# Patient Record
Sex: MALE | Race: WHITE | NOT HISPANIC OR LATINO | Employment: FULL TIME | URBAN - METROPOLITAN AREA
[De-identification: names, ages, dates, MRNs, and addresses within clinical notes are randomized per-mention and may not be internally consistent; named-entity substitution may affect disease eponyms.]

---

## 2017-01-06 ENCOUNTER — HOSPITAL ENCOUNTER (OUTPATIENT)
Dept: CARDIOLOGY | Facility: HOSPITAL | Age: 57
Setting detail: RECURRING SERIES
Discharge: HOME | End: 2017-01-06

## 2017-01-06 ENCOUNTER — TELEPHONE (OUTPATIENT)
Dept: CARDIOLOGY | Facility: CLINIC | Age: 57
End: 2017-01-06

## 2017-01-06 PROCEDURE — 85610 PROTHROMBIN TIME: CPT | Performed by: INTERNAL MEDICINE

## 2017-01-06 PROCEDURE — 36416 COLLJ CAPILLARY BLOOD SPEC: CPT | Performed by: INTERNAL MEDICINE

## 2017-01-06 NOTE — TELEPHONE ENCOUNTER
Pt calling, he was hired by the [facility]'s office & in their training he has to be tased & he has a mechanical heart valve, is this safe? Pt # [phone]. dmk

## 2017-02-03 ENCOUNTER — HOSPITAL ENCOUNTER (OUTPATIENT)
Dept: CARDIOLOGY | Facility: HOSPITAL | Age: 57
Setting detail: RECURRING SERIES
Discharge: HOME | End: 2017-02-03

## 2017-02-03 PROCEDURE — 36416 COLLJ CAPILLARY BLOOD SPEC: CPT

## 2017-02-03 PROCEDURE — 85610 PROTHROMBIN TIME: CPT

## 2017-03-03 ENCOUNTER — HOSPITAL ENCOUNTER (OUTPATIENT)
Dept: CARDIOLOGY | Facility: HOSPITAL | Age: 57
Setting detail: RECURRING SERIES
Discharge: HOME | End: 2017-03-03

## 2017-03-03 PROCEDURE — 36416 COLLJ CAPILLARY BLOOD SPEC: CPT

## 2017-03-03 PROCEDURE — 85610 PROTHROMBIN TIME: CPT

## 2017-03-06 RX ORDER — ATENOLOL 25 MG/1
25 TABLET ORAL DAILY
Qty: 30 TABLET | Refills: 0 | Status: SHIPPED | OUTPATIENT
Start: 2017-03-06 | End: 2017-04-09 | Stop reason: SDUPTHER

## 2017-03-31 ENCOUNTER — HOSPITAL ENCOUNTER (OUTPATIENT)
Dept: CARDIOLOGY | Facility: HOSPITAL | Age: 57
Setting detail: RECURRING SERIES
Discharge: HOME | End: 2017-03-31

## 2017-03-31 PROCEDURE — 85610 PROTHROMBIN TIME: CPT

## 2017-03-31 PROCEDURE — 36416 COLLJ CAPILLARY BLOOD SPEC: CPT

## 2017-04-10 RX ORDER — ATENOLOL 25 MG/1
TABLET ORAL
Qty: 30 TABLET | Refills: 0 | Status: SHIPPED | OUTPATIENT
Start: 2017-04-10 | End: 2017-05-04 | Stop reason: SDUPTHER

## 2017-05-04 ENCOUNTER — HOSPITAL ENCOUNTER (OUTPATIENT)
Dept: CARDIOLOGY | Facility: HOSPITAL | Age: 57
Setting detail: RECURRING SERIES
Discharge: HOME | End: 2017-05-04

## 2017-05-04 ENCOUNTER — OFFICE VISIT (OUTPATIENT)
Dept: CARDIOLOGY | Facility: CLINIC | Age: 57
End: 2017-05-04

## 2017-05-04 VITALS
DIASTOLIC BLOOD PRESSURE: 82 MMHG | HEIGHT: 78 IN | HEART RATE: 60 BPM | BODY MASS INDEX: 34.02 KG/M2 | SYSTOLIC BLOOD PRESSURE: 140 MMHG | WEIGHT: 294 LBS

## 2017-05-04 DIAGNOSIS — I34.0 NON-RHEUMATIC MITRAL REGURGITATION: ICD-10-CM

## 2017-05-04 DIAGNOSIS — I49.1 APC (ATRIAL PREMATURE CONTRACTIONS): ICD-10-CM

## 2017-05-04 DIAGNOSIS — I33.0 ACUTE BACTERIAL ENDOCARDITIS: Primary | ICD-10-CM

## 2017-05-04 DIAGNOSIS — I48.0 PAROXYSMAL ATRIAL FIBRILLATION (HCC): ICD-10-CM

## 2017-05-04 PROCEDURE — 93000 ELECTROCARDIOGRAM COMPLETE: CPT | Performed by: INTERNAL MEDICINE

## 2017-05-04 PROCEDURE — 36416 COLLJ CAPILLARY BLOOD SPEC: CPT

## 2017-05-04 PROCEDURE — 85610 PROTHROMBIN TIME: CPT

## 2017-05-04 PROCEDURE — 99214 OFFICE O/P EST MOD 30 MIN: CPT | Performed by: INTERNAL MEDICINE

## 2017-05-04 RX ORDER — ATENOLOL 25 MG/1
25 TABLET ORAL DAILY
Qty: 90 TABLET | Refills: 1 | Status: SHIPPED | OUTPATIENT
Start: 2017-05-04 | End: 2017-11-06 | Stop reason: SDUPTHER

## 2017-05-04 RX ORDER — WARFARIN SODIUM 5 MG/1
5 TABLET ORAL
Qty: 90 TABLET | Refills: 0 | Status: SHIPPED | OUTPATIENT
Start: 2017-05-04 | End: 2017-09-23 | Stop reason: SDUPTHER

## 2017-06-06 ENCOUNTER — HOSPITAL ENCOUNTER (OUTPATIENT)
Dept: CARDIOLOGY | Facility: HOSPITAL | Age: 57
Setting detail: RECURRING SERIES
Discharge: HOME | End: 2017-06-06

## 2017-06-06 PROCEDURE — 36416 COLLJ CAPILLARY BLOOD SPEC: CPT

## 2017-06-06 PROCEDURE — 85610 PROTHROMBIN TIME: CPT

## 2017-07-07 ENCOUNTER — TELEPHONE (OUTPATIENT)
Dept: CARDIOLOGY | Facility: CLINIC | Age: 57
End: 2017-07-07

## 2017-07-07 NOTE — TELEPHONE ENCOUNTER
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 Needs to go to the [facility] on "[employer]" fax# [phone]. Pt's # [phone] wants a call when complete. He knows you are out until Monday. dmk

## 2017-07-13 ENCOUNTER — HOSPITAL ENCOUNTER (OUTPATIENT)
Dept: CARDIOLOGY | Facility: HOSPITAL | Age: 57
Setting detail: RECURRING SERIES
Discharge: HOME | End: 2017-07-13

## 2017-07-13 PROCEDURE — 85610 PROTHROMBIN TIME: CPT

## 2017-07-13 PROCEDURE — 36416 COLLJ CAPILLARY BLOOD SPEC: CPT

## 2017-07-13 NOTE — TELEPHONE ENCOUNTER
The dictation system is still down nation wide.   This letter is scanned under the Media Tab and was also faxed, as directed earlier in the message stream, to the indicated number.  Thanks

## 2017-08-16 ENCOUNTER — HOSPITAL ENCOUNTER (OUTPATIENT)
Dept: CARDIOLOGY | Facility: HOSPITAL | Age: 57
Setting detail: RECURRING SERIES
Discharge: HOME | End: 2017-08-16

## 2017-08-16 PROCEDURE — 36416 COLLJ CAPILLARY BLOOD SPEC: CPT

## 2017-08-16 PROCEDURE — 85610 PROTHROMBIN TIME: CPT

## 2017-09-25 RX ORDER — WARFARIN SODIUM 5 MG/1
TABLET ORAL
Qty: 5 TABLET | Refills: 0 | Status: SHIPPED | OUTPATIENT
Start: 2017-09-25 | End: 2017-09-28 | Stop reason: SDUPTHER

## 2017-09-27 ENCOUNTER — HOSPITAL ENCOUNTER (OUTPATIENT)
Dept: CARDIOLOGY | Facility: HOSPITAL | Age: 57
Setting detail: RECURRING SERIES
Discharge: HOME | End: 2017-09-27

## 2017-09-27 PROCEDURE — 85610 PROTHROMBIN TIME: CPT

## 2017-09-27 PROCEDURE — 36416 COLLJ CAPILLARY BLOOD SPEC: CPT

## 2017-09-28 RX ORDER — WARFARIN SODIUM 5 MG/1
5 TABLET ORAL DAILY
Qty: 30 TABLET | Refills: 0 | Status: SHIPPED | OUTPATIENT
Start: 2017-09-28 | End: 2017-10-26 | Stop reason: SDUPTHER

## 2017-10-25 ENCOUNTER — HOSPITAL ENCOUNTER (OUTPATIENT)
Dept: CARDIOLOGY | Facility: HOSPITAL | Age: 57
Setting detail: RECURRING SERIES
Discharge: HOME | End: 2017-10-25

## 2017-10-25 PROCEDURE — 36416 COLLJ CAPILLARY BLOOD SPEC: CPT

## 2017-10-25 PROCEDURE — 85610 PROTHROMBIN TIME: CPT

## 2017-10-27 RX ORDER — WARFARIN SODIUM 5 MG/1
TABLET ORAL
Qty: 30 TABLET | Refills: 1 | Status: SHIPPED | OUTPATIENT
Start: 2017-10-27 | End: 2017-12-29 | Stop reason: SDUPTHER

## 2017-11-06 RX ORDER — ATENOLOL 25 MG/1
TABLET ORAL
Qty: 90 TABLET | Refills: 1 | Status: SHIPPED | OUTPATIENT
Start: 2017-11-06 | End: 2018-05-03 | Stop reason: SDUPTHER

## 2017-11-08 ENCOUNTER — HOSPITAL ENCOUNTER (OUTPATIENT)
Dept: CARDIOLOGY | Facility: HOSPITAL | Age: 57
Setting detail: RECURRING SERIES
Discharge: HOME | End: 2017-11-08

## 2017-11-08 PROCEDURE — 36416 COLLJ CAPILLARY BLOOD SPEC: CPT

## 2017-11-08 PROCEDURE — 85610 PROTHROMBIN TIME: CPT

## 2017-12-12 ENCOUNTER — HOSPITAL ENCOUNTER (OUTPATIENT)
Dept: CARDIOLOGY | Facility: HOSPITAL | Age: 57
Setting detail: RECURRING SERIES
Discharge: HOME | End: 2017-12-12

## 2017-12-12 PROCEDURE — 85610 PROTHROMBIN TIME: CPT

## 2017-12-12 PROCEDURE — 36416 COLLJ CAPILLARY BLOOD SPEC: CPT

## 2017-12-29 RX ORDER — WARFARIN SODIUM 5 MG/1
TABLET ORAL
Qty: 90 TABLET | Refills: 0 | Status: SHIPPED | OUTPATIENT
Start: 2017-12-29 | End: 2018-03-25 | Stop reason: SDUPTHER

## 2018-01-12 ENCOUNTER — HOSPITAL ENCOUNTER (OUTPATIENT)
Dept: CARDIOLOGY | Facility: HOSPITAL | Age: 58
Setting detail: RECURRING SERIES
Discharge: HOME | End: 2018-01-12

## 2018-01-12 PROCEDURE — 36416 COLLJ CAPILLARY BLOOD SPEC: CPT

## 2018-01-12 PROCEDURE — 85610 PROTHROMBIN TIME: CPT

## 2018-02-09 ENCOUNTER — HOSPITAL ENCOUNTER (OUTPATIENT)
Dept: CARDIOLOGY | Facility: HOSPITAL | Age: 58
Setting detail: RECURRING SERIES
Discharge: HOME | End: 2018-02-09

## 2018-02-09 PROCEDURE — 36416 COLLJ CAPILLARY BLOOD SPEC: CPT

## 2018-02-09 PROCEDURE — 85610 PROTHROMBIN TIME: CPT

## 2018-02-16 ENCOUNTER — HOSPITAL ENCOUNTER (OUTPATIENT)
Dept: CARDIOLOGY | Facility: HOSPITAL | Age: 58
Setting detail: RECURRING SERIES
Discharge: HOME | End: 2018-02-16

## 2018-02-16 PROCEDURE — 36416 COLLJ CAPILLARY BLOOD SPEC: CPT

## 2018-02-16 PROCEDURE — 85610 PROTHROMBIN TIME: CPT

## 2018-02-23 ENCOUNTER — HOSPITAL ENCOUNTER (OUTPATIENT)
Dept: CARDIOLOGY | Facility: HOSPITAL | Age: 58
Setting detail: RECURRING SERIES
Discharge: HOME | End: 2018-02-23

## 2018-02-23 PROCEDURE — 36416 COLLJ CAPILLARY BLOOD SPEC: CPT

## 2018-02-23 PROCEDURE — 85610 PROTHROMBIN TIME: CPT

## 2018-03-07 ENCOUNTER — HOSPITAL ENCOUNTER (OUTPATIENT)
Dept: CARDIOLOGY | Facility: HOSPITAL | Age: 58
Setting detail: RECURRING SERIES
Discharge: HOME | End: 2018-03-07

## 2018-03-07 PROCEDURE — 85610 PROTHROMBIN TIME: CPT

## 2018-03-07 PROCEDURE — 36416 COLLJ CAPILLARY BLOOD SPEC: CPT

## 2018-03-22 ENCOUNTER — HOSPITAL ENCOUNTER (OUTPATIENT)
Dept: CARDIOLOGY | Facility: HOSPITAL | Age: 58
Setting detail: RECURRING SERIES
Discharge: HOME | End: 2018-03-22

## 2018-03-22 PROCEDURE — 85610 PROTHROMBIN TIME: CPT

## 2018-03-22 PROCEDURE — 36416 COLLJ CAPILLARY BLOOD SPEC: CPT

## 2018-03-26 RX ORDER — WARFARIN SODIUM 5 MG/1
TABLET ORAL
Qty: 90 TABLET | Refills: 0 | Status: SHIPPED | OUTPATIENT
Start: 2018-03-26 | End: 2018-06-25 | Stop reason: SDUPTHER

## 2018-04-04 ENCOUNTER — HOSPITAL ENCOUNTER (OUTPATIENT)
Dept: CARDIOLOGY | Facility: HOSPITAL | Age: 58
Setting detail: RECURRING SERIES
Discharge: HOME | End: 2018-04-04

## 2018-04-04 PROCEDURE — 36416 COLLJ CAPILLARY BLOOD SPEC: CPT

## 2018-04-04 PROCEDURE — 85610 PROTHROMBIN TIME: CPT

## 2018-05-04 RX ORDER — ATENOLOL 25 MG/1
TABLET ORAL
Qty: 30 TABLET | Refills: 0 | Status: SHIPPED | OUTPATIENT
Start: 2018-05-04 | End: 2018-06-04 | Stop reason: SDUPTHER

## 2018-05-09 ENCOUNTER — HOSPITAL ENCOUNTER (OUTPATIENT)
Dept: CARDIOLOGY | Facility: HOSPITAL | Age: 58
Setting detail: RECURRING SERIES
Discharge: HOME | End: 2018-05-09

## 2018-05-09 PROCEDURE — 36416 COLLJ CAPILLARY BLOOD SPEC: CPT

## 2018-05-09 PROCEDURE — 85610 PROTHROMBIN TIME: CPT

## 2018-05-11 ENCOUNTER — TELEPHONE (OUTPATIENT)
Dept: CARDIOLOGY | Facility: CLINIC | Age: 58
End: 2018-05-11

## 2018-05-11 NOTE — TELEPHONE ENCOUNTER
He said it was ok to see Gabrielle, as long as he is ok with that. I made this telephone encounter for documentation. dmk

## 2018-05-11 NOTE — TELEPHONE ENCOUNTER
----- Message from Navi Fay MD sent at 5/11/2018  7:45 AM EDT -----  Regarding: RE: 1 YR FU  If ok with patient  ----- Message -----  From: Shanae Swift MA  Sent: 5/10/2018   4:30 PM  To: Navi Fay MD  Subject: FW: 1 YR FU                                      Is this ok for the patient to see Gabrielle?       ----- Message -----  From: Gemma Barreto  Sent: 5/10/2018   4:05 PM  To: Shanae Swift MA  Subject: 1 YR FU                                          Shanae,  Pako Donlon called today and is wanting a 1 year follow up appointment with Dr Calderón which would be in May but he only wants an appointment on a Wednesday. I went out to November and could not find one with Dr Fay and Pako is needing a refill on his meds.  I scheduled an appointment with TISH Chaudhry for 6/19/18 @ 2:00 pm.  Is this acceptable.  Just let me know.  Thank you,  Gemma

## 2018-05-23 ENCOUNTER — HOSPITAL ENCOUNTER (OUTPATIENT)
Dept: CARDIOLOGY | Facility: HOSPITAL | Age: 58
Setting detail: RECURRING SERIES
Discharge: HOME | End: 2018-05-23

## 2018-05-23 PROCEDURE — 36416 COLLJ CAPILLARY BLOOD SPEC: CPT

## 2018-05-23 PROCEDURE — 85610 PROTHROMBIN TIME: CPT

## 2018-06-04 RX ORDER — ATENOLOL 25 MG/1
25 TABLET ORAL DAILY
Qty: 30 TABLET | Refills: 0 | Status: SHIPPED | OUTPATIENT
Start: 2018-06-04 | End: 2018-07-04 | Stop reason: SDUPTHER

## 2018-06-19 ENCOUNTER — OFFICE VISIT (OUTPATIENT)
Dept: CARDIOLOGY | Facility: CLINIC | Age: 58
End: 2018-06-19

## 2018-06-19 ENCOUNTER — HOSPITAL ENCOUNTER (OUTPATIENT)
Dept: CARDIOLOGY | Facility: HOSPITAL | Age: 58
Setting detail: RECURRING SERIES
Discharge: HOME | End: 2018-06-19

## 2018-06-19 VITALS
BODY MASS INDEX: 30.42 KG/M2 | DIASTOLIC BLOOD PRESSURE: 94 MMHG | WEIGHT: 270 LBS | SYSTOLIC BLOOD PRESSURE: 140 MMHG | HEART RATE: 59 BPM

## 2018-06-19 DIAGNOSIS — I48.0 PAROXYSMAL ATRIAL FIBRILLATION (HCC): ICD-10-CM

## 2018-06-19 DIAGNOSIS — Z86.79 H/O BACTERIAL ENDOCARDITIS: ICD-10-CM

## 2018-06-19 DIAGNOSIS — I34.0 NON-RHEUMATIC MITRAL REGURGITATION: Primary | ICD-10-CM

## 2018-06-19 DIAGNOSIS — I33.0 ACUTE BACTERIAL ENDOCARDITIS: ICD-10-CM

## 2018-06-19 DIAGNOSIS — I49.1 APC (ATRIAL PREMATURE CONTRACTIONS): ICD-10-CM

## 2018-06-19 DIAGNOSIS — R94.31 ABNORMAL EKG: ICD-10-CM

## 2018-06-19 PROCEDURE — 99214 OFFICE O/P EST MOD 30 MIN: CPT | Performed by: NURSE PRACTITIONER

## 2018-06-19 PROCEDURE — 36416 COLLJ CAPILLARY BLOOD SPEC: CPT

## 2018-06-19 PROCEDURE — 85610 PROTHROMBIN TIME: CPT

## 2018-06-19 PROCEDURE — 93000 ELECTROCARDIOGRAM COMPLETE: CPT | Performed by: NURSE PRACTITIONER

## 2018-06-19 NOTE — PATIENT INSTRUCTIONS
Endocarditis  Endocarditis is an infection of the inner layer of the heart (endocardium) or an infection of the heart valves. Endocarditis can cause growths inside the heart or on the heart valves. Over time, these growths can destroy heart tissue and cause heart failure or problems with heart rhythm. They can also cause stroke if they break away and form a blood clot in the brain. Early treatment offers the best chance for curing endocarditis and preventing complications.  What are the causes?  This condition may be caused by:  · Germs that normally live in or on your body. The germs that most commonly cause endocarditis are bacteria.  · A fungus.    What increases the risk?  This condition is more likely to develop in people who have:  · A heart defect.  · Artificial (prosthetic) heart valves.  · An abnormal or damaged heart valve.  · A history of endocarditis.    Having certain procedures may also increase the risk of germs getting into the heart or bloodstream.  What are the signs or symptoms?  Symptoms of this condition may start suddenly, or they may start slowly and gradually get worse.  Symptoms include:  · Fever.  · Chills.  · Night sweats.  · Muscle aches.  · Fatigue.  · Weakness.  · Shortness of breath.  · Chest pain.  · Blood spots in the eyes.  · Bleeding under the fingernails or toenails.  · Painless red spots on the palms.  · Painful lumps in the fingertips or toes.  · Swelling in the feet or ankles.    How is this diagnosed?  This condition may be diagnosed based on:  · A physical exam. Your health care provider will listen to your heart to check for abnormal heart sounds (murmur). He or she may also use a scope to check for bleeding at the back of your eyes (retinas).  · Tests. They may include:  ? Blood tests to look for the germs that cause endocarditis.  ? Imaging tests. A chest x-ray, CT scan, or echocardiogram may be used to create an image of your heart. A type of echocardiogram called a  transesophageal echocardiogram may be done to look at certain heart valves more closely.    How is this treated?  Treatment for this condition depends on the cause of the endocarditis. Treatment may include:  · Antibiotic medicines. These may be given through a tube into one of your veins (IV antibiotics) or taken by mouth. You may need to be on more than one antibiotic medicine.  · Surgery to replace your heart valve. You may need surgery if:  ? The endocarditis does not respond to treatment.  ? You develop complications.  ? Your heart valve is severely damaged.    Follow these instructions at home:  Medicines  · Take over-the-counter and prescription medicines only as told by your health care provider.  · If you were prescribed an antibiotic medicine, take it as told by your health care provider. Do not stop taking the antibiotic even if you start to feel better. You may need to be on intravenous antibiotics for several weeks.  · Do not use IV drugs unless it is part of your medical treatment.  Lifestyle  · Do not get tattoos or body piercings.  · Practice good oral hygiene. This includes:  ? Brushing and flossing regularly.  ? Scheduling routine dental appointments.  · Do not use any products that contain nicotine or tobacco, such as cigarettes and e-cigarettes. If you need help quitting, ask your health care provider.  · Limit alcohol intake to no more than 1 drink a day for nonpregnant women and 2 drinks a day for men. One drink equals 12 oz of beer, 5 oz of wine, or 1½ oz of hard liquor.  General instructions  · Let your health care provider know before you have any dental or surgical procedures. You may need to take antibiotics before the procedure.  · Tell all of your health care providers, including your dentist, that you have had endocarditis.  · Gradually resume your usual activities.  · Keep all follow-up visits as told by your health care provider. This is important.  Contact a health care provider  if:  · You have a fever.  · Your symptoms do not improve.  · Your symptoms get worse.  · Your symptoms come back.  Get help right away if:  · You have trouble breathing.  · You have chest pain.  · You have symptoms of a stroke. These include:  ? Sudden weakness.  ? Numbness.  ? Confusion.  ? Trouble talking or understanding.  ? A severe headache.  Summary  · Endocarditis is an infection of the inner layer of the heart (endocardium) or heart valves. It is caused by bacteria or a fungus.  · Having certain heart conditions or procedures may increase the risk of endocarditis.  · Antibiotics are an important treatment for endocarditis. Take these medicines as told by your health care provider. Do not stop taking them even if you start to feel better.  · Tell all of your health care providers, including your dentist, that you have had endocarditis.  This information is not intended to replace advice given to you by your health care provider. Make sure you discuss any questions you have with your health care provider.  Document Released: 12/18/2006 Document Revised: 09/29/2017 Document Reviewed: 09/29/2017  [employer] Interactive Patient Education © 2018 [employer]

## 2018-06-19 NOTE — PROGRESS NOTES
Date of Office Visit: 2018  Encounter Provider: COREY Garces  Place of Service: [facility] CARDIOLOGY  Patient Name: Elliot Sebastian  :1960    Chief Complaint   Patient presents with   • Atrial Fibrillation   • Congestive Heart Failure   • Cardiac Valve Problem   • Follow-up   :     HPI: Elliot Sebastian is a 58 y.o. male is a patient of Dr. Fay. I am seeing him today and have reviewed the records.     His past medical history is significant of Paroxysmal atrial fibrillation, bacterial endocarditis, mitral valve insufficiency status post mitral valve replacement, bilateral pleural effusion, pulmonary hypertension, gout, DVT, and BPH.    He had a history of severe mitral regurgitation and acute bacterial endocarditis.  He had some congestive heart failure and eventually underwent a [place] valve replacement of the mitral valve in 2013 by .  He recovered well from that.  He had atrial fibrillation in the perioperative period and was on amiodarone at one point.  This was discontinued in .  He had no further recurrence of atrial fibrillation.    He was last seen in the office in May 2017 and reported that every now and then he had palpitations.  There were no adjustments made to his medication regimen and he was to return in one year.    He presents today for annual reassessment. He denies palpitation, shortness of breath, edema, lightheadedness, chest pain, or fatigue.  Approximately one month ago he started to rise and a couple times a week.  He exercises for 1-2 miles at a time and incorporate some walking.  He denies shortness of breath or chest discomfort with that.  He does have snoring history but denies apnea.  He denies daytime fatigue or sleepiness.  He tells me that 4 months ago he began a new job and he is now on third shift.  He is working a [employer].  January plan start at the [employer] if he passes his physical.  He does  not have a blood pressure cuff at home and has not been able to check his blood pressures.      No Known Allergies    Past Medical History:   Diagnosis Date   • Acute bacterial endocarditis    • Anemia    • APC (atrial premature contractions)    • Atrial fibrillation    • BPH (benign prostatic hypertrophy)    • CHF (congestive heart failure)    • Cholelithiasis    • Chronic constipation    • Deep vein thrombophlebitis of leg     DISTAL   • Disease of thyroid gland    • Gout    • Intestinal obstruction    • Ischemic enteritis    • Left heart failure, NYHA class 3     CLASS 111 LEFT SYSTOIC CONGESTIVE HEART FAILURE   • Mitral regurgitation    • Pleural effusion, bilateral    • Pulmonary hypertension    • Superior mesenteric artery aneurysm    • Umbilical hernia    • Vitamin D deficiency        Past Surgical History:   Procedure Laterality Date   • APPENDECTOMY     • CHOLECYSTECTOMY     • EXPLORATION NECK FOR POSTOP HEMORRHAGE / THROMBOSIS / INFECTION     • MITRAL VALVE REPLACEMENT  01/03/2013    33MM [employer] MECHANICAL VALVE, PRESERVATION OF CORDS TO SUBVALVULAR APPARATUS AND DOROTHY GORE-FLORI CORD IN THE P2 AREA, DEBRIDEMENT OF ANTERIOR AND POSTERIOR MITRAL LEAFLET      Family and social history reviewed.     Review of Systems     All other systems were reviewed and are negative        Objective:     Vitals:    06/19/18 1412   BP: 140/94   BP Location: Left arm   Patient Position: Sitting   Pulse: 59   Weight: 122 kg (270 lb)     Body mass index is 30.42 kg/m².    PHYSICAL EXAM:  Physical Exam   Constitutional: He is oriented to person, place, and time. He appears well-developed and well-nourished. No distress.   HENT:   Head: Normocephalic.   Eyes: Conjunctivae are normal.   Neck: Normal range of motion. No JVD present.   Cardiovascular: Normal rate, regular rhythm and intact distal pulses.    Murmur heard.   Systolic murmur is present  Prosthetic S1  Pulses:       Carotid pulses are 2+ on the right side, and 2+ on the  left side.       Radial pulses are 2+ on the right side, and 2+ on the left side.        Posterior tibial pulses are 2+ on the right side, and 2+ on the left side.   Pulmonary/Chest: Effort normal and breath sounds normal. No respiratory distress. He has no wheezes. He has no rhonchi. He has no rales. He exhibits no tenderness.   Abdominal: Soft. Bowel sounds are normal. He exhibits no distension.   Musculoskeletal: Normal range of motion. He exhibits no edema.   Neurological: He is alert and oriented to person, place, and time.   Skin: Skin is warm, dry and intact. No rash noted. He is not diaphoretic. No cyanosis.   Psychiatric: He has a normal mood and affect. His behavior is normal. Judgment and thought content normal.         ECG 12 Lead  Date/Time: 6/19/2018 2:24 PM  Performed by: ALLIE PETERS  Authorized by: ALLIE PETERS   Comparison: compared with previous ECG from 5/4/2017  Similar to previous ECG  Rhythm: sinus rhythm  Rate: bradycardic  BPM: 59  Conduction: 1st degree  ST Segments: ST segments normal  T depression: III and aVF  QRS axis: left  Clinical impression: abnormal ECG  Comments: More prominent t wave inversion in inferior leads             Current Outpatient Prescriptions   Medication Sig Dispense Refill   • ascorbic acid (TH VITAMIN C) 1000 MG tablet Take 2,000 mg by mouth.     • atenolol (TENORMIN) 25 MG tablet Take 1 tablet by mouth Daily. 30 tablet 0   • Cholecalciferol (VITAMIN D3) 2000 UNITS capsule Take 5,000 Units by mouth.     • Multiple Vitamins-Minerals (MENS MULTIVITAMIN PLUS) tablet Take by mouth.     • warfarin (COUMADIN) 5 MG tablet TAKE ONE TABLET BY MOUTH ONCE DAILY 90 tablet 0     No current facility-administered medications for this visit.      Assessment:       Diagnosis Plan   1. Non-rheumatic mitral regurgitation     2. Paroxysmal atrial fibrillation  ECG 12 Lead   3. H/O bacterial endocarditis     4. APC (atrial premature contractions)     5. Abnormal EKG  Treadmill  Stress Test   6. Acute bacterial endocarditis          Orders Placed This Encounter   Procedures   • Treadmill Stress Test     Standing Status:   Future     Standing Expiration Date:   6/19/2019     Order Specific Question:   What stress agent will be used?     Answer:   Exercise with possible pharmacologic     Order Specific Question:   Reason for Exam:     Answer:   abnormal EKG   • ECG 12 Lead     This order was created via procedure documentation       Plan:       1. Paroxysmal atrial fibrillation this was in the perioperative setting. No recurrence    2. History of bacterial endocarditis and severe mitral regurgitation status post mitral valve replacement. remains on coumadin    3. Long term anticoagulant on warfarin-INR checked in our clinic    4. History of DVT- prior to 01/2013.     5. Abnormal EKG- more prominent T wave inversion in lead II, and new T wave inversion in avF. Treadmill only stress test ordered.     6. Elevated blood pressure- he is to monitor at local pharmacies and call if >140/90. I explained goal BP is <130/80. He verbalized understanding    Follow up in  5-6 months with Dr. Fay    Patient was instructed to call the office if new symptoms develop or report to nearest ER if heart attack or stroke is suspected.    It has been a pleasure to participate in this patient's care.      Thank you,  COREY Garces      **Lu Disclaimer:**  Much of this encounter note is an electronic transcription/translation of spoken language to printed text. The electronic translation of spoken language may permit erroneous, or at times, nonsensical words or phrases to be inadvertently transcribed. Although I have reviewed the note for such errors, some may still exist.

## 2018-06-25 RX ORDER — WARFARIN SODIUM 5 MG/1
5 TABLET ORAL DAILY
Qty: 90 TABLET | Refills: 0 | Status: SHIPPED | OUTPATIENT
Start: 2018-06-25 | End: 2018-09-29 | Stop reason: SDUPTHER

## 2018-07-05 RX ORDER — ATENOLOL 25 MG/1
TABLET ORAL
Qty: 30 TABLET | Refills: 5 | Status: SHIPPED | OUTPATIENT
Start: 2018-07-05 | End: 2018-12-29 | Stop reason: SDUPTHER

## 2018-07-11 ENCOUNTER — HOSPITAL ENCOUNTER (OUTPATIENT)
Dept: CARDIOLOGY | Facility: HOSPITAL | Age: 58
Discharge: HOME | End: 2018-07-11
Admitting: NURSE PRACTITIONER

## 2018-07-11 ENCOUNTER — HOSPITAL ENCOUNTER (OUTPATIENT)
Dept: CARDIOLOGY | Facility: HOSPITAL | Age: 58
Setting detail: RECURRING SERIES
Discharge: HOME | End: 2018-07-11

## 2018-07-11 DIAGNOSIS — R94.31 ABNORMAL EKG: ICD-10-CM

## 2018-07-11 LAB
BH CV STRESS BP STAGE 1: NORMAL
BH CV STRESS BP STAGE 2: NORMAL
BH CV STRESS BP STAGE 3: NORMAL
BH CV STRESS DURATION MIN STAGE 1: 3
BH CV STRESS DURATION MIN STAGE 2: 3
BH CV STRESS DURATION MIN STAGE 3: 3
BH CV STRESS DURATION SEC STAGE 1: 0
BH CV STRESS DURATION SEC STAGE 2: 0
BH CV STRESS DURATION SEC STAGE 3: 0
BH CV STRESS GRADE STAGE 1: 10
BH CV STRESS GRADE STAGE 2: 12
BH CV STRESS GRADE STAGE 3: 14
BH CV STRESS HR STAGE 1: 97
BH CV STRESS HR STAGE 2: 120
BH CV STRESS HR STAGE 3: 149
BH CV STRESS METS STAGE 1: 5
BH CV STRESS METS STAGE 2: 7.5
BH CV STRESS METS STAGE 3: 10
BH CV STRESS PROTOCOL 1: NORMAL
BH CV STRESS RECOVERY BP: NORMAL MMHG
BH CV STRESS RECOVERY HR: 93 BPM
BH CV STRESS SPEED STAGE 1: 1.7
BH CV STRESS SPEED STAGE 2: 2.5
BH CV STRESS SPEED STAGE 3: 3.4
BH CV STRESS STAGE 1: 1
BH CV STRESS STAGE 2: 2
BH CV STRESS STAGE 3: 3
MAXIMAL PREDICTED HEART RATE: 163 BPM
PERCENT MAX PREDICTED HR: 91.41 %
STRESS BASELINE BP: NORMAL MMHG
STRESS BASELINE HR: 74 BPM
STRESS PERCENT HR: 108 %
STRESS POST ESTIMATED WORKLOAD: 10 METS
STRESS POST EXERCISE DUR MIN: 9 MIN
STRESS POST EXERCISE DUR SEC: 0 SEC
STRESS POST PEAK BP: NORMAL MMHG
STRESS POST PEAK HR: 149 BPM
STRESS TARGET HR: 139 BPM

## 2018-07-11 PROCEDURE — 36416 COLLJ CAPILLARY BLOOD SPEC: CPT

## 2018-07-11 PROCEDURE — 93017 CV STRESS TEST TRACING ONLY: CPT

## 2018-07-11 PROCEDURE — 93018 CV STRESS TEST I&R ONLY: CPT | Performed by: INTERNAL MEDICINE

## 2018-07-11 PROCEDURE — 93016 CV STRESS TEST SUPVJ ONLY: CPT | Performed by: INTERNAL MEDICINE

## 2018-07-11 PROCEDURE — 85610 PROTHROMBIN TIME: CPT

## 2018-08-27 ENCOUNTER — HOSPITAL ENCOUNTER (OUTPATIENT)
Dept: CARDIOLOGY | Facility: HOSPITAL | Age: 58
Setting detail: RECURRING SERIES
Discharge: HOME | End: 2018-08-27

## 2018-08-27 PROCEDURE — 85610 PROTHROMBIN TIME: CPT

## 2018-08-27 PROCEDURE — 36416 COLLJ CAPILLARY BLOOD SPEC: CPT

## 2018-09-21 ENCOUNTER — HOSPITAL ENCOUNTER (OUTPATIENT)
Dept: CARDIOLOGY | Facility: HOSPITAL | Age: 58
Setting detail: RECURRING SERIES
Discharge: HOME | End: 2018-09-21

## 2018-09-21 PROCEDURE — 85610 PROTHROMBIN TIME: CPT

## 2018-09-21 PROCEDURE — 36416 COLLJ CAPILLARY BLOOD SPEC: CPT

## 2018-10-01 RX ORDER — WARFARIN SODIUM 5 MG/1
TABLET ORAL
Qty: 90 TABLET | Refills: 0 | Status: SHIPPED | OUTPATIENT
Start: 2018-10-01 | End: 2018-12-29 | Stop reason: SDUPTHER

## 2018-10-19 ENCOUNTER — ANTICOAGULATION VISIT (OUTPATIENT)
Dept: PHARMACY | Facility: HOSPITAL | Age: 58
End: 2018-10-19

## 2018-10-19 DIAGNOSIS — Z95.2 HX OF MITRAL VALVE REPLACEMENT WITH MECHANICAL VALVE: ICD-10-CM

## 2018-10-19 DIAGNOSIS — I48.0 PAROXYSMAL ATRIAL FIBRILLATION (HCC): ICD-10-CM

## 2018-10-19 LAB
INR PPP: 2.6 (ref 0.91–1.09)
PROTHROMBIN TIME: 31.3 SECONDS (ref 10–13.8)

## 2018-10-19 PROCEDURE — 85610 PROTHROMBIN TIME: CPT

## 2018-10-19 PROCEDURE — G0463 HOSPITAL OUTPT CLINIC VISIT: HCPCS

## 2018-10-19 PROCEDURE — 36416 COLLJ CAPILLARY BLOOD SPEC: CPT

## 2018-10-19 NOTE — PROGRESS NOTES
Anticoagulation Clinic Progress Note  Anticoagulation Summary  As of 10/19/2018    INR goal:   2.5-3.5   TTR:   --   Today's INR:   2.6   Warfarin maintenance plan:   2.5 mg on Wed; 5 mg all other days   Weekly warfarin total:   32.5 mg   Plan last modified:   Sammie Holbrook RPH (10/19/2018)   Next INR check:   2018   Target end date:   Indefinite    Indications    Atrial fibrillation (CMS/HCC) [I48.91]  Hx of mitral valve replacement with mechanical valve [Z95.2] [Z95.2]             Anticoagulation Episode Summary     INR check location:       Preferred lab:       Send INR reminders to:   [facility]    Comments:         Anticoagulation Care Providers     Provider Role Specialty Phone number    Navi Fay MD Referring Cardiology [phone]    Sammie Holbrook RPH Responsible Pharmacy             Drug interactions: no new meds  Diet: consistent    Clinic Interview:  Patient Findings     Negatives:   Signs/symptoms of thrombosis, Signs/symptoms of bleeding,   Laboratory test error suspected, Change in health, Change in alcohol use,   Change in activity, Upcoming invasive procedure, Emergency department   visit, Upcoming dental procedure, Missed doses, Extra doses, Change in   medications, Change in diet/appetite, Hospital admission, Bruising, Other   complaints      Clinical Outcomes     Negatives:   Major bleeding event, Thromboembolic event,   Anticoagulation-related hospital admission, Anticoagulation-related ED   visit, Anticoagulation-related fatality        Education:  Elliot Sebastian is a new start in the [facility]. We discussed the followin) Warfarin's indication, mechanism, and dosing  2) Enforced the importance of taking warfarin as instructed and at the same time every day, preferably in the evening so that we can make dose adjustments more easily following subsequent clinic visits  3) What he should do about a missed dose; pts can take missed doses  within about 12 hours of their usual scheduled dose, but he was instructed on the importance of not doubling up on doses unless told to do so by the [facility]  4) Explained possible side effects of warfarin therapy, including increased risk of bleeding, s/sx of bleeding and s/sx of any additional clots/PE/CVA.   5) Discussed monitoring of warfarin, the INR, goal INR range, and the frequency of monitoring  6) Reviewed drug/food/tobacco/EtOH interactions and provided written information covering these topics in more detail, explaining that green, leafy vegetables interact most heavily with warfarin  7) Instructed the pt not to take or discontinue any medications without informing his physician/pharmacist and reminded him to inform us of any dietary changes, as well  8) Explained that he would be coming into the clinic more frequently in these first few weeks of therapy as we try to adjust his dose and achieve a therapeutic INR x 2 consecutive readings. Once that is achieved, patient will follow up in clinic every 4 weeks, on average.    He stated no problems with transportation or scheduling clinic appts in this manner. he expressed understanding of the information provided and has no additional questions at this time.    Elliotkishore Sebastian was presented with a copy of the Patients Rights and Responsibilities. he expressed verbal consent and agreement to receive care in the [facility] under the current collaborative care agreement with [facility].       INR History:  Previous INR data from [facility] is recorded in [facility] and scanned into the patient's chart in [facility]. These results have been analyzed and reviewed.      Plan:  1. INR is Therapeutic today- see above in Anticoagulation Summary.   Will instruct Elliot Sebastian to Continue their warfarin regimen- see above in Anticoagulation Summary.  2. Follow up in 2 weeks  3. Patient declines warfarin  refills.  4. Verbal and written information provided. Patient expresses understanding and has no further questions at this time.    Sammie Holbrook [facility]

## 2018-11-02 ENCOUNTER — ANTICOAGULATION VISIT (OUTPATIENT)
Dept: PHARMACY | Facility: HOSPITAL | Age: 58
End: 2018-11-02

## 2018-11-02 DIAGNOSIS — Z95.2 HX OF MITRAL VALVE REPLACEMENT WITH MECHANICAL VALVE: ICD-10-CM

## 2018-11-02 LAB
INR PPP: 2 (ref 0.91–1.09)
PROTHROMBIN TIME: 24 SECONDS (ref 10–13.8)

## 2018-11-02 PROCEDURE — 85610 PROTHROMBIN TIME: CPT

## 2018-11-02 PROCEDURE — G0463 HOSPITAL OUTPT CLINIC VISIT: HCPCS

## 2018-11-02 PROCEDURE — 36416 COLLJ CAPILLARY BLOOD SPEC: CPT

## 2018-11-02 NOTE — PROGRESS NOTES
Anticoagulation Clinic Progress Note    Anticoagulation Summary  As of 11/2/2018    INR goal:   2.5-3.5   TTR:   0.0 % (4 d)   Today's INR:   2.0!   Warfarin maintenance plan:   5 mg every day   Weekly warfarin total:   35 mg   Plan last modified:   Sammie Holbrook RPH (11/2/2018)   Next INR check:   11/16/2018   Target end date:   Indefinite    Indications    Atrial fibrillation (CMS/HCC) [I48.91]  Hx of mitral valve replacement with mechanical valve [Z95.2] [Z95.2]             Anticoagulation Episode Summary     INR check location:       Preferred lab:       Send INR reminders to:   [facility] [place]    Comments:         Anticoagulation Care Providers     Provider Role Specialty Phone number    Navi Fay MD Referring Cardiology [phone]    Sammie Holbrook RPH Responsible Pharmacy           Drug interactions: has remained unchanged.  Diet: has remained unchanged.    Clinic Interview:  Patient Findings     Negatives:   Signs/symptoms of thrombosis, Signs/symptoms of bleeding,   Laboratory test error suspected, Change in health, Change in alcohol use,   Change in activity, Upcoming invasive procedure, Emergency department   visit, Upcoming dental procedure, Missed doses, Extra doses, Change in   medications, Change in diet/appetite, Hospital admission, Bruising, Other   complaints      Clinical Outcomes     Negatives:   Major bleeding event, Thromboembolic event,   Anticoagulation-related hospital admission, Anticoagulation-related ED   visit, Anticoagulation-related fatality        INR History:  Anticoagulation Monitoring 10/19/2018 11/2/2018   INR 2.6 2.0   INR Date 10/19/2018 11/2/2018   INR Goal 2.5-3.5 2.5-3.5   Trend - Up   Last Week Total 0 mg 32.5 mg   Next Week Total 32.5 mg 35 mg   Sun 5 mg 5 mg   Mon 5 mg 5 mg   Tue 5 mg 5 mg   Wed 2.5 mg 5 mg   Thu 5 mg 5 mg   Fri 5 mg 5 mg   Sat 5 mg 5 mg   Visit Report - -       Previous INR data from [facility] is recorded in [facility] and scanned into the patient's chart in [facility]. These results have been analyzed and reviewed.      Plan:  1. INR is Subtherapeutic today- see above in Anticoagulation Summary.  Will instruct Elliot Sebastian to Increase their warfarin regimen- see above in Anticoagulation Summary.  2. Follow up in 2 weeks  3. Patient declines warfarin refills.  4. Verbal and written information provided. Patient expresses understanding and has no further questions at this time.    Sammie Holbrook RPH

## 2018-11-16 ENCOUNTER — ANTICOAGULATION VISIT (OUTPATIENT)
Dept: PHARMACY | Facility: HOSPITAL | Age: 58
End: 2018-11-16

## 2018-11-16 DIAGNOSIS — Z95.2 HX OF MITRAL VALVE REPLACEMENT WITH MECHANICAL VALVE: ICD-10-CM

## 2018-11-16 LAB
INR PPP: 3.2 (ref 0.91–1.09)
PROTHROMBIN TIME: 38.5 SECONDS (ref 10–13.8)

## 2018-11-16 PROCEDURE — 85610 PROTHROMBIN TIME: CPT

## 2018-11-16 PROCEDURE — 36416 COLLJ CAPILLARY BLOOD SPEC: CPT

## 2018-11-16 NOTE — PROGRESS NOTES
Anticoagulation Clinic Progress Note    Anticoagulation Summary  As of 11/16/2018    INR goal:   2.5-3.5   TTR:   43.8 % (2.6 wk)   INR used for dosing:   3.2 (11/16/2018)   Warfarin maintenance plan:   5 mg every day   Weekly warfarin total:   35 mg   No change documented:   Manisha Benjamin   Plan last modified:   Sammie Holbrook RPH (11/2/2018)   Next INR check:   11/30/2018   Priority:   High   Target end date:   Indefinite    Indications    Atrial fibrillation (CMS/HCC) [I48.91]  Hx of mitral valve replacement with mechanical valve [Z95.2] [Z95.2]             Anticoagulation Episode Summary     INR check location:       Preferred lab:       Send INR reminders to:    CHARLES MENDEZ [facility]    Comments:         Anticoagulation Care Providers     Provider Role Specialty Phone number    Navi Fay MD Referring Cardiology [phone]    Sammie Holbrook RPH Responsible Pharmacy [phone]          Drug interactions: has remained unchanged.  Diet: has remained unchanged.    Clinic Interview:  Patient Findings     Negatives:   Signs/symptoms of thrombosis, Signs/symptoms of bleeding,   Laboratory test error suspected, Change in health, Change in alcohol use,   Change in activity, Upcoming invasive procedure, Emergency department   visit, Upcoming dental procedure, Missed doses, Extra doses, Change in   medications, Change in diet/appetite, Hospital admission, Bruising, Other   complaints      Clinical Outcomes     Negatives:   Major bleeding event, Thromboembolic event,   Anticoagulation-related hospital admission, Anticoagulation-related ED   visit, Anticoagulation-related fatality        INR History:  Anticoagulation Monitoring 10/19/2018 11/2/2018 11/16/2018   INR 2.6 2.0 3.2   INR Date 10/19/2018 11/2/2018 11/16/2018   INR Goal 2.5-3.5 2.5-3.5 2.5-3.5   Trend - Up Same   Last Week Total 0 mg 32.5 mg 35 mg   Next Week Total 32.5 mg 35 mg 35 mg   Sun 5 mg 5 mg 5 mg   Mon 5 mg 5 mg 5 mg   Tue 5 mg 5 mg 5  mg   Wed 2.5 mg 5 mg 5 mg   Thu 5 mg 5 mg 5 mg   Fri 5 mg 5 mg 5 mg   Sat 5 mg 5 mg 5 mg   Visit Report - - -   Some recent data might be hidden       Plan:  1. INR is therapeutic today- see above in Anticoagulation Summary.   Will instruct Elliot Sebastian to continue their warfarin regimen- see above in Anticoagulation Summary.  2. Follow up in 2 weeks.  3. Patient declines warfarin refills.  4. Verbal and written information provided. Patient expresses understanding and has no further questions at this time.    Manisha Benjamin

## 2018-11-30 ENCOUNTER — ANTICOAGULATION VISIT (OUTPATIENT)
Dept: PHARMACY | Facility: HOSPITAL | Age: 58
End: 2018-11-30

## 2018-11-30 DIAGNOSIS — Z95.2 HX OF MITRAL VALVE REPLACEMENT WITH MECHANICAL VALVE: ICD-10-CM

## 2018-11-30 LAB
INR PPP: 2.8 (ref 0.91–1.09)
PROTHROMBIN TIME: 34.2 SECONDS (ref 10–13.8)

## 2018-11-30 PROCEDURE — 36416 COLLJ CAPILLARY BLOOD SPEC: CPT

## 2018-11-30 PROCEDURE — 85610 PROTHROMBIN TIME: CPT

## 2018-11-30 NOTE — PROGRESS NOTES
Anticoagulation Clinic Progress Note    Anticoagulation Summary  As of 2018    INR goal:   2.5-3.5   TTR:   67.9 % (1.1 mo)   INR used for dosin.8 (2018)   Warfarin maintenance plan:   5 mg every day   Weekly warfarin total:   35 mg   No change documented:   Sara Mukherjee   Plan last modified:   Sammie Holbrook RPH (2018)   Next INR check:   2018   Priority:   High   Target end date:   Indefinite    Indications    Atrial fibrillation (CMS/HCC) [I48.91]  Hx of mitral valve replacement with mechanical valve [Z95.2] [Z95.2]             Anticoagulation Episode Summary     INR check location:       Preferred lab:       Send INR reminders to:    CHARLES MENDEZ [facility]    Comments:         Anticoagulation Care Providers     Provider Role Specialty Phone number    Navi Fay MD Referring Cardiology [phone]    Sammie Holbrook RPH Responsible Pharmacy [phone]          Drug interactions: has remained unchanged.  Diet: has remained unchanged.    Clinic Interview:  Patient Findings     Negatives:   Signs/symptoms of thrombosis, Signs/symptoms of bleeding,   Laboratory test error suspected, Change in health, Change in alcohol use,   Change in activity, Upcoming invasive procedure, Emergency department   visit, Upcoming dental procedure, Missed doses, Extra doses, Change in   medications, Change in diet/appetite, Hospital admission, Bruising, Other   complaints      Clinical Outcomes     Negatives:   Major bleeding event, Thromboembolic event,   Anticoagulation-related hospital admission, Anticoagulation-related ED   visit, Anticoagulation-related fatality        INR History:  Anticoagulation Monitoring 2018   INR 2.0 3.2 2.8   INR Date 2018   INR Goal 2.5-3.5 2.5-3.5 2.5-3.5   Trend Up Same Same   Last Week Total 32.5 mg 35 mg 35 mg   Next Week Total 35 mg 35 mg 35 mg   Sun 5 mg 5 mg 5 mg   Mon 5 mg 5 mg 5 mg   Tue 5 mg 5 mg  5 mg   Wed 5 mg 5 mg 5 mg   Thu 5 mg 5 mg 5 mg   Fri 5 mg 5 mg 5 mg   Sat 5 mg 5 mg 5 mg   Visit Report - - -   Some recent data might be hidden       Plan:  1. INR is therapeutic today- see above in Anticoagulation Summary.   Will instruct Elliot Sebastian to continue their warfarin regimen- see above in Anticoagulation Summary.  2. Follow up in 4 weeks.  3. Patient declines warfarin refills.  4. Verbal and written information provided. Patient expresses understanding and has no further questions at this time.    Sara Mukherjee

## 2018-12-28 ENCOUNTER — ANTICOAGULATION VISIT (OUTPATIENT)
Dept: PHARMACY | Facility: HOSPITAL | Age: 58
End: 2018-12-28

## 2018-12-28 DIAGNOSIS — Z95.2 HX OF MITRAL VALVE REPLACEMENT WITH MECHANICAL VALVE: ICD-10-CM

## 2018-12-28 LAB
INR PPP: 2.2 (ref 0.91–1.09)
PROTHROMBIN TIME: 26.2 SECONDS (ref 10–13.8)

## 2018-12-28 PROCEDURE — 85610 PROTHROMBIN TIME: CPT

## 2018-12-28 PROCEDURE — 36416 COLLJ CAPILLARY BLOOD SPEC: CPT

## 2018-12-28 PROCEDURE — G0463 HOSPITAL OUTPT CLINIC VISIT: HCPCS

## 2018-12-28 NOTE — PROGRESS NOTES
Anticoagulation Clinic Progress Note    Anticoagulation Summary  As of 2018    INR goal:   2.5-3.5   TTR:   59.6 % (2 mo)   INR used for dosin.2! (2018)   Warfarin maintenance plan:   5 mg every day   Weekly warfarin total:   35 mg   Plan last modified:   Sammie Holbrook RPH (2018)   Next INR check:   2019   Priority:   High   Target end date:   Indefinite    Indications    Atrial fibrillation (CMS/HCC) [I48.91]  Hx of mitral valve replacement with mechanical valve [Z95.2] [Z95.2]             Anticoagulation Episode Summary     INR check location:       Preferred lab:       Send INR reminders to:    [facility]    Comments:         Anticoagulation Care Providers     Provider Role Specialty Phone number    Navi Fay MD Referring Cardiology [phone]    Sammie Holbrook RPH Responsible Pharmacy [phone]          Clinic Interview:  Patient Findings     Positives:   Change in alcohol use, Change in diet/appetite    Comments:   Extra greens and alcohol this week. Holiday eating.        Clinical Outcomes     Comments:   Extra greens and alcohol this week. Holiday eating.          INR History:  Anticoagulation Monitoring 2018   INR 3.2 2.8 2.2   INR Date 2018   INR Goal 2.5-3.5 2.5-3.5 2.5-3.5   Trend Same Same Same   Last Week Total 35 mg 35 mg 35 mg   Next Week Total 35 mg 35 mg 37.5 mg   Sun 5 mg 5 mg 5 mg   Mon 5 mg 5 mg 5 mg   Tue 5 mg 5 mg 5 mg   Wed 5 mg 5 mg 5 mg   Thu 5 mg 5 mg 5 mg   Fri 5 mg 5 mg 7.5 mg (); Otherwise 5 mg   Sat 5 mg 5 mg 5 mg   Visit Report - - -   Some recent data might be hidden       Plan:  1. INR is Subtherapeutic today- see above in Anticoagulation Summary.  Will instruct Elliot Sebastian to take a booster dose today of 7.5mg. Then resume the 35mg/wk dose. He has been very stable on this dose. Ate/Drank differently last week with the Holidays.   2. Follow up in 2 weeks  3.  Patient declines warfarin refills.  4. Verbal and written information provided. Patient expresses understanding and has no further questions at this time.    Gemma Mclaughlin [facility]

## 2018-12-31 RX ORDER — WARFARIN SODIUM 5 MG/1
TABLET ORAL
Qty: 90 TABLET | Refills: 0 | Status: SHIPPED | OUTPATIENT
Start: 2018-12-31 | End: 2019-01-14 | Stop reason: SDUPTHER

## 2018-12-31 RX ORDER — ATENOLOL 25 MG/1
TABLET ORAL
Qty: 30 TABLET | Refills: 5 | Status: SHIPPED | OUTPATIENT
Start: 2018-12-31 | End: 2019-06-30 | Stop reason: SDUPTHER

## 2019-01-14 ENCOUNTER — ANTICOAGULATION VISIT (OUTPATIENT)
Dept: PHARMACY | Facility: HOSPITAL | Age: 59
End: 2019-01-14

## 2019-01-14 DIAGNOSIS — Z95.2 HX OF MITRAL VALVE REPLACEMENT WITH MECHANICAL VALVE: ICD-10-CM

## 2019-01-14 LAB
INR PPP: 2.8 (ref 0.91–1.09)
PROTHROMBIN TIME: 33.8 SECONDS (ref 10–13.8)

## 2019-01-14 PROCEDURE — 36416 COLLJ CAPILLARY BLOOD SPEC: CPT

## 2019-01-14 PROCEDURE — 85610 PROTHROMBIN TIME: CPT

## 2019-01-14 RX ORDER — WARFARIN SODIUM 5 MG/1
TABLET ORAL
Qty: 90 TABLET | Refills: 0 | Status: SHIPPED | OUTPATIENT
Start: 2019-01-14 | End: 2019-06-19 | Stop reason: SDUPTHER

## 2019-01-14 NOTE — PROGRESS NOTES
Anticoagulation Clinic Progress Note    Anticoagulation Summary  As of 2019    INR goal:   2.5-3.5   TTR:   57.5 % (2.6 mo)   INR used for dosin.8 (2019)   Warfarin maintenance plan:   5 mg every day   Weekly warfarin total:   35 mg   No change documented:   Sara Mukherjee   Plan last modified:   Sammie Holbrook RPH (2018)   Next INR check:   2019   Priority:   High   Target end date:   Indefinite    Indications    Atrial fibrillation (CMS/HCC) [I48.91]  Hx of mitral valve replacement with mechanical valve [Z95.2] [Z95.2]             Anticoagulation Episode Summary     INR check location:       Preferred lab:       Send INR reminders to:   [facility] MEN[facility] [place]    Comments:         Anticoagulation Care Providers     Provider Role Specialty Phone number    Navi Fay MD Referring Cardiology [phone]    Sammie Holbrook RPH Responsible Pharmacy [phone]          Clinic Interview:  Patient Findings     Negatives:   Signs/symptoms of thrombosis, Signs/symptoms of bleeding,   Laboratory test error suspected, Change in health, Change in alcohol use,   Change in activity, Upcoming invasive procedure, Emergency department   visit, Upcoming dental procedure, Missed doses, Extra doses, Change in   medications, Change in diet/appetite, Hospital admission, Bruising, Other   complaints      Clinical Outcomes     Negatives:   Major bleeding event, Thromboembolic event,   Anticoagulation-related hospital admission, Anticoagulation-related ED   visit, Anticoagulation-related fatality        INR History:  Anticoagulation Monitoring 2018   INR 2.8 2.2 2.8   INR Date 2018   INR Goal 2.5-3.5 2.5-3.5 2.5-3.5   Trend Same Same Same   Last Week Total 35 mg 35 mg 35 mg   Next Week Total 35 mg 37.5 mg 35 mg   Sun 5 mg 5 mg 5 mg   Mon 5 mg 5 mg 5 mg   Tue 5 mg 5 mg 5 mg   Wed 5 mg 5 mg 5 mg   Thu 5 mg 5 mg 5 mg   Fri 5 mg 7.5 mg ();  Otherwise 5 mg 5 mg   Sat 5 mg 5 mg 5 mg   Visit Report - - -   Some recent data might be hidden       Plan:  1. INR is therapeutic today- see above in Anticoagulation Summary.   Will instruct Elliot Sebastian to continue their warfarin regimen- see above in Anticoagulation Summary.  2. Follow up in 4 weeks.  3. Patient declines warfarin refills.  4. Verbal and written information provided. Patient expresses understanding and has no further questions at this time.    Sara Mukherjee

## 2019-01-23 ENCOUNTER — OFFICE VISIT (OUTPATIENT)
Dept: CARDIOLOGY | Facility: CLINIC | Age: 59
End: 2019-01-23

## 2019-01-23 VITALS
OXYGEN SATURATION: 98 % | HEART RATE: 68 BPM | HEIGHT: 78 IN | SYSTOLIC BLOOD PRESSURE: 108 MMHG | WEIGHT: 269 LBS | DIASTOLIC BLOOD PRESSURE: 74 MMHG | BODY MASS INDEX: 31.12 KG/M2

## 2019-01-23 DIAGNOSIS — I48.0 PAROXYSMAL ATRIAL FIBRILLATION (HCC): ICD-10-CM

## 2019-01-23 DIAGNOSIS — I34.0 NON-RHEUMATIC MITRAL REGURGITATION: Primary | ICD-10-CM

## 2019-01-23 DIAGNOSIS — I33.0 ACUTE BACTERIAL ENDOCARDITIS: ICD-10-CM

## 2019-01-23 PROCEDURE — 99214 OFFICE O/P EST MOD 30 MIN: CPT | Performed by: INTERNAL MEDICINE

## 2019-01-23 PROCEDURE — 93000 ELECTROCARDIOGRAM COMPLETE: CPT | Performed by: INTERNAL MEDICINE

## 2019-01-23 NOTE — PROGRESS NOTES
Date of Office Visit: 2019  Encounter Provider: Navi Fay MD  Place of Service: [facility] CARDIOLOGY  Patient Name: Elliot Sebastian  :1960      Chief Complaint   Patient presents with   • Cardiac Valve Problem     History of Present Illness  The patient is a 58-year-old white male with a history of bacterial endocarditis Wells mitral regurgitation for which he underwent a mitral valve replacement in  with a [employer] mechanical prosthesis.  The patient returns to the office today for follow-up examination.  He is feeling well.  As a result of his valvular disease the patient developed transient atrial fibrillation.  He has not been treated for this since 2016.  He also had heart failure as a result of his mitral regurgitation but this too has resolved.  Symptomatically he feels well without complaints of palpitations, shortness of breath, peripheral edema, chest pain nor fatigue.      Past Medical History:   Diagnosis Date   • Acute bacterial endocarditis    • Anemia    • APC (atrial premature contractions)    • Atrial fibrillation (CMS/HCC)    • BPH (benign prostatic hypertrophy)    • CHF (congestive heart failure) (CMS/HCC)    • Cholelithiasis    • Chronic constipation    • Deep vein thrombophlebitis of leg (CMS/HCC)     DISTAL   • Disease of thyroid gland    • Gout    • Intestinal obstruction (CMS/HCC)    • Ischemic enteritis (CMS/HCC)    • Left heart failure, NYHA class 3 (CMS/HCC)     CLASS 111 LEFT SYSTOIC CONGESTIVE HEART FAILURE   • Mitral regurgitation    • Pleural effusion, bilateral    • Pulmonary hypertension (CMS/HCC)    • 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CMS/HCC)    • Umbilical hernia    • Vitamin D deficiency          Past Surgical History:   Procedure Laterality Date   • APPENDECTOMY     • CHOLECYSTECTOMY     • EXPLORATION NECK FOR POSTOP HEMORRHAGE / THROMBOSIS / INFECTION     • MITRAL VALVE REPLACEMENT  2013    33MM [employer] DAVID  MECHANICAL VALVE, PRESERVATION OF CORDS TO SUBVALVULAR APPARATUS AND DOROTHY GORE-FLORI CORD IN THE P2 AREA, DEBRIDEMENT OF ANTERIOR AND POSTERIOR MITRAL LEAFLET            Current Outpatient Medications:   •  ascorbic acid (TH VITAMIN C) 1000 MG tablet, Take 1,000 mg by mouth., Disp: , Rfl:   •  atenolol (TENORMIN) 25 MG tablet, TAKE 1 TABLET BY MOUTH ONCE DAILY, Disp: 30 tablet, Rfl: 5  •  Cholecalciferol (VITAMIN D3) 2000 UNITS capsule, Take 1,000 Units by mouth Daily., Disp: , Rfl:   •  Multiple Vitamins-Minerals (MENS MULTIVITAMIN PLUS) tablet, Take by mouth., Disp: , Rfl:   •  warfarin (COUMADIN) 5 MG tablet, Take one tablet by mouth daily, or as directed by the [facility]., Disp: 90 tablet, Rfl: 0      Social History     Socioeconomic History   • Marital status:      Spouse name: Not on file   • Number of children: Not on file   • Years of education: Not on file   • Highest education level: Not on file   Social Needs   • Financial resource strain: Not on file   • Food insecurity - worry: Not on file   • Food insecurity - inability: Not on file   • Transportation needs - medical: Not on file   • Transportation needs - non-medical: Not on file   Occupational History   • Not on file   Tobacco Use   • Smoking status: Never Smoker   • Smokeless tobacco: Never Used   • Tobacco comment: caffeine use   Substance and Sexual Activity   • Alcohol use: Yes     Comment: SOCIAL   • Drug use: Not on file   • Sexual activity: Not on file   Other Topics Concern   • Not on file   Social History Narrative   • Not on file         Review of Systems   Constitution: Negative.   HENT: Negative.    Eyes: Negative.    Cardiovascular: Negative.    Respiratory: Negative.    Endocrine: Negative.    Skin: Negative.    Musculoskeletal: Negative.    Gastrointestinal: Negative.    Neurological: Negative.    Psychiatric/Behavioral: Negative.        Procedures      ECG 12 Lead  Date/Time: 1/23/2019 1:28 PM  Performed by:  "Navi Fay MD  Authorized by: Navi Fay MD   Comparison: compared with previous ECG from 6/19/2018  Similar to previous ECG  Rhythm: sinus rhythm  Rate: normal  Conduction: conduction normal  ST Segments: ST segments normal  T Waves: T waves normal  QRS axis: normal                  Objective:    /74 (BP Location: Left arm, Patient Position: Sitting, Cuff Size: Adult)   Pulse 68   Ht 198.1 cm (78\")   Wt 122 kg (269 lb)   SpO2 98%   BMI 31.09 kg/m²         Physical Exam   Constitutional: He is oriented to person, place, and time. He appears well-developed and well-nourished.   HENT:   Head: Normocephalic.   Eyes: Pupils are equal, round, and reactive to light.   Neck: Normal range of motion. No JVD present. Carotid bruit is not present. No thyromegaly present.   Cardiovascular: Normal rate, regular rhythm and intact distal pulses. Exam reveals no gallop and no friction rub.   No murmur heard.  Mechanical prosthetic sounds: Normal   Pulmonary/Chest: Effort normal and breath sounds normal.   Abdominal: Soft. Bowel sounds are normal.   Musculoskeletal: He exhibits no edema.   Neurological: He is alert and oriented to person, place, and time.   Skin: Skin is warm, dry and intact. No erythema.   Psychiatric: He has a normal mood and affect.   Vitals reviewed.          Assessment:       Diagnosis Plan   1. Non-rheumatic mitral regurgitation     2. Acute bacterial endocarditis     3. Paroxysmal atrial fibrillation (CMS/HCC)         1.  Mitral regurgitation: Status post mechanical replacement.  Anticoagulated with warfarin    2.  History of bacterial endocarditis: Resolved    3.  Transient atrial fibrillation: No further episodes since 2016.     Plan:       Patient remained stable.  He will follow-up in one year  "

## 2019-02-12 ENCOUNTER — APPOINTMENT (OUTPATIENT)
Dept: PHARMACY | Facility: HOSPITAL | Age: 59
End: 2019-02-12

## 2019-02-13 ENCOUNTER — ANTICOAGULATION VISIT (OUTPATIENT)
Dept: PHARMACY | Facility: HOSPITAL | Age: 59
End: 2019-02-13

## 2019-02-13 DIAGNOSIS — Z95.2 HX OF MITRAL VALVE REPLACEMENT WITH MECHANICAL VALVE: ICD-10-CM

## 2019-02-13 LAB
INR PPP: 2 (ref 0.91–1.09)
PROTHROMBIN TIME: 24.2 SECONDS (ref 10–13.8)

## 2019-02-13 PROCEDURE — 36416 COLLJ CAPILLARY BLOOD SPEC: CPT

## 2019-02-13 PROCEDURE — G0463 HOSPITAL OUTPT CLINIC VISIT: HCPCS

## 2019-02-13 PROCEDURE — 85610 PROTHROMBIN TIME: CPT

## 2019-02-13 NOTE — PROGRESS NOTES
Anticoagulation Clinic Progress Note    Anticoagulation Summary  As of 2019    INR goal:   2.5-3.5   TTR:   51.9 % (3.6 mo)   INR used for dosin.0! (2019)   Warfarin maintenance plan:   7.5 mg on Wed, Sat; 5 mg all other days   Weekly warfarin total:   40 mg   Plan last modified:   Sary Tejeda RPH (2019)   Next INR check:   2019   Priority:   High   Target end date:   Indefinite    Indications    Atrial fibrillation (CMS/HCC) [I48.91]  Hx of mitral valve replacement with mechanical valve [Z95.2] [Z95.2]             Anticoagulation Episode Summary     INR check location:       Preferred lab:       Send INR reminders to:    CHARLES MENDEZ [facility]    Comments:         Anticoagulation Care Providers     Provider Role Specialty Phone number    Navi Fay MD Referring Cardiology [phone]    Sammie Holbrook RP Responsible Pharmacy [phone]          Clinic Interview:      INR History:  Anticoagulation Monitoring 2018   INR 2.2 2.8 2.0   INR Date 2018   INR Goal 2.5-3.5 2.5-3.5 2.5-3.5   Trend Same Same Up   Last Week Total 35 mg 35 mg 35 mg   Next Week Total 37.5 mg 35 mg 40 mg   Sun 5 mg 5 mg 5 mg   Mon 5 mg 5 mg 5 mg   Tue 5 mg 5 mg 5 mg   Wed 5 mg 5 mg 7.5 mg   Thu 5 mg 5 mg 5 mg   Fri 7.5 mg (); Otherwise 5 mg 5 mg 5 mg   Sat 5 mg 5 mg 7.5 mg   Visit Report - - -   Some recent data might be hidden       Plan:  1. INR is Subtherapeutic today- see above in Anticoagulation Summary.  Will instruct Elliot Sebastian to Increase their warfarin regimen- see above in Anticoagulation Summary.  2. Follow up in 2 weeks  3. Patient declines warfarin refills.  4. Verbal and written information provided. Patient expresses understanding and has no further questions at this time.    Sary Tejeda REBECA

## 2019-02-27 ENCOUNTER — ANTICOAGULATION VISIT (OUTPATIENT)
Dept: PHARMACY | Facility: HOSPITAL | Age: 59
End: 2019-02-27

## 2019-02-27 DIAGNOSIS — Z95.2 HX OF MITRAL VALVE REPLACEMENT WITH MECHANICAL VALVE: ICD-10-CM

## 2019-02-27 LAB
INR PPP: 2.6 (ref 0.91–1.09)
PROTHROMBIN TIME: 30.7 SECONDS (ref 10–13.8)

## 2019-02-27 PROCEDURE — 85610 PROTHROMBIN TIME: CPT

## 2019-02-27 PROCEDURE — 36416 COLLJ CAPILLARY BLOOD SPEC: CPT

## 2019-02-27 NOTE — PROGRESS NOTES
Anticoagulation Clinic Progress Note    Anticoagulation Summary  As of 2019    INR goal:   2.5-3.5   TTR:   47.9 % (4 mo)   INR used for dosin.6 (2019)   Warfarin maintenance plan:   7.5 mg on Wed, Sat; 5 mg all other days   Weekly warfarin total:   40 mg   No change documented:   Sara Mukherjee   Plan last modified:   Sary Tejeda [facility] (2019)   Next INR check:   3/20/2019   Priority:   High   Target end date:   Indefinite    Indications    Atrial fibrillation (CMS/HCC) [I48.91]  Hx of mitral valve replacement with mechanical valve [Z95.2] [Z95.2]             Anticoagulation Episode Summary     INR check location:       Preferred lab:       Send INR reminders to:   [facility] MENDEZ [facility]    Comments:         Anticoagulation Care Providers     Provider Role Specialty Phone number    Navi Fay MD Referring Cardiology [phone]    Sammie Holbrook [facility] Responsible Pharmacy [phone]          Clinic Interview:  Patient Findings     Negatives:   Signs/symptoms of thrombosis, Signs/symptoms of bleeding,   Laboratory test error suspected, Change in health, Change in alcohol use,   Change in activity, Upcoming invasive procedure, Emergency department   visit, Upcoming dental procedure, Missed doses, Extra doses, Change in   medications, Change in diet/appetite, Hospital admission, Bruising, Other   complaints      Clinical Outcomes     Negatives:   Major bleeding event, Thromboembolic event,   Anticoagulation-related hospital admission, Anticoagulation-related ED   visit, Anticoagulation-related fatality        INR History:  Anticoagulation Monitoring 2019   INR 2.8 2.0 2.6   INR Date 2019   INR Goal 2.5-3.5 2.5-3.5 2.5-3.5   Trend Same Up Same   Last Week Total 35 mg 35 mg 40 mg   Next Week Total 35 mg 40 mg 40 mg   Sun 5 mg 5 mg 5 mg   Mon 5 mg 5 mg 5 mg   Tue 5 mg 5 mg 5 mg   Wed 5 mg 7.5 mg 7.5 mg   Thu 5 mg 5 mg 5 mg   Fri 5 mg  5 mg 5 mg   Sat 5 mg 7.5 mg 7.5 mg   Visit Report - - -   Some recent data might be hidden       Plan:  1. INR is therapeutic today- see above in Anticoagulation Summary.   Will instruct Elliot Sebastian to continue their warfarin regimen- see above in Anticoagulation Summary.  2. Follow up in 3 weeks.  3. Patient declines warfarin refills.  4. Verbal and written information provided. Patient expresses understanding and has no further questions at this time.    Sara Mukherjee

## 2019-03-20 ENCOUNTER — ANTICOAGULATION VISIT (OUTPATIENT)
Dept: PHARMACY | Facility: HOSPITAL | Age: 59
End: 2019-03-20

## 2019-03-20 DIAGNOSIS — Z95.2 HX OF MITRAL VALVE REPLACEMENT WITH MECHANICAL VALVE: ICD-10-CM

## 2019-03-20 LAB
INR PPP: 3.9 (ref 0.91–1.09)
PROTHROMBIN TIME: 46.7 SECONDS (ref 10–13.8)

## 2019-03-20 PROCEDURE — 36416 COLLJ CAPILLARY BLOOD SPEC: CPT

## 2019-03-20 PROCEDURE — 85610 PROTHROMBIN TIME: CPT

## 2019-03-20 NOTE — PROGRESS NOTES
Anticoagulation Clinic Progress Note    Anticoagulation Summary  As of 3/20/2019    INR goal:   2.5-3.5   TTR:   51.0 % (4.7 mo)   INR used for dosing:   3.9! (3/20/2019)   Warfarin maintenance plan:   7.5 mg every Wed, Sat; 5 mg all other days   Weekly warfarin total:   40 mg   No change documented:   Sara Mukherjee   Plan last modified:   Sary Tejeda [facility] (2/13/2019)   Next INR check:   4/17/2019   Priority:   High   Target end date:   Indefinite    Indications    Atrial fibrillation (CMS/HCC) [I48.91]  Hx of mitral valve replacement with mechanical valve [Z95.2] [Z95.2]             Anticoagulation Episode Summary     INR check location:       Preferred lab:       Send INR reminders to:    [facility]    Comments:         Anticoagulation Care Providers     Provider Role Specialty Phone number    Navi Fay MD Referring Cardiology [phone]    Sammie Holbrook [facility] Responsible Pharmacy [phone]          Clinic Interview:  Patient Findings     Positives:   Extra doses    Negatives:   Signs/symptoms of thrombosis, Signs/symptoms of bleeding,   Laboratory test error suspected, Change in health, Change in alcohol use,   Change in activity, Upcoming invasive procedure, Emergency department   visit, Upcoming dental procedure, Missed doses, Change in medications,   Change in diet/appetite, Hospital admission, Bruising, Other complaints    Comments:   On Monday (3/18) missed dose, so on Tuesday (3/19) He doubled   5 mg from Monday and 5 from Tuesday.      Clinical Outcomes     Negatives:   Major bleeding event, Thromboembolic event,   Anticoagulation-related hospital admission, Anticoagulation-related ED   visit, Anticoagulation-related fatality    Comments:   On Monday (3/18) missed dose, so on Tuesday (3/19) He doubled   5 mg from Monday and 5 from Tuesday.        INR History:  Anticoagulation Monitoring 2/13/2019 2/27/2019 3/20/2019   INR 2.0 2.6 3.9   INR Date 2/13/2019 2/27/2019  3/20/2019   INR Goal 2.5-3.5 2.5-3.5 2.5-3.5   Trend Up Same Same   Last Week Total 35 mg 40 mg 40 mg   Next Week Total 40 mg 40 mg 40 mg   Sun 5 mg 5 mg 5 mg   Mon 5 mg 5 mg 5 mg   Tue 5 mg 5 mg 5 mg   Wed 7.5 mg 7.5 mg 7.5 mg   Thu 5 mg 5 mg 5 mg   Fri 5 mg 5 mg 5 mg   Sat 7.5 mg 7.5 mg 7.5 mg   Visit Report - - -   Some recent data might be hidden       Plan:  1. INR is out of range- see above in Anticoagulation Summary.   Will instruct Elliot Sebastian to Continue  their warfarin regimen- see above in Anticoagulation Summary.  2. Follow up in 4 weeks.   3. Patient declines warfarin refills.  4. Verbal and written information provided. Patient expresses understanding and has no further questions at this time.    Sara Mukherjee

## 2019-04-30 ENCOUNTER — ANTICOAGULATION VISIT (OUTPATIENT)
Dept: PHARMACY | Facility: HOSPITAL | Age: 59
End: 2019-04-30

## 2019-04-30 DIAGNOSIS — Z95.2 HX OF MITRAL VALVE REPLACEMENT WITH MECHANICAL VALVE: ICD-10-CM

## 2019-04-30 LAB
INR PPP: 6 (ref 0.91–1.09)
PROTHROMBIN TIME: 71.4 SECONDS (ref 10–13.8)

## 2019-04-30 PROCEDURE — 85610 PROTHROMBIN TIME: CPT

## 2019-04-30 PROCEDURE — G0463 HOSPITAL OUTPT CLINIC VISIT: HCPCS

## 2019-04-30 PROCEDURE — 36416 COLLJ CAPILLARY BLOOD SPEC: CPT

## 2019-04-30 NOTE — PROGRESS NOTES
Anticoagulation Clinic Progress Note    Anticoagulation Summary  As of 2019    INR goal:   2.5-3.5   TTR:   39.6 % (6.1 mo)   INR used for dosin.0! (2019)   Warfarin maintenance plan:   7.5 mg every Wed, Sat; 5 mg all other days   Weekly warfarin total:   40 mg   Plan last modified:   Sary Tejeda RPH (2019)   Next INR check:   2019   Priority:   High   Target end date:   Indefinite    Indications    Atrial fibrillation (CMS/HCC) [I48.91]  Hx of mitral valve replacement with mechanical valve [Z95.2] [Z95.2]             Anticoagulation Episode Summary     INR check location:       Preferred lab:       Send INR reminders to:   DYANA MENDEZ [facility]    Comments:         Anticoagulation Care Providers     Provider Role Specialty Phone number    Navi Fay MD Referring Cardiology [phone]    Sammie Holbrook [facility] Responsible Pharmacy [phone]          Clinic Interview:  Patient Findings     Positives:   Change in alcohol use    Negatives:   Signs/symptoms of thrombosis, Signs/symptoms of bleeding,   Laboratory test error suspected, Change in health, Change in activity,   Upcoming invasive procedure, Emergency department visit, Upcoming dental   procedure, Missed doses, Extra doses, Change in medications, Change in   diet/appetite, Hospital admission, Bruising, Other complaints    Comments:   Increased alcohol due to recent stressors.      Clinical Outcomes     Negatives:   Major bleeding event, Thromboembolic event,   Anticoagulation-related hospital admission, Anticoagulation-related ED   visit, Anticoagulation-related fatality    Comments:   Increased alcohol due to recent stressors.        INR History:  Anticoagulation Monitoring 2019 3/20/2019 2019   INR 2.6 3.9 6.0   INR Date 2019 3/20/2019 2019   INR Goal 2.5-3.5 2.5-3.5 2.5-3.5   Trend Same Same Same   Last Week Total 40 mg 40 mg 40 mg   Next Week Total 40 mg 40 mg 27.5 mg   Sun 5 mg 5 mg 5 mg    Mon 5 mg 5 mg 5 mg   Tue 5 mg 5 mg Hold (4/30); Otherwise 5 mg   Wed 7.5 mg 7.5 mg Hold (5/1)   Thu 5 mg 5 mg 5 mg   Fri 5 mg 5 mg 5 mg   Sat 7.5 mg 7.5 mg 7.5 mg   Visit Report - - -   Some recent data might be hidden       Plan:  1. INR is Supratherapeutic today- see above in Anticoagulation Summary.  Will instruct Elliot Sebastian to Change their warfarin regimen- see above in Anticoagulation Summary.  2. Follow up in 8 days (refused sooner appt due to work scheduled)  3. Patient declines warfarin refills.  4. Verbal and written information provided. Patient expresses understanding and has no further questions at this time.    Michael Horner [facility]

## 2019-05-08 ENCOUNTER — ANTICOAGULATION VISIT (OUTPATIENT)
Dept: PHARMACY | Facility: HOSPITAL | Age: 59
End: 2019-05-08

## 2019-05-08 DIAGNOSIS — Z95.2 HX OF MITRAL VALVE REPLACEMENT WITH MECHANICAL VALVE: ICD-10-CM

## 2019-05-08 LAB
INR PPP: 2.7 (ref 0.91–1.09)
PROTHROMBIN TIME: 32.7 SECONDS (ref 10–13.8)

## 2019-05-08 PROCEDURE — 85610 PROTHROMBIN TIME: CPT

## 2019-05-08 PROCEDURE — 36416 COLLJ CAPILLARY BLOOD SPEC: CPT

## 2019-05-09 NOTE — PROGRESS NOTES
Anticoagulation Clinic Progress Note    Anticoagulation Summary  As of 2019    INR goal:   2.5-3.5   TTR:   39.0 % (6.4 mo)   INR used for dosin.7 (2019)   Warfarin maintenance plan:   7.5 mg every Wed, Sat; 5 mg all other days   Weekly warfarin total:   40 mg   No change documented:   Sara Mukherjee   Plan last modified:   Sary Tejeda [facility] (2019)   Next INR check:   2019   Priority:   High   Target end date:   Indefinite    Indications    Atrial fibrillation (CMS/HCC) [I48.91]  Hx of mitral valve replacement with mechanical valve [Z95.2] [Z95.2]             Anticoagulation Episode Summary     INR check location:       Preferred lab:       Send INR reminders to:    [facility]    Comments:         Anticoagulation Care Providers     Provider Role Specialty Phone number    Navi Fay MD Referring Cardiology [phone]    [facility] [phone]          Clinic Interview:  Patient Findings     Negatives:   Signs/symptoms of thrombosis, Signs/symptoms of bleeding,   Laboratory test error suspected, Change in health, Change in alcohol use,   Change in activity, Upcoming invasive procedure, Emergency department   visit, Upcoming dental procedure, Missed doses, Extra doses, Change in   medications, Change in diet/appetite, Hospital admission, Bruising, Other   complaints      Clinical Outcomes     Negatives:   Major bleeding event, Thromboembolic event,   Anticoagulation-related hospital admission, Anticoagulation-related ED   visit, Anticoagulation-related fatality        INR History:  Anticoagulation Monitoring 3/20/2019 2019 2019   INR 3.9 6.0 2.7   INR Date 3/20/2019 2019 2019   INR Goal 2.5-3.5 2.5-3.5 2.5-3.5   Trend Same Same Same   Last Week Total 40 mg 40 mg 32.5 mg   Next Week Total 40 mg 27.5 mg 40 mg   Sun 5 mg 5 mg 5 mg   Mon 5 mg 5 mg 5 mg   Tue 5 mg Hold (); Otherwise 5 mg 5 mg   Wed 7.5 mg Hold () 7.5  mg   Thu 5 mg 5 mg 5 mg   Fri 5 mg 5 mg 5 mg   Sat 7.5 mg 7.5 mg 7.5 mg   Visit Report - - -   Some recent data might be hidden       Plan:  1. INR is therapeutic today- see above in Anticoagulation Summary.   Will instruct Elliot Sebastian to continue their warfarin regimen- see above in Anticoagulation Summary.  2. Follow up in 2 weeks.  3. Patient declines warfarin refills.  4. Verbal and written information provided. Patient expresses understanding and has no further questions at this time.    Sara Mukherjee

## 2019-05-22 ENCOUNTER — ANTICOAGULATION VISIT (OUTPATIENT)
Dept: PHARMACY | Facility: HOSPITAL | Age: 59
End: 2019-05-22

## 2019-05-22 DIAGNOSIS — Z95.2 HX OF MITRAL VALVE REPLACEMENT WITH MECHANICAL VALVE: ICD-10-CM

## 2019-05-22 LAB
INR PPP: 6.2 (ref 0.91–1.09)
PROTHROMBIN TIME: 74.7 SECONDS (ref 10–13.8)

## 2019-05-22 PROCEDURE — 36416 COLLJ CAPILLARY BLOOD SPEC: CPT

## 2019-05-22 PROCEDURE — G0463 HOSPITAL OUTPT CLINIC VISIT: HCPCS

## 2019-05-22 PROCEDURE — 85610 PROTHROMBIN TIME: CPT

## 2019-05-22 NOTE — PROGRESS NOTES
"Anticoagulation Clinic Progress Note    Anticoagulation Summary  As of 2019    INR goal:   2.5-3.5   TTR:   37.9 % (6.8 mo)   INR used for dosin.2! (2019)   Warfarin maintenance plan:   7.5 mg every Wed, Sat; 5 mg all other days   Weekly warfarin total:   40 mg   Plan last modified:   Sary Tejeda [facility] (2019)   Next INR check:   2019   Priority:   High   Target end date:   Indefinite    Indications    Atrial fibrillation (CMS/HCC) [I48.91]  Hx of mitral valve replacement with mechanical valve [Z95.2] [Z95.2]             Anticoagulation Episode Summary     INR check location:       Preferred lab:       Send INR reminders to:    CHARLES MENDEZ [facility]    Comments:         Anticoagulation Care Providers     Provider Role Specialty Phone number    Navi Fay MD Referring Cardiology [phone]    [facility] [phone]          Clinic Interview:  Patient Findings     Positives:   Change in alcohol use, Bruising, Other complaints    Negatives:   Signs/symptoms of thrombosis, Signs/symptoms of bleeding,   Laboratory test error suspected, Change in health, Change in activity,   Upcoming invasive procedure, Emergency department visit, Upcoming dental   procedure, Missed doses, Extra doses, Change in medications, Change in   diet/appetite, Hospital admission    Comments:   Increase alcohol intake (2 drinks/day), decreased exercise   and physical activity. Reports that his stress is \"through the roof.\"   Noted bruising on his arms. Discussed the need to be evaluated if bruising   worsens, blood shows up in urine/stool, nosebleeds, etc.      Clinical Outcomes     Negatives:   Major bleeding event, Thromboembolic event,   Anticoagulation-related hospital admission, Anticoagulation-related ED   visit, Anticoagulation-related fatality    Comments:   Increase alcohol intake (2 drinks/day), decreased exercise   and physical activity. Reports that his stress is " "\"through the roof.\"   Noted bruising on his arms. Discussed the need to be evaluated if bruising   worsens, blood shows up in urine/stool, nosebleeds, etc.        INR History:  Anticoagulation Monitoring 4/30/2019 5/8/2019 5/22/2019   INR 6.0 2.7 6.2   INR Date 4/30/2019 5/8/2019 5/22/2019   INR Goal 2.5-3.5 2.5-3.5 2.5-3.5   Trend Same Same Same   Last Week Total 40 mg 32.5 mg 40 mg   Next Week Total 27.5 mg 40 mg 27.5 mg   Sun 5 mg 5 mg 5 mg   Mon 5 mg 5 mg 5 mg   Tue Hold (4/30); Otherwise 5 mg 5 mg 5 mg   Wed Hold (5/1) 7.5 mg Hold (5/22)   Thu 5 mg 5 mg Hold (5/23)   Fri 5 mg 5 mg 5 mg   Sat 7.5 mg 7.5 mg 7.5 mg   Visit Report - - -   Some recent data might be hidden       Plan:  1. INR is Supratherapeutic today- see above in Anticoagulation Summary.  Will instruct Elliot Sebastian to Change their warfarin regimen- see above in Anticoagulation Summary. May need to decrease weekly maintenance dose at next visit.  2. Follow up in 1 week (earliest that patient is able to come back)  3. Patient declines warfarin refills.  4. Verbal and written information provided. Patient expresses understanding and has no further questions at this time.  5. Recommended that he have a venous blood draw to verify his INR. Patient refused, said he would go on his next visit with [facility] if he's still elevated.  Discussed signs/symptoms of bleeding and when to call us/be evaluated in ED.     Leonardo Mckeon RPH  "

## 2019-05-29 ENCOUNTER — ANTICOAGULATION VISIT (OUTPATIENT)
Dept: PHARMACY | Facility: HOSPITAL | Age: 59
End: 2019-05-29

## 2019-05-29 DIAGNOSIS — Z95.2 HX OF MITRAL VALVE REPLACEMENT WITH MECHANICAL VALVE: ICD-10-CM

## 2019-05-29 LAB
INR PPP: 1.9 (ref 0.91–1.09)
PROTHROMBIN TIME: 22.7 SECONDS (ref 10–13.8)

## 2019-05-29 PROCEDURE — 36416 COLLJ CAPILLARY BLOOD SPEC: CPT

## 2019-05-29 PROCEDURE — 85610 PROTHROMBIN TIME: CPT

## 2019-05-29 PROCEDURE — G0463 HOSPITAL OUTPT CLINIC VISIT: HCPCS

## 2019-05-29 NOTE — PROGRESS NOTES
Anticoagulation Clinic Progress Note    Anticoagulation Summary  As of 2019    INR goal:   2.5-3.5   TTR:   37.4 % (7.1 mo)   INR used for dosin.9! (2019)   Warfarin maintenance plan:   7.5 mg every Sun; 5 mg all other days   Weekly warfarin total:   37.5 mg   Plan last modified:   Leonardo Mckeon RPH (2019)   Next INR check:   2019   Priority:   High   Target end date:   Indefinite    Indications    Atrial fibrillation (CMS/HCC) [I48.91]  Hx of mitral valve replacement with mechanical valve [Z95.2] [Z95.2]             Anticoagulation Episode Summary     INR check location:       Preferred lab:       Send INR reminders to:   DYANA MENDEZ [facility]    Comments:         Anticoagulation Care Providers     Provider Role Specialty Phone number    Navi Fay MD Referring Cardiology [phone]    [facility] Hol[facility] [phone]          Clinic Interview:  Patient Findings     Positives:   Change in diet/appetite, Other complaints    Negatives:   Signs/symptoms of thrombosis, Signs/symptoms of bleeding,   Laboratory test error suspected, Change in health, Change in alcohol use,   Change in activity, Upcoming invasive procedure, Emergency department   visit, Upcoming dental procedure, Missed doses, Extra doses, Change in   medications, Hospital admission, Bruising    Comments:   Has started counseling and stress is better. Ate more greens   since last visit.      Clinical Outcomes     Negatives:   Major bleeding event, Thromboembolic event,   Anticoagulation-related hospital admission, Anticoagulation-related ED   visit, Anticoagulation-related fatality    Comments:   Has started counseling and stress is better. Ate more greens   since last visit.        INR History:  Anticoagulation Monitoring 2019   INR 2.7 6.2 1.9   INR Date 2019   INR Goal 2.5-3.5 2.5-3.5 2.5-3.5   Trend Same Same Down   Last Week Total 32.5 mg  40 mg 27.5 mg   Next Week Total 40 mg 27.5 mg 42.5 mg   Sun 5 mg 5 mg 7.5 mg   Mon 5 mg 5 mg 5 mg   Tue 5 mg 5 mg 5 mg   Wed 7.5 mg Hold (5/22) 10 mg (5/29)   Thu 5 mg Hold (5/23) 5 mg   Fri 5 mg 5 mg 5 mg   Sat 7.5 mg 7.5 mg 5 mg   Visit Report - - -   Some recent data might be hidden       Plan:  1. INR is Subtherapeutic today- see above in Anticoagulation Summary.  Will instruct Elliot Sebastian to Change their warfarin regimen- see above in Anticoagulation Summary.  2. Follow up in 1 week  3. Patient declines warfarin refills.  4. Verbal and written information provided. Patient expresses understanding and has no further questions at this time.    Leonardo Mckeon RP

## 2019-06-04 ENCOUNTER — ANTICOAGULATION VISIT (OUTPATIENT)
Dept: PHARMACY | Facility: HOSPITAL | Age: 59
End: 2019-06-04

## 2019-06-04 DIAGNOSIS — Z95.2 HX OF MITRAL VALVE REPLACEMENT WITH MECHANICAL VALVE: ICD-10-CM

## 2019-06-04 LAB
INR PPP: 3.6 (ref 0.91–1.09)
PROTHROMBIN TIME: 43.1 SECONDS (ref 10–13.8)

## 2019-06-04 PROCEDURE — 36416 COLLJ CAPILLARY BLOOD SPEC: CPT

## 2019-06-04 PROCEDURE — 85610 PROTHROMBIN TIME: CPT

## 2019-06-04 NOTE — PROGRESS NOTES
Anticoagulation Clinic Progress Note    Anticoagulation Summary  As of 6/4/2019    INR goal:   2.5-3.5   TTR:   38.0 % (7.3 mo)   INR used for dosing:   3.6! (6/4/2019)   Warfarin maintenance plan:   7.5 mg every Sun; 5 mg all other days   Weekly warfarin total:   37.5 mg   No change documented:   Michael Horner [facility]   Plan last modified:   Leonardo Mckeon RPH (5/29/2019)   Next INR check:   6/18/2019   Priority:   High   Target end date:   Indefinite    Indications    Atrial fibrillation (CMS/[facility]) [I48.91]  Hx of mitral valve replacement with mechanical valve [Z95.2] [Z95.2]             Anticoagulation Episode Summary     INR check location:       Preferred lab:       Send INR reminders to:    [facility]    Comments:         Anticoagulation Care Providers     Provider Role Specialty Phone number    Navi Fay MD Referring Cardiology [phone]    [facility] [phone]          Clinic Interview:  Patient Findings     Positives:   Change in diet/appetite    Negatives:   Signs/symptoms of thrombosis, Signs/symptoms of bleeding,   Laboratory test error suspected, Change in health, Change in alcohol use,   Change in activity, Upcoming invasive procedure, Emergency department   visit, Upcoming dental procedure, Missed doses, Extra doses, Change in   medications, Hospital admission, Bruising, Other complaints    Comments:   Reports no vit k on Sunday like he had planned. Pt voices   plan to have serving of vitamin k every Sunday alongside higher 7.5 mg   dose.       Clinical Outcomes     Negatives:   Major bleeding event, Thromboembolic event,   Anticoagulation-related hospital admission, Anticoagulation-related ED   visit, Anticoagulation-related fatality    Comments:   Reports no vit k on Sunday like he had planned. Pt voices   plan to have serving of vitamin k every Sunday alongside higher 7.5 mg   dose.         INR History:  Anticoagulation Monitoring 5/22/2019  5/29/2019 6/4/2019   INR 6.2 1.9 3.6   INR Date 5/22/2019 5/29/2019 6/4/2019   INR Goal 2.5-3.5 2.5-3.5 2.5-3.5   Trend Same Down Same   Last Week Total 40 mg 27.5 mg 42.5 mg   Next Week Total 27.5 mg 42.5 mg 37.5 mg   Sun 5 mg 7.5 mg 7.5 mg   Mon 5 mg 5 mg 5 mg   Tue 5 mg 5 mg 5 mg   Wed Hold (5/22) 10 mg (5/29) 5 mg   Thu Hold (5/23) 5 mg 5 mg   Fri 5 mg 5 mg 5 mg   Sat 7.5 mg 5 mg 5 mg   Visit Report - - -   Some recent data might be hidden       Plan:  1. INR is Supratherapeutic today- see above in Anticoagulation Summary.  Will instruct Elliot Sebastian to Continue their warfarin regimen- see above in Anticoagulation Summary.  2. Follow up in 1 week and 2 weeks  3. Patient declines warfarin refills.  4. Verbal and written information provided. Patient expresses understanding and has no further questions at this time.    Michael Horner [facility]

## 2019-06-18 ENCOUNTER — ANTICOAGULATION VISIT (OUTPATIENT)
Dept: PHARMACY | Facility: HOSPITAL | Age: 59
End: 2019-06-18

## 2019-06-18 DIAGNOSIS — Z95.2 HX OF MITRAL VALVE REPLACEMENT WITH MECHANICAL VALVE: ICD-10-CM

## 2019-06-18 LAB
INR PPP: 5.5 (ref 0.91–1.09)
PROTHROMBIN TIME: 66.2 SECONDS (ref 10–13.8)

## 2019-06-18 PROCEDURE — 85610 PROTHROMBIN TIME: CPT

## 2019-06-18 PROCEDURE — 36416 COLLJ CAPILLARY BLOOD SPEC: CPT

## 2019-06-18 PROCEDURE — G0463 HOSPITAL OUTPT CLINIC VISIT: HCPCS

## 2019-06-18 NOTE — PROGRESS NOTES
Anticoagulation Clinic Progress Note    Anticoagulation Summary  As of 2019    INR goal:   2.5-3.5   TTR:   35.7 % (7.7 mo)   INR used for dosin.5! (2019)   Warfarin maintenance plan:   5 mg every day   Weekly warfarin total:   35 mg   Plan last modified:   Michael Horner [facility] (2019)   Next INR check:   2019   Priority:   High   Target end date:   Indefinite    Indications    Atrial fibrillation (CMS/HCC) [I48.91]  Hx of mitral valve replacement with mechanical valve [Z95.2] [Z95.2]             Anticoagulation Episode Summary     INR check location:       Preferred lab:       Send INR reminders to:    [facility]    Comments:         Anticoagulation Care Providers     Provider Role Specialty Phone number    Navi Fay MD Referring Cardiology [phone]    [facility] Hol[facility] [phone]          Clinic Interview:  Patient Findings     Negatives:   Signs/symptoms of thrombosis, Signs/symptoms of bleeding,   Laboratory test error suspected, Change in health, Change in alcohol use,   Change in activity, Upcoming invasive procedure, Emergency department   visit, Upcoming dental procedure, Missed doses, Extra doses, Change in   medications, Change in diet/appetite, Hospital admission, Bruising, Other   complaints    Comments:   Trying to eat vit k 1x/wk: brussels sprouts 2 sundays ago,   peas this past .       Clinical Outcomes     Negatives:   Major bleeding event, Thromboembolic event,   Anticoagulation-related hospital admission, Anticoagulation-related ED   visit, Anticoagulation-related fatality    Comments:   Trying to eat vit k 1x/wk: brussels sprouts 2 sundays ago,   peas this past .         INR History:  Anticoagulation Monitoring 2019   INR 1.9 3.6 5.5   INR Date 2019   INR Goal 2.5-3.5 2.5-3.5 2.5-3.5   Trend Down Same Down   Last Week Total 27.5 mg 42.5 mg 37.5 mg   Next Week  Total 42.5 mg 37.5 mg 30 mg   Sun 7.5 mg 7.5 mg 5 mg   Mon 5 mg 5 mg 5 mg   Tue 5 mg 5 mg Hold (6/18)   Wed 10 mg (5/29) 5 mg 5 mg   Thu 5 mg 5 mg 5 mg   Fri 5 mg 5 mg 5 mg   Sat 5 mg 5 mg 5 mg   Visit Report - - -   Some recent data might be hidden       Plan:  1. POC INR is Supratherapeutic today (refused venous INR) - see above in Anticoagulation Summary.  Will instruct Elliot Sebastian to Change their warfarin regimen- see above in Anticoagulation Summary.  2. Follow up in 1 week  3. Patient declines warfarin refills.  4. Verbal and written information provided. Patient expresses understanding and has no further questions at this time.    Michael Horner [facility]

## 2019-06-19 RX ORDER — WARFARIN SODIUM 5 MG/1
TABLET ORAL
Qty: 90 TABLET | Refills: 0 | Status: SHIPPED | OUTPATIENT
Start: 2019-06-19 | End: 2019-08-14 | Stop reason: SDUPTHER

## 2019-06-25 ENCOUNTER — ANTICOAGULATION VISIT (OUTPATIENT)
Dept: PHARMACY | Facility: HOSPITAL | Age: 59
End: 2019-06-25

## 2019-06-25 DIAGNOSIS — Z95.2 HX OF MITRAL VALVE REPLACEMENT WITH MECHANICAL VALVE: ICD-10-CM

## 2019-06-25 LAB
INR PPP: 2.8 (ref 0.91–1.09)
PROTHROMBIN TIME: 33.4 SECONDS (ref 10–13.8)

## 2019-06-25 PROCEDURE — 85610 PROTHROMBIN TIME: CPT

## 2019-06-25 PROCEDURE — 36416 COLLJ CAPILLARY BLOOD SPEC: CPT

## 2019-06-25 NOTE — PROGRESS NOTES
Anticoagulation Clinic Progress Note    Anticoagulation Summary  As of 2019    INR goal:   2.5-3.5   TTR:   35.4 % (8 mo)   INR used for dosin.8 (2019)   Warfarin maintenance plan:   5 mg every day   Weekly warfarin total:   35 mg   No change documented:   Alicja Starks   Plan last modified:   Michael Horner [facility] (2019)   Next INR check:   2019   Priority:   High   Target end date:   Indefinite    Indications    Atrial fibrillation (CMS/HCC) [I48.91]  Hx of mitral valve replacement with mechanical valve [Z95.2] [Z95.2]             Anticoagulation Episode Summary     INR check location:       Preferred lab:       Send INR reminders to:   [facility] MENDEZ [facility] PO[facility]    Comments:         Anticoagulation Care Providers     Provider Role Specialty Phone number    Navi Fay MD Referring Cardiology [phone]    [facility] Holbrook [facility] [phone]          Clinic Interview:  Patient Findings     Negatives:   Signs/symptoms of thrombosis, Signs/symptoms of bleeding,   Laboratory test error suspected, Change in health, Change in alcohol use,   Change in activity, Upcoming invasive procedure, Emergency department   visit, Upcoming dental procedure, Missed doses, Extra doses, Change in   medications, Change in diet/appetite, Hospital admission, Bruising, Other   complaints      Clinical Outcomes     Negatives:   Major bleeding event, Thromboembolic event,   Anticoagulation-related hospital admission, Anticoagulation-related ED   visit, Anticoagulation-related fatality        INR History:  Anticoagulation Monitoring 2019   INR 3.6 5.5 2.8   INR Date 2019   INR Goal 2.5-3.5 2.5-3.5 2.5-3.5   Trend Same Down Same   Last Week Total 42.5 mg 37.5 mg 30 mg   Next Week Total 37.5 mg 30 mg 35 mg   Sun 7.5 mg 5 mg 5 mg   Mon 5 mg 5 mg 5 mg   Tue 5 mg Hold () 5 mg   Wed 5 mg 5 mg 5 mg   Thu 5 mg 5 mg 5 mg   Fri 5 mg 5 mg 5  mg   Sat 5 mg 5 mg 5 mg   Visit Report - - -   Some recent data might be hidden       Plan:  1. INR is therapeutic today- see above in Anticoagulation Summary.   Will instruct Elliot Sebastian to continue their warfarin regimen- see above in Anticoagulation Summary.  2. Follow up in 2 weeks.  3. Patient desires warfarin refills.  4. Verbal and written information provided. Patient expresses understanding and has no further questions at this time.    Alicja Starks

## 2019-07-02 RX ORDER — ATENOLOL 25 MG/1
TABLET ORAL
Qty: 30 TABLET | Refills: 5 | Status: SHIPPED | OUTPATIENT
Start: 2019-07-02 | End: 2019-08-15 | Stop reason: SDUPTHER

## 2019-07-09 ENCOUNTER — ANTICOAGULATION VISIT (OUTPATIENT)
Dept: PHARMACY | Facility: HOSPITAL | Age: 59
End: 2019-07-09

## 2019-07-09 DIAGNOSIS — Z95.2 HX OF MITRAL VALVE REPLACEMENT WITH MECHANICAL VALVE: ICD-10-CM

## 2019-07-09 LAB
INR PPP: 2.3 (ref 0.91–1.09)
PROTHROMBIN TIME: 28.2 SECONDS (ref 10–13.8)

## 2019-07-09 PROCEDURE — G0463 HOSPITAL OUTPT CLINIC VISIT: HCPCS

## 2019-07-09 PROCEDURE — 36416 COLLJ CAPILLARY BLOOD SPEC: CPT

## 2019-07-09 PROCEDURE — 85610 PROTHROMBIN TIME: CPT

## 2019-07-09 NOTE — PROGRESS NOTES
Anticoagulation Clinic Progress Note    Anticoagulation Summary  As of 2019    INR goal:   2.5-3.5   TTR:   36.8 % (8.4 mo)   INR used for dosin.3! (2019)   Warfarin maintenance plan:   5 mg every day   Weekly warfarin total:   35 mg   Plan last modified:   Michael Horner [facility] (2019)   Next INR check:   2019   Priority:   High   Target end date:   Indefinite    Indications    Atrial fibrillation (CMS/HCC) [I48.91]  Hx of mitral valve replacement with mechanical valve [Z95.2] [Z95.2]             Anticoagulation Episode Summary     INR check location:       Preferred lab:       Send INR reminders to:    [facility]    Comments:         Anticoagulation Care Providers     Provider Role Specialty Phone number    Navi Fay MD Referring Cardiology [phone]    [facility] [phone]          Clinic Interview:  Patient Findings     Positives:   Change in diet/appetite    Negatives:   Signs/symptoms of thrombosis, Signs/symptoms of bleeding,   Laboratory test error suspected, Change in health, Change in alcohol use,   Change in activity, Upcoming invasive procedure, Emergency department   visit, Upcoming dental procedure, Missed doses, Extra doses, Change in   medications, Hospital admission, Bruising, Other complaints    Comments:   Brussell Sprouts on .       Clinical Outcomes     Negatives:   Major bleeding event, Thromboembolic event,   Anticoagulation-related hospital admission, Anticoagulation-related ED   visit, Anticoagulation-related fatality    Comments:   Brumo Liz on .        INR History:  Anticoagulation Monitoring 2019   INR 5.5 2.8 2.3   INR Date 2019   INR Goal 2.5-3.5 2.5-3.5 2.5-3.5   Trend Down Same Same   Last Week Total 37.5 mg 30 mg 35 mg   Next Week Total 30 mg 35 mg 37.5 mg   Sun 5 mg 5 mg 5 mg   Mon 5 mg 5 mg 5 mg   Tue Hold () 5 mg 7.5 mg ();  Otherwise 5 mg   Wed 5 mg 5 mg 5 mg   Thu 5 mg 5 mg 5 mg   Fri 5 mg 5 mg 5 mg   Sat 5 mg 5 mg 5 mg   Visit Report - - -   Some recent data might be hidden       Plan:  1. INR is Subtherapeutic today- see above in Anticoagulation Summary.  Will instruct Elliot Sebastian to take a booster dose today of 7.5mg, then resume his 35mg/week dosage regimen.  2. Follow up in 2 weeks  3. Patient declines warfarin refills.  4. Verbal and written information provided. Patient expresses understanding and has no further questions at this time.    Gemma Mclaughlin [facility]

## 2019-07-23 ENCOUNTER — ANTICOAGULATION VISIT (OUTPATIENT)
Dept: PHARMACY | Facility: HOSPITAL | Age: 59
End: 2019-07-23

## 2019-07-23 DIAGNOSIS — Z95.2 HX OF MITRAL VALVE REPLACEMENT WITH MECHANICAL VALVE: ICD-10-CM

## 2019-07-23 LAB
INR PPP: 2.4 (ref 0.91–1.09)
PROTHROMBIN TIME: 28.3 SECONDS (ref 10–13.8)

## 2019-07-23 PROCEDURE — 85610 PROTHROMBIN TIME: CPT

## 2019-07-23 PROCEDURE — 36416 COLLJ CAPILLARY BLOOD SPEC: CPT

## 2019-07-23 PROCEDURE — G0463 HOSPITAL OUTPT CLINIC VISIT: HCPCS

## 2019-07-23 NOTE — PROGRESS NOTES
Anticoagulation Clinic Progress Note    Anticoagulation Summary  As of 2019    INR goal:   2.5-3.5   TTR:   34.9 % (8.9 mo)   INR used for dosin.4! (2019)   Warfarin maintenance plan:   5 mg every day   Weekly warfarin total:   35 mg   Plan last modified:   Michael Horner [facility] (2019)   Next INR check:   2019   Priority:   High   Target end date:   Indefinite    Indications    Atrial fibrillation (CMS/HCC) [I48.91]  Hx of mitral valve replacement with mechanical valve [Z95.2] [Z95.2]             Anticoagulation Episode Summary     INR check location:       Preferred lab:       Send INR reminders to:    [facility]    Comments:         Anticoagulation Care Providers     Provider Role Specialty Phone number    Navi Fay MD Referring Cardiology [phone]    [facility] [phone]          Clinic Interview:  Patient Findings     Positives:   Change in diet/appetite    Negatives:   Signs/symptoms of thrombosis, Signs/symptoms of bleeding,   Laboratory test error suspected, Change in health, Change in alcohol use,   Change in activity, Upcoming invasive procedure, Emergency department   visit, Upcoming dental procedure, Missed doses, Extra doses, Change in   medications, Hospital admission, Bruising, Other complaints    Comments:   Pt did not decrease vit k, just moved consumption to another   day. Pt wishes to try lower amount of vit k.      Clinical Outcomes     Negatives:   Major bleeding event, Thromboembolic event,   Anticoagulation-related hospital admission, Anticoagulation-related ED   visit, Anticoagulation-related fatality    Comments:   Pt did not decrease vit k, just moved consumption to another   day. Pt wishes to try lower amount of vit k.        INR History:  Anticoagulation Monitoring 2019   INR 2.8 2.3 2.4   INR Date 2019   INR Goal 2.5-3.5 2.5-3.5 2.5-3.5   Trend Same Same Same    Last Week Total 30 mg 35 mg 35 mg   Next Week Total 35 mg 37.5 mg 37.5 mg   Sun 5 mg 5 mg 5 mg   Mon 5 mg 5 mg 5 mg   Tue 5 mg 7.5 mg (7/9); Otherwise 5 mg 7.5 mg (7/23); Otherwise 5 mg   Wed 5 mg 5 mg 5 mg   Thu 5 mg 5 mg 5 mg   Fri 5 mg 5 mg 5 mg   Sat 5 mg 5 mg 5 mg   Visit Report - - -   Some recent data might be hidden       Plan:  1. INR is Subtherapeutic today- see above in Anticoagulation Summary.  Will instruct Elliot Sebastian to Change their warfarin regimen- see above in Anticoagulation Summary.  2. Follow up in 2 weeks  3. Patient declines warfarin refills.  4. Verbal and written information provided. Patient expresses understanding and has no further questions at this time.    Michael Horner [facility]

## 2019-08-06 ENCOUNTER — ANTICOAGULATION VISIT (OUTPATIENT)
Dept: PHARMACY | Facility: HOSPITAL | Age: 59
End: 2019-08-06

## 2019-08-06 DIAGNOSIS — Z95.2 HX OF MITRAL VALVE REPLACEMENT WITH MECHANICAL VALVE: ICD-10-CM

## 2019-08-06 LAB
INR PPP: 4.1 (ref 0.91–1.09)
PROTHROMBIN TIME: 48.7 SECONDS (ref 10–13.8)

## 2019-08-06 PROCEDURE — 85610 PROTHROMBIN TIME: CPT

## 2019-08-06 PROCEDURE — 36416 COLLJ CAPILLARY BLOOD SPEC: CPT

## 2019-08-06 PROCEDURE — G0463 HOSPITAL OUTPT CLINIC VISIT: HCPCS

## 2019-08-06 NOTE — PROGRESS NOTES
Anticoagulation Clinic Progress Note    Anticoagulation Summary  As of 2019    INR goal:   2.5-3.5   TTR:   36.1 % (9.4 mo)   INR used for dosin.1! (2019)   Warfarin maintenance plan:   5 mg every day   Weekly warfarin total:   35 mg   Plan last modified:   Michael Horner [facility] (2019)   Next INR check:   2019   Priority:   High   Target end date:   Indefinite    Indications    Atrial fibrillation (CMS/HCC) [I48.91]  Hx of mitral valve replacement with mechanical valve [Z95.2] [Z95.2]             Anticoagulation Episode Summary     INR check location:       Preferred lab:       Send INR reminders to:    [facility]    Comments:         Anticoagulation Care Providers     Provider Role Specialty Phone number    Navi Fay MD Referring Cardiology [phone]    Sammie Holbrook [facility] Responsible Pharmacy [phone]          Clinic Interview:  Patient Findings     Positives:   Change in diet/appetite    Negatives:   Signs/symptoms of thrombosis, Signs/symptoms of bleeding,   Laboratory test error suspected, Change in health, Change in alcohol use,   Change in activity, Upcoming invasive procedure, Emergency department   visit, Upcoming dental procedure, Missed doses, Extra doses, Change in   medications, Hospital admission, Bruising, Other complaints    Comments:   Had no greens the past 2 weeks. He is seeing how greens   affect his INR.       Clinical Outcomes     Negatives:   Major bleeding event, Thromboembolic event,   Anticoagulation-related hospital admission, Anticoagulation-related ED   visit, Anticoagulation-related fatality    Comments:   Had no greens the past 2 weeks. He is seeing how greens   affect his INR.  He is reviewing the Vitamin K content of vegetables and is working on a stable plan for eating.        INR History:  Anticoagulation Monitoring 2019   INR 2.3 2.4 4.1   INR Date 2019   INR Goal 2.5-3.5 2.5-3.5  2.5-3.5   Trend Same Same Same   Last Week Total 35 mg 35 mg 35 mg   Next Week Total 37.5 mg 37.5 mg 32.5 mg   Sun 5 mg 5 mg 5 mg   Mon 5 mg 5 mg 5 mg   Tue 7.5 mg (7/9); Otherwise 5 mg 7.5 mg (7/23); Otherwise 5 mg 2.5 mg (8/6); Otherwise 5 mg   Wed 5 mg 5 mg 5 mg   Thu 5 mg 5 mg 5 mg   Fri 5 mg 5 mg 5 mg   Sat 5 mg 5 mg 5 mg   Visit Report - - -   Some recent data might be hidden       Plan:  1. INR is Supratherapeutic today- see above in Anticoagulation Summary.  Will instruct Elliot Sebastian to decrease his dose today by 50%. He plans to eat brussel sprouts today and adjust his Vitamin K intake via vegetables these next 2 weeks.   2. Follow up in 2 weeks  3. Patient declines warfarin refills.  4. Verbal and written information provided. Patient expresses understanding and has no further questions at this time.    Gemma Mclaughlin [facility]

## 2019-08-15 RX ORDER — ATENOLOL 25 MG/1
TABLET ORAL
Qty: 30 TABLET | Refills: 5 | Status: SHIPPED | OUTPATIENT
Start: 2019-08-15 | End: 2019-12-09 | Stop reason: SDUPTHER

## 2019-08-15 RX ORDER — WARFARIN SODIUM 5 MG/1
TABLET ORAL
Qty: 30 TABLET | Refills: 0 | Status: SHIPPED | OUTPATIENT
Start: 2019-08-15 | End: 2019-10-20 | Stop reason: SDUPTHER

## 2019-08-20 ENCOUNTER — ANTICOAGULATION VISIT (OUTPATIENT)
Dept: PHARMACY | Facility: HOSPITAL | Age: 59
End: 2019-08-20

## 2019-08-20 DIAGNOSIS — Z95.2 HX OF MITRAL VALVE REPLACEMENT WITH MECHANICAL VALVE: ICD-10-CM

## 2019-08-20 LAB
INR PPP: 2.7 (ref 0.91–1.09)
PROTHROMBIN TIME: 32.2 SECONDS (ref 10–13.8)

## 2019-08-20 PROCEDURE — 85610 PROTHROMBIN TIME: CPT

## 2019-08-20 PROCEDURE — 36416 COLLJ CAPILLARY BLOOD SPEC: CPT

## 2019-08-20 NOTE — PROGRESS NOTES
Anticoagulation Clinic Progress Note    Anticoagulation Summary  As of 2019    INR goal:   2.5-3.5   TTR:   37.1 % (9.8 mo)   INR used for dosin.7 (2019)   Warfarin maintenance plan:   5 mg every day   Weekly warfarin total:   35 mg   No change documented:   Manisha Benjamin   Plan last modified:   Michael Horner [facility] (2019)   Next INR check:   9/3/2019   Priority:   High   Target end date:   Indefinite    Indications    Atrial fibrillation (CMS/HCC) [I48.91]  Hx of mitral valve replacement with mechanical valve [Z95.2] [Z95.2]             Anticoagulation Episode Summary     INR check location:       Preferred lab:       Send INR reminders to:   [facility] MEN[facility]    Comments:         Anticoagulation Care Providers     Provider Role Specialty Phone number    Navi Fay MD Referring Cardiology [phone]    Sammie Holbrook [facility] Responsible Pharmacy [phone]          Clinic Interview:  Patient Findings     Negatives:   Signs/symptoms of thrombosis, Signs/symptoms of bleeding,   Laboratory test error suspected, Change in health, Change in alcohol use,   Change in activity, Upcoming invasive procedure, Emergency department   visit, Upcoming dental procedure, Missed doses, Extra doses, Change in   medications, Change in diet/appetite, Hospital admission, Bruising, Other   complaints      Clinical Outcomes     Negatives:   Major bleeding event, Thromboembolic event,   Anticoagulation-related hospital admission, Anticoagulation-related ED   visit, Anticoagulation-related fatality        INR History:  Anticoagulation Monitoring 2019   INR 2.4 4.1 2.7   INR Date 2019   INR Goal 2.5-3.5 2.5-3.5 2.5-3.5   Trend Same Same Same   Last Week Total 35 mg 35 mg 30 mg   Next Week Total 37.5 mg 32.5 mg 35 mg   Sun 5 mg 5 mg 5 mg   Mon 5 mg 5 mg 5 mg   Tue 7.5 mg (); Otherwise 5 mg 2.5 mg (); Otherwise 5 mg 5 mg   Wed 5 mg 5 mg 5 mg   Thu  5 mg 5 mg 5 mg   Fri 5 mg 5 mg 5 mg   Sat 5 mg 5 mg 5 mg   Visit Report - - -   Some recent data might be hidden       Plan:  1. INR is therapeutic today- see above in Anticoagulation Summary.   Will instruct Elliot Sebastian to continue their warfarin regimen- see above in Anticoagulation Summary.  2. Follow up in 2 weeks.  3. Patient declines warfarin refills.  4. Verbal and written information provided. Patient expresses understanding and has no further questions at this time.    Manisha Benjamin

## 2019-09-03 ENCOUNTER — ANTICOAGULATION VISIT (OUTPATIENT)
Dept: PHARMACY | Facility: HOSPITAL | Age: 59
End: 2019-09-03

## 2019-09-03 DIAGNOSIS — Z95.2 HX OF MITRAL VALVE REPLACEMENT WITH MECHANICAL VALVE: ICD-10-CM

## 2019-09-03 LAB
INR PPP: 4.1 (ref 0.91–1.09)
PROTHROMBIN TIME: 49.4 SECONDS (ref 10–13.8)

## 2019-09-03 PROCEDURE — G0463 HOSPITAL OUTPT CLINIC VISIT: HCPCS

## 2019-09-03 PROCEDURE — 36416 COLLJ CAPILLARY BLOOD SPEC: CPT

## 2019-09-03 PROCEDURE — 85610 PROTHROMBIN TIME: CPT

## 2019-09-03 NOTE — PROGRESS NOTES
Anticoagulation Clinic Progress Note    Anticoagulation Summary  As of 9/3/2019    INR goal:   2.5-3.5   TTR:   38.0 % (10.3 mo)   INR used for dosin.1! (9/3/2019)   Warfarin maintenance plan:   5 mg every day   Weekly warfarin total:   35 mg   Plan last modified:   Michael Horner [facility] (2019)   Next INR check:   2019   Priority:   High   Target end date:   Indefinite    Indications    Atrial fibrillation (CMS/HCC) [I48.91]  Hx of mitral valve replacement with mechanical valve [Z95.2] [Z95.2]             Anticoagulation Episode Summary     INR check location:       Preferred lab:       Send INR reminders to:    [facility]    Comments:         Anticoagulation Care Providers     Provider Role Specialty Phone number    Navi Fay MD Referring Cardiology [phone]    Sammie Holbrook [facility] Responsible Pharm[facility] [phone]          Clinic Interview:  Patient Findings     Positives:   Change in medications    Negatives:   Signs/symptoms of thrombosis, Signs/symptoms of bleeding,   Laboratory test error suspected, Change in health, Change in alcohol use,   Change in activity, Upcoming invasive procedure, Emergency department   visit, Upcoming dental procedure, Missed doses, Extra doses, Change in   diet/appetite, Hospital admission, Bruising, Other complaints    Comments:   Recent Steroid for back injury, finished last dose today.   Some acetaminophen use last week.       Clinical Outcomes     Negatives:   Major bleeding event, Thromboembolic event,   Anticoagulation-related hospital admission, Anticoagulation-related ED   visit, Anticoagulation-related fatality    Comments:   Recent Steroid for back injury, finished last dose today.   Some acetaminophen use last week.         INR History:  Anticoagulation Monitoring 2019 2019 9/3/2019   INR 4.1 2.7 4.1   INR Date 2019 2019 9/3/2019   INR Goal 2.5-3.5 2.5-3.5 2.5-3.5   Trend Same Same Same   Last Week Total 35 mg 30  mg 35 mg   Next Week Total 32.5 mg 35 mg 32.5 mg   Sun 5 mg 5 mg 5 mg   Mon 5 mg 5 mg 5 mg   Tue 2.5 mg (8/6); Otherwise 5 mg 5 mg 5 mg   Wed 5 mg 5 mg 2.5 mg (9/4); Otherwise 5 mg   Thu 5 mg 5 mg 5 mg   Fri 5 mg 5 mg 5 mg   Sat 5 mg 5 mg 5 mg   Visit Report - - -   Some recent data might be hidden       Plan:  1. INR is Supratherapeutic today- see above in Anticoagulation Summary.  Will instruct Elliot Sebastian to decrease his next dose of warfarin to 2.5mg. He will eat some broccoli today.   2. Follow up in 2 weeks  3. Patient declines warfarin refills.  4. Verbal and written information provided. Patient expresses understanding and has no further questions at this time.    Gemma Mclaughlin [facility]

## 2019-09-03 NOTE — PROGRESS NOTES
Anticoagulation Clinic Progress Note    Anticoagulation Summary  As of 9/3/2019    INR goal:   2.5-3.5   TTR:   38.0 % (10.3 mo)   INR used for dosin.1! (9/3/2019)   Warfarin maintenance plan:   5 mg every day   Weekly warfarin total:   35 mg   Plan last modified:   Michael Horner [facility] (2019)   Next INR check:   2019   Priority:   High   Target end date:   Indefinite    Indications    Atrial fibrillation (CMS/HCC) [I48.91]  Hx of mitral valve replacement with mechanical valve [Z95.2] [Z95.2]             Anticoagulation Episode Summary     INR check location:       Preferred lab:       Send INR reminders to:    [facility]    Comments:         Anticoagulation Care Providers     Provider Role Specialty Phone number    Navi Fay MD Referring Cardiology [phone]    Sammie Holbrook [facility] Responsible Pharmacy [phone]          Clinic Interview:      INR History:  Anticoagulation Monitoring 2019 2019 9/3/2019   INR 4.1 2.7 4.1   INR Date 2019 2019 9/3/2019   INR Goal 2.5-3.5 2.5-3.5 2.5-3.5   Trend Same Same Same   Last Week Total 35 mg 30 mg 35 mg   Next Week Total 32.5 mg 35 mg 32.5 mg   Sun 5 mg 5 mg 5 mg   Mon 5 mg 5 mg 5 mg   Tue 2.5 mg (); Otherwise 5 mg 5 mg 5 mg   Wed 5 mg 5 mg 2.5 mg (); Otherwise 5 mg   Thu 5 mg 5 mg 5 mg   Fri 5 mg 5 mg 5 mg   Sat 5 mg 5 mg 5 mg   Visit Report - - -   Some recent data might be hidden       Plan:  1. INR is {therapeutic/subtherapeutic/supratherapeutic:33002} today- see above in Anticoagulation Summary.  Will instruct Elliot Sebastian to {CONTINUE/INCREASE:22756} their warfarin regimen- see above in Anticoagulation Summary.  2. Follow up in {WHEN; DAYS WEEKS MONTHS:[id number]}  3. Patient {DECLINES/DESIRES:} warfarin refills.  4. Verbal and written information provided. Patient expresses understanding and has no further questions at this time.    Gemma Mclaughlin [facility]

## 2019-09-17 ENCOUNTER — ANTICOAGULATION VISIT (OUTPATIENT)
Dept: PHARMACY | Facility: HOSPITAL | Age: 59
End: 2019-09-17

## 2019-09-17 DIAGNOSIS — Z95.2 HX OF MITRAL VALVE REPLACEMENT WITH MECHANICAL VALVE: ICD-10-CM

## 2019-09-17 LAB
INR PPP: 4.7 (ref 0.91–1.09)
PROTHROMBIN TIME: 56.3 SECONDS (ref 10–13.8)

## 2019-09-17 PROCEDURE — 36416 COLLJ CAPILLARY BLOOD SPEC: CPT

## 2019-09-17 PROCEDURE — 85610 PROTHROMBIN TIME: CPT

## 2019-09-17 PROCEDURE — G0463 HOSPITAL OUTPT CLINIC VISIT: HCPCS

## 2019-09-17 NOTE — PROGRESS NOTES
Anticoagulation Clinic Progress Note    Anticoagulation Summary  As of 2019    INR goal:   2.5-3.5   TTR:   36.3 % (10.8 mo)   INR used for dosin.7! (2019)   Warfarin maintenance plan:   5 mg every day   Weekly warfarin total:   35 mg   Plan last modified:   Michael Horner [facility] (2019)   Next INR check:   2019   Priority:   High   Target end date:   Indefinite    Indications    Atrial fibrillation (CMS/HCC) [I48.91]  Hx of mitral valve replacement with mechanical valve [Z95.2] [Z95.2]             Anticoagulation Episode Summary     INR check location:       Preferred lab:       Send INR reminders to:    [facility]    Comments:         Anticoagulation Care Providers     Provider Role Specialty Phone number    Navi Fay MD Referring Cardiology [phone]          Clinic Interview:  Patient Findings     Positives:   Change in alcohol use, Change in activity, Change in   medications, Change in diet/appetite, Other complaints    Negatives:   Signs/symptoms of thrombosis, Signs/symptoms of bleeding,   Laboratory test error suspected, Change in health, Upcoming invasive   procedure, Emergency department visit, Upcoming dental procedure, Missed   doses, Extra doses, Hospital admission, Bruising    Comments:   On vacation for past 9 days at home. Significant amt of time   working in yard, sweating a lot, less hydrated than usual. Continues with   vit k one day/wk. APAP use since last night. Decreased EtOH overall while   on vacation. Already took today's dose of warfarin. Previously low on   current dose.       Clinical Outcomes     Negatives:   Major bleeding event, Thromboembolic event,   Anticoagulation-related hospital admission, Anticoagulation-related ED   visit, Anticoagulation-related fatality    Comments:   On vacation for past 9 days at home. Significant amt of time   working in yard, sweating a lot, less hydrated than usual. Continues with   vit k one day/wk.  APAP use since last night. Decreased EtOH overall while   on vacation. Already took today's dose of warfarin. Previously low on   current dose.         INR History:  Anticoagulation Monitoring 8/20/2019 9/3/2019 9/17/2019   INR 2.7 4.1 4.7   INR Date 8/20/2019 9/3/2019 9/17/2019   INR Goal 2.5-3.5 2.5-3.5 2.5-3.5   Trend Same Same Same   Last Week Total 30 mg 35 mg 35 mg   Next Week Total 35 mg 32.5 mg 30 mg   Sun 5 mg 5 mg 5 mg   Mon 5 mg 5 mg 5 mg   Tue 5 mg 5 mg 5 mg   Wed 5 mg 2.5 mg (9/4); Otherwise 5 mg Hold (9/18)   Thu 5 mg 5 mg 5 mg   Fri 5 mg 5 mg 5 mg   Sat 5 mg 5 mg 5 mg   Visit Report - - -   Some recent data might be hidden       Plan:  1. INR is Supratherapeutic today- see above in Anticoagulation Summary.  Will instruct Elliot Sebastian to Change their warfarin regimen- see above in Anticoagulation Summary.  2. Follow up in 1 week  3. Patient declines warfarin refills.  4. To seek immediate medical attention if s/sx of bleed develop or fall occurs. Verbal and written information provided. Patient expresses understanding and has no further questions at this time.    Michael Horner [facility]

## 2019-09-24 ENCOUNTER — ANTICOAGULATION VISIT (OUTPATIENT)
Dept: PHARMACY | Facility: HOSPITAL | Age: 59
End: 2019-09-24

## 2019-09-24 DIAGNOSIS — Z95.2 HX OF MITRAL VALVE REPLACEMENT WITH MECHANICAL VALVE: ICD-10-CM

## 2019-09-24 LAB
INR PPP: 2.8 (ref 0.91–1.09)
PROTHROMBIN TIME: 33.8 SECONDS (ref 10–13.8)

## 2019-09-24 PROCEDURE — 85610 PROTHROMBIN TIME: CPT

## 2019-09-24 PROCEDURE — 36416 COLLJ CAPILLARY BLOOD SPEC: CPT

## 2019-09-24 NOTE — PROGRESS NOTES
Anticoagulation Clinic Progress Note    Anticoagulation Summary  As of 2019    INR goal:   2.5-3.5   TTR:   36.3 % (11 mo)   INR used for dosin.8 (2019)   Warfarin maintenance plan:   5 mg every day   Weekly warfarin total:   35 mg   No change documented:   Svetlana Arreola, Pharmacy Intern   Plan last modified:   Michael Horner [facility] (2019)   Next INR check:   10/1/2019   Priority:   High   Target end date:   Indefinite    Indications    Atrial fibrillation (CMS/HCC) [I48.91]  Hx of mitral valve replacement with mechanical valve [Z95.2] [Z95.2]             Anticoagulation Episode Summary     INR check location:       Preferred lab:       Send INR reminders to:   [facility] PO[facility]    Comments:         Anticoagulation Care Providers     Provider Role Specialty Phone number    Navi Fay MD Referring Cardiology [phone]          Clinic Interview:  Patient Findings     Negatives:   Signs/symptoms of thrombosis, Signs/symptoms of bleeding,   Laboratory test error suspected, Change in health, Change in alcohol use,   Change in activity, Upcoming invasive procedure, Emergency department   visit, Upcoming dental procedure, Missed doses, Extra doses, Change in   medications, Change in diet/appetite, Hospital admission, Bruising, Other   complaints      Clinical Outcomes     Negatives:   Major bleeding event, Thromboembolic event,   Anticoagulation-related hospital admission, Anticoagulation-related ED   visit, Anticoagulation-related fatality        INR History:  Anticoagulation Monitoring 9/3/2019 2019 2019   INR 4.1 4.7 2.8   INR Date 9/3/2019 2019 2019   INR Goal 2.5-3.5 2.5-3.5 2.5-3.5   Trend Same Same Same   Last Week Total 35 mg 35 mg 30 mg   Next Week Total 32.5 mg 30 mg 35 mg   Sun 5 mg 5 mg 5 mg   Mon 5 mg 5 mg 5 mg   Tue 5 mg 5 mg 5 mg   Wed 2.5 mg (); Otherwise 5 mg Hold () 5 mg   Thu 5 mg 5 mg 5 mg   Fri 5 mg 5 mg 5 mg   Sat 5 mg 5 mg 5 mg    Visit Report - - -   Some recent data might be hidden       Plan:  1. INR is Therapeutic today- see above in Anticoagulation Summary.  Will instruct Elliot Tomlinsonlon to Continue their warfarin regimen- see above in Anticoagulation Summary.  2. Follow up in 1 week  3. Patient declines warfarin refills.  4. Verbal and written information provided. Patient expresses understanding and has no further questions at this time.    Svetlana Arreola, Pharmacy Intern

## 2019-10-02 ENCOUNTER — ANTICOAGULATION VISIT (OUTPATIENT)
Dept: PHARMACY | Facility: HOSPITAL | Age: 59
End: 2019-10-02

## 2019-10-02 DIAGNOSIS — Z95.2 HX OF MITRAL VALVE REPLACEMENT WITH MECHANICAL VALVE: ICD-10-CM

## 2019-10-02 LAB
INR PPP: 2.9 (ref 0.91–1.09)
PROTHROMBIN TIME: 34.3 SECONDS (ref 10–13.8)

## 2019-10-02 PROCEDURE — 36416 COLLJ CAPILLARY BLOOD SPEC: CPT

## 2019-10-02 PROCEDURE — 85610 PROTHROMBIN TIME: CPT

## 2019-10-02 NOTE — PROGRESS NOTES
Anticoagulation Clinic Progress Note    Anticoagulation Summary  As of 10/2/2019    INR goal:   2.5-3.5   TTR:   37.8 % (11.3 mo)   INR used for dosin.9 (10/2/2019)   Warfarin maintenance plan:   5 mg every day   Weekly warfarin total:   35 mg   No change documented:   Manisha Benjamin   Plan last modified:   Michael Horner [facility] (2019)   Next INR check:   10/30/2019   Priority:   Maintenance   Target end date:   Indefinite    Indications    Atrial fibrillation (CMS/HCC) [I48.91]  Hx of mitral valve replacement with mechanical valve [Z95.2] [Z95.2]             Anticoagulation Episode Summary     INR check location:       Preferred lab:       Send INR reminders to:    CHARLES MENDEZ [facility]    Comments:         Anticoagulation Care Providers     Provider Role Specialty Phone number    Navi Fay MD Referring Cardiology [phone]          Clinic Interview:      INR History:  Anticoagulation Monitoring 2019 2019 10/2/2019   INR 4.7 2.8 2.9   INR Date 2019 2019 10/2/2019   INR Goal 2.5-3.5 2.5-3.5 2.5-3.5   Trend Same Same Same   Last Week Total 35 mg 30 mg 35 mg   Next Week Total 30 mg 35 mg 35 mg   Sun 5 mg 5 mg 5 mg   Mon 5 mg 5 mg 5 mg   Tue 5 mg 5 mg 5 mg   Wed Hold () 5 mg 5 mg   Thu 5 mg 5 mg 5 mg   Fri 5 mg 5 mg 5 mg   Sat 5 mg 5 mg 5 mg   Visit Report - - -   Some recent data might be hidden       Plan:  1. INR is therapeutic today- see above in Anticoagulation Summary.   Will instruct Elliot Sebastian to continue their warfarin regimen- see above in Anticoagulation Summary.  2. Follow up in 4 weeks.  3. Patient declines warfarin refills.  4. Verbal and written information provided. Patient expresses understanding and has no further questions at this time.    Manisha Benjamin

## 2019-10-21 RX ORDER — WARFARIN SODIUM 5 MG/1
TABLET ORAL
Qty: 90 TABLET | Refills: 0 | Status: SHIPPED | OUTPATIENT
Start: 2019-10-21 | End: 2020-01-20

## 2019-10-28 ENCOUNTER — ANTICOAGULATION VISIT (OUTPATIENT)
Dept: PHARMACY | Facility: HOSPITAL | Age: 59
End: 2019-10-28

## 2019-10-28 DIAGNOSIS — Z95.2 HX OF MITRAL VALVE REPLACEMENT WITH MECHANICAL VALVE: ICD-10-CM

## 2019-10-28 LAB
INR PPP: 2.7 (ref 0.91–1.09)
PROTHROMBIN TIME: 32.6 SECONDS (ref 10–13.8)

## 2019-10-28 PROCEDURE — 85610 PROTHROMBIN TIME: CPT

## 2019-10-28 PROCEDURE — 36416 COLLJ CAPILLARY BLOOD SPEC: CPT

## 2019-10-28 NOTE — PROGRESS NOTES
Anticoagulation Clinic Progress Note    Anticoagulation Summary  As of 10/28/2019    INR goal:   2.5-3.5   TTR:   42.3 % (1 y)   INR used for dosin.7 (10/28/2019)   Warfarin maintenance plan:   5 mg every day   Weekly warfarin total:   35 mg   No change documented:   Santiago Parra RPH   Plan last modified:   Michael Horner R[facility] (2019)   Next INR check:   2019   Priority:   Maintenance   Target end date:   Indefinite    Indications    Atrial fibrillation (CMS/HCC) [I48.91]  Hx of mitral valve replacement with mechanical valve [Z95.2] [Z95.2]             Anticoagulation Episode Summary     INR check location:       Preferred lab:       Send INR reminders to:   DYANA MENDEZ [facility]    Comments:         Anticoagulation Care Providers     Provider Role Specialty Phone number    Navi Fay MD Referring Cardiology [phone]          Clinic Interview:  Patient Findings     Negatives:   Signs/symptoms of thrombosis, Signs/symptoms of bleeding,   Laboratory test error suspected, Change in health, Change in alcohol use,   Change in activity, Upcoming invasive procedure, Emergency department   visit, Upcoming dental procedure, Missed doses, Extra doses, Change in   medications, Change in diet/appetite, Hospital admission, Bruising, Other   complaints      Clinical Outcomes     Negatives:   Major bleeding event, Thromboembolic event,   Anticoagulation-related hospital admission, Anticoagulation-related ED   visit, Anticoagulation-related fatality        INR History:  Anticoagulation Monitoring 2019 10/2/2019 10/28/2019   INR 2.8 2.9 2.7   INR Date 2019 10/2/2019 10/28/2019   INR Goal 2.5-3.5 2.5-3.5 2.5-3.5   Trend Same Same Same   Last Week Total 30 mg 35 mg 35 mg   Next Week Total 35 mg 35 mg 35 mg   Sun 5 mg 5 mg 5 mg   Mon 5 mg 5 mg 5 mg   Tue 5 mg 5 mg 5 mg   Wed 5 mg 5 mg 5 mg   Thu 5 mg 5 mg 5 mg   Fri 5 mg 5 mg 5 mg   Sat 5 mg 5 mg 5 mg   Visit Report - - -   Some recent  data might be hidden       Plan:  1. INR is Therapeutic today- see above in Anticoagulation Summary.  Will instruct Elliot Sebastian to Continue their warfarin regimen- see above in Anticoagulation Summary.  2. Follow up in 1 month  3. Patient declines warfarin refills.  4. Verbal and written information provided. Patient expresses understanding and has no further questions at this time.    Santiago Parra, [facility]

## 2019-11-25 ENCOUNTER — ANTICOAGULATION VISIT (OUTPATIENT)
Dept: PHARMACY | Facility: HOSPITAL | Age: 59
End: 2019-11-25

## 2019-11-25 DIAGNOSIS — Z95.2 HX OF MITRAL VALVE REPLACEMENT WITH MECHANICAL VALVE: ICD-10-CM

## 2019-11-25 LAB
INR PPP: 3.9 (ref 0.91–1.09)
PROTHROMBIN TIME: 46.2 SECONDS (ref 10–13.8)

## 2019-11-25 PROCEDURE — 85610 PROTHROMBIN TIME: CPT

## 2019-11-25 PROCEDURE — 36416 COLLJ CAPILLARY BLOOD SPEC: CPT

## 2019-11-25 PROCEDURE — G0463 HOSPITAL OUTPT CLINIC VISIT: HCPCS

## 2019-11-25 NOTE — PROGRESS NOTES
I have supervised and reviewed the notes, assessments, and/or procedures performed by Yair Bonilla, PharmD Candidate. The documented assessment and plan were developed cooperatively, and the plan was implemented in my presence. I concur with his documentation of this patient.    Sary Tejeda [facility]

## 2019-11-25 NOTE — PROGRESS NOTES
Anticoagulation Clinic Progress Note    Anticoagulation Summary  As of 11/25/2019    INR goal:   2.5-3.5   TTR:   44.0 % (1.1 y)   INR used for dosing:   3.9! (11/25/2019)   Warfarin maintenance plan:   5 mg every day   Weekly warfarin total:   35 mg   Plan last modified:   Michael Horner RPH (6/18/2019)   Next INR check:   12/9/2019   Priority:   Maintenance   Target end date:   Indefinite    Indications    Atrial fibrillation (CMS/HCC) [I48.91]  Hx of mitral valve replacement with mechanical valve [Z95.2] [Z95.2]             Anticoagulation Episode Summary     INR check location:       Preferred lab:       Send INR reminders to:    [facility]    Comments:         Anticoagulation Care Providers     Provider Role Specialty Phone number    Navi Fay MD Referring Cardiology [phone]          Clinic Interview:  Patient Findings     Positives:   Change in health, Change in alcohol use    Negatives:   Signs/symptoms of thrombosis, Signs/symptoms of bleeding,   Laboratory test error suspected, Change in activity, Upcoming invasive   procedure, Emergency department visit, Upcoming dental procedure, Missed   doses, Extra doses, Change in medications, Change in diet/appetite,   Hospital admission, Bruising, Other complaints    Comments:   GI distress, states after meals he needs to be ready to go to   the bathroom; also states he had a few extra drinks of alcohol yesterday.      Clinical Outcomes     Negatives:   Major bleeding event, Thromboembolic event,   Anticoagulation-related hospital admission, Anticoagulation-related ED   visit, Anticoagulation-related fatality    Comments:   GI distress, states after meals he needs to be ready to go to   the bathroom; also states he had a few extra drinks of alcohol yesterday.        INR History:  Anticoagulation Monitoring 10/2/2019 10/28/2019 11/25/2019   INR 2.9 2.7 3.9   INR Date 10/2/2019 10/28/2019 11/25/2019   INR Goal 2.5-3.5 2.5-3.5 2.5-3.5    Trend Same Same Same   Last Week Total 35 mg 35 mg 35 mg   Next Week Total 35 mg 35 mg 32.5 mg   Sun 5 mg 5 mg 5 mg   Mon 5 mg 5 mg 5 mg   Tue 5 mg 5 mg 2.5 mg (11/26); Otherwise 5 mg   Wed 5 mg 5 mg 5 mg   Thu 5 mg 5 mg 5 mg   Fri 5 mg 5 mg 5 mg   Sat 5 mg 5 mg 5 mg   Visit Report - - -   Some recent data might be hidden       Plan:  1. INR is Supratherapeutic today- see above in Anticoagulation Summary.  Will instruct Elliot Sebastian to Change their warfarin regimen- see above in Anticoagulation Summary.  2. Follow up in 2 weeks  3. Patient declines warfarin refills.  4. Verbal and written information provided. Patient expresses understanding and has no further questions at this time.    Christiano Bonilla, Pharmacy Intern

## 2019-12-09 ENCOUNTER — ANTICOAGULATION VISIT (OUTPATIENT)
Dept: PHARMACY | Facility: HOSPITAL | Age: 59
End: 2019-12-09

## 2019-12-09 DIAGNOSIS — Z95.2 HX OF MITRAL VALVE REPLACEMENT WITH MECHANICAL VALVE: ICD-10-CM

## 2019-12-09 LAB
INR PPP: 3 (ref 0.91–1.09)
PROTHROMBIN TIME: 35.6 SECONDS (ref 10–13.8)

## 2019-12-09 PROCEDURE — 85610 PROTHROMBIN TIME: CPT

## 2019-12-09 PROCEDURE — 36416 COLLJ CAPILLARY BLOOD SPEC: CPT

## 2019-12-09 RX ORDER — ATENOLOL 25 MG/1
25 TABLET ORAL DAILY
Qty: 30 TABLET | Refills: 2 | Status: SHIPPED | OUTPATIENT
Start: 2019-12-09 | End: 2020-03-12

## 2019-12-09 NOTE — PROGRESS NOTES
Anticoagulation Clinic Progress Note    Anticoagulation Summary  As of 12/9/2019    INR goal:   2.5-3.5   TTR:   44.4 % (1.1 y)   INR used for dosing:   3.0 (12/9/2019)   Warfarin maintenance plan:   5 mg every day   Weekly warfarin total:   35 mg   No change documented:   Manisha Benjamin   Plan last modified:   Michael Horner [facility] (6/18/2019)   Next INR check:   1/6/2020   Priority:   Maintenance   Target end date:   Indefinite    Indications    Atrial fibrillation (CMS/HCC) [I48.91]  Hx of mitral valve replacement with mechanical valve [Z95.2] [Z95.2]             Anticoagulation Episode Summary     INR check location:       Preferred lab:       Send INR reminders to:    CHARLES MENDEZ [facility]    Comments:         Anticoagulation Care Providers     Provider Role Specialty Phone number    Navi Fay MD Referring Cardiology [phone]          Clinic Interview:      INR History:  Anticoagulation Monitoring 10/28/2019 11/25/2019 12/9/2019   INR 2.7 3.9 3.0   INR Date 10/28/2019 11/25/2019 12/9/2019   INR Goal 2.5-3.5 2.5-3.5 2.5-3.5   Trend Same Same Same   Last Week Total 35 mg 35 mg 35 mg   Next Week Total 35 mg 32.5 mg 35 mg   Sun 5 mg 5 mg 5 mg   Mon 5 mg 5 mg 5 mg   Tue 5 mg 2.5 mg (11/26); Otherwise 5 mg 5 mg   Wed 5 mg 5 mg 5 mg   Thu 5 mg 5 mg 5 mg   Fri 5 mg 5 mg 5 mg   Sat 5 mg 5 mg 5 mg   Visit Report - - -   Some recent data might be hidden       Plan:  1. INR is therapeutic today- see above in Anticoagulation Summary.   Will instruct Elliot Sebastian to continue their warfarin regimen- see above in Anticoagulation Summary.  2. Follow up in 4 weeks.  3. Patient declines warfarin refills.  4. Verbal and written information provided. Patient expresses understanding and has no further questions at this time.    Manisha Benjamin

## 2020-01-06 ENCOUNTER — APPOINTMENT (OUTPATIENT)
Dept: PHARMACY | Facility: HOSPITAL | Age: 60
End: 2020-01-06

## 2020-01-13 ENCOUNTER — ANTICOAGULATION VISIT (OUTPATIENT)
Dept: PHARMACY | Facility: HOSPITAL | Age: 60
End: 2020-01-13

## 2020-01-13 DIAGNOSIS — Z95.2 HX OF MITRAL VALVE REPLACEMENT WITH MECHANICAL VALVE: ICD-10-CM

## 2020-01-13 LAB
INR PPP: 3.1 (ref 0.91–1.09)
PROTHROMBIN TIME: 37.7 SECONDS (ref 10–13.8)

## 2020-01-13 PROCEDURE — 85610 PROTHROMBIN TIME: CPT

## 2020-01-13 PROCEDURE — 36416 COLLJ CAPILLARY BLOOD SPEC: CPT

## 2020-01-13 NOTE — PROGRESS NOTES
I have supervised and reviewed the notes, assessments, and/or procedures performed by our PharmD Candidate. The documented assessment and plan were developed cooperatively. I concur with the documentation of this patient encounter.    Sary Tejeda RP

## 2020-01-13 NOTE — PROGRESS NOTES
Anticoagulation Clinic Progress Note    Anticoagulation Summary  As of 1/13/2020    INR goal:   2.5-3.5   TTR:   48.8 % (1.2 y)   INR used for dosing:   3.1 (1/13/2020)   Warfarin maintenance plan:   5 mg every day   Weekly warfarin total:   35 mg   No change documented:   Kathie Allison, Pharmacy Intern   Plan last modified:   Michael Horner [facility] (6/18/2019)   Next INR check:   2/10/2020   Priority:   Maintenance   Target end date:   Indefinite    Indications    Atrial fibrillation (CMS/HCC) [I48.91]  Hx of mitral valve replacement with mechanical valve [Z95.2] [Z95.2]             Anticoagulation Episode Summary     INR check location:       Preferred lab:       Send INR reminders to:    CHARLES MENDEZ [facility]    Comments:         Anticoagulation Care Providers     Provider Role Specialty Phone number    Navi Fay MD Referring Cardiology [phone]          Clinic Interview:  Patient Findings     Negatives:   Signs/symptoms of thrombosis, Signs/symptoms of bleeding,   Laboratory test error suspected, Change in health, Change in alcohol use,   Change in activity, Upcoming invasive procedure, Emergency department   visit, Upcoming dental procedure, Missed doses, Extra doses, Change in   medications, Change in diet/appetite, Hospital admission, Bruising, Other   complaints      Clinical Outcomes     Negatives:   Major bleeding event, Thromboembolic event,   Anticoagulation-related hospital admission, Anticoagulation-related ED   visit, Anticoagulation-related fatality        INR History:  Anticoagulation Monitoring 11/25/2019 12/9/2019 1/13/2020   INR 3.9 3.0 3.1   INR Date 11/25/2019 12/9/2019 1/13/2020   INR Goal 2.5-3.5 2.5-3.5 2.5-3.5   Trend Same Same Same   Last Week Total 35 mg 35 mg 35 mg   Next Week Total 32.5 mg 35 mg 35 mg   Sun 5 mg 5 mg 5 mg   Mon 5 mg 5 mg 5 mg   Tue 2.5 mg (11/26); Otherwise 5 mg 5 mg 5 mg   Wed 5 mg 5 mg 5 mg   Thu 5 mg 5 mg 5 mg   Fri 5 mg 5 mg 5 mg   Sat 5 mg 5 mg  5 mg   Visit Report - - -   Some recent data might be hidden       Plan:  1. INR is Therapeutic today- see above in Anticoagulation Summary.  Will instruct Elliot Sebastian to Continue their warfarin regimen- see above in Anticoagulation Summary.  2. Follow up in 1 month  3. Patient declines warfarin refills.  4. Verbal and written information provided. Patient expresses understanding and has no further questions at this time.    Kathie Allison, Pharmacy Intern

## 2020-01-20 RX ORDER — WARFARIN SODIUM 5 MG/1
TABLET ORAL
Qty: 90 TABLET | Refills: 1 | Status: ON HOLD | OUTPATIENT
Start: 2020-01-20 | End: 2020-03-30

## 2020-02-10 ENCOUNTER — ANTICOAGULATION VISIT (OUTPATIENT)
Dept: PHARMACY | Facility: HOSPITAL | Age: 60
End: 2020-02-10

## 2020-02-10 ENCOUNTER — OFFICE VISIT (OUTPATIENT)
Dept: CARDIOLOGY | Facility: CLINIC | Age: 60
End: 2020-02-10

## 2020-02-10 VITALS
DIASTOLIC BLOOD PRESSURE: 100 MMHG | SYSTOLIC BLOOD PRESSURE: 150 MMHG | BODY MASS INDEX: 35.29 KG/M2 | HEART RATE: 67 BPM | OXYGEN SATURATION: 97 % | WEIGHT: 305 LBS | HEIGHT: 78 IN

## 2020-02-10 DIAGNOSIS — Z95.2 HX OF MITRAL VALVE REPLACEMENT WITH MECHANICAL VALVE: ICD-10-CM

## 2020-02-10 DIAGNOSIS — R03.0 ELEVATED BLOOD PRESSURE READING: ICD-10-CM

## 2020-02-10 DIAGNOSIS — I33.0 BACTERIAL ENDOCARDITIS, UNSPECIFIED CHRONICITY: Primary | ICD-10-CM

## 2020-02-10 DIAGNOSIS — I48.91 ATRIAL FIBRILLATION, UNSPECIFIED TYPE (HCC): ICD-10-CM

## 2020-02-10 PROBLEM — R68.82 REDUCED LIBIDO: Status: ACTIVE | Noted: 2020-02-10

## 2020-02-10 PROBLEM — N40.0 BENIGN PROSTATIC HYPERPLASIA: Status: ACTIVE | Noted: 2020-02-10

## 2020-02-10 PROBLEM — E05.90 SUBCLINICAL HYPERTHYROIDISM: Status: ACTIVE | Noted: 2020-02-10

## 2020-02-10 PROBLEM — E55.9 VITAMIN D DEFICIENCY: Status: ACTIVE | Noted: 2020-02-10

## 2020-02-10 PROBLEM — M54.50 LOW BACK PAIN: Status: ACTIVE | Noted: 2020-02-10

## 2020-02-10 PROBLEM — R53.83 FATIGUE: Status: ACTIVE | Noted: 2020-02-10

## 2020-02-10 PROBLEM — R97.20 ELEVATED PROSTATE SPECIFIC ANTIGEN (PSA): Status: ACTIVE | Noted: 2020-02-10

## 2020-02-10 PROBLEM — R41.840 POOR CONCENTRATION: Status: ACTIVE | Noted: 2020-02-10

## 2020-02-10 LAB
INR PPP: 3.8 (ref 0.91–1.09)
PROTHROMBIN TIME: 45 SECONDS (ref 10–13.8)

## 2020-02-10 PROCEDURE — 85610 PROTHROMBIN TIME: CPT

## 2020-02-10 PROCEDURE — 99214 OFFICE O/P EST MOD 30 MIN: CPT | Performed by: NURSE PRACTITIONER

## 2020-02-10 PROCEDURE — G0463 HOSPITAL OUTPT CLINIC VISIT: HCPCS

## 2020-02-10 PROCEDURE — 93000 ELECTROCARDIOGRAM COMPLETE: CPT | Performed by: NURSE PRACTITIONER

## 2020-02-10 PROCEDURE — 36416 COLLJ CAPILLARY BLOOD SPEC: CPT

## 2020-02-10 NOTE — PROGRESS NOTES
Date of Office Visit: 02/10/20  Encounter Provider: COREY Singletary  Primary Cardiologist: Dr. Fay  Place of Service: [facility] CARDIOLOGY  Patient Name: Elliot Sebastian  :1960      Subjective:     Chief Complaint:  Yearly cardiac follow-up    History of Present Illness:  Elliot Sebastian is a pleasant 59 y.o. male who is new to me .  Outside records have been obtained and reviewed by me.     This is a patient of Dr. Fay.  He has a history of bacterial endocarditis and mitral regurgitation for which he underwent mitral valve replacement with a [place] mechanical prosthesis in .  The patient developed atrial fibrillation transiently due to his valvular disease but apparently had had no recurrence.  He also had heart failure as result of mitral regurgitation that had also resolved.    2019 patient was last in the office to see Dr. Fay.  He had been feeling well without chest pain, shortness of breath, feet fatigue, peripheral edema or palpitations.    2020 last INR therapeutic at 3.1.  He is recheck INR today which is managed by the outpatient anticoagulation clinic at [facility].    He is presenting today for yearly cardiac follow-up.  He has overall been doing well.  He admits he got off the bandwagon of running when he did not make the cut for the [employer].  Prior to 6 or 7 months ago he was running frequently.  He denies any chest pain, shortness of breath, palpitations, lower extremity edema, PND, orthopnea dizziness, lightheadedness, syncope, near syncope or significant fatigue.  His blood pressure is high in the office today.  He really has not been checking at home.  He is not been following regularly with a primary care physician.  He denies any falls or abnormal bleeding with a friend.  He is managed by the outpatient INR clinic.  He denies headaches or changes in his vision.  He is not surprised his blood  pressure is up a little bit since he has gained weight.  He does have some leg achiness in his thighs that is better if he is exerting himself that he has attributed to his atenolol in the past.      Past Medical History:   Diagnosis Date   • Acute bacterial endocarditis    • Anemia    • APC (atrial premature contractions)    • Atrial fibrillation (CMS/HCC)    • BPH (benign prostatic hypertrophy)    • CHF (congestive heart failure) (CMS/HCC)    • Cholelithiasis    • Chronic constipation    • Deep vein thrombophlebitis of leg (CMS/HCC)     DISTAL   • Disease of thyroid gland    • Gout    • Intestinal obstruction (CMS/HCC)    • Ischemic enteritis (CMS/HCC)    • Left heart failure, NYHA class 3 (CMS/HCC)     CLASS 111 LEFT SYSTOIC CONGESTIVE HEART FAILURE   • Mitral regurgitation    • Pleural effusion, bilateral    • Pulmonary hypertension (CMS/HCC)    • 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CMS/HCC)    • Umbilical hernia    • Vitamin D deficiency      Past Surgical History:   Procedure Laterality Date   • APPENDECTOMY     • CHOLECYSTECTOMY     • EXPLORATION NECK FOR POSTOP HEMORRHAGE / THROMBOSIS / INFECTION     • MITRAL VALVE REPLACEMENT  01/03/2013    33MM [employer] MECHANICAL VALVE, PRESERVATION OF CORDS TO SUBVALVULAR APPARATUS AND DOROTHY GORE-FLORI CORD IN THE P2 AREA, DEBRIDEMENT OF ANTERIOR AND POSTERIOR MITRAL LEAFLET      Outpatient Medications Prior to Visit   Medication Sig Dispense Refill   • ascorbic acid (TH VITAMIN C) 1000 MG tablet Take 1,000 mg by mouth.     • atenolol (TENORMIN) 25 MG tablet Take 1 tablet by mouth Daily. 30 tablet 2   • Cholecalciferol (VITAMIN D3) 2000 UNITS capsule Take 1,000 Units by mouth Daily.     • Multiple Vitamins-Minerals (MENS MULTIVITAMIN PLUS) tablet Take by mouth.     • warfarin (COUMADIN) 5 MG tablet TAKE 1 TABLET BY MOUTH ONCE DAILY OR AS DIRECTED BY MED MANAGEMENT CLINIC 90 tablet 1   • cyclobenzaprine (FLEXERIL) 10 MG tablet Take 1 tablet by mouth 3 (Three) Times a Day As  Needed for Muscle Spasms. 15 tablet 0     No facility-administered medications prior to visit.        Allergies as of 02/10/2020   • (No Known Allergies)     Social History     Socioeconomic History   • Marital status:      Spouse name: Not on file   • Number of children: Not on file   • Years of education: Not on file   • Highest education level: Not on file   Tobacco Use   • Smoking status: Never Smoker   • Smokeless tobacco: Never Used   • Tobacco comment: caffeine use   Substance and Sexual Activity   • Alcohol use: Yes     Comment: SOCIAL     Family History   Problem Relation Age of Onset   • Diabetes Mother    • Heart disease Father    • Diabetes Sister    • Diabetes Brother      Review of Systems   Constitution: Positive for weight gain. Negative for chills, fever and malaise/fatigue.   HENT: Negative for ear pain, hearing loss, nosebleeds and sore throat.    Eyes: Negative for double vision, pain, vision loss in left eye and vision loss in right eye.   Cardiovascular: Negative for chest pain, claudication, dyspnea on exertion, irregular heartbeat, leg swelling, near-syncope, orthopnea, palpitations, paroxysmal nocturnal dyspnea and syncope.   Respiratory: Negative for cough, shortness of breath, snoring and wheezing.    Endocrine: Negative for cold intolerance and heat intolerance.   Hematologic/Lymphatic: Negative for bleeding problem.   Skin: Negative for color change, itching, rash and unusual hair distribution.   Musculoskeletal: Positive for myalgias (bilateral thigh pain). Negative for joint pain and joint swelling.   Gastrointestinal: Negative for abdominal pain, diarrhea, hematochezia, melena, nausea and vomiting.   Genitourinary: Negative for decreased libido, frequency, hematuria, hesitancy and incomplete emptying.   Neurological: Negative for excessive daytime sleepiness, dizziness, headaches, light-headedness, loss of balance, numbness, paresthesias and seizures.   Psychiatric/Behavioral:  "Negative for depression.          Objective:     Vitals:    02/10/20 1457 02/10/20 1523   BP: 140/90 150/100   BP Location: Right arm Right arm   Patient Position: Sitting    Cuff Size: Adult    Pulse: 64 67   SpO2:  97%   Weight: (!) 138 kg (305 lb)    Height: 198.1 cm (78\")      Body mass index is 35.25 kg/m².    PHYSICAL EXAM:  Physical Exam   Constitutional: He is oriented to person, place, and time. He appears well-developed and well-nourished. No distress.   Obese   HENT:   Head: Normocephalic and atraumatic.   Eyes: Pupils are equal, round, and reactive to light. Conjunctivae and EOM are normal.   Neck: No JVD present. Carotid bruit is not present.   Cardiovascular: Normal rate, regular rhythm, normal heart sounds and intact distal pulses.   Pulses:       Radial pulses are 2+ on the right side, and 2+ on the left side.        Dorsalis pedis pulses are 2+ on the right side, and 2+ on the left side.        Posterior tibial pulses are 2+ on the right side, and 2+ on the left side.   Bilateral toes and feet cold but with palpable pulses and normal capillary refill  Normal mechanical prosthetic valve sounds   Pulmonary/Chest: Effort normal and breath sounds normal. No accessory muscle usage. No tachypnea. No respiratory distress. He has no decreased breath sounds. He has no wheezes. He has no rhonchi. He has no rales. He exhibits no tenderness.   Abdominal: Soft. Bowel sounds are normal. He exhibits no distension. There is no tenderness. There is no rebound and no guarding.   Musculoskeletal: Normal range of motion. He exhibits no edema.   Neurological: He is alert and oriented to person, place, and time.   Skin: Skin is warm, dry and intact. He is not diaphoretic. No erythema.   Psychiatric: He has a normal mood and affect. His speech is normal and behavior is normal. Judgment and thought content normal. Cognition and memory are normal.   Nursing note and vitals reviewed.        ECG 12 Lead  Date/Time: 2/10/2020 " 3:02 PM  Performed by: Chrystal Escalera APRN  Authorized by: Chrystal Escalera APRN   Comparison: compared with previous ECG from 1/23/2019  Similar to previous ECG  Comparison to previous ECG: PACs new  Rhythm: sinus rhythm  Ectopy: atrial premature contractions  Rate: normal  BPM: 64  T inversion: III and aVF  T flattening: V2 and V3  QRS axis: left  Other findings: non-specific ST-T wave changes, T wave abnormality and poor R wave progression    Clinical impression: abnormal EKG  Comments: Inferior T wave abnormalities unchanged  Indication: Hypertension, bacterial endocarditis with mechanical mitral valve            Assessment:       Diagnosis Plan   1. Bacterial endocarditis, unspecified chronicity     2. Hx of mitral valve replacement with mechanical valve [Z95.2]     3. Atrial fibrillation, unspecified type (CMS/HCC)     4. Elevated blood pressure reading         Plan:     1. History of bacterial endocarditis: He will need anabiotic prophylaxis for dental or invasive procedures.  He was reeducated on this today and was understanding.  2.  History of mechanical atrial valve replacement: 2013.  Anticoagulated on warfarin managed by outpatient anticoagulation clinic.  No reported issues.  Will defer to Dr. Fay on repeat echocardiogram for reassessment of mechanical valve.  Transiently he had congestive heart failure and atrial fibrillation that both resolved following valve replacement.  3.  Elevated blood pressure reading: He has gained weight and has fallen off the exercise wagon.  We discussed lifestyle modification.  He is asymptomatic.  He is going to monitor his sodium intake and try to increase his physical activity.  He is going to check his blood pressure at work a few times a week over the next few weeks and will determine if he needs medical therapy following lifestyle modifications first.  4.  PACs: He is asymptomatic.  In the past he had transient atrial fibrillation that resolved after his  valve replacement  5.  Obesity: We discussed dietary modifications and getting back into an exercise regimen.      He has not seen his primary care physician in several years.  He is going to make a follow-up appointment as he has not had repeat labs recently.  We will follow-up on his blood pressure readings from home in the next few weeks.    Follow up with Dr. Fay in 1 year, unless otherwise needed sooner.  I advised the patient to contact our office with any questions or concerns.       It has been a pleasure to participate in this patient's care. Please feel free to contact me with any questions or concerns.     COREY Singletary  02/10/20             Your medication list           Accurate as of February 10, 2020  5:05 PM. If you have any questions, ask your nurse or doctor.               CONTINUE taking these medications      Instructions Last Dose Given Next Dose Due   atenolol 25 MG tablet  Commonly known as:  TENORMIN      Take 1 tablet by mouth Daily.       MENS MULTIVITAMIN PLUS tablet      Take by mouth.       TH VITAMIN C 1000 MG tablet  Generic drug:  ascorbic acid      Take 1,000 mg by mouth.       Vitamin D3 50 MCG (2000 UT) capsule      Take 1,000 Units by mouth Daily.       warfarin 5 MG tablet  Commonly known as:  COUMADIN      TAKE 1 TABLET BY MOUTH ONCE DAILY OR AS DIRECTED BY MED MANAGEMENT CLINIC          STOP taking these medications    cyclobenzaprine 10 MG tablet  Commonly known as:  FLEXERIL  Stopped by:  COREY Singletary               The above medication changes may not have been made by this provider.  Medication list was updated to reflect medications patient is currently taking including medication changes and discontinuations made by other healthcare providers.     Dictated utilizing Dragon Dictation System.

## 2020-02-10 NOTE — PROGRESS NOTES
Anticoagulation Clinic Progress Note    Anticoagulation Summary  As of 2/10/2020    INR goal:   2.5-3.5   TTR:   49.3 % (1.3 y)   INR used for dosing:   3.8! (2/10/2020)   Warfarin maintenance plan:   5 mg every day   Weekly warfarin total:   35 mg   No change documented:   Michael Horner RPH   Plan last modified:   Michael Horner RPH (6/18/2019)   Next INR check:   2/24/2020   Priority:   Maintenance   Target end date:   Indefinite    Indications    Atrial fibrillation (CMS/HCC) [I48.91]  Hx of mitral valve replacement with mechanical valve [Z95.2] [Z95.2]             Anticoagulation Episode Summary     INR check location:       Preferred lab:       Send INR reminders to:    CHARLES MENDEZ [facility]    Comments:         Anticoagulation Care Providers     Provider Role Specialty Phone number    Navi Fay MD Referring Cardiology [phone]          Clinic Interview:  Patient Findings     Negatives:   Signs/symptoms of thrombosis, Signs/symptoms of bleeding,   Laboratory test error suspected, Change in health, Change in alcohol use,   Change in activity, Upcoming invasive procedure, Emergency department   visit, Upcoming dental procedure, Missed doses, Extra doses, Change in   medications, Change in diet/appetite, Hospital admission, Bruising, Other   complaints    Comments:   Reports intention to eat additional serving of vit k.      Clinical Outcomes     Negatives:   Major bleeding event, Thromboembolic event,   Anticoagulation-related hospital admission, Anticoagulation-related ED   visit, Anticoagulation-related fatality    Comments:   Reports intention to eat additional serving of vit k.        INR History:  Anticoagulation Monitoring 12/9/2019 1/13/2020 2/10/2020   INR 3.0 3.1 3.8   INR Date 12/9/2019 1/13/2020 2/10/2020   INR Goal 2.5-3.5 2.5-3.5 2.5-3.5   Trend Same Same Same   Last Week Total 35 mg 35 mg 35 mg   Next Week Total 35 mg 35 mg 35 mg   Sun 5 mg 5 mg 5 mg   Mon 5 mg 5 mg 5 mg   Tue 5  mg 5 mg 5 mg   Wed 5 mg 5 mg 5 mg   Thu 5 mg 5 mg 5 mg   Fri 5 mg 5 mg 5 mg   Sat 5 mg 5 mg 5 mg   Visit Report - - -   Some recent data might be hidden       Plan:  1. INR is Supratherapeutic today- see above in Anticoagulation Summary.  Will instruct Elliot CAIN Romulo to Continue their warfarin regimen- see above in Anticoagulation Summary.  2. Follow up in 2 weeks  3. Patient declines warfarin refills.  4. Verbal and written information provided. Patient expresses understanding and has no further questions at this time.    Michael Horner [facility]

## 2020-02-24 ENCOUNTER — ANTICOAGULATION VISIT (OUTPATIENT)
Dept: PHARMACY | Facility: HOSPITAL | Age: 60
End: 2020-02-24

## 2020-02-24 DIAGNOSIS — Z95.2 HX OF MITRAL VALVE REPLACEMENT WITH MECHANICAL VALVE: ICD-10-CM

## 2020-02-24 LAB
INR PPP: 2.1 (ref 0.91–1.09)
PROTHROMBIN TIME: 25.6 SECONDS (ref 10–13.8)

## 2020-02-24 PROCEDURE — 85610 PROTHROMBIN TIME: CPT

## 2020-02-24 PROCEDURE — G0463 HOSPITAL OUTPT CLINIC VISIT: HCPCS

## 2020-02-24 PROCEDURE — 36416 COLLJ CAPILLARY BLOOD SPEC: CPT

## 2020-02-24 NOTE — PROGRESS NOTES
Anticoagulation Clinic Progress Note    Anticoagulation Summary  As of 2020    INR goal:   2.5-3.5   TTR:   49.6 % (1.3 y)   INR used for dosin.1! (2020)   Warfarin maintenance plan:   5 mg every day   Weekly warfarin total:   35 mg   Plan last modified:   Michael Horner RPH (2019)   Next INR check:   3/9/2020   Priority:   Maintenance   Target end date:   Indefinite    Indications    Atrial fibrillation (CMS/HCC) [I48.91]  Hx of mitral valve replacement with mechanical valve [Z95.2] [Z95.2]             Anticoagulation Episode Summary     INR check location:       Preferred lab:       Send INR reminders to:    CHARLES MENDEZ [facility]    Comments:         Anticoagulation Care Providers     Provider Role Specialty Phone number    Navi Fay MD Referring Cardiology [phone]          Clinic Interview:  Patient Findings     Positives:   Change in diet/appetite    Negatives:   Signs/symptoms of thrombosis, Signs/symptoms of bleeding,   Laboratory test error suspected, Change in health, Change in alcohol use,   Change in activity, Upcoming invasive procedure, Emergency department   visit, Upcoming dental procedure, Missed doses, Extra doses, Change in   medications, Hospital admission, Bruising, Other complaints    Comments:   Increased vit k (2 servings [bags] of broccoli, 1 salad).       Clinical Outcomes     Negatives:   Major bleeding event, Thromboembolic event,   Anticoagulation-related hospital admission, Anticoagulation-related ED   visit, Anticoagulation-related fatality    Comments:   Increased vit k (2 servings [bags] of broccoli, 1 salad).         INR History:  Anticoagulation Monitoring 2020 2/10/2020 2020   INR 3.1 3.8 2.1   INR Date 2020 2/10/2020 2020   INR Goal 2.5-3.5 2.5-3.5 2.5-3.5   Trend Same Same Same   Last Week Total 35 mg 35 mg 35 mg   Next Week Total 35 mg 35 mg 37.5 mg   Sun 5 mg 5 mg 5 mg   Mon 5 mg 5 mg 7.5 mg (); Otherwise 5 mg    Tue 5 mg 5 mg 5 mg   Wed 5 mg 5 mg 5 mg   Thu 5 mg 5 mg 5 mg   Fri 5 mg 5 mg 5 mg   Sat 5 mg 5 mg 5 mg   Visit Report - - -   Some recent data might be hidden       Plan:  1. INR is Subtherapeutic today- see above in Anticoagulation Summary.  Will instruct Elliot Sebastian to Change their warfarin regimen- see above in Anticoagulation Summary.  2. Follow up in 2 weeks  3. Patient declines warfarin refills.  4. Verbal and written information provided. Patient expresses understanding and has no further questions at this time.    Michael Horner [facility]

## 2020-03-02 ENCOUNTER — TELEPHONE (OUTPATIENT)
Dept: CARDIOLOGY | Facility: CLINIC | Age: 60
End: 2020-03-02

## 2020-03-02 NOTE — TELEPHONE ENCOUNTER
I called and left patient a message to follow-up on his outpatient blood pressure monitoring to determine if he needed medication adjustment.  I asked him to call me back.

## 2020-03-05 NOTE — TELEPHONE ENCOUNTER
Rachel can you please reach out to this patient and find out he is been measuring his blood pressure cuff so with any readings as he getting?  I left him 2 messages already has had his last office visit we were going to see what his blood pressures were doing over the next couple weeks and decide if he needed medication changes.  Thanks.

## 2020-03-09 ENCOUNTER — ANTICOAGULATION VISIT (OUTPATIENT)
Dept: PHARMACY | Facility: HOSPITAL | Age: 60
End: 2020-03-09

## 2020-03-09 DIAGNOSIS — Z95.2 HX OF MITRAL VALVE REPLACEMENT WITH MECHANICAL VALVE: ICD-10-CM

## 2020-03-09 LAB
INR PPP: 2.2 (ref 0.91–1.09)
PROTHROMBIN TIME: 26.1 SECONDS (ref 10–13.8)

## 2020-03-09 PROCEDURE — 36416 COLLJ CAPILLARY BLOOD SPEC: CPT

## 2020-03-09 PROCEDURE — 85610 PROTHROMBIN TIME: CPT

## 2020-03-09 PROCEDURE — G0463 HOSPITAL OUTPT CLINIC VISIT: HCPCS

## 2020-03-09 NOTE — PROGRESS NOTES
Anticoagulation Clinic Progress Note    Anticoagulation Summary  As of 3/9/2020    INR goal:   2.5-3.5   TTR:   48.2 % (1.4 y)   INR used for dosin.2! (3/9/2020)   Warfarin maintenance plan:   7.5 mg every Mon; 5 mg all other days   Weekly warfarin total:   37.5 mg   Plan last modified:   Sary Tejeda RPH (3/9/2020)   Next INR check:   3/23/2020   Priority:   Maintenance   Target end date:   Indefinite    Indications    Atrial fibrillation (CMS/HCC) [I48.91]  Hx of mitral valve replacement with mechanical valve [Z95.2] [Z95.2]             Anticoagulation Episode Summary     INR check location:       Preferred lab:       Send INR reminders to:   [facility] [place]    Comments:         Anticoagulation Care Providers     Provider Role Specialty Phone number    Navi Fay MD Referring Cardiology [phone]          Clinic Interview:  Patient Findings     Positives:   Change in alcohol use, Change in diet/appetite, Bruising    Negatives:   Signs/symptoms of thrombosis, Signs/symptoms of bleeding,   Laboratory test error suspected, Change in health, Change in activity,   Upcoming invasive procedure, Emergency department visit, Upcoming dental   procedure, Missed doses, Extra doses, Change in medications, Hospital   admission, Other complaints    Comments:    Pt decreased vit K to only 1 serving broccli/wk & had 1 EtOH   drink yesterday.      Clinical Outcomes     Negatives:   Major bleeding event, Thromboembolic event,   Anticoagulation-related hospital admission, Anticoagulation-related ED   visit, Anticoagulation-related fatality    Comments:    Pt decreased vit K to only 1 serving broccli/wk & had 1 EtOH   drink yesterday.        INR History:  Anticoagulation Monitoring 2/10/2020 2020 3/9/2020   INR 3.8 2.1 2.2   INR Date 2/10/2020 2020 3/9/2020   INR Goal 2.5-3.5 2.5-3.5 2.5-3.5   Trend Same Same Up   Last Week Total 35 mg 35 mg 35 mg   Next Week Total 35 mg 37.5 mg 40 mg   Sun  5 mg 5 mg 5 mg   Mon 5 mg 7.5 mg (2/24); Otherwise 5 mg 10 mg (3/9); Otherwise 7.5 mg   Tue 5 mg 5 mg 5 mg   Wed 5 mg 5 mg 5 mg   Thu 5 mg 5 mg 5 mg   Fri 5 mg 5 mg 5 mg   Sat 5 mg 5 mg 5 mg   Visit Report - - -   Some recent data might be hidden       Plan:  1. INR is Subtherapeutic today- see above in Anticoagulation Summary.  Will instruct Elliot Sebastian to Change their warfarin regimen- see above in Anticoagulation Summary.  2. Follow up in 2 weeks  3. Patient declines warfarin refills.  4. Verbal and written information provided. Patient expresses understanding and has no further questions at this time.    Micheline Arreola, Pharmacy Intern

## 2020-03-09 NOTE — PROGRESS NOTES
I have supervised and reviewed the notes, assessments, and/or procedures performed by our PharmD Candidate. The documented assessment and plan were developed cooperatively, and the plan was implemented in my presence. I concur with the documentation of this patient encounter.    Sary Tejeda [facility]

## 2020-03-10 NOTE — TELEPHONE ENCOUNTER
Rachel have you contact this patient yet to find out about his blood pressures (see my last telephone note to you)?

## 2020-03-12 RX ORDER — ATENOLOL 25 MG/1
TABLET ORAL
Qty: 90 TABLET | Refills: 3 | Status: ON HOLD | OUTPATIENT
Start: 2020-03-12 | End: 2020-03-30

## 2020-03-23 ENCOUNTER — ANTICOAGULATION VISIT (OUTPATIENT)
Dept: PHARMACY | Facility: HOSPITAL | Age: 60
End: 2020-03-23

## 2020-03-23 ENCOUNTER — APPOINTMENT (OUTPATIENT)
Dept: PHARMACY | Facility: HOSPITAL | Age: 60
End: 2020-03-23

## 2020-03-23 DIAGNOSIS — Z95.2 HX OF MITRAL VALVE REPLACEMENT WITH MECHANICAL VALVE: ICD-10-CM

## 2020-03-23 LAB
INR PPP: 3.6 (ref 0.91–1.09)
PROTHROMBIN TIME: 43.3 SECONDS (ref 10–13.8)

## 2020-03-23 PROCEDURE — 36416 COLLJ CAPILLARY BLOOD SPEC: CPT

## 2020-03-23 PROCEDURE — 85610 PROTHROMBIN TIME: CPT

## 2020-03-24 NOTE — PROGRESS NOTES
Anticoagulation Clinic Progress Note    Anticoagulation Summary  As of 3/23/2020    INR goal:   2.5-3.5   TTR:   48.8 % (1.4 y)   INR used for dosing:   3.6! (3/23/2020)   Warfarin maintenance plan:   7.5 mg every Mon; 5 mg all other days   Weekly warfarin total:   37.5 mg   No change documented:   Sara Mukherjee   Plan last modified:   Sary Tejeda RPH (3/9/2020)   Next INR check:   4/6/2020   Priority:   Maintenance   Target end date:   Indefinite    Indications    Atrial fibrillation (CMS/HCC) [I48.91]  Hx of mitral valve replacement with mechanical valve [Z95.2] [Z95.2]             Anticoagulation Episode Summary     INR check location:       Preferred lab:       Send INR reminders to:    [facility]    Comments:         Anticoagulation Care Providers     Provider Role Specialty Phone number    Navi Fay MD Referring Cardiology [phone]          Clinic Interview:  Patient Findings     Negatives:   Signs/symptoms of thrombosis, Signs/symptoms of bleeding,   Laboratory test error suspected, Change in health, Change in alcohol use,   Change in activity, Upcoming invasive procedure, Emergency department   visit, Upcoming dental procedure, Missed doses, Extra doses, Change in   medications, Change in diet/appetite, Hospital admission, Bruising, Other   complaints      Clinical Outcomes     Negatives:   Major bleeding event, Thromboembolic event,   Anticoagulation-related hospital admission, Anticoagulation-related ED   visit, Anticoagulation-related fatality        INR History:  Anticoagulation Monitoring 2/24/2020 3/9/2020 3/23/2020   INR 2.1 2.2 3.6   INR Date 2/24/2020 3/9/2020 3/23/2020   INR Goal 2.5-3.5 2.5-3.5 2.5-3.5   Trend Same Up Same   Last Week Total 35 mg 35 mg 37.5 mg   Next Week Total 37.5 mg 40 mg 37.5 mg   Sun 5 mg 5 mg 5 mg   Mon 7.5 mg (2/24); Otherwise 5 mg 10 mg (3/9); Otherwise 7.5 mg 7.5 mg   Tue 5 mg 5 mg 5 mg   Wed 5 mg 5 mg 5 mg   Thu 5 mg 5 mg 5 mg   Fri  5 mg 5 mg 5 mg   Sat 5 mg 5 mg 5 mg   Visit Report - - -   Some recent data might be hidden       Plan:  1. INR is out of range- see above in Anticoagulation Summary.   Will instruct Elliot Sebastian to Continue  their warfarin regimen- see above in Anticoagulation Summary.  2. Follow up in 2 weeks.   3. Patient declines warfarin refills.  4. Verbal and written information provided. Patient expresses understanding and has no further questions at this time.    Sara Mukherjee

## 2020-03-25 NOTE — TELEPHONE ENCOUNTER
I am not sure what either of you are talking about for this patient.  The only thing I know is that Rachel & I had been trying to reach him to see if he had been checking his blood pressure at home as I had advised and we had not been able to make contact with him.

## 2020-03-30 ENCOUNTER — TELEPHONE (OUTPATIENT)
Dept: CARDIOLOGY | Facility: CLINIC | Age: 60
End: 2020-03-30

## 2020-03-30 ENCOUNTER — HOSPITAL ENCOUNTER (INPATIENT)
Facility: HOSPITAL | Age: 60
LOS: 3 days | Discharge: HOME | End: 2020-04-02
Attending: EMERGENCY MEDICINE | Admitting: INTERNAL MEDICINE

## 2020-03-30 ENCOUNTER — APPOINTMENT (OUTPATIENT)
Dept: GENERAL RADIOLOGY | Facility: HOSPITAL | Age: 60
End: 2020-03-30

## 2020-03-30 DIAGNOSIS — Z95.2 HISTORY OF MITRAL VALVE REPLACEMENT WITH MECHANICAL VALVE: ICD-10-CM

## 2020-03-30 DIAGNOSIS — L03.115 CELLULITIS OF LEG, RIGHT: ICD-10-CM

## 2020-03-30 DIAGNOSIS — A41.9 SEPSIS WITHOUT ACUTE ORGAN DYSFUNCTION, DUE TO UNSPECIFIED ORGANISM (HCC): Primary | ICD-10-CM

## 2020-03-30 PROBLEM — Z20.822 SUSPECTED COVID-19 VIRUS INFECTION: Status: ACTIVE | Noted: 2020-03-30

## 2020-03-30 LAB
ALBUMIN SERPL-MCNC: 4.3 G/DL (ref 3.5–5.2)
ALBUMIN/GLOB SERPL: 2.2 G/DL
ALP SERPL-CCNC: 66 U/L (ref 39–117)
ALT SERPL W P-5'-P-CCNC: 26 U/L (ref 1–41)
ANION GAP SERPL CALCULATED.3IONS-SCNC: 9.3 MMOL/L (ref 5–15)
AST SERPL-CCNC: 30 U/L (ref 1–40)
B PARAPERT DNA SPEC QL NAA+PROBE: NOT DETECTED
B PERT DNA SPEC QL NAA+PROBE: NOT DETECTED
BACTERIA UR QL AUTO: ABNORMAL /HPF
BASOPHILS # BLD AUTO: 0.01 10*3/MM3 (ref 0–0.2)
BASOPHILS NFR BLD AUTO: 0.2 % (ref 0–1.5)
BILIRUB SERPL-MCNC: 0.7 MG/DL (ref 0.2–1.2)
BILIRUB UR QL STRIP: NEGATIVE
BUN BLD-MCNC: 13 MG/DL (ref 6–20)
BUN/CREAT SERPL: 13.5 (ref 7–25)
C PNEUM DNA NPH QL NAA+NON-PROBE: NOT DETECTED
CALCIUM SPEC-SCNC: 9.3 MG/DL (ref 8.6–10.5)
CHLORIDE SERPL-SCNC: 104 MMOL/L (ref 98–107)
CLARITY UR: CLEAR
CO2 SERPL-SCNC: 25.7 MMOL/L (ref 22–29)
COLOR UR: YELLOW
CREAT BLD-MCNC: 0.96 MG/DL (ref 0.76–1.27)
CRP SERPL-MCNC: 0.2 MG/DL (ref 0–0.5)
D-LACTATE SERPL-SCNC: 2.5 MMOL/L (ref 0.5–2)
D-LACTATE SERPL-SCNC: 3.3 MMOL/L (ref 0.5–2)
DEPRECATED RDW RBC AUTO: 47.6 FL (ref 37–54)
EOSINOPHIL # BLD AUTO: 0.05 10*3/MM3 (ref 0–0.4)
EOSINOPHIL NFR BLD AUTO: 0.8 % (ref 0.3–6.2)
ERYTHROCYTE [DISTWIDTH] IN BLOOD BY AUTOMATED COUNT: 13.5 % (ref 12.3–15.4)
FLUAV H1 2009 PAND RNA NPH QL NAA+PROBE: NOT DETECTED
FLUAV H1 HA GENE NPH QL NAA+PROBE: NOT DETECTED
FLUAV H3 RNA NPH QL NAA+PROBE: NOT DETECTED
FLUAV SUBTYP SPEC NAA+PROBE: NOT DETECTED
FLUBV RNA ISLT QL NAA+PROBE: NOT DETECTED
GFR SERPL CREATININE-BSD FRML MDRD: 80 ML/MIN/1.73
GLOBULIN UR ELPH-MCNC: 2 GM/DL
GLUCOSE BLD-MCNC: 133 MG/DL (ref 65–99)
GLUCOSE UR STRIP-MCNC: NEGATIVE MG/DL
HADV DNA SPEC NAA+PROBE: NOT DETECTED
HCOV 229E RNA SPEC QL NAA+PROBE: NOT DETECTED
HCOV HKU1 RNA SPEC QL NAA+PROBE: NOT DETECTED
HCOV NL63 RNA SPEC QL NAA+PROBE: NOT DETECTED
HCOV OC43 RNA SPEC QL NAA+PROBE: NOT DETECTED
HCT VFR BLD AUTO: 43.7 % (ref 37.5–51)
HGB BLD-MCNC: 15.1 G/DL (ref 13–17.7)
HGB UR QL STRIP.AUTO: ABNORMAL
HMPV RNA NPH QL NAA+NON-PROBE: NOT DETECTED
HPIV1 RNA SPEC QL NAA+PROBE: NOT DETECTED
HPIV2 RNA SPEC QL NAA+PROBE: NOT DETECTED
HPIV3 RNA NPH QL NAA+PROBE: NOT DETECTED
HPIV4 P GENE NPH QL NAA+PROBE: NOT DETECTED
HYALINE CASTS UR QL AUTO: ABNORMAL /LPF
IMM GRANULOCYTES # BLD AUTO: 0.01 10*3/MM3 (ref 0–0.05)
IMM GRANULOCYTES NFR BLD AUTO: 0.2 % (ref 0–0.5)
INR PPP: 2.73 (ref 0.9–1.1)
KETONES UR QL STRIP: ABNORMAL
LACTATE HOLD SPECIMEN: NORMAL
LDH SERPL-CCNC: 288 U/L (ref 135–225)
LEUKOCYTE ESTERASE UR QL STRIP.AUTO: NEGATIVE
LYMPHOCYTES # BLD AUTO: 0.6 10*3/MM3 (ref 0.7–3.1)
LYMPHOCYTES NFR BLD AUTO: 10.2 % (ref 19.6–45.3)
M PNEUMO IGG SER IA-ACNC: NOT DETECTED
MCH RBC QN AUTO: 32.8 PG (ref 26.6–33)
MCHC RBC AUTO-ENTMCNC: 34.6 G/DL (ref 31.5–35.7)
MCV RBC AUTO: 95 FL (ref 79–97)
MONOCYTES # BLD AUTO: 0.21 10*3/MM3 (ref 0.1–0.9)
MONOCYTES NFR BLD AUTO: 3.6 % (ref 5–12)
NEUTROPHILS # BLD AUTO: 5.01 10*3/MM3 (ref 1.7–7)
NEUTROPHILS NFR BLD AUTO: 85 % (ref 42.7–76)
NITRITE UR QL STRIP: NEGATIVE
NRBC BLD AUTO-RTO: 0.2 /100 WBC (ref 0–0.2)
PH UR STRIP.AUTO: <=5 [PH] (ref 5–8)
PLATELET # BLD AUTO: 129 10*3/MM3 (ref 140–450)
PMV BLD AUTO: 9.2 FL (ref 6–12)
POTASSIUM BLD-SCNC: 4.2 MMOL/L (ref 3.5–5.2)
PROCALCITONIN SERPL-MCNC: 0.09 NG/ML (ref 0.1–0.25)
PROT SERPL-MCNC: 6.3 G/DL (ref 6–8.5)
PROT UR QL STRIP: NEGATIVE
PROTHROMBIN TIME: 28.6 SECONDS (ref 11.7–14.2)
RBC # BLD AUTO: 4.6 10*6/MM3 (ref 4.14–5.8)
RBC # UR: ABNORMAL /HPF
REF LAB TEST METHOD: ABNORMAL
RHINOVIRUS RNA SPEC NAA+PROBE: NOT DETECTED
RSV RNA NPH QL NAA+NON-PROBE: NOT DETECTED
SODIUM BLD-SCNC: 139 MMOL/L (ref 136–145)
SP GR UR STRIP: 1.02 (ref 1–1.03)
SQUAMOUS #/AREA URNS HPF: ABNORMAL /HPF
UROBILINOGEN UR QL STRIP: ABNORMAL
WBC NRBC COR # BLD: 5.89 10*3/MM3 (ref 3.4–10.8)
WBC UR QL AUTO: ABNORMAL /HPF

## 2020-03-30 PROCEDURE — 80053 COMPREHEN METABOLIC PANEL: CPT | Performed by: NURSE PRACTITIONER

## 2020-03-30 PROCEDURE — 99285 EMERGENCY DEPT VISIT HI MDM: CPT

## 2020-03-30 PROCEDURE — 93005 ELECTROCARDIOGRAM TRACING: CPT

## 2020-03-30 PROCEDURE — 71045 X-RAY EXAM CHEST 1 VIEW: CPT

## 2020-03-30 PROCEDURE — 25010000002 VANCOMYCIN 10 G RECONSTITUTED SOLUTION: Performed by: EMERGENCY MEDICINE

## 2020-03-30 PROCEDURE — 85025 COMPLETE CBC W/AUTO DIFF WBC: CPT | Performed by: NURSE PRACTITIONER

## 2020-03-30 PROCEDURE — 25010000002 CEFEPIME PER 500 MG: Performed by: INTERNAL MEDICINE

## 2020-03-30 PROCEDURE — U0002 COVID-19 LAB TEST NON-CDC: HCPCS | Performed by: EMERGENCY MEDICINE

## 2020-03-30 PROCEDURE — 83605 ASSAY OF LACTIC ACID: CPT | Performed by: NURSE PRACTITIONER

## 2020-03-30 PROCEDURE — 86140 C-REACTIVE PROTEIN: CPT | Performed by: NURSE PRACTITIONER

## 2020-03-30 PROCEDURE — 36415 COLL VENOUS BLD VENIPUNCTURE: CPT

## 2020-03-30 PROCEDURE — 81001 URINALYSIS AUTO W/SCOPE: CPT | Performed by: NURSE PRACTITIONER

## 2020-03-30 PROCEDURE — 0100U HC BIOFIRE FILMARRAY RESP PANEL 2: CPT | Performed by: NURSE PRACTITIONER

## 2020-03-30 PROCEDURE — 25010000002 CEFEPIME PER 500 MG: Performed by: EMERGENCY MEDICINE

## 2020-03-30 PROCEDURE — 84145 PROCALCITONIN (PCT): CPT | Performed by: NURSE PRACTITIONER

## 2020-03-30 PROCEDURE — 83615 LACTATE (LD) (LDH) ENZYME: CPT | Performed by: NURSE PRACTITIONER

## 2020-03-30 PROCEDURE — 85610 PROTHROMBIN TIME: CPT | Performed by: EMERGENCY MEDICINE

## 2020-03-30 PROCEDURE — 93005 ELECTROCARDIOGRAM TRACING: CPT | Performed by: EMERGENCY MEDICINE

## 2020-03-30 PROCEDURE — 87040 BLOOD CULTURE FOR BACTERIA: CPT | Performed by: NURSE PRACTITIONER

## 2020-03-30 PROCEDURE — 25810000003 SODIUM CHLORIDE 0.9 % WITH KCL 20 MEQ 20-0.9 MEQ/L-% SOLUTION: Performed by: INTERNAL MEDICINE

## 2020-03-30 PROCEDURE — 93010 ELECTROCARDIOGRAM REPORT: CPT | Performed by: INTERNAL MEDICINE

## 2020-03-30 RX ORDER — NITROGLYCERIN 0.4 MG/1
0.4 TABLET SUBLINGUAL
Status: DISCONTINUED | OUTPATIENT
Start: 2020-03-30 | End: 2020-04-02 | Stop reason: HOSPADM

## 2020-03-30 RX ORDER — WARFARIN SODIUM 5 MG/1
5 TABLET ORAL
COMMUNITY
End: 2020-07-20

## 2020-03-30 RX ORDER — SODIUM CHLORIDE 0.9 % (FLUSH) 0.9 %
10 SYRINGE (ML) INJECTION EVERY 12 HOURS SCHEDULED
Status: DISCONTINUED | OUTPATIENT
Start: 2020-03-30 | End: 2020-04-02 | Stop reason: HOSPADM

## 2020-03-30 RX ORDER — MELATONIN
2000 DAILY
Status: DISCONTINUED | OUTPATIENT
Start: 2020-03-30 | End: 2020-04-02 | Stop reason: HOSPADM

## 2020-03-30 RX ORDER — ATENOLOL 25 MG/1
25 TABLET ORAL DAILY
Status: DISCONTINUED | OUTPATIENT
Start: 2020-03-30 | End: 2020-04-02 | Stop reason: HOSPADM

## 2020-03-30 RX ORDER — SODIUM CHLORIDE 0.9 % (FLUSH) 0.9 %
10 SYRINGE (ML) INJECTION AS NEEDED
Status: DISCONTINUED | OUTPATIENT
Start: 2020-03-30 | End: 2020-04-02 | Stop reason: HOSPADM

## 2020-03-30 RX ORDER — WARFARIN SODIUM 5 MG/1
5 TABLET ORAL
Status: DISCONTINUED | OUTPATIENT
Start: 2020-03-31 | End: 2020-04-02 | Stop reason: HOSPADM

## 2020-03-30 RX ORDER — ACETAMINOPHEN 500 MG
1000 TABLET ORAL ONCE
Status: COMPLETED | OUTPATIENT
Start: 2020-03-30 | End: 2020-03-30

## 2020-03-30 RX ORDER — ACETAMINOPHEN 325 MG/1
650 TABLET ORAL EVERY 4 HOURS PRN
Status: DISCONTINUED | OUTPATIENT
Start: 2020-03-30 | End: 2020-04-02 | Stop reason: HOSPADM

## 2020-03-30 RX ORDER — WARFARIN SODIUM 7.5 MG/1
7.5 TABLET ORAL WEEKLY
COMMUNITY
Start: 2020-03-30 | End: 2020-07-20 | Stop reason: SDUPTHER

## 2020-03-30 RX ORDER — ONDANSETRON 2 MG/ML
4 INJECTION INTRAMUSCULAR; INTRAVENOUS EVERY 6 HOURS PRN
Status: DISCONTINUED | OUTPATIENT
Start: 2020-03-30 | End: 2020-04-02 | Stop reason: HOSPADM

## 2020-03-30 RX ORDER — WARFARIN SODIUM 1 MG/1
7.5 TABLET ORAL
Status: DISCONTINUED | OUTPATIENT
Start: 2020-03-30 | End: 2020-03-30

## 2020-03-30 RX ORDER — ATENOLOL 25 MG/1
25 TABLET ORAL DAILY
COMMUNITY
End: 2021-05-06 | Stop reason: SDUPTHER

## 2020-03-30 RX ORDER — DIPHENOXYLATE HYDROCHLORIDE AND ATROPINE SULFATE 2.5; .025 MG/1; MG/1
1 TABLET ORAL DAILY
Status: DISCONTINUED | OUTPATIENT
Start: 2020-03-30 | End: 2020-04-02 | Stop reason: HOSPADM

## 2020-03-30 RX ORDER — ACETAMINOPHEN 160 MG/5ML
650 SOLUTION ORAL EVERY 4 HOURS PRN
Status: DISCONTINUED | OUTPATIENT
Start: 2020-03-30 | End: 2020-04-02 | Stop reason: HOSPADM

## 2020-03-30 RX ORDER — ACETAMINOPHEN 650 MG/1
650 SUPPOSITORY RECTAL EVERY 4 HOURS PRN
Status: DISCONTINUED | OUTPATIENT
Start: 2020-03-30 | End: 2020-04-02 | Stop reason: HOSPADM

## 2020-03-30 RX ORDER — SODIUM CHLORIDE AND POTASSIUM CHLORIDE 150; 900 MG/100ML; MG/100ML
125 INJECTION, SOLUTION INTRAVENOUS CONTINUOUS
Status: DISCONTINUED | OUTPATIENT
Start: 2020-03-30 | End: 2020-03-31

## 2020-03-30 RX ORDER — WARFARIN SODIUM 7.5 MG/1
7.5 TABLET ORAL
Status: DISCONTINUED | OUTPATIENT
Start: 2020-03-30 | End: 2020-04-02 | Stop reason: HOSPADM

## 2020-03-30 RX ADMIN — CEFEPIME HYDROCHLORIDE 2 G: 2 INJECTION, POWDER, FOR SOLUTION INTRAVENOUS at 21:45

## 2020-03-30 RX ADMIN — WARFARIN 7.5 MG: 7.5 TABLET ORAL at 21:44

## 2020-03-30 RX ADMIN — ACETAMINOPHEN 1000 MG: 500 TABLET, FILM COATED ORAL at 13:00

## 2020-03-30 RX ADMIN — VITAMIN D, TAB 1000IU (100/BT) 2000 UNITS: 25 TAB at 21:44

## 2020-03-30 RX ADMIN — POTASSIUM CHLORIDE AND SODIUM CHLORIDE 100 ML/HR: 900; 150 INJECTION, SOLUTION INTRAVENOUS at 19:56

## 2020-03-30 RX ADMIN — Medication 1 TABLET: at 21:44

## 2020-03-30 RX ADMIN — ACETAMINOPHEN 650 MG: 325 TABLET, FILM COATED ORAL at 19:56

## 2020-03-30 RX ADMIN — CEFEPIME HYDROCHLORIDE 2 G: 2 INJECTION, POWDER, FOR SOLUTION INTRAVENOUS at 14:42

## 2020-03-30 RX ADMIN — ATENOLOL 25 MG: 25 TABLET ORAL at 21:44

## 2020-03-30 RX ADMIN — VANCOMYCIN HYDROCHLORIDE 2750 MG: 10 INJECTION, POWDER, LYOPHILIZED, FOR SOLUTION INTRAVENOUS at 15:32

## 2020-03-30 RX ADMIN — SODIUM CHLORIDE, POTASSIUM CHLORIDE, SODIUM LACTATE AND CALCIUM CHLORIDE 2811 ML: 600; 310; 30; 20 INJECTION, SOLUTION INTRAVENOUS at 12:51

## 2020-03-30 RX ADMIN — SODIUM CHLORIDE, PRESERVATIVE FREE 10 ML: 5 INJECTION INTRAVENOUS at 21:44

## 2020-03-30 NOTE — TELEPHONE ENCOUNTER
I attempted to reach patient regarding his blood pressure that he was supposed to be checking for the last month (after his 2/10/2020 office visit with me).  I have attempted to reach him a few times over the last few weeks regarding his blood pressures, but have not been able to reach him.  Upon this phone call I noticed he has been admitted to the hospital for what appears to be sepsis and fever.  We will follow-up post hospital course.

## 2020-03-31 LAB
ALBUMIN SERPL-MCNC: 3.6 G/DL (ref 3.5–5.2)
ALBUMIN/GLOB SERPL: 1.8 G/DL
ALP SERPL-CCNC: 47 U/L (ref 39–117)
ALT SERPL W P-5'-P-CCNC: 32 U/L (ref 1–41)
ANION GAP SERPL CALCULATED.3IONS-SCNC: 12.5 MMOL/L (ref 5–15)
AST SERPL-CCNC: 50 U/L (ref 1–40)
BASOPHILS # BLD AUTO: 0.02 10*3/MM3 (ref 0–0.2)
BASOPHILS NFR BLD AUTO: 0.2 % (ref 0–1.5)
BILIRUB SERPL-MCNC: 1.5 MG/DL (ref 0.2–1.2)
BUN BLD-MCNC: 15 MG/DL (ref 6–20)
BUN/CREAT SERPL: 14.6 (ref 7–25)
CALCIUM SPEC-SCNC: 8.7 MG/DL (ref 8.6–10.5)
CHLORIDE SERPL-SCNC: 105 MMOL/L (ref 98–107)
CO2 SERPL-SCNC: 18.5 MMOL/L (ref 22–29)
CREAT BLD-MCNC: 1.03 MG/DL (ref 0.76–1.27)
D-LACTATE SERPL-SCNC: 2 MMOL/L (ref 0.5–2)
DEPRECATED RDW RBC AUTO: 47.3 FL (ref 37–54)
EOSINOPHIL # BLD AUTO: 0 10*3/MM3 (ref 0–0.4)
EOSINOPHIL NFR BLD AUTO: 0 % (ref 0.3–6.2)
ERYTHROCYTE [DISTWIDTH] IN BLOOD BY AUTOMATED COUNT: 13.5 % (ref 12.3–15.4)
GFR SERPL CREATININE-BSD FRML MDRD: 74 ML/MIN/1.73
GLOBULIN UR ELPH-MCNC: 2 GM/DL
GLUCOSE BLD-MCNC: 149 MG/DL (ref 65–99)
HCT VFR BLD AUTO: 38.8 % (ref 37.5–51)
HGB BLD-MCNC: 13.8 G/DL (ref 13–17.7)
IMM GRANULOCYTES # BLD AUTO: 0.07 10*3/MM3 (ref 0–0.05)
IMM GRANULOCYTES NFR BLD AUTO: 0.7 % (ref 0–0.5)
INR PPP: 3.15 (ref 0.9–1.1)
LYMPHOCYTES # BLD AUTO: 1.11 10*3/MM3 (ref 0.7–3.1)
LYMPHOCYTES NFR BLD AUTO: 11.7 % (ref 19.6–45.3)
MCH RBC QN AUTO: 33.7 PG (ref 26.6–33)
MCHC RBC AUTO-ENTMCNC: 35.6 G/DL (ref 31.5–35.7)
MCV RBC AUTO: 94.6 FL (ref 79–97)
MONOCYTES # BLD AUTO: 0.64 10*3/MM3 (ref 0.1–0.9)
MONOCYTES NFR BLD AUTO: 6.8 % (ref 5–12)
NEUTROPHILS # BLD AUTO: 7.61 10*3/MM3 (ref 1.7–7)
NEUTROPHILS NFR BLD AUTO: 80.6 % (ref 42.7–76)
NRBC BLD AUTO-RTO: 0 /100 WBC (ref 0–0.2)
PLATELET # BLD AUTO: 118 10*3/MM3 (ref 140–450)
PMV BLD AUTO: 10.3 FL (ref 6–12)
POTASSIUM BLD-SCNC: 4.4 MMOL/L (ref 3.5–5.2)
PROT SERPL-MCNC: 5.6 G/DL (ref 6–8.5)
PROTHROMBIN TIME: 32.1 SECONDS (ref 11.7–14.2)
RBC # BLD AUTO: 4.1 10*6/MM3 (ref 4.14–5.8)
SODIUM BLD-SCNC: 136 MMOL/L (ref 136–145)
WBC NRBC COR # BLD: 9.45 10*3/MM3 (ref 3.4–10.8)

## 2020-03-31 PROCEDURE — 85025 COMPLETE CBC W/AUTO DIFF WBC: CPT | Performed by: INTERNAL MEDICINE

## 2020-03-31 PROCEDURE — 25010000002 CEFEPIME PER 500 MG: Performed by: INTERNAL MEDICINE

## 2020-03-31 PROCEDURE — 85610 PROTHROMBIN TIME: CPT | Performed by: INTERNAL MEDICINE

## 2020-03-31 PROCEDURE — 25010000002 VANCOMYCIN 10 G RECONSTITUTED SOLUTION: Performed by: INTERNAL MEDICINE

## 2020-03-31 PROCEDURE — 83605 ASSAY OF LACTIC ACID: CPT | Performed by: INTERNAL MEDICINE

## 2020-03-31 PROCEDURE — 80053 COMPREHEN METABOLIC PANEL: CPT | Performed by: INTERNAL MEDICINE

## 2020-03-31 RX ADMIN — ACETAMINOPHEN 650 MG: 325 TABLET, FILM COATED ORAL at 12:28

## 2020-03-31 RX ADMIN — CEFEPIME HYDROCHLORIDE 2 G: 2 INJECTION, POWDER, FOR SOLUTION INTRAVENOUS at 16:54

## 2020-03-31 RX ADMIN — CEFEPIME HYDROCHLORIDE 2 G: 2 INJECTION, POWDER, FOR SOLUTION INTRAVENOUS at 22:34

## 2020-03-31 RX ADMIN — ACETAMINOPHEN 650 MG: 325 TABLET, FILM COATED ORAL at 20:34

## 2020-03-31 RX ADMIN — VANCOMYCIN HYDROCHLORIDE 2000 MG: 10 INJECTION, POWDER, LYOPHILIZED, FOR SOLUTION INTRAVENOUS at 14:30

## 2020-03-31 RX ADMIN — SODIUM CHLORIDE, PRESERVATIVE FREE 10 ML: 5 INJECTION INTRAVENOUS at 08:16

## 2020-03-31 RX ADMIN — ACETAMINOPHEN 650 MG: 325 TABLET, FILM COATED ORAL at 05:27

## 2020-03-31 RX ADMIN — Medication 1 TABLET: at 08:16

## 2020-03-31 RX ADMIN — WARFARIN 5 MG: 5 TABLET ORAL at 17:34

## 2020-03-31 RX ADMIN — ATENOLOL 25 MG: 25 TABLET ORAL at 08:16

## 2020-03-31 RX ADMIN — ACETAMINOPHEN 650 MG: 325 TABLET, FILM COATED ORAL at 00:11

## 2020-03-31 RX ADMIN — CEFEPIME HYDROCHLORIDE 2 G: 2 INJECTION, POWDER, FOR SOLUTION INTRAVENOUS at 05:16

## 2020-03-31 RX ADMIN — VITAMIN D, TAB 1000IU (100/BT) 2000 UNITS: 25 TAB at 08:16

## 2020-03-31 RX ADMIN — SODIUM CHLORIDE, PRESERVATIVE FREE 10 ML: 5 INJECTION INTRAVENOUS at 20:30

## 2020-03-31 RX ADMIN — VANCOMYCIN HYDROCHLORIDE 2000 MG: 10 INJECTION, POWDER, LYOPHILIZED, FOR SOLUTION INTRAVENOUS at 02:00

## 2020-04-01 LAB
ANION GAP SERPL CALCULATED.3IONS-SCNC: 9.8 MMOL/L (ref 5–15)
BUN BLD-MCNC: 10 MG/DL (ref 6–20)
BUN/CREAT SERPL: 11.5 (ref 7–25)
CALCIUM SPEC-SCNC: 8.6 MG/DL (ref 8.6–10.5)
CHLORIDE SERPL-SCNC: 103 MMOL/L (ref 98–107)
CO2 SERPL-SCNC: 22.2 MMOL/L (ref 22–29)
CREAT BLD-MCNC: 0.87 MG/DL (ref 0.76–1.27)
DEPRECATED RDW RBC AUTO: 46.5 FL (ref 37–54)
ERYTHROCYTE [DISTWIDTH] IN BLOOD BY AUTOMATED COUNT: 13.3 % (ref 12.3–15.4)
GFR SERPL CREATININE-BSD FRML MDRD: 90 ML/MIN/1.73
GLUCOSE BLD-MCNC: 129 MG/DL (ref 65–99)
HCT VFR BLD AUTO: 37.2 % (ref 37.5–51)
HGB BLD-MCNC: 13.1 G/DL (ref 13–17.7)
INR PPP: 3.03 (ref 0.9–1.1)
MCH RBC QN AUTO: 33.3 PG (ref 26.6–33)
MCHC RBC AUTO-ENTMCNC: 35.2 G/DL (ref 31.5–35.7)
MCV RBC AUTO: 94.7 FL (ref 79–97)
PLATELET # BLD AUTO: 107 10*3/MM3 (ref 140–450)
PMV BLD AUTO: 9.4 FL (ref 6–12)
POTASSIUM BLD-SCNC: 3.8 MMOL/L (ref 3.5–5.2)
PROTHROMBIN TIME: 31.1 SECONDS (ref 11.7–14.2)
RBC # BLD AUTO: 3.93 10*6/MM3 (ref 4.14–5.8)
REF LAB TEST METHOD: NORMAL
SARS-COV-2 RNA RESP QL NAA+PROBE: NOT DETECTED
SODIUM BLD-SCNC: 135 MMOL/L (ref 136–145)
VANCOMYCIN TROUGH SERPL-MCNC: 11.3 MCG/ML (ref 5–20)
WBC NRBC COR # BLD: 7.09 10*3/MM3 (ref 3.4–10.8)

## 2020-04-01 PROCEDURE — 85027 COMPLETE CBC AUTOMATED: CPT | Performed by: INTERNAL MEDICINE

## 2020-04-01 PROCEDURE — 80048 BASIC METABOLIC PNL TOTAL CA: CPT | Performed by: INTERNAL MEDICINE

## 2020-04-01 PROCEDURE — 85610 PROTHROMBIN TIME: CPT | Performed by: INTERNAL MEDICINE

## 2020-04-01 PROCEDURE — 25010000002 CEFEPIME PER 500 MG: Performed by: INTERNAL MEDICINE

## 2020-04-01 PROCEDURE — 25010000002 VANCOMYCIN 10 G RECONSTITUTED SOLUTION: Performed by: INTERNAL MEDICINE

## 2020-04-01 PROCEDURE — 80202 ASSAY OF VANCOMYCIN: CPT | Performed by: INTERNAL MEDICINE

## 2020-04-01 RX ADMIN — CEFEPIME HYDROCHLORIDE 2 G: 2 INJECTION, POWDER, FOR SOLUTION INTRAVENOUS at 06:01

## 2020-04-01 RX ADMIN — VITAMIN D, TAB 1000IU (100/BT) 2000 UNITS: 25 TAB at 08:00

## 2020-04-01 RX ADMIN — Medication 1 TABLET: at 08:00

## 2020-04-01 RX ADMIN — VANCOMYCIN HYDROCHLORIDE 2000 MG: 10 INJECTION, POWDER, LYOPHILIZED, FOR SOLUTION INTRAVENOUS at 14:16

## 2020-04-01 RX ADMIN — ATENOLOL 25 MG: 25 TABLET ORAL at 06:01

## 2020-04-01 RX ADMIN — VANCOMYCIN HYDROCHLORIDE 1250 MG: 10 INJECTION, POWDER, LYOPHILIZED, FOR SOLUTION INTRAVENOUS at 22:17

## 2020-04-01 RX ADMIN — SODIUM CHLORIDE, PRESERVATIVE FREE 10 ML: 5 INJECTION INTRAVENOUS at 08:00

## 2020-04-01 RX ADMIN — VANCOMYCIN HYDROCHLORIDE 2000 MG: 10 INJECTION, POWDER, LYOPHILIZED, FOR SOLUTION INTRAVENOUS at 02:24

## 2020-04-01 RX ADMIN — WARFARIN 5 MG: 5 TABLET ORAL at 17:05

## 2020-04-01 RX ADMIN — ACETAMINOPHEN 650 MG: 325 TABLET, FILM COATED ORAL at 04:16

## 2020-04-01 RX ADMIN — CEFEPIME HYDROCHLORIDE 2 G: 2 INJECTION, POWDER, FOR SOLUTION INTRAVENOUS at 17:57

## 2020-04-01 RX ADMIN — ACETAMINOPHEN 650 MG: 325 TABLET, FILM COATED ORAL at 00:17

## 2020-04-02 VITALS
WEIGHT: 307.3 LBS | HEIGHT: 78 IN | SYSTOLIC BLOOD PRESSURE: 132 MMHG | TEMPERATURE: 98.7 F | DIASTOLIC BLOOD PRESSURE: 81 MMHG | RESPIRATION RATE: 18 BRPM | OXYGEN SATURATION: 95 % | BODY MASS INDEX: 35.56 KG/M2 | HEART RATE: 70 BPM

## 2020-04-02 LAB
ANION GAP SERPL CALCULATED.3IONS-SCNC: 8.1 MMOL/L (ref 5–15)
BUN BLD-MCNC: 10 MG/DL (ref 6–20)
BUN/CREAT SERPL: 11.9 (ref 7–25)
CALCIUM SPEC-SCNC: 8.7 MG/DL (ref 8.6–10.5)
CHLORIDE SERPL-SCNC: 106 MMOL/L (ref 98–107)
CO2 SERPL-SCNC: 25.9 MMOL/L (ref 22–29)
CREAT BLD-MCNC: 0.84 MG/DL (ref 0.76–1.27)
DEPRECATED RDW RBC AUTO: 45.6 FL (ref 37–54)
ERYTHROCYTE [DISTWIDTH] IN BLOOD BY AUTOMATED COUNT: 13.1 % (ref 12.3–15.4)
GFR SERPL CREATININE-BSD FRML MDRD: 94 ML/MIN/1.73
GLUCOSE BLD-MCNC: 102 MG/DL (ref 65–99)
HCT VFR BLD AUTO: 35.4 % (ref 37.5–51)
HGB BLD-MCNC: 12.3 G/DL (ref 13–17.7)
INR PPP: 2.91 (ref 0.9–1.1)
MCH RBC QN AUTO: 32.9 PG (ref 26.6–33)
MCHC RBC AUTO-ENTMCNC: 34.7 G/DL (ref 31.5–35.7)
MCV RBC AUTO: 94.7 FL (ref 79–97)
PLATELET # BLD AUTO: 121 10*3/MM3 (ref 140–450)
PMV BLD AUTO: 9.8 FL (ref 6–12)
POTASSIUM BLD-SCNC: 4.1 MMOL/L (ref 3.5–5.2)
PROTHROMBIN TIME: 30.1 SECONDS (ref 11.7–14.2)
RBC # BLD AUTO: 3.74 10*6/MM3 (ref 4.14–5.8)
SODIUM BLD-SCNC: 140 MMOL/L (ref 136–145)
WBC NRBC COR # BLD: 5.47 10*3/MM3 (ref 3.4–10.8)

## 2020-04-02 PROCEDURE — 80048 BASIC METABOLIC PNL TOTAL CA: CPT | Performed by: INTERNAL MEDICINE

## 2020-04-02 PROCEDURE — 85610 PROTHROMBIN TIME: CPT | Performed by: INTERNAL MEDICINE

## 2020-04-02 PROCEDURE — 85027 COMPLETE CBC AUTOMATED: CPT | Performed by: INTERNAL MEDICINE

## 2020-04-02 PROCEDURE — 25010000002 CEFEPIME PER 500 MG: Performed by: INTERNAL MEDICINE

## 2020-04-02 PROCEDURE — 25010000002 VANCOMYCIN 10 G RECONSTITUTED SOLUTION: Performed by: INTERNAL MEDICINE

## 2020-04-02 RX ORDER — CEPHALEXIN 500 MG/1
500 CAPSULE ORAL 3 TIMES DAILY
Qty: 21 CAPSULE | Refills: 0 | Status: SHIPPED | OUTPATIENT
Start: 2020-04-02 | End: 2020-04-09

## 2020-04-02 RX ADMIN — CEFEPIME HYDROCHLORIDE 2 G: 2 INJECTION, POWDER, FOR SOLUTION INTRAVENOUS at 08:38

## 2020-04-02 RX ADMIN — VITAMIN D, TAB 1000IU (100/BT) 2000 UNITS: 25 TAB at 08:38

## 2020-04-02 RX ADMIN — Medication 1 TABLET: at 08:38

## 2020-04-02 RX ADMIN — VANCOMYCIN HYDROCHLORIDE 1250 MG: 10 INJECTION, POWDER, LYOPHILIZED, FOR SOLUTION INTRAVENOUS at 05:28

## 2020-04-02 RX ADMIN — ATENOLOL 25 MG: 25 TABLET ORAL at 08:38

## 2020-04-02 RX ADMIN — SODIUM CHLORIDE, PRESERVATIVE FREE 10 ML: 5 INJECTION INTRAVENOUS at 08:38

## 2020-04-02 RX ADMIN — CEFEPIME HYDROCHLORIDE 2 G: 2 INJECTION, POWDER, FOR SOLUTION INTRAVENOUS at 01:09

## 2020-04-04 LAB
BACTERIA SPEC AEROBE CULT: NORMAL
BACTERIA SPEC AEROBE CULT: NORMAL

## 2020-04-06 ENCOUNTER — APPOINTMENT (OUTPATIENT)
Dept: PHARMACY | Facility: HOSPITAL | Age: 60
End: 2020-04-06

## 2020-04-20 ENCOUNTER — APPOINTMENT (OUTPATIENT)
Dept: LAB | Facility: HOSPITAL | Age: 60
End: 2020-04-20

## 2020-04-20 ENCOUNTER — ANTICOAGULATION VISIT (OUTPATIENT)
Dept: PHARMACY | Facility: HOSPITAL | Age: 60
End: 2020-04-20

## 2020-04-20 DIAGNOSIS — Z95.2 HX OF MITRAL VALVE REPLACEMENT WITH MECHANICAL VALVE: ICD-10-CM

## 2020-04-20 LAB
INR PPP: 3.94 (ref 0.9–1.1)
INR PPP: 5.1 (ref 0.91–1.09)
PROTHROMBIN TIME: 38.3 SECONDS (ref 11.7–14.2)
PROTHROMBIN TIME: 61.4 SECONDS (ref 10–13.8)

## 2020-04-20 PROCEDURE — 36416 COLLJ CAPILLARY BLOOD SPEC: CPT

## 2020-04-20 PROCEDURE — 85610 PROTHROMBIN TIME: CPT

## 2020-04-20 PROCEDURE — G0463 HOSPITAL OUTPT CLINIC VISIT: HCPCS

## 2020-04-20 PROCEDURE — 36415 COLL VENOUS BLD VENIPUNCTURE: CPT

## 2020-04-20 NOTE — PROGRESS NOTES
"Anticoagulation Clinic Progress Note    Anticoagulation Summary  As of 2020    INR goal:   2.5-3.5   TTR:   49.4 % (1.4 y)   INR used for dosin.1! (2020)   Warfarin maintenance plan:   7.5 mg every Mon; 5 mg all other days   Weekly warfarin total:   37.5 mg   Plan last modified:   Sary Tejeda RPH (3/9/2020)   Next INR check:   2020   Priority:   Maintenance   Target end date:   Indefinite    Indications    Atrial fibrillation (CMS/HCC) [I48.91]  Hx of mitral valve replacement with mechanical valve [Z95.2] [Z95.2]             Anticoagulation Episode Summary     INR check location:       Preferred lab:       Send INR reminders to:   [facility] MENDE[facility]    Comments:         Anticoagulation Care Providers     Provider Role Specialty Phone number    Navi Fay MD Referring Cardiology [phone]          Clinic Interview:  Patient Findings     Positives:   Change in diet/appetite    Negatives:   Signs/symptoms of thrombosis, Signs/symptoms of bleeding,   Laboratory test error suspected, Change in health, Change in alcohol use,   Change in activity, Upcoming invasive procedure, Emergency department   visit, Upcoming dental procedure, Missed doses, Extra doses, Change in   medications, Hospital admission, Bruising, Other complaints    Comments:   Pt missed Saturday greens; pt also had \"strawberry-dilan\" this   AM       Clinical Outcomes     Negatives:   Major bleeding event, Thromboembolic event,   Anticoagulation-related hospital admission, Anticoagulation-related ED   visit, Anticoagulation-related fatality    Comments:   Pt missed Saturday greens; pt also had \"strawberry-dilan\" this   AM         INR History:  Anticoagulation Monitoring 3/9/2020 3/23/2020 2020   INR 2.2 3.6 5.1   INR Date 3/9/2020 3/23/2020 2020   INR Goal 2.5-3.5 2.5-3.5 2.5-3.5   Trend Up Same Same   Last Week Total 35 mg 37.5 mg 37.5 mg   Next Week Total 40 mg 37.5 mg 32.5 mg   Sun 5 mg 5 mg -   Mon 10 " mg (3/9); Otherwise 7.5 mg 7.5 mg 7.5 mg   Tue 5 mg 5 mg Hold (4/21)   Wed 5 mg 5 mg 5 mg   Thu 5 mg 5 mg 5 mg   Fri 5 mg 5 mg -   Sat 5 mg 5 mg -   Visit Report - - -   Some recent data might be hidden       Plan:  1. INR is Supratherapeutic today- see above in Anticoagulation Summary.  Will instruct Elliot Sebastian to plan on holding warfarin on 4/20/2020 and eat leafy greens today if possible seeing as the patient has already taken their warfarin dose- see above in Anticoagulation Summary.  2. Follow up in 1 days  3. Patient declines warfarin refills.  4. Verbal and written information provided. Patient expresses understanding and has no further questions at this time.    Mejia Bailey [facility]

## 2020-04-21 NOTE — PROGRESS NOTES
"Anticoagulation Clinic Progress Note    Anticoagulation Summary  As of 4/20/2020    INR goal:   2.5-3.5   TTR:   49.4 % (1.4 y)   INR used for dosing:   3.94! (4/20/2020)   Warfarin maintenance plan:   7.5 mg every Mon; 5 mg all other days   Weekly warfarin total:   37.5 mg   Plan last modified:   Sary Tejeda RPH (3/9/2020)   Next INR check:   4/24/2020   Priority:   Maintenance   Target end date:   Indefinite    Indications    Atrial fibrillation (CMS/HCC) [I48.91]  Hx of mitral valve replacement with mechanical valve [Z95.2] [Z95.2]             Anticoagulation Episode Summary     INR check location:       Preferred lab:       Send INR reminders to:   DY[facility] MENDEZ [facility]    Comments:         Anticoagulation Care Providers     Provider Role Specialty Phone number    Navi Fay MD Referring Cardiology [phone]          Clinic Interview:  Patient Findings     Positives:   Change in diet/appetite    Negatives:   Signs/symptoms of thrombosis, Signs/symptoms of bleeding,   Laboratory test error suspected, Change in health, Change in alcohol use,   Change in activity, Upcoming invasive procedure, Emergency department   visit, Upcoming dental procedure, Missed doses, Extra doses, Change in   medications, Hospital admission, Bruising, Other complaints    Comments:   Pt missed Saturday greens; pt also had \"strawberry-dilan\" this   AM       Clinical Outcomes     Negatives:   Major bleeding event, Thromboembolic event,   Anticoagulation-related hospital admission, Anticoagulation-related ED   visit, Anticoagulation-related fatality    Comments:   Pt missed Saturday greens; pt also had \"strawberry-dilan\" this   AM         INR History:  Anticoagulation Monitoring 3/9/2020 3/23/2020 4/20/2020   INR 2.2 3.6 3.94   INR Date 3/9/2020 3/23/2020 4/20/2020   INR Goal 2.5-3.5 2.5-3.5 2.5-3.5   Trend Up Same Same   Last Week Total 35 mg 37.5 mg 37.5 mg   Next Week Total 40 mg 37.5 mg 35 mg   Sun 5 mg 5 mg -   Mon 10 " mg (3/9); Otherwise 7.5 mg 7.5 mg 7.5 mg   Tue 5 mg 5 mg 2.5 mg (4/21)   Wed 5 mg 5 mg 5 mg   Thu 5 mg 5 mg 5 mg   Fri 5 mg 5 mg -   Sat 5 mg 5 mg -   Visit Report - - -   Some recent data might be hidden       Plan:  1. INR is Supratherapeutic today- see above in Anticoagulation Summary.  Will instruct Elliot Sebastian to Continue their warfarin regimen with the exception of taking 2.5 mg on 4/21- see above in Anticoagulation Summary.  2. Follow up in 1 week  3. Patient declines warfarin refills.  4. Verbal and written information provided. Patient expresses understanding and has no further questions at this time.    Mejia Bailey [facility]

## 2020-04-27 ENCOUNTER — ANTICOAGULATION VISIT (OUTPATIENT)
Dept: PHARMACY | Facility: HOSPITAL | Age: 60
End: 2020-04-27

## 2020-04-27 DIAGNOSIS — Z95.2 HX OF MITRAL VALVE REPLACEMENT WITH MECHANICAL VALVE: ICD-10-CM

## 2020-04-27 LAB
INR PPP: 3.6 (ref 0.91–1.09)
PROTHROMBIN TIME: 43.6 SECONDS (ref 10–13.8)

## 2020-04-27 PROCEDURE — G0463 HOSPITAL OUTPT CLINIC VISIT: HCPCS

## 2020-04-27 PROCEDURE — 36416 COLLJ CAPILLARY BLOOD SPEC: CPT

## 2020-04-27 PROCEDURE — 85610 PROTHROMBIN TIME: CPT

## 2020-04-27 NOTE — PROGRESS NOTES
Anticoagulation Clinic Progress Note    Anticoagulation Summary  As of 4/27/2020    INR goal:   2.5-3.5   TTR:   48.7 % (1.4 y)   INR used for dosing:   3.6! (4/27/2020)   Warfarin maintenance plan:   5 mg every day   Weekly warfarin total:   35 mg   Plan last modified:   Michael Horner RPH (4/27/2020)   Next INR check:   5/11/2020   Priority:   Maintenance   Target end date:   Indefinite    Indications    Atrial fibrillation (CMS/HCC) [I48.91]  Hx of mitral valve replacement with mechanical valve [Z95.2] [Z95.2]             Anticoagulation Episode Summary     INR check location:       Preferred lab:       Send INR reminders to:    [facility]    Comments:         Anticoagulation Care Providers     Provider Role Specialty Phone number    Navi Fay MD Referring Cardiology [phone]          Clinic Interview:  Patient Findings     Negatives:   Signs/symptoms of thrombosis, Signs/symptoms of bleeding,   Laboratory test error suspected, Change in health, Change in alcohol use,   Change in activity, Upcoming invasive procedure, Emergency department   visit, Upcoming dental procedure, Missed doses, Extra doses, Change in   medications, Change in diet/appetite, Hospital admission, Bruising, Other   complaints      Clinical Outcomes     Negatives:   Major bleeding event, Thromboembolic event,   Anticoagulation-related hospital admission, Anticoagulation-related ED   visit, Anticoagulation-related fatality        INR History:  Anticoagulation Monitoring 3/23/2020 4/20/2020 4/27/2020   INR 3.6 3.94 3.6   INR Date 3/23/2020 4/20/2020 4/27/2020   INR Goal 2.5-3.5 2.5-3.5 2.5-3.5   Trend Same Same Down   Last Week Total 37.5 mg 37.5 mg 35 mg   Next Week Total 37.5 mg 35 mg 35 mg   Sun 5 mg - 5 mg   Mon 7.5 mg 7.5 mg 5 mg   Tue 5 mg 2.5 mg (4/21) 5 mg   Wed 5 mg 5 mg 5 mg   Thu 5 mg 5 mg 5 mg   Fri 5 mg - 5 mg   Sat 5 mg - 5 mg   Visit Report - - -   Some recent data might be hidden       Plan:  1.  INR is Supratherapeutic today- see above in Anticoagulation Summary.   Will instruct Elliot Tomlinsonlon to Change their warfarin regimen- see above in Anticoagulation Summary.  2. Follow up in 2 weeks  3. They have been instructed to call if any changes in medications, doses, concerns, etc. Patient expresses understanding and has no further questions at this time.    Michael Horner RP

## 2020-05-31 LAB — INR PPP: 3.4

## 2020-06-01 ENCOUNTER — ANTICOAGULATION VISIT (OUTPATIENT)
Dept: PHARMACY | Facility: HOSPITAL | Age: 60
End: 2020-06-01

## 2020-06-01 DIAGNOSIS — Z95.2 HX OF MITRAL VALVE REPLACEMENT WITH MECHANICAL VALVE: ICD-10-CM

## 2020-06-02 NOTE — PROGRESS NOTES
Anticoagulation Clinic Progress Note    Anticoagulation Summary  As of 6/1/2020    INR goal:   2.5-3.5   TTR:   48.8 % (1.5 y)   INR used for dosing:   3.40 (5/31/2020)   Warfarin maintenance plan:   5 mg every day   Weekly warfarin total:   35 mg   Plan last modified:   Michael Horner RPH (4/27/2020)   Next INR check:   6/8/2020   Priority:   Maintenance   Target end date:   Indefinite    Indications    Atrial fibrillation (CMS/HCC) [I48.91]  Hx of mitral valve replacement with mechanical valve [Z95.2] [Z95.2]             Anticoagulation Episode Summary     INR check location:       Preferred lab:       Send INR reminders to:    [facility]    Comments:         Anticoagulation Care Providers     Provider Role Specialty Phone number    Navi Fay MD Referring Cardiology [phone]          Clinic Interview:  No pertinent clinical findings have been reported.    INR History:  Anticoagulation Monitoring 4/20/2020 4/27/2020 6/1/2020   INR 3.94 3.6 3.40   INR Date 4/20/2020 4/27/2020 5/31/2020   INR Goal 2.5-3.5 2.5-3.5 2.5-3.5   Trend Same Down Same   Last Week Total 37.5 mg 35 mg 35 mg   Next Week Total 35 mg 35 mg 35 mg   Sun - 5 mg 5 mg   Mon 7.5 mg 5 mg 5 mg   Tue 2.5 mg (4/21) 5 mg 5 mg   Wed 5 mg 5 mg 5 mg   Thu 5 mg 5 mg 5 mg   Fri - 5 mg 5 mg   Sat - 5 mg 5 mg   Visit Report - - -   Some recent data might be hidden       Plan:  1. INR is therapeutic - see above in Anticoagulation Summary.    Elliot Sebastian to continue their warfarin regimen- see above in Anticoagulation Summary.  2. Follow up in 1 week  3. Spoke with pt today. They have been instructed to call if any changes in medications, doses, concerns, etc. Patient expresses understanding and has no further questions at this time.    Sary Tejeda [facility]

## 2020-06-08 ENCOUNTER — ANTICOAGULATION VISIT (OUTPATIENT)
Dept: PHARMACY | Facility: HOSPITAL | Age: 60
End: 2020-06-08

## 2020-06-08 DIAGNOSIS — Z95.2 HX OF MITRAL VALVE REPLACEMENT WITH MECHANICAL VALVE: ICD-10-CM

## 2020-06-08 LAB — INR PPP: 4.4

## 2020-06-08 NOTE — PROGRESS NOTES
Anticoagulation Clinic Progress Note    Anticoagulation Summary  As of 2020    INR goal:   2.5-3.5   TTR:   48.2 % (1.6 y)   INR used for dosin.40! (2020)   Warfarin maintenance plan:   5 mg every day   Weekly warfarin total:   35 mg   Plan last modified:   Michael Horner RPH (2020)   Next INR check:   6/15/2020   Priority:   Maintenance   Target end date:   Indefinite    Indications    Atrial fibrillation (CMS/HCC) [I48.91]  Hx of mitral valve replacement with mechanical valve [Z95.2] [Z95.2]             Anticoagulation Episode Summary     INR check location:       Preferred lab:       Send INR reminders to:    [facility]    Comments:   MDINAVILA home juliet weekly      Anticoagulation Care Providers     Provider Role Specialty Phone number    Navi Fay MD Referring Cardiology [phone]          Clinic Interview:      INR History:  Anticoagulation Monitoring 2020   INR 3.6 3.40 4.40   INR Date 2020   INR Goal 2.5-3.5 2.5-3.5 2.5-3.5   Trend Down Same Same   Last Week Total 35 mg 35 mg 35 mg   Next Week Total 35 mg 35 mg 32.5 mg   Sun 5 mg 5 mg 5 mg   Mon 5 mg 5 mg 2.5 mg ()   Tue 5 mg 5 mg 5 mg   Wed 5 mg 5 mg 5 mg   Thu 5 mg 5 mg 5 mg   Fri 5 mg 5 mg 5 mg   Sat 5 mg 5 mg 5 mg   Visit Report - - -   Some recent data might be hidden       Plan:  1. INR is Supratherapeutic today- see above in Anticoagulation Summary.   Will instruct Elliot Sebastian to reduce warfarin dose today, then continue their warfarin regimen- see above in Anticoagulation Summary.  2. Follow up in 1 week  3. Spoke with pt today. They have been instructed to call if any changes in medications, doses, concerns, etc. Patient expresses understanding and has no further questions at this time.    Sary Tejeda [facility]

## 2020-06-15 ENCOUNTER — ANTICOAGULATION VISIT (OUTPATIENT)
Dept: PHARMACY | Facility: HOSPITAL | Age: 60
End: 2020-06-15

## 2020-06-15 DIAGNOSIS — Z95.2 HX OF MITRAL VALVE REPLACEMENT WITH MECHANICAL VALVE: ICD-10-CM

## 2020-06-15 LAB — INR PPP: 4.1

## 2020-06-15 NOTE — PROGRESS NOTES
Anticoagulation Clinic Progress Note    Anticoagulation Summary  As of 6/15/2020    INR goal:   2.5-3.5   TTR:   47.6 % (1.6 y)   INR used for dosin.10! (6/15/2020)   Warfarin maintenance plan:   2.5 mg every Fri; 5 mg all other days   Weekly warfarin total:   32.5 mg   Plan last modified:   Michael Horner RPH (6/15/2020)   Next INR check:   2020   Priority:   Maintenance   Target end date:   Indefinite    Indications    Atrial fibrillation (CMS/HCC) [I48.91]  Hx of mitral valve replacement with mechanical valve [Z95.2] [Z95.2]             Anticoagulation Episode Summary     INR check location:       Preferred lab:       Send INR reminders to:   [facility]    Comments:   MDINR home juliet weekly      Anticoagulation Care Providers     Provider Role Specialty Phone number    Navi Fay MD Referring Cardiology [phone]          Clinic Interview:  Patient Findings     Negatives:   Signs/symptoms of thrombosis, Signs/symptoms of bleeding,   Laboratory test error suspected, Change in health, Change in alcohol use,   Change in activity, Upcoming invasive procedure, Emergency department   visit, Upcoming dental procedure, Missed doses, Extra doses, Change in   medications, Change in diet/appetite, Hospital admission, Bruising, Other   complaints      Clinical Outcomes     Negatives:   Major bleeding event, Thromboembolic event,   Anticoagulation-related hospital admission, Anticoagulation-related ED   visit, Anticoagulation-related fatality        INR History:  Anticoagulation Monitoring 2020 2020 6/15/2020   INR 3.40 4.40 4.10   INR Date 2020 2020 6/15/2020   INR Goal 2.5-3.5 2.5-3.5 2.5-3.5   Trend Same Same Down   Last Week Total 35 mg 35 mg 32.5 mg   Next Week Total 35 mg 32.5 mg 30 mg   Sun 5 mg 5 mg 5 mg   Mon 5 mg 2.5 mg () 2.5 mg (6/15)   Tue 5 mg 5 mg 5 mg   Wed 5 mg 5 mg 5 mg   Thu 5 mg 5 mg 5 mg   Fri 5 mg 5 mg 2.5 mg   Sat 5 mg 5 mg 5 mg   Visit  Report - - -   Some recent data might be hidden       Plan:  1. INR is Supratherapeutic today- see above in Anticoagulation Summary.   Will instruct Elliot Sebastian to Change their warfarin regimen- see above in Anticoagulation Summary.  2. Follow up in 1 week  3. They have been instructed to call if any changes in medications, doses, concerns, etc. Patient expresses understanding and has no further questions at this time.    Michael Horner [facility]

## 2020-06-22 ENCOUNTER — ANTICOAGULATION VISIT (OUTPATIENT)
Dept: PHARMACY | Facility: HOSPITAL | Age: 60
End: 2020-06-22

## 2020-06-22 DIAGNOSIS — Z95.2 HX OF MITRAL VALVE REPLACEMENT WITH MECHANICAL VALVE: ICD-10-CM

## 2020-06-22 LAB — INR PPP: 4.7

## 2020-06-22 NOTE — PROGRESS NOTES
Anticoagulation Clinic Progress Note    Anticoagulation Summary  As of 2020    INR goal:   2.5-3.5   TTR:   47.1 % (1.6 y)   INR used for dosin.70! (2020)   Warfarin maintenance plan:   2.5 mg every Fri; 5 mg all other days   Weekly warfarin total:   32.5 mg   Plan last modified:   Michael Horner RPH (6/15/2020)   Next INR check:   2020   Priority:   Maintenance   Target end date:   Indefinite    Indications    Atrial fibrillation (CMS/HCC) [I48.91]  Hx of mitral valve replacement with mechanical valve [Z95.2] [Z95.2]             Anticoagulation Episode Summary     INR check location:       Preferred lab:       Send INR reminders to:    [facility]    Comments:   MDINR home juliet weekly      Anticoagulation Care Providers     Provider Role Specialty Phone number    Navi Fay MD Referring Cardiology [phone]          Clinic Interview:  Did have 2 drinks last  night and one this am (8% etoh drinks).  Occasional etoh       INR History:  Anticoagulation Monitoring 2020 6/15/2020 2020   INR 4.40 4.10 4.70   INR Date 2020 6/15/2020 2020   INR Goal 2.5-3.5 2.5-3.5 2.5-3.5   Trend Same Down Same   Last Week Total 35 mg 32.5 mg 30 mg   Next Week Total 32.5 mg 30 mg 27.5 mg   Sun 5 mg 5 mg 5 mg   Mon 2.5 mg () 2.5 mg (6/15) Hold ()   Tue 5 mg 5 mg 5 mg   Wed 5 mg 5 mg 5 mg   Thu 5 mg 5 mg 5 mg   Fri 5 mg 2.5 mg 2.5 mg   Sat 5 mg 5 mg 5 mg   Visit Report - - -   Some recent data might be hidden       Plan:  1. INR is Supratherapeutic today- see above in Anticoagulation Summary.   Will instruct Elliot Sebastian to HOLD warfarin today, then continue their warfarin regimen- see above in Anticoagulation Summary.  2. Follow up in 1 week  3. Spoke with pt today. They have been instructed to call if any changes in medications, doses, concerns, etc. Patient expresses understanding and has no further questions at this time.    Sary Tejeda [facility]

## 2020-06-29 ENCOUNTER — ANTICOAGULATION VISIT (OUTPATIENT)
Dept: PHARMACY | Facility: HOSPITAL | Age: 60
End: 2020-06-29

## 2020-06-29 DIAGNOSIS — Z95.2 HX OF MITRAL VALVE REPLACEMENT WITH MECHANICAL VALVE: ICD-10-CM

## 2020-06-29 LAB — INR PPP: 3

## 2020-06-29 NOTE — PROGRESS NOTES
Anticoagulation Clinic Progress Note    Anticoagulation Summary  As of 6/29/2020    INR goal:   2.5-3.5   TTR:   46.9 % (1.6 y)   INR used for dosing:   3.00 (6/29/2020)   Warfarin maintenance plan:   2.5 mg every Mon, Fri; 5 mg all other days   Weekly warfarin total:   30 mg   Plan last modified:   Michael Horner RPH (6/29/2020)   Next INR check:   7/6/2020   Priority:   Maintenance   Target end date:   Indefinite    Indications    Atrial fibrillation (CMS/HCC) [I48.91]  Hx of mitral valve replacement with mechanical valve [Z95.2] [Z95.2]             Anticoagulation Episode Summary     INR check location:       Preferred lab:       Send INR reminders to:   [facility]    Comments:   MDINR home juliet weekly      Anticoagulation Care Providers     Provider Role Specialty Phone number    Navi Fay MD Referring Cardiology [phone]          Clinic Interview:  Patient Findings     Negatives:   Signs/symptoms of thrombosis, Signs/symptoms of bleeding,   Laboratory test error suspected, Change in health, Change in alcohol use,   Change in activity, Upcoming invasive procedure, Emergency department   visit, Upcoming dental procedure, Missed doses, Extra doses, Change in   medications, Change in diet/appetite, Hospital admission, Bruising, Other   complaints      Clinical Outcomes     Negatives:   Major bleeding event, Thromboembolic event,   Anticoagulation-related hospital admission, Anticoagulation-related ED   visit, Anticoagulation-related fatality        INR History:  Anticoagulation Monitoring 6/15/2020 6/22/2020 6/29/2020   INR 4.10 4.70 3.00   INR Date 6/15/2020 6/22/2020 6/29/2020   INR Goal 2.5-3.5 2.5-3.5 2.5-3.5   Trend Down Same Down   Last Week Total 32.5 mg 30 mg 27.5 mg   Next Week Total 30 mg 27.5 mg 30 mg   Sun 5 mg 5 mg 5 mg   Mon 2.5 mg (6/15) Hold (6/22) 2.5 mg   Tue 5 mg 5 mg 5 mg   Wed 5 mg 5 mg 5 mg   Thu 5 mg 5 mg 5 mg   Fri 2.5 mg 2.5 mg 2.5 mg   Sat 5 mg 5 mg 5 mg    Visit Report - - -   Some recent data might be hidden       Plan:  1. INR is Therapeutic today- see above in Anticoagulation Summary.   Will instruct Elliot Tomlinsonlon to Change their warfarin regimen- see above in Anticoagulation Summary.  2. Follow up in 1 week  3. They have been instructed to call if any changes in medications, doses, concerns, etc. Patient expresses understanding and has no further questions at this time.    Michael Horner [facility]

## 2020-07-06 ENCOUNTER — ANTICOAGULATION VISIT (OUTPATIENT)
Dept: PHARMACY | Facility: HOSPITAL | Age: 60
End: 2020-07-06

## 2020-07-06 DIAGNOSIS — Z95.2 HX OF MITRAL VALVE REPLACEMENT WITH MECHANICAL VALVE: ICD-10-CM

## 2020-07-06 LAB — INR PPP: 2.5

## 2020-07-06 NOTE — PROGRESS NOTES
Anticoagulation Clinic Progress Note    Anticoagulation Summary  As of 2020    INR goal:   2.5-3.5   TTR:   47.5 % (1.6 y)   INR used for dosin.50 (2020)   Warfarin maintenance plan:   2.5 mg every Mon, Fri; 5 mg all other days   Weekly warfarin total:   30 mg   No change documented:   Sara Mukherjee   Plan last modified:   Michael Horner RPH (2020)   Next INR check:   2020   Priority:   Maintenance   Target end date:   Indefinite    Indications    Atrial fibrillation (CMS/HCC) [I48.91]  Hx of mitral valve replacement with mechanical valve [Z95.2] [Z95.2]             Anticoagulation Episode Summary     INR check location:       Preferred lab:       Send INR reminders to:    [facility]    Comments:   NICOLE home juliet weekly      Anticoagulation Care Providers     Provider Role Specialty Phone number    Navi Fay MD Referring Cardiology [phone]          Clinic Interview:  No pertinent clinical findings have been reported.    INR History:  Anticoagulation Monitoring 2020   INR 4.70 3.00 2.50   INR Date 2020   INR Goal 2.5-3.5 2.5-3.5 2.5-3.5   Trend Same Down Same   Last Week Total 30 mg 27.5 mg 30 mg   Next Week Total 27.5 mg 30 mg 30 mg   Sun 5 mg 5 mg 5 mg   Mon Hold () 2.5 mg 2.5 mg   Tue 5 mg 5 mg 5 mg   Wed 5 mg 5 mg 5 mg   Thu 5 mg 5 mg 5 mg   Fri 2.5 mg 2.5 mg 2.5 mg   Sat 5 mg 5 mg 5 mg   Visit Report - - -   Some recent data might be hidden       Plan:  1. INR is therapeutic today- see above in Anticoagulation Summary.    Elliot Sebastian to change their warfarin regimen- see above in Anticoagulation Summary.  2. Follow up in 1 week  3. Spoke with pt today.. They have been instructed to call if any changes in medications, doses, concerns, etc. Patient expresses understanding and has no further questions at this time.    Sara Mukherjee

## 2020-07-13 ENCOUNTER — ANTICOAGULATION VISIT (OUTPATIENT)
Dept: PHARMACY | Facility: HOSPITAL | Age: 60
End: 2020-07-13

## 2020-07-13 DIAGNOSIS — Z95.2 HX OF MITRAL VALVE REPLACEMENT WITH MECHANICAL VALVE: ICD-10-CM

## 2020-07-13 LAB — INR PPP: 3.3

## 2020-07-13 NOTE — PROGRESS NOTES
Anticoagulation Clinic Progress Note    Anticoagulation Summary  As of 7/13/2020    INR goal:   2.5-3.5   TTR:   48.1 % (1.6 y)   INR used for dosing:   3.30 (7/13/2020)   Warfarin maintenance plan:   2.5 mg every Fri; 5 mg all other days   Weekly warfarin total:   32.5 mg   No change documented:   Sara Mukherjee   Plan last modified:   Sary Tejeda RPH (7/6/2020)   Next INR check:   7/20/2020   Priority:   Maintenance   Target end date:   Indefinite    Indications    Atrial fibrillation (CMS/HCC) [I48.91]  Hx of mitral valve replacement with mechanical valve [Z95.2] [Z95.2]             Anticoagulation Episode Summary     INR check location:       Preferred lab:       Send INR reminders to:    [facility]    Comments:   NICOLE home juliet weekly      Anticoagulation Care Providers     Provider Role Specialty Phone number    Navi Fay MD Referring Cardiology [phone]          Clinic Interview:  No pertinent clinical findings have been reported.    INR History:  Anticoagulation Monitoring 6/29/2020 7/6/2020 7/13/2020   INR 3.00 2.50 3.30   INR Date 6/29/2020 7/6/2020 7/13/2020   INR Goal 2.5-3.5 2.5-3.5 2.5-3.5   Trend Down Up Same   Last Week Total 27.5 mg 30 mg 32.5 mg   Next Week Total 30 mg 32.5 mg 32.5 mg   Sun 5 mg 5 mg 5 mg   Mon 2.5 mg 5 mg 5 mg   Tue 5 mg 5 mg 5 mg   Wed 5 mg 5 mg 5 mg   Thu 5 mg 5 mg 5 mg   Fri 2.5 mg 2.5 mg 2.5 mg   Sat 5 mg 5 mg 5 mg   Visit Report - - -   Some recent data might be hidden       Plan:  1. INR is therapeutic today- see above in Anticoagulation Summary.    Elliot Sebastian to continue their warfarin regimen- see above in Anticoagulation Summary.  2. Follow up in 1 week  3. They have been instructed to call if any changes in medications, doses, concerns, etc. Patient expresses understanding and has no further questions at this time.    Michael Horner RP

## 2020-07-20 ENCOUNTER — TELEPHONE (OUTPATIENT)
Dept: CARDIOLOGY | Facility: CLINIC | Age: 60
End: 2020-07-20

## 2020-07-20 ENCOUNTER — ANTICOAGULATION VISIT (OUTPATIENT)
Dept: PHARMACY | Facility: HOSPITAL | Age: 60
End: 2020-07-20

## 2020-07-20 DIAGNOSIS — Z95.2 HX OF MITRAL VALVE REPLACEMENT WITH MECHANICAL VALVE: ICD-10-CM

## 2020-07-20 LAB — INR PPP: 3

## 2020-07-20 RX ORDER — WARFARIN SODIUM 5 MG/1
TABLET ORAL
Qty: 90 TABLET | Refills: 1 | Status: SHIPPED | OUTPATIENT
Start: 2020-07-20 | End: 2021-01-18

## 2020-07-20 NOTE — TELEPHONE ENCOUNTER
[phone]    Pt called to confirm he can take Imodium with his mechanical valve?  Please advise.    C DAVON

## 2020-07-20 NOTE — PROGRESS NOTES
Anticoagulation Clinic Progress Note    Anticoagulation Summary  As of 7/20/2020    INR goal:   2.5-3.5   TTR:   48.7 % (1.7 y)   INR used for dosing:   3.00 (7/20/2020)   Warfarin maintenance plan:   2.5 mg every Fri; 5 mg all other days   Weekly warfarin total:   32.5 mg   No change documented:   Sara Mukherjee   Plan last modified:   Sary Tejeda RPH (7/6/2020)   Next INR check:   7/27/2020   Priority:   Maintenance   Target end date:   Indefinite    Indications    Atrial fibrillation (CMS/HCC) [I48.91]  Hx of mitral valve replacement with mechanical valve [Z95.2] [Z95.2]             Anticoagulation Episode Summary     INR check location:       Preferred lab:       Send INR reminders to:    [facility]    Comments:   NICOLE home juliet weekly      Anticoagulation Care Providers     Provider Role Specialty Phone number    Navi Fay MD Referring Cardiology [phone]          Clinic Interview:  No pertinent clinical findings have been reported.    INR History:  Anticoagulation Monitoring 7/6/2020 7/13/2020 7/20/2020   INR 2.50 3.30 3.00   INR Date 7/6/2020 7/13/2020 7/20/2020   INR Goal 2.5-3.5 2.5-3.5 2.5-3.5   Trend Up Same Same   Last Week Total 30 mg 32.5 mg 32.5 mg   Next Week Total 32.5 mg 32.5 mg 32.5 mg   Sun 5 mg 5 mg 5 mg   Mon 5 mg 5 mg 5 mg   Tue 5 mg 5 mg 5 mg   Wed 5 mg 5 mg 5 mg   Thu 5 mg 5 mg 5 mg   Fri 2.5 mg 2.5 mg 2.5 mg   Sat 5 mg 5 mg 5 mg   Visit Report - - -   Some recent data might be hidden       Plan:  1. INR is therapeutic today- see above in Anticoagulation Summary.    Elliot Sebastian to continue their warfarin regimen- see above in Anticoagulation Summary.  2. Follow up in 1 week  3. Pt has agreed to only be called if INR out of range. They have been instructed to call if any changes in medications, doses, concerns, etc. Patient expresses understanding and has no further questions at this time.    Sara Mukherjee

## 2020-07-27 ENCOUNTER — OFFICE VISIT (OUTPATIENT)
Dept: FAMILY MEDICINE CLINIC | Facility: CLINIC | Age: 60
End: 2020-07-27

## 2020-07-27 ENCOUNTER — ANTICOAGULATION VISIT (OUTPATIENT)
Dept: PHARMACY | Facility: HOSPITAL | Age: 60
End: 2020-07-27

## 2020-07-27 VITALS
HEIGHT: 78 IN | BODY MASS INDEX: 34.55 KG/M2 | SYSTOLIC BLOOD PRESSURE: 144 MMHG | WEIGHT: 298.6 LBS | HEART RATE: 70 BPM | OXYGEN SATURATION: 98 % | DIASTOLIC BLOOD PRESSURE: 86 MMHG

## 2020-07-27 DIAGNOSIS — K52.9 CHRONIC DIARRHEA: Primary | ICD-10-CM

## 2020-07-27 DIAGNOSIS — R10.9 ABDOMINAL CRAMPING: ICD-10-CM

## 2020-07-27 DIAGNOSIS — Z95.2 HX OF MITRAL VALVE REPLACEMENT WITH MECHANICAL VALVE: ICD-10-CM

## 2020-07-27 LAB — INR PPP: 3.2

## 2020-07-27 PROCEDURE — 99213 OFFICE O/P EST LOW 20 MIN: CPT | Performed by: PHYSICIAN ASSISTANT

## 2020-07-27 RX ORDER — DICYCLOMINE HYDROCHLORIDE 10 MG/1
10 CAPSULE ORAL
Qty: 60 CAPSULE | Refills: 0 | Status: SHIPPED | OUTPATIENT
Start: 2020-07-27 | End: 2020-09-21

## 2020-07-27 NOTE — PROGRESS NOTES
Subjective   Elliot Sebastian is a 60 y.o. male presents for   Chief Complaint   Patient presents with   • Irritable Bowel Syndrome     diarrhea 2-3x a day        History of Present Illness     Elliot is a 60-year-old male presents with chronic diarrhea.  States he has had loose stool to diarrhea at least 2-3 times daily for the past several months.  States this has been occurring for the past 6-7 months.  He has had some abdominal cramping as well.  He has had an occasional nausea episodes.  States when he gets the urge for a bowel movement he has to go right away.  He has noticed some mucus on his stools.  He has tried over-the-counter Metamucil to see if this will help bulk up stools but has not had any improvement.  Denied any recent antibiotic usage, fevers, chills, vomiting, foreign travel, well water or camping trips.  He does have multiple cats inside and out at home.  States the cats have not had any diarrhea or GI upset.  States his diet has been overall healthy.  He has not noticed any improvement with what he eats with his bowel movements.  He has a history of a small bowel obstruction with resection surgery performed by Dr. Annemarie Jaimes.  He has had a colonoscopy in the past as well.  Did notice some once stool about 6 or 7 months ago but none since.  No family history of Crohn's or ulcerative colitis.    The following portions of the patient's history were reviewed and updated as appropriate: allergies, current medications, past family history, past medical history, past social history, past surgical history and problem list.    Review of Systems   Constitutional: Negative.  Negative for chills, fatigue and fever.   HENT: Negative.    Eyes: Negative.    Respiratory: Negative.    Cardiovascular: Negative.  Negative for chest pain, palpitations and leg swelling.   Gastrointestinal: Positive for abdominal pain, diarrhea and nausea. Negative for blood in stool and constipation.   Endocrine: Negative.   "  Genitourinary: Negative.    Musculoskeletal: Negative.    Skin: Negative.    Allergic/Immunologic: Negative.    Neurological: Negative.  Negative for dizziness, light-headedness and headaches.   Hematological: Negative.    Psychiatric/Behavioral: Negative.          Vitals:    07/27/20 1415   BP: 144/86   BP Location: Left arm   Patient Position: Sitting   Cuff Size: Adult   Pulse: 70   SpO2: 98%   Weight: 135 kg (298 lb 9.6 oz)   Height: 200.7 cm (79\")     Wt Readings from Last 3 Encounters:   07/27/20 135 kg (298 lb 9.6 oz)   04/01/20 (!) 139 kg (307 lb 4.8 oz)   02/10/20 (!) 138 kg (305 lb)     BP Readings from Last 3 Encounters:   07/27/20 144/86   04/02/20 132/81   02/10/20 150/100     Social History     Socioeconomic History   • Marital status:      Spouse name: Not on file   • Number of children: Not on file   • Years of education: Not on file   • Highest education level: Not on file   Tobacco Use   • Smoking status: Never Smoker   • Smokeless tobacco: Never Used   • Tobacco comment: caffeine use   Substance and Sexual Activity   • Alcohol use: Yes     Comment: SOCIAL       No Known Allergies    Body mass index is 33.64 kg/m².    Objective   Physical Exam   Constitutional: He is oriented to person, place, and time. Vital signs are normal. He appears well-developed and well-nourished.   Sitting on exam table wearing facial mask   HENT:   Head: Normocephalic and atraumatic.   Neck: Trachea normal and phonation normal. Neck supple. No tracheal tenderness present. No tracheal deviation present.   Cardiovascular: Normal rate, regular rhythm, S1 normal, S2 normal, normal heart sounds and normal pulses.   No murmur heard.  Pulmonary/Chest: Effort normal and breath sounds normal.   Abdominal: Soft. Normal appearance, normal aorta and bowel sounds are normal. There is no hepatomegaly. There is no tenderness. There is no rigidity, no rebound, no guarding, no CVA tenderness, no tenderness at McBurney's point and " negative Charles's sign.   Neurological: He is alert and oriented to person, place, and time.   Skin: Skin is warm, dry and intact. Capillary refill takes less than 2 seconds.   Psychiatric: He has a normal mood and affect. His speech is normal and behavior is normal. Judgment and thought content normal. Cognition and memory are normal.      I was wearing surgical mask during the entire office visit encounter.    Assessment/Plan   Elliot was seen today for irritable bowel syndrome.    Diagnoses and all orders for this visit:    Chronic diarrhea  -     Ambulatory Referral to Gastroenterology  -     dicyclomine (Bentyl) 10 MG capsule; Take 1 capsule by mouth 4 (Four) Times a Day Before Meals & at Bedtime.  -     Ova & Parasite Examination - Stool, Per Rectum  -     Clostridium Difficile EIA - Stool, Per Rectum  -     Stool Culture (Reference Lab) - Stool, Per Rectum  -     XR Abdomen Flat & Upright; Future  -     Ova & Parasite Examination - Stool, Per Rectum; Future  -     Clostridium Difficile EIA - Stool, Per Rectum; Future  -     Stool Culture (Reference Lab) - Stool, Per Rectum; Future  -     Clostridium Difficile EIA - Stool, Per Rectum; Future  -     Stool Culture (Reference Lab) - Stool, Per Rectum; Future  -     Ova & Parasite Examination - Stool, Per Rectum; Future    Abdominal cramping  -     XR Abdomen Flat & Upright; Future  -     Ova & Parasite Examination - Stool, Per Rectum; Future  -     Clostridium Difficile EIA - Stool, Per Rectum; Future  -     Stool Culture (Reference Lab) - Stool, Per Rectum; Future  -     Clostridium Difficile EIA - Stool, Per Rectum; Future  -     Stool Culture (Reference Lab) - Stool, Per Rectum; Future  -     Ova & Parasite Examination - Stool, Per Rectum; Future      Mr. Elliot Sebastian is seen at office visit today with chronic diarrhea and abdominal cramping: We will check stool O&P, C. difficile and stool culture x3 for further evaluation of his chronic diarrhea.  I have  given him a written order to take to [facility] for x-ray abdominal flat upright series for further evaluation of his symptoms.  He has had a history of a small bowel obstruction.  Elliot will be notified of test results when completed.  Will try Bentyl to take up to 4 times a day to see if this will improve his symptoms.  Will send to GI for further evaluation for possible Crohn's versus microscopic colitis.  He has upcoming appointment with Dr. Beltran in August 2020.  I have asked him to keep this appointment as well.    YISEL Banda [facility]  [address] [place] [address]  Dept: [phone]  Dept Fax: [phone]  Loc: [phone]  Loc Fax: [phone]

## 2020-07-27 NOTE — PROGRESS NOTES
Anticoagulation Clinic Progress Note    Anticoagulation Summary  As of 7/27/2020    INR goal:   2.5-3.5   TTR:   49.3 % (1.7 y)   INR used for dosing:   3.20 (7/27/2020)   Warfarin maintenance plan:   2.5 mg every Fri; 5 mg all other days   Weekly warfarin total:   32.5 mg   No change documented:   Manisha Benjamin   Plan last modified:   Sary Tejeda RPH (7/6/2020)   Next INR check:   8/24/2020   Priority:   Maintenance   Target end date:   Indefinite    Indications    Atrial fibrillation (CMS/HCC) [I48.91]  Hx of mitral valve replacement with mechanical valve [Z95.2] [Z95.2]             Anticoagulation Episode Summary     INR check location:       Preferred lab:       Send INR reminders to:    [facility]    Comments:   MDINAVILA home juliet weekly *only call when out of range*      Anticoagulation Care Providers     Provider Role Specialty Phone number    Navi Fay MD Referring Cardiology [phone]          Clinic Interview:  No pertinent clinical findings have been reported.    INR History:  Anticoagulation Monitoring 7/13/2020 7/20/2020 7/27/2020   INR 3.30 3.00 3.20   INR Date 7/13/2020 7/20/2020 7/27/2020   INR Goal 2.5-3.5 2.5-3.5 2.5-3.5   Trend Same Same Same   Last Week Total 32.5 mg 32.5 mg 32.5 mg   Next Week Total 32.5 mg 32.5 mg 32.5 mg   Sun 5 mg 5 mg 5 mg   Mon 5 mg 5 mg 5 mg   Tue 5 mg 5 mg 5 mg   Wed 5 mg 5 mg 5 mg   Thu 5 mg 5 mg 5 mg   Fri 2.5 mg 2.5 mg 2.5 mg   Sat 5 mg 5 mg 5 mg   Visit Report - - -   Some recent data might be hidden       Plan:  1. INR is therapeutic today- see above in Anticoagulation Summary.    Elliot Sebastian to continue their warfarin regimen- see above in Anticoagulation Summary.  2. Follow up in 1 week  3. They have been instructed to call if any changes in medications, doses, concerns, etc. Patient expresses understanding and has no further questions at this time.    Manisha Benjamin

## 2020-07-28 ENCOUNTER — RESULTS ENCOUNTER (OUTPATIENT)
Dept: FAMILY MEDICINE CLINIC | Facility: CLINIC | Age: 60
End: 2020-07-28

## 2020-07-28 ENCOUNTER — TELEPHONE (OUTPATIENT)
Dept: FAMILY MEDICINE CLINIC | Facility: CLINIC | Age: 60
End: 2020-07-28

## 2020-07-28 DIAGNOSIS — R10.9 ABDOMINAL CRAMPING: ICD-10-CM

## 2020-07-28 DIAGNOSIS — K52.9 CHRONIC DIARRHEA: ICD-10-CM

## 2020-07-28 NOTE — TELEPHONE ENCOUNTER
Patient called and was checking on the status of paperwork needed for Dismissal from Jury Duty.    Please advise     [phone]

## 2020-07-29 ENCOUNTER — TELEPHONE (OUTPATIENT)
Dept: FAMILY MEDICINE CLINIC | Facility: CLINIC | Age: 60
End: 2020-07-29

## 2020-07-29 NOTE — TELEPHONE ENCOUNTER
Yes he verbally stated he needed a note for jury duty due to his current diarrhea issues.  I said I would complete the forms after receiving.  I have not received the forms at this time.  We have called patient to obtain copy of forms

## 2020-07-29 NOTE — TELEPHONE ENCOUNTER
Did Patient talk to you about Jury Duty paperwork at his appt Monday?  He says he told you all about it but I dont see any note of it.

## 2020-07-31 ENCOUNTER — TELEPHONE (OUTPATIENT)
Dept: FAMILY MEDICINE CLINIC | Facility: CLINIC | Age: 60
End: 2020-07-31

## 2020-07-31 DIAGNOSIS — R19.7 DIARRHEA, UNSPECIFIED TYPE: Primary | ICD-10-CM

## 2020-07-31 RX ORDER — DIPHENOXYLATE HYDROCHLORIDE AND ATROPINE SULFATE 2.5; .025 MG/1; MG/1
1 TABLET ORAL 4 TIMES DAILY PRN
Qty: 20 TABLET | Refills: 0 | Status: SHIPPED | OUTPATIENT
Start: 2020-07-31 | End: 2020-11-30

## 2020-07-31 NOTE — TELEPHONE ENCOUNTER
Please notified patient that we will send to pharmacy Lomotil medication to take to up to 4 times a day for his diarrhea.  This is a short-term medication.  Please bring stool studies and for testing as soon as he can.

## 2020-07-31 NOTE — TELEPHONE ENCOUNTER
PATIENT CALLED IN AND STATED HE WAS IN FOR DIARRHEA AND WAS PRESCRIBED dicyclomine (Bentyl) 10 MG capsule PATIENT STATED IT IS MAKING HIS SYMPTOMS WORSE . PATIENT WOULD LIKE TO KNOW IF SOMETHING ELSE COULD BE PRESCRIBED .      PHARMACY VERIFIED  [address], [place] - [address] [address] [facility] [phone]  - [phone]   [phone]        PATIENT CALL BACK [phone].

## 2020-07-31 NOTE — TELEPHONE ENCOUNTER
Notified pt to stop jessecandice, he is working long hours and has not brought in samples he said he should have them here by Monday. Also is needing to schedule with GI.

## 2020-07-31 NOTE — TELEPHONE ENCOUNTER
1.  Have him stop the Bentyl medication.  2.  Has he brought in his samples for stool studies?  3.  Has he heard from GI?    Please run a sara for possible Lomotil

## 2020-08-03 ENCOUNTER — ANTICOAGULATION VISIT (OUTPATIENT)
Dept: PHARMACY | Facility: HOSPITAL | Age: 60
End: 2020-08-03

## 2020-08-03 DIAGNOSIS — Z95.2 HX OF MITRAL VALVE REPLACEMENT WITH MECHANICAL VALVE: ICD-10-CM

## 2020-08-03 LAB — INR PPP: 2.8

## 2020-08-03 NOTE — PROGRESS NOTES
Anticoagulation Clinic Progress Note    Anticoagulation Summary  As of 8/3/2020    INR goal:   2.5-3.5   TTR:   49.8 % (1.7 y)   INR used for dosin.80 (8/3/2020)   Warfarin maintenance plan:   2.5 mg every Fri; 5 mg all other days   Weekly warfarin total:   32.5 mg   No change documented:   Sara Mukherjee   Plan last modified:   Sary Tejeda RPH (2020)   Next INR check:   8/10/2020   Priority:   Maintenance   Target end date:   Indefinite    Indications    Atrial fibrillation (CMS/HCC) [I48.91]  Hx of mitral valve replacement with mechanical valve [Z95.2] [Z95.2]             Anticoagulation Episode Summary     INR check location:       Preferred lab:       Send INR reminders to:    [facility]    Comments:   MDINAVILA home juliet weekly *only call when out of range*      Anticoagulation Care Providers     Provider Role Specialty Phone number    Navi Fay MD Referring Cardiology [phone]          Clinic Interview:  No pertinent clinical findings have been reported.    INR History:  Anticoagulation Monitoring 2020 2020 8/3/2020   INR 3.00 3.20 2.80   INR Date 2020 2020 8/3/2020   INR Goal 2.5-3.5 2.5-3.5 2.5-3.5   Trend Same Same Same   Last Week Total 32.5 mg 32.5 mg 32.5 mg   Next Week Total 32.5 mg 32.5 mg 32.5 mg   Sun 5 mg 5 mg 5 mg   Mon 5 mg 5 mg 5 mg   Tue 5 mg 5 mg 5 mg   Wed 5 mg 5 mg 5 mg   Thu 5 mg 5 mg 5 mg   Fri 2.5 mg 2.5 mg 2.5 mg   Sat 5 mg 5 mg 5 mg   Visit Report - - -   Some recent data might be hidden       Plan:  1. INR is therapeutic today- see above in Anticoagulation Summary.    Elliot Sebastian to continue their warfarin regimen- see above in Anticoagulation Summary.  2. Follow up in 1 week  3. Pt has agreed to only be called if INR out of range. They have been instructed to call if any changes in medications, doses, concerns, etc. Patient expresses understanding and has no further questions at this time.    Sara Mukherjee

## 2020-08-10 DIAGNOSIS — K52.9 CHRONIC DIARRHEA: ICD-10-CM

## 2020-08-10 DIAGNOSIS — R10.9 ABDOMINAL CRAMPING: ICD-10-CM

## 2020-08-10 LAB — INR PPP: 2.9

## 2020-08-11 ENCOUNTER — ANTICOAGULATION VISIT (OUTPATIENT)
Dept: PHARMACY | Facility: HOSPITAL | Age: 60
End: 2020-08-11

## 2020-08-11 DIAGNOSIS — Z95.2 HX OF MITRAL VALVE REPLACEMENT WITH MECHANICAL VALVE: ICD-10-CM

## 2020-08-11 NOTE — PROGRESS NOTES
Anticoagulation Clinic Progress Note    Anticoagulation Summary  As of 2020    INR goal:   2.5-3.5   TTR:   50.4 % (1.7 y)   INR used for dosin.90 (8/10/2020)   Warfarin maintenance plan:   2.5 mg every Fri; 5 mg all other days   Weekly warfarin total:   32.5 mg   No change documented:   Alicja Starks   Plan last modified:   Sary Tejeda RPH (2020)   Next INR check:   2020   Priority:   Maintenance   Target end date:   Indefinite    Indications    Atrial fibrillation (CMS/HCC) [I48.91]  Hx of mitral valve replacement with mechanical valve [Z95.2] [Z95.2]             Anticoagulation Episode Summary     INR check location:       Preferred lab:       Send INR reminders to:    [facility]    Comments:   MDINAVILA home juliet weekly *only call when out of range*      Anticoagulation Care Providers     Provider Role Specialty Phone number    Navi Fay MD Referring Cardiology [phone]          Clinic Interview:  No pertinent clinical findings have been reported.    INR History:  Anticoagulation Monitoring 2020 8/3/2020 2020   INR 3.20 2.80 2.90   INR Date 2020 8/3/2020 8/10/2020   INR Goal 2.5-3.5 2.5-3.5 2.5-3.5   Trend Same Same Same   Last Week Total 32.5 mg 32.5 mg 32.5 mg   Next Week Total 32.5 mg 32.5 mg 32.5 mg   Sun 5 mg 5 mg 5 mg   Mon 5 mg 5 mg -   Tue 5 mg 5 mg 5 mg   Wed 5 mg 5 mg 5 mg   Thu 5 mg 5 mg 5 mg   Fri 2.5 mg 2.5 mg 2.5 mg   Sat 5 mg 5 mg 5 mg   Visit Report - - -   Some recent data might be hidden       Plan:  1. INR is therapeutic today- see above in Anticoagulation Summary.    Elliot Sebastian to continue their warfarin regimen- see above in Anticoagulation Summary.  2. Follow up in 1 week  3. Pt has agreed to only be called if INR out of range. They have been instructed to call if any changes in medications, doses, concerns, etc. Patient expresses understanding and has no further questions at this time.    Alicja BALTAZAR  Raudel

## 2020-08-14 LAB
BACTERIA SPEC CULT: NORMAL
BACTERIA SPEC CULT: NORMAL
C DIFF TOX A+B STL QL IA: NEGATIVE
CAMPYLOBACTER STL CULT: NORMAL
E COLI SXT STL QL IA: NEGATIVE
O+P SPEC MICRO: NORMAL
O+P STL CONC: NORMAL
SALM + SHIG STL CULT: NORMAL

## 2020-08-17 ENCOUNTER — TELEPHONE (OUTPATIENT)
Dept: FAMILY MEDICINE CLINIC | Facility: CLINIC | Age: 60
End: 2020-08-17

## 2020-08-17 ENCOUNTER — ANTICOAGULATION VISIT (OUTPATIENT)
Dept: PHARMACY | Facility: HOSPITAL | Age: 60
End: 2020-08-17

## 2020-08-17 DIAGNOSIS — K52.9 CHRONIC DIARRHEA: Primary | ICD-10-CM

## 2020-08-17 DIAGNOSIS — R10.9 ABDOMINAL CRAMPING: ICD-10-CM

## 2020-08-17 DIAGNOSIS — Z95.2 HX OF MITRAL VALVE REPLACEMENT WITH MECHANICAL VALVE: ICD-10-CM

## 2020-08-17 LAB — INR PPP: 3.2

## 2020-08-17 NOTE — PROGRESS NOTES
Anticoagulation Clinic Progress Note    Anticoagulation Summary  As of 8/17/2020    INR goal:   2.5-3.5   TTR:   50.9 % (1.7 y)   INR used for dosing:   3.20 (8/17/2020)   Warfarin maintenance plan:   2.5 mg every Fri; 5 mg all other days   Weekly warfarin total:   32.5 mg   No change documented:   Sara Mukherjee   Plan last modified:   Sary Tejeda RPH (7/6/2020)   Next INR check:   8/24/2020   Priority:   Maintenance   Target end date:   Indefinite    Indications    Atrial fibrillation (CMS/HCC) [I48.91]  Hx of mitral valve replacement with mechanical valve [Z95.2] [Z95.2]             Anticoagulation Episode Summary     INR check location:       Preferred lab:       Send INR reminders to:    [facility]    Comments:   MDINAVILA home juliet weekly *only call when out of range*      Anticoagulation Care Providers     Provider Role Specialty Phone number    Navi Fay MD Referring Cardiology [phone]          Clinic Interview:  No pertinent clinical findings have been reported.    INR History:  Anticoagulation Monitoring 8/3/2020 8/11/2020 8/17/2020   INR 2.80 2.90 3.20   INR Date 8/3/2020 8/10/2020 8/17/2020   INR Goal 2.5-3.5 2.5-3.5 2.5-3.5   Trend Same Same Same   Last Week Total 32.5 mg 32.5 mg 32.5 mg   Next Week Total 32.5 mg 32.5 mg 32.5 mg   Sun 5 mg 5 mg 5 mg   Mon 5 mg - 5 mg   Tue 5 mg 5 mg 5 mg   Wed 5 mg 5 mg 5 mg   Thu 5 mg 5 mg 5 mg   Fri 2.5 mg 2.5 mg 2.5 mg   Sat 5 mg 5 mg 5 mg   Visit Report - - -   Some recent data might be hidden       Plan:  1. INR is therapeutic today- see above in Anticoagulation Summary.    Elliot Sebastian to continue their warfarin regimen- see above in Anticoagulation Summary.  2. Follow up in 2 weeks  3. Pt has agreed to only be called if INR out of range. They have been instructed to call if any changes in medications, doses, concerns, etc. Patient expresses understanding and has no further questions at this time.    Sara Mukherjee

## 2020-08-24 ENCOUNTER — HOSPITAL ENCOUNTER (OUTPATIENT)
Dept: GENERAL RADIOLOGY | Facility: HOSPITAL | Age: 60
Discharge: HOME | End: 2020-08-24
Admitting: PHYSICIAN ASSISTANT

## 2020-08-24 ENCOUNTER — ANTICOAGULATION VISIT (OUTPATIENT)
Dept: PHARMACY | Facility: HOSPITAL | Age: 60
End: 2020-08-24

## 2020-08-24 DIAGNOSIS — Z95.2 HX OF MITRAL VALVE REPLACEMENT WITH MECHANICAL VALVE: ICD-10-CM

## 2020-08-24 DIAGNOSIS — R10.9 ABDOMINAL CRAMPING: ICD-10-CM

## 2020-08-24 DIAGNOSIS — K52.9 CHRONIC DIARRHEA: ICD-10-CM

## 2020-08-24 LAB — INR PPP: 3

## 2020-08-24 PROCEDURE — 74019 RADEX ABDOMEN 2 VIEWS: CPT

## 2020-08-24 NOTE — PROGRESS NOTES
Anticoagulation Clinic Progress Note    Anticoagulation Summary  As of 8/24/2020    INR goal:   2.5-3.5   TTR:   51.5 % (1.8 y)   INR used for dosing:   3.00 (8/24/2020)   Warfarin maintenance plan:   2.5 mg every Fri; 5 mg all other days   Weekly warfarin total:   32.5 mg   No change documented:   Manisha Benjamin   Plan last modified:   Sary Tejeda RP (7/6/2020)   Next INR check:   8/31/2020   Priority:   High   Target end date:   Indefinite    Indications    Atrial fibrillation (CMS/HCC) [I48.91]  Hx of mitral valve replacement with mechanical valve [Z95.2] [Z95.2]             Anticoagulation Episode Summary     INR check location:       Preferred lab:       Send INR reminders to:    [facility]    Comments:   MDINAVILA home juliet weekly *only call when out of range*      Anticoagulation Care Providers     Provider Role Specialty Phone number    Navi Fay MD Referring Cardiology [phone]          Clinic Interview:  No pertinent clinical findings have been reported.    INR History:  Anticoagulation Monitoring 8/11/2020 8/17/2020 8/24/2020   INR 2.90 3.20 3.00   INR Date 8/10/2020 8/17/2020 8/24/2020   INR Goal 2.5-3.5 2.5-3.5 2.5-3.5   Trend Same Same Same   Last Week Total 32.5 mg 32.5 mg 32.5 mg   Next Week Total 32.5 mg 32.5 mg 32.5 mg   Sun 5 mg 5 mg 5 mg   Mon - 5 mg 5 mg   Tue 5 mg 5 mg 5 mg   Wed 5 mg 5 mg 5 mg   Thu 5 mg 5 mg 5 mg   Fri 2.5 mg 2.5 mg 2.5 mg   Sat 5 mg 5 mg 5 mg   Visit Report - - -   Some recent data might be hidden       Plan:  1. INR is therapeutic today- see above in Anticoagulation Summary.    Elliot Sebastian to continue their warfarin regimen- see above in Anticoagulation Summary.  2. Follow up in 1 week  3. Pt has agreed to only be called if INR out of range. They have been instructed to call if any changes in medications, doses, concerns, etc. Patient expresses understanding and has no further questions at this time.    Manisha Benjamin

## 2020-08-29 LAB
BACTERIA SPEC CULT: NORMAL
BACTERIA SPEC CULT: NORMAL
C DIFF TOX A+B STL QL IA: NORMAL
CAMPYLOBACTER STL CULT: NORMAL
E COLI SXT STL QL IA: NEGATIVE
O+P SPEC MICRO: NORMAL
O+P STL CONC: NORMAL
SALM + SHIG STL CULT: NORMAL
SPECIMEN STATUS: NORMAL

## 2020-09-01 ENCOUNTER — ANTICOAGULATION VISIT (OUTPATIENT)
Dept: PHARMACY | Facility: HOSPITAL | Age: 60
End: 2020-09-01

## 2020-09-01 DIAGNOSIS — Z95.2 HX OF MITRAL VALVE REPLACEMENT WITH MECHANICAL VALVE: ICD-10-CM

## 2020-09-01 LAB — INR PPP: 3

## 2020-09-01 NOTE — PROGRESS NOTES
Anticoagulation Clinic Progress Note    Anticoagulation Summary  As of 9/1/2020    INR goal:   2.5-3.5   TTR:   52.0 % (1.8 y)   INR used for dosing:   3.00 (8/31/2020)   Warfarin maintenance plan:   2.5 mg every Fri; 5 mg all other days   Weekly warfarin total:   32.5 mg   No change documented:   Manisha Benjamin   Plan last modified:   Sary Tejeda [facility] (7/6/2020)   Next INR check:   9/8/2020   Priority:   High   Target end date:   Indefinite    Indications    Atrial fibrillation (CMS/HCC) [I48.91]  Hx of mitral valve replacement with mechanical valve [Z95.2] [Z95.2]             Anticoagulation Episode Summary     INR check location:       Preferred lab:       Send INR reminders to:   [facility]  POOL    Comments:   NICOLE home juliet weekly *only call when out of range*      Anticoagulation Care Providers     Provider Role Specialty Phone number    Navi Fay MD Referring Cardiology [phone]          Clinic Interview:  No pertinent clinical findings have been reported.    INR History:  Anticoagulation Monitoring 8/17/2020 8/24/2020 9/1/2020   INR 3.20 3.00 3.00   INR Date 8/17/2020 8/24/2020 8/31/2020   INR Goal 2.5-3.5 2.5-3.5 2.5-3.5   Trend Same Same Same   Last Week Total 32.5 mg 32.5 mg 32.5 mg   Next Week Total 32.5 mg 32.5 mg 32.5 mg   Sun 5 mg 5 mg 5 mg   Mon 5 mg 5 mg 5 mg   Tue 5 mg 5 mg 5 mg   Wed 5 mg 5 mg 5 mg   Thu 5 mg 5 mg 5 mg   Fri 2.5 mg 2.5 mg 2.5 mg   Sat 5 mg 5 mg 5 mg   Visit Report - - -   Some recent data might be hidden       Plan:  1. INR is therapeutic today- see above in Anticoagulation Summary.    Elliot Sebastian to continue their warfarin regimen- see above in Anticoagulation Summary.  2. Follow up in 1 week  3. Pt has agreed to only be called if INR out of range. They have been instructed to call if any changes in medications, doses, concerns, etc. Patient expresses understanding and has no further questions at this time.    Manisha Benjamin

## 2020-09-07 LAB — INR PPP: 2.8

## 2020-09-08 ENCOUNTER — ANTICOAGULATION VISIT (OUTPATIENT)
Dept: PHARMACY | Facility: HOSPITAL | Age: 60
End: 2020-09-08

## 2020-09-08 DIAGNOSIS — Z95.2 HX OF MITRAL VALVE REPLACEMENT WITH MECHANICAL VALVE: ICD-10-CM

## 2020-09-08 NOTE — PROGRESS NOTES
Anticoagulation Clinic Progress Note    Anticoagulation Summary  As of 2020    INR goal:   2.5-3.5   TTR:   52.5 % (1.8 y)   INR used for dosin.80 (2020)   Warfarin maintenance plan:   2.5 mg every Fri; 5 mg all other days   Weekly warfarin total:   32.5 mg   No change documented:   Alicja Starks   Plan last modified:   Sary Tejeda RPH (2020)   Next INR check:   2020   Priority:   High   Target end date:   Indefinite    Indications    Atrial fibrillation (CMS/HCC) [I48.91]  Hx of mitral valve replacement with mechanical valve [Z95.2] [Z95.2]             Anticoagulation Episode Summary     INR check location:       Preferred lab:       Send INR reminders to:   [facility]  POOL    Comments:   NICOLE home juliet weekly *only call when out of range*      Anticoagulation Care Providers     Provider Role Specialty Phone number    Navi Fay MD Referring Cardiology [phone]          Clinic Interview:  No pertinent clinical findings have been reported.    INR History:  Anticoagulation Monitoring 2020   INR 3.00 3.00 2.80   INR Date 2020   INR Goal 2.5-3.5 2.5-3.5 2.5-3.5   Trend Same Same Same   Last Week Total 32.5 mg 32.5 mg 32.5 mg   Next Week Total 32.5 mg 32.5 mg 32.5 mg   Sun 5 mg 5 mg 5 mg   Mon 5 mg 5 mg -   Tue 5 mg 5 mg 5 mg   Wed 5 mg 5 mg 5 mg   Thu 5 mg 5 mg 5 mg   Fri 2.5 mg 2.5 mg 2.5 mg   Sat 5 mg 5 mg 5 mg   Visit Report - - -   Some recent data might be hidden       Plan:  1. INR is therapeutic today- see above in Anticoagulation Summary.    Elliot Sebastian to continue their warfarin regimen- see above in Anticoagulation Summary.  2. Follow up in 1 week  3. Pt has agreed to only be called if INR out of range. They have been instructed to call if any changes in medications, doses, concerns, etc. Patient expresses understanding and has no further questions at this time.    Alicja BALTAZAR  Raudel

## 2020-09-14 ENCOUNTER — ANTICOAGULATION VISIT (OUTPATIENT)
Dept: PHARMACY | Facility: HOSPITAL | Age: 60
End: 2020-09-14

## 2020-09-14 DIAGNOSIS — Z95.2 HX OF MITRAL VALVE REPLACEMENT WITH MECHANICAL VALVE: ICD-10-CM

## 2020-09-14 LAB — INR PPP: 2.7

## 2020-09-14 NOTE — PROGRESS NOTES
Anticoagulation Clinic Progress Note    Anticoagulation Summary  As of 2020    INR goal:  2.5-3.5   TTR:  53.0 % (1.8 y)   INR used for dosin.70 (2020)   Warfarin maintenance plan:  2.5 mg every Fri; 5 mg all other days   Weekly warfarin total:  32.5 mg   No change documented:  Alicja Starks   Plan last modified:  Sary Tejeda RPH (2020)   Next INR check:  2020   Priority:  High   Target end date:  Indefinite    Indications    Atrial fibrillation (CMS/HCC) [I48.91]  Hx of mitral valve replacement with mechanical valve [Z95.2] [Z95.2]             Anticoagulation Episode Summary     INR check location:      Preferred lab:      Send INR reminders to:  [facility]  POOL    Comments:  MDINAVILA home juliet weekly *only call when out of range*      Anticoagulation Care Providers     Provider Role Specialty Phone number    Navi Fay MD Referring Cardiology [phone]          Clinic Interview:  No pertinent clinical findings have been reported.    INR History:  Anticoagulation Monitoring 2020   INR 3.00 2.80 2.70   INR Date 2020   INR Goal 2.5-3.5 2.5-3.5 2.5-3.5   Trend Same Same Same   Last Week Total 32.5 mg 32.5 mg 32.5 mg   Next Week Total 32.5 mg 32.5 mg 32.5 mg   Sun 5 mg 5 mg 5 mg   Mon 5 mg - 5 mg   Tue 5 mg 5 mg 5 mg   Wed 5 mg 5 mg 5 mg   Thu 5 mg 5 mg 5 mg   Fri 2.5 mg 2.5 mg 2.5 mg   Sat 5 mg 5 mg 5 mg   Visit Report - - -   Some recent data might be hidden       Plan:  1. INR is therapeutic today- see above in Anticoagulation Summary.    Elliot Sebastian to continue their warfarin regimen- see above in Anticoagulation Summary.  2. Follow up in 1 week  3. Pt has agreed to only be called if INR out of range. They have been instructed to call if any changes in medications, doses, concerns, etc. Patient expresses understanding and has no further questions at this time.    Alicja Starks

## 2020-09-21 ENCOUNTER — OFFICE VISIT (OUTPATIENT)
Dept: GASTROENTEROLOGY | Facility: CLINIC | Age: 60
End: 2020-09-21

## 2020-09-21 VITALS — WEIGHT: 291.7 LBS | BODY MASS INDEX: 33.75 KG/M2 | HEIGHT: 78 IN

## 2020-09-21 DIAGNOSIS — R19.7 DIARRHEA, UNSPECIFIED TYPE: Primary | ICD-10-CM

## 2020-09-21 PROCEDURE — 99204 OFFICE O/P NEW MOD 45 MIN: CPT | Performed by: INTERNAL MEDICINE

## 2020-09-21 RX ORDER — SODIUM CHLORIDE, SODIUM LACTATE, POTASSIUM CHLORIDE, CALCIUM CHLORIDE 600; 310; 30; 20 MG/100ML; MG/100ML; MG/100ML; MG/100ML
30 INJECTION, SOLUTION INTRAVENOUS CONTINUOUS
Status: CANCELLED | OUTPATIENT
Start: 2020-10-26

## 2020-09-21 NOTE — PROGRESS NOTES
Chief Complaint   Patient presents with   • Diarrhea   • Abdominal Pain     Subjective   HPI  Elliot Sebastian is a 60 y.o. male who presents today for new patient evaluation.    's primary complaint is of postprandial diarrhea.  The symptoms have been present for the least the last 2 to 3 months he suspects may be even longer than that.  Typically has diarrhea within 15 minutes to an hour after eating.  He has not been able to isolate any particular dietary triggers.  Symptoms do not happen after every meal and he can sometimes have periods of constipation between these episodes of diarrhea.  He was given a prescription for for Bentyl by his PCP but felt that this made his symptoms worse.  Was then given a prescription for Lomotil but has been reluctant to try this as he is concerned about interactions with alcohol as he does endorse drinking at least 1 beer daily.  He denies any unintentional weight loss.  Prior colonoscopy about 7 years ago.    He has a history of a partial small bowel resection in 2012 related to ischemic colitis occurring in the context of an episode of endocarditis.  He had 22 inches of his small bowel resected.  He also had a mechanical heart valve replacement done at that time and for that remains on warfarin.  He is followed by Dr. Mosqueda.        Past Medical History:   Diagnosis Date   • Acute bacterial endocarditis    • Anemia    • APC (atrial premature contractions)    • Atrial fibrillation (CMS/HCC)    • BPH (benign prostatic hypertrophy)    • CHF (congestive heart failure) (CMS/HCC)    • Cholelithiasis    • Chronic constipation    • Deep vein thrombophlebitis of leg (CMS/HCC)     DISTAL   • Disease of thyroid gland    • Gout    • Intestinal obstruction (CMS/HCC)    • Ischemic enteritis (CMS/HCC)    • Left heart failure, NYHA class 3 (CMS/HCC)     CLASS 111 LEFT SYSTOIC CONGESTIVE HEART FAILURE   • Mitral regurgitation    • Pleural effusion, bilateral    • Pulmonary hypertension  (CMS/HCC)    • 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CMS/HCC)    • Umbilical hernia    • Vitamin D deficiency        Current Outpatient Medications:   •  atenolol (TENORMIN) 25 MG tablet, Take 25 mg by mouth Daily., Disp: , Rfl:   •  Cholecalciferol (VITAMIN D3) 2000 UNITS capsule, Take 1,000 Units by mouth Daily., Disp: , Rfl:   •  Multiple Vitamins-Minerals (MENS MULTIVITAMIN PLUS) tablet, Take 2 capsules by mouth Daily. Soft gels, Disp: , Rfl:   •  warfarin (COUMADIN) 5 MG tablet, TAKE 1 TABLET BY MOUTH ONCE DAILY OR AS DIRECTED BY MED MANAGEMENT CLINIC, Disp: 90 tablet, Rfl: 1  •  diphenoxylate-atropine (Lomotil) 2.5-0.025 MG per tablet, Take 1 tablet by mouth 4 (Four) Times a Day As Needed for Diarrhea., Disp: 20 tablet, Rfl: 0  No Known Allergies     Social History     Socioeconomic History   • Marital status:      Spouse name: Not on file   • Number of children: Not on file   • Years of education: Not on file   • Highest education level: Not on file   Tobacco Use   • Smoking status: Never Smoker   • Smokeless tobacco: Never Used   • Tobacco comment: caffeine use   Substance and Sexual Activity   • Alcohol use: Yes     Alcohol/week: 2.0 standard drinks     Types: 2 Cans of beer per week     Comment: 1-2 nightly   • Drug use: Never     Family History   Problem Relation Age of Onset   • Diabetes Mother    • Heart disease Father    • Diabetes Sister    • Diabetes Brother      Review of Systems   Constitutional: Negative.    Gastrointestinal: Positive for constipation and diarrhea.        Objective      There were no vitals filed for this visit.  Physical Exam  Vitals signs reviewed.   Constitutional:       Appearance: He is well-developed.   HENT:      Head: Normocephalic and atraumatic.   Eyes:      General: No scleral icterus.     Conjunctiva/sclera: Conjunctivae normal.   Neck:      Musculoskeletal: Normal range of motion and neck supple.      Thyroid: No thyromegaly.   Cardiovascular:      Rate and  Rhythm: Normal rate and regular rhythm.      Heart sounds: No murmur. No friction rub.      Comments: Mechanical heart valve click noted  Pulmonary:      Effort: Pulmonary effort is normal. No respiratory distress.      Breath sounds: Normal breath sounds. No wheezing or rales.   Chest:      Chest wall: No tenderness.   Abdominal:      General: Bowel sounds are normal. There is no distension.      Palpations: Abdomen is soft. There is no mass.      Tenderness: There is no abdominal tenderness. There is no guarding or rebound.      Hernia: No hernia is present.   Lymphadenopathy:      Cervical: No cervical adenopathy.   Skin:     General: Skin is warm and dry.      Findings: No rash.   Neurological:      Mental Status: He is alert and oriented to person, place, and time.   Psychiatric:         Behavior: Behavior normal.         Thought Content: Thought content normal.         Judgment: Judgment normal.          Assessment/Plan   Assessment:     1. Diarrhea, unspecified type      Plan:   Check celiac panel today in office  Will schedule for colonoscopy.  Will reach out to Dr Fay's office for instructions on perioperative management of his warfarin  Recommend trial of Citrucel        Juan Phillips M.D.  [facility]  [address] [place] [address]  Office: [phone]

## 2020-09-22 ENCOUNTER — ANTICOAGULATION VISIT (OUTPATIENT)
Dept: PHARMACY | Facility: HOSPITAL | Age: 60
End: 2020-09-22

## 2020-09-22 DIAGNOSIS — Z95.2 HX OF MITRAL VALVE REPLACEMENT WITH MECHANICAL VALVE: ICD-10-CM

## 2020-09-22 LAB
ENDOMYSIUM IGA SER QL: NEGATIVE
GLIADIN PEPTIDE IGA SER-ACNC: 10 UNITS (ref 0–19)
GLIADIN PEPTIDE IGG SER-ACNC: 3 UNITS (ref 0–19)
IGA SERPL-MCNC: 144 MG/DL (ref 90–386)
INR PPP: 3.4
TTG IGA SER-ACNC: <2 U/ML (ref 0–3)
TTG IGG SER-ACNC: 5 U/ML (ref 0–5)

## 2020-09-22 NOTE — PROGRESS NOTES
Anticoagulation Clinic Progress Note    Anticoagulation Summary  As of 9/22/2020    INR goal:  2.5-3.5   TTR:  53.5 % (1.8 y)   INR used for dosing:  3.40 (9/21/2020)   Warfarin maintenance plan:  2.5 mg every Fri; 5 mg all other days   Weekly warfarin total:  32.5 mg   No change documented:  Manisha Benjamin   Plan last modified:  Sary Tejeda RP (7/6/2020)   Next INR check:  9/29/2020   Priority:  High   Target end date:  Indefinite    Indications    Atrial fibrillation (CMS/HCC) [I48.91]  Hx of mitral valve replacement with mechanical valve [Z95.2] [Z95.2]             Anticoagulation Episode Summary     INR check location:      Preferred lab:      Send INR reminders to:  [facility]  POOL    Comments:  NICOLE home juliet weekly *only call when out of range*      Anticoagulation Care Providers     Provider Role Specialty Phone number    Navi Fay MD Referring Cardiology [phone]          Clinic Interview:  No pertinent clinical findings have been reported.    INR History:  Anticoagulation Monitoring 9/8/2020 9/14/2020 9/22/2020   INR 2.80 2.70 3.40   INR Date 9/7/2020 9/14/2020 9/21/2020   INR Goal 2.5-3.5 2.5-3.5 2.5-3.5   Trend Same Same Same   Last Week Total 32.5 mg 32.5 mg 32.5 mg   Next Week Total 32.5 mg 32.5 mg 32.5 mg   Sun 5 mg 5 mg 5 mg   Mon - 5 mg 5 mg   Tue 5 mg 5 mg 5 mg   Wed 5 mg 5 mg 5 mg   Thu 5 mg 5 mg 5 mg   Fri 2.5 mg 2.5 mg 2.5 mg   Sat 5 mg 5 mg 5 mg   Visit Report - - -   Some recent data might be hidden       Plan:  1. INR is therapeutic today- see above in Anticoagulation Summary.    Elliot Sebastian to continue their warfarin regimen- see above in Anticoagulation Summary.  2. Follow up in 1 week  3. Pt has agreed to only be called if INR out of range. They have been instructed to call if any changes in medications, doses, concerns, etc. Patient expresses understanding and has no further questions at this time.    Manisha Benjamin

## 2020-09-28 ENCOUNTER — ANTICOAGULATION VISIT (OUTPATIENT)
Dept: PHARMACY | Facility: HOSPITAL | Age: 60
End: 2020-09-28

## 2020-09-28 DIAGNOSIS — Z95.2 HX OF MITRAL VALVE REPLACEMENT WITH MECHANICAL VALVE: ICD-10-CM

## 2020-09-28 LAB — INR PPP: 2.9

## 2020-09-28 NOTE — PROGRESS NOTES
Anticoagulation Clinic Progress Note    Anticoagulation Summary  As of 2020    INR goal:  2.5-3.5   TTR:  54.0 % (1.9 y)   INR used for dosin.90 (2020)   Warfarin maintenance plan:  2.5 mg every Fri; 5 mg all other days   Weekly warfarin total:  32.5 mg   No change documented:  Sara Mukherjee   Plan last modified:  Sary Tejeda RPH (2020)   Next INR check:  10/5/2020   Priority:  High   Target end date:  Indefinite    Indications    Atrial fibrillation (CMS/HCC) [I48.91]  Hx of mitral valve replacement with mechanical valve [Z95.2] [Z95.2]             Anticoagulation Episode Summary     INR check location:      Preferred lab:      Send INR reminders to:  [facility]  POOL    Comments:  NICOLE home juliet weekly *only call when out of range*      Anticoagulation Care Providers     Provider Role Specialty Phone number    Navi Fay MD Referring Cardiology [phone]          Clinic Interview:  No pertinent clinical findings have been reported.    INR History:  Anticoagulation Monitoring 2020   INR 2.70 3.40 2.90   INR Date 2020   INR Goal 2.5-3.5 2.5-3.5 2.5-3.5   Trend Same Same Same   Last Week Total 32.5 mg 32.5 mg 32.5 mg   Next Week Total 32.5 mg 32.5 mg 32.5 mg   Sun 5 mg 5 mg 5 mg   Mon 5 mg 5 mg 5 mg   Tue 5 mg 5 mg 5 mg   Wed 5 mg 5 mg 5 mg   Thu 5 mg 5 mg 5 mg   Fri 2.5 mg 2.5 mg 2.5 mg   Sat 5 mg 5 mg 5 mg   Visit Report - - -   Some recent data might be hidden       Plan:  1. INR is therapeutic today- see above in Anticoagulation Summary.    Elliot Sebastian to continue their warfarin regimen- see above in Anticoagulation Summary.  2. Follow up in 1 week  3. Pt has agreed to only be called if INR out of range. They have been instructed to call if any changes in medications, doses, concerns, etc. Patient expresses understanding and has no further questions at this time.    Sara Mukherjee

## 2020-10-05 ENCOUNTER — ANTICOAGULATION VISIT (OUTPATIENT)
Dept: PHARMACY | Facility: HOSPITAL | Age: 60
End: 2020-10-05

## 2020-10-05 DIAGNOSIS — Z95.2 HX OF MITRAL VALVE REPLACEMENT WITH MECHANICAL VALVE: ICD-10-CM

## 2020-10-05 LAB — INR PPP: 2.5

## 2020-10-05 NOTE — PROGRESS NOTES
Anticoagulation Clinic Progress Note    Anticoagulation Summary  As of 10/5/2020    INR goal:  2.5-3.5   TTR:  54.4 % (1.9 y)   INR used for dosin.50 (10/5/2020)   Warfarin maintenance plan:  2.5 mg every Fri; 5 mg all other days   Weekly warfarin total:  32.5 mg   No change documented:  Sara Mukherjee   Plan last modified:  Sary Tejeda RPH (2020)   Next INR check:  10/12/2020   Priority:  High   Target end date:  Indefinite    Indications    Atrial fibrillation (CMS/HCC) [I48.91]  Hx of mitral valve replacement with mechanical valve [Z95.2] [Z95.2]             Anticoagulation Episode Summary     INR check location:      Preferred lab:      Send INR reminders to:  [facility]  POOL    Comments:  NICOLE home juliet weekly *only call when out of range*      Anticoagulation Care Providers     Provider Role Specialty Phone number    Navi Fay MD Referring Cardiology [phone]          Clinic Interview:  No pertinent clinical findings have been reported.    INR History:  Anticoagulation Monitoring 2020 2020 10/5/2020   INR 3.40 2.90 2.50   INR Date 2020 2020 10/5/2020   INR Goal 2.5-3.5 2.5-3.5 2.5-3.5   Trend Same Same Same   Last Week Total 32.5 mg 32.5 mg 32.5 mg   Next Week Total 32.5 mg 32.5 mg 32.5 mg   Sun 5 mg 5 mg 5 mg   Mon 5 mg 5 mg 5 mg   Tue 5 mg 5 mg 5 mg   Wed 5 mg 5 mg 5 mg   Thu 5 mg 5 mg 5 mg   Fri 2.5 mg 2.5 mg 2.5 mg   Sat 5 mg 5 mg 5 mg   Visit Report - - -   Some recent data might be hidden       Plan:  1. INR is therapeutic today- see above in Anticoagulation Summary.    Elliot Sebastian to continue their warfarin regimen- see above in Anticoagulation Summary.  2. Follow up in 1 week  3. Pt has agreed to only be called if INR out of range. They have been instructed to call if any changes in medications, doses, concerns, etc. Patient expresses understanding and has no further questions at this time.    Sara Mukherjee

## 2020-10-12 ENCOUNTER — TELEPHONE (OUTPATIENT)
Dept: CARDIOLOGY | Facility: CLINIC | Age: 60
End: 2020-10-12

## 2020-10-12 NOTE — TELEPHONE ENCOUNTER
Mr. Sebastian is  Checking with his GI doc to make sure the 4 days is ok.  He tells me that he saw Chrystal in February of this year and has an appt with you 2/2021.  Do you still want him to make an earlier appointment  With you?    Thank you,  Jacqui Kaminski RN  [facility]  Triage

## 2020-10-12 NOTE — TELEPHONE ENCOUNTER
5 days is to home.  Off 4 days.  If he needs to be off longer he may need a Lovenox bridge.  He also needs to make a follow-up appointment.  He was last seen in January 2019

## 2020-10-12 NOTE — TELEPHONE ENCOUNTER
Dr. Fay,    Mr Donlon called to report that his is having a colonoscopy with biopsies done on 10/25/20. He is on warfarin and they would like him to stop this 5 days prior to the procedure.    Please advise.    Thank you,  Jacqui Kaminski RN  [facility]  Triage

## 2020-10-13 ENCOUNTER — ANTICOAGULATION VISIT (OUTPATIENT)
Dept: PHARMACY | Facility: HOSPITAL | Age: 60
End: 2020-10-13

## 2020-10-13 DIAGNOSIS — Z95.2 HX OF MITRAL VALVE REPLACEMENT WITH MECHANICAL VALVE: ICD-10-CM

## 2020-10-13 LAB — INR PPP: 2.8

## 2020-10-13 NOTE — PROGRESS NOTES
Anticoagulation Clinic Progress Note    Anticoagulation Summary  As of 10/13/2020    INR goal:  2.5-3.5   TTR:  54.9 % (1.9 y)   INR used for dosin.80 (10/12/2020)   Warfarin maintenance plan:  2.5 mg every Fri; 5 mg all other days   Weekly warfarin total:  32.5 mg   No change documented:  Manisha Benjamin   Plan last modified:  Sary Tejeda [facility] (2020)   Next INR check:  10/20/2020   Priority:  High   Target end date:  Indefinite    Indications    Atrial fibrillation (CMS/HCC) [I48.91]  Hx of mitral valve replacement with mechanical valve [Z95.2] [Z95.2]             Anticoagulation Episode Summary     INR check location:      Preferred lab:      Send INR reminders to:  [facility]  POOL    Comments:  NICOLE home juliet weekly *only call when out of range*      Anticoagulation Care Providers     Provider Role Specialty Phone number    Navi Fay MD Referring Cardiology [phone]          Clinic Interview:  No pertinent clinical findings have been reported.    INR History:  Anticoagulation Monitoring 2020 10/5/2020 10/13/2020   INR 2.90 2.50 2.80   INR Date 2020 10/5/2020 10/12/2020   INR Goal 2.5-3.5 2.5-3.5 2.5-3.5   Trend Same Same Same   Last Week Total 32.5 mg 32.5 mg 32.5 mg   Next Week Total 32.5 mg 32.5 mg 32.5 mg   Sun 5 mg 5 mg 5 mg   Mon 5 mg 5 mg 5 mg   Tue 5 mg 5 mg 5 mg   Wed 5 mg 5 mg 5 mg   Thu 5 mg 5 mg 5 mg   Fri 2.5 mg 2.5 mg 2.5 mg   Sat 5 mg 5 mg 5 mg   Visit Report - - -   Some recent data might be hidden       Plan:  1. INR is therapeutic today- see above in Anticoagulation Summary.    Elliot Sebastian to continue their warfarin regimen- see above in Anticoagulation Summary.  2. Follow up in 1 week  3. Pt has agreed to only be called if INR out of range. They have been instructed to call if any changes in medications, doses, concerns, etc. Patient expresses understanding and has no further questions at this time.    Manisha Benjamin

## 2020-10-16 ENCOUNTER — TRANSCRIBE ORDERS (OUTPATIENT)
Dept: ADMINISTRATIVE | Facility: HOSPITAL | Age: 60
End: 2020-10-16

## 2020-10-16 ENCOUNTER — TELEPHONE (OUTPATIENT)
Dept: GASTROENTEROLOGY | Facility: CLINIC | Age: 60
End: 2020-10-16

## 2020-10-16 DIAGNOSIS — Z01.818 OTHER SPECIFIED PRE-OPERATIVE EXAMINATION: Primary | ICD-10-CM

## 2020-10-16 NOTE — TELEPHONE ENCOUNTER
Called plan for PA spoke to Candelaria call ref# [id number]  No auth required  Facility can call [phone] to negotiate price for procedure with insurance  DOS 10/26  Cpt [id number]

## 2020-10-19 LAB — INR PPP: 2.8

## 2020-10-20 ENCOUNTER — ANTICOAGULATION VISIT (OUTPATIENT)
Dept: PHARMACY | Facility: HOSPITAL | Age: 60
End: 2020-10-20

## 2020-10-20 DIAGNOSIS — Z95.2 HX OF MITRAL VALVE REPLACEMENT WITH MECHANICAL VALVE: ICD-10-CM

## 2020-10-20 NOTE — PROGRESS NOTES
Anticoagulation Clinic Progress Note    Anticoagulation Summary  As of 10/20/2020    INR goal:  2.5-3.5   TTR:  55.4 % (1.9 y)   INR used for dosin.80 (10/19/2020)   Warfarin maintenance plan:  2.5 mg every Fri; 5 mg all other days   Weekly warfarin total:  32.5 mg   No change documented:  Sara Mukherjee   Plan last modified:  Sary Tejeda RPH (2020)   Next INR check:  10/26/2020   Priority:  High   Target end date:  Indefinite    Indications    Atrial fibrillation (CMS/HCC) [I48.91]  Hx of mitral valve replacement with mechanical valve [Z95.2] [Z95.2]             Anticoagulation Episode Summary     INR check location:      Preferred lab:      Send INR reminders to:  [facility]  POOL    Comments:  NICOLE home juliet weekly *only call when out of range*      Anticoagulation Care Providers     Provider Role Specialty Phone number    Navi Fay MD Referring Cardiology [phone]          Clinic Interview:  No pertinent clinical findings have been reported.    INR History:  Anticoagulation Monitoring 10/5/2020 10/13/2020 10/20/2020   INR 2.50 2.80 2.80   INR Date 10/5/2020 10/12/2020 10/19/2020   INR Goal 2.5-3.5 2.5-3.5 2.5-3.5   Trend Same Same Same   Last Week Total 32.5 mg 32.5 mg 32.5 mg   Next Week Total 32.5 mg 32.5 mg 32.5 mg   Sun 5 mg 5 mg 5 mg   Mon 5 mg 5 mg -   Tue 5 mg 5 mg 5 mg   Wed 5 mg 5 mg 5 mg   Thu 5 mg 5 mg 5 mg   Fri 2.5 mg 2.5 mg 2.5 mg   Sat 5 mg 5 mg 5 mg   Visit Report - - -   Some recent data might be hidden       Plan:  1. INR is therapeutic today- see above in Anticoagulation Summary.    Elliot Sebastian to continue their warfarin regimen- see above in Anticoagulation Summary.  2. Follow up in 1 week  3. Pt has agreed to only be called if INR out of range. They have been instructed to call if any changes in medications, doses, concerns, etc. Patient expresses understanding and has no further questions at this time.    Sara Mukherjee

## 2020-10-23 ENCOUNTER — LAB (OUTPATIENT)
Dept: LAB | Facility: HOSPITAL | Age: 60
End: 2020-10-23

## 2020-10-23 DIAGNOSIS — Z01.818 OTHER SPECIFIED PRE-OPERATIVE EXAMINATION: ICD-10-CM

## 2020-10-23 PROCEDURE — U0004 COV-19 TEST NON-CDC HGH THRU: HCPCS | Performed by: INTERNAL MEDICINE

## 2020-10-23 PROCEDURE — C9803 HOPD COVID-19 SPEC COLLECT: HCPCS

## 2020-10-24 LAB — SARS-COV-2 RNA RESP QL NAA+PROBE: NOT DETECTED

## 2020-10-26 ENCOUNTER — ANESTHESIA (OUTPATIENT)
Dept: GASTROENTEROLOGY | Facility: HOSPITAL | Age: 60
End: 2020-10-26

## 2020-10-26 ENCOUNTER — ANESTHESIA EVENT (OUTPATIENT)
Dept: GASTROENTEROLOGY | Facility: HOSPITAL | Age: 60
End: 2020-10-26

## 2020-10-26 ENCOUNTER — HOSPITAL ENCOUNTER (OUTPATIENT)
Facility: HOSPITAL | Age: 60
Setting detail: HOSPITAL OUTPATIENT SURGERY
Discharge: HOME | End: 2020-10-26
Attending: INTERNAL MEDICINE | Admitting: INTERNAL MEDICINE

## 2020-10-26 VITALS
TEMPERATURE: 97.9 F | OXYGEN SATURATION: 97 % | SYSTOLIC BLOOD PRESSURE: 118 MMHG | RESPIRATION RATE: 16 BRPM | DIASTOLIC BLOOD PRESSURE: 92 MMHG | HEART RATE: 79 BPM

## 2020-10-26 DIAGNOSIS — R19.7 DIARRHEA, UNSPECIFIED TYPE: ICD-10-CM

## 2020-10-26 LAB — INR PPP: 1.6

## 2020-10-26 PROCEDURE — 45385 COLONOSCOPY W/LESION REMOVAL: CPT | Performed by: INTERNAL MEDICINE

## 2020-10-26 PROCEDURE — 25010000002 PROPOFOL 10 MG/ML EMULSION: Performed by: NURSE ANESTHETIST, CERTIFIED REGISTERED

## 2020-10-26 PROCEDURE — 88305 TISSUE EXAM BY PATHOLOGIST: CPT | Performed by: INTERNAL MEDICINE

## 2020-10-26 PROCEDURE — 45380 COLONOSCOPY AND BIOPSY: CPT | Performed by: INTERNAL MEDICINE

## 2020-10-26 DEVICE — DEV CLIP ENDO RESOLUTION360 CONTRL ROT 235CM: Type: IMPLANTABLE DEVICE | Site: DESCENDING COLON | Status: FUNCTIONAL

## 2020-10-26 RX ORDER — SODIUM CHLORIDE, SODIUM LACTATE, POTASSIUM CHLORIDE, CALCIUM CHLORIDE 600; 310; 30; 20 MG/100ML; MG/100ML; MG/100ML; MG/100ML
30 INJECTION, SOLUTION INTRAVENOUS CONTINUOUS
Status: DISCONTINUED | OUTPATIENT
Start: 2020-10-26 | End: 2020-10-26 | Stop reason: HOSPADM

## 2020-10-26 RX ORDER — LIDOCAINE HYDROCHLORIDE 20 MG/ML
INJECTION, SOLUTION INFILTRATION; PERINEURAL AS NEEDED
Status: DISCONTINUED | OUTPATIENT
Start: 2020-10-26 | End: 2020-10-26 | Stop reason: SURG

## 2020-10-26 RX ORDER — SODIUM CHLORIDE 0.9 % (FLUSH) 0.9 %
3 SYRINGE (ML) INJECTION EVERY 12 HOURS SCHEDULED
Status: DISCONTINUED | OUTPATIENT
Start: 2020-10-26 | End: 2020-10-26 | Stop reason: HOSPADM

## 2020-10-26 RX ORDER — SODIUM CHLORIDE 0.9 % (FLUSH) 0.9 %
10 SYRINGE (ML) INJECTION AS NEEDED
Status: DISCONTINUED | OUTPATIENT
Start: 2020-10-26 | End: 2020-10-26 | Stop reason: HOSPADM

## 2020-10-26 RX ORDER — PROPOFOL 10 MG/ML
VIAL (ML) INTRAVENOUS CONTINUOUS PRN
Status: DISCONTINUED | OUTPATIENT
Start: 2020-10-26 | End: 2020-10-26 | Stop reason: SURG

## 2020-10-26 RX ORDER — SODIUM CHLORIDE, SODIUM LACTATE, POTASSIUM CHLORIDE, CALCIUM CHLORIDE 600; 310; 30; 20 MG/100ML; MG/100ML; MG/100ML; MG/100ML
30 INJECTION, SOLUTION INTRAVENOUS CONTINUOUS PRN
Status: DISCONTINUED | OUTPATIENT
Start: 2020-10-26 | End: 2020-10-26 | Stop reason: HOSPADM

## 2020-10-26 RX ORDER — PROPOFOL 10 MG/ML
VIAL (ML) INTRAVENOUS AS NEEDED
Status: DISCONTINUED | OUTPATIENT
Start: 2020-10-26 | End: 2020-10-26 | Stop reason: SURG

## 2020-10-26 RX ADMIN — SODIUM CHLORIDE, POTASSIUM CHLORIDE, SODIUM LACTATE AND CALCIUM CHLORIDE 30 ML/HR: 600; 310; 30; 20 INJECTION, SOLUTION INTRAVENOUS at 09:17

## 2020-10-26 RX ADMIN — PROPOFOL 250 MCG/KG/MIN: 10 INJECTION, EMULSION INTRAVENOUS at 09:24

## 2020-10-26 RX ADMIN — LIDOCAINE HYDROCHLORIDE 60 MG: 20 INJECTION, SOLUTION INFILTRATION; PERINEURAL at 09:23

## 2020-10-26 RX ADMIN — PROPOFOL 100 MG: 10 INJECTION, EMULSION INTRAVENOUS at 09:23

## 2020-10-26 NOTE — ANESTHESIA PREPROCEDURE EVALUATION
Anesthesia Evaluation     Patient summary reviewed and Nursing notes reviewed                Airway   Mallampati: II  TM distance: >3 FB  Neck ROM: full  No difficulty expected  Dental - normal exam     Pulmonary - normal exam   (+) pleural effusion,   Cardiovascular     Rhythm: irregular  Rate: normal    (+) valvular problems/murmurs MR, dysrhythmias PAC, Atrial Fib, CHF , DVT resolved,     ROS comment: Hx of endocarditis/s/p MVR/CHF/pulmonary HTN  PE comment: Feels like afib/prosthetic valve sounds    Neuro/Psych  GI/Hepatic/Renal/Endo      Musculoskeletal     (+) back pain, chronic pain,   Abdominal  - normal exam   Substance History      OB/GYN          Other                        Anesthesia Plan    ASA 3     MAC     intravenous induction     Anesthetic plan, all risks, benefits, and alternatives have been provided, discussed and informed consent has been obtained with: patient.    Plan discussed with CRNA.

## 2020-10-26 NOTE — DISCHARGE INSTRUCTIONS
For the next 24 hours patient needs to be with a responsible adult.    For 24 hours DO NOT drive, operate machinery, appliances, drink alcohol, make important decisions or sign legal documents.    Start with a light or bland diet if you are feeling sick to your stomach otherwise advance to regular diet as tolerated.    Follow recommendations on procedure report if provided by your doctor.    Call Dr Phillips for problems [phone]    Problems may include but not limited to: large amounts of bleeding, trouble breathing, repeated vomiting, severe unrelieved pain, fever or chills.

## 2020-10-26 NOTE — H&P
[facility]  Pre Procedure History & Physical    Chief Complaint:   Diarrhea    Subjective     HPI:   Elliot Sebastian is a 60 y.o. male who presents today for new patient evaluation.     Pts primary complaint is of postprandial diarrhea.  The symptoms have been present for the least the last 2 to 3 months he suspects may be even longer than that.  Typically has diarrhea within 15 minutes to an hour after eating.  He has not been able to isolate any particular dietary triggers.  Symptoms do not happen after every meal and he can sometimes have periods of constipation between these episodes of diarrhea.  He was given a prescription for for Bentyl by his PCP but felt that this made his symptoms worse.  Was then given a prescription for Lomotil but has been reluctant to try this as he is concerned about interactions with alcohol as he does endorse drinking at least 1 beer daily.  He denies any unintentional weight loss.  Prior colonoscopy about 7 years ago.     He has a history of a partial small bowel resection in 2012 related to ischemic colitis occurring in the context of an episode of endocarditis.  He had 22 inches of his small bowel resected.  He also had a mechanical heart valve replacement done at that time and for that remains on warfarin.  He is followed by Dr. Fay.      Patient has stopped wafarin 4 days prior to procedure per instructions per Dr Fay's office.         Past Medical History:   Past Medical History:   Diagnosis Date   • Acute bacterial endocarditis    • Anemia    • APC (atrial premature contractions)    • Atrial fibrillation (CMS/HCC)    • BPH (benign prostatic hypertrophy)    • CHF (congestive heart failure) (CMS/HCC)    • Cholelithiasis    • Chronic constipation    • Deep vein thrombophlebitis of leg (CMS/HCC)     DISTAL   • Disease of thyroid gland    • Gout    • Intestinal obstruction (CMS/HCC)    • Ischemic enteritis (CMS/HCC)    • Left heart failure, NYHA  class 3 (CMS/HCC)     CLASS 111 LEFT SYSTOIC CONGESTIVE HEART FAILURE   • Mitral regurgitation    • Pleural effusion, bilateral    • Pulmonary hypertension (CMS/HCC)    • 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CMS/HCC)    • Umbilical hernia    • Vitamin D deficiency        Family History:  Family History   Problem Relation Age of Onset   • Diabetes Mother    • Heart disease Father    • Diabetes Sister    • Diabetes Brother        Social History:   reports that he has never smoked. He has never used smokeless tobacco. He reports current alcohol use of about 2.0 standard drinks of alcohol per week. He reports that he does not use drugs.    Medications:   Medications Prior to Admission   Medication Sig Dispense Refill Last Dose   • atenolol (TENORMIN) 25 MG tablet Take 25 mg by mouth Daily.   10/24/2020   • Cholecalciferol (VITAMIN D3) 2000 UNITS capsule Take 1,000 Units by mouth Daily.   10/24/2020   • diphenoxylate-atropine (Lomotil) 2.5-0.025 MG per tablet Take 1 tablet by mouth 4 (Four) Times a Day As Needed for Diarrhea. 20 tablet 0 Unknown at Unknown time   • Multiple Vitamins-Minerals (MENS MULTIVITAMIN PLUS) tablet Take 2 capsules by mouth Daily. Soft gels   10/24/2020   • warfarin (COUMADIN) 5 MG tablet TAKE 1 TABLET BY MOUTH ONCE DAILY OR AS DIRECTED BY MED MANAGEMENT CLINIC 90 tablet 1 10/22/2020       Allergies:  Patient has no known allergies.    ROS:    Pertinent items are noted in HPI     Objective     Blood pressure 145/87, pulse 76, temperature 98.3 °F (36.8 °C), temperature source Oral, resp. rate 16, SpO2 97 %.    Physical Exam   Constitutional: Pt is oriented to person, place, and time and well-developed, well-nourished, and in no distress.   HENT:   Mouth/Throat: Oropharynx is clear and moist.   Neck: Normal range of motion. Neck supple.   Cardiovascular: Normal rate, regular rhythm and normal heart sounds.    Pulmonary/Chest: Effort normal and breath sounds normal. No respiratory distress. No   wheezes.   Abdominal: Soft. Bowel sounds are normal.   Skin: Skin is warm and dry.   Psychiatric: Mood, memory, affect and judgment normal.     Assessment/Plan     Diagnosis:  Diarrhea    Anticipated Surgical Procedure:  Colonoscopy    The risks, benefits, and alternatives of this procedure have been discussed with the patient or the responsible party- the patient understands and agrees to proceed.

## 2020-10-26 NOTE — ANESTHESIA POSTPROCEDURE EVALUATION
Patient: Elliot Sebastian    Procedure Summary     Date: 10/26/20 Room / Location: [facility] / [facility]    Anesthesia Start: 0918 Anesthesia Stop: 1007    Procedure: COLONOSCOPY into cecum with biopsy, polypectomy, clip placement (N/A ) Diagnosis:       Diarrhea, unspecified type      (Diarrhea, unspecified type [R19.7])    Surgeon: Juan Phillips MD Provider: Chilango Kline MD    Anesthesia Type: MAC ASA Status: 3          Anesthesia Type: MAC    Vitals  Vitals Value Taken Time   /75 10/26/20 1016   Temp 36.6 °C (97.9 °F) 10/26/20 1016   Pulse 75 10/26/20 1016   Resp 16 10/26/20 1016   SpO2 99 % 10/26/20 1016           Post Anesthesia Care and Evaluation    Patient location during evaluation: PHASE II  Patient participation: complete - patient participated  Level of consciousness: awake and alert  Pain management: adequate  Airway patency: patent  Anesthetic complications: No anesthetic complications  PONV Status: none  Cardiovascular status: acceptable  Respiratory status: acceptable  Hydration status: acceptable

## 2020-10-27 ENCOUNTER — ANTICOAGULATION VISIT (OUTPATIENT)
Dept: PHARMACY | Facility: HOSPITAL | Age: 60
End: 2020-10-27

## 2020-10-27 DIAGNOSIS — Z95.2 HX OF MITRAL VALVE REPLACEMENT WITH MECHANICAL VALVE: ICD-10-CM

## 2020-10-27 LAB
LAB AP CASE REPORT: NORMAL
LAB AP CLINICAL INFORMATION: NORMAL
PATH REPORT.FINAL DX SPEC: NORMAL
PATH REPORT.GROSS SPEC: NORMAL

## 2020-10-27 NOTE — PROGRESS NOTES
Anticoagulation Clinic Progress Note    Anticoagulation Summary  As of 10/27/2020    INR goal:  2.5-3.5   TTR:  55.1 % (1.9 y)   INR used for dosin.60 (10/26/2020)   Warfarin maintenance plan:  2.5 mg every Fri; 5 mg all other days   Weekly warfarin total:  32.5 mg   Plan last modified:  Sary Tejeda RPH (2020)   Next INR check:  2020   Priority:  High   Target end date:  Indefinite    Indications    Atrial fibrillation (CMS/HCC) [I48.91]  Hx of mitral valve replacement with mechanical valve [Z95.2] [Z95.2]             Anticoagulation Episode Summary     INR check location:      Preferred lab:      Send INR reminders to:   [facility]  POOL    Comments:  MDINR home juliet weekly *only call when out of range*      Anticoagulation Care Providers     Provider Role Specialty Phone number    Navi Fay MD Referring Cardiology [phone]          Clinic Interview:  Patient Findings     Positives:  Missed doses, Extra doses, Other complaints    Negatives:  Signs/symptoms of thrombosis, Signs/symptoms of bleeding,   Laboratory test error suspected, Change in health, Change in alcohol use,   Change in activity, Upcoming invasive procedure, Emergency department   visit, Upcoming dental procedure, Change in medications, Change in   diet/appetite, Hospital admission, Bruising    Comments:  Held warfarin x 4 days for colonoscopy. Took 10 mg yesterday   rather than 5 mg. Pt resistant to enoxaparin bridge due to cost.       Clinical Outcomes     Negatives:  Major bleeding event, Thromboembolic event,   Anticoagulation-related hospital admission, Anticoagulation-related ED   visit, Anticoagulation-related fatality    Comments:  Held warfarin x 4 days for colonoscopy. Took 10 mg yesterday   rather than 5 mg. Pt resistant to enoxaparin bridge due to cost.         INR History:  Anticoagulation Monitoring 10/13/2020 10/20/2020 10/27/2020   INR 2.80 2.80 1.60   INR Date 10/12/2020 10/19/2020  10/26/2020   INR Goal 2.5-3.5 2.5-3.5 2.5-3.5   Trend Same Same Same   Last Week Total 32.5 mg 32.5 mg 20 mg   Next Week Total 32.5 mg 32.5 mg 35 mg   Sun 5 mg 5 mg 5 mg   Mon 5 mg - -   Tue 5 mg 5 mg 7.5 mg (10/27)   Wed 5 mg 5 mg 5 mg   Thu 5 mg 5 mg 5 mg   Fri 2.5 mg 2.5 mg 2.5 mg   Sat 5 mg 5 mg 5 mg   Visit Report - - -   Some recent data might be hidden       Plan:  1. INR is Subtherapeutic today- see above in Anticoagulation Summary.   Will instruct Elliot Sebastian to Change their warfarin regimen- see above in Anticoagulation Summary.  2. Follow up in 3 days  3. They have been instructed to call if any changes in medications, doses, concerns, etc. Patient expresses understanding and has no further questions at this time.    Michael Horner [facility]

## 2020-10-30 ENCOUNTER — ANTICOAGULATION VISIT (OUTPATIENT)
Dept: PHARMACY | Facility: HOSPITAL | Age: 60
End: 2020-10-30

## 2020-10-30 DIAGNOSIS — Z95.2 HX OF MITRAL VALVE REPLACEMENT WITH MECHANICAL VALVE: ICD-10-CM

## 2020-10-30 LAB — INR PPP: 3.5

## 2020-10-30 NOTE — PROGRESS NOTES
Anticoagulation Clinic Progress Note    Anticoagulation Summary  As of 10/30/2020    INR goal:  2.5-3.5   TTR:  55.0 % (1.9 y)   INR used for dosing:  3.50 (10/30/2020)   Warfarin maintenance plan:  2.5 mg every Fri; 5 mg all other days   Weekly warfarin total:  32.5 mg   Plan last modified:  Sary Tejeda RPH (7/6/2020)   Next INR check:  11/2/2020   Priority:  High   Target end date:  Indefinite    Indications    Atrial fibrillation (CMS/HCC) [I48.91]  Hx of mitral valve replacement with mechanical valve [Z95.2] [Z95.2]             Anticoagulation Episode Summary     INR check location:      Preferred lab:      Send INR reminders to:   [facility]  POOL    Comments:  MDINAVILA home juliet weekly *only call when out of range*      Anticoagulation Care Providers     Provider Role Specialty Phone number    Navi Fay MD Referring Cardiology [phone]          Clinic Interview:      INR History:  Anticoagulation Monitoring 10/20/2020 10/27/2020 10/30/2020   INR 2.80 1.60 3.50   INR Date 10/19/2020 10/26/2020 10/30/2020   INR Goal 2.5-3.5 2.5-3.5 2.5-3.5   Trend Same Same Same   Last Week Total 32.5 mg 20 mg 27.5 mg   Next Week Total 32.5 mg 35 mg 32.5 mg   Sun 5 mg 5 mg 5 mg   Mon - - -   Tue 5 mg 7.5 mg (10/27) -   Wed 5 mg 5 mg -   Thu 5 mg 5 mg -   Fri 2.5 mg 2.5 mg 2.5 mg   Sat 5 mg 5 mg 5 mg   Visit Report - - -   Some recent data might be hidden       Plan:  1. INR is Therapeutic today- see above in Anticoagulation Summary.   Will instruct Elliot Sebastian to Continue their warfarin regimen- see above in Anticoagulation Summary.  2. Follow up in 1 week  3. Spoke with pt today. They have been instructed to call if any changes in medications, doses, concerns, etc. Patient expresses understanding and has no further questions at this time.    Sary Tejeda RPH

## 2020-11-02 ENCOUNTER — ANTICOAGULATION VISIT (OUTPATIENT)
Dept: PHARMACY | Facility: HOSPITAL | Age: 60
End: 2020-11-02

## 2020-11-02 DIAGNOSIS — Z95.2 HX OF MITRAL VALVE REPLACEMENT WITH MECHANICAL VALVE: ICD-10-CM

## 2020-11-02 LAB — INR PPP: 3

## 2020-11-02 NOTE — PROGRESS NOTES
Anticoagulation Clinic Progress Note    Anticoagulation Summary  As of 11/2/2020    INR goal:  2.5-3.5   TTR:  55.2 % (2 y)   INR used for dosing:  3.00 (11/2/2020)   Warfarin maintenance plan:  2.5 mg every Fri; 5 mg all other days   Weekly warfarin total:  32.5 mg   No change documented:  Michael Horner RPH   Plan last modified:  Sary Tejeda RPH (7/6/2020)   Next INR check:  11/9/2020   Priority:  High   Target end date:  Indefinite    Indications    Atrial fibrillation (CMS/HCC) [I48.91]  Hx of mitral valve replacement with mechanical valve [Z95.2] [Z95.2]             Anticoagulation Episode Summary     INR check location:      Preferred lab:      Send INR reminders to:   [facility]  POOL    Comments:  NICOLE home juliet weekly *only call when out of range*      Anticoagulation Care Providers     Provider Role Specialty Phone number    Navi Fay MD Referring Cardiology [phone]          Clinic Interview:  No pertinent clinical findings have been reported.    INR History:  Anticoagulation Monitoring 10/27/2020 10/30/2020 11/2/2020   INR 1.60 3.50 3.00   INR Date 10/26/2020 10/30/2020 11/2/2020   INR Goal 2.5-3.5 2.5-3.5 2.5-3.5   Trend Same Same Same   Last Week Total 20 mg 27.5 mg 40 mg   Next Week Total 35 mg 32.5 mg 32.5 mg   Sun 5 mg 5 mg 5 mg   Mon - - 5 mg   Tue 7.5 mg (10/27) - 5 mg   Wed 5 mg - 5 mg   Thu 5 mg - 5 mg   Fri 2.5 mg 2.5 mg 2.5 mg   Sat 5 mg 5 mg 5 mg   Visit Report - - -   Some recent data might be hidden       Plan:  1. INR is therapeutic today- see above in Anticoagulation Summary.    Elliot Sebastian to continue their warfarin regimen- see above in Anticoagulation Summary.  2. Follow up in 1 week  3. Pt has agreed to only be called if INR out of range. Left HIPAA-friendly VM w/ instructions. They have been instructed to call if any changes in medications, doses, concerns, etc.     Michael Horner RPH

## 2020-11-05 ENCOUNTER — TELEPHONE (OUTPATIENT)
Dept: GASTROENTEROLOGY | Facility: CLINIC | Age: 60
End: 2020-11-05

## 2020-11-05 NOTE — TELEPHONE ENCOUNTER
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  RTC with me 1 week after his appt with surgery

## 2020-11-09 LAB — INR PPP: 3.3

## 2020-11-10 ENCOUNTER — ANTICOAGULATION VISIT (OUTPATIENT)
Dept: PHARMACY | Facility: HOSPITAL | Age: 60
End: 2020-11-10

## 2020-11-10 DIAGNOSIS — Z95.2 HX OF MITRAL VALVE REPLACEMENT WITH MECHANICAL VALVE: ICD-10-CM

## 2020-11-10 NOTE — PROGRESS NOTES
Anticoagulation Clinic Progress Note    Anticoagulation Summary  As of 11/10/2020    INR goal:  2.5-3.5   TTR:  55.7 % (2 y)   INR used for dosing:  3.30 (11/9/2020)   Warfarin maintenance plan:  2.5 mg every Fri; 5 mg all other days   Weekly warfarin total:  32.5 mg   No change documented:  Sara Mukherjee   Plan last modified:  Sary Tejeda RPH (7/6/2020)   Next INR check:  11/16/2020   Priority:  High   Target end date:  Indefinite    Indications    Atrial fibrillation (CMS/HCC) [I48.91]  Hx of mitral valve replacement with mechanical valve [Z95.2] [Z95.2]             Anticoagulation Episode Summary     INR check location:      Preferred lab:      [facility]    Comments:  NICOLE home juliet weekly *only call when out of range*      Anticoagulation Care Providers     Provider Role Specialty Phone number    Navi Fay MD Referring Cardiology [phone]          Clinic Interview:  No pertinent clinical findings have been reported.    INR History:  Anticoagulation Monitoring 10/30/2020 11/2/2020 11/10/2020   INR 3.50 3.00 3.30   INR Date 10/30/2020 11/2/2020 11/9/2020   INR Goal 2.5-3.5 2.5-3.5 2.5-3.5   Trend Same Same Same   Last Week Total 27.5 mg 40 mg 32.5 mg   Next Week Total 32.5 mg 32.5 mg 32.5 mg   Sun 5 mg 5 mg 5 mg   Mon - 5 mg -   Tue - 5 mg 5 mg   Wed - 5 mg 5 mg   Thu - 5 mg 5 mg   Fri 2.5 mg 2.5 mg 2.5 mg   Sat 5 mg 5 mg 5 mg   Visit Report - - -   Some recent data might be hidden       Plan:  1. INR is therapeutic today- see above in Anticoagulation Summary.    Elliot Sebastian to continue their warfarin regimen- see above in Anticoagulation Summary.  2. Follow up in 1 week  3. Pt has agreed to only be called if INR out of range. They have been instructed to call if any changes in medications, doses, concerns, etc. Patient expresses understanding and has no further questions at this time.    Sara Mukherjee

## 2020-11-11 ENCOUNTER — TELEPHONE (OUTPATIENT)
Dept: GASTROENTEROLOGY | Facility: CLINIC | Age: 60
End: 2020-11-11

## 2020-11-11 NOTE — TELEPHONE ENCOUNTER
Call to pt.  Advise per DR Phillips note. Verb understanding.    Advise that Dr Parks's office will contact to arrange appt.  Pt will then call this office to arrange f/u appt with DR Phillips for 1 wk after.      Pt states continues to have episodes of very urgent diarrhea - causes incontinence.  Denies blood.  Citrucel has helped somewhat.  Asking what could be causing this.  Question to DR Phillips.

## 2020-11-11 NOTE — TELEPHONE ENCOUNTER
No answers from his colonoscopy his biopsies were negative for infection or inflammation.    Lets give him a two week course of Xifaxin 550mg TID.  He will need to have his INR monitor closely through the anticoagulation clinic while on the medication as antibiotics can effect his coumadin levels.

## 2020-11-12 DIAGNOSIS — Z00.00 ANNUAL PHYSICAL EXAM: Primary | ICD-10-CM

## 2020-11-12 DIAGNOSIS — E55.9 VITAMIN D DEFICIENCY: ICD-10-CM

## 2020-11-12 DIAGNOSIS — Z12.5 SPECIAL SCREENING FOR MALIGNANT NEOPLASM OF PROSTATE: ICD-10-CM

## 2020-11-12 DIAGNOSIS — Z00.00 HEALTHCARE MAINTENANCE: ICD-10-CM

## 2020-11-12 NOTE — TELEPHONE ENCOUNTER
VM to pt with request to contact office.     Call to [facility] @ [phone] - VIRIDIANA left with Ramirez per DR Jacqueline michelle.

## 2020-11-16 LAB — INR PPP: 2.4

## 2020-11-17 ENCOUNTER — ANTICOAGULATION VISIT (OUTPATIENT)
Dept: PHARMACY | Facility: HOSPITAL | Age: 60
End: 2020-11-17

## 2020-11-17 DIAGNOSIS — Z95.2 HX OF MITRAL VALVE REPLACEMENT WITH MECHANICAL VALVE: ICD-10-CM

## 2020-11-17 NOTE — PROGRESS NOTES
Anticoagulation Clinic Progress Note    Anticoagulation Summary  As of 2020    INR goal:  2.5-3.5   TTR:  56.0 % (2 y)   INR used for dosin.40 (2020)   Warfarin maintenance plan:  2.5 mg every Fri; 5 mg all other days   Weekly warfarin total:  32.5 mg   Plan last modified:  Sary Tejeda RPH (2020)   Next INR check:  2020   Priority:  High   Target end date:  Indefinite    Indications    Atrial fibrillation (CMS/HCC) [I48.91]  Hx of mitral valve replacement with mechanical valve [Z95.2] [Z95.2]             Anticoagulation Episode Summary     INR check location:      Preferred lab:      Send INR reminders to:   [facility]  POOL    Comments:  MDINAVILA home juliet weekly *only call when out of range*      Anticoagulation Care Providers     Provider Role Specialty Phone number    Navi Fay MD Referring Cardiology [phone]          Clinic Interview:  Patient Findings     Negatives:  Signs/symptoms of thrombosis, Signs/symptoms of bleeding,   Laboratory test error suspected, Change in health, Change in alcohol use,   Change in activity, Upcoming invasive procedure, Emergency department   visit, Upcoming dental procedure, Missed doses, Extra doses, Change in   medications, Change in diet/appetite, Hospital admission, Bruising, Other   complaints      Clinical Outcomes     Negatives:  Major bleeding event, Thromboembolic event,   Anticoagulation-related hospital admission, Anticoagulation-related ED   visit, Anticoagulation-related fatality        INR History:  Anticoagulation Monitoring 2020 11/10/2020 2020   INR 3.00 3.30 2.40   INR Date 2020   INR Goal 2.5-3.5 2.5-3.5 2.5-3.5   Trend Same Same Same   Last Week Total 40 mg 32.5 mg 32.5 mg   Next Week Total 32.5 mg 32.5 mg 32.5 mg   Sun 5 mg 5 mg 5 mg   Mon 5 mg - -   Tue 5 mg 5 mg 5 mg   Wed 5 mg 5 mg 5 mg   Thu 5 mg 5 mg 5 mg   Fri 2.5 mg 2.5 mg 2.5 mg   Sat 5 mg 5 mg 5 mg   Visit  Report - - -   Some recent data might be hidden       Plan:  1. INR is Subtherapeutic today- see above in Anticoagulation Summary.   Will instruct Elliot Sebastian to Continue their warfarin regimen- see above in Anticoagulation Summary.  2. Follow up in 1 week  3. Spoke with patient. They have been instructed to call if any changes in medications, doses, concerns, etc. Patient expresses understanding and has no further questions at this time.    Criselda Da Silva, [facility]

## 2020-11-18 ENCOUNTER — PRIOR AUTHORIZATION (OUTPATIENT)
Dept: GASTROENTEROLOGY | Facility: CLINIC | Age: 60
End: 2020-11-18

## 2020-11-18 NOTE — TELEPHONE ENCOUNTER
PA for Xifaxan 550MG tablets submitted through [facility].  Pending.

## 2020-11-23 LAB — INR PPP: 2.8

## 2020-11-24 ENCOUNTER — ANTICOAGULATION VISIT (OUTPATIENT)
Dept: PHARMACY | Facility: HOSPITAL | Age: 60
End: 2020-11-24

## 2020-11-24 DIAGNOSIS — Z95.2 HX OF MITRAL VALVE REPLACEMENT WITH MECHANICAL VALVE: ICD-10-CM

## 2020-11-24 LAB
25(OH)D3+25(OH)D2 SERPL-MCNC: 22.7 NG/ML (ref 30–100)
ALBUMIN SERPL-MCNC: 4.1 G/DL (ref 3.8–4.9)
ALBUMIN/GLOB SERPL: 1.6 {RATIO} (ref 1.2–2.2)
ALP SERPL-CCNC: 76 IU/L (ref 39–117)
ALT SERPL-CCNC: 22 IU/L (ref 0–44)
AST SERPL-CCNC: 26 IU/L (ref 0–40)
BASOPHILS # BLD AUTO: 0 X10E3/UL (ref 0–0.2)
BASOPHILS NFR BLD AUTO: 1 %
BILIRUB SERPL-MCNC: 0.7 MG/DL (ref 0–1.2)
BUN SERPL-MCNC: 10 MG/DL (ref 6–24)
BUN/CREAT SERPL: 11 (ref 9–20)
CALCIUM SERPL-MCNC: 9.6 MG/DL (ref 8.7–10.2)
CHLORIDE SERPL-SCNC: 106 MMOL/L (ref 96–106)
CHOLEST SERPL-MCNC: 190 MG/DL (ref 100–199)
CO2 SERPL-SCNC: 24 MMOL/L (ref 20–29)
CREAT SERPL-MCNC: 0.88 MG/DL (ref 0.76–1.27)
EOSINOPHIL # BLD AUTO: 0.1 X10E3/UL (ref 0–0.4)
EOSINOPHIL NFR BLD AUTO: 2 %
ERYTHROCYTE [DISTWIDTH] IN BLOOD BY AUTOMATED COUNT: 13.3 % (ref 11.6–15.4)
GLOBULIN SER CALC-MCNC: 2.5 G/DL (ref 1.5–4.5)
GLUCOSE SERPL-MCNC: 103 MG/DL (ref 65–99)
HCT VFR BLD AUTO: 46.2 % (ref 37.5–51)
HDLC SERPL-MCNC: 37 MG/DL
HGB BLD-MCNC: 15.7 G/DL (ref 13–17.7)
IMM GRANULOCYTES # BLD AUTO: 0 X10E3/UL (ref 0–0.1)
IMM GRANULOCYTES NFR BLD AUTO: 0 %
LDLC SERPL CALC-MCNC: 117 MG/DL (ref 0–99)
LYMPHOCYTES # BLD AUTO: 1.4 X10E3/UL (ref 0.7–3.1)
LYMPHOCYTES NFR BLD AUTO: 35 %
MCH RBC QN AUTO: 32.3 PG (ref 26.6–33)
MCHC RBC AUTO-ENTMCNC: 34 G/DL (ref 31.5–35.7)
MCV RBC AUTO: 95 FL (ref 79–97)
MONOCYTES # BLD AUTO: 0.4 X10E3/UL (ref 0.1–0.9)
MONOCYTES NFR BLD AUTO: 10 %
NEUTROPHILS # BLD AUTO: 2.1 X10E3/UL (ref 1.4–7)
NEUTROPHILS NFR BLD AUTO: 52 %
PLATELET # BLD AUTO: 172 X10E3/UL (ref 150–450)
POTASSIUM SERPL-SCNC: 4.5 MMOL/L (ref 3.5–5.2)
PROT SERPL-MCNC: 6.6 G/DL (ref 6–8.5)
PSA SERPL-MCNC: 1.2 NG/ML (ref 0–4)
RBC # BLD AUTO: 4.86 X10E6/UL (ref 4.14–5.8)
SODIUM SERPL-SCNC: 141 MMOL/L (ref 134–144)
TRIGL SERPL-MCNC: 206 MG/DL (ref 0–149)
VLDLC SERPL CALC-MCNC: 36 MG/DL (ref 5–40)
WBC # BLD AUTO: 4 X10E3/UL (ref 3.4–10.8)

## 2020-11-24 NOTE — PROGRESS NOTES
Anticoagulation Clinic Progress Note    Anticoagulation Summary  As of 2020    INR goal:  2.5-3.5   TTR:  56.2 % (2 y)   INR used for dosin.80 (2020)   Warfarin maintenance plan:  2.5 mg every Fri; 5 mg all other days   Weekly warfarin total:  32.5 mg   No change documented:  Gemma Mclaughlin RPH   Plan last modified:  Sary Tejeda RPH (2020)   Next INR check:     Priority:  High   Target end date:  Indefinite    Indications    Atrial fibrillation (CMS/HCC) [I48.91]  Hx of mitral valve replacement with mechanical valve [Z95.2] [Z95.2]             Anticoagulation Episode Summary     INR check location:      Preferred lab:      Send INR reminders to:  [facility]  POOL    Comments:  MDINAVILA home juliet weekly *only call when out of range*      Anticoagulation Care Providers     Provider Role Specialty Phone number    Navi Fay MD Referring Cardiology [phone]          Clinic Interview:  No pertinent clinical findings have been reported.    INR History:  Anticoagulation Monitoring 11/10/2020 2020 2020   INR 3.30 2.40 2.80   INR Date 2020   INR Goal 2.5-3.5 2.5-3.5 2.5-3.5   Trend Same Same Same   Last Week Total 32.5 mg 32.5 mg 32.5 mg   Next Week Total 32.5 mg 32.5 mg 32.5 mg   Sun 5 mg 5 mg 5 mg   Mon - - 5 mg   Tue 5 mg 5 mg 5 mg   Wed 5 mg 5 mg 5 mg   Thu 5 mg 5 mg 5 mg   Fri 2.5 mg 2.5 mg 2.5 mg   Sat 5 mg 5 mg 5 mg   Visit Report - - -   Some recent data might be hidden       Plan:  1. INR is therapeutic today- see above in Anticoagulation Summary.    Elliot Sebastian to continue their warfarin regimen- see above in Anticoagulation Summary.  2. Follow up in 2 weeks  3. Pt has agreed to only be called if INR out of range. They have been instructed to call if any changes in medications, doses, concerns, etc.     Gemma Mclaughlin RP

## 2020-11-25 ENCOUNTER — TELEPHONE (OUTPATIENT)
Dept: GASTROENTEROLOGY | Facility: CLINIC | Age: 60
End: 2020-11-25

## 2020-11-25 NOTE — TELEPHONE ENCOUNTER
----- Message from Idris Mena sent at 11/24/2020  4:03 PM EST -----  Regarding: call back  Contact: [phone]  PT is returning phone call, please reach out to sunita

## 2020-11-30 ENCOUNTER — ANTICOAGULATION VISIT (OUTPATIENT)
Dept: PHARMACY | Facility: HOSPITAL | Age: 60
End: 2020-11-30

## 2020-11-30 ENCOUNTER — OFFICE VISIT (OUTPATIENT)
Dept: FAMILY MEDICINE CLINIC | Facility: CLINIC | Age: 60
End: 2020-11-30

## 2020-11-30 VITALS
HEIGHT: 78 IN | HEART RATE: 82 BPM | OXYGEN SATURATION: 96 % | SYSTOLIC BLOOD PRESSURE: 122 MMHG | DIASTOLIC BLOOD PRESSURE: 82 MMHG | WEIGHT: 292 LBS | TEMPERATURE: 98 F | BODY MASS INDEX: 33.78 KG/M2

## 2020-11-30 DIAGNOSIS — E55.9 VITAMIN D DEFICIENCY: ICD-10-CM

## 2020-11-30 DIAGNOSIS — Z95.2 HX OF MITRAL VALVE REPLACEMENT WITH MECHANICAL VALVE: ICD-10-CM

## 2020-11-30 DIAGNOSIS — Z00.01 ENCOUNTER FOR WELL ADULT EXAM WITH ABNORMAL FINDINGS: Primary | ICD-10-CM

## 2020-11-30 DIAGNOSIS — I48.0 PAROXYSMAL ATRIAL FIBRILLATION (HCC): ICD-10-CM

## 2020-11-30 DIAGNOSIS — R73.02 GLUCOSE INTOLERANCE (IMPAIRED GLUCOSE TOLERANCE): ICD-10-CM

## 2020-11-30 DIAGNOSIS — N40.0 BENIGN PROSTATIC HYPERPLASIA WITHOUT LOWER URINARY TRACT SYMPTOMS: ICD-10-CM

## 2020-11-30 DIAGNOSIS — S80.811A ABRASION OF RIGHT LOWER LEG, INITIAL ENCOUNTER: ICD-10-CM

## 2020-11-30 DIAGNOSIS — E66.09 EXOGENOUS OBESITY: ICD-10-CM

## 2020-11-30 DIAGNOSIS — K52.9 CHRONIC DIARRHEA: ICD-10-CM

## 2020-11-30 PROBLEM — R19.7 DIARRHEA: Status: RESOLVED | Noted: 2020-09-21 | Resolved: 2020-11-30

## 2020-11-30 LAB — INR PPP: 2.6

## 2020-11-30 PROCEDURE — 99396 PREV VISIT EST AGE 40-64: CPT | Performed by: FAMILY MEDICINE

## 2020-11-30 NOTE — PROGRESS NOTES
Anticoagulation Clinic Progress Note    Anticoagulation Summary  As of 2020    INR goal:  2.5-3.5   TTR:  56.6 % (2 y)   INR used for dosin.60 (2020)   Warfarin maintenance plan:  2.5 mg every Fri; 5 mg all other days   Weekly warfarin total:  32.5 mg   No change documented:  Alicja Starks   Plan last modified:  Sary Tejeda RPH (2020)   Next INR check:  2020   Priority:  High   Target end date:  Indefinite    Indications    Atrial fibrillation (CMS/HCC) [I48.91]  Hx of mitral valve replacement with mechanical valve [Z95.2] [Z95.2]             Anticoagulation Episode Summary     INR check location:      Preferred lab:      Send INR reminders to:   [facility]  POOL    Comments:  NICOLE home juliet weekly *only call when out of range*      Anticoagulation Care Providers     Provider Role Specialty Phone number    Navi Fay MD Referring Cardiology [phone]          Clinic Interview:  No pertinent clinical findings have been reported.    INR History:  Anticoagulation Monitoring 2020   INR 2.40 2.80 2.60   INR Date 2020   INR Goal 2.5-3.5 2.5-3.5 2.5-3.5   Trend Same Same Same   Last Week Total 32.5 mg 32.5 mg 32.5 mg   Next Week Total 32.5 mg 32.5 mg 32.5 mg   Sun 5 mg 5 mg 5 mg   Mon - - 5 mg   Tue 5 mg 5 mg 5 mg   Wed 5 mg 5 mg 5 mg   Thu 5 mg 5 mg 5 mg   Fri 2.5 mg 2.5 mg 2.5 mg   Sat 5 mg 5 mg 5 mg   Visit Report - - -   Some recent data might be hidden       Plan:  1. INR is therapeutic today- see above in Anticoagulation Summary.    Elliot Sebastian to continue their warfarin regimen- see above in Anticoagulation Summary.  2. Follow up in 1 week  3. Pt has agreed to only be called if INR out of range. They have been instructed to call if any changes in medications, doses, concerns, etc. Patient expresses understanding and has no further questions at this time.    Alicja Starks

## 2020-11-30 NOTE — TELEPHONE ENCOUNTER
Called patient. Left message for call back.      Per Dr. 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  RTC with me 1 week after his appt with surgery

## 2020-12-07 ENCOUNTER — ANTICOAGULATION VISIT (OUTPATIENT)
Dept: PHARMACY | Facility: HOSPITAL | Age: 60
End: 2020-12-07

## 2020-12-07 DIAGNOSIS — Z95.2 HX OF MITRAL VALVE REPLACEMENT WITH MECHANICAL VALVE: ICD-10-CM

## 2020-12-07 DIAGNOSIS — I48.0 PAROXYSMAL ATRIAL FIBRILLATION (HCC): ICD-10-CM

## 2020-12-07 LAB — INR PPP: 2.7

## 2020-12-07 NOTE — PROGRESS NOTES
Anticoagulation Clinic Progress Note    Anticoagulation Summary  As of 2020    INR goal:  2.5-3.5   TTR:  57.0 % (2 y)   INR used for dosin.70 (2020)   Warfarin maintenance plan:  2.5 mg every Fri; 5 mg all other days   Weekly warfarin total:  32.5 mg   No change documented:  Alicja Starks   Plan last modified:  Sary Tejeda RPH (2020)   Next INR check:  2020   Priority:  High   Target end date:  Indefinite    Indications    Paroxysmal atrial fibrillation (CMS/HCC) [I48.0]  Hx of mitral valve replacement with mechanical valve [Z95.2] [Z95.2]             Anticoagulation Episode Summary     INR check location:      Preferred lab:      Send INR reminders to:  [facility]  POOL    Comments:  NICOLE home juliet weekly *only call when out of range*      Anticoagulation Care Providers     Provider Role Specialty Phone number    Navi Fay MD Referring Cardiology [phone]          Clinic Interview:  No pertinent clinical findings have been reported.    INR History:  Anticoagulation Monitoring 2020   INR 2.80 2.60 2.70   INR Date 2020   INR Goal 2.5-3.5 2.5-3.5 2.5-3.5   Trend Same Same Same   Last Week Total 32.5 mg 32.5 mg 32.5 mg   Next Week Total 32.5 mg 32.5 mg 32.5 mg   Sun 5 mg 5 mg 5 mg   Mon - 5 mg 5 mg   Tue 5 mg 5 mg 5 mg   Wed 5 mg 5 mg 5 mg   Thu 5 mg 5 mg 5 mg   Fri 2.5 mg 2.5 mg 2.5 mg   Sat 5 mg 5 mg 5 mg   Visit Report - - -   Some recent data might be hidden       Plan:  1. INR is therapeutic today- see above in Anticoagulation Summary.    Elliot Sebastian to continue their warfarin regimen- see above in Anticoagulation Summary.  2. Follow up in 1 week  3. Pt has agreed to only be called if INR out of range. They have been instructed to call if any changes in medications, doses, concerns, etc. Patient expresses understanding and has no further questions at this time.    Alicja BALTAZAR  Raudel

## 2020-12-14 LAB — INR PPP: 3.1

## 2020-12-15 ENCOUNTER — ANTICOAGULATION VISIT (OUTPATIENT)
Dept: PHARMACY | Facility: HOSPITAL | Age: 60
End: 2020-12-15

## 2020-12-15 DIAGNOSIS — I48.0 PAROXYSMAL ATRIAL FIBRILLATION (HCC): ICD-10-CM

## 2020-12-15 DIAGNOSIS — Z95.2 HX OF MITRAL VALVE REPLACEMENT WITH MECHANICAL VALVE: ICD-10-CM

## 2020-12-15 NOTE — PROGRESS NOTES
Anticoagulation Clinic Progress Note    Anticoagulation Summary  As of 12/15/2020    INR goal:  2.5-3.5   TTR:  57.4 % (2.1 y)   INR used for dosing:  3.10 (12/14/2020)   Warfarin maintenance plan:  2.5 mg every Fri; 5 mg all other days   Weekly warfarin total:  32.5 mg   No change documented:  Alicja Starks   Plan last modified:  Sary Tejeda RPH (7/6/2020)   Next INR check:  12/21/2020   Priority:  High   Target end date:  Indefinite    Indications    Paroxysmal atrial fibrillation (CMS/HCC) [I48.0]  Hx of mitral valve replacement with mechanical valve [Z95.2] [Z95.2]             Anticoagulation Episode Summary     INR check location:      Preferred lab:      Send INR reminders to:   [facility]  POOL    Comments:  MDINAVILA home juliet weekly *only call when out of range*      Anticoagulation Care Providers     Provider Role Specialty Phone number    Navi Fay MD Referring Cardiology [phone]          Clinic Interview:  No pertinent clinical findings have been reported.    INR History:  Anticoagulation Monitoring 11/30/2020 12/7/2020 12/15/2020   INR 2.60 2.70 3.10   INR Date 11/30/2020 12/7/2020 12/14/2020   INR Goal 2.5-3.5 2.5-3.5 2.5-3.5   Trend Same Same Same   Last Week Total 32.5 mg 32.5 mg 32.5 mg   Next Week Total 32.5 mg 32.5 mg 32.5 mg   Sun 5 mg 5 mg 5 mg   Mon 5 mg 5 mg -   Tue 5 mg 5 mg 5 mg   Wed 5 mg 5 mg 5 mg   Thu 5 mg 5 mg 5 mg   Fri 2.5 mg 2.5 mg 2.5 mg   Sat 5 mg 5 mg 5 mg   Visit Report - - -   Some recent data might be hidden       Plan:  1. INR is therapeutic today- see above in Anticoagulation Summary.    Elliot Sebastian to continue their warfarin regimen- see above in Anticoagulation Summary.  2. Follow up in 1 week  3. Pt has agreed to only be called if INR out of range. They have been instructed to call if any changes in medications, doses, concerns, etc. Patient expresses understanding and has no further questions at this time.    Alicja BALTAZAR  Raudel

## 2020-12-21 LAB — INR PPP: 3.8

## 2020-12-22 ENCOUNTER — ANTICOAGULATION VISIT (OUTPATIENT)
Dept: PHARMACY | Facility: HOSPITAL | Age: 60
End: 2020-12-22

## 2020-12-22 ENCOUNTER — TELEPHONE (OUTPATIENT)
Dept: GASTROENTEROLOGY | Facility: CLINIC | Age: 60
End: 2020-12-22

## 2020-12-22 DIAGNOSIS — Z95.2 HX OF MITRAL VALVE REPLACEMENT WITH MECHANICAL VALVE: ICD-10-CM

## 2020-12-22 DIAGNOSIS — I48.0 PAROXYSMAL ATRIAL FIBRILLATION (HCC): ICD-10-CM

## 2020-12-22 RX ORDER — CHOLESTYRAMINE 4 G/9G
4 POWDER, FOR SUSPENSION ORAL DAILY
Qty: 30 PACKET | Refills: 11 | Status: SHIPPED | OUTPATIENT
Start: 2020-12-22 | End: 2022-03-01 | Stop reason: SDUPTHER

## 2020-12-23 NOTE — PROGRESS NOTES
Anticoagulation Clinic Progress Note    Anticoagulation Summary  As of 12/22/2020    INR goal:  2.5-3.5   TTR:  57.4 % (2.1 y)   INR used for dosing:  3.80 (12/21/2020)   Warfarin maintenance plan:  2.5 mg every Fri; 5 mg all other days   Weekly warfarin total:  32.5 mg   Plan last modified:  Sary Tejeda RPH (7/6/2020)   Next INR check:  12/28/2020   Priority:  High   Target end date:  Indefinite    Indications    Paroxysmal atrial fibrillation (CMS/HCC) [I48.0]  Hx of mitral valve replacement with mechanical valve [Z95.2] [Z95.2]             Anticoagulation Episode Summary     INR check location:      Preferred lab:      Send INR reminders to:   [facility]  POOL    Comments:  MDINAVILA home juliet weekly *only call when out of range*      Anticoagulation Care Providers     Provider Role Specialty Phone number    Navi Fay MD Referring Cardiology [phone]          Clinic Interview:      INR History:  Anticoagulation Monitoring 12/7/2020 12/15/2020 12/22/2020   INR 2.70 3.10 3.80   INR Date 12/7/2020 12/14/2020 12/21/2020   INR Goal 2.5-3.5 2.5-3.5 2.5-3.5   Trend Same Same Same   Last Week Total 32.5 mg 32.5 mg 32.5 mg   Next Week Total 32.5 mg 32.5 mg 32.5 mg   Sun 5 mg 5 mg 5 mg   Mon 5 mg - -   Tue 5 mg 5 mg 5 mg   Wed 5 mg 5 mg 5 mg   Thu 5 mg 5 mg 5 mg   Fri 2.5 mg 2.5 mg 2.5 mg   Sat 5 mg 5 mg 5 mg   Visit Report - - -   Some recent data might be hidden       Plan:  1. INR is Supratherapeutic today- see above in Anticoagulation Summary.   Will instruct Elliot Sebastian to Continue their warfarin regimen- see above in Anticoagulation Summary.  2. Follow up in 1 weeks  3. Left VM to call us with updates/issues for elevated INR. They have been instructed to call if any changes in medications, doses, concerns, etc.   Sary Tejeda RPH

## 2020-12-28 ENCOUNTER — ANTICOAGULATION VISIT (OUTPATIENT)
Dept: PHARMACY | Facility: HOSPITAL | Age: 60
End: 2020-12-28

## 2020-12-28 DIAGNOSIS — Z95.2 HX OF MITRAL VALVE REPLACEMENT WITH MECHANICAL VALVE: ICD-10-CM

## 2020-12-28 DIAGNOSIS — I48.0 PAROXYSMAL ATRIAL FIBRILLATION (HCC): ICD-10-CM

## 2020-12-28 LAB — INR PPP: 3.6

## 2020-12-29 ENCOUNTER — TELEPHONE (OUTPATIENT)
Dept: CARDIOLOGY | Facility: CLINIC | Age: 60
End: 2020-12-29

## 2020-12-29 ENCOUNTER — OFFICE VISIT (OUTPATIENT)
Dept: CARDIOLOGY | Facility: CLINIC | Age: 60
End: 2020-12-29

## 2020-12-29 VITALS
HEIGHT: 78 IN | WEIGHT: 303.8 LBS | BODY MASS INDEX: 35.15 KG/M2 | SYSTOLIC BLOOD PRESSURE: 140 MMHG | OXYGEN SATURATION: 99 % | DIASTOLIC BLOOD PRESSURE: 90 MMHG | HEART RATE: 56 BPM

## 2020-12-29 DIAGNOSIS — I10 ESSENTIAL HYPERTENSION: ICD-10-CM

## 2020-12-29 DIAGNOSIS — Z95.2 HX OF MITRAL VALVE REPLACEMENT WITH MECHANICAL VALVE: Primary | ICD-10-CM

## 2020-12-29 DIAGNOSIS — I33.0 ACUTE BACTERIAL ENDOCARDITIS: ICD-10-CM

## 2020-12-29 PROCEDURE — 93000 ELECTROCARDIOGRAM COMPLETE: CPT | Performed by: INTERNAL MEDICINE

## 2020-12-29 PROCEDURE — 99214 OFFICE O/P EST MOD 30 MIN: CPT | Performed by: INTERNAL MEDICINE

## 2020-12-29 NOTE — TELEPHONE ENCOUNTER
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

## 2020-12-29 NOTE — PROGRESS NOTES
Date of Office Visit: 2020    Patient Name: Elliot Sebastian  : 1960    Encounter Provider: Navi Fay MD  Referring Provider: No ref. provider found  Place of Service: [facility] CARDIOLOGY  Patient Care Team:  Mannie Beltran DO as PCP - General (Family Medicine)  Sary Tejeda RPH as Pharmacist      Chief Complaint   Patient presents with   • Cardiac Valve Problem   • Hypertension     History of Present Illness    The patient is a 60-year-old white male with a history of bacterial endocarditis.  He ultimately required replacement of his mitral valve with a mechanical [place] prosthesis.  This occurred in .  Postoperatively the patient developed transient atrial fibrillation.  He has not had any recurrence.    The patient returns to the office today feeling well.  He is denying any chest pain or shortness of breath.  There is no lightheadedness nor dizziness.  There is no orthopnea nor paroxysmal nocturnal dyspnea.    Recently he went in for an employment physical and was noted initially to have an elevated blood pressure.  His pressure was in the 170 systolic range.  The patient had his blood pressure rechecked and it had come down to acceptable levels and he was able to pass his physical.  He reports he does not check his blood pressure regularly.    Past Medical History:   Diagnosis Date   • Acute bacterial endocarditis    • Anemia    • APC (atrial premature contractions)    • Atrial fibrillation (CMS/HCC)    • BPH (benign prostatic hypertrophy)    • CHF (congestive heart failure) (CMS/HCC)    • Cholelithiasis    • Chronic constipation    • Deep vein thrombophlebitis of leg (CMS/HCC)     DISTAL   • Disease of thyroid gland    • Gout    • Intestinal obstruction (CMS/HCC)    • Ischemic enteritis (CMS/HCC)    • Left heart failure, NYHA class 3 (CMS/HCC)     CLASS 111 LEFT SYSTOIC CONGESTIVE HEART FAILURE   • Mitral regurgitation    • Pleural  effusion, bilateral    • Pulmonary hypertension (CMS/HCC)    • 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CMS/HCC)    • Umbilical hernia    • Vitamin D deficiency          Past Surgical History:   Procedure Laterality Date   • APPENDECTOMY     • CHOLECYSTECTOMY     • COLONOSCOPY  approx 2013    normal per patient   • COLONOSCOPY N/A 10/26/2020    Procedure: COLONOSCOPY into cecum with biopsy, polypectomy, clip placement;  Surgeon: Juan Phillips MD;  Location: [facility] ENDOSCOPY;  Service: Gastroenterology;  Laterality: N/A;  polyps, clip  asc polyp removed but not retrieved   • EXPLORATION NECK FOR POSTOP HEMORRHAGE / THROMBOSIS / INFECTION     • MITRAL VALVE REPLACEMENT  01/03/2013    33MM [facility] MECHANICAL VALVE, PRESERVATION OF CORDS TO SUBVALVULAR APPARATUS AND DOROTHY GORE-FLORI CORD IN THE P2 AREA, DEBRIDEMENT OF ANTERIOR AND POSTERIOR MITRAL LEAFLET            Current Outpatient Medications:   •  atenolol (TENORMIN) 25 MG tablet, Take 25 mg by mouth Daily., Disp: , Rfl:   •  Cholecalciferol (VITAMIN D3) 2000 UNITS capsule, Take 1,000 Units by mouth Daily., Disp: , Rfl:   •  cholestyramine (Questran) 4 GM/DOSE powder, Take 1 packet by mouth Daily. If no improvement after two weeks, may increase to BID., Disp: 30 packet, Rfl: 11  •  Multiple Vitamins-Minerals (MENS MULTIVITAMIN PLUS) tablet, Take 2 capsules by mouth Daily. Soft gels, Disp: , Rfl:   •  warfarin (COUMADIN) 5 MG tablet, TAKE 1 TABLET BY MOUTH ONCE DAILY OR AS DIRECTED BY MED MANAGEMENT CLINIC, Disp: 90 tablet, Rfl: 1      Social History     Socioeconomic History   • Marital status:      Spouse name: Not on file   • Number of children: Not on file   • Years of education: Not on file   • Highest education level: Not on file   Tobacco Use   • Smoking status: Never Smoker   • Smokeless tobacco: Never Used   • Tobacco comment: caffeine use   Substance and Sexual Activity   • Alcohol use: Yes     Alcohol/week: 2.0 standard drinks     Types: 2 Cans  "of beer per week     Comment: 1-2 nightly   • Drug use: Never         Review of Systems   Constitution: Negative.   HENT: Negative.    Eyes: Negative.    Cardiovascular: Negative.    Respiratory: Negative.    Endocrine: Negative.    Skin: Negative.    Musculoskeletal: Negative.    Gastrointestinal: Negative.    Neurological: Negative.    Psychiatric/Behavioral: Negative.        Procedures      ECG 12 Lead    Date/Time: 12/29/2020 9:55 AM  Performed by: Navi Fay MD  Authorized by: Navi Fay MD   Comparison: compared with previous ECG from 3/30/2020  Similar to previous ECG  Rhythm: sinus rhythm  Ectopy: atrial premature contractions  Rate: normal  Conduction: conduction normal  QRS axis: normal  Other findings: non-specific ST-T wave changes                Objective:    /90 (BP Location: Left arm, Patient Position: Sitting, Cuff Size: Adult)   Pulse 56   Ht 200.7 cm (79\")   Wt (!) 138 kg (303 lb 12.8 oz)   SpO2 99%   BMI 34.22 kg/m²         Vitals signs reviewed.   Constitutional:       Appearance: Well-developed.   Eyes:      Pupils: Pupils are equal, round, and reactive to light.   HENT:      Head: Normocephalic.   Neck:      Musculoskeletal: Normal range of motion.      Thyroid: No thyromegaly.      Vascular: No carotid bruit or JVD.   Pulmonary:      Effort: Pulmonary effort is normal.      Breath sounds: Normal breath sounds.   Cardiovascular:      Normal rate. Regular rhythm.      No gallop.      Comments: Normal prosthetic mechanical valve sounds  Pulses:     Intact distal pulses.   Edema:     Peripheral edema absent.   Abdominal:      General: Bowel sounds are normal.      Palpations: Abdomen is soft.   Skin:     General: Skin is warm and dry.      Findings: No erythema.   Neurological:      Mental Status: Alert and oriented to person, place, and time.             Assessment:       Diagnosis Plan   1. Hx of mitral valve replacement with mechanical valve [Z95.2]  Adult " Transthoracic Echo Complete W/ Cont if Necessary Per Protocol   2. Essential hypertension     3. Acute bacterial endocarditis       1.  Bacterial endocarditis: Status post mitral valve replacement with mechanical prosthesis.  Anticoagulated with warfarin.  Follow-up echocardiogram at this time.  2.  Hypertension: Blood pressure borderline acceptable today.  I have asked him to get a blood pressure cuff and start to record his pressures regularly.  He will call me within a couple of weeks with an update.  In addition weight reduction and salt limitation are important measures to follow as well.       Plan:

## 2021-01-04 LAB — INR PPP: 2.3

## 2021-01-05 ENCOUNTER — ANTICOAGULATION VISIT (OUTPATIENT)
Dept: PHARMACY | Facility: HOSPITAL | Age: 61
End: 2021-01-05

## 2021-01-05 DIAGNOSIS — Z95.2 HX OF MITRAL VALVE REPLACEMENT WITH MECHANICAL VALVE: ICD-10-CM

## 2021-01-05 DIAGNOSIS — I48.0 PAROXYSMAL ATRIAL FIBRILLATION (HCC): ICD-10-CM

## 2021-01-05 NOTE — PROGRESS NOTES
Anticoagulation Clinic Progress Note    Anticoagulation Summary  As of 2021    INR goal:  2.5-3.5   TTR:  57.0 % (2.1 y)   INR used for dosin.30 (2021)   Warfarin maintenance plan:  2.5 mg every Fri; 5 mg all other days   Weekly warfarin total:  32.5 mg   No change documented:  Collette Vora [facility]   Plan last modified:  Sary Tejeda RPH (2020)   Next INR check:  2021   Priority:  High   Target end date:  Indefinite    Indications    Paroxysmal atrial fibrillation (CMS/HCC) [I48.0]  Hx of mitral valve replacement with mechanical valve [Z95.2] [Z95.2]             Anticoagulation Episode Summary     INR check location:      Preferred lab:      Send INR reminders to:   [facility]  POOL    Comments:  MDINAVILA home juliet weekly *only call when out of range*      Anticoagulation Care Providers     Provider Role Specialty Phone number    Navi Fay MD Referring Cardiology [phone]          Clinic Interview:  Patient Findings     Positives:  Missed doses    Negatives:  Signs/symptoms of thrombosis, Signs/symptoms of bleeding,   Laboratory test error suspected, Change in health, Change in alcohol use,   Change in activity, Upcoming invasive procedure, Emergency department   visit, Upcoming dental procedure, Extra doses, Change in medications,   Change in diet/appetite, Hospital admission, Bruising, Other complaints    Comments:  INR subtherapeutic due to one missed dose, continue regimen,   recheck in one week      Clinical Outcomes     Negatives:  Major bleeding event, Thromboembolic event,   Anticoagulation-related hospital admission, Anticoagulation-related ED   visit, Anticoagulation-related fatality    Comments:  INR subtherapeutic due to one missed dose, continue regimen,   recheck in one week        INR History:  Anticoagulation Monitoring 2020   INR 3.80 3.60 2.30   INR Date 2020   INR Goal 2.5-3.5 2.5-3.5 2.5-3.5     Trend Same Same Same   Last Week Total 32.5 mg 32.5 mg 32.5 mg   Next Week Total 32.5 mg 32.5 mg 32.5 mg   Sun 5 mg 5 mg 5 mg   Mon - 5 mg -   Tue 5 mg 5 mg 5 mg   Wed 5 mg 5 mg 5 mg   Thu 5 mg 5 mg 5 mg   Fri 2.5 mg 2.5 mg 2.5 mg   Sat 5 mg 5 mg 5 mg   Visit Report - - -   Some recent data might be hidden       Plan:  1. INR is Subtherapeutic today due to one missed dose- see above in Anticoagulation Summary.   Will instruct Elliot Sebastian to Continue their warfarin regimen- see above in Anticoagulation Summary.  2. Follow up in 1 week  3. Pt has agreed to only be called if INR out of range. They have been instructed to call if any changes in medications, doses, concerns, etc. Patient expresses understanding and has no further questions at this time.    Collette Vora, [facility]

## 2021-01-11 LAB — INR PPP: 2.4

## 2021-01-12 ENCOUNTER — ANTICOAGULATION VISIT (OUTPATIENT)
Dept: PHARMACY | Facility: HOSPITAL | Age: 61
End: 2021-01-12

## 2021-01-12 DIAGNOSIS — Z95.2 HX OF MITRAL VALVE REPLACEMENT WITH MECHANICAL VALVE: ICD-10-CM

## 2021-01-12 DIAGNOSIS — I48.0 PAROXYSMAL ATRIAL FIBRILLATION (HCC): ICD-10-CM

## 2021-01-12 NOTE — PROGRESS NOTES
Anticoagulation Clinic Progress Note    Anticoagulation Summary  As of 2021    INR goal:  2.5-3.5   TTR:  56.5 % (2.1 y)   INR used for dosin.40 (2021)   Warfarin maintenance plan:  2.5 mg every Fri; 5 mg all other days   Weekly warfarin total:  32.5 mg   No change documented:  Michael Horner RPH   Plan last modified:  Sary Tejeda RPH (2020)   Next INR check:  2021   Priority:  High   Target end date:  Indefinite    Indications    Paroxysmal atrial fibrillation (CMS/HCC) [I48.0]  Hx of mitral valve replacement with mechanical valve [Z95.2] [Z95.2]             Anticoagulation Episode Summary     INR check location:      Preferred lab:      Send INR reminders to:   [facility]  POOL    Comments:  MDINR home juliet weekly *only call when out of range*      Anticoagulation Care Providers     Provider Role Specialty Phone number    Navi Fay MD Referring Cardiology [phone]          Clinic Interview:  Patient Findings     Positives:  Change in alcohol use, Change in diet/appetite    Negatives:  Signs/symptoms of thrombosis, Signs/symptoms of bleeding,   Laboratory test error suspected, Change in health, Change in activity,   Upcoming invasive procedure, Emergency department visit, Upcoming dental   procedure, Missed doses, Extra doses, Change in medications, Hospital   admission, Bruising, Other complaints    Comments:  Reports new job that has requires some long drives. This hsas   resulted in less EtOH consumption, which will continue moving forward. Pt   voices intentions to decrease his vit k intake.      Clinical Outcomes     Negatives:  Major bleeding event, Thromboembolic event,   Anticoagulation-related hospital admission, Anticoagulation-related ED   visit, Anticoagulation-related fatality    Comments:  Reports new job that has requires some long drives. This hsas   resulted in less EtOH consumption, which will continue moving forward. Pt   voices intentions  to decrease his vit k intake.        INR History:  Anticoagulation Monitoring 12/28/2020 1/5/2021 1/12/2021   INR 3.60 2.30 2.40   INR Date 12/28/2020 1/4/2021 1/11/2021   INR Goal 2.5-3.5 2.5-3.5 2.5-3.5   Trend Same Same Same   Last Week Total 32.5 mg 32.5 mg 32.5 mg   Next Week Total 32.5 mg 32.5 mg 32.5 mg   Sun 5 mg 5 mg 5 mg   Mon 5 mg - -   Tue 5 mg 5 mg 5 mg   Wed 5 mg 5 mg 5 mg   Thu 5 mg 5 mg 5 mg   Fri 2.5 mg 2.5 mg 2.5 mg   Sat 5 mg 5 mg 5 mg   Visit Report - - -   Some recent data might be hidden       Plan:  1. INR is Subtherapeutic today- see above in Anticoagulation Summary.   Will instruct Elliot CAIN Sebastian to Continue their warfarin regimen- see above in Anticoagulation Summary.  2. Follow up in 1 week  3. They have been instructed to call if any changes in medications, doses, concerns, etc. Patient expresses understanding and has no further questions at this time.    Michael Horner [facility]

## 2021-01-18 RX ORDER — WARFARIN SODIUM 5 MG/1
TABLET ORAL
Qty: 90 TABLET | Refills: 0 | Status: SHIPPED | OUTPATIENT
Start: 2021-01-18 | End: 2021-05-03

## 2021-01-20 LAB — INR PPP: 2.4

## 2021-01-21 ENCOUNTER — ANTICOAGULATION VISIT (OUTPATIENT)
Dept: PHARMACY | Facility: HOSPITAL | Age: 61
End: 2021-01-21

## 2021-01-21 DIAGNOSIS — Z95.2 HX OF MITRAL VALVE REPLACEMENT WITH MECHANICAL VALVE: ICD-10-CM

## 2021-01-21 DIAGNOSIS — I48.0 PAROXYSMAL ATRIAL FIBRILLATION (HCC): ICD-10-CM

## 2021-01-21 NOTE — PROGRESS NOTES
Anticoagulation Clinic Progress Note    Anticoagulation Summary  As of 2021    INR goal:  2.5-3.5   TTR:  55.9 % (2.2 y)   INR used for dosin.40 (2021)   Warfarin maintenance plan:  2.5 mg every Fri; 5 mg all other days   Weekly warfarin total:  32.5 mg   No change documented:  Alcija Starks   Plan last modified:  Sary Tejeda [facility] (2020)   Next INR check:  2021   Priority:  High   Target end date:  Indefinite    Indications    Paroxysmal atrial fibrillation (CMS/HCC) [I48.0]  Hx of mitral valve replacement with mechanical valve [Z95.2] [Z95.2]             Anticoagulation Episode Summary     INR check location:      Preferred lab:      Send INR reminders to:   [facility]  POOL    Comments:  NICOLE home juliet weekly *only call when out of range*      Anticoagulation Care Providers     Provider Role Specialty Phone number    Navi Fay MD Referring Cardiology [phone]          Clinic Interview:  No pertinent clinical findings have been reported.    INR History:  Anticoagulation Monitoring 2021   INR 2.30 2.40 2.40   INR Date 2021   INR Goal 2.5-3.5 2.5-3.5 2.5-3.5   Trend Same Same Same   Last Week Total 32.5 mg 32.5 mg 32.5 mg   Next Week Total 32.5 mg 32.5 mg 32.5 mg   Sun 5 mg 5 mg 5 mg   Mon - - 5 mg   Tue 5 mg 5 mg 5 mg   Wed 5 mg 5 mg -   Thu 5 mg 5 mg 5 mg   Fri 2.5 mg 2.5 mg 2.5 mg   Sat 5 mg 5 mg 5 mg   Visit Report - - -   Some recent data might be hidden       Plan:  1. INR is subtherapeutic - see above in Anticoagulation Summary.    Elliot Sebastian to continue their warfarin regimen- see above in Anticoagulation Summary.  2. Follow up in 1 week  3. Pt has agreed to only be called if INR out of range. They have been instructed to call if any changes in medications, doses, concerns, etc. Patient expresses understanding and has no further questions at this time.    Alicja Starks

## 2021-01-25 ENCOUNTER — ANTICOAGULATION VISIT (OUTPATIENT)
Dept: PHARMACY | Facility: HOSPITAL | Age: 61
End: 2021-01-25

## 2021-01-25 DIAGNOSIS — Z95.2 HX OF MITRAL VALVE REPLACEMENT WITH MECHANICAL VALVE: ICD-10-CM

## 2021-01-25 DIAGNOSIS — I48.0 PAROXYSMAL ATRIAL FIBRILLATION (HCC): ICD-10-CM

## 2021-01-25 LAB — INR PPP: 3.1

## 2021-01-25 NOTE — PROGRESS NOTES
Anticoagulation Clinic Progress Note    Anticoagulation Summary  As of 1/25/2021    INR goal:  2.5-3.5   TTR:  56.1 % (2.2 y)   INR used for dosing:  3.10 (1/25/2021)   Warfarin maintenance plan:  5 mg every day   Weekly warfarin total:  35 mg   Plan last modified:  Collette Vora RP (1/25/2021)   Next INR check:     Priority:  High   Target end date:  Indefinite    Indications    Paroxysmal atrial fibrillation (CMS/HCC) [I48.0]  Hx of mitral valve replacement with mechanical valve [Z95.2] [Z95.2]             Anticoagulation Episode Summary     INR check location:      Preferred lab:      Send INR reminders to:  [facility]  POOL    Comments:  MDINAVILA home juliet weekly *only call when out of range*      Anticoagulation Care Providers     Provider Role Specialty Phone number    Navi Fay MD Referring Cardiology [phone]          Clinic Interview:      INR History:  Anticoagulation Monitoring 1/12/2021 1/21/2021 1/25/2021   INR 2.40 2.40 3.10   INR Date 1/11/2021 1/20/2021 1/25/2021   INR Goal 2.5-3.5 2.5-3.5 2.5-3.5   Trend Same Same Up   Last Week Total 32.5 mg 32.5 mg 35 mg   Next Week Total 32.5 mg 32.5 mg 35 mg   Sun 5 mg 5 mg 5 mg   Mon - 5 mg 5 mg   Tue 5 mg 5 mg 5 mg   Wed 5 mg - 5 mg   Thu 5 mg 5 mg 5 mg   Fri 2.5 mg 2.5 mg 5 mg   Sat 5 mg 5 mg 5 mg   Visit Report - - -   Some recent data might be hidden       Plan:  1. INR is Therapeutic today- see above in Anticoagulation Summary.  Will instruct Elliot Sebastian to Continue their warfarin regimen- see above in Anticoagulation Summary.  2. Follow up in 1 week  3. Patient declines warfarin refills.  4. Verbal and written information provided. Patient expresses understanding and has no further questions at this time.    Collette Vora [facility]

## 2021-02-04 LAB — INR PPP: 4.7

## 2021-02-05 ENCOUNTER — ANTICOAGULATION VISIT (OUTPATIENT)
Dept: PHARMACY | Facility: HOSPITAL | Age: 61
End: 2021-02-05

## 2021-02-05 DIAGNOSIS — Z95.2 HX OF MITRAL VALVE REPLACEMENT WITH MECHANICAL VALVE: ICD-10-CM

## 2021-02-05 DIAGNOSIS — I48.0 PAROXYSMAL ATRIAL FIBRILLATION (HCC): ICD-10-CM

## 2021-02-05 NOTE — PROGRESS NOTES
Anticoagulation Clinic Progress Note    Anticoagulation Summary  As of 2021    INR goal:  2.5-3.5   TTR:  55.7 % (2.2 y)   INR used for dosin.70 (2021)   Warfarin maintenance plan:  5 mg every day   Weekly warfarin total:  35 mg   Plan last modified:  Collette Vora [facility] (2021)   Next INR check:  2021   Priority:  High   Target end date:  Indefinite    Indications    Paroxysmal atrial fibrillation (CMS/HCC) [I48.0]  Hx of mitral valve replacement with mechanical valve [Z95.2] [Z95.2]             Anticoagulation Episode Summary     INR check location:      Preferred lab:      Send INR reminders to:   [facility]  POOL    Comments:  MDINR home juliet weekly *only call when out of range*      Anticoagulation Care Providers     Provider Role Specialty Phone number    Navi Fay MD Referring Cardiology [phone]          Clinic Interview:  Patient Findings     Positives:  Change in diet/appetite    Negatives:  Signs/symptoms of thrombosis, Signs/symptoms of bleeding,   Laboratory test error suspected, Change in health, Change in alcohol use,   Change in activity, Upcoming invasive procedure, Emergency department   visit, Upcoming dental procedure, Missed doses, Extra doses, Change in   medications, Hospital admission, Bruising, Other complaints    Comments:  Reports no vit k in past week (less than usual) trying to help   keep his INR from becoming subtherapeutic; plans to resume usual intake.       Clinical Outcomes     Negatives:  Major bleeding event, Thromboembolic event,   Anticoagulation-related hospital admission, Anticoagulation-related ED   visit, Anticoagulation-related fatality    Comments:  Reports no vit k in past week (less than usual) trying to help   keep his INR from becoming subtherapeutic; plans to resume usual intake.         INR History:  Anticoagulation Monitoring 2021   INR 2.40 3.10 4.70   INR Date 2021  2/4/2021   INR Goal 2.5-3.5 2.5-3.5 2.5-3.5   Trend Same Up Same   Last Week Total 32.5 mg 35 mg 35 mg   Next Week Total 32.5 mg 35 mg 30 mg   Sun 5 mg 5 mg 5 mg   Mon 5 mg 5 mg 5 mg   Tue 5 mg 5 mg 5 mg   Wed - 5 mg 5 mg   Thu 5 mg 5 mg -   Fri 2.5 mg 5 mg Hold (2/5)   Sat 5 mg 5 mg 5 mg   Visit Report - - -   Some recent data might be hidden       Plan:  1. INR was Supratherapeutic yesterday- see above in Anticoagulation Summary.   Will instruct Elliot Patrician to Change their warfarin regimen- see above in Anticoagulation Summary.  2. Follow up in 1 week  3. They have been instructed to call if any changes in medications, doses, concerns, etc. To seek immediate medical attention if s/sx of bleeding develop or fall occurs. Patient expresses understanding and has no further questions at this time.    Michael Horner [facility]

## 2021-02-07 ENCOUNTER — TELEPHONE (OUTPATIENT)
Dept: GASTROENTEROLOGY | Facility: CLINIC | Age: 61
End: 2021-02-07

## 2021-02-07 NOTE — TELEPHONE ENCOUNTER
Patient was a no show for office visit with Zeina, and has cancelled his appointment with general surgery.     He has a large precancerous polyp in the colon which will likely need to be removed with surgery, he really needs to follow up with surgery and our office.

## 2021-02-12 ENCOUNTER — ANTICOAGULATION VISIT (OUTPATIENT)
Dept: PHARMACY | Facility: HOSPITAL | Age: 61
End: 2021-02-12

## 2021-02-12 DIAGNOSIS — I48.0 PAROXYSMAL ATRIAL FIBRILLATION (HCC): ICD-10-CM

## 2021-02-12 DIAGNOSIS — Z95.2 HX OF MITRAL VALVE REPLACEMENT WITH MECHANICAL VALVE: ICD-10-CM

## 2021-02-12 LAB — INR PPP: 2.4

## 2021-02-20 LAB — INR PPP: 3.2

## 2021-02-22 ENCOUNTER — ANTICOAGULATION VISIT (OUTPATIENT)
Dept: PHARMACY | Facility: HOSPITAL | Age: 61
End: 2021-02-22

## 2021-02-22 DIAGNOSIS — I48.0 PAROXYSMAL ATRIAL FIBRILLATION (HCC): ICD-10-CM

## 2021-02-22 DIAGNOSIS — Z95.2 HX OF MITRAL VALVE REPLACEMENT WITH MECHANICAL VALVE: ICD-10-CM

## 2021-02-22 NOTE — PROGRESS NOTES
Anticoagulation Clinic Progress Note    Anticoagulation Summary  As of 2/22/2021    INR goal:  2.5-3.5   TTR:  55.9 % (2.3 y)   INR used for dosing:  3.20 (2/20/2021)   Warfarin maintenance plan:  5 mg every day   Weekly warfarin total:  35 mg   No change documented:  Gemma Mclaughlin [facility]   Plan last modified:  Collette Vora RP (1/25/2021)   Next INR check:  3/5/2021   Priority:  High   Target end date:  Indefinite    Indications    Paroxysmal atrial fibrillation (CMS/HCC) [I48.0]  Hx of mitral valve replacement with mechanical valve [Z95.2] [Z95.2]             Anticoagulation Episode Summary     INR check location:      Preferred lab:      Send INR reminders to:  [facility]  POOL    Comments:  NICOLE home juliet weekly *only call when out of range*      Anticoagulation Care Providers     Provider Role Specialty Phone number    Navi Fay MD Referring Cardiology [phone]          Clinic Interview:  No pertinent clinical findings have been reported.    INR History:  Anticoagulation Monitoring 2/5/2021 2/12/2021 2/22/2021   INR 4.70 2.40 3.20   INR Date 2/4/2021 2/12/2021 2/20/2021   INR Goal 2.5-3.5 2.5-3.5 2.5-3.5   Trend Same Same Same   Last Week Total 35 mg 30 mg 35 mg   Next Week Total 30 mg 35 mg 35 mg   Sun 5 mg 5 mg 5 mg   Mon 5 mg 5 mg 5 mg   Tue 5 mg 5 mg 5 mg   Wed 5 mg 5 mg 5 mg   Thu - 5 mg 5 mg   Fri Hold (2/5) 5 mg 5 mg   Sat 5 mg 5 mg 5 mg   Visit Report - - -   Some recent data might be hidden       Plan:  1. INR was therapeutic 2/20- see above in Anticoagulation Summary.    Elliot Sebastian to continue their warfarin regimen- see above in Anticoagulation Summary.  2. Follow up in 2 weeks  3. Pt has agreed to only be called if INR out of range. They have been instructed to call if any changes in medications, doses, concerns, etc.     Gemma Mclaughlin [facility]

## 2021-03-01 ENCOUNTER — ANTICOAGULATION VISIT (OUTPATIENT)
Dept: PHARMACY | Facility: HOSPITAL | Age: 61
End: 2021-03-01

## 2021-03-01 DIAGNOSIS — I48.0 PAROXYSMAL ATRIAL FIBRILLATION (HCC): ICD-10-CM

## 2021-03-01 DIAGNOSIS — Z95.2 HX OF MITRAL VALVE REPLACEMENT WITH MECHANICAL VALVE: ICD-10-CM

## 2021-03-01 LAB — INR PPP: 3.1

## 2021-03-01 NOTE — PROGRESS NOTES
Anticoagulation Clinic Progress Note    Anticoagulation Summary  As of 3/1/2021    INR goal:  2.5-3.5   TTR:  56.4 % (2.3 y)   INR used for dosing:  3.10 (3/1/2021)   Warfarin maintenance plan:  5 mg every day   Weekly warfarin total:  35 mg   No change documented:  Manisha Benjamin   Plan last modified:  Collette Vora, [facility] (1/25/2021)   Next INR check:  3/8/2021   Priority:  High   Target end date:  Indefinite    Indications    Paroxysmal atrial fibrillation (CMS/HCC) [I48.0]  Hx of mitral valve replacement with mechanical valve [Z95.2] [Z95.2]             Anticoagulation Episode Summary     INR check location:      Preferred lab:      Send INR reminders to:  [facility]  POOL    Comments:  NICOLE home juliet weekly *only call when out of range*      Anticoagulation Care Providers     Provider Role Specialty Phone number    Navi Fay MD Referring Cardiology [phone]          Clinic Interview:  No pertinent clinical findings have been reported.    INR History:  Anticoagulation Monitoring 2/12/2021 2/22/2021 3/1/2021   INR 2.40 3.20 3.10   INR Date 2/12/2021 2/20/2021 3/1/2021   INR Goal 2.5-3.5 2.5-3.5 2.5-3.5   Trend Same Same Same   Last Week Total 30 mg 35 mg 35 mg   Next Week Total 35 mg 35 mg 35 mg   Sun 5 mg 5 mg 5 mg   Mon 5 mg 5 mg 5 mg   Tue 5 mg 5 mg 5 mg   Wed 5 mg 5 mg 5 mg   Thu 5 mg 5 mg 5 mg   Fri 5 mg 5 mg 5 mg   Sat 5 mg 5 mg 5 mg   Visit Report - - -   Some recent data might be hidden       Plan:  1. INR is therapeutic today- see above in Anticoagulation Summary.    Elliot Sebastian to continue their warfarin regimen- see above in Anticoagulation Summary.  2. Follow up in 1 week  3. Pt has agreed to only be called if INR out of range. They have been instructed to call if any changes in medications, doses, concerns, etc. Patient expresses understanding and has no further questions at this time.    Manisha Benjamin

## 2021-03-10 LAB — INR PPP: 2.9

## 2021-03-11 ENCOUNTER — ANTICOAGULATION VISIT (OUTPATIENT)
Dept: PHARMACY | Facility: HOSPITAL | Age: 61
End: 2021-03-11

## 2021-03-11 DIAGNOSIS — Z95.2 HX OF MITRAL VALVE REPLACEMENT WITH MECHANICAL VALVE: ICD-10-CM

## 2021-03-11 DIAGNOSIS — I48.0 PAROXYSMAL ATRIAL FIBRILLATION (HCC): ICD-10-CM

## 2021-03-11 NOTE — PROGRESS NOTES
Anticoagulation Clinic Progress Note    Anticoagulation Summary  As of 3/11/2021    INR goal:  2.5-3.5   TTR:  56.8 % (2.3 y)   INR used for dosin.90 (3/10/2021)   Warfarin maintenance plan:  5 mg every day   Weekly warfarin total:  35 mg   No change documented:  Alicja Starks   Plan last modified:  Collette Vora, [facility] (2021)   Next INR check:  3/17/2021   Priority:  High   Target end date:  Indefinite    Indications    Paroxysmal atrial fibrillation (CMS/HCC) [I48.0]  Hx of mitral valve replacement with mechanical valve [Z95.2] [Z95.2]             Anticoagulation Episode Summary     INR check location:      Preferred lab:      Send INR reminders to:  [facility]  POOL    Comments:  NICOLE home juliet weekly *only call when out of range*      Anticoagulation Care Providers     Provider Role Specialty Phone number    Navi Fay MD Referring Cardiology [phone]          Clinic Interview:  No pertinent clinical findings have been reported.    INR History:  Anticoagulation Monitoring 2021 3/1/2021 3/11/2021   INR 3.20 3.10 2.90   INR Date 2021 3/1/2021 3/10/2021   INR Goal 2.5-3.5 2.5-3.5 2.5-3.5   Trend Same Same Same   Last Week Total 35 mg 35 mg 35 mg   Next Week Total 35 mg 35 mg 35 mg   Sun 5 mg 5 mg 5 mg   Mon 5 mg 5 mg 5 mg   Tue 5 mg 5 mg 5 mg   Wed 5 mg 5 mg -   Thu 5 mg 5 mg 5 mg   Fri 5 mg 5 mg 5 mg   Sat 5 mg 5 mg 5 mg   Visit Report - - -   Some recent data might be hidden       Plan:  1. INR is therapeutic today- see above in Anticoagulation Summary.    Elliot Sebastian to continue their warfarin regimen- see above in Anticoagulation Summary.  2. Follow up in 1 week  3. Pt has agreed to only be called if INR out of range. They have been instructed to call if any changes in medications, doses, concerns, etc. Patient expresses understanding and has no further questions at this time.    Alicja Starks

## 2021-03-17 ENCOUNTER — ANTICOAGULATION VISIT (OUTPATIENT)
Dept: PHARMACY | Facility: HOSPITAL | Age: 61
End: 2021-03-17

## 2021-03-17 DIAGNOSIS — Z95.2 HX OF MITRAL VALVE REPLACEMENT WITH MECHANICAL VALVE: ICD-10-CM

## 2021-03-17 DIAGNOSIS — I48.0 PAROXYSMAL ATRIAL FIBRILLATION (HCC): ICD-10-CM

## 2021-03-17 LAB — INR PPP: 3

## 2021-03-24 LAB — INR PPP: 3.4

## 2021-03-26 ENCOUNTER — BULK ORDERING (OUTPATIENT)
Dept: CASE MANAGEMENT | Facility: OTHER | Age: 61
End: 2021-03-26

## 2021-03-26 ENCOUNTER — ANTICOAGULATION VISIT (OUTPATIENT)
Dept: PHARMACY | Facility: HOSPITAL | Age: 61
End: 2021-03-26

## 2021-03-26 DIAGNOSIS — Z23 IMMUNIZATION DUE: ICD-10-CM

## 2021-03-26 DIAGNOSIS — I48.0 PAROXYSMAL ATRIAL FIBRILLATION (HCC): ICD-10-CM

## 2021-03-26 DIAGNOSIS — Z95.2 HX OF MITRAL VALVE REPLACEMENT WITH MECHANICAL VALVE: ICD-10-CM

## 2021-03-26 NOTE — PROGRESS NOTES
Anticoagulation Clinic Progress Note    Anticoagulation Summary  As of 3/26/2021    INR goal:  2.5-3.5   TTR:  57.5 % (2.3 y)   INR used for dosing:  3.40 (3/24/2021)   Warfarin maintenance plan:  5 mg every day   Weekly warfarin total:  35 mg   No change documented:  Sara Mukherjee   Plan last modified:  Collette Vora, [facility] (1/25/2021)   Next INR check:  3/31/2021   Priority:  High   Target end date:  Indefinite    Indications    Paroxysmal atrial fibrillation (CMS/HCC) [I48.0]  Hx of mitral valve replacement with mechanical valve [Z95.2] [Z95.2]             Anticoagulation Episode Summary     INR check location:      Preferred lab:      [facility]    Comments:  NICOLE home juliet weekly *only call when out of range*      Anticoagulation Care Providers     Provider Role Specialty Phone number    Navi Fay MD Referring Cardiology [phone]          Clinic Interview:  No pertinent clinical findings have been reported.    INR History:  Anticoagulation Monitoring 3/11/2021 3/17/2021 3/26/2021   INR 2.90 3.00 3.40   INR Date 3/10/2021 3/17/2021 3/24/2021   INR Goal 2.5-3.5 2.5-3.5 2.5-3.5   Trend Same Same Same   Last Week Total 35 mg 35 mg 35 mg   Next Week Total 35 mg 35 mg 35 mg   Sun 5 mg 5 mg 5 mg   Mon 5 mg 5 mg 5 mg   Tue 5 mg 5 mg 5 mg   Wed - 5 mg -   Thu 5 mg 5 mg -   Fri 5 mg 5 mg 5 mg   Sat 5 mg 5 mg 5 mg   Visit Report - - -   Some recent data might be hidden       Plan:  1. INR is therapeutic today- see above in Anticoagulation Summary.    Elliot Sebastian to continue their warfarin regimen- see above in Anticoagulation Summary.  2. Follow up in 1 week  3. Pt has agreed to only be called if INR out of range. They have been instructed to call if any changes in medications, doses, concerns, etc. Patient expresses understanding and has no further questions at this time.    Sara Mukherjee

## 2021-03-31 LAB — INR PPP: 3.3

## 2021-04-01 ENCOUNTER — ANTICOAGULATION VISIT (OUTPATIENT)
Dept: PHARMACY | Facility: HOSPITAL | Age: 61
End: 2021-04-01

## 2021-04-01 DIAGNOSIS — Z95.2 HX OF MITRAL VALVE REPLACEMENT WITH MECHANICAL VALVE: ICD-10-CM

## 2021-04-01 DIAGNOSIS — I48.0 PAROXYSMAL ATRIAL FIBRILLATION (HCC): ICD-10-CM

## 2021-04-01 NOTE — PROGRESS NOTES
Anticoagulation Clinic Progress Note    Anticoagulation Summary  As of 4/1/2021    INR goal:  2.5-3.5   TTR:  57.9 % (2.4 y)   INR used for dosing:  3.30 (3/31/2021)   Warfarin maintenance plan:  5 mg every day   Weekly warfarin total:  35 mg   No change documented:  Sara Mukherjee   Plan last modified:  Collette Vora, [facility] (1/25/2021)   Next INR check:  4/7/2021   Priority:  High   Target end date:  Indefinite    Indications    Paroxysmal atrial fibrillation (CMS/HCC) [I48.0]  Hx of mitral valve replacement with mechanical valve [Z95.2] [Z95.2]             Anticoagulation Episode Summary     INR check location:      Preferred lab:      Send INR reminders to:   [facility]  POOL    Comments:  NICOLE home juliet weekly *only call when out of range*      Anticoagulation Care Providers     Provider Role Specialty Phone number    Navi Fay MD Referring Cardiology [phone]          Clinic Interview:  No pertinent clinical findings have been reported.    INR History:  Anticoagulation Monitoring 3/17/2021 3/26/2021 4/1/2021   INR 3.00 3.40 3.30   INR Date 3/17/2021 3/24/2021 3/31/2021   INR Goal 2.5-3.5 2.5-3.5 2.5-3.5   Trend Same Same Same   Last Week Total 35 mg 35 mg 35 mg   Next Week Total 35 mg 35 mg 35 mg   Sun 5 mg 5 mg 5 mg   Mon 5 mg 5 mg 5 mg   Tue 5 mg 5 mg 5 mg   Wed 5 mg - -   Thu 5 mg - 5 mg   Fri 5 mg 5 mg 5 mg   Sat 5 mg 5 mg 5 mg   Visit Report - - -   Some recent data might be hidden       Plan:  1. INR is therapeutic today- see above in Anticoagulation Summary.    Elliot Sebastian to continue their warfarin regimen- see above in Anticoagulation Summary.  2. Follow up in 1 week  3. Pt has agreed to only be called if INR out of range. They have been instructed to call if any changes in medications, doses, concerns, etc. Patient expresses understanding and has no further questions at this time.    Sara Mukherjee

## 2021-04-07 ENCOUNTER — ANTICOAGULATION VISIT (OUTPATIENT)
Dept: PHARMACY | Facility: HOSPITAL | Age: 61
End: 2021-04-07

## 2021-04-07 DIAGNOSIS — Z95.2 HX OF MITRAL VALVE REPLACEMENT WITH MECHANICAL VALVE: ICD-10-CM

## 2021-04-07 DIAGNOSIS — I48.0 PAROXYSMAL ATRIAL FIBRILLATION (HCC): ICD-10-CM

## 2021-04-07 LAB — INR PPP: 3.6

## 2021-04-07 NOTE — PROGRESS NOTES
Anticoagulation Clinic Progress Note    Anticoagulation Summary  As of 4/7/2021    INR goal:  2.5-3.5   TTR:  57.9 % (2.4 y)   INR used for dosing:  3.60 (4/7/2021)   Warfarin maintenance plan:  5 mg every day   Weekly warfarin total:  35 mg   No change documented:  Michael Horner [facility]   Plan last modified:  Collette Vora [facility] (1/25/2021)   Next INR check:  4/14/2021   Priority:  High   Target end date:  Indefinite    Indications    Paroxysmal atrial fibrillation (CMS/HCC) [I48.0]  Hx of mitral valve replacement with mechanical valve [Z95.2] [Z95.2]             Anticoagulation Episode Summary     INR check location:      Preferred lab:      Send INR reminders to:  [facility]  POOL    Comments:  MDINAVILA home juliet weekly *only call when out of range*      Anticoagulation Care Providers     Provider Role Specialty Phone number    Navi Fay MD Referring Cardiology [phone]          Clinic Interview:  Patient Findings     Positives:  Change in health, Other complaints    Negatives:  Signs/symptoms of thrombosis, Signs/symptoms of bleeding,   Laboratory test error suspected, Change in alcohol use, Change in   activity, Upcoming invasive procedure, Emergency department visit,   Upcoming dental procedure, Missed doses, Extra doses, Change in   medications, Change in diet/appetite, Hospital admission, Bruising    Comments:  Reports a little diarrhea after missing a couple doses of   cholestyramine.       Clinical Outcomes     Negatives:  Major bleeding event, Thromboembolic event,   Anticoagulation-related hospital admission, Anticoagulation-related ED   visit, Anticoagulation-related fatality    Comments:  Reports a little diarrhea after missing a couple doses of   cholestyramine.         INR History:  Anticoagulation Monitoring 3/26/2021 4/1/2021 4/7/2021   INR 3.40 3.30 3.60   INR Date 3/24/2021 3/31/2021 4/7/2021   INR Goal 2.5-3.5 2.5-3.5 2.5-3.5   Trend Same Same Same   Last Week Total  35 mg 35 mg 35 mg   Next Week Total 35 mg 35 mg 35 mg   Sun 5 mg 5 mg 5 mg   Mon 5 mg 5 mg 5 mg   Tue 5 mg 5 mg 5 mg   Wed - - 5 mg   Thu - 5 mg 5 mg   Fri 5 mg 5 mg 5 mg   Sat 5 mg 5 mg 5 mg   Visit Report - - -   Some recent data might be hidden       Plan:  1. INR is Supratherapeutic today- see above in Anticoagulation Summary.   Will instruct Elliot Sebastian to Change their warfarin regimen- see above in Anticoagulation Summary.  2. Follow up in 1 week  3. They have been instructed to call if any changes in medications, doses, concerns, etc. Patient expresses understanding and has no further questions at this time.    Michael Horner RP

## 2021-04-14 ENCOUNTER — ANTICOAGULATION VISIT (OUTPATIENT)
Dept: PHARMACY | Facility: HOSPITAL | Age: 61
End: 2021-04-14

## 2021-04-14 DIAGNOSIS — Z95.2 HX OF MITRAL VALVE REPLACEMENT WITH MECHANICAL VALVE: ICD-10-CM

## 2021-04-14 DIAGNOSIS — I48.0 PAROXYSMAL ATRIAL FIBRILLATION (HCC): Primary | ICD-10-CM

## 2021-04-14 LAB — INR PPP: 3.5

## 2021-04-14 NOTE — PROGRESS NOTES
Anticoagulation Clinic Progress Note    Anticoagulation Summary  As of 4/14/2021    INR goal:  2.5-3.5   TTR:  57.5 % (2.4 y)   INR used for dosing:  3.50 (4/14/2021)   Warfarin maintenance plan:  5 mg every day   Weekly warfarin total:  35 mg   No change documented:  Michael Horner RPH   Plan last modified:  Collette Vora RPH (1/25/2021)   Next INR check:  4/21/2021   Priority:  High   Target end date:  Indefinite    Indications    Paroxysmal atrial fibrillation (CMS/HCC) [I48.0]  Hx of mitral valve replacement with mechanical valve [Z95.2] [Z95.2]             Anticoagulation Episode Summary     INR check location:      Preferred lab:      [facility]    Comments:  NICOLE home juliet weekly *only call when out of range*      Anticoagulation Care Providers     Provider Role Specialty Phone number    Navi Fay MD Referring Cardiology [phone]          Clinic Interview:  No pertinent clinical findings have been reported.    INR History:  Anticoagulation Monitoring 4/1/2021 4/7/2021 4/14/2021   INR 3.30 3.60 3.50   INR Date 3/31/2021 4/7/2021 4/14/2021   INR Goal 2.5-3.5 2.5-3.5 2.5-3.5   Trend Same Same Same   Last Week Total 35 mg 35 mg 35 mg   Next Week Total 35 mg 35 mg 35 mg   Sun 5 mg 5 mg 5 mg   Mon 5 mg 5 mg 5 mg   Tue 5 mg 5 mg 5 mg   Wed - 5 mg 5 mg   Thu 5 mg 5 mg 5 mg   Fri 5 mg 5 mg 5 mg   Sat 5 mg 5 mg 5 mg   Visit Report - - -   Some recent data might be hidden       Plan:  1. INR is therapeutic today- see above in Anticoagulation Summary.    Elliot Sebastian to continue their warfarin regimen- see above in Anticoagulation Summary.  2. Follow up in 1 week  3. Pt has agreed to only be called if INR out of range. They have been instructed to call if any changes in medications, doses, concerns, etc. Patient expresses understanding and has no further questions at this time.    Michael Horner RPH

## 2021-04-22 ENCOUNTER — ANTICOAGULATION VISIT (OUTPATIENT)
Dept: PHARMACY | Facility: HOSPITAL | Age: 61
End: 2021-04-22

## 2021-04-22 DIAGNOSIS — I48.0 PAROXYSMAL ATRIAL FIBRILLATION (HCC): Primary | ICD-10-CM

## 2021-04-22 DIAGNOSIS — Z95.2 HX OF MITRAL VALVE REPLACEMENT WITH MECHANICAL VALVE: ICD-10-CM

## 2021-04-22 LAB — INR PPP: 5.1

## 2021-04-22 NOTE — PROGRESS NOTES
Anticoagulation Clinic Progress Note    Anticoagulation Summary  As of 2021    INR goal:  2.5-3.5   TTR:  57.0 % (2.4 y)   INR used for dosin.10 (2021)   Warfarin maintenance plan:  2.5 mg every Mon; 5 mg all other days   Weekly warfarin total:  32.5 mg   Plan last modified:  Santiago Parra, PharmD (2021)   Next INR check:  2021   Priority:  High   Target end date:  Indefinite    Indications    Paroxysmal atrial fibrillation (CMS/HCC) [I48.0]  Hx of mitral valve replacement with mechanical valve [Z95.2] [Z95.2]             Anticoagulation Episode Summary     INR check location:      Preferred lab:      Send INR reminders to:   [facility]  POOL    Comments:  MDINAVILA home juliet weekly *only call when out of range*      Anticoagulation Care Providers     Provider Role Specialty Phone number    Navi Fay MD Referring Cardiology [phone]          Clinic Interview:  Patient Findings     Positives:  Change in alcohol use, Other complaints    Negatives:  Signs/symptoms of thrombosis, Signs/symptoms of bleeding,   Laboratory test error suspected, Change in health, Change in activity,   Upcoming invasive procedure, Emergency department visit, Upcoming dental   procedure, Missed doses, Extra doses, Change in medications, Change in   diet/appetite, Hospital admission, Bruising    Comments:  Patient reports being stressed out recently adjusting to new   job; had to drive in the [facility] and didn't get home until 4   AM. Patient reports drinking a beer or two several days of the week.       Clinical Outcomes     Negatives:  Major bleeding event, Thromboembolic event,   Anticoagulation-related hospital admission, Anticoagulation-related ED   visit, Anticoagulation-related fatality    Comments:  Patient reports being stressed out recently adjusting to new   job; had to drive in the [facility] and didn't get home until 4   AM. Patient reports drinking a beer or  two several days of the week.         INR History:  Anticoagulation Monitoring 4/7/2021 4/14/2021 4/22/2021   INR 3.60 3.50 5.10   INR Date 4/7/2021 4/14/2021 4/21/2021   INR Goal 2.5-3.5 2.5-3.5 2.5-3.5   Trend Same Same Down   Last Week Total 35 mg 35 mg 35 mg   Next Week Total 35 mg 35 mg 27.5 mg   Sun 5 mg 5 mg 5 mg   Mon 5 mg 5 mg 2.5 mg   Tue 5 mg 5 mg 5 mg   Wed 5 mg 5 mg -   Thu 5 mg 5 mg Hold (4/22)   Fri 5 mg 5 mg 5 mg   Sat 5 mg 5 mg 5 mg   Visit Report - - -   Some recent data might be hidden       Plan:  1. INR is Supratherapeutic today- see above in Anticoagulation Summary.   Will instruct Elliot Sebastian to Change their warfarin regimen- see above in Anticoagulation Summary. Hold today's dose and reduce maintenance regimen to 2.5 mg Mondays and 5 mg all other days (~7% decrease)  2. Follow up in 1 week  3. Pt has agreed to only be called if INR out of range. They have been instructed to call if any changes in medications, doses, concerns, etc. Patient expresses understanding and has no further questions at this time.    Santiago Parra, EdD

## 2021-04-28 LAB — INR PPP: 3.2

## 2021-04-29 ENCOUNTER — ANTICOAGULATION VISIT (OUTPATIENT)
Dept: PHARMACY | Facility: HOSPITAL | Age: 61
End: 2021-04-29

## 2021-04-29 DIAGNOSIS — Z95.2 HX OF MITRAL VALVE REPLACEMENT WITH MECHANICAL VALVE: ICD-10-CM

## 2021-04-29 DIAGNOSIS — I48.0 PAROXYSMAL ATRIAL FIBRILLATION (HCC): Primary | ICD-10-CM

## 2021-04-29 NOTE — PROGRESS NOTES
Anticoagulation Clinic Progress Note  Anticoagulation Summary  As of 4/29/2021    INR goal:  2.5-3.5   TTR:  56.7 % (2.4 y)   INR used for dosing:  3.20 (4/28/2021)   Warfarin maintenance plan:  2.5 mg every Mon; 5 mg all other days   Weekly warfarin total:  32.5 mg   Plan last modified:  Santiago Parra PharmD (4/22/2021)   Next INR check:  5/5/2021   Priority:  High   Target end date:  Indefinite    Indications    Paroxysmal atrial fibrillation (CMS/HCC) [I48.0]  Hx of mitral valve replacement with mechanical valve [Z95.2] [Z95.2]             Anticoagulation Episode Summary     INR check location:      Preferred lab:      Send INR reminders to:   [facility]  POOL    Comments:  MDINAVILA home juliet weekly *only call when out of range*      Anticoagulation Care Providers     Provider Role Specialty Phone number    Navi Fay MD Referring Cardiology [phone]        Clinic Interview:  Patient Findings     Negatives:  Signs/symptoms of thrombosis, Signs/symptoms of bleeding,   Laboratory test error suspected, Change in health, Change in alcohol use,   Change in activity, Upcoming invasive procedure, Emergency department   visit, Upcoming dental procedure, Missed doses, Extra doses, Change in   medications, Change in diet/appetite, Hospital admission, Bruising, Other   complaints    Comments:  Confirms he took new dosing as described 4/22/21      Clinical Outcomes     Negatives:  Major bleeding event, Thromboembolic event,   Anticoagulation-related hospital admission, Anticoagulation-related ED   visit, Anticoagulation-related fatality    Comments:  Confirms he took new dosing as described 4/22/21      INR History:  Anticoagulation Monitoring 4/14/2021 4/22/2021 4/29/2021   INR 3.50 5.10 3.20   INR Date 4/14/2021 4/21/2021 4/28/2021   INR Goal 2.5-3.5 2.5-3.5 2.5-3.5   Trend Same Down Same   Last Week Total 35 mg 35 mg 27.5 mg   Next Week Total 35 mg 27.5 mg 32.5 mg   Sun 5 mg 5 mg 5 mg   Mon 5  mg 2.5 mg 2.5 mg   Tue 5 mg 5 mg 5 mg   Wed 5 mg - -   Thu 5 mg Hold (4/22) 5 mg   Fri 5 mg 5 mg 5 mg   Sat 5 mg 5 mg 5 mg   Visit Report - - -   Some recent data might be hidden     Plan:  1. INR is Therapeutic today- see above in Anticoagulation Summary.   Will instruct Elliot CAIN Sebastian to Continue their warfarin regimen- see above in Anticoagulation Summary.  2. Follow up in 1 week  3. They have been instructed to call if any changes in medications, doses, concerns, etc. Patient expresses understanding and has no further questions at this time.    Pop Vanegas, PharmD

## 2021-05-05 ENCOUNTER — ANTICOAGULATION VISIT (OUTPATIENT)
Dept: PHARMACY | Facility: HOSPITAL | Age: 61
End: 2021-05-05

## 2021-05-05 DIAGNOSIS — I48.0 PAROXYSMAL ATRIAL FIBRILLATION (HCC): Primary | ICD-10-CM

## 2021-05-05 DIAGNOSIS — Z95.2 HX OF MITRAL VALVE REPLACEMENT WITH MECHANICAL VALVE: ICD-10-CM

## 2021-05-05 LAB — INR PPP: 3.7

## 2021-05-05 NOTE — PROGRESS NOTES
Anticoagulation Clinic Progress Note    Anticoagulation Summary  As of 5/5/2021    INR goal:  2.5-3.5   TTR:  56.7 % (2.5 y)   INR used for dosing:  3.70 (5/5/2021)   Warfarin maintenance plan:  2.5 mg every Mon; 5 mg all other days   Weekly warfarin total:  32.5 mg   Plan last modified:  Stjepic, Santiago, PharmD (4/22/2021)   Next INR check:  5/12/2021   Priority:  High   Target end date:  Indefinite    Indications    Paroxysmal atrial fibrillation (CMS/HCC) [I48.0]  Hx of mitral valve replacement with mechanical valve [Z95.2] [Z95.2]             Anticoagulation Episode Summary     INR check location:      Preferred lab:      Send INR reminders to:   [facility]  POOL    Comments:  MDINAVILA home juliet weekly *only call when out of range*      Anticoagulation Care Providers     Provider Role Specialty Phone number    Navi Fay MD Referring Cardiology [phone]          Clinic Interview:  Patient Findings     Positives:  Other complaints    Negatives:  Signs/symptoms of thrombosis, Signs/symptoms of bleeding,   Laboratory test error suspected, Change in health, Change in alcohol use,   Change in activity, Upcoming invasive procedure, Emergency department   visit, Upcoming dental procedure, Missed doses, Extra doses, Change in   medications, Change in diet/appetite, Hospital admission, Bruising    Comments:  Already took today's 5 mg dose of warfarin.       Clinical Outcomes     Negatives:  Major bleeding event, Thromboembolic event,   Anticoagulation-related hospital admission, Anticoagulation-related ED   visit, Anticoagulation-related fatality    Comments:  Already took today's 5 mg dose of warfarin.         INR History:  Anticoagulation Monitoring 4/22/2021 4/29/2021 5/5/2021   INR 5.10 3.20 3.70   INR Date 4/21/2021 4/28/2021 5/5/2021   INR Goal 2.5-3.5 2.5-3.5 2.5-3.5   Trend Down Same Same   Last Week Total 35 mg 27.5 mg 32.5 mg   Next Week Total 27.5 mg 32.5 mg 30 mg   Sun 5 mg 5 mg 5 mg     Mon 2.5 mg 2.5 mg 2.5 mg   Tue 5 mg 5 mg 5 mg   Wed - - 5 mg   Thu Hold (4/22) 5 mg 2.5 mg (5/6)   Fri 5 mg 5 mg 5 mg   Sat 5 mg 5 mg 5 mg   Visit Report - - -   Some recent data might be hidden       Plan:  1. INR is Supratherapeutic today- see above in Anticoagulation Summary.   Will instruct Elliot Sebastian to Change their warfarin regimen- see above in Anticoagulation Summary.  2. Follow up in 1 week  3. They have been instructed to call if any changes in medications, doses, concerns, etc. Patient expresses understanding and has no further questions at this time.    Michael Horner [facility]

## 2021-05-06 RX ORDER — ATENOLOL 25 MG/1
25 TABLET ORAL DAILY
Qty: 90 TABLET | Refills: 3 | Status: SHIPPED | OUTPATIENT
Start: 2021-05-06 | End: 2021-08-19 | Stop reason: SDUPTHER

## 2021-05-12 ENCOUNTER — ANTICOAGULATION VISIT (OUTPATIENT)
Dept: PHARMACY | Facility: HOSPITAL | Age: 61
End: 2021-05-12

## 2021-05-12 DIAGNOSIS — I48.0 PAROXYSMAL ATRIAL FIBRILLATION (HCC): Primary | ICD-10-CM

## 2021-05-12 DIAGNOSIS — Z95.2 HX OF MITRAL VALVE REPLACEMENT WITH MECHANICAL VALVE: ICD-10-CM

## 2021-05-12 LAB — INR PPP: 3.3

## 2021-05-12 NOTE — PROGRESS NOTES
Anticoagulation Clinic Progress Note    Anticoagulation Summary  As of 5/12/2021    INR goal:  2.5-3.5   TTR:  56.7 % (2.5 y)   INR used for dosing:  3.30 (5/12/2021)   Warfarin maintenance plan:  2.5 mg every Mon, Thu; 5 mg all other days   Weekly warfarin total:  30 mg   Plan last modified:  Michael Horner RPH (5/12/2021)   Next INR check:  5/19/2021   Priority:  High   Target end date:  Indefinite    Indications    Paroxysmal atrial fibrillation (CMS/HCC) [I48.0]  Hx of mitral valve replacement with mechanical valve [Z95.2] [Z95.2]             Anticoagulation Episode Summary     INR check location:      Preferred lab:      Send INR reminders to:   [facility]  POOL    Comments:  MDINAVILA home juliet weekly *only call when out of range*      Anticoagulation Care Providers     Provider Role Specialty Phone number    Navi Fay MD Referring Cardiology [phone]          Clinic Interview:  Patient Findings     Negatives:  Signs/symptoms of thrombosis, Signs/symptoms of bleeding,   Laboratory test error suspected, Change in health, Change in alcohol use,   Change in activity, Upcoming invasive procedure, Emergency department   visit, Upcoming dental procedure, Missed doses, Extra doses, Change in   medications, Change in diet/appetite, Hospital admission, Bruising, Other   complaints      Clinical Outcomes     Negatives:  Major bleeding event, Thromboembolic event,   Anticoagulation-related hospital admission, Anticoagulation-related ED   visit, Anticoagulation-related fatality        INR History:  Anticoagulation Monitoring 4/29/2021 5/5/2021 5/12/2021   INR 3.20 3.70 3.30   INR Date 4/28/2021 5/5/2021 5/12/2021   INR Goal 2.5-3.5 2.5-3.5 2.5-3.5   Trend Same Same Down   Last Week Total 27.5 mg 32.5 mg 30 mg   Next Week Total 32.5 mg 30 mg 30 mg   Sun 5 mg 5 mg 5 mg   Mon 2.5 mg 2.5 mg 2.5 mg   Tue 5 mg 5 mg 5 mg   Wed - 5 mg 5 mg   Thu 5 mg 2.5 mg (5/6) 2.5 mg   Fri 5 mg 5 mg 5 mg   Sat 5 mg 5  mg 5 mg   Visit Report - - -   Some recent data might be hidden       Plan:  1. INR is Therapeutic today- see above in Anticoagulation Summary.   Will instruct Elliot Sebastian to Change their warfarin regimen- see above in Anticoagulation Summary.  2. Follow up in 1 week  3. They have been instructed to call if any changes in medications, doses, concerns, etc. Patient expresses understanding and has no further questions at this time.    Michael Horner RP

## 2021-05-20 ENCOUNTER — ANTICOAGULATION VISIT (OUTPATIENT)
Dept: PHARMACY | Facility: HOSPITAL | Age: 61
End: 2021-05-20

## 2021-05-20 DIAGNOSIS — Z95.2 HX OF MITRAL VALVE REPLACEMENT WITH MECHANICAL VALVE: ICD-10-CM

## 2021-05-20 DIAGNOSIS — I48.0 PAROXYSMAL ATRIAL FIBRILLATION (HCC): Primary | ICD-10-CM

## 2021-05-20 LAB — INR PPP: 2.9

## 2021-05-20 NOTE — PROGRESS NOTES
Anticoagulation Clinic Progress Note    Anticoagulation Summary  As of 2021    INR goal:  2.5-3.5   TTR:  57.1 % (2.5 y)   INR used for dosin.90 (2021)   Warfarin maintenance plan:  2.5 mg every Mon, Thu; 5 mg all other days   Weekly warfarin total:  30 mg   No change documented:  Manisha Benjamin   Plan last modified:  Michael Horner [facility] (2021)   Next INR check:  2021   Priority:  High   Target end date:  Indefinite    Indications    Paroxysmal atrial fibrillation (CMS/HCC) [I48.0]  Hx of mitral valve replacement with mechanical valve [Z95.2] [Z95.2]             Anticoagulation Episode Summary     INR check location:      Preferred lab:      Send INR reminders to:  [facility]  POOL    Comments:  NICOLE home juliet weekly *only call when out of range*      Anticoagulation Care Providers     Provider Role Specialty Phone number    Navi Fay MD Referring Cardiology [phone]          Clinic Interview:  No pertinent clinical findings have been reported.    INR History:  Anticoagulation Monitoring 2021   INR 3.70 3.30 2.90   INR Date 2021   INR Goal 2.5-3.5 2.5-3.5 2.5-3.5   Trend Same Down Same   Last Week Total 32.5 mg 30 mg 30 mg   Next Week Total 30 mg 30 mg 30 mg   Sun 5 mg 5 mg 5 mg   Mon 2.5 mg 2.5 mg 2.5 mg   Tue 5 mg 5 mg 5 mg   Wed 5 mg 5 mg 5 mg   Thu 2.5 mg () 2.5 mg 2.5 mg   Fri 5 mg 5 mg 5 mg   Sat 5 mg 5 mg 5 mg   Visit Report - - -   Some recent data might be hidden       Plan:  1. INR is therapeutic today- see above in Anticoagulation Summary.    Elliot Sebastian to continue their warfarin regimen- see above in Anticoagulation Summary.  2. Follow up in 1 week  3. Pt has agreed to only be called if INR out of range. They have been instructed to call if any changes in medications, doses, concerns, etc. Patient expresses understanding and has no further questions at this time.    Manisha Benjamin

## 2021-05-27 ENCOUNTER — ANTICOAGULATION VISIT (OUTPATIENT)
Dept: PHARMACY | Facility: HOSPITAL | Age: 61
End: 2021-05-27

## 2021-05-27 DIAGNOSIS — Z95.2 HX OF MITRAL VALVE REPLACEMENT WITH MECHANICAL VALVE: ICD-10-CM

## 2021-05-27 DIAGNOSIS — I48.0 PAROXYSMAL ATRIAL FIBRILLATION (HCC): Primary | ICD-10-CM

## 2021-05-27 LAB — INR PPP: 2.7

## 2021-05-27 NOTE — PROGRESS NOTES
Anticoagulation Clinic Progress Note    Anticoagulation Summary  As of 2021    INR goal:  2.5-3.5   TTR:  57.4 % (2.5 y)   INR used for dosin.70 (2021)   Warfarin maintenance plan:  2.5 mg every Mon, Thu; 5 mg all other days   Weekly warfarin total:  30 mg   No change documented:  Alicja Starks   Plan last modified:  Michael Horner [facility] (2021)   Next INR check:  6/3/2021   Priority:  High   Target end date:  Indefinite    Indications    Paroxysmal atrial fibrillation (CMS/HCC) [I48.0]  Hx of mitral valve replacement with mechanical valve [Z95.2] [Z95.2]             Anticoagulation Episode Summary     INR check location:      Preferred lab:      Send INR reminders to:   [facility]  POOL    Comments:  MDINAVILA home juliet weekly *only call when out of range*      Anticoagulation Care Providers     Provider Role Specialty Phone number    Navi Fay MD Referring Cardiology [phone]          Clinic Interview:  No pertinent clinical findings have been reported.    INR History:  Anticoagulation Monitoring 2021   INR 3.30 2.90 2.70   INR Date 2021   INR Goal 2.5-3.5 2.5-3.5 2.5-3.5   Trend Down Same Same   Last Week Total 30 mg 30 mg 30 mg   Next Week Total 30 mg 30 mg 30 mg   Sun 5 mg 5 mg 5 mg   Mon 2.5 mg 2.5 mg 2.5 mg   Tue 5 mg 5 mg 5 mg   Wed 5 mg 5 mg 5 mg   Thu 2.5 mg 2.5 mg 2.5 mg   Fri 5 mg 5 mg 5 mg   Sat 5 mg 5 mg 5 mg   Visit Report - - -   Some recent data might be hidden       Plan:  1. INR is therapeutic today- see above in Anticoagulation Summary.    Elliot Sebastian to continue their warfarin regimen- see above in Anticoagulation Summary.  2. Follow up in 1 week  3. Pt has agreed to only be called if INR out of range. They have been instructed to call if any changes in medications, doses, concerns, etc. Patient expresses understanding and has no further questions at this time.    Alicja BALTAZAR  Raudel

## 2021-06-02 ENCOUNTER — ANTICOAGULATION VISIT (OUTPATIENT)
Dept: PHARMACY | Facility: HOSPITAL | Age: 61
End: 2021-06-02

## 2021-06-02 DIAGNOSIS — Z95.2 HX OF MITRAL VALVE REPLACEMENT WITH MECHANICAL VALVE: ICD-10-CM

## 2021-06-02 DIAGNOSIS — I48.0 PAROXYSMAL ATRIAL FIBRILLATION (HCC): Primary | ICD-10-CM

## 2021-06-02 LAB — INR PPP: 2.9

## 2021-06-02 NOTE — PROGRESS NOTES
Anticoagulation Clinic Progress Note    Anticoagulation Summary  As of 2021    INR goal:  2.5-3.5   TTR:  57.7 % (2.5 y)   INR used for dosin.90 (2021)   Warfarin maintenance plan:  2.5 mg every Mon, Thu; 5 mg all other days   Weekly warfarin total:  30 mg   No change documented:  Alicja Starks   Plan last modified:  Michael Horner [facility] (2021)   Next INR check:  2021   Priority:  High   Target end date:  Indefinite    Indications    Paroxysmal atrial fibrillation (CMS/HCC) [I48.0]  Hx of mitral valve replacement with mechanical valve [Z95.2] [Z95.2]             Anticoagulation Episode Summary     INR check location:      Preferred lab:      Send INR reminders to:   CHARLES [facility]  POOL    Comments:  MDINAVILA home juliet weekly *only call when out of range*      Anticoagulation Care Providers     Provider Role Specialty Phone number    Navi Fay MD Referring Cardiology [phone]          Clinic Interview:  No pertinent clinical findings have been reported.    INR History:  Anticoagulation Monitoring 2021   INR 2.90 2.70 2.90   INR Date 2021   INR Goal 2.5-3.5 2.5-3.5 2.5-3.5   Trend Same Same Same   Last Week Total 30 mg 30 mg 30 mg   Next Week Total 30 mg 30 mg 30 mg   Sun 5 mg 5 mg 5 mg   Mon 2.5 mg 2.5 mg 2.5 mg   Tue 5 mg 5 mg 5 mg   Wed 5 mg 5 mg 5 mg   Thu 2.5 mg 2.5 mg 2.5 mg   Fri 5 mg 5 mg 5 mg   Sat 5 mg 5 mg 5 mg   Visit Report - - -   Some recent data might be hidden       Plan:  1. INR is therapeutic today- see above in Anticoagulation Summary.    Elliot Sebastian to continue their warfarin regimen- see above in Anticoagulation Summary.  2. Follow up in 1 week  3. Pt has agreed to only be called if INR out of range. They have been instructed to call if any changes in medications, doses, concerns, etc. Patient expresses understanding and has no further questions at this time.    Alicja Starks

## 2021-06-09 LAB — INR PPP: 3.5

## 2021-06-10 ENCOUNTER — ANTICOAGULATION VISIT (OUTPATIENT)
Dept: PHARMACY | Facility: HOSPITAL | Age: 61
End: 2021-06-10

## 2021-06-10 DIAGNOSIS — I48.0 PAROXYSMAL ATRIAL FIBRILLATION (HCC): Primary | ICD-10-CM

## 2021-06-10 DIAGNOSIS — Z95.2 HX OF MITRAL VALVE REPLACEMENT WITH MECHANICAL VALVE: ICD-10-CM

## 2021-06-10 NOTE — PROGRESS NOTES
Anticoagulation Clinic Progress Note    Anticoagulation Summary  As of 6/10/2021    INR goal:  2.5-3.5   TTR:  58.0 % (2.6 y)   INR used for dosing:  3.50 (6/9/2021)   Warfarin maintenance plan:  2.5 mg every Mon, Thu; 5 mg all other days   Weekly warfarin total:  30 mg   No change documented:  Alicja Starks   Plan last modified:  Michael Horner [facility] (5/12/2021)   Next INR check:  6/16/2021   Priority:  High   Target end date:  Indefinite    Indications    Paroxysmal atrial fibrillation (CMS/HCC) [I48.0]  Hx of mitral valve replacement with mechanical valve [Z95.2] [Z95.2]             Anticoagulation Episode Summary     INR check location:      Preferred lab:      Send INR reminders to:   CHARLES [facility]  POOL    Comments:  MDINAVILA home juliet weekly *only call when out of range*      Anticoagulation Care Providers     Provider Role Specialty Phone number    Navi Fay MD Referring Cardiology [phone]          Clinic Interview:  No pertinent clinical findings have been reported.    INR History:  Anticoagulation Monitoring 5/27/2021 6/2/2021 6/10/2021   INR 2.70 2.90 3.50   INR Date 5/27/2021 6/2/2021 6/9/2021   INR Goal 2.5-3.5 2.5-3.5 2.5-3.5   Trend Same Same Same   Last Week Total 30 mg 30 mg 30 mg   Next Week Total 30 mg 30 mg 30 mg   Sun 5 mg 5 mg 5 mg   Mon 2.5 mg 2.5 mg 2.5 mg   Tue 5 mg 5 mg 5 mg   Wed 5 mg 5 mg -   Thu 2.5 mg 2.5 mg 2.5 mg   Fri 5 mg 5 mg 5 mg   Sat 5 mg 5 mg 5 mg   Visit Report - - -   Some recent data might be hidden       Plan:  1. INR is therapeutic today- see above in Anticoagulation Summary.    Elliot Sebastian to continue their warfarin regimen- see above in Anticoagulation Summary.  2. Follow up in 1 week  3. Pt has agreed to only be called if INR out of range. They have been instructed to call if any changes in medications, doses, concerns, etc. Patient expresses understanding and has no further questions at this time.    Alicja Starks

## 2021-06-16 LAB — INR PPP: 4.1

## 2021-06-25 ENCOUNTER — ANTICOAGULATION VISIT (OUTPATIENT)
Dept: PHARMACY | Facility: HOSPITAL | Age: 61
End: 2021-06-25

## 2021-06-25 DIAGNOSIS — I48.0 PAROXYSMAL ATRIAL FIBRILLATION (HCC): Primary | ICD-10-CM

## 2021-06-25 DIAGNOSIS — Z95.2 HX OF MITRAL VALVE REPLACEMENT WITH MECHANICAL VALVE: ICD-10-CM

## 2021-06-25 LAB — INR PPP: 3.4

## 2021-06-25 NOTE — PROGRESS NOTES
Anticoagulation Clinic Progress Note    Anticoagulation Summary  As of 6/25/2021    INR goal:  2.5-3.5   TTR:  57.1 % (2.6 y)   INR used for dosing:  3.40 (6/25/2021)   Warfarin maintenance plan:  2.5 mg every Mon, Thu; 5 mg all other days   Weekly warfarin total:  30 mg   No change documented:  Michael Horner RPH   Plan last modified:  Michael Horner RPH (5/12/2021)   Next INR check:  7/2/2021   Priority:  High   Target end date:  Indefinite    Indications    Paroxysmal atrial fibrillation (CMS/HCC) [I48.0]  Hx of mitral valve replacement with mechanical valve [Z95.2] [Z95.2]             Anticoagulation Episode Summary     INR check location:      Preferred lab:      Send INR reminders to:   [facility]  POOL    Comments:  MDINAVILA home juliet weekly *only call when out of range*      Anticoagulation Care Providers     Provider Role Specialty Phone number    Navi Fay MD Referring Cardiology [phone]          Clinic Interview:  Patient Findings     Positives:  Change in diet/appetite    Negatives:  Signs/symptoms of thrombosis, Signs/symptoms of bleeding,   Laboratory test error suspected, Change in health, Change in alcohol use,   Change in activity, Upcoming invasive procedure, Emergency department   visit, Upcoming dental procedure, Missed doses, Extra doses, Change in   medications, Hospital admission, Bruising, Other complaints    Comments:  Reports he had less vit k last week leading up to INR 4.1 on   6/16/21; he has since resumed his usual intake. Confirmed he did not   change his warfarin dose after seeing high INR.       Clinical Outcomes     Negatives:  Major bleeding event, Thromboembolic event,   Anticoagulation-related hospital admission, Anticoagulation-related ED   visit, Anticoagulation-related fatality    Comments:  Reports he had less vit k last week leading up to INR 4.1 on   6/16/21; he has since resumed his usual intake. Confirmed he did not   change his warfarin dose  after seeing high INR.         INR History:  Anticoagulation Monitoring 6/2/2021 6/10/2021 6/25/2021   INR 2.90 3.50 3.40   INR Date 6/2/2021 6/9/2021 6/25/2021   INR Goal 2.5-3.5 2.5-3.5 2.5-3.5   Trend Same Same Same   Last Week Total 30 mg 30 mg 30 mg   Next Week Total 30 mg 30 mg 30 mg   Sun 5 mg 5 mg 5 mg   Mon 2.5 mg 2.5 mg 2.5 mg   Tue 5 mg 5 mg 5 mg   Wed 5 mg - 5 mg   Thu 2.5 mg 2.5 mg 2.5 mg   Fri 5 mg 5 mg 5 mg   Sat 5 mg 5 mg 5 mg   Visit Report - - -   Some recent data might be hidden       Plan:  1. INR is Therapeutic today- see above in Anticoagulation Summary.   Will instruct Elliot Sebastian to Continue their warfarin regimen- see above in Anticoagulation Summary.  2. Follow up in 1 week  3. They have been instructed to call if any changes in medications, doses, concerns, etc. Patient expresses understanding and has no further questions at this time.    Michael Horner [facility]

## 2021-06-30 LAB — INR PPP: 4

## 2021-07-01 ENCOUNTER — ANTICOAGULATION VISIT (OUTPATIENT)
Dept: PHARMACY | Facility: HOSPITAL | Age: 61
End: 2021-07-01

## 2021-07-01 DIAGNOSIS — I48.0 PAROXYSMAL ATRIAL FIBRILLATION (HCC): Primary | ICD-10-CM

## 2021-07-01 DIAGNOSIS — Z95.2 HX OF MITRAL VALVE REPLACEMENT WITH MECHANICAL VALVE: ICD-10-CM

## 2021-07-01 NOTE — PROGRESS NOTES
Anticoagulation Clinic Progress Note  Anticoagulation Summary  As of 2021    INR goal:  2.5-3.5   TTR:  56.9 % (2.6 y)   INR used for dosin.00 (2021)   Warfarin maintenance plan:  2.5 mg every Mon, Thu; 5 mg all other days   Weekly warfarin total:  30 mg   Plan last modified:  Michael Horner RPH (2021)   Next INR check:  2021   Priority:  High   Target end date:  Indefinite    Indications    Paroxysmal atrial fibrillation (CMS/HCC) [I48.0]  Hx of mitral valve replacement with mechanical valve [Z95.2] [Z95.2]             Anticoagulation Episode Summary     INR check location:      Preferred lab:      Send INR reminders to:   [facility]  POOL    Comments:  MDINAVILA home juliet weekly *only call when out of range*      Anticoagulation Care Providers     Provider Role Specialty Phone number    Navi Fay MD Referring Cardiology [phone]        Clinic Interview:  Patient Findings     Positives:  Change in diet/appetite    Negatives:  Signs/symptoms of thrombosis, Signs/symptoms of bleeding,   Laboratory test error suspected, Change in health, Change in alcohol use,   Change in activity, Upcoming invasive procedure, Emergency department   visit, Upcoming dental procedure, Missed doses, Extra doses, Change in   medications, Hospital admission, Bruising, Other complaints    Comments:  Denies any bleeding.  Noted to have less greens than typical      Clinical Outcomes     Negatives:  Major bleeding event, Thromboembolic event,   Anticoagulation-related hospital admission, Anticoagulation-related ED   visit, Anticoagulation-related fatality      INR History:  Anticoagulation Monitoring 6/10/2021 2021 2021   INR 3.50 3.40 4.00   INR Date 2021   INR Goal 2.5-3.5 2.5-3.5 2.5-3.5   Trend Same Same Same   Last Week Total 30 mg 30 mg 30 mg   Next Week Total 30 mg 30 mg 27.5 mg   Sun 5 mg 5 mg 5 mg   Mon 2.5 mg 2.5 mg 2.5 mg   Tue 5 mg 5 mg 5 mg   Wed -  5 mg -   Thu 2.5 mg 2.5 mg 2.5 mg   Fri 5 mg 5 mg 2.5 mg (7/2)   Sat 5 mg 5 mg 5 mg   Visit Report - - -   Some recent data might be hidden     Plan:  1. INR is Supratherapeutic today- see above in Anticoagulation Summary.   Will instruct Elliot Sebastian to Change their warfarin regimen- see above in Anticoagulation Summary.  2. Follow up in 1 week  3. They have been instructed to call if any changes in medications, doses, concerns, etc. Patient expresses understanding and has no further questions at this time.    Pop Vanegas, PharmD

## 2021-07-07 ENCOUNTER — ANTICOAGULATION VISIT (OUTPATIENT)
Dept: PHARMACY | Facility: HOSPITAL | Age: 61
End: 2021-07-07

## 2021-07-07 DIAGNOSIS — Z95.2 HX OF MITRAL VALVE REPLACEMENT WITH MECHANICAL VALVE: ICD-10-CM

## 2021-07-07 DIAGNOSIS — I48.0 PAROXYSMAL ATRIAL FIBRILLATION (HCC): Primary | ICD-10-CM

## 2021-07-07 LAB — INR PPP: 2.5

## 2021-07-07 NOTE — PROGRESS NOTES
Anticoagulation Clinic Progress Note    Anticoagulation Summary  As of 2021    INR goal:  2.5-3.5   TTR:  57.0 % (2.6 y)   INR used for dosin.50 (2021)   Warfarin maintenance plan:  2.5 mg every Mon, Wed, Fri; 5 mg all other days   Weekly warfarin total:  27.5 mg   Plan last modified:  Collette Vora [facility] (2021)   Next INR check:  2021   Priority:  High   Target end date:  Indefinite    Indications    Paroxysmal atrial fibrillation (CMS/HCC) [I48.0]  Hx of mitral valve replacement with mechanical valve [Z95.2] [Z95.2]             Anticoagulation Episode Summary     INR check location:      Preferred lab:      [facility]    Comments:  MDINAVILA home juliet weekly *only call when out of range*      Anticoagulation Care Providers     Provider Role Specialty Phone number    Navi Fay MD Referring Cardiology [phone]          Clinic Interview:  Patient Findings     Positives:  Change in alcohol use, Change in diet/appetite    Negatives:  Signs/symptoms of thrombosis, Signs/symptoms of bleeding,   Laboratory test error suspected, Change in health, Change in activity,   Upcoming invasive procedure, Emergency department visit, Upcoming dental   procedure, Missed doses, Extra doses, Change in medications, Hospital   admission, Bruising, Other complaints    Comments:  Is eeating less veggies and drinking 1-2 beers a day       Clinical Outcomes     Negatives:  Major bleeding event, Thromboembolic event,   Anticoagulation-related hospital admission, Anticoagulation-related ED   visit, Anticoagulation-related fatality    Comments:  Is eeating less veggies and drinking 1-2 beers a day         INR History:  Anticoagulation Monitoring 2021   INR 3.40 4.00 2.50   INR Date 2021   INR Goal 2.5-3.5 2.5-3.5 2.5-3.5   Trend Same Same Down   Last Week Total 30 mg 30 mg 27.5 mg   Next Week Total 30 mg 27.5 mg 30  mg   Sun 5 mg 5 mg 5 mg   Mon 2.5 mg 2.5 mg 2.5 mg   Tue 5 mg 5 mg 5 mg   Wed 5 mg - 5 mg (7/7)   Thu 2.5 mg 2.5 mg 5 mg (7/8)   Fri 5 mg 2.5 mg (7/2) 2.5 mg (7/9)   Sat 5 mg 5 mg 5 mg   Visit Report - - -   Some recent data might be hidden       Plan:  1. INR is Therapeutic today- see above in Anticoagulation Summary.   Will instruct Elliot Sebastian to Change their warfarin regimen- see above in Anticoagulation Summary.  2. Follow up in 1 week  3. Pt has agreed to only be called if INR out of range. They have been instructed to call if any changes in medications, doses, concerns, etc. Patient expresses understanding and has no further questions at this time.    Collette Vora, [facility]

## 2021-07-15 ENCOUNTER — ANTICOAGULATION VISIT (OUTPATIENT)
Dept: PHARMACY | Facility: HOSPITAL | Age: 61
End: 2021-07-15

## 2021-07-15 DIAGNOSIS — Z95.2 HX OF MITRAL VALVE REPLACEMENT WITH MECHANICAL VALVE: ICD-10-CM

## 2021-07-15 DIAGNOSIS — I48.0 PAROXYSMAL ATRIAL FIBRILLATION (HCC): Primary | ICD-10-CM

## 2021-07-15 LAB — INR PPP: 3.1

## 2021-07-15 NOTE — PROGRESS NOTES
Anticoagulation Clinic Progress Note    Anticoagulation Summary  As of 7/15/2021    INR goal:  2.5-3.5   TTR:  57.3 % (2.6 y)   INR used for dosing:  3.10 (7/14/2021)   Warfarin maintenance plan:  2.5 mg every Mon, Wed, Fri; 5 mg all other days   Weekly warfarin total:  27.5 mg   No change documented:  Manisha Benjamin   Plan last modified:  Collette Vora, [facility] (7/7/2021)   Next INR check:  7/22/2021   Priority:  High   Target end date:  Indefinite    Indications    Paroxysmal atrial fibrillation (CMS/HCC) [I48.0]  Hx of mitral valve replacement with mechanical valve [Z95.2] [Z95.2]             Anticoagulation Episode Summary     INR check location:      Preferred lab:      Send INR reminders to:  [facility]  POOL    Comments:  NICOLE home juliet weekly *only call when out of range*      Anticoagulation Care Providers     Provider Role Specialty Phone number    Navi Fay MD Referring Cardiology [phone]          Clinic Interview:  No pertinent clinical findings have been reported.    INR History:  Anticoagulation Monitoring 7/1/2021 7/7/2021 7/15/2021   INR 4.00 2.50 3.10   INR Date 6/30/2021 7/7/2021 7/14/2021   INR Goal 2.5-3.5 2.5-3.5 2.5-3.5   Trend Same Down Same   Last Week Total 30 mg 27.5 mg 27.5 mg   Next Week Total 27.5 mg 30 mg 27.5 mg   Sun 5 mg 5 mg 5 mg   Mon 2.5 mg 2.5 mg 2.5 mg   Tue 5 mg 5 mg 5 mg   Wed - 5 mg (7/7) 2.5 mg   Thu 2.5 mg 5 mg (7/8) 5 mg   Fri 2.5 mg (7/2) 2.5 mg (7/9) 2.5 mg   Sat 5 mg 5 mg 5 mg   Visit Report - - -   Some recent data might be hidden       Plan:  1. INR is therapeutic today- see above in Anticoagulation Summary.    Elliot Sebastian to continue their warfarin regimen- see above in Anticoagulation Summary.  2. Follow up in 1 week  3. Pt has agreed to only be called if INR out of range. They have been instructed to call if any changes in medications, doses, concerns, etc. Patient expresses understanding and has no further questions at  this time.    Manisha Benjamin

## 2021-07-15 NOTE — PROGRESS NOTES
Anticoagulation Clinic Progress Note    Anticoagulation Summary  As of 7/15/2021    INR goal:  2.5-3.5   TTR:  57.3 % (2.6 y)   INR used for dosing:  3.10 (7/14/2021)   Warfarin maintenance plan:  2.5 mg every Mon, Wed, Fri; 5 mg all other days   Weekly warfarin total:  27.5 mg   No change documented:  aMnisha Benjamin   Plan last modified:  Collette Vora, [facility] (7/7/2021)   Next INR check:  7/21/2021   Priority:  High   Target end date:  Indefinite    Indications    Paroxysmal atrial fibrillation (CMS/HCC) [I48.0]  Hx of mitral valve replacement with mechanical valve [Z95.2] [Z95.2]             Anticoagulation Episode Summary     INR check location:      Preferred lab:      [facility]    Comments:  MDINAVILA home juliet weekly *only call when out of range*      Anticoagulation Care Providers     Provider Role Specialty Phone number    Navi Fay MD Referring Cardiology [phone]          Clinic Interview:  Patient Findings     Negatives:  Signs/symptoms of thrombosis, Signs/symptoms of bleeding,   Laboratory test error suspected, Change in health, Change in alcohol use,   Change in activity, Upcoming invasive procedure, Emergency department   visit, Upcoming dental procedure, Missed doses, Extra doses, Change in   medications, Change in diet/appetite, Hospital admission, Bruising, Other   complaints      Clinical Outcomes     Negatives:  Major bleeding event, Thromboembolic event,   Anticoagulation-related hospital admission, Anticoagulation-related ED   visit, Anticoagulation-related fatality        INR History:  Anticoagulation Monitoring 7/1/2021 7/7/2021 7/15/2021   INR 4.00 2.50 3.10   INR Date 6/30/2021 7/7/2021 7/14/2021   INR Goal 2.5-3.5 2.5-3.5 2.5-3.5   Trend Same Down Same   Last Week Total 30 mg 27.5 mg 27.5 mg   Next Week Total 27.5 mg 30 mg 27.5 mg   Sun 5 mg 5 mg 5 mg   Mon 2.5 mg 2.5 mg 2.5 mg   Tue 5 mg 5 mg 5 mg   Wed - 5 mg (7/7) -   Thu  2.5 mg 5 mg (7/8) 5 mg   Fri 2.5 mg (7/2) 2.5 mg (7/9) 2.5 mg   Sat 5 mg 5 mg 5 mg   Visit Report - - -   Some recent data might be hidden       Plan:  1. INR is Therapeutic today- see above in Anticoagulation Summary.   Will instruct Elliot Sebastian to Continue their warfarin regimen- see above in Anticoagulation Summary.  2. Follow up in 1 week  3. They have been instructed to call if any changes in medications, doses, concerns, etc. Patient expresses understanding and has no further questions at this time.    Michael Horner [facility]

## 2021-07-21 ENCOUNTER — ANTICOAGULATION VISIT (OUTPATIENT)
Dept: PHARMACY | Facility: HOSPITAL | Age: 61
End: 2021-07-21

## 2021-07-21 DIAGNOSIS — Z95.2 HX OF MITRAL VALVE REPLACEMENT WITH MECHANICAL VALVE: ICD-10-CM

## 2021-07-21 DIAGNOSIS — I48.0 PAROXYSMAL ATRIAL FIBRILLATION (HCC): Primary | ICD-10-CM

## 2021-07-21 LAB — INR PPP: 1.9

## 2021-07-21 NOTE — PROGRESS NOTES
Anticoagulation Clinic Progress Note    Anticoagulation Summary  As of 2021    INR goal:  2.5-3.5   TTR:  57.3 % (2.7 y)   INR used for dosin.90 (2021)   Warfarin maintenance plan:  2.5 mg every Mon, Wed, Fri; 5 mg all other days   Weekly warfarin total:  27.5 mg   Plan last modified:  Collette Vora [facility] (2021)   Next INR check:  2021   Priority:  High   Target end date:  Indefinite    Indications    Paroxysmal atrial fibrillation (CMS/HCC) [I48.0]  Hx of mitral valve replacement with mechanical valve [Z95.2] [Z95.2]             Anticoagulation Episode Summary     INR check location:      Preferred lab:      [facility]    Comments:  NICOLE home juliet weekly *only call when out of range*      Anticoagulation Care Providers     Provider Role Specialty Phone number    Navi Fay MD Referring Cardiology [phone]          Clinic Interview:  Patient Findings     Positives:  Missed doses    Comments:  Missed Monday and  dose       Clinical Outcomes     Comments:  Missed Monday and  dose         INR History:  Anticoagulation Monitoring 2021 7/15/2021 2021   INR 2.50 3.10 1.90   INR Date 2021   INR Goal 2.5-3.5 2.5-3.5 2.5-3.5   Trend Down Same Same   Last Week Total 27.5 mg 27.5 mg 27.5 mg   Next Week Total 30 mg 27.5 mg 30 mg   Sun 5 mg 5 mg 5 mg   Mon 2.5 mg 2.5 mg 2.5 mg   Tue 5 mg 5 mg 5 mg   Wed 5 mg () - 5 mg ()   Thu 5 mg () 5 mg 5 mg   Fri 2.5 mg () 2.5 mg 2.5 mg   Sat 5 mg 5 mg 5 mg   Visit Report - - -   Some recent data might be hidden       Plan:  1. INR is Subtherapeutic today- see above in Anticoagulation Summary.   Will instruct Elliot Sebastian to Increase their warfarin regimen- see above in Anticoagulation Summary.  2. Follow up in 1 week  3. Pt has agreed to only be called if INR out of range. They have been instructed to call if any changes in medications,  doses, concerns, etc. Patient expresses understanding and has no further questions at this time.    Angelica Marroquin, [facility]

## 2021-07-28 ENCOUNTER — ANTICOAGULATION VISIT (OUTPATIENT)
Dept: PHARMACY | Facility: HOSPITAL | Age: 61
End: 2021-07-28

## 2021-07-28 DIAGNOSIS — I48.0 PAROXYSMAL ATRIAL FIBRILLATION (HCC): Primary | ICD-10-CM

## 2021-07-28 DIAGNOSIS — Z95.2 HX OF MITRAL VALVE REPLACEMENT WITH MECHANICAL VALVE: ICD-10-CM

## 2021-07-28 LAB — INR PPP: 2.6

## 2021-07-28 NOTE — PROGRESS NOTES
Anticoagulation Clinic Progress Note    Anticoagulation Summary  As of 2021    INR goal:  2.5-3.5   TTR:  56.9 % (2.7 y)   INR used for dosin.60 (2021)   Warfarin maintenance plan:  2.5 mg every Mon, Wed, Fri; 5 mg all other days   Weekly warfarin total:  27.5 mg   No change documented:  Angelica Marroquin RPH   Plan last modified:  Collette Vora RP (2021)   Next INR check:  2021   Priority:  High   Target end date:  Indefinite    Indications    Paroxysmal atrial fibrillation (CMS/HCC) [I48.0]  Hx of mitral valve replacement with mechanical valve [Z95.2] [Z95.2]             Anticoagulation Episode Summary     INR check location:      Preferred lab:      Send INR reminders to:   [facility]  POOL    Comments:  NICOLE home juliet weekly *only call when out of range*      Anticoagulation Care Providers     Provider Role Specialty Phone number    Navi Fay MD Referring Cardiology [phone]          Clinic Interview:  No pertinent clinical findings have been reported.    INR History:  Anticoagulation Monitoring 7/15/2021 2021 2021   INR 3.10 1.90 2.60   INR Date 2021   INR Goal 2.5-3.5 2.5-3.5 2.5-3.5   Trend Same Same Same   Last Week Total 27.5 mg 27.5 mg 30 mg   Next Week Total 27.5 mg 30 mg 27.5 mg   Sun 5 mg 5 mg 5 mg   Mon 2.5 mg 2.5 mg 2.5 mg   Tue 5 mg 5 mg 5 mg   Wed - 5 mg () 2.5 mg   Thu 5 mg 5 mg 5 mg   Fri 2.5 mg 2.5 mg 2.5 mg   Sat 5 mg 5 mg 5 mg   Visit Report - - -   Some recent data might be hidden       Plan:  1. INR is therapeutic today- see above in Anticoagulation Summary.    Elliot Sebastian to continue their warfarin regimen- see above in Anticoagulation Summary.  2. Follow up in 1 week  3. Pt has agreed to only be called if INR out of range. They have been instructed to call if any changes in medications, doses, concerns, etc. Patient expresses understanding and has no further questions at this  time.    Angelica Marroquin, [facility]

## 2021-08-04 ENCOUNTER — ANTICOAGULATION VISIT (OUTPATIENT)
Dept: PHARMACY | Facility: HOSPITAL | Age: 61
End: 2021-08-04

## 2021-08-04 DIAGNOSIS — I48.0 PAROXYSMAL ATRIAL FIBRILLATION (HCC): Primary | ICD-10-CM

## 2021-08-04 DIAGNOSIS — Z95.2 HX OF MITRAL VALVE REPLACEMENT WITH MECHANICAL VALVE: ICD-10-CM

## 2021-08-04 LAB — INR PPP: 3

## 2021-08-04 NOTE — PROGRESS NOTES
Anticoagulation Clinic Progress Note    Anticoagulation Summary  As of 8/4/2021    INR goal:  2.5-3.5   TTR:  57.3 % (2.7 y)   INR used for dosing:  3.00 (8/4/2021)   Warfarin maintenance plan:  2.5 mg every Mon, Wed, Fri; 5 mg all other days   Weekly warfarin total:  27.5 mg   No change documented:  Alicja Starks   Plan last modified:  Collette Vora, [facility] (7/7/2021)   Next INR check:  8/11/2021   Priority:  High   Target end date:  Indefinite    Indications    Paroxysmal atrial fibrillation (CMS/HCC) [I48.0]  Hx of mitral valve replacement with mechanical valve [Z95.2] [Z95.2]             Anticoagulation Episode Summary     INR check location:      Preferred lab:      Send INR reminders to:  [facility]  POOL    Comments:  NICOLE home juliet weekly *only call when out of range*      Anticoagulation Care Providers     Provider Role Specialty Phone number    Navi Fay MD Referring Cardiology [phone]          Clinic Interview:  No pertinent clinical findings have been reported.    INR History:  Anticoagulation Monitoring 7/21/2021 7/28/2021 8/4/2021   INR 1.90 2.60 3.00   INR Date 7/21/2021 7/28/2021 8/4/2021   INR Goal 2.5-3.5 2.5-3.5 2.5-3.5   Trend Same Same Same   Last Week Total 27.5 mg 30 mg 27.5 mg   Next Week Total 30 mg 27.5 mg 27.5 mg   Sun 5 mg 5 mg 5 mg   Mon 2.5 mg 2.5 mg 2.5 mg   Tue 5 mg 5 mg 5 mg   Wed 5 mg (7/21) 2.5 mg 2.5 mg   Thu 5 mg 5 mg 5 mg   Fri 2.5 mg 2.5 mg 2.5 mg   Sat 5 mg 5 mg 5 mg   Visit Report - - -   Some recent data might be hidden       Plan:  1. INR is therapeutic today- see above in Anticoagulation Summary.    Elliot Sebastian to continue their warfarin regimen- see above in Anticoagulation Summary.  2. Follow up in 1 week  3. Pt has agreed to only be called if INR out of range. They have been instructed to call if any changes in medications, doses, concerns, etc. Patient expresses understanding and has no further questions at this  time.    Alicja Starks

## 2021-08-12 ENCOUNTER — ANTICOAGULATION VISIT (OUTPATIENT)
Dept: PHARMACY | Facility: HOSPITAL | Age: 61
End: 2021-08-12

## 2021-08-12 DIAGNOSIS — Z95.2 HX OF MITRAL VALVE REPLACEMENT WITH MECHANICAL VALVE: ICD-10-CM

## 2021-08-12 DIAGNOSIS — I48.0 PAROXYSMAL ATRIAL FIBRILLATION (HCC): Primary | ICD-10-CM

## 2021-08-12 LAB — INR PPP: 2.8

## 2021-08-12 NOTE — PROGRESS NOTES
Anticoagulation Clinic Progress Note    Anticoagulation Summary  As of 2021    INR goal:  2.5-3.5   TTR:  57.6 % (2.7 y)   INR used for dosin.80 (2021)   Warfarin maintenance plan:  2.5 mg every Mon, Wed, Fri; 5 mg all other days   Weekly warfarin total:  27.5 mg   No change documented:  Manisha Benjamin   Plan last modified:  Collette Vora, [facility] (2021)   Next INR check:  2021   Priority:  High   Target end date:  Indefinite    Indications    Paroxysmal atrial fibrillation (CMS/HCC) [I48.0]  Hx of mitral valve replacement with mechanical valve [Z95.2] [Z95.2]             Anticoagulation Episode Summary     INR check location:      Preferred lab:      Send INR reminders to:   [facility]  POOL    Comments:  NICOLE home juliet weekly *only call when out of range*      Anticoagulation Care Providers     Provider Role Specialty Phone number    Navi Fay MD Referring Cardiology [phone]          Clinic Interview:  No pertinent clinical findings have been reported.    INR History:  Anticoagulation Monitoring 2021   INR 2.60 3.00 2.80   INR Date 2021   INR Goal 2.5-3.5 2.5-3.5 2.5-3.5   Trend Same Same Same   Last Week Total 30 mg 27.5 mg 27.5 mg   Next Week Total 27.5 mg 27.5 mg 27.5 mg   Sun 5 mg 5 mg 5 mg   Mon 2.5 mg 2.5 mg 2.5 mg   Tue 5 mg 5 mg 5 mg   Wed 2.5 mg 2.5 mg 2.5 mg   Thu 5 mg 5 mg 5 mg   Fri 2.5 mg 2.5 mg 2.5 mg   Sat 5 mg 5 mg 5 mg   Visit Report - - -   Some recent data might be hidden       Plan:  1. INR is therapeutic today- see above in Anticoagulation Summary.    Elliot Sebastian to continue their warfarin regimen- see above in Anticoagulation Summary.  2. Follow up in 1 week  3. Pt has agreed to only be called if INR out of range. They have been instructed to call if any changes in medications, doses, concerns, etc. Patient expresses understanding and has no further questions at this  time.    Manisha Benjamin

## 2021-08-19 ENCOUNTER — ANTICOAGULATION VISIT (OUTPATIENT)
Dept: PHARMACY | Facility: HOSPITAL | Age: 61
End: 2021-08-19

## 2021-08-19 DIAGNOSIS — I48.0 PAROXYSMAL ATRIAL FIBRILLATION (HCC): Primary | ICD-10-CM

## 2021-08-19 DIAGNOSIS — Z95.2 HX OF MITRAL VALVE REPLACEMENT WITH MECHANICAL VALVE: ICD-10-CM

## 2021-08-19 LAB — INR PPP: 2.7

## 2021-08-19 RX ORDER — ATENOLOL 25 MG/1
25 TABLET ORAL DAILY
Qty: 90 TABLET | Refills: 1 | Status: SHIPPED | OUTPATIENT
Start: 2021-08-19 | End: 2021-08-30 | Stop reason: SDUPTHER

## 2021-08-19 RX ORDER — WARFARIN SODIUM 5 MG/1
TABLET ORAL
Qty: 75 TABLET | Refills: 1 | Status: SHIPPED | OUTPATIENT
Start: 2021-08-19 | End: 2021-08-30

## 2021-08-19 NOTE — TELEPHONE ENCOUNTER
Please advise filling Atenolol.    LOV   -   12/29/2020  Next   -   12/30/21  Last Labs   -   11/23/2020    Renetta

## 2021-08-19 NOTE — PROGRESS NOTES
Anticoagulation Clinic Progress Note    Anticoagulation Summary  As of 2021    INR goal:  2.5-3.5   TTR:  57.8 % (2.7 y)   INR used for dosin.70 (2021)   Warfarin maintenance plan:  2.5 mg every Mon, Wed, Fri; 5 mg all other days   Weekly warfarin total:  27.5 mg   No change documented:  Manisha Benjamin   Plan last modified:  Collette Vora, [facility] (2021)   Next INR check:  2021   Priority:  High   Target end date:  Indefinite    Indications    Paroxysmal atrial fibrillation (CMS/HCC) [I48.0]  Hx of mitral valve replacement with mechanical valve [Z95.2] [Z95.2]             Anticoagulation Episode Summary     INR check location:      Preferred lab:      Send INR reminders to:   [facility]  POOL    Comments:  NICOLE home juliet weekly *only call when out of range*      Anticoagulation Care Providers     Provider Role Specialty Phone number    Navi Fay MD Referring Cardiology [phone]          Clinic Interview:  No pertinent clinical findings have been reported.    INR History:  Anticoagulation Monitoring 2021   INR 3.00 2.80 2.70   INR Date 2021   INR Goal 2.5-3.5 2.5-3.5 2.5-3.5   Trend Same Same Same   Last Week Total 27.5 mg 27.5 mg 27.5 mg   Next Week Total 27.5 mg 27.5 mg 27.5 mg   Sun 5 mg 5 mg 5 mg   Mon 2.5 mg 2.5 mg 2.5 mg   Tue 5 mg 5 mg 5 mg   Wed 2.5 mg 2.5 mg 2.5 mg   Thu 5 mg 5 mg 5 mg   Fri 2.5 mg 2.5 mg 2.5 mg   Sat 5 mg 5 mg 5 mg   Visit Report - - -   Some recent data might be hidden       Plan:  1. INR is therapeutic today- see above in Anticoagulation Summary.    Elliot Sebastian to continue their warfarin regimen- see above in Anticoagulation Summary.  2. Follow up in 1 week  3. Pt has agreed to only be called if INR out of range. They have been instructed to call if any changes in medications, doses, concerns, etc. Patient expresses understanding and has no further questions at this  time.    Manisha Benjamin

## 2021-08-25 ENCOUNTER — ANTICOAGULATION VISIT (OUTPATIENT)
Dept: PHARMACY | Facility: HOSPITAL | Age: 61
End: 2021-08-25

## 2021-08-25 DIAGNOSIS — Z95.2 HX OF MITRAL VALVE REPLACEMENT WITH MECHANICAL VALVE: ICD-10-CM

## 2021-08-25 DIAGNOSIS — I48.0 PAROXYSMAL ATRIAL FIBRILLATION (HCC): Primary | ICD-10-CM

## 2021-08-25 LAB — INR PPP: 3

## 2021-08-25 NOTE — PROGRESS NOTES
Anticoagulation Clinic Progress Note    Anticoagulation Summary  As of 8/25/2021    INR goal:  2.5-3.5   TTR:  58.1 % (2.8 y)   INR used for dosing:  3.00 (8/25/2021)   Warfarin maintenance plan:  2.5 mg every Mon, Wed, Fri; 5 mg all other days   Weekly warfarin total:  27.5 mg   No change documented:  Sara Mukherjee   Plan last modified:  Collette Vora, [facility] (7/7/2021)   Next INR check:  9/1/2021   Priority:  High   Target end date:  Indefinite    Indications    Paroxysmal atrial fibrillation (CMS/HCC) [I48.0]  Hx of mitral valve replacement with mechanical valve [Z95.2] [Z95.2]             Anticoagulation Episode Summary     INR check location:      Preferred lab:      Send INR reminders to:   [facility]  POOL    Comments:  NICOLE home juliet weekly *only call when out of range*      Anticoagulation Care Providers     Provider Role Specialty Phone number    Navi Fay MD Referring Cardiology [phone]          Clinic Interview:  No pertinent clinical findings have been reported.    INR History:  Anticoagulation Monitoring 8/12/2021 8/19/2021 8/25/2021   INR 2.80 2.70 3.00   INR Date 8/12/2021 8/18/2021 8/25/2021   INR Goal 2.5-3.5 2.5-3.5 2.5-3.5   Trend Same Same Same   Last Week Total 27.5 mg 27.5 mg 27.5 mg   Next Week Total 27.5 mg 27.5 mg 27.5 mg   Sun 5 mg 5 mg 5 mg   Mon 2.5 mg 2.5 mg 2.5 mg   Tue 5 mg 5 mg 5 mg   Wed 2.5 mg 2.5 mg 2.5 mg   Thu 5 mg 5 mg 5 mg   Fri 2.5 mg 2.5 mg 2.5 mg   Sat 5 mg 5 mg 5 mg   Visit Report - - -   Some recent data might be hidden       Plan:  1. INR is therapeutic today- see above in Anticoagulation Summary.    Elliot Sebastian to continue their warfarin regimen- see above in Anticoagulation Summary.  2. Follow up in 1 week  3. Pt has agreed to only be called if INR out of range. They have been instructed to call if any changes in medications, doses, concerns, etc. Patient expresses understanding and has no further questions at this  time.    Sara Mukherjee

## 2021-08-30 RX ORDER — WARFARIN SODIUM 5 MG/1
TABLET ORAL
Qty: 71 TABLET | Refills: 1 | Status: SHIPPED | OUTPATIENT
Start: 2021-08-30 | End: 2022-04-20

## 2021-08-30 RX ORDER — ATENOLOL 25 MG/1
25 TABLET ORAL DAILY
Qty: 90 TABLET | Refills: 1 | Status: SHIPPED | OUTPATIENT
Start: 2021-08-30 | End: 2022-04-14

## 2021-09-01 ENCOUNTER — ANTICOAGULATION VISIT (OUTPATIENT)
Dept: PHARMACY | Facility: HOSPITAL | Age: 61
End: 2021-09-01

## 2021-09-01 DIAGNOSIS — I48.0 PAROXYSMAL ATRIAL FIBRILLATION (HCC): Primary | ICD-10-CM

## 2021-09-01 DIAGNOSIS — Z95.2 HX OF MITRAL VALVE REPLACEMENT WITH MECHANICAL VALVE: ICD-10-CM

## 2021-09-01 LAB — INR PPP: 3

## 2021-09-01 NOTE — PROGRESS NOTES
Anticoagulation Clinic Progress Note    Anticoagulation Summary  As of 9/1/2021    INR goal:  2.5-3.5   TTR:  58.4 % (2.8 y)   INR used for dosing:  3.00 (9/1/2021)   Warfarin maintenance plan:  2.5 mg every Mon, Wed, Fri; 5 mg all other days   Weekly warfarin total:  27.5 mg   No change documented:  Manisha Benjamin   Plan last modified:  Collette Vora, [facility] (7/7/2021)   Next INR check:  9/8/2021   Priority:  High   Target end date:  Indefinite    Indications    Paroxysmal atrial fibrillation (CMS/HCC) [I48.0]  Hx of mitral valve replacement with mechanical valve [Z95.2] [Z95.2]             Anticoagulation Episode Summary     INR check location:      Preferred lab:      Send INR reminders to:   [facility]  POOL    Comments:  NICOLE home juliet weekly *only call when out of range*      Anticoagulation Care Providers     Provider Role Specialty Phone number    Navi Fay MD Referring Cardiology [phone]          Clinic Interview:  No pertinent clinical findings have been reported.    INR History:  Anticoagulation Monitoring 8/19/2021 8/25/2021 9/1/2021   INR 2.70 3.00 3.00   INR Date 8/18/2021 8/25/2021 9/1/2021   INR Goal 2.5-3.5 2.5-3.5 2.5-3.5   Trend Same Same Same   Last Week Total 27.5 mg 27.5 mg 27.5 mg   Next Week Total 27.5 mg 27.5 mg 27.5 mg   Sun 5 mg 5 mg 5 mg   Mon 2.5 mg 2.5 mg 2.5 mg   Tue 5 mg 5 mg 5 mg   Wed 2.5 mg 2.5 mg 2.5 mg   Thu 5 mg 5 mg 5 mg   Fri 2.5 mg 2.5 mg 2.5 mg   Sat 5 mg 5 mg 5 mg   Visit Report - - -   Some recent data might be hidden       Plan:  1. INR is therapeutic today- see above in Anticoagulation Summary.    Elliot Sebastian to continue their warfarin regimen- see above in Anticoagulation Summary.  2. Follow up in 1 week  3. Pt has agreed to only be called if INR out of range. They have been instructed to call if any changes in medications, doses, concerns, etc. Patient expresses understanding and has no further questions at this  time.    Manisha Benjamin

## 2021-09-08 LAB — INR PPP: 2.6

## 2021-09-09 ENCOUNTER — ANTICOAGULATION VISIT (OUTPATIENT)
Dept: PHARMACY | Facility: HOSPITAL | Age: 61
End: 2021-09-09

## 2021-09-09 DIAGNOSIS — Z95.2 HX OF MITRAL VALVE REPLACEMENT WITH MECHANICAL VALVE: ICD-10-CM

## 2021-09-09 DIAGNOSIS — I48.0 PAROXYSMAL ATRIAL FIBRILLATION (HCC): Primary | ICD-10-CM

## 2021-09-09 NOTE — PROGRESS NOTES
Anticoagulation Clinic Progress Note    Anticoagulation Summary  As of 2021    INR goal:  2.5-3.5   TTR:  58.7 % (2.8 y)   INR used for dosin.60 (2021)   Warfarin maintenance plan:  2.5 mg every Mon, Wed, Fri; 5 mg all other days   Weekly warfarin total:  27.5 mg   No change documented:  Alicja Starks   Plan last modified:  Collette Vora, [facility] (2021)   Next INR check:  9/15/2021   Priority:  High   Target end date:  Indefinite    Indications    Paroxysmal atrial fibrillation (CMS/HCC) [I48.0]  Hx of mitral valve replacement with mechanical valve [Z95.2] [Z95.2]             Anticoagulation Episode Summary     INR check location:      Preferred lab:      Send INR reminders to:  [facility]  POOL    Comments:  NICOLE home juliet weekly *only call when out of range*      Anticoagulation Care Providers     Provider Role Specialty Phone number    Navi Fay MD Referring Cardiology [phone]          Clinic Interview:  No pertinent clinical findings have been reported.    INR History:  Anticoagulation Monitoring 2021   INR 3.00 3.00 2.60   INR Date 2021   INR Goal 2.5-3.5 2.5-3.5 2.5-3.5   Trend Same Same Same   Last Week Total 27.5 mg 27.5 mg 27.5 mg   Next Week Total 27.5 mg 27.5 mg 27.5 mg   Sun 5 mg 5 mg 5 mg   Mon 2.5 mg 2.5 mg 2.5 mg   Tue 5 mg 5 mg 5 mg   Wed 2.5 mg 2.5 mg -   Thu 5 mg 5 mg 5 mg   Fri 2.5 mg 2.5 mg 2.5 mg   Sat 5 mg 5 mg 5 mg   Visit Report - - -   Some recent data might be hidden       Plan:  1. INR is therapeutic today- see above in Anticoagulation Summary.    Elliot Sebastian to continue their warfarin regimen- see above in Anticoagulation Summary.  2. Follow up in 1 week  3. Pt has agreed to only be called if INR out of range. They have been instructed to call if any changes in medications, doses, concerns, etc. Patient expresses understanding and has no further questions at this  time.    Alicja Starks

## 2021-09-15 LAB — INR PPP: 3.4

## 2021-09-16 ENCOUNTER — ANTICOAGULATION VISIT (OUTPATIENT)
Dept: PHARMACY | Facility: HOSPITAL | Age: 61
End: 2021-09-16

## 2021-09-16 DIAGNOSIS — I48.0 PAROXYSMAL ATRIAL FIBRILLATION (HCC): Primary | ICD-10-CM

## 2021-09-16 DIAGNOSIS — Z95.2 HX OF MITRAL VALVE REPLACEMENT WITH MECHANICAL VALVE: ICD-10-CM

## 2021-09-16 NOTE — PROGRESS NOTES
Anticoagulation Clinic Progress Note    Anticoagulation Summary  As of 9/16/2021    INR goal:  2.5-3.5   TTR:  59.0 % (2.8 y)   INR used for dosing:  3.40 (9/15/2021)   Warfarin maintenance plan:  2.5 mg every Mon, Wed, Fri; 5 mg all other days   Weekly warfarin total:  27.5 mg   No change documented:  Alicja Starks   Plan last modified:  Collette Vora, [facility] (7/7/2021)   Next INR check:  9/22/2021   Priority:  High   Target end date:  Indefinite    Indications    Paroxysmal atrial fibrillation (CMS/HCC) [I48.0]  Hx of mitral valve replacement with mechanical valve [Z95.2] [Z95.2]             Anticoagulation Episode Summary     INR check location:      Preferred lab:      Send INR reminders to:  [facility]  POOL    Comments:  NICOLE home juliet weekly *only call when out of range*      Anticoagulation Care Providers     Provider Role Specialty Phone number    Navi Fay MD Referring Cardiology [phone]          Clinic Interview:  No pertinent clinical findings have been reported.    INR History:  Anticoagulation Monitoring 9/1/2021 9/9/2021 9/16/2021   INR 3.00 2.60 3.40   INR Date 9/1/2021 9/8/2021 9/15/2021   INR Goal 2.5-3.5 2.5-3.5 2.5-3.5   Trend Same Same Same   Last Week Total 27.5 mg 27.5 mg 27.5 mg   Next Week Total 27.5 mg 27.5 mg 27.5 mg   Sun 5 mg 5 mg 5 mg   Mon 2.5 mg 2.5 mg 2.5 mg   Tue 5 mg 5 mg 5 mg   Wed 2.5 mg - -   Thu 5 mg 5 mg 5 mg   Fri 2.5 mg 2.5 mg 2.5 mg   Sat 5 mg 5 mg 5 mg   Visit Report - - -   Some recent data might be hidden       Plan:  1. INR is therapeutic today- see above in Anticoagulation Summary.    Elliot Sebastian to continue their warfarin regimen- see above in Anticoagulation Summary.  2. Follow up in 1 week  3. Pt has agreed to only be called if INR out of range. They have been instructed to call if any changes in medications, doses, concerns, etc. Patient expresses understanding and has no further questions at this time.    Angelica  Cara, [facility]

## 2021-09-22 LAB — INR PPP: 3.2

## 2021-09-23 ENCOUNTER — ANTICOAGULATION VISIT (OUTPATIENT)
Dept: PHARMACY | Facility: HOSPITAL | Age: 61
End: 2021-09-23

## 2021-09-23 DIAGNOSIS — I48.0 PAROXYSMAL ATRIAL FIBRILLATION (HCC): Primary | ICD-10-CM

## 2021-09-23 DIAGNOSIS — Z95.2 HX OF MITRAL VALVE REPLACEMENT WITH MECHANICAL VALVE: ICD-10-CM

## 2021-09-23 NOTE — PROGRESS NOTES
Anticoagulation Clinic Progress Note    Anticoagulation Summary  As of 9/23/2021    INR goal:  2.5-3.5   TTR:  59.3 % (2.8 y)   INR used for dosing:  3.20 (9/22/2021)   Warfarin maintenance plan:  2.5 mg every Mon, Wed, Fri; 5 mg all other days   Weekly warfarin total:  27.5 mg   No change documented:  Alicja Starks   Plan last modified:  Collette Vora, [facility] (7/7/2021)   Next INR check:  9/29/2021   Priority:  High   Target end date:  Indefinite    Indications    Paroxysmal atrial fibrillation (CMS/HCC) [I48.0]  Hx of mitral valve replacement with mechanical valve [Z95.2] [Z95.2]             Anticoagulation Episode Summary     INR check location:      Preferred lab:      Send INR reminders to:   [facility]  POOL    Comments:  NICOLE home juliet weekly *only call when out of range*      Anticoagulation Care Providers     Provider Role Specialty Phone number    Navi Fay MD Referring Cardiology [phone]          Clinic Interview:  No pertinent clinical findings have been reported.    INR History:  Anticoagulation Monitoring 9/9/2021 9/16/2021 9/23/2021   INR 2.60 3.40 3.20   INR Date 9/8/2021 9/15/2021 9/22/2021   INR Goal 2.5-3.5 2.5-3.5 2.5-3.5   Trend Same Same Same   Last Week Total 27.5 mg 27.5 mg 27.5 mg   Next Week Total 27.5 mg 27.5 mg 27.5 mg   Sun 5 mg 5 mg 5 mg   Mon 2.5 mg 2.5 mg 2.5 mg   Tue 5 mg 5 mg 5 mg   Wed - - -   Thu 5 mg 5 mg 5 mg   Fri 2.5 mg 2.5 mg 2.5 mg   Sat 5 mg 5 mg 5 mg   Visit Report - - -   Some recent data might be hidden       Plan:  1. INR is therapeutic today- see above in Anticoagulation Summary.    Elliot Sebastian to continue their warfarin regimen- see above in Anticoagulation Summary.  2. Follow up in 1 week  3. Pt has agreed to only be called if INR out of range. They have been instructed to call if any changes in medications, doses, concerns, etc. Patient expresses understanding and has no further questions at this time.    Alicja BALTAZAR  [place]

## 2021-09-29 LAB — INR PPP: 2.2

## 2021-09-30 ENCOUNTER — ANTICOAGULATION VISIT (OUTPATIENT)
Dept: PHARMACY | Facility: HOSPITAL | Age: 61
End: 2021-09-30

## 2021-09-30 DIAGNOSIS — I48.0 PAROXYSMAL ATRIAL FIBRILLATION (HCC): Primary | ICD-10-CM

## 2021-09-30 DIAGNOSIS — Z95.2 HX OF MITRAL VALVE REPLACEMENT WITH MECHANICAL VALVE: ICD-10-CM

## 2021-09-30 NOTE — PROGRESS NOTES
Anticoagulation Clinic Progress Note    Anticoagulation Summary  As of 2021    INR goal:  2.5-3.5   TTR:  59.3 % (2.9 y)   INR used for dosin.20 (2021)   Warfarin maintenance plan:  2.5 mg every Mon, Wed, Fri; 5 mg all other days   Weekly warfarin total:  27.5 mg   Plan last modified:  Collette Vora [facility] (2021)   Next INR check:  10/6/2021   Priority:  High   Target end date:  Indefinite    Indications    Paroxysmal atrial fibrillation (CMS/HCC) [I48.0]  Hx of mitral valve replacement with mechanical valve [Z95.2] [Z95.2]             Anticoagulation Episode Summary     INR check location:      Preferred lab:      [facility]    Comments:  MDINAVILA home juliet weekly *only call when out of range*      Anticoagulation Care Providers     Provider Role Specialty Phone number    Navi Fay MD Referring Cardiology [phone]          Drug interactions: has remained unchanged.  Diet: has remained unchanged.    Clinic Interview:  No pertinent clinical findings have been reported.    INR History:  Anticoagulation Monitoring 2021   INR 3.40 3.20 2.20   INR Date 9/15/2021 2021 2021   INR Goal 2.5-3.5 2.5-3.5 2.5-3.5   Trend Same Same Same   Last Week Total 27.5 mg 27.5 mg 22.5 mg   Next Week Total 27.5 mg 27.5 mg 30 mg   Sun 5 mg 5 mg 5 mg   Mon 2.5 mg 2.5 mg 2.5 mg   Tue 5 mg 5 mg 5 mg   Wed - - -   Thu 5 mg 5 mg 7.5 mg ()   Fri 2.5 mg 2.5 mg 2.5 mg   Sat 5 mg 5 mg 5 mg   Visit Report - - -   Some recent data might be hidden       Plan:  1. INR is 2.2 today- see above in Anticoagulation Summary.   Pt reports he missed his Sat  dose. Instructed pt to take booster of 7.5mg today, then cont current sched see above in Anticoagulation Summary.  2. Follow up in 1 weeks  3. Pt has agreed to only be called if INR out of range. They have been instructed to call if any changes in medications, doses, concerns, etc.  Patient expresses understanding and has no further questions at this time.    Madison Medellin, [facility]

## 2021-10-06 LAB — INR PPP: 3.3

## 2021-10-07 ENCOUNTER — ANTICOAGULATION VISIT (OUTPATIENT)
Dept: PHARMACY | Facility: HOSPITAL | Age: 61
End: 2021-10-07

## 2021-10-07 DIAGNOSIS — Z95.2 HX OF MITRAL VALVE REPLACEMENT WITH MECHANICAL VALVE: ICD-10-CM

## 2021-10-07 DIAGNOSIS — I48.0 PAROXYSMAL ATRIAL FIBRILLATION (HCC): Primary | ICD-10-CM

## 2021-10-07 NOTE — PROGRESS NOTES
Anticoagulation Clinic Progress Note    Anticoagulation Summary  As of 10/7/2021    INR goal:  2.5-3.5   TTR:  59.4 % (2.9 y)   INR used for dosing:  3.30 (10/6/2021)   Warfarin maintenance plan:  2.5 mg every Mon, Wed, Fri; 5 mg all other days   Weekly warfarin total:  27.5 mg   No change documented:  Angelica Marroquin RPH   Plan last modified:  Collette Vora [facility] (7/7/2021)   Next INR check:  10/13/2021   Priority:  High   Target end date:  Indefinite    Indications    Paroxysmal atrial fibrillation (HCC) [I48.0]  Hx of mitral valve replacement with mechanical valve [Z95.2] [Z95.2]             Anticoagulation Episode Summary     INR check location:      Preferred lab:      Send INR reminders to:   CHARLES MENDEZ  POOL    Comments:  MDINAVILA home juliet weekly *only call when out of range*      Anticoagulation Care Providers     Provider Role Specialty Phone number    Navi Fay MD Referring Cardiology [phone]          Clinic Interview:  No pertinent clinical findings have been reported.    INR History:  Anticoagulation Monitoring 9/23/2021 9/30/2021 10/7/2021   INR 3.20 2.20 3.30   INR Date 9/22/2021 9/29/2021 10/6/2021   INR Goal 2.5-3.5 2.5-3.5 2.5-3.5   Trend Same Same Same   Last Week Total 27.5 mg 22.5 mg 30 mg   Next Week Total 27.5 mg 30 mg 27.5 mg   Sun 5 mg 5 mg 5 mg   Mon 2.5 mg 2.5 mg 2.5 mg   Tue 5 mg 5 mg 5 mg   Wed - - -   Thu 5 mg 7.5 mg (9/30) 5 mg   Fri 2.5 mg 2.5 mg 2.5 mg   Sat 5 mg 5 mg 5 mg   Visit Report - - -   Some recent data might be hidden       Plan:  1. INR is therapeutic today- see above in Anticoagulation Summary.    Elliot Sebastian to continue their warfarin regimen- see above in Anticoagulation Summary.  2. Follow up in 1 week  3. Pt has agreed to only be called if INR out of range. They have been instructed to call if any changes in medications, doses, concerns, etc. Patient expresses understanding and has no further questions at this time.    Angelica  Cara, [facility]

## 2021-10-13 LAB — INR PPP: 3.1

## 2021-10-14 ENCOUNTER — ANTICOAGULATION VISIT (OUTPATIENT)
Dept: PHARMACY | Facility: HOSPITAL | Age: 61
End: 2021-10-14

## 2021-10-14 DIAGNOSIS — I48.0 PAROXYSMAL ATRIAL FIBRILLATION (HCC): Primary | ICD-10-CM

## 2021-10-14 DIAGNOSIS — Z95.2 HX OF MITRAL VALVE REPLACEMENT WITH MECHANICAL VALVE: ICD-10-CM

## 2021-10-14 NOTE — PROGRESS NOTES
Anticoagulation Clinic Progress Note    Anticoagulation Summary  As of 10/14/2021    INR goal:  2.5-3.5   TTR:  59.7 % (2.9 y)   INR used for dosing:  3.10 (10/13/2021)   Warfarin maintenance plan:  2.5 mg every Mon, Wed, Fri; 5 mg all other days   Weekly warfarin total:  27.5 mg   No change documented:  Sara Mukherjee   Plan last modified:  Collette Vora, [facility] (7/7/2021)   Next INR check:  10/20/2021   Priority:  High   Target end date:  Indefinite    Indications    Paroxysmal atrial fibrillation (HCC) [I48.0]  Hx of mitral valve replacement with mechanical valve [Z95.2] [Z95.2]             Anticoagulation Episode Summary     INR check location:      Preferred lab:      Send INR reminders to:   CHARLES MENDEZ  POOL    Comments:  NICOLE home juliet weekly *only call when out of range*      Anticoagulation Care Providers     Provider Role Specialty Phone number    Navi Fay MD Referring Cardiology [phone]          Clinic Interview:  No pertinent clinical findings have been reported.    INR History:  Anticoagulation Monitoring 9/30/2021 10/7/2021 10/14/2021   INR 2.20 3.30 3.10   INR Date 9/29/2021 10/6/2021 10/13/2021   INR Goal 2.5-3.5 2.5-3.5 2.5-3.5   Trend Same Same Same   Last Week Total 22.5 mg 30 mg 27.5 mg   Next Week Total 30 mg 27.5 mg 27.5 mg   Sun 5 mg 5 mg 5 mg   Mon 2.5 mg 2.5 mg 2.5 mg   Tue 5 mg 5 mg 5 mg   Wed - - -   Thu 7.5 mg (9/30) 5 mg 5 mg   Fri 2.5 mg 2.5 mg 2.5 mg   Sat 5 mg 5 mg 5 mg   Visit Report - - -   Some recent data might be hidden       Plan:  1. INR is therapeutic today- see above in Anticoagulation Summary.    Elliot Sebastian to continue their warfarin regimen- see above in Anticoagulation Summary.  2. Follow up in 1 week  3. Pt has agreed to only be called if INR out of range. They have been instructed to call if any changes in medications, doses, concerns, etc. Patient expresses understanding and has no further questions at this time.    Sara  Yaz

## 2021-10-20 LAB — INR PPP: 3.5

## 2021-10-21 ENCOUNTER — ANTICOAGULATION VISIT (OUTPATIENT)
Dept: PHARMACY | Facility: HOSPITAL | Age: 61
End: 2021-10-21

## 2021-10-21 DIAGNOSIS — Z95.2 HX OF MITRAL VALVE REPLACEMENT WITH MECHANICAL VALVE: ICD-10-CM

## 2021-10-21 DIAGNOSIS — I48.0 PAROXYSMAL ATRIAL FIBRILLATION (HCC): Primary | ICD-10-CM

## 2021-10-21 NOTE — PROGRESS NOTES
Anticoagulation Clinic Progress Note    Anticoagulation Summary  As of 10/21/2021    INR goal:  2.5-3.5   TTR:  60.0 % (2.9 y)   INR used for dosing:  3.50 (10/20/2021)   Warfarin maintenance plan:  2.5 mg every Mon, Wed, Fri; 5 mg all other days   Weekly warfarin total:  27.5 mg   No change documented:  Alicja Starks   Plan last modified:  Collette Vora, [facility] (7/7/2021)   Next INR check:  10/27/2021   Priority:  High   Target end date:  Indefinite    Indications    Paroxysmal atrial fibrillation (HCC) [I48.0]  Hx of mitral valve replacement with mechanical valve [Z95.2] [Z95.2]             Anticoagulation Episode Summary     INR check location:      Preferred lab:      Send INR reminders to:   [facility]  POOL    Comments:  MDINAVILA home juliet weekly *only call when out of range*      Anticoagulation Care Providers     Provider Role Specialty Phone number    Navi Fay MD Referring Cardiology [phone]          Clinic Interview:  No pertinent clinical findings have been reported.    INR History:  Anticoagulation Monitoring 10/7/2021 10/14/2021 10/21/2021   INR 3.30 3.10 3.50   INR Date 10/6/2021 10/13/2021 10/20/2021   INR Goal 2.5-3.5 2.5-3.5 2.5-3.5   Trend Same Same Same   Last Week Total 30 mg 27.5 mg 27.5 mg   Next Week Total 27.5 mg 27.5 mg 27.5 mg   Sun 5 mg 5 mg 5 mg   Mon 2.5 mg 2.5 mg 2.5 mg   Tue 5 mg 5 mg 5 mg   Wed - - -   Thu 5 mg 5 mg 5 mg   Fri 2.5 mg 2.5 mg 2.5 mg   Sat 5 mg 5 mg 5 mg   Visit Report - - -   Some recent data might be hidden       Plan:  1. INR is therapeutic today- see above in Anticoagulation Summary.    Elliot Sebastian to continue their warfarin regimen- see above in Anticoagulation Summary.  2. Follow up in 1 week  3. Pt has agreed to only be called if INR out of range. They have been instructed to call if any changes in medications, doses, concerns, etc. Patient expresses understanding and has no further questions at this time.    Alicja  GIOVANNY Starks

## 2021-10-27 LAB — INR PPP: 2.7

## 2021-10-28 ENCOUNTER — ANTICOAGULATION VISIT (OUTPATIENT)
Dept: PHARMACY | Facility: HOSPITAL | Age: 61
End: 2021-10-28

## 2021-10-28 DIAGNOSIS — Z95.2 HX OF MITRAL VALVE REPLACEMENT WITH MECHANICAL VALVE: ICD-10-CM

## 2021-10-28 DIAGNOSIS — I48.0 PAROXYSMAL ATRIAL FIBRILLATION (HCC): Primary | ICD-10-CM

## 2021-10-28 NOTE — PROGRESS NOTES
Anticoagulation Clinic Progress Note    Anticoagulation Summary  As of 10/28/2021    INR goal:  2.5-3.5   TTR:  60.2 % (2.9 y)   INR used for dosin.70 (10/27/2021)   Warfarin maintenance plan:  2.5 mg every Mon, Wed, Fri; 5 mg all other days   Weekly warfarin total:  27.5 mg   No change documented:  Sraa Mukherjee   Plan last modified:  Collette Vora, [facility] ([facility])   Next INR check:  11/3/2021   Priority:  High   Target end date:  Indefinite    Indications    Paroxysmal atrial fibrillation (HCC) [I48.0]  Hx of mitral valve replacement with mechanical valve [Z95.2] [Z95.2]             Anticoagulation Episode Summary     INR check location:      Preferred lab:      Send INR reminders to:  DYANA MENDEZ  POOL    Comments:  NICOLE home juliet weekly *only call when out of range*      Anticoagulation Care Providers     Provider Role Specialty Phone number    Navi Fay MD Referring Cardiology [phone]          Clinic Interview:  No pertinent clinical findings have been reported.    INR History:  Anticoagulation Monitoring 10/14/2021 10/21/2021 10/28/2021   INR 3.10 3.50 2.70   INR Date 10/13/2021 10/20/2021 10/27/2021   INR Goal 2.5-3.5 2.5-3.5 2.5-3.5   Trend Same Same Same   Last Week Total 27.5 mg 27.5 mg 27.5 mg   Next Week Total 27.5 mg 27.5 mg 27.5 mg   Sun 5 mg 5 mg 5 mg   Mon 2.5 mg 2.5 mg 2.5 mg   Tue 5 mg 5 mg 5 mg   Wed - - -   Thu 5 mg 5 mg 5 mg   Fri 2.5 mg 2.5 mg 2.5 mg   Sat 5 mg 5 mg 5 mg   Visit Report - - -   Some recent data might be hidden       Plan:  1. INR is therapeutic today- see above in Anticoagulation Summary.    Elliot Sebastian to continue their warfarin regimen- see above in Anticoagulation Summary.  2. Follow up in 1 week  3. Pt has agreed to only be called if INR out of range. They have been instructed to call if any changes in medications, doses, concerns, etc. Patient expresses understanding and has no further questions at this time.    Sara  Yaz

## 2021-11-04 LAB — INR PPP: 3.5

## 2021-11-05 ENCOUNTER — ANTICOAGULATION VISIT (OUTPATIENT)
Dept: PHARMACY | Facility: HOSPITAL | Age: 61
End: 2021-11-05

## 2021-11-05 DIAGNOSIS — I48.0 PAROXYSMAL ATRIAL FIBRILLATION (HCC): Primary | ICD-10-CM

## 2021-11-05 DIAGNOSIS — Z95.2 HX OF MITRAL VALVE REPLACEMENT WITH MECHANICAL VALVE: ICD-10-CM

## 2021-11-05 NOTE — PROGRESS NOTES
Anticoagulation Clinic Progress Note    Anticoagulation Summary  As of 11/5/2021    INR goal:  2.5-3.5   TTR:  60.5 % (3 y)   INR used for dosing:  3.50 (11/4/2021)   Warfarin maintenance plan:  2.5 mg every Mon, Wed, Fri; 5 mg all other days   Weekly warfarin total:  27.5 mg   No change documented:  Sara Mukherjee   Plan last modified:  Collette Vora [facility] (7/7/2021)   Next INR check:  11/11/2021   Priority:  High   Target end date:  Indefinite    Indications    Paroxysmal atrial fibrillation (HCC) [I48.0]  Hx of mitral valve replacement with mechanical valve [Z95.2] [Z95.2]             Anticoagulation Episode Summary     INR check location:      Preferred lab:      Send INR reminders to:   CHARLES MENDEZ  POOL    Comments:  NICOLE home juliet weekly *only call when out of range*      Anticoagulation Care Providers     Provider Role Specialty Phone number    Navi Fay MD Referring Cardiology [phone]          Clinic Interview:  No pertinent clinical findings have been reported.    INR History:  Anticoagulation Monitoring 10/21/2021 10/28/2021 11/5/2021   INR 3.50 2.70 3.50   INR Date 10/20/2021 10/27/2021 11/4/2021   INR Goal 2.5-3.5 2.5-3.5 2.5-3.5   Trend Same Same Same   Last Week Total 27.5 mg 27.5 mg 27.5 mg   Next Week Total 27.5 mg 27.5 mg 27.5 mg   Sun 5 mg 5 mg 5 mg   Mon 2.5 mg 2.5 mg 2.5 mg   Tue 5 mg 5 mg 5 mg   Wed - - 2.5 mg   Thu 5 mg 5 mg -   Fri 2.5 mg 2.5 mg 2.5 mg   Sat 5 mg 5 mg 5 mg   Visit Report - - -   Some recent data might be hidden       Plan:  1. INR is therapeutic today- see above in Anticoagulation Summary.    Elliot Sebastian to continue their warfarin regimen- see above in Anticoagulation Summary.  2. Follow up in 1 week  3. Pt has agreed to only be called if INR out of range. They have been instructed to call if any changes in medications, doses, concerns, etc. Patient expresses understanding and has no further questions at this time.    Sara  Yaz

## 2021-11-10 LAB — INR PPP: 2.3

## 2021-11-11 ENCOUNTER — ANTICOAGULATION VISIT (OUTPATIENT)
Dept: PHARMACY | Facility: HOSPITAL | Age: 61
End: 2021-11-11

## 2021-11-11 DIAGNOSIS — Z95.2 HX OF MITRAL VALVE REPLACEMENT WITH MECHANICAL VALVE: ICD-10-CM

## 2021-11-11 DIAGNOSIS — I48.0 PAROXYSMAL ATRIAL FIBRILLATION (HCC): Primary | ICD-10-CM

## 2021-11-11 NOTE — PROGRESS NOTES
Anticoagulation Clinic Progress Note    Anticoagulation Summary  As of 2021    INR goal:  2.5-3.5   TTR:  60.6 % (3 y)   INR used for dosin.30 (11/10/2021)   Warfarin maintenance plan:  2.5 mg every Mon, Wed, Fri; 5 mg all other days   Weekly warfarin total:  27.5 mg   No change documented:  Michael Horner, PharmD   Plan last modified:  Collette Vora, [facility] (2021)   Next INR check:  2021   Priority:  High   Target end date:  Indefinite    Indications    Paroxysmal atrial fibrillation (HCC) [I48.0]  Hx of mitral valve replacement with mechanical valve [Z95.2] [Z95.2]             Anticoagulation Episode Summary     INR check location:      Preferred lab:      Send INR reminders to:   CHARLES MENDEZ  POOL    Comments:  MDINR home juliet weekly *only call when out of range*      Anticoagulation Care Providers     Provider Role Specialty Phone number    Navi Fay MD Referring Cardiology [phone]          Clinic Interview:  Patient Findings     Positives:  Change in diet/appetite    Negatives:  Signs/symptoms of thrombosis, Signs/symptoms of bleeding,   Laboratory test error suspected, Change in health, Change in alcohol use,   Change in activity, Upcoming invasive procedure, Emergency department   visit, Upcoming dental procedure, Missed doses, Extra doses, Change in   medications, Hospital admission, Bruising, Other complaints    Comments:  Reports more vit k in diet (brussels sprouts) this week than   usual.      Clinical Outcomes     Negatives:  Major bleeding event, Thromboembolic event,   Anticoagulation-related hospital admission, Anticoagulation-related ED   visit, Anticoagulation-related fatality    Comments:  Reports more vit k in diet (brussels sprouts) this week than   usual.        INR History:  Anticoagulation Monitoring 10/28/2021 2021 2021   INR 2.70 3.50 2.30   INR Date 10/27/2021 2021 11/10/2021   INR Goal 2.5-3.5 2.5-3.5 2.5-3.5   Trend Same  Same Same   Last Week Total 27.5 mg 27.5 mg 27.5 mg   Next Week Total 27.5 mg 27.5 mg 27.5 mg   Sun 5 mg 5 mg 5 mg   Mon 2.5 mg 2.5 mg 2.5 mg   Tue 5 mg 5 mg 5 mg   Wed - 2.5 mg -   Thu 5 mg - 5 mg   Fri 2.5 mg 2.5 mg 2.5 mg   Sat 5 mg 5 mg 5 mg   Visit Report - - -   Some recent data might be hidden       Plan:  1. INR is Subtherapeutic today- see above in Anticoagulation Summary.   Will instruct Elliot Sebastian to Continue their warfarin regimen- see above in Anticoagulation Summary.  2. Follow up in 1 week  3. They have been instructed to call if any changes in medications, doses, concerns, etc. Patient expresses understanding and has no further questions at this time.    Michael Horner, PharmD

## 2021-11-18 ENCOUNTER — ANTICOAGULATION VISIT (OUTPATIENT)
Dept: PHARMACY | Facility: HOSPITAL | Age: 61
End: 2021-11-18

## 2021-11-18 DIAGNOSIS — Z95.2 HX OF MITRAL VALVE REPLACEMENT WITH MECHANICAL VALVE: ICD-10-CM

## 2021-11-18 DIAGNOSIS — I48.0 PAROXYSMAL ATRIAL FIBRILLATION (HCC): Primary | ICD-10-CM

## 2021-11-18 LAB — INR PPP: 3

## 2021-11-18 NOTE — PROGRESS NOTES
Anticoagulation Clinic Progress Note    Anticoagulation Summary  As of 11/18/2021    INR goal:  2.5-3.5   TTR:  60.7 % (3 y)   INR used for dosing:  3.00 (11/18/2021)   Warfarin maintenance plan:  2.5 mg every Mon, Wed, Fri; 5 mg all other days   Weekly warfarin total:  27.5 mg   No change documented:  Angelica Marroquin RPH   Plan last modified:  Collette Vora RPH (7/7/2021)   Next INR check:  11/25/2021   Priority:  High   Target end date:  Indefinite    Indications    Paroxysmal atrial fibrillation (HCC) [I48.0]  Hx of mitral valve replacement with mechanical valve [Z95.2] [Z95.2]             Anticoagulation Episode Summary     INR check location:      Preferred lab:      Send INR reminders to:   CHARLES MENDEZ  POOL    Comments:  NICOLE home juliet weekly *only call when out of range*      Anticoagulation Care Providers     Provider Role Specialty Phone number    Navi Fay MD Referring Cardiology [phone]          Clinic Interview:  No pertinent clinical findings have been reported.    INR History:  Anticoagulation Monitoring 11/5/2021 11/11/2021 11/18/2021   INR 3.50 2.30 3.00   INR Date 11/4/2021 11/10/2021 11/18/2021   INR Goal 2.5-3.5 2.5-3.5 2.5-3.5   Trend Same Same Same   Last Week Total 27.5 mg 27.5 mg 27.5 mg   Next Week Total 27.5 mg 27.5 mg 27.5 mg   Sun 5 mg 5 mg 5 mg   Mon 2.5 mg 2.5 mg 2.5 mg   Tue 5 mg 5 mg 5 mg   Wed 2.5 mg - 2.5 mg   Thu - 5 mg 5 mg   Fri 2.5 mg 2.5 mg 2.5 mg   Sat 5 mg 5 mg 5 mg   Visit Report - - -   Some recent data might be hidden       Plan:  1. INR is therapeutic today- see above in Anticoagulation Summary.    Elliot Sebastian to continue their warfarin regimen- see above in Anticoagulation Summary.  2. Follow up in 1 week  3.  They have been instructed to call if any changes in medications, doses, concerns, etc. Patient expresses understanding and has no further questions at this time.    Angelica Marroquin RPH

## 2021-11-25 LAB — INR PPP: 1.5

## 2021-11-26 ENCOUNTER — ANTICOAGULATION VISIT (OUTPATIENT)
Dept: PHARMACY | Facility: HOSPITAL | Age: 61
End: 2021-11-26

## 2021-11-26 DIAGNOSIS — I48.0 PAROXYSMAL ATRIAL FIBRILLATION (HCC): Primary | ICD-10-CM

## 2021-11-26 DIAGNOSIS — Z95.2 HX OF MITRAL VALVE REPLACEMENT WITH MECHANICAL VALVE: ICD-10-CM

## 2021-11-26 NOTE — PROGRESS NOTES
Anticoagulation Clinic Progress Note    Anticoagulation Summary  As of 2021    INR goal:  2.5-3.5   TTR:  60.6 % (3 y)   INR used for dosin.50 (2021)   Warfarin maintenance plan:  2.5 mg every Mon, Wed, Fri; 5 mg all other days   Weekly warfarin total:  27.5 mg   Plan last modified:  Sheron Zaman, PharmD (2021)   Next INR check:  2021   Priority:  High   Target end date:  Indefinite    Indications    Paroxysmal atrial fibrillation (HCC) [I48.0]  Hx of mitral valve replacement with mechanical valve [Z95.2] [Z95.2]             Anticoagulation Episode Summary     INR check location:      Preferred lab:      Send INR reminders to:   CHARLES MENDEZ  POOL    Comments:  MDINAVILA home juliet weekly *only call when out of range*      Anticoagulation Care Providers     Provider Role Specialty Phone number    Navi Fay MD Referring Cardiology [phone]          Clinic Interview:  Patient Findings     Negatives:  Signs/symptoms of thrombosis, Signs/symptoms of bleeding,   Laboratory test error suspected, Change in health, Change in alcohol use,   Change in activity, Upcoming invasive procedure, Emergency department   visit, Upcoming dental procedure, Missed doses, Extra doses, Change in   medications, Change in diet/appetite, Hospital admission, Bruising, Other   complaints      Clinical Outcomes     Negatives:  Major bleeding event, Thromboembolic event,   Anticoagulation-related hospital admission, Anticoagulation-related ED   visit, Anticoagulation-related fatality        INR History:  Anticoagulation Monitoring 2021   INR 2.30 3.00 1.50   INR Date 11/10/2021 2021 2021   INR Goal 2.5-3.5 2.5-3.5 2.5-3.5   Trend Same Same Same   Last Week Total 27.5 mg 27.5 mg 27.5 mg   Next Week Total 27.5 mg 27.5 mg 27.5 mg   Sun 5 mg 5 mg 5 mg   Mon 2.5 mg 2.5 mg -   Tue 5 mg 5 mg -   Wed - 2.5 mg -   Thu 5 mg 5 mg -   Fri 2.5 mg 2.5 mg 2.5 mg    Sat 5 mg 5 mg 5 mg   Visit Report - - -   Some recent data might be hidden     Comment: Patient did not answer after 3 attempts. Left HIPAA friendly message with directions and will plan to call on Monday to follow up.    Plan:  1. INR is Subtherapeutic today- see above in Anticoagulation Summary.   Will instruct Elliot Sebastian to Change their warfarin regimen to include a boost dose of 7.5 mg today, then resume normal- see above in Anticoagulation Summary.  2. Follow up in Monday  3. They have been instructed to call if any changes in medications, doses, concerns, etc. Patient expresses understanding and has no further questions at this time.    Sherno Zaman, PharmD

## 2021-11-29 ENCOUNTER — TELEPHONE (OUTPATIENT)
Dept: CARDIOLOGY | Facility: CLINIC | Age: 61
End: 2021-11-29

## 2021-11-29 ENCOUNTER — ANTICOAGULATION VISIT (OUTPATIENT)
Dept: PHARMACY | Facility: HOSPITAL | Age: 61
End: 2021-11-29

## 2021-11-29 DIAGNOSIS — Z95.2 HX OF MITRAL VALVE REPLACEMENT WITH MECHANICAL VALVE: ICD-10-CM

## 2021-11-29 DIAGNOSIS — I48.0 PAROXYSMAL ATRIAL FIBRILLATION (HCC): Primary | ICD-10-CM

## 2021-11-29 NOTE — TELEPHONE ENCOUNTER
Mr. 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      He needs it to say that he is able to perform his duties as a .    If you are able to write the letter, I will fax to Renata at [phone].    I will also call Mr. Sebastian to let him know it was faxed.    Please advise

## 2021-11-29 NOTE — PROGRESS NOTES
Anticoagulation Clinic Progress Note    Anticoagulation Summary  As of 11/29/2021    INR goal:  2.5-3.5   TTR:  60.6 % (3 y)   INR used for dosing:  No new INR was available at the time of this encounter.   Warfarin maintenance plan:  2.5 mg every Mon, Wed, Fri; 5 mg all other days   Weekly warfarin total:  27.5 mg   No change documented:  Angelica Marroquin RPH   Plan last modified:  Sheron Zaman, PharmD (11/26/2021)   Next INR check:  12/2/2021   Priority:  High   Target end date:  Indefinite    Indications    Paroxysmal atrial fibrillation (HCC) [I48.0]  Hx of mitral valve replacement with mechanical valve [Z95.2] [Z95.2]             Anticoagulation Episode Summary     INR check location:      Preferred lab:      Send INR reminders to:   CHARLES MENDEZ  POOL    Comments:  MDINAVILA home juliet weekly *only call when out of range*      Anticoagulation Care Providers     Provider Role Specialty Phone number    Navi Fay MD Referring Cardiology [phone]          Clinic Interview:  Patient Findings     Positives:  Missed doses    Negatives:  Signs/symptoms of thrombosis, Signs/symptoms of bleeding,   Laboratory test error suspected, Change in health, Change in alcohol use,   Change in activity, Upcoming invasive procedure, Emergency department   visit, Upcoming dental procedure, Extra doses, Change in medications,   Change in diet/appetite, Hospital admission, Bruising, Other complaints    Comments:  Patient missed Monday and Tuesdays dose the past week leading   to the INR of 1.5. Patient was unable to be reached on Friday and was   recommended to take 7.5 mg. Unfortunately he did not receive our message   in time and took 10 mg on Friday so then reduced Saturday to 2.5 mg (same   total recommended dose increase)       Clinical Outcomes     Negatives:  Major bleeding event, Thromboembolic event,   Anticoagulation-related hospital admission, Anticoagulation-related ED   visit,  Anticoagulation-related fatality    Comments:  Patient missed Monday and Tuesdays dose the past week leading   to the INR of 1.5. Patient was unable to be reached on Friday and was   recommended to take 7.5 mg. Unfortunately he did not receive our message   in time and took 10 mg on Friday so then reduced Saturday to 2.5 mg (same   total recommended dose increase)         INR History:  Anticoagulation Monitoring 11/18/2021 11/26/2021 11/29/2021   INR 3.00 1.50 -   INR Date 11/18/2021 11/25/2021 -   INR Goal 2.5-3.5 2.5-3.5 2.5-3.5   Trend Same Same Same   Last Week Total 27.5 mg 27.5 mg 32.5 mg   Next Week Total 27.5 mg 27.5 mg 27.5 mg   Sun 5 mg 5 mg -   Mon 2.5 mg - 2.5 mg   Tue 5 mg - 5 mg   Wed 2.5 mg - 2.5 mg   Thu 5 mg - -   Fri 2.5 mg 2.5 mg -   Sat 5 mg 5 mg -   Visit Report - - -   Some recent data might be hidden       Plan:  1. INR is not available today- see above in Anticoagulation Summary.   Will instruct Elliot Sebastian to Continue their warfarin regimen- see above in Anticoagulation Summary.  2. Follow up on Thursday   3. They have been instructed to call if any changes in medications, doses, concerns, etc. Patient expresses understanding and has no further questions at this time.    Angelica Marroquin [facility]

## 2021-12-01 ENCOUNTER — ANTICOAGULATION VISIT (OUTPATIENT)
Dept: PHARMACY | Facility: HOSPITAL | Age: 61
End: 2021-12-01

## 2021-12-01 DIAGNOSIS — I48.0 PAROXYSMAL ATRIAL FIBRILLATION (HCC): Primary | ICD-10-CM

## 2021-12-01 DIAGNOSIS — Z95.2 HX OF MITRAL VALVE REPLACEMENT WITH MECHANICAL VALVE: ICD-10-CM

## 2021-12-01 LAB — INR PPP: 3

## 2021-12-01 NOTE — PROGRESS NOTES
Anticoagulation Clinic Progress Note    Anticoagulation Summary  As of 12/1/2021    INR goal:  2.5-3.5   TTR:  60.4 % (3 y)   INR used for dosing:  3.00 (12/1/2021)   Warfarin maintenance plan:  2.5 mg every Mon, Wed, Fri; 5 mg all other days   Weekly warfarin total:  27.5 mg   No change documented:  Angelica Marroquin RPH   Plan last modified:  Sheron Zaman PharmD (11/26/2021)   Next INR check:  12/8/2021   Priority:  High   Target end date:  Indefinite    Indications    Paroxysmal atrial fibrillation (HCC) [I48.0]  Hx of mitral valve replacement with mechanical valve [Z95.2] [Z95.2]             Anticoagulation Episode Summary     INR check location:      Preferred lab:      Send INR reminders to:   [facility]  POOL    Comments:  [facility] home juliet weekly *only call when out of range*      Anticoagulation Care Providers     Provider Role Specialty Phone number    Navi Fay MD Referring Cardiology [phone]          Clinic Interview:  No pertinent clinical findings have been reported.    INR History:  Anticoagulation Monitoring 11/26/2021 11/29/2021 12/1/2021   INR 1.50 - 3.00   INR Date 11/25/2021 - 12/1/2021   INR Goal 2.5-3.5 2.5-3.5 2.5-3.5   Trend Same Same Same   Last Week Total 27.5 mg 32.5 mg 32.5 mg   Next Week Total 27.5 mg 27.5 mg 27.5 mg   Sun 5 mg - 5 mg   Mon - 2.5 mg 2.5 mg   Tue - 5 mg 5 mg   Wed - 2.5 mg 2.5 mg   Thu - - 5 mg   Fri 2.5 mg - 2.5 mg   Sat 5 mg - 5 mg   Visit Report - - -   Some recent data might be hidden       Plan:  1. INR is therapeutic today- see above in Anticoagulation Summary.    Elliot Sebastian to continue their warfarin regimen- see above in Anticoagulation Summary.  2. Follow up in 1 week  3. Pt has agreed to only be called if INR out of range. They have been instructed to call if any changes in medications, doses, concerns, etc. Patient expresses understanding and has no further questions at this time.    Angelica Marroquin RPH

## 2021-12-08 LAB — INR PPP: 3

## 2021-12-15 ENCOUNTER — ANTICOAGULATION VISIT (OUTPATIENT)
Dept: PHARMACY | Facility: HOSPITAL | Age: 61
End: 2021-12-15

## 2021-12-15 ENCOUNTER — TELEPHONE (OUTPATIENT)
Dept: PHARMACY | Facility: HOSPITAL | Age: 61
End: 2021-12-15

## 2021-12-15 DIAGNOSIS — Z95.2 HX OF MITRAL VALVE REPLACEMENT WITH MECHANICAL VALVE: ICD-10-CM

## 2021-12-15 DIAGNOSIS — I48.0 PAROXYSMAL ATRIAL FIBRILLATION (HCC): Primary | ICD-10-CM

## 2021-12-15 NOTE — PROGRESS NOTES
Anticoagulation Clinic Progress Note    Anticoagulation Summary  As of 12/15/2021    INR goal:  2.5-3.5   TTR:  60.7 % (3.1 y)   INR used for dosing:  3.00 (12/8/2021)   Warfarin maintenance plan:  2.5 mg every Mon, Wed, Fri; 5 mg all other days   Weekly warfarin total:  27.5 mg   No change documented:  [place], Michael, PharmD   Plan last modified:  Sheron Zaman PharmD (11/26/2021)   Next INR check:  1/5/2022   Priority:  High   Target end date:  Indefinite    Indications    Paroxysmal atrial fibrillation (HCC) [I48.0]  Hx of mitral valve replacement with mechanical valve [Z95.2] [Z95.2]             Anticoagulation Episode Summary     INR check location:      Preferred lab:      Send INR reminders to:   [facility]  POOL    Comments:  [facility] (previously [facility]; stopped due to cost)      Anticoagulation Care Providers     Provider Role Specialty Phone number    Navi Fay MD Referring Cardiology [phone]          Clinic Interview:  Patient Findings     Positives:  Other complaints    Negatives:  Signs/symptoms of thrombosis, Signs/symptoms of bleeding,   Laboratory test error suspected, Change in health, Change in alcohol use,   Change in activity, Upcoming invasive procedure, Emergency department   visit, Upcoming dental procedure, Missed doses, Extra doses, Change in   medications, Change in diet/appetite, Hospital admission, Bruising    Comments:  Pt electing to discontinue mdINR due to high cost with new   insurance. Based on the variability in his job schedule, he requests to   proceed with INR checks at [place], which does not requires scheduled   appointment times.       Clinical Outcomes     Negatives:  Major bleeding event, Thromboembolic event,   Anticoagulation-related hospital admission, Anticoagulation-related ED   visit, Anticoagulation-related fatality    Comments:  Pt electing to discontinue mdINR due to high cost with new   insurance. Based on the variability in his job  schedule, he requests to   proceed with INR checks at [place], which does not requires scheduled   appointment times.         INR History:  Anticoagulation Monitoring 11/29/2021 12/1/2021 12/15/2021   INR - 3.00 3.00   INR Date - 12/1/2021 12/8/2021   INR Goal 2.5-3.5 2.5-3.5 2.5-3.5   Trend Same Same Same   Last Week Total 32.5 mg 32.5 mg 27.5 mg   Next Week Total 27.5 mg 27.5 mg 27.5 mg   Sun - 5 mg 5 mg   Mon 2.5 mg 2.5 mg 2.5 mg   Tue 5 mg 5 mg 5 mg   Wed 2.5 mg 2.5 mg 2.5 mg   Thu - 5 mg 5 mg   Fri - 2.5 mg 2.5 mg   Sat - 5 mg 5 mg   Visit Report - - -   Some recent data might be hidden       Plan:  1. INR is Therapeutic today- see above in Anticoagulation Summary.   Will instruct Elliot CAIN Sebastian to Continue their warfarin regimen- see above in Anticoagulation Summary.  2. Follow up 4 weeks from last INR - INR check at [place].  3. They have been instructed to call if any changes in medications, doses, concerns, etc. Patient expresses understanding and has no further questions at this time.    Michael Horner, PharmD

## 2022-01-17 ENCOUNTER — OFFICE VISIT (OUTPATIENT)
Dept: CARDIOLOGY | Facility: CLINIC | Age: 62
End: 2022-01-17

## 2022-01-17 VITALS
DIASTOLIC BLOOD PRESSURE: 88 MMHG | WEIGHT: 309 LBS | HEART RATE: 63 BPM | HEIGHT: 78 IN | BODY MASS INDEX: 35.75 KG/M2 | SYSTOLIC BLOOD PRESSURE: 146 MMHG

## 2022-01-17 DIAGNOSIS — R03.0 ELEVATED BLOOD PRESSURE READING: ICD-10-CM

## 2022-01-17 DIAGNOSIS — I34.0 NONRHEUMATIC MITRAL VALVE REGURGITATION: ICD-10-CM

## 2022-01-17 DIAGNOSIS — I48.0 PAROXYSMAL ATRIAL FIBRILLATION: Primary | ICD-10-CM

## 2022-01-17 PROCEDURE — 93000 ELECTROCARDIOGRAM COMPLETE: CPT | Performed by: INTERNAL MEDICINE

## 2022-01-17 PROCEDURE — 99214 OFFICE O/P EST MOD 30 MIN: CPT | Performed by: INTERNAL MEDICINE

## 2022-01-17 RX ORDER — LOSARTAN POTASSIUM 50 MG/1
50 TABLET ORAL DAILY
Qty: 30 TABLET | Refills: 1 | Status: SHIPPED | OUTPATIENT
Start: 2022-01-17 | End: 2022-06-30

## 2022-01-17 RX ORDER — LOSARTAN POTASSIUM 50 MG/1
50 TABLET ORAL DAILY
Qty: 90 TABLET | Refills: 3 | Status: SHIPPED | OUTPATIENT
Start: 2022-01-17 | End: 2022-03-01 | Stop reason: SDUPTHER

## 2022-02-01 NOTE — TELEPHONE ENCOUNTER
Caller: Elliot Sebastian    Relationship: Self    Best call back number: [phone]    Requested Prescriptions:   Requested Prescriptions     Pending Prescriptions Disp Refills   • cholestyramine (Questran) 4 GM/DOSE powder 30 packet 11     Sig: Take 1 packet by mouth Daily. If no improvement after two weeks, may increase to BID.        Pharmacy where request should be sent: [facility] - [address] [phone] [facility] [phone] FX     Additional details provided by patient: PLEASE SEND THIS MEDICATION TO NEW PHARMACY SO THEY CAN ADD IT TO HIS FILE. HE DOES NOT CURRENTLY NEED A REFILL BUT WILL NEED ONE IN A COUPLE OF WEEKS.     Does the patient have less than a 3 day supply:  [] Yes  [x] No    Idris Almonte Rep   02/01/22 15:42 EST

## 2022-02-02 RX ORDER — CHOLESTYRAMINE 4 G/9G
4 POWDER, FOR SUSPENSION ORAL DAILY
Qty: 30 PACKET | Refills: 11 | OUTPATIENT
Start: 2022-02-02

## 2022-03-01 ENCOUNTER — OFFICE VISIT (OUTPATIENT)
Dept: FAMILY MEDICINE CLINIC | Facility: CLINIC | Age: 62
End: 2022-03-01

## 2022-03-01 VITALS
OXYGEN SATURATION: 98 % | HEART RATE: 62 BPM | DIASTOLIC BLOOD PRESSURE: 80 MMHG | SYSTOLIC BLOOD PRESSURE: 120 MMHG | TEMPERATURE: 97.3 F | WEIGHT: 303 LBS | HEIGHT: 78 IN | BODY MASS INDEX: 35.06 KG/M2

## 2022-03-01 DIAGNOSIS — K58.0 IRRITABLE BOWEL SYNDROME WITH DIARRHEA: Primary | ICD-10-CM

## 2022-03-01 PROCEDURE — 99213 OFFICE O/P EST LOW 20 MIN: CPT | Performed by: FAMILY MEDICINE

## 2022-03-01 RX ORDER — CHOLESTYRAMINE 4 G/9G
4 POWDER, FOR SUSPENSION ORAL DAILY
Qty: 90 PACKET | Refills: 3 | Status: SHIPPED | OUTPATIENT
Start: 2022-03-01 | End: 2022-09-12 | Stop reason: SDUPTHER

## 2022-03-02 NOTE — PROGRESS NOTES
Subjective   Elliot Sebastian is a 61 y.o. male with   Chief Complaint   Patient presents with   • ibs-d   • Med Refill   .    History of Present Illness   61-year-old white male with long history of irritable bowel syndrome with diarrhea here for further medical management.  Patient has successfully been using Questran at 4 g per dose powder packs using 1 daily.  As long as he does so this firms up his stool and he is able to get through his day.  He is a  and states that he cannot be relegated to the bathroom multiple times per day.  He has tolerated the above product well and there have been no side effects.  He is followed by cardiology for paroxysmal atrial fibrillation is well has a past history of bacterial endocarditis.  He has had congestive heart failure, mitral valve insufficiency and he has a history of mitral valve replacement.  Current medications include losartan, atenolol and warfarin.  Pro times are followed by cardiology.  In general he is doing well and has no other acute complaints.  The following portions of the patient's history were reviewed and updated as appropriate: allergies, current medications, past family history, past medical history, past social history, past surgical history and problem list.    Review of Systems   Cardiovascular:        Paroxysmal atrial fibrillation, hypertension, congestive heart failure, status post mitral valve replacement.   Gastrointestinal: Positive for diarrhea.        Irritable bowel syndrome with diarrhea       Objective     Vitals:    03/01/22 1354   BP: 120/80   Pulse: 62   Temp: 97.3 °F (36.3 °C)   SpO2: 98%       No results found for this or any previous visit (from the past 672 hour(s)).    Physical Exam  Vitals and nursing note reviewed.   Constitutional:       Appearance: Normal appearance. He is well-developed and well-groomed.   HENT:      Head: Normocephalic and atraumatic.   Neck:      Thyroid: No thyroid mass or thyromegaly.       Vascular: Normal carotid pulses. No carotid bruit.      Trachea: Trachea and phonation normal.   Cardiovascular:      Rate and Rhythm: Normal rate and regular rhythm.      Heart sounds: Normal heart sounds. No murmur heard.  No friction rub. No gallop.    Pulmonary:      Effort: Pulmonary effort is normal. No respiratory distress.      Breath sounds: Normal breath sounds. No decreased breath sounds, wheezing, rhonchi or rales.   Musculoskeletal:      Cervical back: Neck supple.   Lymphadenopathy:      Cervical: No cervical adenopathy.   Skin:     General: Skin is warm and dry.      Findings: No rash.   Neurological:      Mental Status: He is alert and oriented to person, place, and time.   Psychiatric:         Attention and Perception: Attention and perception normal.         Mood and Affect: Mood and affect normal.         Speech: Speech normal.         Behavior: Behavior normal. Behavior is cooperative.         Thought Content: Thought content normal.         Cognition and Memory: Cognition and memory normal.         Judgment: Judgment normal.         Assessment/Plan   Diagnoses and all orders for this visit:    1. Irritable bowel syndrome with diarrhea (Primary)  -     cholestyramine (Questran) 4 GM/DOSE powder; Take 1 packet by mouth Daily. If no improvement after two weeks, may increase to BID.  Dispense: 90 packet; Refill: 3        Return in about 1 year (around 3/1/2023) for Recheck.

## 2022-04-14 ENCOUNTER — TELEPHONE (OUTPATIENT)
Dept: PHARMACY | Facility: HOSPITAL | Age: 62
End: 2022-04-14

## 2022-04-14 RX ORDER — ATENOLOL 25 MG/1
25 TABLET ORAL DAILY
Qty: 90 TABLET | Refills: 0 | Status: SHIPPED | OUTPATIENT
Start: 2022-04-14 | End: 2022-07-18 | Stop reason: SDUPTHER

## 2022-04-20 ENCOUNTER — TELEPHONE (OUTPATIENT)
Dept: PHARMACY | Facility: HOSPITAL | Age: 62
End: 2022-04-20

## 2022-04-20 RX ORDER — WARFARIN SODIUM 5 MG/1
TABLET ORAL
Qty: 9 TABLET | Refills: 0 | Status: SHIPPED | OUTPATIENT
Start: 2022-04-20 | End: 2022-04-27 | Stop reason: SDUPTHER

## 2022-04-20 NOTE — TELEPHONE ENCOUNTER
Left a second message for patient to get INR at [place]. Only filled a 10 day supply as an emergency fill since last INR was in December.

## 2022-04-26 ENCOUNTER — ANTICOAGULATION VISIT (OUTPATIENT)
Dept: PHARMACY | Facility: HOSPITAL | Age: 62
End: 2022-04-26

## 2022-04-26 ENCOUNTER — LAB (OUTPATIENT)
Dept: LAB | Facility: HOSPITAL | Age: 62
End: 2022-04-26

## 2022-04-26 DIAGNOSIS — Z95.2 HX OF MITRAL VALVE REPLACEMENT WITH MECHANICAL VALVE: ICD-10-CM

## 2022-04-26 DIAGNOSIS — I48.0 PAROXYSMAL ATRIAL FIBRILLATION: Primary | ICD-10-CM

## 2022-04-26 DIAGNOSIS — I48.0 PAROXYSMAL ATRIAL FIBRILLATION: ICD-10-CM

## 2022-04-26 LAB
INR PPP: 3.3 (ref 0.8–1.2)
PROTHROMBIN TIME: 37 SECONDS (ref 12.8–15.2)

## 2022-04-26 PROCEDURE — 85610 PROTHROMBIN TIME: CPT | Performed by: INTERNAL MEDICINE

## 2022-04-27 RX ORDER — WARFARIN SODIUM 5 MG/1
TABLET ORAL
Qty: 75 TABLET | Refills: 0 | Status: SHIPPED | OUTPATIENT
Start: 2022-04-27 | End: 2022-07-18 | Stop reason: SDUPTHER

## 2022-04-27 NOTE — PROGRESS NOTES
Anticoagulation Clinic Progress Note    Anticoagulation Summary  As of 4/26/2022    INR goal:  2.5-3.5   TTR:  65.0 % (3.4 y)   INR used for dosing:  3.3 (4/26/2022)   Warfarin maintenance plan:  2.5 mg every Mon, Wed, Fri; 5 mg all other days   Weekly warfarin total:  27.5 mg   No change documented:  Angelica Marroquin RPH   Plan last modified:  Sheron Zaman, PharmD (11/26/2021)   Next INR check:  5/24/2022   Priority:  High   Target end date:  Indefinite    Indications    Paroxysmal atrial fibrillation (HCC) [I48.0]  Hx of mitral valve replacement with mechanical valve [Z95.2] [Z95.2]             Anticoagulation Episode Summary     INR check location:      Preferred lab:      Send INR reminders to:   [facility]  POOL    Comments:  [facility] (previously [facility]; stopped due to cost)      Anticoagulation Care Providers     Provider Role Specialty Phone number    Navi Fay MD Referring Cardiology [phone]            INR History:  Anticoagulation Monitoring 12/1/2021 12/15/2021 4/26/2022   INR 3.00 3.00 3.3   INR Date 12/1/2021 12/8/2021 4/26/2022   INR Goal 2.5-3.5 2.5-3.5 2.5-3.5   Trend Same Same Same   Last Week Total 32.5 mg 27.5 mg 27.5 mg   Next Week Total 27.5 mg 27.5 mg 27.5 mg   Sun 5 mg 5 mg 5 mg   Mon 2.5 mg 2.5 mg 2.5 mg   Tue 5 mg 5 mg 5 mg   Wed 2.5 mg 2.5 mg 2.5 mg   Thu 5 mg 5 mg 5 mg   Fri 2.5 mg 2.5 mg 2.5 mg   Sat 5 mg 5 mg 5 mg   Visit Report - - -   Some recent data might be hidden       Plan:  1. INR is Therapeutic today- see above in Anticoagulation Summary.   Will instruct Elliot Sebastian to Continue their warfarin regimen- see above in Anticoagulation Summary.  2. Follow up in 4 weeks  3.  They have been instructed to call if any changes in medications, doses, concerns, etc. Patient expresses understanding and has no further questions at this time.    Angelica Marroquin RPH

## 2022-06-01 ENCOUNTER — TELEPHONE (OUTPATIENT)
Dept: PHARMACY | Facility: HOSPITAL | Age: 62
End: 2022-06-01

## 2022-06-08 ENCOUNTER — TELEPHONE (OUTPATIENT)
Dept: PHARMACY | Facility: HOSPITAL | Age: 62
End: 2022-06-08

## 2022-06-08 NOTE — TELEPHONE ENCOUNTER
John Victoria  3/5/1957    Patient presents with: Follow - Up:  exam room 7. 4week followup      Stacey Hunter is a 65year old male who presents as a follow-up.     The patient has a history of alcoholism and consequent alcohol cirrho Patient is overdue for INR. Left voicemail to have it tested as soon as possible    Oral Tab Take 1 tablet (10 mg total) by mouth daily.  Disp: 90 tablet Rfl: 1   ferrous sulfate 325 (65 FE) MG Oral Tab EC Take 1 tablet (325 mg total) by mouth daily with breakfast. Disp: 90 tablet Rfl: 1   Pantoprazole Sodium 40 MG Oral Tab EC Take 1 table artery disease ([address]     Nonspecific abnormal electrocardiogram (ECG) (EKG)     S/P PTCA (percutaneous transluminal coronary angioplasty)     CAD in native artery     Stented coronary artery     Carotid stenosis     Carotid stenosis, left     Ankle pain, r 1/1/1972    Eye Surgery, REPAIR Fx of LEFT EYE   • OTHER SURGICAL HISTORY      Gastric Surgery, AT 4YEAR OLD      Social History    Tobacco Use      Smoking status: Former Smoker        Packs/day: 0.50        Years: 3.00        Pack years: 1.5        Quit hypertensive gastropathy, and gastric antral vascular ectasias with bleeding, for which PPI and propanolol use have been advised  Continue regular follow-up with gastroenterology and hepatology services  Continue lasix    History of alcoholism:  No craving

## 2022-06-16 ENCOUNTER — TELEPHONE (OUTPATIENT)
Dept: PHARMACY | Facility: HOSPITAL | Age: 62
End: 2022-06-16

## 2022-06-16 DIAGNOSIS — Z95.2 HX OF MITRAL VALVE REPLACEMENT WITH MECHANICAL VALVE: ICD-10-CM

## 2022-06-16 DIAGNOSIS — I48.0 PAROXYSMAL ATRIAL FIBRILLATION: Primary | ICD-10-CM

## 2022-06-16 NOTE — TELEPHONE ENCOUNTER
INR REMINDER: LVM for patient to get labs drawn. Place new standing order since last one was from 12/21.

## 2022-06-30 RX ORDER — LOSARTAN POTASSIUM 50 MG/1
50 TABLET ORAL DAILY
Qty: 90 TABLET | Refills: 0 | Status: SHIPPED | OUTPATIENT
Start: 2022-06-30 | End: 2022-07-18 | Stop reason: SDUPTHER

## 2022-06-30 NOTE — TELEPHONE ENCOUNTER
Please advise filling Losartan.  Did not meet protocol due to needing Potassium and creatinine blood work.  Also due for an appointment.  Made a note on prescription for this.    LOV   -   1/17/22  Next   -   Not scheduled    Last labs   -   NOTHING since 11/23/2020    Renetta NORTON Protocol Failed 06/29/2022 07:32 PM   Protocol Details  Normal serum potassium on file within the past year    Normal serum creatinine on file within the past year

## 2022-07-11 ENCOUNTER — ANTICOAGULATION VISIT (OUTPATIENT)
Dept: PHARMACY | Facility: HOSPITAL | Age: 62
End: 2022-07-11

## 2022-07-11 ENCOUNTER — LAB (OUTPATIENT)
Dept: LAB | Facility: HOSPITAL | Age: 62
End: 2022-07-11

## 2022-07-11 DIAGNOSIS — Z95.2 HX OF MITRAL VALVE REPLACEMENT WITH MECHANICAL VALVE: ICD-10-CM

## 2022-07-11 DIAGNOSIS — I48.0 PAROXYSMAL ATRIAL FIBRILLATION: Primary | ICD-10-CM

## 2022-07-11 LAB
INR PPP: 2.3 (ref 0.8–1.2)
PROTHROMBIN TIME: 27 SECONDS (ref 12.8–15.2)

## 2022-07-11 PROCEDURE — 85610 PROTHROMBIN TIME: CPT | Performed by: INTERNAL MEDICINE

## 2022-07-11 NOTE — PROGRESS NOTES
Anticoagulation Clinic Progress Note    Anticoagulation Summary  As of 2022    INR goal:  2.5-3.5   TTR:  65.9 % (3.6 y)   INR used for dosin.3 (2022)   Warfarin maintenance plan:  2.5 mg every Mon, Wed, Fri; 5 mg all other days   Weekly warfarin total:  27.5 mg   Plan last modified:  Sheron Spivey, PharmD (2021)   Next INR check:  2022   Priority:  High   Target end date:  Indefinite    Indications    Paroxysmal atrial fibrillation (HCC) [I48.0]  Hx of mitral valve replacement with mechanical valve [Z95.2] [Z95.2]             Anticoagulation Episode Summary     INR check location:      Preferred lab:      Send INR reminders to:   [facility]  POOL    Comments:  [facility] (previously [facility]; stopped due to cost)      Anticoagulation Care Providers     Provider Role Specialty Phone number    Navi Fay MD Referring Cardiology [phone]          Clinic Interview:  Patient Findings     Positives:  Missed doses    Negatives:  Signs/symptoms of thrombosis, Signs/symptoms of bleeding,   Laboratory test error suspected, Change in health, Change in alcohol use,   Change in activity, Upcoming invasive procedure, Emergency department   visit, Upcoming dental procedure, Extra doses, Change in medications,   Change in diet/appetite, Hospital admission, Bruising, Other complaints    Comments:  Reports missing a dose on ; states he checked with his   home monitor on Saturday and reading was 2.5.       Clinical Outcomes     Negatives:  Major bleeding event, Thromboembolic event,   Anticoagulation-related hospital admission, Anticoagulation-related ED   visit, Anticoagulation-related fatality    Comments:  Reports missing a dose on ; states he checked with his   home monitor on Saturday and reading was 2.5.         INR History:  Anticoagulation Monitoring 12/15/2021 2022 2022   INR 3.00 3.3 2.3   INR Date 2021   INR Goal 2.5-3.5 2.5-3.5  2.5-3.5   Trend Same Same Same   Last Week Total 27.5 mg 27.5 mg 22.5 mg   Next Week Total 27.5 mg 27.5 mg 30 mg   Sun 5 mg 5 mg 5 mg   Mon 2.5 mg 2.5 mg 5 mg (7/11); Otherwise 2.5 mg   Tue 5 mg 5 mg 5 mg   Wed 2.5 mg 2.5 mg 2.5 mg   Thu 5 mg 5 mg 5 mg   Fri 2.5 mg 2.5 mg 2.5 mg   Sat 5 mg 5 mg 5 mg   Visit Report - - -   Some recent data might be hidden       Plan:  1. INR is Subtherapeutic today- see above in Anticoagulation Summary.   Will instruct Elliot CAIN Sebastian to Continue their warfarin regimen- see above in Anticoagulation Summary.  Patient instructed to take 5mg today (instead of 2.5mg) then resume previous dosing.   2. Follow up in 2 weeks  3. They have been instructed to call if any changes in medications, doses, concerns, etc. Patient expresses understanding and has no further questions at this time.    Bushra Torre, PharmD

## 2022-07-18 RX ORDER — WARFARIN SODIUM 5 MG/1
TABLET ORAL
Qty: 75 TABLET | Refills: 0 | Status: ON HOLD | OUTPATIENT
Start: 2022-07-18 | End: 2022-08-07 | Stop reason: SDUPTHER

## 2022-07-19 RX ORDER — ATENOLOL 25 MG/1
25 TABLET ORAL DAILY
Qty: 90 TABLET | Refills: 0 | Status: SHIPPED | OUTPATIENT
Start: 2022-07-19 | End: 2022-07-25 | Stop reason: SDUPTHER

## 2022-07-19 RX ORDER — LOSARTAN POTASSIUM 50 MG/1
50 TABLET ORAL DAILY
Qty: 90 TABLET | Refills: 0 | Status: SHIPPED | OUTPATIENT
Start: 2022-07-19 | End: 2022-07-25 | Stop reason: SDUPTHER

## 2022-07-25 RX ORDER — LOSARTAN POTASSIUM 50 MG/1
50 TABLET ORAL DAILY
Qty: 90 TABLET | Refills: 0 | Status: SHIPPED | OUTPATIENT
Start: 2022-07-25 | End: 2022-08-19 | Stop reason: SDUPTHER

## 2022-07-25 RX ORDER — ATENOLOL 25 MG/1
25 TABLET ORAL DAILY
Qty: 90 TABLET | Refills: 0 | Status: SHIPPED | OUTPATIENT
Start: 2022-07-25 | End: 2022-08-19 | Stop reason: SDUPTHER

## 2022-07-25 NOTE — TELEPHONE ENCOUNTER
We need to find out what his BP has been running, if it is controlled.  He was to follow up in office in one month after Dr. Fay saw him.  I will refill one time,however, an appointment must be scheduled for additional refills.

## 2022-07-28 ENCOUNTER — ANTICOAGULATION VISIT (OUTPATIENT)
Dept: PHARMACY | Facility: HOSPITAL | Age: 62
End: 2022-07-28

## 2022-07-28 ENCOUNTER — LAB (OUTPATIENT)
Dept: LAB | Facility: HOSPITAL | Age: 62
End: 2022-07-28

## 2022-07-28 DIAGNOSIS — I48.0 PAROXYSMAL ATRIAL FIBRILLATION: Primary | ICD-10-CM

## 2022-07-28 DIAGNOSIS — Z95.2 HX OF MITRAL VALVE REPLACEMENT WITH MECHANICAL VALVE: ICD-10-CM

## 2022-07-28 LAB
INR PPP: 2.2 (ref 0.8–1.2)
PROTHROMBIN TIME: 25 SECONDS (ref 12.8–15.2)

## 2022-07-28 PROCEDURE — 85610 PROTHROMBIN TIME: CPT | Performed by: INTERNAL MEDICINE

## 2022-07-29 NOTE — PROGRESS NOTES
Anticoagulation Clinic Progress Note    Anticoagulation Summary  As of 2022    INR goal:  2.5-3.5   TTR:  65.0 % (3.7 y)   INR used for dosin.2 (2022)   Warfarin maintenance plan:  2.5 mg every Mon, Fri; 5 mg all other days   Weekly warfarin total:  30 mg   Plan last modified:  Angelica Marroquin RPH (2022)   Next INR check:  2022   Priority:  High   Target end date:  Indefinite    Indications    Paroxysmal atrial fibrillation (HCC) [I48.0]  Hx of mitral valve replacement with mechanical valve [Z95.2] [Z95.2]             Anticoagulation Episode Summary     INR check location:      Preferred lab:      Send INR reminders to:   [facility]  POOL    Comments:  [facility] (previously [facility]; stopped due to cost)      Anticoagulation Care Providers     Provider Role Specialty Phone number    Navi Fay MD Referring Cardiology [phone]          Clinic Interview:  Patient Findings     Negatives:  Signs/symptoms of thrombosis, Signs/symptoms of bleeding,   Laboratory test error suspected, Change in health, Change in alcohol use,   Change in activity, Upcoming invasive procedure, Emergency department   visit, Upcoming dental procedure, Missed doses, Extra doses, Change in   medications, Change in diet/appetite, Hospital admission, Bruising, Other   complaints      Clinical Outcomes     Negatives:  Major bleeding event, Thromboembolic event,   Anticoagulation-related hospital admission, Anticoagulation-related ED   visit, Anticoagulation-related fatality        INR History:  Anticoagulation Monitoring 2022   INR 3.3 2.3 2.2   INR Date 2022   INR Goal 2.5-3.5 2.5-3.5 2.5-3.5   Trend Same Same Up   Last Week Total 27.5 mg 22.5 mg 27.5 mg   Next Week Total 27.5 mg 30 mg 32.5 mg   Sun 5 mg 5 mg 5 mg   Mon 2.5 mg 5 mg (); Otherwise 2.5 mg 2.5 mg   Tue 5 mg 5 mg 5 mg   Wed 2.5 mg 2.5 mg 5 mg   Thu 5 mg 5 mg 7.5 mg (); Otherwise 5  mg   Fri 2.5 mg 2.5 mg 2.5 mg   Sat 5 mg 5 mg 5 mg   Visit Report - - -   Some recent data might be hidden       Plan:  1. INR is Subtherapeutic today- see above in Anticoagulation Summary.   Will instruct Elliot Sebastian to Increase their warfarin regimen- see above in Anticoagulation Summary.  2. Follow up in 2 weeks  3. LVM with instructions as I was unable to reach patient. They have been instructed to call if any changes in medications, doses, concerns, etc. Patient expresses understanding and has no further questions at this time.    Angelica Marroquin, [facility]

## 2022-08-01 ENCOUNTER — APPOINTMENT (OUTPATIENT)
Dept: CARDIOLOGY | Facility: HOSPITAL | Age: 62
End: 2022-08-01

## 2022-08-01 ENCOUNTER — HOSPITAL ENCOUNTER (INPATIENT)
Facility: HOSPITAL | Age: 62
LOS: 6 days | Discharge: HOME HEALTH | End: 2022-08-07
Attending: EMERGENCY MEDICINE | Admitting: INTERNAL MEDICINE

## 2022-08-01 DIAGNOSIS — I33.0 ACUTE BACTERIAL ENDOCARDITIS: ICD-10-CM

## 2022-08-01 DIAGNOSIS — L03.115 CELLULITIS OF RIGHT LOWER EXTREMITY: Primary | ICD-10-CM

## 2022-08-01 DIAGNOSIS — R79.1 SUBTHERAPEUTIC INTERNATIONAL NORMALIZED RATIO (INR): ICD-10-CM

## 2022-08-01 LAB
ALBUMIN SERPL-MCNC: 4 G/DL (ref 3.5–5.2)
ALBUMIN/GLOB SERPL: 1.7 G/DL
ALP SERPL-CCNC: 67 U/L (ref 39–117)
ALT SERPL W P-5'-P-CCNC: 35 U/L (ref 1–41)
ANION GAP SERPL CALCULATED.3IONS-SCNC: 12.2 MMOL/L (ref 5–15)
AST SERPL-CCNC: 44 U/L (ref 1–40)
BASOPHILS # BLD AUTO: 0.02 10*3/MM3 (ref 0–0.2)
BASOPHILS NFR BLD AUTO: 0.2 % (ref 0–1.5)
BILIRUB SERPL-MCNC: 1.7 MG/DL (ref 0–1.2)
BUN SERPL-MCNC: 18 MG/DL (ref 8–23)
BUN/CREAT SERPL: 14 (ref 7–25)
CALCIUM SPEC-SCNC: 9.7 MG/DL (ref 8.6–10.5)
CHLORIDE SERPL-SCNC: 98 MMOL/L (ref 98–107)
CO2 SERPL-SCNC: 21.8 MMOL/L (ref 22–29)
CREAT SERPL-MCNC: 1.29 MG/DL (ref 0.76–1.27)
D-LACTATE SERPL-SCNC: 3 MMOL/L (ref 0.5–2)
DEPRECATED RDW RBC AUTO: 48 FL (ref 37–54)
EGFRCR SERPLBLD CKD-EPI 2021: 63.1 ML/MIN/1.73
EOSINOPHIL # BLD AUTO: 0 10*3/MM3 (ref 0–0.4)
EOSINOPHIL NFR BLD AUTO: 0 % (ref 0.3–6.2)
ERYTHROCYTE [DISTWIDTH] IN BLOOD BY AUTOMATED COUNT: 13.6 % (ref 12.3–15.4)
GLOBULIN UR ELPH-MCNC: 2.4 GM/DL
GLUCOSE SERPL-MCNC: 109 MG/DL (ref 65–99)
HCT VFR BLD AUTO: 44.7 % (ref 37.5–51)
HGB BLD-MCNC: 15.6 G/DL (ref 13–17.7)
HOLD SPECIMEN: NORMAL
IMM GRANULOCYTES # BLD AUTO: 0.06 10*3/MM3 (ref 0–0.05)
IMM GRANULOCYTES NFR BLD AUTO: 0.7 % (ref 0–0.5)
INR PPP: 1.79 (ref 0.9–1.1)
LYMPHOCYTES # BLD AUTO: 0.49 10*3/MM3 (ref 0.7–3.1)
LYMPHOCYTES NFR BLD AUTO: 5.7 % (ref 19.6–45.3)
MCH RBC QN AUTO: 33.7 PG (ref 26.6–33)
MCHC RBC AUTO-ENTMCNC: 34.9 G/DL (ref 31.5–35.7)
MCV RBC AUTO: 96.5 FL (ref 79–97)
MONOCYTES # BLD AUTO: 0.43 10*3/MM3 (ref 0.1–0.9)
MONOCYTES NFR BLD AUTO: 5 % (ref 5–12)
NEUTROPHILS NFR BLD AUTO: 7.66 10*3/MM3 (ref 1.7–7)
NEUTROPHILS NFR BLD AUTO: 88.4 % (ref 42.7–76)
NRBC BLD AUTO-RTO: 0 /100 WBC (ref 0–0.2)
PLATELET # BLD AUTO: 131 10*3/MM3 (ref 140–450)
PMV BLD AUTO: 9.1 FL (ref 6–12)
POTASSIUM SERPL-SCNC: 4.2 MMOL/L (ref 3.5–5.2)
PROT SERPL-MCNC: 6.4 G/DL (ref 6–8.5)
PROTHROMBIN TIME: 20.4 SECONDS (ref 11.7–14.2)
RBC # BLD AUTO: 4.63 10*6/MM3 (ref 4.14–5.8)
SODIUM SERPL-SCNC: 132 MMOL/L (ref 136–145)
TROPONIN T SERPL-MCNC: <0.01 NG/ML (ref 0–0.03)
WBC NRBC COR # BLD: 8.66 10*3/MM3 (ref 3.4–10.8)
WHOLE BLOOD HOLD COAG: NORMAL
WHOLE BLOOD HOLD SPECIMEN: NORMAL

## 2022-08-01 PROCEDURE — 87186 SC STD MICRODIL/AGAR DIL: CPT | Performed by: EMERGENCY MEDICINE

## 2022-08-01 PROCEDURE — 85610 PROTHROMBIN TIME: CPT | Performed by: EMERGENCY MEDICINE

## 2022-08-01 PROCEDURE — U0003 INFECTIOUS AGENT DETECTION BY NUCLEIC ACID (DNA OR RNA); SEVERE ACUTE RESPIRATORY SYNDROME CORONAVIRUS 2 (SARS-COV-2) (CORONAVIRUS DISEASE [COVID-19]), AMPLIFIED PROBE TECHNIQUE, MAKING USE OF HIGH THROUGHPUT TECHNOLOGIES AS DESCRIBED BY CMS-2020-01-R: HCPCS | Performed by: EMERGENCY MEDICINE

## 2022-08-01 PROCEDURE — 84484 ASSAY OF TROPONIN QUANT: CPT | Performed by: EMERGENCY MEDICINE

## 2022-08-01 PROCEDURE — 85025 COMPLETE CBC W/AUTO DIFF WBC: CPT | Performed by: EMERGENCY MEDICINE

## 2022-08-01 PROCEDURE — 80053 COMPREHEN METABOLIC PANEL: CPT | Performed by: EMERGENCY MEDICINE

## 2022-08-01 PROCEDURE — 25010000002 VANCOMYCIN 10 G RECONSTITUTED SOLUTION: Performed by: EMERGENCY MEDICINE

## 2022-08-01 PROCEDURE — 93010 ELECTROCARDIOGRAM REPORT: CPT | Performed by: INTERNAL MEDICINE

## 2022-08-01 PROCEDURE — 93005 ELECTROCARDIOGRAM TRACING: CPT | Performed by: EMERGENCY MEDICINE

## 2022-08-01 PROCEDURE — 93970 EXTREMITY STUDY: CPT

## 2022-08-01 PROCEDURE — 87150 DNA/RNA AMPLIFIED PROBE: CPT | Performed by: EMERGENCY MEDICINE

## 2022-08-01 PROCEDURE — 87040 BLOOD CULTURE FOR BACTERIA: CPT | Performed by: EMERGENCY MEDICINE

## 2022-08-01 PROCEDURE — 99284 EMERGENCY DEPT VISIT MOD MDM: CPT

## 2022-08-01 PROCEDURE — 36415 COLL VENOUS BLD VENIPUNCTURE: CPT

## 2022-08-01 PROCEDURE — 83605 ASSAY OF LACTIC ACID: CPT | Performed by: EMERGENCY MEDICINE

## 2022-08-01 RX ADMIN — SODIUM CHLORIDE 500 ML: 9 INJECTION, SOLUTION INTRAVENOUS at 23:26

## 2022-08-01 RX ADMIN — VANCOMYCIN HYDROCHLORIDE 2500 MG: 10 INJECTION, POWDER, LYOPHILIZED, FOR SOLUTION INTRAVENOUS at 23:27

## 2022-08-01 NOTE — ED NOTES
"Patient from home via [facility]; c/o bilateral lower leg pain with redness and swelling. Hx of DVT. Patient is on warfarin. Patient states \"I think I am going into sepsis because I am shaky.\"  "

## 2022-08-02 ENCOUNTER — APPOINTMENT (OUTPATIENT)
Dept: CARDIOLOGY | Facility: HOSPITAL | Age: 62
End: 2022-08-02

## 2022-08-02 PROBLEM — Z79.01 CHRONIC ANTICOAGULATION: Status: ACTIVE | Noted: 2022-08-02

## 2022-08-02 PROBLEM — A41.89 GRAM POSITIVE SEPTICEMIA: Status: ACTIVE | Noted: 2022-08-02

## 2022-08-02 PROBLEM — N17.9 AKI (ACUTE KIDNEY INJURY): Status: ACTIVE | Noted: 2022-08-02

## 2022-08-02 PROBLEM — R50.9 FEVER: Status: ACTIVE | Noted: 2022-08-02

## 2022-08-02 LAB
AORTIC DIMENSIONLESS INDEX: 0.7 (DI)
BACTERIA BLD CULT: ABNORMAL
BH CV ECHO MEAS - ACS: 2.13 CM
BH CV ECHO MEAS - AO MAX PG: 5.3 MMHG
BH CV ECHO MEAS - AO MEAN PG: 2.9 MMHG
BH CV ECHO MEAS - AO ROOT DIAM: 3.9 CM
BH CV ECHO MEAS - AO V2 MAX: 115.4 CM/SEC
BH CV ECHO MEAS - AO V2 VTI: 18.7 CM
BH CV ECHO MEAS - AVA(I,D): 2.8 CM2
BH CV ECHO MEAS - EDV(CUBED): 117.9 ML
BH CV ECHO MEAS - EDV(MOD-SP2): 173 ML
BH CV ECHO MEAS - EDV(MOD-SP4): 148 ML
BH CV ECHO MEAS - EF(MOD-BP): 60.4 %
BH CV ECHO MEAS - EF(MOD-SP2): 57.8 %
BH CV ECHO MEAS - EF(MOD-SP4): 62.8 %
BH CV ECHO MEAS - ESV(CUBED): 43.5 ML
BH CV ECHO MEAS - ESV(MOD-SP2): 73 ML
BH CV ECHO MEAS - ESV(MOD-SP4): 55 ML
BH CV ECHO MEAS - FS: 28.3 %
BH CV ECHO MEAS - IVS/LVPW: 0.96 CM
BH CV ECHO MEAS - IVSD: 1.31 CM
BH CV ECHO MEAS - LAT PEAK E' VEL: 8.7 CM/SEC
BH CV ECHO MEAS - LV DIASTOLIC VOL/BSA (35-75): 55.1 CM2
BH CV ECHO MEAS - LV MASS(C)D: 264.7 GRAMS
BH CV ECHO MEAS - LV MAX PG: 2.19 MMHG
BH CV ECHO MEAS - LV MEAN PG: 1.11 MMHG
BH CV ECHO MEAS - LV SYSTOLIC VOL/BSA (12-30): 20.5 CM2
BH CV ECHO MEAS - LV V1 MAX: 74.1 CM/SEC
BH CV ECHO MEAS - LV V1 VTI: 14 CM
BH CV ECHO MEAS - LVIDD: 4.9 CM
BH CV ECHO MEAS - LVIDS: 3.5 CM
BH CV ECHO MEAS - LVOT AREA: 3.8 CM2
BH CV ECHO MEAS - LVOT DIAM: 2.19 CM
BH CV ECHO MEAS - LVPWD: 1.37 CM
BH CV ECHO MEAS - MED PEAK E' VEL: 6.2 CM/SEC
BH CV ECHO MEAS - MV A MAX VEL: 97.3 CM/SEC
BH CV ECHO MEAS - MV DEC SLOPE: 457.4 CM/SEC2
BH CV ECHO MEAS - MV DEC TIME: 0.2 MSEC
BH CV ECHO MEAS - MV E MAX VEL: 116 CM/SEC
BH CV ECHO MEAS - MV E/A: 1.19
BH CV ECHO MEAS - MV MAX PG: 8.1 MMHG
BH CV ECHO MEAS - MV MEAN PG: 4.7 MMHG
BH CV ECHO MEAS - MV P1/2T: 86 MSEC
BH CV ECHO MEAS - MV V2 VTI: 35.3 CM
BH CV ECHO MEAS - MVA(P1/2T): 2.6 CM2
BH CV ECHO MEAS - MVA(VTI): 1.5 CM2
BH CV ECHO MEAS - PA V2 MAX: 90.9 CM/SEC
BH CV ECHO MEAS - QP/QS: 0.92
BH CV ECHO MEAS - RAP SYSTOLE: 8 MMHG
BH CV ECHO MEAS - RV MAX PG: 1.06 MMHG
BH CV ECHO MEAS - RV V1 MAX: 51.4 CM/SEC
BH CV ECHO MEAS - RV V1 VTI: 9.3 CM
BH CV ECHO MEAS - RVOT DIAM: 2.6 CM
BH CV ECHO MEAS - SI(MOD-SP2): 37.2 ML/M2
BH CV ECHO MEAS - SI(MOD-SP4): 34.6 ML/M2
BH CV ECHO MEAS - SV(LVOT): 52.9 ML
BH CV ECHO MEAS - SV(MOD-SP2): 100 ML
BH CV ECHO MEAS - SV(MOD-SP4): 93 ML
BH CV ECHO MEAS - SV(RVOT): 48.5 ML
BH CV ECHO MEASUREMENTS AVERAGE E/E' RATIO: 15.57
BH CV LOWER VASCULAR LEFT COMMON FEMORAL AUGMENT: NORMAL
BH CV LOWER VASCULAR LEFT COMMON FEMORAL COMPETENT: NORMAL
BH CV LOWER VASCULAR LEFT COMMON FEMORAL COMPRESS: NORMAL
BH CV LOWER VASCULAR LEFT COMMON FEMORAL PHASIC: NORMAL
BH CV LOWER VASCULAR LEFT COMMON FEMORAL SPONT: NORMAL
BH CV LOWER VASCULAR LEFT DISTAL FEMORAL COMPRESS: NORMAL
BH CV LOWER VASCULAR LEFT GASTRONEMIUS COMPRESS: NORMAL
BH CV LOWER VASCULAR LEFT GREATER SAPH AK COMPRESS: NORMAL
BH CV LOWER VASCULAR LEFT GREATER SAPH BK COMPRESS: NORMAL
BH CV LOWER VASCULAR LEFT LESSER SAPH COMPRESS: NORMAL
BH CV LOWER VASCULAR LEFT MID FEMORAL AUGMENT: NORMAL
BH CV LOWER VASCULAR LEFT MID FEMORAL COMPETENT: NORMAL
BH CV LOWER VASCULAR LEFT MID FEMORAL COMPRESS: NORMAL
BH CV LOWER VASCULAR LEFT MID FEMORAL PHASIC: NORMAL
BH CV LOWER VASCULAR LEFT MID FEMORAL SPONT: NORMAL
BH CV LOWER VASCULAR LEFT PERONEAL COMPRESS: NORMAL
BH CV LOWER VASCULAR LEFT POPLITEAL AUGMENT: NORMAL
BH CV LOWER VASCULAR LEFT POPLITEAL COMPETENT: NORMAL
BH CV LOWER VASCULAR LEFT POPLITEAL COMPRESS: NORMAL
BH CV LOWER VASCULAR LEFT POPLITEAL PHASIC: NORMAL
BH CV LOWER VASCULAR LEFT POPLITEAL SPONT: NORMAL
BH CV LOWER VASCULAR LEFT POSTERIOR TIBIAL COMPRESS: NORMAL
BH CV LOWER VASCULAR LEFT PROFUNDA FEMORAL COMPRESS: NORMAL
BH CV LOWER VASCULAR LEFT PROXIMAL FEMORAL COMPRESS: NORMAL
BH CV LOWER VASCULAR LEFT SAPHENOFEMORAL JUNCTION COMPRESS: NORMAL
BH CV LOWER VASCULAR RIGHT COMMON FEMORAL AUGMENT: NORMAL
BH CV LOWER VASCULAR RIGHT COMMON FEMORAL COMPETENT: NORMAL
BH CV LOWER VASCULAR RIGHT COMMON FEMORAL COMPRESS: NORMAL
BH CV LOWER VASCULAR RIGHT COMMON FEMORAL PHASIC: NORMAL
BH CV LOWER VASCULAR RIGHT COMMON FEMORAL SPONT: NORMAL
BH CV LOWER VASCULAR RIGHT DISTAL FEMORAL COMPRESS: NORMAL
BH CV LOWER VASCULAR RIGHT GASTRONEMIUS COMPRESS: NORMAL
BH CV LOWER VASCULAR RIGHT GREATER SAPH AK COMPRESS: NORMAL
BH CV LOWER VASCULAR RIGHT GREATER SAPH BK COMPRESS: NORMAL
BH CV LOWER VASCULAR RIGHT LESSER SAPH COMPRESS: NORMAL
BH CV LOWER VASCULAR RIGHT MID FEMORAL AUGMENT: NORMAL
BH CV LOWER VASCULAR RIGHT MID FEMORAL COMPETENT: NORMAL
BH CV LOWER VASCULAR RIGHT MID FEMORAL COMPRESS: NORMAL
BH CV LOWER VASCULAR RIGHT MID FEMORAL PHASIC: NORMAL
BH CV LOWER VASCULAR RIGHT MID FEMORAL SPONT: NORMAL
BH CV LOWER VASCULAR RIGHT PERONEAL COMPRESS: NORMAL
BH CV LOWER VASCULAR RIGHT POPLITEAL AUGMENT: NORMAL
BH CV LOWER VASCULAR RIGHT POPLITEAL COMPETENT: NORMAL
BH CV LOWER VASCULAR RIGHT POPLITEAL COMPRESS: NORMAL
BH CV LOWER VASCULAR RIGHT POPLITEAL PHASIC: NORMAL
BH CV LOWER VASCULAR RIGHT POPLITEAL SPONT: NORMAL
BH CV LOWER VASCULAR RIGHT POSTERIOR TIBIAL COMPRESS: NORMAL
BH CV LOWER VASCULAR RIGHT PROFUNDA FEMORAL COMPRESS: NORMAL
BH CV LOWER VASCULAR RIGHT PROXIMAL FEMORAL COMPRESS: NORMAL
BH CV LOWER VASCULAR RIGHT SAPHENOFEMORAL JUNCTION COMPRESS: NORMAL
BH CV XLRA - TDI S': 6.9 CM/SEC
CREAT SERPL-MCNC: 0.88 MG/DL (ref 0.76–1.27)
D-LACTATE SERPL-SCNC: 2 MMOL/L (ref 0.5–2)
D-LACTATE SERPL-SCNC: 4.7 MMOL/L (ref 0.5–2)
EGFRCR SERPLBLD CKD-EPI 2021: 97.8 ML/MIN/1.73
INR PPP: 1.93 (ref 0.9–1.1)
LV EF 2D ECHO EST: 60 %
MAXIMAL PREDICTED HEART RATE: 159 BPM
MAXIMAL PREDICTED HEART RATE: 159 BPM
PROTHROMBIN TIME: 21.7 SECONDS (ref 11.7–14.2)
QT INTERVAL: 353 MS
SARS-COV-2 RNA RESP QL NAA+PROBE: NOT DETECTED
SINUS: 4.1 CM
STJ: 3.1 CM
STRESS TARGET HR: 135 BPM
STRESS TARGET HR: 135 BPM
VANCOMYCIN SERPL-MCNC: 7.4 MCG/ML (ref 5–40)

## 2022-08-02 PROCEDURE — 25010000002 DIGOXIN PER 500 MCG: Performed by: NURSE PRACTITIONER

## 2022-08-02 PROCEDURE — 25010000002 VANCOMYCIN PER 500 MG: Performed by: NURSE PRACTITIONER

## 2022-08-02 PROCEDURE — 83605 ASSAY OF LACTIC ACID: CPT | Performed by: EMERGENCY MEDICINE

## 2022-08-02 PROCEDURE — 25010000002 MAGNESIUM SULFATE 2 GM/50ML SOLUTION: Performed by: NURSE PRACTITIONER

## 2022-08-02 PROCEDURE — 93306 TTE W/DOPPLER COMPLETE: CPT | Performed by: INTERNAL MEDICINE

## 2022-08-02 PROCEDURE — 25010000002 CEFEPIME PER 500 MG: Performed by: EMERGENCY MEDICINE

## 2022-08-02 PROCEDURE — 80202 ASSAY OF VANCOMYCIN: CPT | Performed by: NURSE PRACTITIONER

## 2022-08-02 PROCEDURE — 93005 ELECTROCARDIOGRAM TRACING: CPT | Performed by: INTERNAL MEDICINE

## 2022-08-02 PROCEDURE — 85610 PROTHROMBIN TIME: CPT | Performed by: NURSE PRACTITIONER

## 2022-08-02 PROCEDURE — 99222 1ST HOSP IP/OBS MODERATE 55: CPT | Performed by: INTERNAL MEDICINE

## 2022-08-02 PROCEDURE — 82565 ASSAY OF CREATININE: CPT | Performed by: INTERNAL MEDICINE

## 2022-08-02 PROCEDURE — 25010000002 PERFLUTREN (DEFINITY) 8.476 MG IN SODIUM CHLORIDE (PF) 0.9 % 10 ML INJECTION: Performed by: INTERNAL MEDICINE

## 2022-08-02 PROCEDURE — 99223 1ST HOSP IP/OBS HIGH 75: CPT | Performed by: INTERNAL MEDICINE

## 2022-08-02 PROCEDURE — 93010 ELECTROCARDIOGRAM REPORT: CPT | Performed by: INTERNAL MEDICINE

## 2022-08-02 PROCEDURE — 25010000002 ONDANSETRON PER 1 MG: Performed by: NURSE PRACTITIONER

## 2022-08-02 PROCEDURE — 36415 COLL VENOUS BLD VENIPUNCTURE: CPT | Performed by: NURSE PRACTITIONER

## 2022-08-02 PROCEDURE — 93306 TTE W/DOPPLER COMPLETE: CPT

## 2022-08-02 RX ORDER — ACETAMINOPHEN 160 MG/5ML
650 SOLUTION ORAL EVERY 4 HOURS PRN
Status: DISCONTINUED | OUTPATIENT
Start: 2022-08-02 | End: 2022-08-07 | Stop reason: HOSPADM

## 2022-08-02 RX ORDER — SODIUM CHLORIDE 9 MG/ML
100 INJECTION, SOLUTION INTRAVENOUS CONTINUOUS
Status: DISCONTINUED | OUTPATIENT
Start: 2022-08-02 | End: 2022-08-04

## 2022-08-02 RX ORDER — ENOXAPARIN SODIUM 100 MG/ML
0.75 INJECTION SUBCUTANEOUS EVERY 12 HOURS
Status: DISCONTINUED | OUTPATIENT
Start: 2022-08-02 | End: 2022-08-02

## 2022-08-02 RX ORDER — WARFARIN SODIUM 7.5 MG/1
7.5 TABLET ORAL
Status: COMPLETED | OUTPATIENT
Start: 2022-08-02 | End: 2022-08-02

## 2022-08-02 RX ORDER — MAGNESIUM SULFATE HEPTAHYDRATE 40 MG/ML
2 INJECTION, SOLUTION INTRAVENOUS ONCE
Status: COMPLETED | OUTPATIENT
Start: 2022-08-02 | End: 2022-08-02

## 2022-08-02 RX ORDER — SODIUM CHLORIDE 0.9 % (FLUSH) 0.9 %
10 SYRINGE (ML) INJECTION AS NEEDED
Status: DISCONTINUED | OUTPATIENT
Start: 2022-08-02 | End: 2022-08-07 | Stop reason: HOSPADM

## 2022-08-02 RX ORDER — DIGOXIN 0.25 MG/ML
250 INJECTION INTRAMUSCULAR; INTRAVENOUS ONCE
Status: COMPLETED | OUTPATIENT
Start: 2022-08-03 | End: 2022-08-02

## 2022-08-02 RX ORDER — VANCOMYCIN HYDROCHLORIDE 1 G/200ML
1000 INJECTION, SOLUTION INTRAVENOUS EVERY 12 HOURS
Status: DISCONTINUED | OUTPATIENT
Start: 2022-08-02 | End: 2022-08-03

## 2022-08-02 RX ORDER — SODIUM CHLORIDE 0.9 % (FLUSH) 0.9 %
10 SYRINGE (ML) INJECTION EVERY 12 HOURS SCHEDULED
Status: DISCONTINUED | OUTPATIENT
Start: 2022-08-02 | End: 2022-08-07 | Stop reason: HOSPADM

## 2022-08-02 RX ORDER — ONDANSETRON 2 MG/ML
4 INJECTION INTRAMUSCULAR; INTRAVENOUS EVERY 6 HOURS PRN
Status: DISCONTINUED | OUTPATIENT
Start: 2022-08-02 | End: 2022-08-07 | Stop reason: HOSPADM

## 2022-08-02 RX ORDER — NITROGLYCERIN 0.4 MG/1
0.4 TABLET SUBLINGUAL
Status: DISCONTINUED | OUTPATIENT
Start: 2022-08-02 | End: 2022-08-07 | Stop reason: HOSPADM

## 2022-08-02 RX ORDER — DIGOXIN 0.25 MG/ML
250 INJECTION INTRAMUSCULAR; INTRAVENOUS ONCE
Status: COMPLETED | OUTPATIENT
Start: 2022-08-02 | End: 2022-08-02

## 2022-08-02 RX ORDER — ACETAMINOPHEN 650 MG/1
650 SUPPOSITORY RECTAL EVERY 4 HOURS PRN
Status: DISCONTINUED | OUTPATIENT
Start: 2022-08-02 | End: 2022-08-07 | Stop reason: HOSPADM

## 2022-08-02 RX ORDER — ACETAMINOPHEN 325 MG/1
650 TABLET ORAL EVERY 4 HOURS PRN
Status: DISCONTINUED | OUTPATIENT
Start: 2022-08-02 | End: 2022-08-07 | Stop reason: HOSPADM

## 2022-08-02 RX ORDER — ATENOLOL 25 MG/1
25 TABLET ORAL DAILY
Status: DISCONTINUED | OUTPATIENT
Start: 2022-08-02 | End: 2022-08-03

## 2022-08-02 RX ADMIN — ACETAMINOPHEN 650 MG: 325 TABLET, FILM COATED ORAL at 07:49

## 2022-08-02 RX ADMIN — ACETAMINOPHEN 650 MG: 325 TABLET, FILM COATED ORAL at 15:58

## 2022-08-02 RX ADMIN — PERFLUTREN 2 ML: 6.52 INJECTION, SUSPENSION INTRAVENOUS at 13:29

## 2022-08-02 RX ADMIN — Medication 10 ML: at 20:12

## 2022-08-02 RX ADMIN — DIGOXIN 250 MCG: 250 INJECTION, SOLUTION INTRAMUSCULAR; INTRAVENOUS at 20:09

## 2022-08-02 RX ADMIN — VANCOMYCIN HYDROCHLORIDE 1000 MG: 1 INJECTION, SOLUTION INTRAVENOUS at 12:09

## 2022-08-02 RX ADMIN — MAGNESIUM SULFATE HEPTAHYDRATE 2 G: 2 INJECTION, SOLUTION INTRAVENOUS at 20:09

## 2022-08-02 RX ADMIN — DIGOXIN 250 MCG: 250 INJECTION, SOLUTION INTRAMUSCULAR; INTRAVENOUS at 23:45

## 2022-08-02 RX ADMIN — ONDANSETRON 4 MG: 2 INJECTION INTRAMUSCULAR; INTRAVENOUS at 14:41

## 2022-08-02 RX ADMIN — SODIUM CHLORIDE 100 ML/HR: 9 INJECTION, SOLUTION INTRAVENOUS at 01:15

## 2022-08-02 RX ADMIN — ACETAMINOPHEN 650 MG: 325 TABLET, FILM COATED ORAL at 01:19

## 2022-08-02 RX ADMIN — CEFEPIME 2 G: 2 INJECTION, POWDER, FOR SOLUTION INTRAVENOUS at 01:00

## 2022-08-02 RX ADMIN — VANCOMYCIN HYDROCHLORIDE 1000 MG: 1 INJECTION, SOLUTION INTRAVENOUS at 23:45

## 2022-08-02 RX ADMIN — SODIUM CHLORIDE 500 ML: 9 INJECTION, SOLUTION INTRAVENOUS at 22:00

## 2022-08-02 RX ADMIN — WARFARIN 7.5 MG: 7.5 TABLET ORAL at 18:45

## 2022-08-02 RX ADMIN — SODIUM CHLORIDE 100 ML/HR: 9 INJECTION, SOLUTION INTRAVENOUS at 14:41

## 2022-08-02 NOTE — CONSULTS
Referring Provider: COREY Bingham  Subjective   History of present illness: This is a very nice 62-year-old we are asked for evaluation opinion regarding bacteremia.  Per review the past medical records, he has a history of mitral valve endocarditis in 2013 requiring mitral valve replacement and prolonged antibiotic therapy.  He was last admitted to the hospital from March 30 to April 2, 2020 with sepsis attributed to cellulitis.  COVID-19 and blood cultures were negative at that time.  Patient reports that he was doing well up until 7/30/2022 when he noted the cute onset of shaking chills that lasted about an hour and resolved spontaneously.  He did not think he had an actual fever.  He developed some mild lower back pain as well as swelling and erythema of the right lower extremity.  He had ongoing shaking chills which were intermittent.  Given his prior history from infection, he was concerned that he might have sepsis and presented to the [facility] emergency department.  Here blood cultures were drawn and he was started on vancomycin and cefepime.  His blood cultures are now coming back with gram-positive cocci in chains.    He says that he is feeling better after the antibiotics.  His back pain is much improved as well.      Past Medical History:   Diagnosis Date   • Acute bacterial endocarditis    • Anemia    • APC (atrial premature contractions)    • Atrial fibrillation ([facility])    • BPH (benign prostatic hypertrophy)    • CHF (congestive heart failure) ([facility])    • Cholelithiasis    • Chronic constipation    • Deep vein thrombophlebitis of leg ([facility])     DISTAL   • Disease of thyroid gland    • Gout    • Intestinal obstruction ([facility])    • Ischemic enteritis ([facility])    • Left heart failure, NYHA class 3 ([facility])     CLASS 111 LEFT SYSTOIC CONGESTIVE HEART FAILURE   • Mitral regurgitation    • Pleural effusion, bilateral    • Pulmonary hypertension ([facility])    • 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[facility])     • Umbilical hernia    • Vitamin D deficiency        Past Surgical History:   Procedure Laterality Date   • APPENDECTOMY     • CHOLECYSTECTOMY     • COLONOSCOPY  approx 2013    normal per patient   • COLONOSCOPY N/A 10/26/2020    Procedure: COLONOSCOPY into cecum with biopsy, polypectomy, clip placement;  Surgeon: Juan Phillips MD;  Location: [facility] ENDOSCOPY;  Service: Gastroenterology;  Laterality: N/A;  polyps, clip  asc polyp removed but not retrieved   • EXPLORATION NECK FOR POSTOP HEMORRHAGE / THROMBOSIS / INFECTION     • MITRAL VALVE REPLACEMENT  01/03/2013    33MM [facility] MECHANICAL VALVE, PRESERVATION OF CORDS TO SUBVALVULAR APPARATUS AND DOROTHY GORE-FLORI CORD IN THE P2 AREA, DEBRIDEMENT OF ANTERIOR AND POSTERIOR MITRAL LEAFLET        No family history of infectious diseases  Social history: He is  and lives here in [place], [place].  He works as a .  Non-smoker  No known drug allergies    Review of Systems  Pertinent items are noted in HPI, all other systems reviewed and negative    Objective     Physical Exam:   Vital Signs   Temp:  [99.8 °F (37.7 °C)-103.1 °F (39.5 °C)] 102.4 °F (39.1 °C)  Heart Rate:  [] 98  Resp:  [16-20] 16  BP: (108-141)/(58-84) 109/65    GENERAL: Awake and alert, in no acute distress.   HEENT: Oropharynx is clear. Hearing is grossly normal.   EYES: PERRL. No conjunctival injection. No lid lag.   LYMPHATICS: No lymphadenopathy of the neck or inguinal regions.   HEART: Regular rate and regular rhythm.  1-2+ peripheral edema in bilateral lower extremities and upper extremities.   LUNGS: Clear to auscultation anteriorly with normal respiratory effort.   GI: Soft, nontender, nondistended. No appreciable organomegaly.   SKIN: Warm and dry with mild erythema of the right lower extremity  PSYCHIATRIC: Appropriate mood, affect, insight, and judgment.     Results Review:  WBC 8.66    HGb 15.6/hct 45  Cr 1.29  AST 44  TB 1.7    Microbiology: 8/1 BCx  2/2 [facility]  Radiology: Duplex venography: no LE dvt    A/p  1.  Gram-positive septicemia  2.  Mitral valve replacement    Interestingly his white count is normal and his lower extremity exam is not all that impressive.  Is possible he has a low-grade cellulitis of the right lower extremity but the appearance is somewhat atypical for infective cellulitis.  Blood cultures are growing gram-positive cocci in admission sets which of course is concerning given his artificial heart valve.  We will start with a surface echocardiogram, but may need MOE  Continue vancomycin for an AUC of 400-600.  Random vancomycin level ahead of next dose.  We will further tailor antibiotics based on pending BCID pcr      Thank you for this consult.  We will continue to follow along and tailor antibiotics as the patient's clinical course evolves.      Dillon Vega MD  08/02/22  09:06 EDT

## 2022-08-02 NOTE — PAYOR COMM NOTE
"Elliot Singh (62 y.o. Male)     PLEASE SEE ATTACHED FOR INPT AUTH.             REF#[id number]    PLEASE CALL  OR  [phone]    THANK YOU    MELITON JAVIER LPN CCP        Date of Birth   1960    Social Security Number       Address   [address] [place] [address]  [facility] [address]    Home Phone   [phone]    MRN   [id number]       Mandaen   Sikh    Marital Status                               Admission Date   8/1/22    Admission Type   Emergency    Admitting Provider   Raúl Pendleton MD    Attending Provider   Raúl Pendleton MD    Department, Room/Bed   [address], S513/1       Discharge Date       Discharge Disposition       Discharge Destination                               Attending Provider: Raúl Pendleton MD    Allergies: No Known Allergies    Isolation: None   Infection: None   Code Status: CPR   Advance Care Planning Activity    Ht: 198.1 cm (78\")   Wt: 137 kg (301 lb 9.6 oz)    Admission Cmt: None   Principal Problem: Fever [R50.9]                 Active Insurance as of 8/1/2022     Primary Coverage     Payor Plan Insurance Group Employer/Plan Group    [employer] BLUE [employer]OSS [employer] BLUE [employer]OSS BLUE [employer]IELD PPO [id number]     Payor Plan Address Payor Plan Phone Number Payor Plan Fax Number Effective Dates    [address] [phone]  7/1/2022 - None Entered    [place] [address]       Subscriber Name Subscriber Birth Date Member ID       ELLIOT SINGH 1960 [id number]                 Emergency Contacts      (Rel.) Home Phone Work Phone Mobile Phone    Nola Cantu (Spouse) [phone] -- [phone]               History & Physical      Flori Walker APRN at 08/02/22 0849              Patient Name:  Elliot Singh  YOB: 1960  MRN:  [id number]  Admit Date:  8/1/2022  Patient Care Team:  Mannie Beltran DO as PCP - General (Family Medicine)  Sary Tejeda RPH as " Pharmacist      Subjective   History Present Illness     Chief Complaint   Patient presents with   • Leg Swelling   • Weakness - Generalized       Mr. Sebastian is a 62 y.o. male with a history of MVR on AC, afib, BPH, CHF, anemia, DVT, bacterial endocarditis that presents to [facility] complaining of RLE redness, pain,chills. Symptoms have been present since Saturday. He has also had nausea without vomiting. Denies fever at home, chest pain, shortness of breath, diarrhea, dysuria. Per lab report, blood cultures are growing GPC 2/2, ID has been consulted.    TMax 103.1. HR controlled. BP stable. On room air. Trop neg. Lactate 3.0-->4.7-->2.0. Blood cultures collected. WBC 8.66, Hgb 15.6, plts 131. INR 1.79. Gluc 109, Cr 1.29, Na 132, AST 44, CO2 21.8, Total bili 1.7. Covid neg. BLE doppler neg    Review of Systems   Constitutional: Positive for chills and fever.   HENT: Negative for congestion.    Respiratory: Negative for shortness of breath.    Cardiovascular: Negative for chest pain.   Gastrointestinal: Positive for nausea. Negative for diarrhea and vomiting.   Genitourinary: Negative for dysuria.   Musculoskeletal: Negative for arthralgias.   Skin: Positive for rash.   Neurological: Negative for headaches.   Psychiatric/Behavioral: Negative for sleep disturbance.        Personal History     Past Medical History:   Diagnosis Date   • Acute bacterial endocarditis    • Anemia    • APC (atrial premature contractions)    • Atrial fibrillation (HCC)    • BPH (benign prostatic hypertrophy)    • CHF (congestive heart failure) ([facility])    • Cholelithiasis    • Chronic constipation    • Deep vein thrombophlebitis of leg ([facility])     DISTAL   • Disease of thyroid gland    • Gout    • Intestinal obstruction ([facility])    • Ischemic enteritis ([facility])    • Left heart failure, NYHA class 3 ([facility])     CLASS 111 LEFT SYSTOIC CONGESTIVE HEART FAILURE   • Mitral regurgitation    • Pleural effusion, bilateral    • Pulmonary  hypertension (HCC)    • 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HCC)    • Umbilical hernia    • Vitamin D deficiency      Past Surgical History:   Procedure Laterality Date   • APPENDECTOMY     • CHOLECYSTECTOMY     • COLONOSCOPY  approx 2013    normal per patient   • COLONOSCOPY N/A 10/26/2020    Procedure: COLONOSCOPY into cecum with biopsy, polypectomy, clip placement;  Surgeon: Juan Phillips MD;  Location: [facility] ENDOSCOPY;  Service: Gastroenterology;  Laterality: N/A;  polyps, clip  asc polyp removed but not retrieved   • EXPLORATION NECK FOR POSTOP HEMORRHAGE / THROMBOSIS / INFECTION     • MITRAL VALVE REPLACEMENT  01/03/2013    33MM ST. JASON MECHANICAL VALVE, PRESERVATION OF CORDS TO SUBVALVULAR APPARATUS AND DOROTHY GORE-FLORI CORD IN THE P2 AREA, DEBRIDEMENT OF ANTERIOR AND POSTERIOR MITRAL LEAFLET      Family History   Problem Relation Age of Onset   • Diabetes Mother    • Heart disease Father    • Diabetes Sister    • Diabetes Brother      Social History     Tobacco Use   • Smoking status: Never Smoker   • Smokeless tobacco: Never Used   • Tobacco comment: caffeine use   Substance Use Topics   • Alcohol use: Yes     Alcohol/week: 2.0 standard drinks     Types: 2 Cans of beer per week     Comment: 1-2 nightly   • Drug use: Never     No current facility-administered medications on file prior to encounter.     Current Outpatient Medications on File Prior to Encounter   Medication Sig Dispense Refill   • atenolol (TENORMIN) 25 MG tablet Take 1 tablet by mouth Daily. MUST MAKE FOLLOW UP APPOINTMENT FOR FUTURE REFILLS 90 tablet 0   • Cholecalciferol (VITAMIN D3) 2000 UNITS capsule Take 1,000 Units by mouth Daily.     • cholestyramine (Questran) 4 GM/DOSE powder Take 1 packet by mouth Daily. If no improvement after two weeks, may increase to BID. 90 packet 3   • Multiple Vitamins-Minerals (MENS MULTIVITAMIN PLUS) tablet Take 2 capsules by mouth Daily. Soft gels     • warfarin (COUMADIN) 5 MG tablet TAKE ONE-HALF  (1/2) TABLET ON MONDAY, WEDNESDAY, AND FRIDAY AND 1 TABLET ALL OTHER DAYS OR AS DIRECTED. NEEDS LABS FOR MORE REFILLS (Patient taking differently: 2.5 mg Monday and Friday; 5mg Tuesday, Wednesday, Thursday, Saturday, Sunday) 75 tablet 0   • losartan (COZAAR) 50 MG tablet Take 1 tablet by mouth Daily. MUST MAKE FOLLOW UP APPOINTMENT FOR FUTURE REFILLS 6/30/2022 90 tablet 0     No Known Allergies    Objective    Objective     Vital Signs  Temp:  [99.8 °F (37.7 °C)-103.1 °F (39.5 °C)] 102.4 °F (39.1 °C)  Heart Rate:  [] 98  Resp:  [16-20] 16  BP: (108-141)/(58-84) 109/65  SpO2:  [95 %-99 %] 97 %  on   ;   Device (Oxygen Therapy): room air  Body mass index is 34.85 kg/m².    Physical Exam  Vitals and nursing note reviewed.   Constitutional:       General: He is not in acute distress.     Appearance: He is ill-appearing. He is not toxic-appearing.   HENT:      Head: Normocephalic.      Mouth/Throat:      Mouth: Mucous membranes are moist.   Eyes:      Conjunctiva/sclera: Conjunctivae normal.   Cardiovascular:      Rate and Rhythm: Normal rate and regular rhythm.      Comments: +click  Pulmonary:      Effort: Pulmonary effort is normal. No respiratory distress.      Breath sounds: Normal breath sounds.   Abdominal:      General: Bowel sounds are normal.      Palpations: Abdomen is soft.   Musculoskeletal:         General: Tenderness present.      Cervical back: Neck supple.      Right lower leg: No edema.      Left lower leg: No edema.   Skin:     General: Skin is warm and dry.      Findings: Erythema present.   Neurological:      Mental Status: He is alert and oriented to person, place, and time.   Psychiatric:         Mood and Affect: Mood normal.         Behavior: Behavior normal.         Results Review:  I reviewed the patient's new clinical results.  I reviewed the patient's new imaging results and agree with the interpretation.  I reviewed the patient's other test results and agree with the interpretation  I  personally viewed and interpreted the patient's EKG/Telemetry data    Lab Results (last 24 hours)     Procedure Component Value Units Date/Time    CBC & Differential [[id number]]  (Abnormal) Collected: 08/01/22 2209    Specimen: Blood Updated: 08/01/22 2226    Narrative:      The following orders were created for panel order CBC & Differential.  Procedure                               Abnormality         Status                     ---------                               -----------         ------                     CBC Auto Differential[[id number]]        Abnormal            Final result                 Please view results for these tests on the individual orders.    Comprehensive Metabolic Panel [[id number]]  (Abnormal) Collected: 08/01/22 2209    Specimen: Blood Updated: 08/01/22 2247     Glucose 109 mg/dL      BUN 18 mg/dL      Creatinine 1.29 mg/dL      Sodium 132 mmol/L      Potassium 4.2 mmol/L      Comment: Slight hemolysis detected by analyzer. Results may be affected.        Chloride 98 mmol/L      CO2 21.8 mmol/L      Calcium 9.7 mg/dL      Total Protein 6.4 g/dL      Albumin 4.00 g/dL      ALT (SGPT) 35 U/L      AST (SGOT) 44 U/L      Alkaline Phosphatase 67 U/L      Total Bilirubin 1.7 mg/dL      Globulin 2.4 gm/dL      A/G Ratio 1.7 g/dL      BUN/Creatinine Ratio 14.0     Anion Gap 12.2 mmol/L      eGFR 63.1 mL/min/1.73      Comment: National Kidney Foundation and American Society of Nephrology (ASN) Task Force recommended calculation based on the Chronic Kidney Disease Epidemiology Collaboration (CKD-EPI) equation refit without adjustment for race.       Narrative:      GFR Normal >60  Chronic Kidney Disease <60  Kidney Failure <15      Protime-INR [[id number]]  (Abnormal) Collected: 08/01/22 2209    Specimen: Blood Updated: 08/01/22 2241     Protime 20.4 Seconds      INR 1.79    CBC Auto Differential [[id number]]  (Abnormal) Collected: 08/01/22 2209    Specimen: Blood Updated: 08/01/22 2226     WBC 8.66  10*3/mm3      RBC 4.63 10*6/mm3      Hemoglobin 15.6 g/dL      Hematocrit 44.7 %      MCV 96.5 fL      MCH 33.7 pg      MCHC 34.9 g/dL      RDW 13.6 %      RDW-SD 48.0 fl      MPV 9.1 fL      Platelets 131 10*3/mm3      Neutrophil % 88.4 %      Lymphocyte % 5.7 %      Monocyte % 5.0 %      Eosinophil % 0.0 %      Basophil % 0.2 %      Immature Grans % 0.7 %      Neutrophils, Absolute 7.66 10*3/mm3      Lymphocytes, Absolute 0.49 10*3/mm3      Monocytes, Absolute 0.43 10*3/mm3      Eosinophils, Absolute 0.00 10*3/mm3      Basophils, Absolute 0.02 10*3/mm3      Immature Grans, Absolute 0.06 10*3/mm3      nRBC 0.0 /100 WBC     Blood Culture - Blood, Arm, Left [[id number]]  (Abnormal) Collected: 08/01/22 2209    Specimen: Blood from Arm, Left Updated: 08/02/22 0852     Blood Culture Abnormal Stain     Gram Stain Anaerobic Bottle Gram positive cocci in chains      Aerobic Bottle Gram positive cocci in chains    Blood Culture - Blood, Arm, Left [[id number]]  (Abnormal) Collected: 08/01/22 2209    Specimen: Blood from Arm, Left Updated: 08/02/22 0920     Blood Culture Abnormal Stain     Gram Stain Anaerobic Bottle Gram positive cocci in chains      Aerobic Bottle Gram positive cocci in chains    Lactic Acid, Plasma [[id number]]  (Abnormal) Collected: 08/01/22 2209    Specimen: Blood Updated: 08/01/22 2249     Lactate 3.0 mmol/L     Troponin [[id number]]  (Normal) Collected: 08/01/22 2209    Specimen: Blood Updated: 08/01/22 2247     Troponin T <0.010 ng/mL     Narrative:      Troponin T Reference Range:  <= 0.03 ng/mL-   Negative for AMI  >0.03 ng/mL-     Abnormal for myocardial necrosis.  Clinicians would have to utilize clinical acumen, EKG, Troponin and serial changes to determine if it is an Acute Myocardial Infarction or myocardial injury due to an underlying chronic condition.       Results may be falsely decreased if patient taking Biotin.      Blood Culture ID, PCR - Blood, Arm, Left [[id number]] Collected: 08/01/22  2209    Specimen: Blood from Arm, Left Updated: 08/02/22 0850    COVID PRE-OP / PRE-PROCEDURE SCREENING ORDER (NO ISOLATION) - Swab, Nasopharynx [[id number]]  (Normal) Collected: 08/01/22 2235    Specimen: Swab from Nasopharynx Updated: 08/02/22 0049    Narrative:      The following orders were created for panel order COVID PRE-OP / PRE-PROCEDURE SCREENING ORDER (NO ISOLATION) - Swab, Nasopharynx.  Procedure                               Abnormality         Status                     ---------                               -----------         ------                     COVID-19,[facility]...[[id number]]  Normal              Final result                 Please view results for these tests on the individual orders.    COVID-19,[facility] CEPHEID/CHUCHO NP SWAB IN TRANSPORT MEDIA 8-12 HR TAT - Swab, Nasopharynx [[id number]]  (Normal) Collected: 08/01/22 2235    Specimen: Swab from Nasopharynx Updated: 08/02/22 0049     COVID19 Not Detected    Narrative:      Fact sheet for providers: https://www.fda.gov/media/678593/download     Fact sheet for patients: https://www.fda.gov/media/652665/download    STAT Lactic Acid, Reflex [[id number]]  (Abnormal) Collected: 08/02/22 0142    Specimen: Blood Updated: 08/02/22 0234     Lactate 4.7 mmol/L     Protime-INR [[id number]]  (Abnormal) Collected: 08/02/22 0142    Specimen: Blood Updated: 08/02/22 0226     Protime 21.7 Seconds      INR 1.93    STAT Lactic Acid, Reflex [[id number]]  (Normal) Collected: 08/02/22 0454    Specimen: Blood Updated: 08/02/22 0532     Lactate 2.0 mmol/L           Imaging Results (Last 24 Hours)     ** No results found for the last 24 hours. **              ECG 12 Lead   Final Result   HEART RATE= 91  bpm   RR Interval= 660  ms   MS Interval= 182  ms   P Horizontal Axis= 34  deg   P Front Axis= 21  deg   QRSD Interval= 95  ms   QT Interval= 353  ms   QRS Axis= -34  deg   T Wave Axis= -4  deg   - BORDERLINE ECG -   Sinus rhythm   Left axis deviation    POOR R WAVE PROGRESSION   Borderline T abnormalities, inferior leads   NO SIGNIFICANT CHANGE FROM PREVIOUS ECG   Electronically Signed By: Nasim Cheung ([facility]) 02-Aug-2022 09:45:50   Date and Time of Study: 2022-08-01 22:16:17      SCANNED - TELEMETRY     Final Result              Assessment/Plan     Active Hospital Problems    Diagnosis  POA   • **Fever [R50.9]  Yes   • Gram positive septicemia (HCC) [A41.89]  Yes   • Chronic anticoagulation [Z79.01]  Not Applicable   • ASHIA (acute kidney injury) (HCC) [N17.9]  Yes   • Hx of mitral valve replacement with mechanical valve [Z95.2] [Z95.2]  Not Applicable   • Paroxysmal atrial fibrillation (HCC) [I48.0]  Yes   • Bacterial endocarditis - history of [I33.0]  Yes   • Cellulitis [L03.90]  Yes      Resolved Hospital Problems   No resolved problems to display.       Mr. Sebastian is a 62 y.o. male with a history of MVR on AC, afib, BPH, CHF, anemia, DVT, bacterial endocarditis who is admitted for fever, RLE cellulits, gram positive septicemia    -Fever/RLE cellulitis/Gram positive septicemia: Appreciate ID assistance. Continue antibiotics. Monitor fever curve. Doppler negative for DVT. Check Echo due to MVR; may need MOE. Cardiologist is Sumeet. WBC wnl. Lactic acid normalized   -Hx MVR/Bacterial endocarditis/Pafib: Cardiology consult. Checking Echo. HR controlled. Daily INR, pharmacy to dose Warfarin. Continue atenolol  -ASHIA: Cr 1.29, baseline around 0.8-1.0. Avoid nephrotoxic meds. ARB on home med list, will hold. IVF's. Monitor      · I discussed the patient's findings and my recommendations with patient.    VTE Prophylaxis - Warfarin (home med).  Code Status - Full code.       COREY Magaña  [facility]  08/02/22  11:04 EDT      Electronically signed by Flori Walker APRN at 08/02/22 1105          Emergency Department Notes      Therese Everett RN at 08/01/22 9985        Patient from home via PV; c/o bilateral lower leg pain with  "redness and swelling. Hx of DVT. Patient is on warfarin. Patient states \"I think I am going into sepsis because I am shaky.\"    Electronically signed by Therese Everett RN at 08/01/22 1900     Alex Lamas MD at 08/01/22 9304           EMERGENCY DEPARTMENT ENCOUNTER    Room Number:  S513/1  Date of encounter:  8/2/2022  PCP: Mannie Beltran DO  Historian: Patient      HPI:  Chief Complaint: Pain swelling redness to right anterior leg  A complete HPI/ROS/PMH/PSH/SH/FH are unobtainable due to: None    Context: Elliot Sebastian is a 62 y.o. male who presents to the ED c/o right lower leg erythema swelling and redness since 2 days ago.  Patient states shaking.  Patient has prior history of DVT.  Patient is on warfarin has history of mechanical mitral valve.  States he is concerned he might be in sepsis secondary to the shaking he was doing yesterday.      PAST MEDICAL HISTORY  Active Ambulatory Problems     Diagnosis Date Noted   • Paroxysmal atrial fibrillation (HCC) 03/02/2016   • Bacterial endocarditis - history of 03/02/2016   • Congestive heart failure (HCC) 03/02/2016   • Mitral valve insufficiency 03/02/2016   • Hx of mitral valve replacement with mechanical valve [Z95.2] 10/19/2018   • Benign prostatic hyperplasia without lower urinary tract symptoms 02/10/2020   • Cellulitis 11/02/2015   • Elevated prostate specific antigen (PSA) 02/10/2020   • Fatigue 02/10/2020   • Low back pain 02/10/2020   • Poor concentration 02/10/2020   • Reduced libido 02/10/2020   • Subclinical hyperthyroidism 02/10/2020   • Superficial bacterial skin infection 11/02/2015   • Vitamin D deficiency 02/10/2020   • Elevated blood pressure reading 02/10/2020   • Sepsis without acute organ dysfunction (HCC) 03/30/2020   • Chronic diarrhea 07/27/2020   • Abdominal cramping 07/27/2020   • Exogenous obesity 11/30/2020   • Glucose intolerance (impaired glucose tolerance) 11/30/2020     Resolved Ambulatory Problems     Diagnosis Date " Noted   • Diarrhea 09/21/2020     Past Medical History:   Diagnosis Date   • Acute bacterial endocarditis    • Anemia    • APC (atrial premature contractions)    • Atrial fibrillation (HCC)    • BPH (benign prostatic hypertrophy)    • CHF (congestive heart failure) (HCC)    • Cholelithiasis    • Chronic constipation    • Deep vein thrombophlebitis of leg (HCC)    • Disease of thyroid gland    • Gout    • Intestinal obstruction (HCC)    • Ischemic enteritis (HCC)    • Left heart failure, NYHA class 3 (HCC)    • Mitral regurgitation    • Pleural effusion, bilateral    • Pulmonary hypertension (HCC)    • 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HCC)    • Umbilical hernia          PAST SURGICAL HISTORY  Past Surgical History:   Procedure Laterality Date   • APPENDECTOMY     • CHOLECYSTECTOMY     • COLONOSCOPY  approx 2013    normal per patient   • COLONOSCOPY N/A 10/26/2020    Procedure: COLONOSCOPY into cecum with biopsy, polypectomy, clip placement;  Surgeon: Juan Phillips MD;  Location: [facility] ENDOSCOPY;  Service: Gastroenterology;  Laterality: N/A;  polyps, clip  asc polyp removed but not retrieved   • EXPLORATION NECK FOR POSTOP HEMORRHAGE / THROMBOSIS / INFECTION     • MITRAL VALVE REPLACEMENT  01/03/2013    33MM [employer] JASON MECHANICAL VALVE, PRESERVATION OF CORDS TO SUBVALVULAR APPARATUS AND DOROTHY GORE-FLORI CORD IN THE P2 AREA, DEBRIDEMENT OF ANTERIOR AND POSTERIOR MITRAL LEAFLET          FAMILY HISTORY  Family History   Problem Relation Age of Onset   • Diabetes Mother    • Heart disease Father    • Diabetes Sister    • Diabetes Brother          SOCIAL HISTORY  Social History     Socioeconomic History   • Marital status:    Tobacco Use   • Smoking status: Never Smoker   • Smokeless tobacco: Never Used   • Tobacco comment: caffeine use   Substance and Sexual Activity   • Alcohol use: Yes     Alcohol/week: 2.0 standard drinks     Types: 2 Cans of beer per week     Comment: 1-2 nightly   • Drug use: Never          ALLERGIES  Patient has no known allergies.        REVIEW OF SYSTEMS  Review of Systems     All systems reviewed and negative except for those discussed in HPI.       PHYSICAL EXAM    I have reviewed the triage vital signs and nursing notes.    ED Triage Vitals [08/01/22 1901]   Temp Heart Rate Resp BP SpO2   (!) 100.6 °F (38.1 °C) 100 20 138/75 99 %      Temp src Heart Rate Source Patient Position BP Location FiO2 (%)   -- -- -- -- --       Physical Exam  GENERAL: Mild  distressed  HENT: nares patent  EYES: no scleral icterus  CV: regular rhythm, regular rate  RESPIRATORY: normal effort  ABDOMEN: soft  MUSCULOSKELETAL: no deformity  NEURO: alert, moves all extremities, follows commands  SKIN: warm, dry, large erythematous patch with underlying induration to right anterior leg foot is well-perfused compartments are soft        LAB RESULTS  Recent Results (from the past 24 hour(s))   Duplex Venous Lower Extremity - Bilateral CAR    Collection Time: 08/01/22  8:14 PM   Result Value Ref Range    Target HR (85%) 135 bpm    Max. Pred. HR (100%) 159 bpm    Right Common Femoral Spont Y     Right Common Femoral Phasic Y     Right Common Femoral Augment Y     Right Common Femoral Competent Y     Right Common Femoral Compress C     Right Saphenofemoral Junction Compress C     Right Profunda Femoral Compress C     Right Proximal Femoral Compress C     Right Mid Femoral Spont Y     Right Mid Femoral Phasic Y     Right Mid Femoral Augment Y     Right Mid Femoral Competent Y     Right Mid Femoral Compress C     Right Distal Femoral Compress C     Right Popliteal Spont Y     Right Popliteal Phasic Y     Right Popliteal Augment Y     Right Popliteal Competent Y     Right Popliteal Compress C     Right Posterior Tibial Compress C     Right Peroneal Compress C     Right Gastronemius Compress C     Right Greater Saph AK Compress C     Right Greater Saph BK Compress C     Right Lesser Saph Compress C     Left Common Femoral  Spont Y     Left Common Femoral Phasic Y     Left Common Femoral Augment Y     Left Common Femoral Competent Y     Left Common Femoral Compress C     Left Saphenofemoral Junction Compress C     Left Profunda Femoral Compress C     Left Proximal Femoral Compress C     Left Mid Femoral Spont Y     Left Mid Femoral Phasic Y     Left Mid Femoral Augment Y     Left Mid Femoral Competent Y     Left Mid Femoral Compress C     Left Distal Femoral Compress C     Left Popliteal Spont Y     Left Popliteal Phasic Y     Left Popliteal Augment Y     Left Popliteal Competent Y     Left Popliteal Compress C     Left Posterior Tibial Compress C     Left Peroneal Compress C     Left Gastronemius Compress C     Left Greater Saph AK Compress C     Left Greater Saph BK Compress C     Left Lesser Saph Compress C    Comprehensive Metabolic Panel    Collection Time: 08/01/22 10:09 PM    Specimen: Blood   Result Value Ref Range    Glucose 109 (H) 65 - 99 mg/dL    BUN 18 8 - 23 mg/dL    Creatinine 1.29 (H) 0.76 - 1.27 mg/dL    Sodium 132 (L) 136 - 145 mmol/L    Potassium 4.2 3.5 - 5.2 mmol/L    Chloride 98 98 - 107 mmol/L    CO2 21.8 (L) 22.0 - 29.0 mmol/L    Calcium 9.7 8.6 - 10.5 mg/dL    Total Protein 6.4 6.0 - 8.5 g/dL    Albumin 4.00 3.50 - 5.20 g/dL    ALT (SGPT) 35 1 - 41 U/L    AST (SGOT) 44 (H) 1 - 40 U/L    Alkaline Phosphatase 67 39 - 117 U/L    Total Bilirubin 1.7 (H) 0.0 - 1.2 mg/dL    Globulin 2.4 gm/dL    A/G Ratio 1.7 g/dL    BUN/Creatinine Ratio 14.0 7.0 - 25.0    Anion Gap 12.2 5.0 - 15.0 mmol/L    eGFR 63.1 >60.0 mL/min/1.73   Protime-INR    Collection Time: 08/01/22 10:09 PM    Specimen: Blood   Result Value Ref Range    Protime 20.4 (H) 11.7 - 14.2 Seconds    INR 1.79 (H) 0.90 - 1.10   Green Top (Gel)    Collection Time: 08/01/22 10:09 PM   Result Value Ref Range    Extra Tube Hold for add-ons.    Lavender Top    Collection Time: 08/01/22 10:09 PM   Result Value Ref Range    Extra Tube hold for add-on    Light Blue Top     Collection Time: 08/01/22 10:09 PM   Result Value Ref Range    Extra Tube Hold for add-ons.    CBC Auto Differential    Collection Time: 08/01/22 10:09 PM    Specimen: Blood   Result Value Ref Range    WBC 8.66 3.40 - 10.80 10*3/mm3    RBC 4.63 4.14 - 5.80 10*6/mm3    Hemoglobin 15.6 13.0 - 17.7 g/dL    Hematocrit 44.7 37.5 - 51.0 %    MCV 96.5 79.0 - 97.0 fL    MCH 33.7 (H) 26.6 - 33.0 pg    MCHC 34.9 31.5 - 35.7 g/dL    RDW 13.6 12.3 - 15.4 %    RDW-SD 48.0 37.0 - 54.0 fl    MPV 9.1 6.0 - 12.0 fL    Platelets 131 (L) 140 - 450 10*3/mm3    Neutrophil % 88.4 (H) 42.7 - 76.0 %    Lymphocyte % 5.7 (L) 19.6 - 45.3 %    Monocyte % 5.0 5.0 - 12.0 %    Eosinophil % 0.0 (L) 0.3 - 6.2 %    Basophil % 0.2 0.0 - 1.5 %    Immature Grans % 0.7 (H) 0.0 - 0.5 %    Neutrophils, Absolute 7.66 (H) 1.70 - 7.00 10*3/mm3    Lymphocytes, Absolute 0.49 (L) 0.70 - 3.10 10*3/mm3    Monocytes, Absolute 0.43 0.10 - 0.90 10*3/mm3    Eosinophils, Absolute 0.00 0.00 - 0.40 10*3/mm3    Basophils, Absolute 0.02 0.00 - 0.20 10*3/mm3    Immature Grans, Absolute 0.06 (H) 0.00 - 0.05 10*3/mm3    nRBC 0.0 0.0 - 0.2 /100 WBC   Lactic Acid, Plasma    Collection Time: 08/01/22 10:09 PM    Specimen: Blood   Result Value Ref Range    Lactate 3.0 (C) 0.5 - 2.0 mmol/L   Troponin    Collection Time: 08/01/22 10:09 PM    Specimen: Blood   Result Value Ref Range    Troponin T <0.010 0.000 - 0.030 ng/mL   ECG 12 Lead    Collection Time: 08/01/22 10:16 PM   Result Value Ref Range    QT Interval 353 ms   COVID-19,[facility]-HOUSE [facility]HE[facility] NP SWAB IN TRANSPORT MEDIA 8-12 HR TAT - Swab, Nasopharynx    Collection Time: 08/01/22 10:35 PM    Specimen: Nasopharynx; Swab   Result Value Ref Range    COVID19 Not Detected Not Detected - Ref. Range       Ordered the above labs and independently reviewed the results.        RADIOLOGY  No Radiology Exams Resulted Within Past 24 Hours    I ordered the above noted radiological studies. Reviewed by me and discussed with  radiologist.  See dictation for official radiology interpretation.      PROCEDURES    Procedures      MEDICATIONS GIVEN IN ER    Medications   sodium chloride 0.9 % flush 10 mL (has no administration in time range)   sodium chloride 0.9 % flush 10 mL (has no administration in time range)   Pharmacy to Dose enoxaparin (LOVENOX) (has no administration in time range)   Pharmacy to dose warfarin (has no administration in time range)   Pharmacy to dose vancomycin (has no administration in time range)   nitroglycerin (NITROSTAT) SL tablet 0.4 mg (has no administration in time range)   acetaminophen (TYLENOL) tablet 650 mg (650 mg Oral Given 8/2/22 0119)     Or   acetaminophen (TYLENOL) 160 MG/5ML solution 650 mg ( Oral Not Given:  See Alt 8/2/22 0119)     Or   acetaminophen (TYLENOL) suppository 650 mg ( Rectal Not Given:  See Alt 8/2/22 0119)   sodium chloride 0.9 % infusion (100 mL/hr Intravenous New Bag 8/2/22 0115)   ondansetron (ZOFRAN) injection 4 mg (has no administration in time range)   vancomycin 2500 mg/500 mL 0.9% NS IVPB ([facility]) (2,500 mg Intravenous New Bag 8/1/22 2327)   sodium chloride 0.9 % bolus 500 mL (500 mL Intravenous New Bag 8/1/22 2326)   cefepime 2 gm IVPB in 100 ml NS (VTB) (2 g Intravenous New Bag 8/2/22 0100)         PROGRESS, DATA ANALYSIS, CONSULTS, AND MEDICAL DECISION MAKING    All labs have been independently reviewed by me.  All radiology studies have been reviewed by me and discussed with radiologist dictating the report.   EKG's independently viewed and interpreted by me.  Discussion below represents my analysis of pertinent findings related to patient's condition, differential diagnosis, treatment plan and final disposition.        ED Course as of 08/02/22 0202   Mon Aug 01, 2022   2023 Vascular  reports that patient is negative for DVT in bilateral lower extremities [JR]   2219 EKG          EKG time: 2216  Rhythm/Rate: Sinus rhythm rate 81  P waves and WV: Normal  QRS, axis:  Narrow regular left axis  ST and T waves: Nonspecific    Interpreted Contemporaneously by me, independently viewed  No significant changed compared to prior EKG from 3/30/2020   [TJ]      ED Course User Index  [JR] Manolo Beckford MD  [TJ] Alex Lamas MD           PPE: The patient wore a surgical mask throughout the entire patient encounter. I wore an N95.    AS OF 02:02 EDT VITALS:    BP - 141/74  HR - 98  TEMP - (!) 103.1 °F (39.5 °C) (Oral)  O2 SATS - 98%        DIAGNOSIS  Final diagnoses:   Cellulitis of right lower extremity   Subtherapeutic international normalized ratio (INR)         DISPOSITION  Admit           Alex Lamas MD  08/02/22 0202      Electronically signed by Alex Lamas MD at 08/02/22 0202       Oxygen Therapy (since admission)     Date/Time SpO2 Device (Oxygen Therapy) Flow (L/min) Oxygen Concentration (%) ETCO2 (mmHg)    08/02/22 0715 -- room air -- -- --    08/02/22 0506 97 room air -- -- --    08/02/22 0120 -- room air -- -- --    08/02/22 0048 98 -- -- -- --    08/02/22 0015 95 -- -- -- --    08/01/22 2331 97 -- -- -- --    08/01/22 2240 96 -- -- -- --    08/01/22 1901 99 -- -- -- --        Intake & Output (last 2 days)       07/31 0701 08/01 0700 08/01 0701  08/02 0700 08/02 0701 08/03 0700    IV Piggyback  100     Total Intake(mL/kg)  100 (0.7)     Urine (mL/kg/hr)  300     Total Output  300     Net  -200                Lines, Drains & Airways     Active LDAs     Name Placement date Placement time Site Days    Peripheral IV 08/02/22 0446 Anterior;Right Forearm 08/02/22  0446  Forearm  less than 1                  Medication Administration Report for Romulo Elliot CAIN as of 08/02/22 1156   Legend:    Given Hold Not Given Due Canceled Entry Other Actions    Time Time (Time) Time  Time-Action       Discontinued     Completed     Future     MAR Hold     Linked           Medications 07/31/22 08/01/22 08/02/22    acetaminophen (TYLENOL) tablet 650  mg  Dose: 650 mg  Freq: Every 4 Hours PRN Route: PO  PRN Reason: Mild Pain   Start: 08/02/22 0037   Admin Instructions:   Do not exceed 4 grams of acetaminophen in a 24 hr period. Max dose of 2gm for AST/ALT greater than 120 units/L    If given for fever, use fever parameter: fever greater than 100.4 °F.    If given for pain, use the following pain scale:   Mild Pain = Pain Score of 1-3, CPOT 1-2  Moderate Pain = Pain Score of 4-6, CPOT 3-4  Severe Pain = Pain Score of 7-10, CPOT 5-8      0119-Given     0749-Given           Or  acetaminophen (TYLENOL) 160 MG/5ML solution 650 mg  Dose: 650 mg  Freq: Every 4 Hours PRN Route: PO  PRN Reason: Mild Pain   Start: 08/02/22 0037   Admin Instructions:   Do not exceed 4 grams of acetaminophen in a 24 hr period. Max dose of 2gm for AST/ALT greater than 120 units/L    If given for fever, use fever parameter: fever greater than 100.4 °F.    If given for pain, use the following pain scale:   Mild Pain = Pain Score of 1-3, CPOT 1-2  Moderate Pain = Pain Score of 4-6, CPOT 3-4  Severe Pain = Pain Score of 7-10, CPOT 5-8      0119-Not Given:  See Alt     0749-Not Given:  See Alt           Or  acetaminophen (TYLENOL) suppository 650 mg  Dose: 650 mg  Freq: Every 4 Hours PRN Route: RE  PRN Reason: Mild Pain   Start: 08/02/22 0037   Admin Instructions:   Do not exceed 4 grams of acetaminophen in a 24 hr period. Max dose of 2gm for AST/ALT greater than 120 units/L    If given for fever, use fever parameter: fever greater than 100.4 °F.    If given for pain, use the following pain scale:   Mild Pain = Pain Score of 1-3, CPOT 1-2  Moderate Pain = Pain Score of 4-6, CPOT 3-4  Severe Pain = Pain Score of 7-10, CPOT 5-8      0119-Not Given:  See Alt     0749-Not Given:  See Alt            atenolol (TENORMIN) tablet 25 mg  Dose: 25 mg  Freq: Daily Route: PO  Start: 08/02/22 1200      1200             nitroglycerin (NITROSTAT) SL tablet 0.4 mg  Dose: 0.4 mg  Freq: Every 5 Minutes PRN Route:  SL  PRN Reason: Chest Pain  PRN Comment: Only if SBP Greater Than 100  Start: 08/02/22 0037   Admin Instructions:   If Pain Unrelieved After 3 Doses Notify MD          ondansetron (ZOFRAN) injection 4 mg  Dose: 4 mg  Freq: Every 6 Hours PRN Route: IV  PRN Reasons: Nausea,Vomiting  Start: 08/02/22 0037   Admin Instructions:   If BOTH ondansetron (ZOFRAN) and promethazine (PHENERGAN) are ordered use ondansetron first and THEN promethazine IF ondansetron is ineffective.          Pharmacy to dose vancomycin  Freq: Continuous PRN Route: XX  PRN Reason: Consult  Indications of Use: SKIN AND SOFT TISSUE INFECTION  Start: 08/02/22 0035   End: 08/07/22 0034          Pharmacy to dose warfarin  Freq: Continuous PRN Route: XX  PRN Reason: Consult  Indications of Use: ATRIAL FIBRILLATION,PRESENCE OF MECHANICAL PROSTHETIC HEART VALVE  Start: 08/02/22 0034   Admin Instructions:     If INR Greater Than Target, Contact Pharmacy Prior to Giving Dose.  Acutely Hazardous. Waste BOTH Residual Medication and/or Empty Package. .   Order specific questions:   Target INR 2.5 - 3.5            sodium chloride 0.9 % flush 10 mL  Dose: 10 mL  Freq: As Needed Route: IV  PRN Reason: Line Care  Start: 08/02/22 0033          sodium chloride 0.9 % flush 10 mL  Dose: 10 mL  Freq: Every 12 Hours Scheduled Route: IV  Start: 08/02/22 0039      0213-Canceled Entry     0900-Canceled Entry     2100           sodium chloride 0.9 % infusion  Rate: 100 mL/hr Dose: 100 mL/hr  Freq: Continuous Route: IV  Start: 08/02/22 0039      0115-New Bag     0346-Hold     0510-Restarted           vancomycin (VANCOCIN) 1000 mg/200 mL dextrose 5% IVPB  Dose: 1,000 mg  Freq: Every 12 Hours Route: IV  Indications of Use: SKIN AND SOFT TISSUE INFECTION  Start: 08/02/22 1100   End: 08/07/22 1059      1100     2300           Future Medications  Medications 07/31/22 08/01/22 08/02/22       warfarin (COUMADIN) (dosing per levels)  Freq: Daily Warfarin Route: XX  Start: 08/02/22  1800   Admin Instructions:   Pharmacy is dosing warfarin per INR  If INR Greater Than Target, Contact Pharmacy Prior to Giving Dose.  Acutely Hazardous. Waste BOTH Residual Medication and/or Empty Package. .   Order specific questions:   Target INR 2.5 - 3.5        1800             warfarin (COUMADIN) tablet 7.5 mg  Dose: 7.5 mg  Freq: Once (Warfarin) Route: PO  Indications of Use: ATRIAL FIBRILLATION,PRESENCE OF MECHANICAL PROSTHETIC HEART VALVE  Start: 08/02/22 1800   Admin Instructions:   Food-Drug Interaction  If INR Greater Than Target, Contact Pharmacy Prior to Giving Dose. Acutely Hazardous. Waste BOTH Residual Medication and/or Empty Package. .   Order specific questions:   Target INR 2.5 - 3.5        1800            Completed Medications  Medications 07/31/22 08/01/22 08/02/22       cefepime 2 gm IVPB in 100 ml NS (VTB)  Dose: 2 g  Freq: Once Route: IV  Indications of Use: SEPSIS  Start: 08/01/22 2312   End: 08/02/22 0130      0100-New Bag             sodium chloride 0.9 % bolus 500 mL  Dose: 500 mL  Freq: Once Route: IV  Start: 08/01/22 2202   End: 08/01/22 2356     2326-New Bag              vancomycin 2500 mg/500 mL 0.9% NS IVPB ([facility])  Dose: 2,500 mg  Freq: Once Route: IV  Indications of Use: SKIN AND SOFT TISSUE INFECTION  Start: 08/01/22 2202   End: 08/01/22 2327     2327-New Bag             Discontinued Medications  Medications 07/31/22 08/01/22 08/02/22       Enoxaparin Sodium (LOVENOX) syringe 100 mg  Dose: 0.75 mg/kg  Weight Dosing Info: 137 kg  Freq: Every 12 Hours Route: SC  Indications of Use: ATRIAL FIBRILLATION,PRESENCE OF MECHANICAL PROSTHETIC HEART VALVE  Start: 08/02/22 0900   End: 08/02/22 0545   Admin Instructions:   Give subcutaneous in abdomen only. Do not massage site after injection.          Pharmacy Consult - Pharmacy to dose  Freq: Continuous PRN Route: XX  PRN Reason: Consult  Start: 08/02/22 0854   End: 08/02/22 1012   Order specific questions:   Pharmacy to Dose:  "cefepime  Indication of Use RLE cellulitis            Pharmacy to Dose enoxaparin (LOVENOX)  Freq: Continuous PRN Route: XX  PRN Reason: Consult  Indications of Use: ATRIAL FIBRILLATION,PRESENCE OF MECHANICAL PROSTHETIC HEART VALVE  Start: 08/02/22 0034   End: 08/02/22 0545          Vancomycin Pharmacy Intermittent/Pulse Dosing  Freq: Daily Route: XX  Indications of Use: SKIN AND SOFT TISSUE INFECTION  Start: 08/02/22 0900   End: 08/02/22 0345   Admin Instructions:   Patient is on intermittent or pulse Vancomycin dosing. This is an informational \"Pharmacy Dosing\" note only.                        Consult Notes (all)      Dillon Vega MD at 08/02/22 0906      Consult Orders    1. Inpatient Infectious Diseases Consult [[id number]] ordered by Flori Walker APRN at 08/02/22 0859               Referring Provider: COREY Bingham  Subjective   History of present illness: This is a very nice 62-year-old we are asked for evaluation opinion regarding bacteremia.  Per review the past medical records, he has a history of mitral valve endocarditis in 2013 requiring mitral valve replacement and prolonged antibiotic therapy.  He was last admitted to the hospital from March 30 to April 2, 2020 with sepsis attributed to cellulitis.  COVID-19 and blood cultures were negative at that time.  Patient reports that he was doing well up until 7/30/2022 when he noted the cute onset of shaking chills that lasted about an hour and resolved spontaneously.  He did not think he had an actual fever.  He developed some mild lower back pain as well as swelling and erythema of the right lower extremity.  He had ongoing shaking chills which were intermittent.  Given his prior history from infection, he was concerned that he might have sepsis and presented to the [facility] emergency department.  Here blood cultures were drawn and he was started on vancomycin and cefepime.  His blood cultures are now coming back " with gram-positive cocci in chains.    He says that he is feeling better after the antibiotics.  His back pain is much improved as well.      Past Medical History:   Diagnosis Date   • Acute bacterial endocarditis    • Anemia    • APC (atrial premature contractions)    • Atrial fibrillation (HCC)    • BPH (benign prostatic hypertrophy)    • CHF (congestive heart failure) (HCC)    • Cholelithiasis    • Chronic constipation    • Deep vein thrombophlebitis of leg (HCC)     DISTAL   • Disease of thyroid gland    • Gout    • Intestinal obstruction (HCC)    • Ischemic enteritis (HCC)    • Left heart failure, NYHA class 3 (HCC)     CLASS 111 LEFT SYSTOIC CONGESTIVE HEART FAILURE   • Mitral regurgitation    • Pleural effusion, bilateral    • Pulmonary hypertension (HCC)    • 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HCC)    • Umbilical hernia    • Vitamin D deficiency        Past Surgical History:   Procedure Laterality Date   • APPENDECTOMY     • CHOLECYSTECTOMY     • COLONOSCOPY  approx 2013    normal per patient   • COLONOSCOPY N/A 10/26/2020    Procedure: COLONOSCOPY into cecum with biopsy, polypectomy, clip placement;  Surgeon: Juan Phillips MD;  Location: [facility] ENDOSCOPY;  Service: Gastroenterology;  Laterality: N/A;  polyps, clip  asc polyp removed but not retrieved   • EXPLORATION NECK FOR POSTOP HEMORRHAGE / THROMBOSIS / INFECTION     • MITRAL VALVE REPLACEMENT  01/03/2013    [address]MM [facility] MECHANICAL VALVE, PRESERVATION OF CORDS TO SUBVALVULAR APPARATUS AND DOROTHY GORE-FLORI CORD IN THE P2 AREA, DEBRIDEMENT OF ANTERIOR AND POSTERIOR MITRAL LEAFLET        No family history of infectious diseases  Social history: He is  and lives here in [place], [place].  He works as a .  Non-smoker  No known drug allergies    Review of Systems  Pertinent items are noted in HPI, all other systems reviewed and negative    Objective     Physical Exam:   Vital Signs   Temp:  [99.8 °F (37.7 °C)-103.1 °F (39.5 °C)]  102.4 °F (39.1 °C)  Heart Rate:  [] 98  Resp:  [16-20] 16  BP: (108-141)/(58-84) 109/65    GENERAL: Awake and alert, in no acute distress.   HEENT: Oropharynx is clear. Hearing is grossly normal.   EYES: PERRL. No conjunctival injection. No lid lag.   LYMPHATICS: No lymphadenopathy of the neck or inguinal regions.   HEART: Regular rate and regular rhythm.  1-2+ peripheral edema in bilateral lower extremities and upper extremities.   LUNGS: Clear to auscultation anteriorly with normal respiratory effort.   GI: Soft, nontender, nondistended. No appreciable organomegaly.   SKIN: Warm and dry with mild erythema of the right lower extremity  PSYCHIATRIC: Appropriate mood, affect, insight, and judgment.     Results Review:  WBC 8.66    HGb 15.6/hct 45  Cr 1.29  AST 44  TB 1.7    Microbiology: 8/1 BCx 2/2 GPC  Radiology: Duplex venography: no LE dvt    A/p  1.  Gram-positive septicemia  2.  Mitral valve replacement    Interestingly his white count is normal and his lower extremity exam is not all that impressive.  Is possible he has a low-grade cellulitis of the right lower extremity but the appearance is somewhat atypical for infective cellulitis.  Blood cultures are growing gram-positive cocci in admission sets which of course is concerning given his artificial heart valve.  We will start with a surface echocardiogram, but may need MOE  Continue vancomycin for an AUC of 400-600.  Random vancomycin level ahead of next dose.  We will further tailor antibiotics based on pending BCID pcr      Thank you for this consult.  We will continue to follow along and tailor antibiotics as the patient's clinical course evolves.      Dillon Vega MD  08/02/22  09:06 EDT          Electronically signed by Dillon Vega MD at 08/02/22 6953

## 2022-08-02 NOTE — CONSULTS
Patient Name: Elliot Sebastian  :1960  62 y.o.    Date of Admission: 2022  Date of Consultation:  22  Encounter Provider: Eveline Chanel MD  Place of Service: [facility] CARDIOLOGY  Referring Provider: Zaid Nation MD  Patient Care Team:  Mannie Beltran DO as PCP - General (Family Medicine)  Sary Tejeda [facility] as Pharmacist      Chief complaint:pain, swelling and redness of right anterior leg    History of Present Illness:       This is a 62-year-old patient has a history of DVT, MVR on warfarin-MVR done for bacterial endocarditis, A. fib, BPH, CHF, anemia.    In , he had bacterial endocarditis which caused heart failure and he had mechanical MVR placed at that time.    He has had a history of palpitations which are intermittent.  He is also had a history of hypertension with blood pressures up into the 170s.  He has been on atenolol for atrial fibrillation.  Losartan was added for hypertension treatment.    He presented to the emergency department 2022 with right lower extremity erythema, swelling and redness for 2 days and a temperature of 100.6..  On 2022, he had acute onset shaking chills lasting an hour that resolved spontaneously.  He did not think that it was a fever but after this he developed lower back pain with swelling and erythema.  In the ER, his creatinine was 1.29, sodium 132, INR 1.79, lactate 3, troponin negative, white blood cell count 8.66, hemoglobin 15.6, platelet 131, COVID-negative, ECG shows sinus rhythm and venous Dopplers were negative for DVT.  His blood cultures grew gram-positive cocci.  We have been asked to see him for PAF and gram-positive septicemia.  ID is following.    He has had no medication changes and uses of IV drugs.  He been taking his medications as directed without difficulty.  He felt fine.  Then he had sudden onset of the symptoms which include back pain and fever 2022.  He also has  infection in his right leg was also started on 7/30/2022.  He only had one of the break in the skin which was over his left knuckle.  He has not feels heart racing or skipping and has had no dizziness or syncope.  He feels terrible.  He is very fatigued.  He is somewhat nauseated.  He started having diarrhea today as well.    Stress Test 7/11/18          Past Medical History:   Diagnosis Date   • Acute bacterial endocarditis    • Anemia    • APC (atrial premature contractions)    • Atrial fibrillation (HCC)    • BPH (benign prostatic hypertrophy)    • CHF (congestive heart failure) (HCC)    • Cholelithiasis    • Chronic constipation    • Deep vein thrombophlebitis of leg (HCC)     DISTAL   • Disease of thyroid gland    • Gout    • Intestinal obstruction (HCC)    • Ischemic enteritis (HCC)    • Left heart failure, NYHA class 3 (HCC)     CLASS 111 LEFT SYSTOIC CONGESTIVE HEART FAILURE   • Mitral regurgitation    • Pleural effusion, bilateral    • Pulmonary hypertension (HCC)    • 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HCC)    • Umbilical hernia    • Vitamin D deficiency        Past Surgical History:   Procedure Laterality Date   • APPENDECTOMY     • CHOLECYSTECTOMY     • COLONOSCOPY  approx 2013    normal per patient   • COLONOSCOPY N/A 10/26/2020    Procedure: COLONOSCOPY into cecum with biopsy, polypectomy, clip placement;  Surgeon: Juan Phillips MD;  Location: [facility] ENDOSCOPY;  Service: Gastroenterology;  Laterality: N/A;  polyps, clip  asc polyp removed but not retrieved   • EXPLORATION NECK FOR POSTOP HEMORRHAGE / THROMBOSIS / INFECTION     • MITRAL VALVE REPLACEMENT  01/03/2013    33MM [employer]. JASON MECHANICAL VALVE, PRESERVATION OF CORDS TO SUBVALVULAR APPARATUS AND DOROTHY GORE-FLORI CORD IN THE P2 AREA, DEBRIDEMENT OF ANTERIOR AND POSTERIOR MITRAL LEAFLET          Prior to Admission medications    Medication Sig Start Date End Date Taking? Authorizing Provider   atenolol (TENORMIN) 25 MG tablet Take 1 tablet by  mouth Daily. MUST MAKE FOLLOW UP APPOINTMENT FOR FUTURE REFILLS 7/25/22  Yes Leyla Byrne APRN   Cholecalciferol (VITAMIN D3) 2000 UNITS capsule Take 1,000 Units by mouth Daily. 2/24/15  Yes ProviderHimanshu MD   cholestyramine (Questran) 4 GM/DOSE powder Take 1 packet by mouth Daily. If no improvement after two weeks, may increase to BID. 3/1/22  Yes Mannie Beltran, DO   Multiple Vitamins-Minerals (MENS MULTIVITAMIN PLUS) tablet Take 2 capsules by mouth Daily. Soft gels 4/1/13  Yes ProviderHimanshu MD   warfarin (COUMADIN) 5 MG tablet TAKE ONE-HALF (1/2) TABLET ON MONDAY, WEDNESDAY, AND FRIDAY AND 1 TABLET ALL OTHER DAYS OR AS DIRECTED. NEEDS LABS FOR MORE REFILLS  Patient taking differently: 2.5 mg Monday and Friday; 5mg Tuesday, Wednesday, Thursday, Saturday, Sunday 7/18/22  Yes Navi Fay MD   losartan (COZAAR) 50 MG tablet Take 1 tablet by mouth Daily. MUST MAKE FOLLOW UP APPOINTMENT FOR FUTURE REFILLS 6/30/2022 7/25/22   Leyla Byrne APRN       No Known Allergies    Social History     Socioeconomic History   • Marital status:    Tobacco Use   • Smoking status: Never Smoker   • Smokeless tobacco: Never Used   • Tobacco comment: caffeine use   Substance and Sexual Activity   • Alcohol use: Yes     Alcohol/week: 2.0 standard drinks     Types: 2 Cans of beer per week     Comment: 1-2 nightly   • Drug use: Never       Family History   Problem Relation Age of Onset   • Diabetes Mother    • Heart disease Father    • Diabetes Sister    • Diabetes Brother        REVIEW OF SYSTEMS:   All systems reviewed.  Pertinent positives identified in HPI.  All other systems are negative.      Objective:     Vitals:    08/02/22 0506 08/02/22 0715 08/02/22 1141 08/02/22 1211   BP: 108/58 109/65  101/74   BP Location: Left arm Left arm     Patient Position: Lying Lying     Pulse: 98      Resp: 18 16     Temp: 100.5 °F (38.1 °C) (!) 102.4 °F (39.1 °C) 99.8 °F (37.7 °C)    TempSrc: Oral Oral  Oral    SpO2: 97%      Weight:       Height:         Body mass index is 34.85 kg/m².    General Appearance:    Alert, cooperative, in no acute distress   Head:    Normocephalic, without obvious abnormality, atraumatic   Eyes:            Lids and lashes normal, conjunctivae and sclerae normal, no icterus, no pallor, corneas clear   Ears:    Ears appear intact with no abnormalities noted   Throat:   No oral lesions, no thrush, oral mucosa moist   Neck:   No adenopathy, supple, trachea midline, no thyromegaly, no carotid bruit, no JVD   Back:     No kyphosis present, no scoliosis present, no skin lesions, erythema or scars, no tenderness to palpation, range of motion normal   Lungs:     Clear to auscultation, respirations regular, even and unlabored    Heart:    Regular rhythm and normal rate, normal S1 and S2, no murmur, no gallop, no rub, no click   Chest Wall:    No abnormalities observed   Abdomen:     Normal bowel sounds, no masses, no organomegaly, soft, nontender, nondistended, no guarding, no rebound  tenderness   Extremities:   Moves all extremities well, 2+ edema, no cyanosis, had redness   Pulses:   Pulses palpable and equal bilaterally. Normal radial, carotid, dorsalis pedis and posterior tibial pulses bilaterally.    Skin:  Psychiatric:   No bleeding, bruising or rash    Alert and oriented x 3, normal mood and affect   Lab Review:     Results from last 7 days   Lab Units 08/01/22 2209   SODIUM mmol/L 132*   POTASSIUM mmol/L 4.2   CHLORIDE mmol/L 98   CO2 mmol/L 21.8*   BUN mg/dL 18   CREATININE mg/dL 1.29*   CALCIUM mg/dL 9.7   BILIRUBIN mg/dL 1.7*   ALK PHOS U/L 67   ALT (SGPT) U/L 35   AST (SGOT) U/L 44*   GLUCOSE mg/dL 109*     Results from last 7 days   Lab Units 08/01/22 2209   TROPONIN T ng/mL <0.010     Results from last 7 days   Lab Units 08/01/22 2209   WBC 10*3/mm3 8.66   HEMOGLOBIN g/dL 15.6   HEMATOCRIT % 44.7   PLATELETS 10*3/mm3 131*     Results from last 7 days   Lab Units 08/02/22  0142  08/01/22  2209 07/28/22  0806   INR  1.93* 1.79* 2.2*                                   I personally viewed and interpreted the patient's EKG/Telemetry data.        Assessment and Plan:       1. Gram-positive septicemia  2. Mechanical mitral valve, on warfarin -INR goal 2.5-3.5. pharmacy to adjust warfarin.   3. Cellulitis RLE.   4. Back pain.     TTE today and if unremarkable, may need a MOE.  Looks ill.     Eveline Chanel MD  08/02/22  12:19 EDT

## 2022-08-02 NOTE — PROGRESS NOTES
[facility] Clinical Pharmacy Services: Warfarin Dosing/Monitoring Consult    Elliot Sebastian is a 62 y.o. male, estimated creatinine clearance is 93.6 mL/min (A) (by C-G formula based on SCr of 1.29 mg/dL (H)). weighing (!) 137 kg (301 lb 9.6 oz).    Results from last 7 days   Lab Units 08/02/22  0142 08/01/22  2209 07/28/22  0806   INR  1.93* 1.79* 2.2*   HEMOGLOBIN g/dL  --  15.6  --    HEMATOCRIT %  --  44.7  --    PLATELETS 10*3/mm3  --  131*  --      Prior to admission anticoagulation: H/o mechanical valve, Afib anticoagulated on warfarin. Most recent regimen noted as warfarin 2.5mg M/F and 5mg all other days. Previous INR check on 7/28, subtherapeutic at that time.    Hospital Anticoagulation:  Consulting provider: Nataly NICOLE  Start date: 8/2  Indication: A Fib - requiring full anticoagulation, Mechanical valve  Target INR: 2.5 - 3.5  Expected duration: Indefinite   Bridge Therapy: Yes  with enoxaparin    Potential food or drug interactions:     Acetaminophen (prn) - may result in increased risk of bleeding/increased INR d/t inhibition of warfarin metabolism. Interaction more likely if receiving >2gm/day for consecutive days    Education complete?/Date: No; plan for follow up TBD    Assessment/Plan:    INR subtherapeutic on admission but increasing from yesterday. Home regimen recently increased on 7/28.     Dose: 7.5mg once today  Monitor for any signs or symptoms of bleeding  Follow up daily INRs and dose adjustments    Pharmacy will continue to follow until discharge or discontinuation of warfarin.     Mckinley Caldera, [facility]  Clinical Pharmacist

## 2022-08-02 NOTE — PROGRESS NOTES
[facility] Clinical Pharmacy Services: Vancomycin Pharmacokinetic Initial Consult Note    Elliot Sebastian is a 62 y.o. male who is on day 1 of pharmacy to dose vancomycin.    Indication: Skin and Soft Tissue  Consulting Provider: Nataly NICOLE  Planned Duration of Therapy: TBD - consulted for 5 days  Loading Dose Ordered or Given: 2500 mg on 8/1 at 2327  MRSA PCR performed: No  Culture/Source:   8/1: Blood cx pending 2/2  Target: -600 mg/L.hr   Other Antimicrobials: None    Vitals/Labs  Ht:  ; Wt: (!) 137 kg (301 lb 9.6 oz)  Temp Readings from Last 1 Encounters:   08/02/22 (!) 103.1 °F (39.5 °C) (Oral)    Estimated Creatinine Clearance: 94.4 mL/min (A) (by C-G formula based on SCr of 1.29 mg/dL (H)).        Results from last 7 days   Lab Units 08/01/22  2209   CREATININE mg/dL 1.29*   WBC 10*3/mm3 8.66     Assessment/Plan:      Vancomycin Dose:   1000 mg IV every  12  hours  Predictive AUC level for the dose ordered is 452 mg/L.hr, which is within the target of 400-600 mg/L.hr  Vanc Random has been ordered for 8/2 at 1000 to assess clearance of first dose given slight increase in Scr     Pharmacy will follow patient's kidney function and will adjust doses and obtain levels as necessary. Thank you for involving pharmacy in this patient's care. Please contact pharmacy with any questions or concerns.                           Mckinley Caldera, [facility]  Clinical Pharmacist

## 2022-08-02 NOTE — ED PROVIDER NOTES
EMERGENCY DEPARTMENT ENCOUNTER    Room Number:  S513/1  Date of encounter:  8/2/2022  PCP: Mannie Beltran DO  Historian: Patient      HPI:  Chief Complaint: Pain swelling redness to right anterior leg  A complete HPI/ROS/PMH/PSH/SH/FH are unobtainable due to: None    Context: Elliot Sebastian is a 62 y.o. male who presents to the ED c/o right lower leg erythema swelling and redness since 2 days ago.  Patient states shaking.  Patient has prior history of DVT.  Patient is on warfarin has history of mechanical mitral valve.  States he is concerned he might be in sepsis secondary to the shaking he was doing yesterday.      PAST MEDICAL HISTORY  Active Ambulatory Problems     Diagnosis Date Noted   • Paroxysmal atrial fibrillation (HCC) 03/02/2016   • Bacterial endocarditis - history of 03/02/2016   • Congestive heart failure (HCC) 03/02/2016   • Mitral valve insufficiency 03/02/2016   • Hx of mitral valve replacement with mechanical valve [Z95.2] 10/19/2018   • Benign prostatic hyperplasia without lower urinary tract symptoms 02/10/2020   • Cellulitis 11/02/2015   • Elevated prostate specific antigen (PSA) 02/10/2020   • Fatigue 02/10/2020   • Low back pain 02/10/2020   • Poor concentration 02/10/2020   • Reduced libido 02/10/2020   • Subclinical hyperthyroidism 02/10/2020   • Superficial bacterial skin infection 11/02/2015   • Vitamin D deficiency 02/10/2020   • Elevated blood pressure reading 02/10/2020   • Sepsis without acute organ dysfunction (HCC) 03/30/2020   • Chronic diarrhea 07/27/2020   • Abdominal cramping 07/27/2020   • Exogenous obesity 11/30/2020   • Glucose intolerance (impaired glucose tolerance) 11/30/2020     Resolved Ambulatory Problems     Diagnosis Date Noted   • Diarrhea 09/21/2020     Past Medical History:   Diagnosis Date   • Acute bacterial endocarditis    • Anemia    • APC (atrial premature contractions)    • Atrial fibrillation (HCC)    • BPH (benign prostatic hypertrophy)    • CHF  (congestive heart failure) (HCC)    • Cholelithiasis    • Chronic constipation    • Deep vein thrombophlebitis of leg (HCC)    • Disease of thyroid gland    • Gout    • Intestinal obstruction (HCC)    • Ischemic enteritis (HCC)    • Left heart failure, NYHA class 3 (HCC)    • Mitral regurgitation    • Pleural effusion, bilateral    • Pulmonary hypertension (HCC)    • 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HCC)    • Umbilical hernia          PAST SURGICAL HISTORY  Past Surgical History:   Procedure Laterality Date   • APPENDECTOMY     • CHOLECYSTECTOMY     • COLONOSCOPY  approx 2013    normal per patient   • COLONOSCOPY N/A 10/26/2020    Procedure: COLONOSCOPY into cecum with biopsy, polypectomy, clip placement;  Surgeon: Juan Phillips MD;  Location: [facility] ENDOSCOPY;  Service: Gastroenterology;  Laterality: N/A;  polyps, clip  asc polyp removed but not retrieved   • EXPLORATION NECK FOR POSTOP HEMORRHAGE / THROMBOSIS / INFECTION     • MITRAL VALVE REPLACEMENT  01/03/2013    33MM [employer] JASON MECHANICAL VALVE, PRESERVATION OF CORDS TO SUBVALVULAR APPARATUS AND DOROTHY GORE-FLORI CORD IN THE P2 AREA, DEBRIDEMENT OF ANTERIOR AND POSTERIOR MITRAL LEAFLET          FAMILY HISTORY  Family History   Problem Relation Age of Onset   • Diabetes Mother    • Heart disease Father    • Diabetes Sister    • Diabetes Brother          SOCIAL HISTORY  Social History     Socioeconomic History   • Marital status:    Tobacco Use   • Smoking status: Never Smoker   • Smokeless tobacco: Never Used   • Tobacco comment: caffeine use   Substance and Sexual Activity   • Alcohol use: Yes     Alcohol/week: 2.0 standard drinks     Types: 2 Cans of beer per week     Comment: 1-2 nightly   • Drug use: Never         ALLERGIES  Patient has no known allergies.        REVIEW OF SYSTEMS  Review of Systems     All systems reviewed and negative except for those discussed in HPI.       PHYSICAL EXAM    I have reviewed the triage vital signs and nursing  notes.    ED Triage Vitals [08/01/22 1901]   Temp Heart Rate Resp BP SpO2   (!) 100.6 °F (38.1 °C) 100 20 138/75 99 %      Temp src Heart Rate Source Patient Position BP Location FiO2 (%)   -- -- -- -- --       Physical Exam  GENERAL: Mild  distressed  HENT: nares patent  EYES: no scleral icterus  CV: regular rhythm, regular rate  RESPIRATORY: normal effort  ABDOMEN: soft  MUSCULOSKELETAL: no deformity  NEURO: alert, moves all extremities, follows commands  SKIN: warm, dry, large erythematous patch with underlying induration to right anterior leg foot is well-perfused compartments are soft        LAB RESULTS  Recent Results (from the past 24 hour(s))   Duplex Venous Lower Extremity - Bilateral CAR    Collection Time: 08/01/22  8:14 PM   Result Value Ref Range    Target HR (85%) 135 bpm    Max. Pred. HR (100%) 159 bpm    Right Common Femoral Spont Y     Right Common Femoral Phasic Y     Right Common Femoral Augment Y     Right Common Femoral Competent Y     Right Common Femoral Compress C     Right Saphenofemoral Junction Compress C     Right Profunda Femoral Compress C     Right Proximal Femoral Compress C     Right Mid Femoral Spont Y     Right Mid Femoral Phasic Y     Right Mid Femoral Augment Y     Right Mid Femoral Competent Y     Right Mid Femoral Compress C     Right Distal Femoral Compress C     Right Popliteal Spont Y     Right Popliteal Phasic Y     Right Popliteal Augment Y     Right Popliteal Competent Y     Right Popliteal Compress C     Right Posterior Tibial Compress C     Right Peroneal Compress C     Right Gastronemius Compress C     Right Greater Saph AK Compress C     Right Greater Saph BK Compress C     Right Lesser Saph Compress C     Left Common Femoral Spont Y     Left Common Femoral Phasic Y     Left Common Femoral Augment Y     Left Common Femoral Competent Y     Left Common Femoral Compress C     Left Saphenofemoral Junction Compress C     Left Profunda Femoral Compress C     Left Proximal  Femoral Compress C     Left Mid Femoral Spont Y     Left Mid Femoral Phasic Y     Left Mid Femoral Augment Y     Left Mid Femoral Competent Y     Left Mid Femoral Compress C     Left Distal Femoral Compress C     Left Popliteal Spont Y     Left Popliteal Phasic Y     Left Popliteal Augment Y     Left Popliteal Competent Y     Left Popliteal Compress C     Left Posterior Tibial Compress C     Left Peroneal Compress C     Left Gastronemius Compress C     Left Greater Saph AK Compress C     Left Greater Saph BK Compress C     Left Lesser Saph Compress C    Comprehensive Metabolic Panel    Collection Time: 08/01/22 10:09 PM    Specimen: Blood   Result Value Ref Range    Glucose 109 (H) 65 - 99 mg/dL    BUN 18 8 - 23 mg/dL    Creatinine 1.29 (H) 0.76 - 1.27 mg/dL    Sodium 132 (L) 136 - 145 mmol/L    Potassium 4.2 3.5 - 5.2 mmol/L    Chloride 98 98 - 107 mmol/L    CO2 21.8 (L) 22.0 - 29.0 mmol/L    Calcium 9.7 8.6 - 10.5 mg/dL    Total Protein 6.4 6.0 - 8.5 g/dL    Albumin 4.00 3.50 - 5.20 g/dL    ALT (SGPT) 35 1 - 41 U/L    AST (SGOT) 44 (H) 1 - 40 U/L    Alkaline Phosphatase 67 39 - 117 U/L    Total Bilirubin 1.7 (H) 0.0 - 1.2 mg/dL    Globulin 2.4 gm/dL    A/G Ratio 1.7 g/dL    BUN/Creatinine Ratio 14.0 7.0 - 25.0    Anion Gap 12.2 5.0 - 15.0 mmol/L    eGFR 63.1 >60.0 mL/min/1.73   Protime-INR    Collection Time: 08/01/22 10:09 PM    Specimen: Blood   Result Value Ref Range    Protime 20.4 (H) 11.7 - 14.2 Seconds    INR 1.79 (H) 0.90 - 1.10   Green Top (Gel)    Collection Time: 08/01/22 10:09 PM   Result Value Ref Range    Extra Tube Hold for add-ons.    Lavender Top    Collection Time: 08/01/22 10:09 PM   Result Value Ref Range    Extra Tube hold for add-on    Light Blue Top    Collection Time: 08/01/22 10:09 PM   Result Value Ref Range    Extra Tube Hold for add-ons.    CBC Auto Differential    Collection Time: 08/01/22 10:09 PM    Specimen: Blood   Result Value Ref Range    WBC 8.66 3.40 - 10.80 10*3/mm3    RBC  4.63 4.14 - 5.80 10*6/mm3    Hemoglobin 15.6 13.0 - 17.7 g/dL    Hematocrit 44.7 37.5 - 51.0 %    MCV 96.5 79.0 - 97.0 fL    MCH 33.7 (H) 26.6 - 33.0 pg    MCHC 34.9 31.5 - 35.7 g/dL    RDW 13.6 12.3 - 15.4 %    RDW-SD 48.0 37.0 - 54.0 fl    MPV 9.1 6.0 - 12.0 fL    Platelets 131 (L) 140 - 450 10*3/mm3    Neutrophil % 88.4 (H) 42.7 - 76.0 %    Lymphocyte % 5.7 (L) 19.6 - 45.3 %    Monocyte % 5.0 5.0 - 12.0 %    Eosinophil % 0.0 (L) 0.3 - 6.2 %    Basophil % 0.2 0.0 - 1.5 %    Immature Grans % 0.7 (H) 0.0 - 0.5 %    Neutrophils, Absolute 7.66 (H) 1.70 - 7.00 10*3/mm3    Lymphocytes, Absolute 0.49 (L) 0.70 - 3.10 10*3/mm3    Monocytes, Absolute 0.43 0.10 - 0.90 10*3/mm3    Eosinophils, Absolute 0.00 0.00 - 0.40 10*3/mm3    Basophils, Absolute 0.02 0.00 - 0.20 10*3/mm3    Immature Grans, Absolute 0.06 (H) 0.00 - 0.05 10*3/mm3    nRBC 0.0 0.0 - 0.2 /100 WBC   Lactic Acid, Plasma    Collection Time: 08/01/22 10:09 PM    Specimen: Blood   Result Value Ref Range    Lactate 3.0 (C) 0.5 - 2.0 mmol/L   Troponin    Collection Time: 08/01/22 10:09 PM    Specimen: Blood   Result Value Ref Range    Troponin T <0.010 0.000 - 0.030 ng/mL   ECG 12 Lead    Collection Time: 08/01/22 10:16 PM   Result Value Ref Range    QT Interval 353 ms   COVID-19,[facility] IN-HOUSE CEPHEID/CHUCHO NP SWAB IN TRANSPORT MEDIA 8-12 HR TAT - Swab, Nasopharynx    Collection Time: 08/01/22 10:35 PM    Specimen: Nasopharynx; Swab   Result Value Ref Range    COVID19 Not Detected Not Detected - Ref. Range       Ordered the above labs and independently reviewed the results.        RADIOLOGY  No Radiology Exams Resulted Within Past 24 Hours    I ordered the above noted radiological studies. Reviewed by me and discussed with radiologist.  See dictation for official radiology interpretation.      PROCEDURES    Procedures      MEDICATIONS GIVEN IN ER    Medications   sodium chloride 0.9 % flush 10 mL (has no administration in time range)   sodium chloride 0.9 % flush  10 mL (has no administration in time range)   Pharmacy to Dose enoxaparin (LOVENOX) (has no administration in time range)   Pharmacy to dose warfarin (has no administration in time range)   Pharmacy to dose vancomycin (has no administration in time range)   nitroglycerin (NITROSTAT) SL tablet 0.4 mg (has no administration in time range)   acetaminophen (TYLENOL) tablet 650 mg (650 mg Oral Given 8/2/22 0119)     Or   acetaminophen (TYLENOL) 160 MG/5ML solution 650 mg ( Oral Not Given:  See Alt 8/2/22 0119)     Or   acetaminophen (TYLENOL) suppository 650 mg ( Rectal Not Given:  See Alt 8/2/22 0119)   sodium chloride 0.9 % infusion (100 mL/hr Intravenous New Bag 8/2/22 0115)   ondansetron (ZOFRAN) injection 4 mg (has no administration in time range)   vancomycin 2500 mg/500 mL 0.9% NS IVPB ([facility]) (2,500 mg Intravenous New Bag 8/1/22 2327)   sodium chloride 0.9 % bolus 500 mL (500 mL Intravenous New Bag 8/1/22 2326)   cefepime 2 gm IVPB in 100 ml NS (VTB) (2 g Intravenous New Bag 8/2/22 0100)         PROGRESS, DATA ANALYSIS, CONSULTS, AND MEDICAL DECISION MAKING    All labs have been independently reviewed by me.  All radiology studies have been reviewed by me and discussed with radiologist dictating the report.   EKG's independently viewed and interpreted by me.  Discussion below represents my analysis of pertinent findings related to patient's condition, differential diagnosis, treatment plan and final disposition.        ED Course as of 08/02/22 0202   Mon Aug 01, 2022   2023 Vascular  reports that patient is negative for DVT in bilateral lower extremities [JR]   2219 EKG          EKG time: 2216  Rhythm/Rate: Sinus rhythm rate 81  P waves and WI: Normal  QRS, axis: Narrow regular left axis  ST and T waves: Nonspecific    Interpreted Contemporaneously by me, independently viewed  No significant changed compared to prior EKG from 3/30/2020   [TJ]      ED Course User Index  [JR] Manolo Beckford MD  [TJ]  Alex Lamas MD           PPE: The patient wore a surgical mask throughout the entire patient encounter. I wore an N95.    AS OF 02:02 EDT VITALS:    BP - 141/74  HR - 98  TEMP - (!) 103.1 °F (39.5 °C) (Oral)  O2 SATS - 98%        DIAGNOSIS  Final diagnoses:   Cellulitis of right lower extremity   Subtherapeutic international normalized ratio (INR)         DISPOSITION  Admit           Alex Lamas MD  08/02/22 0208

## 2022-08-02 NOTE — PLAN OF CARE
Goal Outcome Evaluation:  Plan of Care Reviewed With: patient        Progress: no change  Outcome Evaluation: Patient admitted overnight d/t sepsis and cellulitis of his RLE. RLE red and hot to touch. Patient with fever overnight. IVF and IV abx given as ordered. Initial repeat lactic increased then decreased on the latest re-check.      Problem: Heart Failure Comorbidity  Goal: Maintenance of Heart Failure Symptom Control  Outcome: Ongoing, Progressing  Intervention: Maintain Heart Failure-Management  Recent Flowsheet Documentation  Taken 8/2/2022 0200 by Krissy Roque RN  Medication Review/Management: medications reviewed  Taken 8/2/2022 0120 by Krissy Roque RN  Medication Review/Management: medications reviewed     Problem: Infection Progression (Sepsis/Septic Shock)  Goal: Absence of Infection Signs and Symptoms  Outcome: Ongoing, Progressing  Intervention: Promote Recovery  Recent Flowsheet Documentation  Taken 8/2/2022 0538 by Krissy Roque, RN  Activity Management: activity adjusted per tolerance  Taken 8/2/2022 0120 by Krissy Roque RN  Activity Management: activity adjusted per tolerance     Problem: Infection  Goal: Absence of Infection Signs and Symptoms  Outcome: Ongoing, Progressing

## 2022-08-02 NOTE — PROGRESS NOTES
Preliminary negative results of today's bilateral lower extremity venous duplex were called to Dr. Manolo Beckford.

## 2022-08-02 NOTE — CASE MANAGEMENT/SOCIAL WORK
Discharge Planning Assessment  [facility]     Patient Name: Elliot Sebastian  MRN: [id number]  Today's Date: 8/2/2022    Admit Date: 8/1/2022     Discharge Needs Assessment     Row Name 08/02/22 1518       Living Environment    People in Home spouse    Current Living Arrangements home    Primary Care Provided by self       Transition Planning    Patient/Family Anticipates Transition to home with family    Patient/Family Anticipated Services at Transition none       Discharge Needs Assessment    Equipment Currently Used at Home none    Concerns to be Addressed adjustment to diagnosis/illness    Equipment Needed After Discharge none               Discharge Plan     Row Name 08/02/22 1519       Plan    Plan Plans are home, no needs    Patient/Family in Agreement with Plan yes    Plan Comments CCP spoke with pt @ bedside, confirmed face sheet and primary pharmacy as [facility] in [facility].  Pt states he lives with spouse and is IDAL's.  He has his INR checked at [place].  Plans are home, pt denies any needs.  CCP will follow for assist as needed.  Naima BALTAZAR RN/CCP              Continued Care and Services - Admitted Since 8/1/2022    Coordination has not been started for this encounter.       Expected Discharge Date and Time     Expected Discharge Date Expected Discharge Time    Aug 5, 2022          Demographic Summary    No documentation.                Functional Status     Row Name 08/02/22 1518       Functional Status    Usual Activity Tolerance good    Current Activity Tolerance good               Psychosocial    No documentation.                Abuse/Neglect    No documentation.                Legal     Row Name 08/02/22 1518       Financial/Legal    Who Manages Finances if Patient Unable no living will               Substance Abuse    No documentation.                Patient Forms    No documentation.                   Naima Astorga RN

## 2022-08-02 NOTE — H&P
Patient Name:  Elliot Sebastian  YOB: 1960  MRN:  [id number]  Admit Date:  8/1/2022  Patient Care Team:  Mannie Beltran DO as PCP - General (Family Medicine)  Sary Tejeda RPH as Pharmacist      Subjective   History Present Illness     Chief Complaint   Patient presents with   • Leg Swelling   • Weakness - Generalized       Mr. Sebastian is a 62 y.o. male with a history of MVR on AC, afib, BPH, CHF, anemia, DVT, bacterial endocarditis that presents to [facility] complaining of RLE redness, pain,chills. Symptoms have been present since Saturday. He has also had nausea without vomiting. Denies fever at home, chest pain, shortness of breath, diarrhea, dysuria. Per lab report, blood cultures are growing GPC 2/2, ID has been consulted.    TMax 103.1. HR controlled. BP stable. On room air. Trop neg. Lactate 3.0-->4.7-->2.0. Blood cultures collected. WBC 8.66, Hgb 15.6, plts 131. INR 1.79. Gluc 109, Cr 1.29, Na 132, AST 44, CO2 21.8, Total bili 1.7. Covid neg. BLE doppler neg    Review of Systems   Constitutional: Positive for chills and fever.   HENT: Negative for congestion.    Respiratory: Negative for shortness of breath.    Cardiovascular: Negative for chest pain.   Gastrointestinal: Positive for nausea. Negative for diarrhea and vomiting.   Genitourinary: Negative for dysuria.   Musculoskeletal: Negative for arthralgias.   Skin: Positive for rash.   Neurological: Negative for headaches.   Psychiatric/Behavioral: Negative for sleep disturbance.        Personal History     Past Medical History:   Diagnosis Date   • Acute bacterial endocarditis    • Anemia    • APC (atrial premature contractions)    • Atrial fibrillation (HCC)    • BPH (benign prostatic hypertrophy)    • CHF (congestive heart failure) (HCC)    • Cholelithiasis    • Chronic constipation    • Deep vein thrombophlebitis of leg (HCC)     DISTAL   • Disease of thyroid gland    • Gout    • Intestinal obstruction (HCC)    •  Ischemic enteritis (HCC)    • Left heart failure, NYHA class 3 (HCC)     CLASS 111 LEFT SYSTOIC CONGESTIVE HEART FAILURE   • Mitral regurgitation    • Pleural effusion, bilateral    • Pulmonary hypertension (HCC)    • 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HCC)    • Umbilical hernia    • Vitamin D deficiency      Past Surgical History:   Procedure Laterality Date   • APPENDECTOMY     • CHOLECYSTECTOMY     • COLONOSCOPY  approx 2013    normal per patient   • COLONOSCOPY N/A 10/26/2020    Procedure: COLONOSCOPY into cecum with biopsy, polypectomy, clip placement;  Surgeon: Juan Phillips MD;  Location: [facility] ENDOSCOPY;  Service: Gastroenterology;  Laterality: N/A;  polyps, clip  asc polyp removed but not retrieved   • EXPLORATION NECK FOR POSTOP HEMORRHAGE / THROMBOSIS / INFECTION     • MITRAL VALVE REPLACEMENT  01/03/2013    33MM [facility] MECHANICAL VALVE, PRESERVATION OF CORDS TO SUBVALVULAR APPARATUS AND DOROTHY GORE-FLORI CORD IN THE P2 AREA, DEBRIDEMENT OF ANTERIOR AND POSTERIOR MITRAL LEAFLET      Family History   Problem Relation Age of Onset   • Diabetes Mother    • Heart disease Father    • Diabetes Sister    • Diabetes Brother      Social History     Tobacco Use   • Smoking status: Never Smoker   • Smokeless tobacco: Never Used   • Tobacco comment: caffeine use   Substance Use Topics   • Alcohol use: Yes     Alcohol/week: 2.0 standard drinks     Types: 2 Cans of beer per week     Comment: 1-2 nightly   • Drug use: Never     No current facility-administered medications on file prior to encounter.     Current Outpatient Medications on File Prior to Encounter   Medication Sig Dispense Refill   • atenolol (TENORMIN) 25 MG tablet Take 1 tablet by mouth Daily. MUST MAKE FOLLOW UP APPOINTMENT FOR FUTURE REFILLS 90 tablet 0   • Cholecalciferol (VITAMIN D3) 2000 UNITS capsule Take 1,000 Units by mouth Daily.     • cholestyramine (Questran) 4 GM/DOSE powder Take 1 packet by mouth Daily. If no improvement after two  weeks, may increase to BID. 90 packet 3   • Multiple Vitamins-Minerals (MENS MULTIVITAMIN PLUS) tablet Take 2 capsules by mouth Daily. Soft gels     • warfarin (COUMADIN) 5 MG tablet TAKE ONE-HALF (1/2) TABLET ON MONDAY, WEDNESDAY, AND FRIDAY AND 1 TABLET ALL OTHER DAYS OR AS DIRECTED. NEEDS LABS FOR MORE REFILLS (Patient taking differently: 2.5 mg Monday and Friday; 5mg Tuesday, Wednesday, Thursday, Saturday, Sunday) 75 tablet 0   • losartan (COZAAR) 50 MG tablet Take 1 tablet by mouth Daily. MUST MAKE FOLLOW UP APPOINTMENT FOR FUTURE REFILLS 6/30/2022 90 tablet 0     No Known Allergies    Objective    Objective     Vital Signs  Temp:  [99.8 °F (37.7 °C)-103.1 °F (39.5 °C)] 102.4 °F (39.1 °C)  Heart Rate:  [] 98  Resp:  [16-20] 16  BP: (108-141)/(58-84) 109/65  SpO2:  [95 %-99 %] 97 %  on   ;   Device (Oxygen Therapy): room air  Body mass index is 34.85 kg/m².    Physical Exam  Vitals and nursing note reviewed.   Constitutional:       General: He is not in acute distress.     Appearance: He is ill-appearing. He is not toxic-appearing.   HENT:      Head: Normocephalic.      Mouth/Throat:      Mouth: Mucous membranes are moist.   Eyes:      Conjunctiva/sclera: Conjunctivae normal.   Cardiovascular:      Rate and Rhythm: Normal rate and regular rhythm.      Comments: +click  Pulmonary:      Effort: Pulmonary effort is normal. No respiratory distress.      Breath sounds: Normal breath sounds.   Abdominal:      General: Bowel sounds are normal.      Palpations: Abdomen is soft.   Musculoskeletal:         General: Tenderness present.      Cervical back: Neck supple.      Right lower leg: No edema.      Left lower leg: No edema.   Skin:     General: Skin is warm and dry.      Findings: Erythema present.   Neurological:      Mental Status: He is alert and oriented to person, place, and time.   Psychiatric:         Mood and Affect: Mood normal.         Behavior: Behavior normal.         Results Review:  I reviewed the  patient's new clinical results.  I reviewed the patient's new imaging results and agree with the interpretation.  I reviewed the patient's other test results and agree with the interpretation  I personally viewed and interpreted the patient's EKG/Telemetry data    Lab Results (last 24 hours)     Procedure Component Value Units Date/Time    CBC & Differential [[id number]]  (Abnormal) Collected: 08/01/22 2209    Specimen: Blood Updated: 08/01/22 2226    Narrative:      The following orders were created for panel order CBC & Differential.  Procedure                               Abnormality         Status                     ---------                               -----------         ------                     CBC Auto Differential[[id number]]        Abnormal            Final result                 Please view results for these tests on the individual orders.    Comprehensive Metabolic Panel [[id number]]  (Abnormal) Collected: 08/01/22 2209    Specimen: Blood Updated: 08/01/22 2247     Glucose 109 mg/dL      BUN 18 mg/dL      Creatinine 1.29 mg/dL      Sodium 132 mmol/L      Potassium 4.2 mmol/L      Comment: Slight hemolysis detected by analyzer. Results may be affected.        Chloride 98 mmol/L      CO2 21.8 mmol/L      Calcium 9.7 mg/dL      Total Protein 6.4 g/dL      Albumin 4.00 g/dL      ALT (SGPT) 35 U/L      AST (SGOT) 44 U/L      Alkaline Phosphatase 67 U/L      Total Bilirubin 1.7 mg/dL      Globulin 2.4 gm/dL      A/G Ratio 1.7 g/dL      BUN/Creatinine Ratio 14.0     Anion Gap 12.2 mmol/L      eGFR 63.1 mL/min/1.73      Comment: National Kidney Foundation and American Society of Nephrology (ASN) Task Force recommended calculation based on the Chronic Kidney Disease Epidemiology Collaboration (CKD-EPI) equation refit without adjustment for race.       Narrative:      GFR Normal >60  Chronic Kidney Disease <60  Kidney Failure <15      Protime-INR [[id number]]  (Abnormal) Collected: 08/01/22 2209    Specimen:  Blood Updated: 08/01/22 2241     Protime 20.4 Seconds      INR 1.79    CBC Auto Differential [[id number]]  (Abnormal) Collected: 08/01/22 2209    Specimen: Blood Updated: 08/01/22 2226     WBC 8.66 10*3/mm3      RBC 4.63 10*6/mm3      Hemoglobin 15.6 g/dL      Hematocrit 44.7 %      MCV 96.5 fL      MCH 33.7 pg      MCHC 34.9 g/dL      RDW 13.6 %      RDW-SD 48.0 fl      MPV 9.1 fL      Platelets 131 10*3/mm3      Neutrophil % 88.4 %      Lymphocyte % 5.7 %      Monocyte % 5.0 %      Eosinophil % 0.0 %      Basophil % 0.2 %      Immature Grans % 0.7 %      Neutrophils, Absolute 7.66 10*3/mm3      Lymphocytes, Absolute 0.49 10*3/mm3      Monocytes, Absolute 0.43 10*3/mm3      Eosinophils, Absolute 0.00 10*3/mm3      Basophils, Absolute 0.02 10*3/mm3      Immature Grans, Absolute 0.06 10*3/mm3      nRBC 0.0 /100 WBC     Blood Culture - Blood, Arm, Left [[id number]]  (Abnormal) Collected: 08/01/22 2209    Specimen: Blood from Arm, Left Updated: 08/02/22 0852     Blood Culture Abnormal Stain     Gram Stain Anaerobic Bottle Gram positive cocci in chains      Aerobic Bottle Gram positive cocci in chains    Blood Culture - Blood, Arm, Left [[id number]]  (Abnormal) Collected: 08/01/22 2209    Specimen: Blood from Arm, Left Updated: 08/02/22 0920     Blood Culture Abnormal Stain     Gram Stain Anaerobic Bottle Gram positive cocci in chains      Aerobic Bottle Gram positive cocci in chains    Lactic Acid, Plasma [[id number]]  (Abnormal) Collected: 08/01/22 2209    Specimen: Blood Updated: 08/01/22 2249     Lactate 3.0 mmol/L     Troponin [[id number]]  (Normal) Collected: 08/01/22 2209    Specimen: Blood Updated: 08/01/22 2247     Troponin T <0.010 ng/mL     Narrative:      Troponin T Reference Range:  <= 0.03 ng/mL-   Negative for AMI  >0.03 ng/mL-     Abnormal for myocardial necrosis.  Clinicians would have to utilize clinical acumen, EKG, Troponin and serial changes to determine if it is an Acute Myocardial Infarction or  myocardial injury due to an underlying chronic condition.       Results may be falsely decreased if patient taking Biotin.      Blood Culture ID, PCR - Blood, Arm, Left [[id number]] Collected: 08/01/22 2209    Specimen: Blood from Arm, Left Updated: 08/02/22 0850    COVID PRE-OP / PRE-PROCEDURE SCREENING ORDER (NO ISOLATION) - Swab, Nasopharynx [[id number]]  (Normal) Collected: 08/01/22 2235    Specimen: Swab from Nasopharynx Updated: 08/02/22 0049    Narrative:      The following orders were created for panel order COVID PRE-OP / PRE-PROCEDURE SCREENING ORDER (NO ISOLATION) - Swab, Nasopharynx.  Procedure                               Abnormality         Status                     ---------                               -----------         ------                     COVID-19,[facility]-HOUSE...[[id number]]  Normal              Final result                 Please view results for these tests on the individual orders.    COVID-19,[facility]-HOU[facility] CEPHEID/CHUCHO NP SWAB IN TRANSPORT MEDIA 8-12 HR TAT - Swab, Nasopharynx [[id number]]  (Normal) Collected: 08/01/22 2235    Specimen: Swab from Nasopharynx Updated: 08/02/22 0049     COVID19 Not Detected    Narrative:      Fact sheet for providers: https://www.fda.gov/media/345318/download     Fact sheet for patients: https://www.fda.gov/media/686495/download    STAT Lactic Acid, Reflex [[id number]]  (Abnormal) Collected: 08/02/22 0142    Specimen: Blood Updated: 08/02/22 0234     Lactate 4.7 mmol/L     Protime-INR [[id number]]  (Abnormal) Collected: 08/02/22 0142    Specimen: Blood Updated: 08/02/22 0226     Protime 21.7 Seconds      INR 1.93    STAT Lactic Acid, Reflex [[id number]]  (Normal) Collected: 08/02/22 0454    Specimen: Blood Updated: 08/02/22 0532     Lactate 2.0 mmol/L           Imaging Results (Last 24 Hours)     ** No results found for the last 24 hours. **              ECG 12 Lead   Final Result   HEART RATE= 91  bpm   RR Interval= 660  ms   FL Interval= 182  ms   P  Horizontal Axis= 34  deg   P Front Axis= 21  deg   QRSD Interval= 95  ms   QT Interval= 353  ms   QRS Axis= -34  deg   T Wave Axis= -4  deg   - BORDERLINE ECG -   Sinus rhythm   Left axis deviation   POOR R WAVE PROGRESSION   Borderline T abnormalities, inferior leads   NO SIGNIFICANT CHANGE FROM PREVIOUS ECG   Electronically Signed By: Nasim Cheung ([facility]) 02-Aug-2022 09:45:50   Date and Time of Study: 2022-08-01 22:16:17      SCANNED - TELEMETRY     Final Result              Assessment/Plan     Active Hospital Problems    Diagnosis  POA   • **Fever [R50.9]  Yes   • Gram positive septicemia (HCC) [A41.89]  Yes   • Chronic anticoagulation [Z79.01]  Not Applicable   • ASHIA (acute kidney injury) (HCC) [N17.9]  Yes   • Hx of mitral valve replacement with mechanical valve [Z95.2] [Z95.2]  Not Applicable   • Paroxysmal atrial fibrillation (HCC) [I48.0]  Yes   • Bacterial endocarditis - history of [I33.0]  Yes   • Cellulitis [L03.90]  Yes      Resolved Hospital Problems   No resolved problems to display.       Mr. Sebastian is a 62 y.o. male with a history of MVR on AC, afib, BPH, CHF, anemia, DVT, bacterial endocarditis who is admitted for fever, RLE cellulits, gram positive septicemia    -Fever/RLE cellulitis/Gram positive septicemia: Appreciate ID assistance. Continue antibiotics. Monitor fever curve. Doppler negative for DVT. Check Echo due to MVR; may need MOE. Cardiologist is Sumeet. WBC wnl. Lactic acid normalized   -Hx MVR/Bacterial endocarditis/Pafib: Cardiology consult. Checking Echo. HR controlled. Daily INR, pharmacy to dose Warfarin. Continue atenolol  -ASHIA: Cr 1.29, baseline around 0.8-1.0. Avoid nephrotoxic meds. ARB on home med list, will hold. IVF's. Monitor      I discussed the patient's findings and my recommendations with patient.    VTE Prophylaxis - Warfarin (home med).  Code Status - Full code.       COREY Magaña  [facility]  08/02/22  11:04 EDT

## 2022-08-02 NOTE — NURSING NOTE
MT called this RN, states pt. HR in 130s sustained.  Upon assessment pt. Is asymptomatic, slightly hypotensive.  This RN ordered stat EKG, confirmed A-fib RVR.  Nightshift RN notified of changes at shift change, [facility] paged, see new orders.  Will continue to monitor pt. Closely.

## 2022-08-03 ENCOUNTER — ANESTHESIA EVENT (OUTPATIENT)
Dept: POSTOP/PACU | Facility: HOSPITAL | Age: 62
End: 2022-08-03

## 2022-08-03 ENCOUNTER — ANESTHESIA (OUTPATIENT)
Dept: POSTOP/PACU | Facility: HOSPITAL | Age: 62
End: 2022-08-03

## 2022-08-03 ENCOUNTER — APPOINTMENT (OUTPATIENT)
Dept: POSTOP/PACU | Facility: HOSPITAL | Age: 62
End: 2022-08-03

## 2022-08-03 VITALS — DIASTOLIC BLOOD PRESSURE: 72 MMHG | SYSTOLIC BLOOD PRESSURE: 98 MMHG | OXYGEN SATURATION: 99 % | HEART RATE: 109 BPM

## 2022-08-03 LAB
ALBUMIN SERPL-MCNC: 2.9 G/DL (ref 3.5–5.2)
ALP SERPL-CCNC: 53 U/L (ref 39–117)
ALT SERPL W P-5'-P-CCNC: 29 U/L (ref 1–41)
ANION GAP SERPL CALCULATED.3IONS-SCNC: 9 MMOL/L (ref 5–15)
APTT PPP: 34.5 SECONDS (ref 22.7–35.4)
APTT PPP: 45.8 SECONDS (ref 22.7–35.4)
ASCENDING AORTA: 3.7 CM
AST SERPL-CCNC: 47 U/L (ref 1–40)
BH CV ECHO MEAS - MV MAX PG: 12 MMHG
BH CV ECHO MEAS - MV MEAN PG: 7 MMHG
BH CV ECHO MEAS - MV V2 VTI: 22.5 CM
BH CV ECHO MEAS - RAP SYSTOLE: 3 MMHG
BH CV ECHO MEAS - RVSP: 35 MMHG
BH CV ECHO MEAS - TR MAX PG: 32 MMHG
BH CV ECHO MEAS - TR MAX VEL: 282.6 CM/SEC
BILIRUB CONJ SERPL-MCNC: 0.3 MG/DL (ref 0–0.3)
BILIRUB INDIRECT SERPL-MCNC: 0.7 MG/DL
BILIRUB SERPL-MCNC: 1 MG/DL (ref 0–1.2)
BUN SERPL-MCNC: 13 MG/DL (ref 8–23)
BUN/CREAT SERPL: 13 (ref 7–25)
CALCIUM SPEC-SCNC: 8.4 MG/DL (ref 8.6–10.5)
CHLORIDE SERPL-SCNC: 102 MMOL/L (ref 98–107)
CO2 SERPL-SCNC: 20 MMOL/L (ref 22–29)
CREAT SERPL-MCNC: 1 MG/DL (ref 0.76–1.27)
DEPRECATED RDW RBC AUTO: 44.5 FL (ref 37–54)
EGFRCR SERPLBLD CKD-EPI 2021: 85.6 ML/MIN/1.73
ERYTHROCYTE [DISTWIDTH] IN BLOOD BY AUTOMATED COUNT: 13.4 % (ref 12.3–15.4)
GLUCOSE SERPL-MCNC: 103 MG/DL (ref 65–99)
HCT VFR BLD AUTO: 36.5 % (ref 37.5–51)
HGB BLD-MCNC: 13.2 G/DL (ref 13–17.7)
INR PPP: 1.85 (ref 0.9–1.1)
MAGNESIUM SERPL-MCNC: 1.8 MG/DL (ref 1.6–2.4)
MCH RBC QN AUTO: 33.9 PG (ref 26.6–33)
MCHC RBC AUTO-ENTMCNC: 36.2 G/DL (ref 31.5–35.7)
MCV RBC AUTO: 93.8 FL (ref 79–97)
NT-PROBNP SERPL-MCNC: 1267 PG/ML (ref 0–900)
PLATELET # BLD AUTO: 90 10*3/MM3 (ref 140–450)
PMV BLD AUTO: 10.1 FL (ref 6–12)
POTASSIUM SERPL-SCNC: 3.8 MMOL/L (ref 3.5–5.2)
PROT SERPL-MCNC: 5.2 G/DL (ref 6–8.5)
PROTHROMBIN TIME: 21 SECONDS (ref 11.7–14.2)
QT INTERVAL: 337 MS
QT INTERVAL: 376 MS
RBC # BLD AUTO: 3.89 10*6/MM3 (ref 4.14–5.8)
SINUS: 4.1 CM
SODIUM SERPL-SCNC: 131 MMOL/L (ref 136–145)
STJ: 3.4 CM
TROPONIN T SERPL-MCNC: <0.01 NG/ML (ref 0–0.03)
TSH SERPL DL<=0.05 MIU/L-ACNC: 0.42 UIU/ML (ref 0.27–4.2)
WBC NRBC COR # BLD: 3.67 10*3/MM3 (ref 3.4–10.8)

## 2022-08-03 PROCEDURE — 93005 ELECTROCARDIOGRAM TRACING: CPT | Performed by: INTERNAL MEDICINE

## 2022-08-03 PROCEDURE — 99233 SBSQ HOSP IP/OBS HIGH 50: CPT | Performed by: INTERNAL MEDICINE

## 2022-08-03 PROCEDURE — 93010 ELECTROCARDIOGRAM REPORT: CPT | Performed by: INTERNAL MEDICINE

## 2022-08-03 PROCEDURE — 84443 ASSAY THYROID STIM HORMONE: CPT | Performed by: INTERNAL MEDICINE

## 2022-08-03 PROCEDURE — 83735 ASSAY OF MAGNESIUM: CPT | Performed by: INTERNAL MEDICINE

## 2022-08-03 PROCEDURE — 93325 DOPPLER ECHO COLOR FLOW MAPG: CPT | Performed by: INTERNAL MEDICINE

## 2022-08-03 PROCEDURE — 80076 HEPATIC FUNCTION PANEL: CPT | Performed by: INTERNAL MEDICINE

## 2022-08-03 PROCEDURE — 93321 DOPPLER ECHO F-UP/LMTD STD: CPT | Performed by: INTERNAL MEDICINE

## 2022-08-03 PROCEDURE — 87040 BLOOD CULTURE FOR BACTERIA: CPT | Performed by: INTERNAL MEDICINE

## 2022-08-03 PROCEDURE — 25010000002 HEPARIN (PORCINE) PER 1000 UNITS: Performed by: INTERNAL MEDICINE

## 2022-08-03 PROCEDURE — 25010000002 PHENYLEPHRINE 10 MG/ML SOLUTION

## 2022-08-03 PROCEDURE — 93312 ECHO TRANSESOPHAGEAL: CPT

## 2022-08-03 PROCEDURE — 25010000002 AMIODARONE IN DEXTROSE 5% 360-4.14 MG/200ML-% SOLUTION: Performed by: NURSE PRACTITIONER

## 2022-08-03 PROCEDURE — 76376 3D RENDER W/INTRP POSTPROCES: CPT

## 2022-08-03 PROCEDURE — 25010000002 PROPOFOL 10 MG/ML EMULSION

## 2022-08-03 PROCEDURE — 25010000002 DIGOXIN PER 500 MCG: Performed by: NURSE PRACTITIONER

## 2022-08-03 PROCEDURE — 25010000002 CEFTRIAXONE PER 250 MG: Performed by: INTERNAL MEDICINE

## 2022-08-03 PROCEDURE — 25010000002 HEPARIN (PORCINE) 25000-0.45 UT/250ML-% SOLUTION: Performed by: INTERNAL MEDICINE

## 2022-08-03 PROCEDURE — 85730 THROMBOPLASTIN TIME PARTIAL: CPT | Performed by: INTERNAL MEDICINE

## 2022-08-03 PROCEDURE — 0 LIDOCAINE 1 % SOLUTION

## 2022-08-03 PROCEDURE — 93321 DOPPLER ECHO F-UP/LMTD STD: CPT

## 2022-08-03 PROCEDURE — 83880 ASSAY OF NATRIURETIC PEPTIDE: CPT | Performed by: INTERNAL MEDICINE

## 2022-08-03 PROCEDURE — 80048 BASIC METABOLIC PNL TOTAL CA: CPT | Performed by: NURSE PRACTITIONER

## 2022-08-03 PROCEDURE — 85027 COMPLETE CBC AUTOMATED: CPT | Performed by: NURSE PRACTITIONER

## 2022-08-03 PROCEDURE — 84484 ASSAY OF TROPONIN QUANT: CPT | Performed by: INTERNAL MEDICINE

## 2022-08-03 PROCEDURE — 25010000002 AMIODARONE IN DEXTROSE 5% 150-4.21 MG/100ML-% SOLUTION: Performed by: NURSE PRACTITIONER

## 2022-08-03 PROCEDURE — 85610 PROTHROMBIN TIME: CPT | Performed by: NURSE PRACTITIONER

## 2022-08-03 PROCEDURE — 36415 COLL VENOUS BLD VENIPUNCTURE: CPT | Performed by: NURSE PRACTITIONER

## 2022-08-03 PROCEDURE — 93325 DOPPLER ECHO COLOR FLOW MAPG: CPT

## 2022-08-03 PROCEDURE — 25010000002 AMIODARONE IN DEXTROSE 5% 360-4.14 MG/200ML-% SOLUTION: Performed by: INTERNAL MEDICINE

## 2022-08-03 PROCEDURE — 93312 ECHO TRANSESOPHAGEAL: CPT | Performed by: INTERNAL MEDICINE

## 2022-08-03 RX ORDER — HEPARIN SODIUM 5000 [USP'U]/ML
29.2 INJECTION, SOLUTION INTRAVENOUS; SUBCUTANEOUS ONCE
Status: COMPLETED | OUTPATIENT
Start: 2022-08-03 | End: 2022-08-03

## 2022-08-03 RX ORDER — HEPARIN SODIUM 10000 [USP'U]/100ML
7.3 INJECTION, SOLUTION INTRAVENOUS
Status: DISCONTINUED | OUTPATIENT
Start: 2022-08-03 | End: 2022-08-04

## 2022-08-03 RX ORDER — HEPARIN SODIUM 5000 [USP'U]/ML
29.2 INJECTION, SOLUTION INTRAVENOUS; SUBCUTANEOUS EVERY 6 HOURS PRN
Status: DISCONTINUED | OUTPATIENT
Start: 2022-08-03 | End: 2022-08-07

## 2022-08-03 RX ORDER — GLYCOPYRROLATE 0.2 MG/ML
INJECTION INTRAMUSCULAR; INTRAVENOUS AS NEEDED
Status: DISCONTINUED | OUTPATIENT
Start: 2022-08-03 | End: 2022-08-03 | Stop reason: SURG

## 2022-08-03 RX ORDER — WARFARIN SODIUM 5 MG/1
5 TABLET ORAL
Status: COMPLETED | OUTPATIENT
Start: 2022-08-03 | End: 2022-08-03

## 2022-08-03 RX ORDER — SODIUM CHLORIDE, SODIUM LACTATE, POTASSIUM CHLORIDE, CALCIUM CHLORIDE 600; 310; 30; 20 MG/100ML; MG/100ML; MG/100ML; MG/100ML
INJECTION, SOLUTION INTRAVENOUS CONTINUOUS PRN
Status: DISCONTINUED | OUTPATIENT
Start: 2022-08-03 | End: 2022-08-03 | Stop reason: SURG

## 2022-08-03 RX ORDER — PHENYLEPHRINE HYDROCHLORIDE 10 MG/ML
INJECTION INTRAVENOUS AS NEEDED
Status: DISCONTINUED | OUTPATIENT
Start: 2022-08-03 | End: 2022-08-03 | Stop reason: SURG

## 2022-08-03 RX ORDER — PROMETHAZINE HYDROCHLORIDE 25 MG/1
25 SUPPOSITORY RECTAL ONCE AS NEEDED
Status: DISCONTINUED | OUTPATIENT
Start: 2022-08-03 | End: 2022-08-07 | Stop reason: HOSPADM

## 2022-08-03 RX ORDER — CHOLESTYRAMINE 4 G/9G
1 POWDER, FOR SUSPENSION ORAL EVERY 12 HOURS SCHEDULED
Status: DISCONTINUED | OUTPATIENT
Start: 2022-08-03 | End: 2022-08-07 | Stop reason: HOSPADM

## 2022-08-03 RX ORDER — PROMETHAZINE HYDROCHLORIDE 25 MG/1
25 TABLET ORAL ONCE AS NEEDED
Status: DISCONTINUED | OUTPATIENT
Start: 2022-08-03 | End: 2022-08-07 | Stop reason: HOSPADM

## 2022-08-03 RX ORDER — FAMOTIDINE 10 MG/ML
20 INJECTION, SOLUTION INTRAVENOUS ONCE
Status: DISCONTINUED | OUTPATIENT
Start: 2022-08-03 | End: 2022-08-07 | Stop reason: HOSPADM

## 2022-08-03 RX ORDER — LIDOCAINE HYDROCHLORIDE 10 MG/ML
INJECTION, SOLUTION INFILTRATION; PERINEURAL AS NEEDED
Status: DISCONTINUED | OUTPATIENT
Start: 2022-08-03 | End: 2022-08-03 | Stop reason: SURG

## 2022-08-03 RX ORDER — PROPOFOL 10 MG/ML
VIAL (ML) INTRAVENOUS AS NEEDED
Status: DISCONTINUED | OUTPATIENT
Start: 2022-08-03 | End: 2022-08-03 | Stop reason: SURG

## 2022-08-03 RX ADMIN — WARFARIN SODIUM 5 MG: 5 TABLET ORAL at 17:29

## 2022-08-03 RX ADMIN — PHENYLEPHRINE HYDROCHLORIDE 100 MCG: 10 INJECTION, SOLUTION INTRAVENOUS at 10:28

## 2022-08-03 RX ADMIN — AMIODARONE HYDROCHLORIDE 0.5 MG/MIN: 1.8 INJECTION, SOLUTION INTRAVENOUS at 08:23

## 2022-08-03 RX ADMIN — Medication 10 ML: at 08:22

## 2022-08-03 RX ADMIN — GLYCOPYRROLATE 0.1 MG: 0.2 INJECTION INTRAMUSCULAR; INTRAVENOUS at 10:12

## 2022-08-03 RX ADMIN — ACETAMINOPHEN 650 MG: 325 TABLET, FILM COATED ORAL at 08:22

## 2022-08-03 RX ADMIN — PHENYLEPHRINE HYDROCHLORIDE 100 MCG: 10 INJECTION, SOLUTION INTRAVENOUS at 10:24

## 2022-08-03 RX ADMIN — METOPROLOL TARTRATE 2.5 MG: 1 INJECTION, SOLUTION INTRAVENOUS at 01:00

## 2022-08-03 RX ADMIN — AMIODARONE HYDROCHLORIDE 1 MG/MIN: 1.8 INJECTION, SOLUTION INTRAVENOUS at 02:51

## 2022-08-03 RX ADMIN — AMIODARONE HYDROCHLORIDE 150 MG: 1.5 INJECTION, SOLUTION INTRAVENOUS at 01:06

## 2022-08-03 RX ADMIN — HEPARIN SODIUM 7.3 UNITS/KG/HR: 10000 INJECTION, SOLUTION INTRAVENOUS at 12:31

## 2022-08-03 RX ADMIN — CHOLESTYRAMINE 1 PACKET: 4 POWDER, FOR SUSPENSION ORAL at 17:29

## 2022-08-03 RX ADMIN — SODIUM CHLORIDE 100 ML/HR: 9 INJECTION, SOLUTION INTRAVENOUS at 23:40

## 2022-08-03 RX ADMIN — PHENYLEPHRINE HYDROCHLORIDE 100 MCG: 10 INJECTION, SOLUTION INTRAVENOUS at 10:38

## 2022-08-03 RX ADMIN — PROPOFOL 200 MCG/KG/MIN: 10 INJECTION, EMULSION INTRAVENOUS at 10:18

## 2022-08-03 RX ADMIN — CEFTRIAXONE 2 G: 2 INJECTION, POWDER, FOR SOLUTION INTRAMUSCULAR; INTRAVENOUS at 10:41

## 2022-08-03 RX ADMIN — LIDOCAINE HYDROCHLORIDE 60 MG: 10 INJECTION, SOLUTION INFILTRATION; PERINEURAL at 10:12

## 2022-08-03 RX ADMIN — HEPARIN SODIUM 4000 UNITS: 5000 INJECTION INTRAVENOUS; SUBCUTANEOUS at 12:31

## 2022-08-03 RX ADMIN — SODIUM CHLORIDE, POTASSIUM CHLORIDE, SODIUM LACTATE AND CALCIUM CHLORIDE: 600; 310; 30; 20 INJECTION, SOLUTION INTRAVENOUS at 10:05

## 2022-08-03 RX ADMIN — AMIODARONE HYDROCHLORIDE 0.5 MG/MIN: 1.8 INJECTION, SOLUTION INTRAVENOUS at 17:30

## 2022-08-03 RX ADMIN — PROPOFOL 100 MG: 10 INJECTION, EMULSION INTRAVENOUS at 10:18

## 2022-08-03 NOTE — PLAN OF CARE
Problem: Adult Inpatient Plan of Care  Goal: Plan of Care Review  Outcome: Ongoing, Progressing   Goal Outcome Evaluation:  Pt continues to be in Afib RVR. Cardiology called multiple times through the night regarding this. Pt asymptomatic. Attempted iv digoxin x 2 doses, iv mag, iv metoprolol, 500ml NS bolus and now on iv amioderone gtt, pt's rhythm continues to be afib and rate between 120-150s. Blood pressure had been running low, but now a bit better. No complaints of pain. Pt rested some throughout the night with no other issues or complaints.

## 2022-08-03 NOTE — NURSING NOTE
Pt still sustaining 130-150's afib after ordered dose of iv digoxin and [facility] paged for additional orders.

## 2022-08-03 NOTE — PROGRESS NOTES
[facility] Clinical Pharmacy Services: Warfarin Dosing/Monitoring Consult    Elliot Sebastian is a 62 y.o. male, estimated creatinine clearance is 120.7 mL/min (by C-G formula based on SCr of 1 mg/dL). weighing (!) 137 kg (301 lb).    Results from last 7 days   Lab Units 08/03/22  1210 08/03/22  0548 08/02/22  0142 08/01/22  2209 07/28/22  0806   INR   --  1.85* 1.93* 1.79* 2.2*   APTT seconds 34.5  --   --   --   --    HEMOGLOBIN g/dL  --  13.2  --  15.6  --    HEMATOCRIT %  --  36.5*  --  44.7  --    PLATELETS 10*3/mm3  --  90*  --  131*  --      Prior to admission anticoagulation: 2.5mg M/F and 5mg all other days    Hospital Anticoagulation:  Consulting provider: Nataly NICOLE  Start date: 8/2  Indication: A Fib - requiring full anticoagulation/mechanical valve  Target INR: 2.5 - 3.5  Expected duration: indefinite   Bridge Therapy: Yes  with unfractionated heparin    Potential food or drug interactions: amiodarone    Education complete?/Date: No; plan for follow up TBD    Assessment/Plan:  Dose: 5mg today  Monitor for any signs or symptoms of bleeding  Follow up daily INRs and dose adjustments    Pharmacy will continue to follow until discharge or discontinuation of warfarin.     Brodie Abreu PharmD  Clinical Pharmacist

## 2022-08-03 NOTE — ANESTHESIA PREPROCEDURE EVALUATION
Anesthesia Evaluation     Patient summary reviewed and Nursing notes reviewed   NPO Solid Status: > 8 hours  NPO Liquid Status: > 2 hours           Airway   Mallampati: II  Dental    (+) poor dentition    Pulmonary - normal exam   (+) pleural effusion,   Cardiovascular     (+) valvular problems/murmurs MR, dysrhythmias, CHF , murmur, DVT,       Neuro/Psych  (+) psychiatric history,    GI/Hepatic/Renal/Endo    (+) obesity,   renal disease, thyroid problem hypothyroidism    Musculoskeletal     Abdominal    Substance History      OB/GYN          Other                        Anesthesia Plan    ASA 4     MAC       Anesthetic plan, risks, benefits, and alternatives have been provided, discussed and informed consent has been obtained with: patient.    Plan discussed with CRNA.        CODE STATUS:    Code Status (Patient has no pulse and is not breathing): CPR (Attempt to Resuscitate)  Medical Interventions (Patient has pulse or is breathing): Full Support

## 2022-08-03 NOTE — PLAN OF CARE
Pt. Tachycardic throughout the day, improving.  Pt. Slightly hypotensive, much improved this evening.  VS otherwise wnl.  Pt. C/o back pain, relieved by Tylenol.  Pt. A&O x4.  Pt. Had MOE this a.m.  See results.  Pt. On amio drip and Heparin drip.  Pt. Receiving IVFs and IV abx.  Pt. States he feels much better this evening.  Pt. With several loose stools, Questran started BID.  Pt. With adequate UOP.  Pt. Resting comfortably at present, will continue to monitor closely.      Problem: Adult Inpatient Plan of Care  Goal: Plan of Care Review  Outcome: Ongoing, Progressing  Flowsheets (Taken 8/3/2022 1950)  Progress: no change  Plan of Care Reviewed With: patient

## 2022-08-03 NOTE — PROGRESS NOTES
LOS: 2 days   Patient Care Team:  Mannie Beltran DO as PCP - General (Family Medicine)  Sary Tejeda [facility] as Pharmacist    Chief Complaint:     F/u fever, sepsis    Interval History:     He went to [facility] with RVR last night.  When I walked in his amiodarone drip bag was on the floor so he actually was not getting any medication and his heart rate was high again.  His blood pressure is actually stable and better 114/80.  He looks better right now but he says he has moments where he feels good and then he feels poorly.  His right leg is looking better to the cellulitis.  He has no nausea but still has lower back pain mostly on the left.  He has had no vomiting or diarrhea.  He denies headaches.  He denies chest pain or difficulty breathing.      Echo 8/2/22  Interpretation Summary    · Left ventricular wall thickness is consistent with mild concentric hypertrophy.  · Estimated left ventricular EF = 60% Left ventricular systolic function is normal.  · Left ventricular diastolic function was indeterminate.  · There is a mechanical mitral valve prosthesis present. The mitral valve peak and mean gradients are within defined limits.        Objective   Vital Signs  Temp:  [98.2 °F (36.8 °C)-99.8 °F (37.7 °C)] 98.4 °F (36.9 °C)  Heart Rate:  [] 143  Resp:  [16-20] 16  BP: ()/(60-91) 107/91    Intake/Output Summary (Last 24 hours) at 8/3/2022 0742  Last data filed at 8/3/2022 0511  Gross per 24 hour   Intake 1950 ml   Output 250 ml   Net 1700 ml       Comfortable NAD  Neck supple, no JVD or thyromegaly appreciated  S1/S2 irregular with tachy, no m/r/g  Lungs CTA B, normal effort  Abdomen S/NT/ND (+) BS, no HSM appreciated  Extremities warm, no clubbing, cyanosis, or edema  No visible or palpable skin lesions  A/Ox4, mood and affect appropriate    Results Review:      Results from last 7 days   Lab Units 08/03/22  0548 08/02/22  1019 08/01/22  2209   SODIUM mmol/L 131*  --  132*   POTASSIUM mmol/L 3.8  --   4.2   CHLORIDE mmol/L 102  --  98   CO2 mmol/L 20.0*  --  21.8*   BUN mg/dL 13  --  18   CREATININE mg/dL 1.00 0.88 1.29*   GLUCOSE mg/dL 103*  --  109*   CALCIUM mg/dL 8.4*  --  9.7     Results from last 7 days   Lab Units 08/01/22  2209   TROPONIN T ng/mL <0.010     Results from last 7 days   Lab Units 08/03/22  0548 08/01/22  2209   WBC 10*3/mm3 3.67 8.66   HEMOGLOBIN g/dL 13.2 15.6   HEMATOCRIT % 36.5* 44.7   PLATELETS 10*3/mm3 90* 131*     Results from last 7 days   Lab Units 08/03/22  0548 08/02/22  0142 08/01/22 2209   INR  1.85* 1.93* 1.79*                   I reviewed the patient's new clinical results.  I personally viewed and interpreted the patient's EKG/Telemetry data        Medication Review:   amiodarone, 150 mg, Intravenous, Once  atenolol, 25 mg, Oral, Daily  sodium chloride, 10 mL, Intravenous, Q12H  vancomycin, 1,000 mg, Intravenous, Q12H  warfarin (COUMADIN) (dosing per levels), , Does not apply, Daily        amiodarone, 1 mg/min, Last Rate: 1 mg/min (08/03/22 0251)   Followed by  amiodarone, 0.5 mg/min  Pharmacy to dose vancomycin,   Pharmacy to dose warfarin,   sodium chloride, 100 mL/hr, Last Rate: 100 mL/hr (08/02/22 1441)        Assessment & Plan       Fever    Paroxysmal atrial fibrillation (HCC)    Bacterial endocarditis - history of    Hx of mitral valve replacement with mechanical valve [Z95.2]    Cellulitis    Gram positive septicemia (HCC)    Chronic anticoagulation    ASHIA (acute kidney injury) (HCC)      1. Gram-positive septicemia -Streptococcus, may be related to cellulitis, versus endocarditis versus somehow related to the back pain he is having  2. Mechanical mitral valve, on warfarin -INR goal 2.5-3.5. pharmacy to adjust warfarin.   3. Cellulitis RLE.   Improving  4. Back pain.   5. A fib with rvr.  This is new, on amiodarone drip, will increase it back to 1 mg/min.  6. Thrombocytopenia.       Keep npo for MOE with anesthesia today.  Benefits and risks of the MOE were discussed  with the patient including risk of damage to the esophagus.  He was mostly concerned about discomfort with a MOE but did explain to him that anesthesia will make sure that he is very comfortable.  Given the hypotension and tachycardia that he has had, I do not feel comfortable doing this without anesthesia.    Increase amiodarone drip to 1 mg/min to better control atrial fibrillation.    Eveline Chanel MD  08/03/22  07:42 EDT

## 2022-08-03 NOTE — PROGRESS NOTES
ID note for sepsis    Subjective: Now in A. fib with RVR.  On amnio drip.  Discussed with nursing.  Patient also complains of frequent urination and splotchy lesions on his lower extremity along with ongoing lower back pain    Objective:   Temp:  [98.2 °F (36.8 °C)-99.8 °F (37.7 °C)] 99.4 °F (37.4 °C)  Heart Rate:  [] 139  Resp:  [16-20] 16  BP: ()/(60-91) 114/74  GENERAL: Awake and alert, ill  HEENT: Oropharynx is clear. Hearing is grossly normal.   EYES: . No conjunctival injection. No lid lag.   HEART: Tachycardic and irregular 1-2+ peripheral edema.   LUNGS: Clear to auscultation anteriorly with normal respiratory effort.   GI: Soft, nontender, nondistended. No appreciable organomegaly.   SKIN: Warm and dry with minimal erythema of the right lower extremity with erythematous patches  PSYCHIATRIC: Appropriate mood, affect, insight, and judgment.     White count 3.67, hemoglobin 13, platelets 90  Creatinine 1  TTE, no clear evidence of prosthetic valve endocarditis  8/1 blood cultures 2/2 strep species  8/3 blood cultures 2/2 pending    Assessment and plan  1.  Streptococcal septicemia   2.  Artificial mitral valve  3.  A. fib with RVR  4.  Thrombocytopenia  5.  Lower back pain, rule out discitis    Still with ongoing sepsis-like parameters.  We will go ahead and stop the vancomycin in favor of Rocephin which probably has better streptococcal activity  His surface echocardiogram was negative but given ongoing sepsis and artificial mitral valve, probably would warrant MOE.  Cardiology is following along for the A. fib and we will see what they say about potentially performing MOE  I am going to order an MRI of the lumbar spine to evaluate his back pain to make sure he does not have a discitis or other infectious source of his back pain.

## 2022-08-03 NOTE — PROGRESS NOTES
Name: Elliot Sebastian ADMIT: 2022   : 1960  PCP: Mannie Beltran DO    MRN: [id number] LOS: 2 days   AGE/SEX: 62 y.o. male  ROOM: [facility]     Subjective   Subjective   Patient is lying on the bed and appears weak and lethargic.  Denies nausea, vomiting, abdominal pain, chest pain.       Objective   Objective   Vital Signs  Temp:  [97.6 °F (36.4 °C)-99.4 °F (37.4 °C)] 97.6 °F (36.4 °C)  Heart Rate:  [] 106  Resp:  [16-20] 16  BP: ()/(60-91) 115/85  SpO2:  [91 %-99 %] 95 %  on  Flow (L/min):  [2-4] 2;   Device (Oxygen Therapy): nasal cannula  Body mass index is 34.78 kg/m².  Physical Exam  Constitutional:       General: He is not in acute distress.  HENT:      Head: Normocephalic and atraumatic.      Mouth/Throat:      Mouth: Mucous membranes are moist.   Eyes:      Extraocular Movements: Extraocular movements intact.      Conjunctiva/sclera: Conjunctivae normal.      Pupils: Pupils are equal, round, and reactive to light.   Cardiovascular:      Rate and Rhythm: Normal rate and regular rhythm.      Pulses: Normal pulses.      Heart sounds: Normal heart sounds.   Pulmonary:      Effort: Pulmonary effort is normal.      Breath sounds: Normal breath sounds.   Abdominal:      General: Bowel sounds are normal. There is no distension.      Palpations: Abdomen is soft.   Musculoskeletal:      Cervical back: Normal range of motion and neck supple.      Right lower leg: Edema present.      Comments: Erythema noted   Skin:     General: Skin is warm and dry.   Neurological:      General: No focal deficit present.      Mental Status: He is alert and oriented to person, place, and time.         Results Review     I reviewed the patient's new clinical results.  Results from last 7 days   Lab Units 22  0548 22  2209   WBC 10*3/mm3 3.67 8.66   HEMOGLOBIN g/dL 13.2 15.6   PLATELETS 10*3/mm3 90* 131*     Results from last 7 days   Lab Units 22  0548 22  1019 22  2209   SODIUM  mmol/L 131*  --  132*   POTASSIUM mmol/L 3.8  --  4.2   CHLORIDE mmol/L 102  --  98   CO2 mmol/L 20.0*  --  21.8*   BUN mg/dL 13  --  18   CREATININE mg/dL 1.00 0.88 1.29*   GLUCOSE mg/dL 103*  --  109*   EGFR mL/min/1.73 85.6 97.8 63.1     Results from last 7 days   Lab Units 08/03/22  0548 08/01/22  2209   ALBUMIN g/dL 2.90* 4.00   BILIRUBIN mg/dL 1.0 1.7*   ALK PHOS U/L 53 67   AST (SGOT) U/L 47* 44*   ALT (SGPT) U/L 29 35     Results from last 7 days   Lab Units 08/03/22  0548 08/01/22  2209   CALCIUM mg/dL 8.4* 9.7   ALBUMIN g/dL 2.90* 4.00   MAGNESIUM mg/dL 1.8  --      Results from last 7 days   Lab Units 08/02/22  0454 08/02/22  0142 08/01/22  2209   LACTATE mmol/L 2.0 4.7* 3.0*     No results found for: HGBA1C, POCGLU    No radiology results for the last day  Scheduled Medications  amiodarone, 150 mg, Intravenous, Once  cefTRIAXone, 2 g, Intravenous, Q24H  cholestyramine, 1 packet, Oral, Q12H  famotidine, 20 mg, Intravenous, Once  sodium chloride, 10 mL, Intravenous, Q12H  warfarin (COUMADIN) (dosing per levels), , Does not apply, Daily  warfarin, 5 mg, Oral, Once    Infusions  amiodarone, 0.5 mg/min, Last Rate: 0.5 mg/min (08/03/22 1616)  heparin, 7.3 Units/kg/hr, Last Rate: 7.3 Units/kg/hr (08/03/22 1231)  Pharmacy to dose warfarin,   sodium chloride, 100 mL/hr, Last Rate: 100 mL/hr (08/03/22 1011)    Diet  Diet Regular; Consistent Carbohydrate       Assessment/Plan     Active Hospital Problems    Diagnosis  POA   • **Fever [R50.9]  Yes   • Gram positive septicemia (HCC) [A41.89]  Yes   • Chronic anticoagulation [Z79.01]  Not Applicable   • ASHIA (acute kidney injury) (HCC) [N17.9]  Yes   • Hx of mitral valve replacement with mechanical valve [Z95.2] [Z95.2]  Not Applicable   • Paroxysmal atrial fibrillation (HCC) [I48.0]  Yes   • Bacterial endocarditis - history of [I33.0]  Yes   • Cellulitis [L03.90]  Yes      Resolved Hospital Problems   No resolved problems to display.       #1 Gram-positive  septicemia/endocarditis/cellulitis, currently patient is on IV Rocephinand it will be continued.  Underwent MOE earlier today with evidence of vegetation/endocarditis on the mechanical valve.  Appreciate infectious disease input.  2.  A. fib with RVR, currently anticoagulated with warfarin and given that INR is not therapeutic will be bridged with heparin and is on amiodarone drip and cardiology is following along.  3.  Cellulitis right leg, as mentioned above.  4.  CODE STATUS is full code.      Raúl Pendleton MD  [facility]  08/03/22  17:22 EDT

## 2022-08-03 NOTE — ANESTHESIA POSTPROCEDURE EVALUATION
Patient: Elliot Sebastian    Procedure Summary     Date: 08/03/22 Room / Location: [facility] PACU    Anesthesia Start: 1011 Anesthesia Stop: 1041    Procedure: ADULT TRANSESOPHAGEAL ECHO 3D (MOE) W/ CONT IF NECESSARY PER PROTOCOL Diagnosis: (Valvular Function)    Scheduled Providers:  Provider: Liza Mendiola MD    Anesthesia Type: MAC ASA Status: 4          Anesthesia Type: No value filed.    Vitals  Vitals Value Taken Time   BP 96/72 08/03/22 1115   Temp     Pulse 99 08/03/22 1116   Resp 18 08/03/22 1110   SpO2 97 % 08/03/22 1116   Vitals shown include unvalidated device data.        Post Anesthesia Care and Evaluation      Comments: Pt. Discharged prior to being evaluated by anesthesia.  Chart is reviewed and no complications are noted.  THIS CASE IS NOT MEDICALLY DIRECTED

## 2022-08-04 ENCOUNTER — TELEPHONE (OUTPATIENT)
Dept: FAMILY MEDICINE CLINIC | Facility: CLINIC | Age: 62
End: 2022-08-04

## 2022-08-04 LAB
ANION GAP SERPL CALCULATED.3IONS-SCNC: 8.7 MMOL/L (ref 5–15)
APTT PPP: 47.3 SECONDS (ref 22.7–35.4)
APTT PPP: 56.8 SECONDS (ref 22.7–35.4)
APTT PPP: 63.5 SECONDS (ref 22.7–35.4)
BACTERIA SPEC AEROBE CULT: ABNORMAL
BACTERIA SPEC AEROBE CULT: ABNORMAL
BASOPHILS # BLD AUTO: 0.02 10*3/MM3 (ref 0–0.2)
BASOPHILS NFR BLD AUTO: 0.5 % (ref 0–1.5)
BUN SERPL-MCNC: 11 MG/DL (ref 8–23)
BUN/CREAT SERPL: 12.4 (ref 7–25)
CALCIUM SPEC-SCNC: 8.6 MG/DL (ref 8.6–10.5)
CHLORIDE SERPL-SCNC: 108 MMOL/L (ref 98–107)
CO2 SERPL-SCNC: 18.3 MMOL/L (ref 22–29)
CREAT SERPL-MCNC: 0.89 MG/DL (ref 0.76–1.27)
DEPRECATED RDW RBC AUTO: 48.7 FL (ref 37–54)
EGFRCR SERPLBLD CKD-EPI 2021: 97.5 ML/MIN/1.73
EOSINOPHIL # BLD AUTO: 0.04 10*3/MM3 (ref 0–0.4)
EOSINOPHIL NFR BLD AUTO: 0.9 % (ref 0.3–6.2)
ERYTHROCYTE [DISTWIDTH] IN BLOOD BY AUTOMATED COUNT: 13.6 % (ref 12.3–15.4)
GLUCOSE SERPL-MCNC: 97 MG/DL (ref 65–99)
GRAM STN SPEC: ABNORMAL
HCT VFR BLD AUTO: 36.1 % (ref 37.5–51)
HGB BLD-MCNC: 12.2 G/DL (ref 13–17.7)
INR PPP: 1.96 (ref 0.9–1.1)
ISOLATED FROM: ABNORMAL
ISOLATED FROM: ABNORMAL
LYMPHOCYTES # BLD AUTO: 0.8 10*3/MM3 (ref 0.7–3.1)
LYMPHOCYTES NFR BLD AUTO: 18.2 % (ref 19.6–45.3)
MCH RBC QN AUTO: 32.6 PG (ref 26.6–33)
MCHC RBC AUTO-ENTMCNC: 33.8 G/DL (ref 31.5–35.7)
MCV RBC AUTO: 96.5 FL (ref 79–97)
MONOCYTES # BLD AUTO: 0.5 10*3/MM3 (ref 0.1–0.9)
MONOCYTES NFR BLD AUTO: 11.4 % (ref 5–12)
NEUTROPHILS NFR BLD AUTO: 3.01 10*3/MM3 (ref 1.7–7)
NEUTROPHILS NFR BLD AUTO: 68.5 % (ref 42.7–76)
PLATELET # BLD AUTO: 95 10*3/MM3 (ref 140–450)
PMV BLD AUTO: 10.3 FL (ref 6–12)
POTASSIUM SERPL-SCNC: 3.6 MMOL/L (ref 3.5–5.2)
PROTHROMBIN TIME: 21.9 SECONDS (ref 11.7–14.2)
QT INTERVAL: 339 MS
QT INTERVAL: 392 MS
RBC # BLD AUTO: 3.74 10*6/MM3 (ref 4.14–5.8)
SODIUM SERPL-SCNC: 135 MMOL/L (ref 136–145)
WBC NRBC COR # BLD: 4.39 10*3/MM3 (ref 3.4–10.8)

## 2022-08-04 PROCEDURE — 25010000002 CEFTRIAXONE PER 250 MG: Performed by: INTERNAL MEDICINE

## 2022-08-04 PROCEDURE — 25010000002 HEPARIN (PORCINE) 25000-0.45 UT/250ML-% SOLUTION: Performed by: INTERNAL MEDICINE

## 2022-08-04 PROCEDURE — 80048 BASIC METABOLIC PNL TOTAL CA: CPT | Performed by: NURSE PRACTITIONER

## 2022-08-04 PROCEDURE — 85730 THROMBOPLASTIN TIME PARTIAL: CPT | Performed by: INTERNAL MEDICINE

## 2022-08-04 PROCEDURE — 99233 SBSQ HOSP IP/OBS HIGH 50: CPT | Performed by: INTERNAL MEDICINE

## 2022-08-04 PROCEDURE — 93010 ELECTROCARDIOGRAM REPORT: CPT | Performed by: INTERNAL MEDICINE

## 2022-08-04 PROCEDURE — 99232 SBSQ HOSP IP/OBS MODERATE 35: CPT | Performed by: INTERNAL MEDICINE

## 2022-08-04 PROCEDURE — 85610 PROTHROMBIN TIME: CPT | Performed by: NURSE PRACTITIONER

## 2022-08-04 PROCEDURE — 36415 COLL VENOUS BLD VENIPUNCTURE: CPT | Performed by: INTERNAL MEDICINE

## 2022-08-04 PROCEDURE — 25010000002 HEPARIN (PORCINE) PER 1000 UNITS: Performed by: INTERNAL MEDICINE

## 2022-08-04 PROCEDURE — 93005 ELECTROCARDIOGRAM TRACING: CPT | Performed by: INTERNAL MEDICINE

## 2022-08-04 PROCEDURE — 25010000002 AMIODARONE IN DEXTROSE 5% 360-4.14 MG/200ML-% SOLUTION: Performed by: INTERNAL MEDICINE

## 2022-08-04 PROCEDURE — 93005 ELECTROCARDIOGRAM TRACING: CPT | Performed by: NURSE PRACTITIONER

## 2022-08-04 PROCEDURE — 85025 COMPLETE CBC W/AUTO DIFF WBC: CPT | Performed by: INTERNAL MEDICINE

## 2022-08-04 RX ORDER — HEPARIN SODIUM 10000 [USP'U]/100ML
7.3 INJECTION, SOLUTION INTRAVENOUS
Status: DISCONTINUED | OUTPATIENT
Start: 2022-08-04 | End: 2022-08-07

## 2022-08-04 RX ORDER — AMIODARONE HYDROCHLORIDE 200 MG/1
200 TABLET ORAL EVERY 12 HOURS SCHEDULED
Status: DISCONTINUED | OUTPATIENT
Start: 2022-08-04 | End: 2022-08-07 | Stop reason: HOSPADM

## 2022-08-04 RX ORDER — WARFARIN SODIUM 5 MG/1
5 TABLET ORAL
Status: COMPLETED | OUTPATIENT
Start: 2022-08-04 | End: 2022-08-04

## 2022-08-04 RX ORDER — HYDROCODONE BITARTRATE AND ACETAMINOPHEN 5; 325 MG/1; MG/1
1 TABLET ORAL EVERY 6 HOURS PRN
Status: DISCONTINUED | OUTPATIENT
Start: 2022-08-04 | End: 2022-08-07 | Stop reason: HOSPADM

## 2022-08-04 RX ADMIN — CHOLESTYRAMINE 1 PACKET: 4 POWDER, FOR SUSPENSION ORAL at 09:08

## 2022-08-04 RX ADMIN — Medication 10 ML: at 22:06

## 2022-08-04 RX ADMIN — Medication 10 ML: at 01:44

## 2022-08-04 RX ADMIN — Medication 10 ML: at 09:10

## 2022-08-04 RX ADMIN — HEPARIN SODIUM 4000 UNITS: 5000 INJECTION INTRAVENOUS; SUBCUTANEOUS at 09:57

## 2022-08-04 RX ADMIN — WARFARIN SODIUM 5 MG: 5 TABLET ORAL at 17:18

## 2022-08-04 RX ADMIN — HEPARIN SODIUM 4000 UNITS: 5000 INJECTION INTRAVENOUS; SUBCUTANEOUS at 16:57

## 2022-08-04 RX ADMIN — HEPARIN SODIUM 10.3 UNITS/KG/HR: 10000 INJECTION, SOLUTION INTRAVENOUS at 01:42

## 2022-08-04 RX ADMIN — HYDROCODONE BITARTRATE AND ACETAMINOPHEN 1 TABLET: 5; 325 TABLET ORAL at 16:57

## 2022-08-04 RX ADMIN — HEPARIN SODIUM 11.3 UNITS/KG/HR: 10000 INJECTION, SOLUTION INTRAVENOUS at 09:57

## 2022-08-04 RX ADMIN — HYDROCODONE BITARTRATE AND ACETAMINOPHEN 1 TABLET: 5; 325 TABLET ORAL at 23:35

## 2022-08-04 RX ADMIN — AMIODARONE HYDROCHLORIDE 200 MG: 200 TABLET ORAL at 12:27

## 2022-08-04 RX ADMIN — HEPARIN SODIUM 11.3 UNITS/KG/HR: 10000 INJECTION, SOLUTION INTRAVENOUS at 11:30

## 2022-08-04 RX ADMIN — HEPARIN SODIUM 4000 UNITS: 5000 INJECTION INTRAVENOUS; SUBCUTANEOUS at 01:40

## 2022-08-04 RX ADMIN — AMIODARONE HYDROCHLORIDE 0.5 MG/MIN: 1.8 INJECTION, SOLUTION INTRAVENOUS at 03:05

## 2022-08-04 RX ADMIN — CEFTRIAXONE 2 G: 2 INJECTION, POWDER, FOR SOLUTION INTRAMUSCULAR; INTRAVENOUS at 09:08

## 2022-08-04 RX ADMIN — HEPARIN SODIUM 12.3 UNITS/KG/HR: 10000 INJECTION, SOLUTION INTRAVENOUS at 16:56

## 2022-08-04 RX ADMIN — AMIODARONE HYDROCHLORIDE 200 MG: 200 TABLET ORAL at 22:05

## 2022-08-04 NOTE — PROGRESS NOTES
ID note for sepsis    Subjective: Complains of intermittent lower back pain, 8 out of 10 in intensity, worse with weightbearing.  He is up-to-date on his colonoscopies    ROS: No fever, chills, night sweats, no rash, diarrhea    Objective:   Temp:  [97.6 °F (36.4 °C)-99.5 °F (37.5 °C)] 99.5 °F (37.5 °C)  Heart Rate:  [] 132  Resp:  [16-20] 20  BP: ()/(62-98) 128/88  GENERAL: Awake and alert, ill  HEENT: Oropharynx is clear. Hearing is grossly normal.   SKIN: Warm and dry with minimal erythema of the right lower extremity with erythematous patches  PSYCHIATRIC: Appropriate mood, affect, insight, and judgment.     White count 4.39, platelet 95, hemoglobin 12, pro time 1.96      8/1 blood cultures 2/2 strep dysgalactiae ssp equisimilis   8/3 blood cultures 2/2 pending    Assessment and plan  1.  Streptococcal septicemia   2.  Prosthetic mitral valve endocarditis  3.  A. fib with RVR  4.  Thrombocytopenia  5.  Lower back pain, rule out discitis    Continue Rocephin 2 g IV every 24 hours  Discussed with Dr. Chanel yesterday and MOE revealed a small mitral valve vegetation without valve compromise.  Therefore not felt to need any type of surgical correction.  Plan medical therapy with 6 weeks of IV antibiotics.  MOE at the end of antibiotics.  Awaiting MRI to assess for infectious cause of lower back pain.  Discussed with Dr. Mcbride.  Discussed with nursing.  PICC line likely tomorrow

## 2022-08-04 NOTE — CASE MANAGEMENT/SOCIAL WORK
Continued Stay Note  [facility]     Patient Name: Elliot Sebastian  MRN: [id number]  Today's Date: 8/4/2022    Admit Date: 8/1/2022     Discharge Plan     Row Name 08/04/22 1238       Plan    Plan Home with  and [facility] for IV abx.    Plan Comments ID note states pt needs IV abx.  CCP spoke w/ pt who states he has done that before.  He and his wife will be able to administer the IV at home and he is in agreement with .  He requests referrals to [facility] and [facility].  Referrals called.  Evals pending. ...........Marian TAYLOR/ CCP               Discharge Codes    No documentation.               Expected Discharge Date and Time     Expected Discharge Date Expected Discharge Time    Aug 8, 2022             Marian Ricketts RN

## 2022-08-04 NOTE — DISCHARGE PLACEMENT REQUEST
"Elliot Singh (62 y.o. Male)             Date of Birth   1960    Social Security Number       Address   [address] [place], [place] [address]    Home Phone   [phone]    MRN   [id number]       Sabianist   Yazidism    Marital Status                               Admission Date   8/1/22    Admission Type   Emergency    Admitting Provider   Raúl Pendleton MD    Attending Provider   Bobby Mcbride MD    Department, Room/Bed   [address], S[address]/1       Discharge Date       Discharge Disposition       Discharge Destination                               Attending Provider: Bobby Mcbride MD    Allergies: No Known Allergies    Isolation: None   Infection: None   Code Status: CPR   Advance Care Planning Activity    Ht: 198.1 cm (78\")   Wt: 137 kg (301 lb)    Admission Cmt: None   Principal Problem: Fever [R50.9]                 Active Insurance as of 8/1/2022     Primary Coverage     Payor Plan Insurance Group Employer/Plan Group    [employer] BLUE CROSS [employer] BLUE CROSS BLUE SHIELD PPO [id number]     Payor Plan Address Payor Plan Phone Number Payor Plan Fax Number Effective Dates    [address] [phone]  7/1/2022 - None Entered    [facility] [id number]       Subscriber Name Subscriber Birth Date Member ID       ELLIOT SINGH 1960 [id number]                 Emergency Contacts      (Rel.) Home Phone Work Phone Mobile Phone    Nola Cantu (Spouse) [phone] -- [phone]              "

## 2022-08-04 NOTE — PAYOR COMM NOTE
"Elliot Singh (62 y.o. Male)     PLEASE SEE ATTACHED FOR INPT CONTINUED STAY REVIEW.     REF#[id number]    PLEASE CALL  OR  [phone]    THANK YOU    MELITON JAVIER LPN  CCP                          Date of Birth   1960    Social Security Number       Address   [address] [place] [address]  [facility] [address]    Home Phone   [phone]    MRN   [id number]       Voodoo   Yarsanism    Marital Status                               Admission Date   8/1/22    Admission Type   Emergency    Admitting Provider   Raúl Pendleton MD    Attending Provider   Bobby Mcbride MD    Department, Room/Bed   [address], S[address]/1       Discharge Date       Discharge Disposition       Discharge Destination                               Attending Provider: Bobby Mcbride MD    Allergies: No Known Allergies    Isolation: None   Infection: None   Code Status: CPR   Advance Care Planning Activity    Ht: 198.1 cm (78\")   Wt: 137 kg (301 lb)    Admission Cmt: None   Principal Problem: Fever [R50.9]                 Active Insurance as of 8/1/2022     Primary Coverage     Payor Plan Insurance Group Employer/Plan Group    [employer] PPO [id number]     Payor Plan Address Payor Plan Phone Number Payor Plan Fax Number Effective Dates    [address] [phone]  7/1/2022 - None Entered    [facility] [id number]       Subscriber Name Subscriber Birth Date Member ID       ELLIOT SINGH 1960 [id number]                 Emergency Contacts      (Rel.) Home Phone Work Phone Mobile Phone    Nola Cantu (Spouse) [phone] -- [phone]              Oxygen Therapy (since admission)     Date/Time SpO2 Device (Oxygen Therapy) Flow (L/min) Oxygen Concentration (%) ETCO2 (mmHg)    08/04/22 1000 94 -- -- -- --    08/04/22 0930 95 -- -- -- --    08/04/22 0908 -- room air -- -- --    08/04/22 0900 94 -- -- -- --    08/04/22 " 0800 94 -- -- -- --    08/04/22 0700 95 room air -- -- --    08/03/22 2347 95 -- -- -- --    08/03/22 2015 -- room air -- -- --    08/03/22 1846 96 nasal cannula -- -- --    08/03/22 1345 -- nasal cannula 2 -- --    08/03/22 1148 95 nasal cannula 2 -- --    08/03/22 1120 98 nasal cannula 2 -- --    08/03/22 1115 98 -- 2 -- --    08/03/22 1110 98 -- -- -- --    08/03/22 1105 99 -- 2 -- --    08/03/22 1100 99 -- 2 -- --    08/03/22 1055 97 -- 2 -- --    08/03/22 1050 97 -- 4 -- --    08/03/22 0950 96 nasal cannula 4 -- --    08/03/22 0748 96 nasal cannula 2 -- --    08/03/22 0440 94 room air -- -- --    08/02/22 2342 96 room air -- -- --    08/02/22 2010 -- room air -- -- --    08/02/22 1923 95 -- -- -- --    08/02/22 1755 91 room air -- -- --    08/02/22 0819 -- room air -- -- --    08/02/22 0715 -- room air -- -- --    08/02/22 0506 97 room air -- -- --    08/02/22 0120 -- room air -- -- --    08/02/22 0048 98 -- -- -- --    08/02/22 0015 95 -- -- -- --    08/01/22 2331 97 -- -- -- --    08/01/22 2240 96 -- -- -- --    08/01/22 1901 99 -- -- -- --        Intake & Output (last day)       08/03 0701 08/04 0700 08/04 0701 08/05 0700    P.O.      I.V. (mL/kg) 350 (2.6)     IV Piggyback 100     Total Intake(mL/kg) 450 (3.3)     Urine (mL/kg/hr) 225 (0.1)     Total Output 225     Net +225           Urine Unmeasured Occurrence 2 x 2 x    Stool Unmeasured Occurrence 3 x 1 x        Lines, Drains & Airways     Active LDAs     Name Placement date Placement time Site Days    Peripheral IV 08/03/22 0106 Distal;Left;Posterior Forearm 08/03/22  0106  Forearm  1    Peripheral IV 08/03/22 0950 Right Forearm 08/03/22  0950  Forearm  1                Operative/Procedure Notes (last 24 hours)  Notes from 08/03/22 1040 through 08/04/22 1040   No notes of this type exist for this encounter.            Physician Progress Notes (last 24 hours)      Dillon Vega MD at 08/04/22 1007        ID note for sepsis    Subjective:  Complains of intermittent lower back pain, 8 out of 10 in intensity, worse with weightbearing.  He is up-to-date on his colonoscopies    ROS: No fever, chills, night sweats, no rash, diarrhea    Objective:   Temp:  [97.6 °F (36.4 °C)-99.5 °F (37.5 °C)] 99.5 °F (37.5 °C)  Heart Rate:  [] 132  Resp:  [16-20] 20  BP: ()/(62-98) 128/88  GENERAL: Awake and alert, ill  HEENT: Oropharynx is clear. Hearing is grossly normal.   SKIN: Warm and dry with minimal erythema of the right lower extremity with erythematous patches  PSYCHIATRIC: Appropriate mood, affect, insight, and judgment.     White count 4.39, platelet 95, hemoglobin 12, pro time 1.96       blood cultures 2/2 strep dysgalactiae ssp equisimilis   8/3 blood cultures 2/2 pending    Assessment and plan  1.  Streptococcal septicemia   2.  Prosthetic mitral valve endocarditis  3.  A. fib with RVR  4.  Thrombocytopenia  5.  Lower back pain, rule out discitis    Continue Rocephin 2 g IV every 24 hours  Discussed with Dr. Chanel yesterday and MOE revealed a small mitral valve vegetation without valve compromise.  Therefore not felt to need any type of surgical correction.  Plan medical therapy with 6 weeks of IV antibiotics.  MOE at the end of antibiotics.  Awaiting MRI to assess for infectious cause of lower back pain.  Discussed with Dr. Mcbride.  Discussed with nursing.  PICC line likely tomorrow    Electronically signed by Dillon Vega MD at 22 1010     Raúl Pendleton MD at 22 1722              Name: Elliot Sebastian ADMIT: 2022   : 1960  PCP: Mannie Beltran DO    MRN: [id number] LOS: 2 days   AGE/SEX: 62 y.o. male  ROOM: [facility]     Subjective   Subjective   Patient is lying on the bed and appears weak and lethargic.  Denies nausea, vomiting, abdominal pain, chest pain.      Objective   Objective   Vital Signs  Temp:  [97.6 °F (36.4 °C)-99.4 °F (37.4 °C)] 97.6 °F (36.4 °C)  Heart Rate:  []  106  Resp:  [16-20] 16  BP: ()/(60-91) 115/85  SpO2:  [91 %-99 %] 95 %  on  Flow (L/min):  [2-4] 2;   Device (Oxygen Therapy): nasal cannula  Body mass index is 34.78 kg/m².  Physical Exam  Constitutional:       General: He is not in acute distress.  HENT:      Head: Normocephalic and atraumatic.      Mouth/Throat:      Mouth: Mucous membranes are moist.   Eyes:      Extraocular Movements: Extraocular movements intact.      Conjunctiva/sclera: Conjunctivae normal.      Pupils: Pupils are equal, round, and reactive to light.   Cardiovascular:      Rate and Rhythm: Normal rate and regular rhythm.      Pulses: Normal pulses.      Heart sounds: Normal heart sounds.   Pulmonary:      Effort: Pulmonary effort is normal.      Breath sounds: Normal breath sounds.   Abdominal:      General: Bowel sounds are normal. There is no distension.      Palpations: Abdomen is soft.   Musculoskeletal:      Cervical back: Normal range of motion and neck supple.      Right lower leg: Edema present.      Comments: Erythema noted   Skin:     General: Skin is warm and dry.   Neurological:      General: No focal deficit present.      Mental Status: He is alert and oriented to person, place, and time.         Results Review     I reviewed the patient's new clinical results.  Results from last 7 days   Lab Units 08/03/22  0548 08/01/22  2209   WBC 10*3/mm3 3.67 8.66   HEMOGLOBIN g/dL 13.2 15.6   PLATELETS 10*3/mm3 90* 131*     Results from last 7 days   Lab Units 08/03/22  0548 08/02/22  1019 08/01/22  2209   SODIUM mmol/L 131*  --  132*   POTASSIUM mmol/L 3.8  --  4.2   CHLORIDE mmol/L 102  --  98   CO2 mmol/L 20.0*  --  21.8*   BUN mg/dL 13  --  18   CREATININE mg/dL 1.00 0.88 1.29*   GLUCOSE mg/dL 103*  --  109*   EGFR mL/min/1.73 85.6 97.8 63.1     Results from last 7 days   Lab Units 08/03/22  0548 08/01/22  2209   ALBUMIN g/dL 2.90* 4.00   BILIRUBIN mg/dL 1.0 1.7*   ALK PHOS U/L 53 67   AST (SGOT) U/L 47* 44*   ALT (SGPT) U/L 29 35      Results from last 7 days   Lab Units 08/03/22  0548 08/01/22  2209   CALCIUM mg/dL 8.4* 9.7   ALBUMIN g/dL 2.90* 4.00   MAGNESIUM mg/dL 1.8  --      Results from last 7 days   Lab Units 08/02/22  0454 08/02/22  0142 08/01/22  2209   LACTATE mmol/L 2.0 4.7* 3.0*     No results found for: HGBA1C, POCGLU    No radiology results for the last day  Scheduled Medications  amiodarone, 150 mg, Intravenous, Once  cefTRIAXone, 2 g, Intravenous, Q24H  cholestyramine, 1 packet, Oral, Q12H  famotidine, 20 mg, Intravenous, Once  sodium chloride, 10 mL, Intravenous, Q12H  warfarin (COUMADIN) (dosing per levels), , Does not apply, Daily  warfarin, 5 mg, Oral, Once    Infusions  amiodarone, 0.5 mg/min, Last Rate: 0.5 mg/min (08/03/22 1616)  heparin, 7.3 Units/kg/hr, Last Rate: 7.3 Units/kg/hr (08/03/22 1231)  Pharmacy to dose warfarin,   sodium chloride, 100 mL/hr, Last Rate: 100 mL/hr (08/03/22 1011)    Diet  Diet Regular; Consistent Carbohydrate      Assessment/Plan     Active Hospital Problems    Diagnosis  POA   • **Fever [R50.9]  Yes   • Gram positive septicemia (HCC) [A41.89]  Yes   • Chronic anticoagulation [Z79.01]  Not Applicable   • ASHIA (acute kidney injury) (HCC) [N17.9]  Yes   • Hx of mitral valve replacement with mechanical valve [Z95.2] [Z95.2]  Not Applicable   • Paroxysmal atrial fibrillation (HCC) [I48.0]  Yes   • Bacterial endocarditis - history of [I33.0]  Yes   • Cellulitis [L03.90]  Yes      Resolved Hospital Problems   No resolved problems to display.       #1 Gram-positive septicemia/endocarditis/cellulitis, currently patient is on IV Rocephinand it will be continued.  Underwent MOE earlier today with evidence of vegetation/endocarditis on the mechanical valve.  Appreciate infectious disease input.  2.  A. fib with RVR, currently anticoagulated with warfarin and given that INR is not therapeutic will be bridged with heparin and is on amiodarone drip and cardiology is following along.  3.  Cellulitis right  leg, as mentioned above.  4.  CODE STATUS is full code.      Raúl Pendleton MD  [facility]  08/03/22  17:22 EDT        Electronically signed by Raúl Pendleton MD at 08/03/22 1726     Eveline Chanel MD at 08/03/22 1052        MOE - vegetation/endocarditis on [facility] valve - septal leaflet at annulus, off of a knot on the left atrial side.  Valve is otherwise normal and all other valves normal.     Spoke with Dr. Vega and wife Nola.  Would recommend repeat MOE at the end of therapy to reevaluate vegetation to make sure that the vegetation is gone.    Electronically signed by Eveline Chanel MD at 08/03/22 1058       Consult Notes (last 24 hours)  Notes from 08/03/22 1040 through 08/04/22 1040   No notes of this type exist for this encounter.

## 2022-08-04 NOTE — PROGRESS NOTES
[facility] Clinical Pharmacy Services: Warfarin Dosing/Monitoring Consult    Elliot Sebastian is a 62 y.o. male, estimated creatinine clearance is 120.7 mL/min (by C-G formula based on SCr of 1 mg/dL). weighing (!) 137 kg (301 lb).    Results from last 7 days   Lab Units 08/04/22  0750 08/04/22  0052 08/03/22  1845 08/03/22  1210 08/03/22  0548 08/02/22  0142 08/01/22  2209   INR  1.96*  --   --   --  1.85* 1.93* 1.79*   APTT seconds 56.8* 47.3* 45.8* 34.5  --   --   --    HEMOGLOBIN g/dL 12.2*  --   --   --  13.2  --  15.6   HEMATOCRIT % 36.1*  --   --   --  36.5*  --  44.7   PLATELETS 10*3/mm3 95*  --   --   --  90*  --  131*     Prior to admission anticoagulation: 2.5mg M/F and 5mg all other days    Hospital Anticoagulation:  Consulting provider: Nataly NICOLE  Start date: 8/2  Indication: A Fib - requiring full anticoagulation/mechanical valve  Target INR: 2.5 - 3.5  Expected duration: indefinite   Bridge Therapy: Yes  with unfractionated heparin    Potential food or drug interactions: amiodarone    Education complete?/Date: No; plan for follow up TBD    Assessment/Plan:  Dose: 5mg today  Monitor for any signs or symptoms of bleeding  Follow up daily INRs and dose adjustments    Pharmacy will continue to follow until discharge or discontinuation of warfarin.     Brodie Abreu PharmD  Clinical Pharmacist

## 2022-08-04 NOTE — TELEPHONE ENCOUNTER
Caller: MELITON WITH  HOME HEALTH     Relationship:     Best call back number: [phone]    What orders are you requesting (i.e. lab or imaging): ORDERS FOR EVAL FOR NURSING     In what timeframe would the patient need to come in: PATIENT IS BEING RELEASED FROM THE HOSPITAL 08/05/22    Where will you receive your lab/imaging services:     Additional notes:         THANKS

## 2022-08-04 NOTE — PROGRESS NOTES
LOS: 3 days   Patient Care Team:  Mannie Beltran DO as PCP - General (Family Medicine)  Sary Tejeda RPH as Pharmacist    Chief Complaint:     Follow-up endocarditis    Interval History:     He has no chest pain or difficulty breathing.  He is very grouchy because he has not slept.  He has no obvious pain anywhere in orthopnea or PND.    Objective   Vital Signs  Temp:  [97.6 °F (36.4 °C)-99.5 °F (37.5 °C)] 99.5 °F (37.5 °C)  Heart Rate:  [] 132  Resp:  [16-20] 20  BP: ()/(62-98) 128/88    Intake/Output Summary (Last 24 hours) at 8/4/2022 0741  Last data filed at 8/4/2022 0305  Gross per 24 hour   Intake 450 ml   Output 225 ml   Net 225 ml       Comfortable NAD  Neck supple, no JVD or thyromegaly appreciated  S1/S2 RRR, no m/r/g mechanical click  Lungs CTA B, normal effort  Abdomen S/NT/ND (+) BS, no HSM appreciated  Extremities warm, no clubbing, cyanosis, or edema  Right lower extremity cellulitis  A/Ox4, mood and affect appropriate    Results Review:      Results from last 7 days   Lab Units 08/03/22  0548 08/02/22  1019 08/01/22 2209   SODIUM mmol/L 131*  --  132*   POTASSIUM mmol/L 3.8  --  4.2   CHLORIDE mmol/L 102  --  98   CO2 mmol/L 20.0*  --  21.8*   BUN mg/dL 13  --  18   CREATININE mg/dL 1.00 0.88 1.29*   GLUCOSE mg/dL 103*  --  109*   CALCIUM mg/dL 8.4*  --  9.7     Results from last 7 days   Lab Units 08/03/22  0548 08/01/22  2209   TROPONIN T ng/mL <0.010 <0.010     Results from last 7 days   Lab Units 08/03/22  0548 08/01/22 2209   WBC 10*3/mm3 3.67 8.66   HEMOGLOBIN g/dL 13.2 15.6   HEMATOCRIT % 36.5* 44.7   PLATELETS 10*3/mm3 90* 131*     Results from last 7 days   Lab Units 08/04/22  0052 08/03/22  1845 08/03/22  1210 08/03/22  0548 08/02/22  0142 08/01/22  2209   INR   --   --   --  1.85* 1.93* 1.79*   APTT seconds 47.3* 45.8* 34.5  --   --   --          Results from last 7 days   Lab Units 08/03/22  0548   MAGNESIUM mg/dL 1.8           I reviewed the patient's new clinical  results.  I personally viewed and interpreted the patient's EKG/Telemetry data        Medication Review:   amiodarone, 150 mg, Intravenous, Once  cefTRIAXone, 2 g, Intravenous, Q24H  cholestyramine, 1 packet, Oral, Q12H  famotidine, 20 mg, Intravenous, Once  sodium chloride, 10 mL, Intravenous, Q12H  warfarin (COUMADIN) (dosing per levels), , Does not apply, Daily        amiodarone, 0.5 mg/min, Last Rate: 0.5 mg/min (08/04/22 0305)  heparin, 7.3 Units/kg/hr, Last Rate: 10.3 Units/kg/hr (08/04/22 0142)  Pharmacy to dose warfarin,   sodium chloride, 100 mL/hr, Last Rate: 100 mL/hr (08/03/22 2340)        Assessment & Plan       Fever    Paroxysmal atrial fibrillation (HCC)    Bacterial endocarditis - history of    Hx of mitral valve replacement with mechanical valve [Z95.2]    Cellulitis    Gram positive septicemia (HCC)    Chronic anticoagulation    ASHIA (acute kidney injury) (HCC)    1. Gram-positive septicemia -Streptococcus, may be related to cellulitis, versus endocarditis versus somehow related to the back pain he is having  2. Mechanical mitral valve, on warfarin -INR goal 2.5-3.5. pharmacy to adjust warfarin.   3. Cellulitis RLE.   Improving  4. Back pain.   5. A fib with rvr converted back into normal sinus rhythm at 730 on 8/4/2022. stop amio gtt  6. Thrombocytopenia.  7. Endocarditis, normal MV function, d/w ID  8. IBS on cholestyramine.     Stop amiodarone drip, initiate oral amiodarone to maintain normal sinus rhythm, continue heparin drip until INR is greater than 2.5 on warfarin.  Will need a PICC line for endocarditis.    Will follow    Eveline Chanel MD  08/04/22  07:41 EDT

## 2022-08-04 NOTE — PROGRESS NOTES
Name: Elliot Sebastian ADMIT: 2022   : 1960  PCP: Mannie Beltran DO    MRN: [id number] LOS: 3 days   AGE/SEX: 62 y.o. male  ROOM: [facility]   Subjective   Chief Complaint   Patient presents with   • Leg Swelling   • Weakness - Generalized     On IV ABX  On heparin gtt  Had MOE yesterday  Feels ok    ROS  No f/c  No n/v  No cp/palp  No soa/cough    Objective   Vital Signs  Temp:  [97.7 °F (36.5 °C)-99.5 °F (37.5 °C)] 97.7 °F (36.5 °C)  Heart Rate:  [] 81  Resp:  [18-20] 20  BP: (105-139)/(88-98) 139/91  SpO2:  [94 %-96 %] 96 %  on   ;   Device (Oxygen Therapy): room air  Body mass index is 34.78 kg/m².    Physical Exam  Constitutional:       Appearance: He is ill-appearing.   HENT:      Head: Normocephalic and atraumatic.   Eyes:      General: No scleral icterus.  Cardiovascular:      Rate and Rhythm: Regular rhythm. Tachycardia present.      Comments: +mechanical click  Pulmonary:      Effort: Pulmonary effort is normal. No respiratory distress.      Breath sounds: Normal breath sounds.   Abdominal:      General: There is no distension.      Palpations: Abdomen is soft.      Tenderness: There is no abdominal tenderness.   Musculoskeletal:      Cervical back: Neck supple.   Neurological:      Mental Status: He is alert.   Psychiatric:         Behavior: Behavior normal.         Results Review:       I reviewed the patient's new clinical results.  Results from last 7 days   Lab Units 22  0750 22  0548 22  2209   WBC 10*3/mm3 4.39 3.67 8.66   HEMOGLOBIN g/dL 12.2* 13.2 15.6   PLATELETS 10*3/mm3 95* 90* 131*     Results from last 7 days   Lab Units 22  0750 22  0548 22  1019 22  2209   SODIUM mmol/L 135* 131*  --  132*   POTASSIUM mmol/L 3.6 3.8  --  4.2   CHLORIDE mmol/L 108* 102  --  98   CO2 mmol/L 18.3* 20.0*  --  21.8*   BUN mg/dL 11 13  --  18   CREATININE mg/dL 0.89 1.00 0.88 1.29*   GLUCOSE mg/dL 97 103*  --  109*   Estimated Creatinine Clearance:  135.6 mL/min (by C-G formula based on SCr of 0.89 mg/dL).  Results from last 7 days   Lab Units 08/03/22  0548 08/01/22  2209   ALBUMIN g/dL 2.90* 4.00   BILIRUBIN mg/dL 1.0 1.7*   ALK PHOS U/L 53 67   AST (SGOT) U/L 47* 44*   ALT (SGPT) U/L 29 35     Results from last 7 days   Lab Units 08/04/22  0750 08/03/22  0548 08/01/22  2209   CALCIUM mg/dL 8.6 8.4* 9.7   ALBUMIN g/dL  --  2.90* 4.00   MAGNESIUM mg/dL  --  1.8  --      Results from last 7 days   Lab Units 08/02/22  0454 08/02/22  0142 08/01/22 2209   LACTATE mmol/L 2.0 4.7* 3.0*       Coag   Results from last 7 days   Lab Units 08/04/22  1445 08/04/22  0750 08/04/22  0052 08/03/22  1845 08/03/22  1210 08/03/22  0548 08/02/22  0142 08/01/22 2209   INR   --  1.96*  --   --   --  1.85* 1.93* 1.79*   APTT seconds 63.5* 56.8* 47.3* 45.8* 34.5  --   --   --      HbA1C No results found for: HGBA1C  Infection   Results from last 7 days   Lab Units 08/03/22  0547 08/03/22  0546 08/01/22 2209   BLOODCX  No growth at 24 hours No growth at 24 hours Streptococcus dysgalactiae ssp equisimilis*  Streptococcus dysgalactiae ssp equisimilis*   BCIDPCR   --   --  Streptococcus spp, not A, B, or pneumonia. Identification by BCID2 PCR.*     Radiology(recent) No radiology results for the last day  Troponin T   Date Value Ref Range Status   08/03/2022 <0.010 0.000 - 0.030 ng/mL Final     No components found for: TSH;2    amiodarone, 200 mg, Oral, Q12H  cefTRIAXone, 2 g, Intravenous, Q24H  cholestyramine, 1 packet, Oral, Q12H  famotidine, 20 mg, Intravenous, Once  sodium chloride, 10 mL, Intravenous, Q12H  warfarin (COUMADIN) (dosing per levels), , Does not apply, Daily  warfarin, 5 mg, Oral, Once      heparin, 7.3 Units/kg/hr, Last Rate: 11.3 Units/kg/hr (08/04/22 1130)  Pharmacy to dose warfarin,   sodium chloride, 100 mL/hr, Last Rate: 100 mL/hr (08/03/22 2340)    Diet Regular; Consistent Carbohydrate      Assessment & Plan      Active Hospital Problems    Diagnosis  POA   •  **Fever [R50.9]  Yes   • Gram positive septicemia (HCC) [A41.89]  Yes   • Chronic anticoagulation [Z79.01]  Not Applicable   • ASHIA (acute kidney injury) (HCC) [N17.9]  Yes   • Hx of mitral valve replacement with mechanical valve [Z95.2] [Z95.2]  Not Applicable   • Paroxysmal atrial fibrillation (HCC) [I48.0]  Yes   • Bacterial endocarditis - history of [I33.0]  Yes   • Cellulitis [L03.90]  Yes      Resolved Hospital Problems   No resolved problems to display.     62-year-old male with history of previous bacterial endocarditis with mitral valve replacement, history of DVT, chronic anticoagulation, A. fib, BPH, CHF.  He presented with swelling of his right leg as well as redness.  He was found to have cellulitis as well as bacteremia.    #1 Gram-positive septicemia/endocarditis/cellulitis, currently patient is on IV ABX and plans for 6 weeks.  Underwent MOE with evidence of vegetation/endocarditis but will not need surgical therapy at this time. Plans for MRI L spine noted  2.  A. fib with RVR, currently anticoagulated with warfarin and given that INR is not therapeutic will be bridged with heparin and is on amiodarone drip and cardiology is following and adjusting medications. Amio gtt stopped today and to be on oral amio. Monitor PTT   3.  Cellulitis right leg, as mentioned above.  4. Back pain- prn agents, plan for MRI L spine    JONY RN  JONY pharmacist  JONY Vega    Thanks to specialists    Bobby Mcbride MD  [facility]  08/04/22  16:23 EDT

## 2022-08-05 ENCOUNTER — APPOINTMENT (OUTPATIENT)
Dept: MRI IMAGING | Facility: HOSPITAL | Age: 62
End: 2022-08-05

## 2022-08-05 PROBLEM — I33.0 ACUTE BACTERIAL ENDOCARDITIS: Status: ACTIVE | Noted: 2022-08-05

## 2022-08-05 LAB
ANION GAP SERPL CALCULATED.3IONS-SCNC: 9.2 MMOL/L (ref 5–15)
APTT PPP: 57.9 SECONDS (ref 22.7–35.4)
APTT PPP: 86.6 SECONDS (ref 22.7–35.4)
APTT PPP: 89 SECONDS (ref 22.7–35.4)
BUN SERPL-MCNC: 9 MG/DL (ref 8–23)
BUN/CREAT SERPL: 9.8 (ref 7–25)
CALCIUM SPEC-SCNC: 9.1 MG/DL (ref 8.6–10.5)
CHLORIDE SERPL-SCNC: 107 MMOL/L (ref 98–107)
CO2 SERPL-SCNC: 22.8 MMOL/L (ref 22–29)
CREAT SERPL-MCNC: 0.92 MG/DL (ref 0.76–1.27)
DEPRECATED RDW RBC AUTO: 46.6 FL (ref 37–54)
EGFRCR SERPLBLD CKD-EPI 2021: 94.6 ML/MIN/1.73
ERYTHROCYTE [DISTWIDTH] IN BLOOD BY AUTOMATED COUNT: 13.6 % (ref 12.3–15.4)
GLUCOSE SERPL-MCNC: 101 MG/DL (ref 65–99)
HCT VFR BLD AUTO: 33.2 % (ref 37.5–51)
HGB BLD-MCNC: 11.7 G/DL (ref 13–17.7)
INR PPP: 2.16 (ref 0.9–1.1)
MAGNESIUM SERPL-MCNC: 1.8 MG/DL (ref 1.6–2.4)
MCH RBC QN AUTO: 33.4 PG (ref 26.6–33)
MCHC RBC AUTO-ENTMCNC: 35.2 G/DL (ref 31.5–35.7)
MCV RBC AUTO: 94.9 FL (ref 79–97)
PLATELET # BLD AUTO: 112 10*3/MM3 (ref 140–450)
PMV BLD AUTO: 10 FL (ref 6–12)
POTASSIUM SERPL-SCNC: 3.4 MMOL/L (ref 3.5–5.2)
PROTHROMBIN TIME: 23.6 SECONDS (ref 11.7–14.2)
RBC # BLD AUTO: 3.5 10*6/MM3 (ref 4.14–5.8)
SODIUM SERPL-SCNC: 139 MMOL/L (ref 136–145)
WBC NRBC COR # BLD: 4.29 10*3/MM3 (ref 3.4–10.8)

## 2022-08-05 PROCEDURE — A9577 INJ MULTIHANCE: HCPCS | Performed by: INTERNAL MEDICINE

## 2022-08-05 PROCEDURE — 85730 THROMBOPLASTIN TIME PARTIAL: CPT | Performed by: INTERNAL MEDICINE

## 2022-08-05 PROCEDURE — 25010000002 HEPARIN (PORCINE) 25000-0.45 UT/250ML-% SOLUTION: Performed by: INTERNAL MEDICINE

## 2022-08-05 PROCEDURE — 25010000002 CEFTRIAXONE PER 250 MG: Performed by: INTERNAL MEDICINE

## 2022-08-05 PROCEDURE — 85027 COMPLETE CBC AUTOMATED: CPT | Performed by: NURSE PRACTITIONER

## 2022-08-05 PROCEDURE — 85610 PROTHROMBIN TIME: CPT | Performed by: NURSE PRACTITIONER

## 2022-08-05 PROCEDURE — 25010000002 HEPARIN (PORCINE) PER 1000 UNITS: Performed by: INTERNAL MEDICINE

## 2022-08-05 PROCEDURE — 0 GADOBENATE DIMEGLUMINE 529 MG/ML SOLUTION: Performed by: INTERNAL MEDICINE

## 2022-08-05 PROCEDURE — 99232 SBSQ HOSP IP/OBS MODERATE 35: CPT | Performed by: NURSE PRACTITIONER

## 2022-08-05 PROCEDURE — 99233 SBSQ HOSP IP/OBS HIGH 50: CPT | Performed by: INTERNAL MEDICINE

## 2022-08-05 PROCEDURE — 80048 BASIC METABOLIC PNL TOTAL CA: CPT | Performed by: NURSE PRACTITIONER

## 2022-08-05 PROCEDURE — 83735 ASSAY OF MAGNESIUM: CPT | Performed by: INTERNAL MEDICINE

## 2022-08-05 PROCEDURE — 36415 COLL VENOUS BLD VENIPUNCTURE: CPT | Performed by: INTERNAL MEDICINE

## 2022-08-05 PROCEDURE — C1751 CATH, INF, PER/CENT/MIDLINE: HCPCS

## 2022-08-05 PROCEDURE — 72158 MRI LUMBAR SPINE W/O & W/DYE: CPT

## 2022-08-05 RX ORDER — MAGNESIUM SULFATE HEPTAHYDRATE 40 MG/ML
2 INJECTION, SOLUTION INTRAVENOUS AS NEEDED
Status: DISCONTINUED | OUTPATIENT
Start: 2022-08-05 | End: 2022-08-07 | Stop reason: HOSPADM

## 2022-08-05 RX ORDER — MAGNESIUM SULFATE HEPTAHYDRATE 40 MG/ML
4 INJECTION, SOLUTION INTRAVENOUS AS NEEDED
Status: DISCONTINUED | OUTPATIENT
Start: 2022-08-05 | End: 2022-08-07 | Stop reason: HOSPADM

## 2022-08-05 RX ORDER — ATENOLOL 25 MG/1
25 TABLET ORAL
Status: DISCONTINUED | OUTPATIENT
Start: 2022-08-05 | End: 2022-08-07 | Stop reason: HOSPADM

## 2022-08-05 RX ORDER — POTASSIUM CHLORIDE 750 MG/1
40 TABLET, FILM COATED, EXTENDED RELEASE ORAL AS NEEDED
Status: DISCONTINUED | OUTPATIENT
Start: 2022-08-05 | End: 2022-08-07 | Stop reason: HOSPADM

## 2022-08-05 RX ORDER — POTASSIUM CHLORIDE 1.5 G/1.77G
40 POWDER, FOR SOLUTION ORAL AS NEEDED
Status: DISCONTINUED | OUTPATIENT
Start: 2022-08-05 | End: 2022-08-07 | Stop reason: HOSPADM

## 2022-08-05 RX ORDER — WARFARIN SODIUM 5 MG/1
5 TABLET ORAL
Status: COMPLETED | OUTPATIENT
Start: 2022-08-05 | End: 2022-08-05

## 2022-08-05 RX ADMIN — POTASSIUM CHLORIDE 40 MEQ: 750 TABLET, EXTENDED RELEASE ORAL at 19:55

## 2022-08-05 RX ADMIN — HYDROCODONE BITARTRATE AND ACETAMINOPHEN 1 TABLET: 5; 325 TABLET ORAL at 16:48

## 2022-08-05 RX ADMIN — HYDROCODONE BITARTRATE AND ACETAMINOPHEN 1 TABLET: 5; 325 TABLET ORAL at 08:21

## 2022-08-05 RX ADMIN — HEPARIN SODIUM 10.3 UNITS/KG/HR: 10000 INJECTION, SOLUTION INTRAVENOUS at 04:48

## 2022-08-05 RX ADMIN — ATENOLOL 25 MG: 25 TABLET ORAL at 14:22

## 2022-08-05 RX ADMIN — CHOLESTYRAMINE 1 PACKET: 4 POWDER, FOR SUSPENSION ORAL at 19:55

## 2022-08-05 RX ADMIN — GADOBENATE DIMEGLUMINE 20 ML: 529 INJECTION, SOLUTION INTRAVENOUS at 09:58

## 2022-08-05 RX ADMIN — CHOLESTYRAMINE 1 PACKET: 4 POWDER, FOR SUSPENSION ORAL at 11:15

## 2022-08-05 RX ADMIN — Medication 10 ML: at 08:15

## 2022-08-05 RX ADMIN — POTASSIUM CHLORIDE 40 MEQ: 750 TABLET, EXTENDED RELEASE ORAL at 15:35

## 2022-08-05 RX ADMIN — AMIODARONE HYDROCHLORIDE 200 MG: 200 TABLET ORAL at 19:55

## 2022-08-05 RX ADMIN — Medication 10 ML: at 21:02

## 2022-08-05 RX ADMIN — AMIODARONE HYDROCHLORIDE 200 MG: 200 TABLET ORAL at 08:15

## 2022-08-05 RX ADMIN — HEPARIN SODIUM 4000 UNITS: 5000 INJECTION INTRAVENOUS; SUBCUTANEOUS at 22:18

## 2022-08-05 RX ADMIN — CEFTRIAXONE 2 G: 2 INJECTION, POWDER, FOR SOLUTION INTRAMUSCULAR; INTRAVENOUS at 11:14

## 2022-08-05 RX ADMIN — CHOLESTYRAMINE 1 PACKET: 4 POWDER, FOR SUSPENSION ORAL at 02:01

## 2022-08-05 RX ADMIN — WARFARIN SODIUM 5 MG: 5 TABLET ORAL at 18:20

## 2022-08-05 NOTE — NURSING NOTE
Attempted picc line in left basilic and left cephalic vein, both attempts unable to thread picc line past shoulder area.  Nurse notified. Will request order for picc line under fluroscopy.

## 2022-08-05 NOTE — NURSING NOTE
Spoke with Dr Mcbride and he gave a verbal order for the patient to have a PICC placed under fluro. He and I were both unsure how the order was to be entered. I called the  and IV team and was unable to clarify the correct order. Order placed but said SURGERY ONLY in the description.

## 2022-08-05 NOTE — PROGRESS NOTES
ID note for sepsis    Subjective: Complains of intermittent lower back pain, 8-9 out of 10 in intensity, worse with weightbearing.      ROS: No fever, chills, night sweats, no rash, diarrhea    Objective:   Temp:  [97.5 °F (36.4 °C)-98.1 °F (36.7 °C)] 98.1 °F (36.7 °C)  Heart Rate:  [66-84] 68  Resp:  [17-20] 17  BP: (139-162)/(77-98) 143/77  GENERAL: Awake and alert, ill  HEENT: Oropharynx is clear. Hearing is grossly normal.   SKIN: Warm and dry with minimal erythema of the right lower extremity with erythematous patches  PSYCHIATRIC: Appropriate mood, affect, insight, and judgment.     Microbiology  8/1 blood cultures 2/2 strep dysgalactiae ssp equisimilis   8/3 blood cultures 2/2 ngtd    Assessment and plan  1.  Streptococcal septicemia   2.  Prosthetic mitral valve endocarditis  3.  A. fib with RVR  4.  Thrombocytopenia  5.  Lower back pain, rule out discitis    Continue Rocephin 2 g IV every 24 hours  Repeat bcx are negative to date.  Still with LBP.  Getting mri today to rule out infectious etiology but if negative could probably dc today from my end.  I have ordered picc    1. Ceftriaxone 2g IV q24, stop date 9/13  2. Check weekly cbc/diff and serum cr faxed to me [phone]  3. F/u with me on 9/13      Discussed with Dr. Mcbride.      ADDENDUM: MRI negative for infection.  Nothing more to add at this time and we will see as needed.

## 2022-08-05 NOTE — PROGRESS NOTES
"    Patient Name: Elliot Sebastian  :1960  62 y.o.      Patient Care Team:  Mannie Beltran DO as PCP - General (Family Medicine)  Sary Tejeda RPH as Pharmacist    Chief Complaint: follow up endocarditis     Interval History: he is resting in bed. No complaints. INR pending.       Objective   Vital Signs  Temp:  [97.5 °F (36.4 °C)-98.1 °F (36.7 °C)] 98.1 °F (36.7 °C)  Heart Rate:  [66-81] 68  Resp:  [17-20] 17  BP: (139-162)/(77-98) 143/77    Intake/Output Summary (Last 24 hours) at 2022 1215  Last data filed at 2022 0509  Gross per 24 hour   Intake 120 ml   Output 1200 ml   Net -1080 ml     Flowsheet Rows    Flowsheet Row First Filed Value   Admission Height 198.1 cm (78\") Documented at 2022 0329   Admission Weight 137 kg (301 lb 9.6 oz) Documented at 2022 0329          Physical Exam:   General Appearance:    Alert, cooperative, in no acute distress   Lungs:     Clear to auscultation.  Normal respiratory effort and rate.      Heart:    Regular rhythm and normal rate, normal S1 and S2, no murmurs, gallops or rubs.     Chest Wall:    No abnormalities observed   Abdomen:     Soft, nontender, positive bowel sounds.     Extremities:   no cyanosis, clubbing or edema.  No marked joint deformities.  Adequate musculoskeletal strength.       Results Review:    Results from last 7 days   Lab Units 22  0750   SODIUM mmol/L 135*   POTASSIUM mmol/L 3.6   CHLORIDE mmol/L 108*   CO2 mmol/L 18.3*   BUN mg/dL 11   CREATININE mg/dL 0.89   GLUCOSE mg/dL 97   CALCIUM mg/dL 8.6     Results from last 7 days   Lab Units 22  0548 22  2209   TROPONIN T ng/mL <0.010 <0.010     Results from last 7 days   Lab Units 22  0750   WBC 10*3/mm3 4.39   HEMOGLOBIN g/dL 12.2*   HEMATOCRIT % 36.1*   PLATELETS 10*3/mm3 95*     Results from last 7 days   Lab Units 22  0052 22  1445 22  0750 22  1210 22  0548 22  0142   INR   --   --  1.96*  --  1.85* 1.93*   APTT " seconds 86.6* 63.5* 56.8*   < >  --   --     < > = values in this interval not displayed.     Results from last 7 days   Lab Units 08/03/22  0548   MAGNESIUM mg/dL 1.8                   Medication Review:   amiodarone, 200 mg, Oral, Q12H  atenolol, 25 mg, Oral, Q24H  cefTRIAXone, 2 g, Intravenous, Q24H  cholestyramine, 1 packet, Oral, Q12H  famotidine, 20 mg, Intravenous, Once  sodium chloride, 10 mL, Intravenous, Q12H  warfarin (COUMADIN) (dosing per levels), , Does not apply, Daily         heparin, 7.3 Units/kg/hr, Last Rate: 10.3 Units/kg/hr (08/05/22 2126)  Pharmacy to dose warfarin,         Assessment & Plan   1. Gram positive septicemia - streptococcus, may be related to cellulitis versus endocarditis. Getting MRI for persistent lower back pain.   2. Mechanical mitral valve on warfarin. Target INR 2.5-3.5. on heparin drip, continue until INR >/ 2.5  3. Cellulitis RLE, improving.   4. Atrial fibrillation with RVR, converted back to SR. Now on oral amiodarone. Monitor QT.   5. Thrombocytopenia   6. Endocarditis - normal mitral valve function. PICC/IV abx. ID following. Stop date 9/13.  7. Hypertension - was hypotensive on admission. Improved. Will resume atenolol. Continue to hold losartan.     INR pending. Dc heparin when INR >2.5.  Resume atenolol as above.   Check EKG in AM.     COREY Woods  [facility]  08/05/22  12:15 EDT

## 2022-08-05 NOTE — CASE MANAGEMENT/SOCIAL WORK
Continued Stay Note  [facility]     Patient Name: Elliot Sebastian  MRN: [id number]  Today's Date: 8/5/2022    Admit Date: 8/1/2022     Discharge Plan     Row Name 08/05/22 1125       Plan    Plan Home with spouse, [facility] and [facility] for IV abx.    Plan Comments [facility] - pt is accepted. Will need orders upon DC.  Pt notified and med cost discussed.  He is in agreement with plan to take the IV abx at home. ...........Marian BALTAZAR/ TRINH               Discharge Codes    No documentation.               Expected Discharge Date and Time     Expected Discharge Date Expected Discharge Time    Aug 8, 2022             Marian Ricketts RN

## 2022-08-05 NOTE — PROGRESS NOTES
Name: Elliot Sebastian ADMIT: 2022   : 1960  PCP: Mannie Beltran DO    MRN: [id number] LOS: 4 days   AGE/SEX: 62 y.o. male  ROOM: [facility]   Subjective   Chief Complaint   Patient presents with   • Leg Swelling   • Weakness - Generalized     On IV ABX  On heparin gtt  Had MRI earlier today  Feels ok    ROS  No f/c  No n/v  No cp/palp  No soa/cough    Objective   Vital Signs  Temp:  [97.5 °F (36.4 °C)-98.1 °F (36.7 °C)] 98.1 °F (36.7 °C)  Heart Rate:  [66-72] 72  Resp:  [17-18] 18  BP: (139-162)/(77-98) 139/83  SpO2:  [95 %-98 %] 95 %  on  Flow (L/min):  [2] 2;   Device (Oxygen Therapy): room air  Body mass index is 34.78 kg/m².    Physical Exam  Constitutional:       Appearance: He is ill-appearing.   HENT:      Head: Normocephalic and atraumatic.   Eyes:      General: No scleral icterus.  Cardiovascular:      Rate and Rhythm: Regular rhythm. Tachycardia present.      Comments: +mechanical click  Pulmonary:      Effort: Pulmonary effort is normal. No respiratory distress.      Breath sounds: Normal breath sounds.   Abdominal:      General: There is no distension.      Palpations: Abdomen is soft.      Tenderness: There is no abdominal tenderness.   Musculoskeletal:      Cervical back: Neck supple.   Neurological:      Mental Status: He is alert.   Psychiatric:         Behavior: Behavior normal.     Right lower extremity swelling    Results Review:       I reviewed the patient's new clinical results.  Results from last 7 days   Lab Units 22  1416 22  0750 22  0548 22  2209   WBC 10*3/mm3 4.29 4.39 3.67 8.66   HEMOGLOBIN g/dL 11.7* 12.2* 13.2 15.6   PLATELETS 10*3/mm3 112* 95* 90* 131*     Results from last 7 days   Lab Units 22  1416 22  0750 22  0548 22  1019 22  2209   SODIUM mmol/L 139 135* 131*  --  132*   POTASSIUM mmol/L 3.4* 3.6 3.8  --  4.2   CHLORIDE mmol/L 107 108* 102  --  98   CO2 mmol/L 22.8 18.3* 20.0*  --  21.8*   BUN mg/dL 9 11 13   --  18   CREATININE mg/dL 0.92 0.89 1.00 0.88 1.29*   GLUCOSE mg/dL 101* 97 103*  --  109*   Estimated Creatinine Clearance: 131.2 mL/min (by C-G formula based on SCr of 0.92 mg/dL).  Results from last 7 days   Lab Units 08/03/22  0548 08/01/22  2209   ALBUMIN g/dL 2.90* 4.00   BILIRUBIN mg/dL 1.0 1.7*   ALK PHOS U/L 53 67   AST (SGOT) U/L 47* 44*   ALT (SGPT) U/L 29 35     Results from last 7 days   Lab Units 08/05/22  1416 08/04/22  0750 08/03/22  0548 08/01/22  2209   CALCIUM mg/dL 9.1 8.6 8.4* 9.7   ALBUMIN g/dL  --   --  2.90* 4.00   MAGNESIUM mg/dL  --   --  1.8  --      Results from last 7 days   Lab Units 08/02/22  0454 08/02/22  0142 08/01/22  2209   LACTATE mmol/L 2.0 4.7* 3.0*       Coag   Results from last 7 days   Lab Units 08/05/22  1416 08/05/22  0052 08/04/22  1445 08/04/22  0750 08/04/22  0052 08/03/22  1845 08/03/22  1210 08/03/22  0548 08/02/22  0142 08/01/22  2209   INR  2.16*  --   --  1.96*  --   --   --  1.85* 1.93* 1.79*   APTT seconds 89.0* 86.6* 63.5* 56.8* 47.3* 45.8* 34.5  --   --   --      HbA1C No results found for: HGBA1C  Infection   Results from last 7 days   Lab Units 08/03/22  0547 08/03/22  0546 08/01/22  2209   BLOODCX  No growth at 2 days No growth at 2 days Streptococcus dysgalactiae ssp equisimilis*  Streptococcus dysgalactiae ssp equisimilis*   BCIDPCR   --   --  Streptococcus spp, not A, B, or pneumonia. Identification by BCID2 PCR.*     Radiology(recent) MRI Lumbar Spine With & Without Contrast    Result Date: 8/5/2022  1.  No evidence of discitis, osteomyelitis or epidural abscess. 2.  Multilevel degenerative disease involving the lumbar spine as described above including bilateral L5 pars defects, grade 1 anterolisthesis of L5 upon S1 and a milder retrolisthesis of L4 upon L5. Mild-to-moderate facet degenerative disease is noted as described. See above.      Troponin T   Date Value Ref Range Status   08/03/2022 <0.010 0.000 - 0.030 ng/mL Final     No components found  for: TSH;2    amiodarone, 200 mg, Oral, Q12H  atenolol, 25 mg, Oral, Q24H  cefTRIAXone, 2 g, Intravenous, Q24H  cholestyramine, 1 packet, Oral, Q12H  famotidine, 20 mg, Intravenous, Once  sodium chloride, 10 mL, Intravenous, Q12H  warfarin (COUMADIN) (dosing per levels), , Does not apply, Daily      heparin, 7.3 Units/kg/hr, Last Rate: 8.3 Units/kg/hr (08/05/22 1442)  Pharmacy to dose warfarin,     Diet Regular; Consistent Carbohydrate      Assessment & Plan      Active Hospital Problems    Diagnosis  POA   • **Acute bacterial endocarditis [I33.0]  Yes   • Gram positive septicemia (HCC) [A41.89]  Yes   • Fever [R50.9]  Yes   • Chronic anticoagulation [Z79.01]  Not Applicable   • ASHIA (acute kidney injury) (HCC) [N17.9]  Yes   • Hx of mitral valve replacement with mechanical valve [Z95.2] [Z95.2]  Not Applicable   • Paroxysmal atrial fibrillation (HCC) [I48.0]  Yes   • Bacterial endocarditis - history of [I33.0]  Yes   • Cellulitis [L03.90]  Yes      Resolved Hospital Problems   No resolved problems to display.     62-year-old male with history of previous bacterial endocarditis with mitral valve replacement, history of DVT, chronic anticoagulation, A. fib, BPH, CHF.  He presented with swelling of his right leg as well as redness.  He was found to have cellulitis as well as bacteremia.    #1 Gram-positive septicemia/endocarditis/cellulitis, currently patient is on IV ABX and plans for 6 weeks.  Underwent MOE with evidence of vegetation/endocarditis but will not need surgical therapy at this time.  MRI L spine without infection  2.  A. fib with RVR, currently anticoagulated with warfarin and given that INR is not therapeutic will be bridged with heparin and is on amiodarone drip and cardiology is following and adjusting medications. oral amio and BB. Monitor PTT   3.  Cellulitis right leg, as mentioned above.  4. Back pain- prn agents,  MRI L spine with degenerative changes but no infection. Can have outpatient follow  up.   5. Hypokalemia- replace    JONY RN  JONY pharmacist  JONY Vega    Dispo- to home with HH when INR greater than 2.5    Thanks to specialists    Bobby Mcbride MD  [facility]  08/05/22  16:23 EDT

## 2022-08-05 NOTE — PROGRESS NOTES
[facility] Clinical Pharmacy Services: Warfarin Dosing/Monitoring Consult    Elliot Sebastian is a 62 y.o. male, estimated creatinine clearance is 131.2 mL/min (by C-G formula based on SCr of 0.92 mg/dL). weighing (!) 137 kg (301 lb).    Results from last 7 days   Lab Units 08/05/22  1416 08/05/22  0052 08/04/22  1445 08/04/22  0750 08/04/22  0052 08/03/22  1210 08/03/22  0548 08/02/22  0142 08/01/22  2209   INR  2.16*  --   --  1.96*  --   --  1.85* 1.93* 1.79*   APTT seconds 89.0* 86.6* 63.5* 56.8* 47.3*   < >  --   --   --    HEMOGLOBIN g/dL 11.7*  --   --  12.2*  --   --  13.2  --  15.6   HEMATOCRIT % 33.2*  --   --  36.1*  --   --  36.5*  --  44.7   PLATELETS 10*3/mm3 112*  --   --  95*  --   --  90*  --  131*    < > = values in this interval not displayed.     Prior to admission anticoagulation: 2.5mg M/F and 5mg all other days    Hospital Anticoagulation:  Consulting provider: Nataly NICOLE  Start date: 8/2  Indication: A Fib - requiring full anticoagulation/mechanical valve  Target INR: 2.5 - 3.5  Expected duration: indefinite   Bridge Therapy: Yes  with unfractionated heparin    Potential food or drug interactions: amiodarone    Education complete?/Date: No; plan for follow up TBD    Assessment/Plan:  Dose: will give another 5mg today  Monitor for any signs or symptoms of bleeding  Follow up daily INRs and dose adjustments    Pharmacy will continue to follow until discharge or discontinuation of warfarin.     Brodie Abreu PharmD  Clinical Pharmacist

## 2022-08-06 ENCOUNTER — TRANSCRIBE ORDERS (OUTPATIENT)
Dept: HOME HEALTH SERVICES | Facility: HOME HEALTHCARE | Age: 62
End: 2022-08-06

## 2022-08-06 ENCOUNTER — HOME HEALTH ADMISSION (OUTPATIENT)
Dept: HOME HEALTH SERVICES | Facility: HOME HEALTHCARE | Age: 62
End: 2022-08-06

## 2022-08-06 ENCOUNTER — APPOINTMENT (OUTPATIENT)
Dept: GENERAL RADIOLOGY | Facility: HOSPITAL | Age: 62
End: 2022-08-06

## 2022-08-06 DIAGNOSIS — I33.0 ACUTE AND SUBACUTE BACTERIAL ENDOCARDITIS: Primary | ICD-10-CM

## 2022-08-06 LAB
ANION GAP SERPL CALCULATED.3IONS-SCNC: 10 MMOL/L (ref 5–15)
APTT PPP: 67.8 SECONDS (ref 22.7–35.4)
BASOPHILS # BLD AUTO: 0.03 10*3/MM3 (ref 0–0.2)
BASOPHILS NFR BLD AUTO: 0.7 % (ref 0–1.5)
BUN SERPL-MCNC: 11 MG/DL (ref 8–23)
BUN/CREAT SERPL: 11.5 (ref 7–25)
CALCIUM SPEC-SCNC: 9.4 MG/DL (ref 8.6–10.5)
CHLORIDE SERPL-SCNC: 107 MMOL/L (ref 98–107)
CO2 SERPL-SCNC: 27 MMOL/L (ref 22–29)
CREAT SERPL-MCNC: 0.96 MG/DL (ref 0.76–1.27)
DEPRECATED RDW RBC AUTO: 47.2 FL (ref 37–54)
EGFRCR SERPLBLD CKD-EPI 2021: 89.9 ML/MIN/1.73
EOSINOPHIL # BLD AUTO: 0.15 10*3/MM3 (ref 0–0.4)
EOSINOPHIL NFR BLD AUTO: 3.3 % (ref 0.3–6.2)
ERYTHROCYTE [DISTWIDTH] IN BLOOD BY AUTOMATED COUNT: 13.8 % (ref 12.3–15.4)
GLUCOSE SERPL-MCNC: 126 MG/DL (ref 65–99)
HCT VFR BLD AUTO: 34.9 % (ref 37.5–51)
HGB BLD-MCNC: 12.3 G/DL (ref 13–17.7)
IMM GRANULOCYTES # BLD AUTO: 0.16 10*3/MM3 (ref 0–0.05)
IMM GRANULOCYTES NFR BLD AUTO: 3.5 % (ref 0–0.5)
INR PPP: 2.08 (ref 0.9–1.1)
LYMPHOCYTES # BLD AUTO: 1.21 10*3/MM3 (ref 0.7–3.1)
LYMPHOCYTES NFR BLD AUTO: 26.3 % (ref 19.6–45.3)
MCH RBC QN AUTO: 33.8 PG (ref 26.6–33)
MCHC RBC AUTO-ENTMCNC: 35.2 G/DL (ref 31.5–35.7)
MCV RBC AUTO: 95.9 FL (ref 79–97)
MONOCYTES # BLD AUTO: 0.56 10*3/MM3 (ref 0.1–0.9)
MONOCYTES NFR BLD AUTO: 12.2 % (ref 5–12)
NEUTROPHILS NFR BLD AUTO: 2.49 10*3/MM3 (ref 1.7–7)
NEUTROPHILS NFR BLD AUTO: 54 % (ref 42.7–76)
NRBC BLD AUTO-RTO: 0 /100 WBC (ref 0–0.2)
PLATELET # BLD AUTO: 131 10*3/MM3 (ref 140–450)
PMV BLD AUTO: 10 FL (ref 6–12)
POTASSIUM SERPL-SCNC: 4.5 MMOL/L (ref 3.5–5.2)
PROTHROMBIN TIME: 22.9 SECONDS (ref 11.7–14.2)
QT INTERVAL: 451 MS
RBC # BLD AUTO: 3.64 10*6/MM3 (ref 4.14–5.8)
SODIUM SERPL-SCNC: 144 MMOL/L (ref 136–145)
WBC NRBC COR # BLD: 4.6 10*3/MM3 (ref 3.4–10.8)

## 2022-08-06 PROCEDURE — B5181ZA FLUOROSCOPY OF SUPERIOR VENA CAVA USING LOW OSMOLAR CONTRAST, GUIDANCE: ICD-10-PCS | Performed by: RADIOLOGY

## 2022-08-06 PROCEDURE — 25010000002 HEPARIN (PORCINE) 25000-0.45 UT/250ML-% SOLUTION: Performed by: INTERNAL MEDICINE

## 2022-08-06 PROCEDURE — 02HV33Z INSERTION OF INFUSION DEVICE INTO SUPERIOR VENA CAVA, PERCUTANEOUS APPROACH: ICD-10-PCS | Performed by: RADIOLOGY

## 2022-08-06 PROCEDURE — 99232 SBSQ HOSP IP/OBS MODERATE 35: CPT

## 2022-08-06 PROCEDURE — 85025 COMPLETE CBC W/AUTO DIFF WBC: CPT | Performed by: INTERNAL MEDICINE

## 2022-08-06 PROCEDURE — 0 LIDOCAINE 1 % SOLUTION: Performed by: RADIOLOGY

## 2022-08-06 PROCEDURE — 85610 PROTHROMBIN TIME: CPT | Performed by: NURSE PRACTITIONER

## 2022-08-06 PROCEDURE — 85730 THROMBOPLASTIN TIME PARTIAL: CPT | Performed by: INTERNAL MEDICINE

## 2022-08-06 PROCEDURE — 80048 BASIC METABOLIC PNL TOTAL CA: CPT | Performed by: NURSE PRACTITIONER

## 2022-08-06 PROCEDURE — C1751 CATH, INF, PER/CENT/MIDLINE: HCPCS

## 2022-08-06 PROCEDURE — 93010 ELECTROCARDIOGRAM REPORT: CPT | Performed by: INTERNAL MEDICINE

## 2022-08-06 PROCEDURE — 25010000002 CEFTRIAXONE PER 250 MG: Performed by: INTERNAL MEDICINE

## 2022-08-06 PROCEDURE — 93005 ELECTROCARDIOGRAM TRACING: CPT | Performed by: NURSE PRACTITIONER

## 2022-08-06 RX ORDER — AMIODARONE HYDROCHLORIDE 200 MG/1
200 TABLET ORAL EVERY 12 HOURS SCHEDULED
Qty: 60 TABLET | Refills: 0 | Status: SHIPPED | OUTPATIENT
Start: 2022-08-06 | End: 2022-08-19

## 2022-08-06 RX ORDER — WARFARIN SODIUM 7.5 MG/1
7.5 TABLET ORAL
Status: COMPLETED | OUTPATIENT
Start: 2022-08-06 | End: 2022-08-06

## 2022-08-06 RX ORDER — LIDOCAINE HYDROCHLORIDE 10 MG/ML
10 INJECTION, SOLUTION INFILTRATION; PERINEURAL ONCE
Status: COMPLETED | OUTPATIENT
Start: 2022-08-06 | End: 2022-08-06

## 2022-08-06 RX ORDER — ENOXAPARIN SODIUM 150 MG/ML
1 INJECTION SUBCUTANEOUS EVERY 12 HOURS SCHEDULED
Qty: 8 ML | Refills: 0 | Status: SHIPPED | OUTPATIENT
Start: 2022-08-06 | End: 2022-08-11

## 2022-08-06 RX ADMIN — CHOLESTYRAMINE 1 PACKET: 4 POWDER, FOR SUSPENSION ORAL at 21:43

## 2022-08-06 RX ADMIN — AMIODARONE HYDROCHLORIDE 200 MG: 200 TABLET ORAL at 08:21

## 2022-08-06 RX ADMIN — Medication 10 ML: at 08:22

## 2022-08-06 RX ADMIN — WARFARIN 7.5 MG: 7.5 TABLET ORAL at 18:47

## 2022-08-06 RX ADMIN — LIDOCAINE HYDROCHLORIDE 1 ML: 10 INJECTION, SOLUTION INFILTRATION; PERINEURAL at 12:52

## 2022-08-06 RX ADMIN — HEPARIN SODIUM 9.3 UNITS/KG/HR: 10000 INJECTION, SOLUTION INTRAVENOUS at 00:49

## 2022-08-06 RX ADMIN — HEPARIN SODIUM 9.3 UNITS/KG/HR: 10000 INJECTION, SOLUTION INTRAVENOUS at 21:44

## 2022-08-06 RX ADMIN — Medication 10 ML: at 20:20

## 2022-08-06 RX ADMIN — CHOLESTYRAMINE 1 PACKET: 4 POWDER, FOR SUSPENSION ORAL at 08:22

## 2022-08-06 RX ADMIN — CEFTRIAXONE 2 G: 2 INJECTION, POWDER, FOR SOLUTION INTRAMUSCULAR; INTRAVENOUS at 08:22

## 2022-08-06 RX ADMIN — ATENOLOL 25 MG: 25 TABLET ORAL at 08:21

## 2022-08-06 RX ADMIN — AMIODARONE HYDROCHLORIDE 200 MG: 200 TABLET ORAL at 20:19

## 2022-08-06 NOTE — PROGRESS NOTES
[facility] Clinical Pharmacy Services: Warfarin Dosing/Monitoring Consult    Elliot Sebastian is a 62 y.o. male, estimated creatinine clearance is 131.2 mL/min (by C-G formula based on SCr of 0.92 mg/dL). weighing (!) 137 kg (301 lb).    Results from last 7 days   Lab Units 08/06/22  0434 08/05/22  2105 08/05/22  1416 08/05/22  0052 08/04/22  1445 08/04/22  0750 08/03/22  1210 08/03/22  0548 08/02/22  0142 08/01/22  2209   INR  2.08*  --  2.16*  --   --  1.96*  --  1.85* 1.93* 1.79*   APTT seconds 67.8* 57.9* 89.0* 86.6* 63.5* 56.8*   < >  --   --   --    HEMOGLOBIN g/dL 12.3*  --  11.7*  --   --  12.2*  --  13.2  --  15.6   HEMATOCRIT % 34.9*  --  33.2*  --   --  36.1*  --  36.5*  --  44.7   PLATELETS 10*3/mm3 131*  --  112*  --   --  95*  --  90*  --  131*    < > = values in this interval not displayed.     Prior to admission anticoagulation: 2.5mg M/F and 5mg all other days    Hospital Anticoagulation:  Consulting provider: Nataly NICOLE  Inpatient Start date: 8/2  Indication: A Fib - requiring full anticoagulation/mechanical valve  Target INR: 2.5 - 3.5  Expected duration: indefinite   Bridge Therapy: Yes  with unfractionated heparin    Potential food or drug interactions:  Amiodarone- may enhance the anticoagulant effect of Vitamin K Antagonists  Cholestyramine- may decrease the serum concentration of Vitamin K Antagonists    Education complete?/Date: No; plan for follow up TBD    Assessment/Plan:  Dose: INR subtherapeutic. Will order warfarin 7.5 mg today  Nursing to monitor for any signs or symptoms of bleeding  Follow up daily INRs and dose adjustments    Pharmacy will continue to follow until discharge or discontinuation of warfarin.     Liza Marroquin, [facility]

## 2022-08-06 NOTE — PROGRESS NOTES
Name: Elliot Sebastian ADMIT: 2022   : 1960  PCP: Mannie Beltran DO    MRN: [id number] LOS: 5 days   AGE/SEX: 62 y.o. male  ROOM: [facility]   Subjective   Chief Complaint   Patient presents with   • Leg Swelling   • Weakness - Generalized     On IV ABX  On heparin gtt  Had MRI yesterday  Feels well  Ambulating some    ROS  No f/c  No n/v  No cp/palp  No soa/cough    Objective   Vital Signs  Temp:  [97 °F (36.1 °C)-97.8 °F (36.6 °C)] 97 °F (36.1 °C)  Heart Rate:  [55-98] 57  Resp:  [18-19] 18  BP: (115-148)/(78-86) 136/85  SpO2:  [95 %-98 %] 97 %  on   ;   Device (Oxygen Therapy): room air  Body mass index is 34.78 kg/m².    Physical Exam  Constitutional:       Appearance: He is ill-appearing.   HENT:      Head: Normocephalic and atraumatic.   Eyes:      General: No scleral icterus.  Cardiovascular:      Rate and Rhythm: Regular rhythm. Tachycardia present.      Comments: +mechanical click  Pulmonary:      Effort: Pulmonary effort is normal. No respiratory distress.      Breath sounds: Normal breath sounds.   Abdominal:      General: There is no distension.      Palpations: Abdomen is soft.      Tenderness: There is no abdominal tenderness.   Musculoskeletal:      Cervical back: Neck supple.   Neurological:      Mental Status: He is alert.   Psychiatric:         Behavior: Behavior normal.     Right lower extremity swelling    Results Review:       I reviewed the patient's new clinical results.  Results from last 7 days   Lab Units 22  0434 22  1416 22  0750 22  0548   WBC 10*3/mm3 4.60 4.29 4.39 3.67   HEMOGLOBIN g/dL 12.3* 11.7* 12.2* 13.2   PLATELETS 10*3/mm3 131* 112* 95* 90*     Results from last 7 days   Lab Units 22  1512 22  1416 22  0750 22  0548   SODIUM mmol/L 144 139 135* 131*   POTASSIUM mmol/L 4.5 3.4* 3.6 3.8   CHLORIDE mmol/L 107 107 108* 102   CO2 mmol/L 27.0 22.8 18.3* 20.0*   BUN mg/dL 11 9 11 13   CREATININE mg/dL 0.96 0.92 0.89 1.00    GLUCOSE mg/dL 126* 101* 97 103*   Estimated Creatinine Clearance: 125.7 mL/min (by C-G formula based on SCr of 0.96 mg/dL).  Results from last 7 days   Lab Units 08/03/22  0548 08/01/22 2209   ALBUMIN g/dL 2.90* 4.00   BILIRUBIN mg/dL 1.0 1.7*   ALK PHOS U/L 53 67   AST (SGOT) U/L 47* 44*   ALT (SGPT) U/L 29 35     Results from last 7 days   Lab Units 08/06/22  1512 08/05/22  1416 08/04/22  0750 08/03/22  0548 08/01/22  2209   CALCIUM mg/dL 9.4 9.1 8.6 8.4* 9.7   ALBUMIN g/dL  --   --   --  2.90* 4.00   MAGNESIUM mg/dL  --  1.8  --  1.8  --      Results from last 7 days   Lab Units 08/02/22  0454 08/02/22  0142 08/01/22 2209   LACTATE mmol/L 2.0 4.7* 3.0*       Coag   Results from last 7 days   Lab Units 08/06/22  0434 08/05/22  2105 08/05/22  1416 08/05/22  0052 08/04/22  1445 08/04/22  0750 08/04/22  0052 08/03/22  1210 08/03/22  0548 08/02/22  0142 08/01/22  2209   INR  2.08*  --  2.16*  --   --  1.96*  --   --  1.85* 1.93* 1.79*   APTT seconds 67.8* 57.9* 89.0* 86.6* 63.5* 56.8* 47.3*   < >  --   --   --     < > = values in this interval not displayed.     HbA1C No results found for: HGBA1C  Infection   Results from last 7 days   Lab Units 08/03/22  0547 08/03/22  0546 08/01/22 2209   BLOODCX  No growth at 3 days No growth at 3 days Streptococcus dysgalactiae ssp equisimilis*  Streptococcus dysgalactiae ssp equisimilis*   BCIDPCR   --   --  Streptococcus spp, not A, B, or pneumonia. Identification by BCID2 PCR.*     Radiology(recent) IR PICC W Imaging Guidance    Result Date: 8/6/2022  Successful placement of a 5 [place] of [place] double lumen PICC line using ultrasound and fluoroscopic guidance.  FLUOROSCOPY TIME: 10 seconds, 1 images.  DOSE: 3.8 mGy  This report was finalized on 8/6/2022 1:05 PM by Dr. Noah Emery M.D.      MRI Lumbar Spine With & Without Contrast    Result Date: 8/5/2022  1.  No evidence of discitis, osteomyelitis or epidural abscess. 2.  Multilevel degenerative disease involving the lumbar  spine as described above including bilateral L5 pars defects, grade 1 anterolisthesis of L5 upon S1 and a milder retrolisthesis of L4 upon L5. Mild-to-moderate facet degenerative disease is noted as described. See above.      No results found for: TROPONINT, TROPONINI, BNP  No components found for: TSH;2    amiodarone, 200 mg, Oral, Q12H  atenolol, 25 mg, Oral, Q24H  cefTRIAXone, 2 g, Intravenous, Q24H  cholestyramine, 1 packet, Oral, Q12H  famotidine, 20 mg, Intravenous, Once  sodium chloride, 10 mL, Intravenous, Q12H  warfarin (COUMADIN) (dosing per levels), , Does not apply, Daily  warfarin, 7.5 mg, Oral, Once      heparin, 7.3 Units/kg/hr, Last Rate: 9.3 Units/kg/hr (08/06/22 0049)  Pharmacy to dose warfarin,     Diet Regular; Consistent Carbohydrate      Assessment & Plan      Active Hospital Problems    Diagnosis  POA   • **Acute bacterial endocarditis [I33.0]  Yes   • Gram positive septicemia (HCC) [A41.89]  Yes   • Fever [R50.9]  Yes   • Chronic anticoagulation [Z79.01]  Not Applicable   • ASHIA (acute kidney injury) (HCC) [N17.9]  Yes   • Hx of mitral valve replacement with mechanical valve [Z95.2] [Z95.2]  Not Applicable   • Paroxysmal atrial fibrillation (HCC) [I48.0]  Yes   • Bacterial endocarditis - history of [I33.0]  Yes   • Cellulitis [L03.90]  Yes      Resolved Hospital Problems   No resolved problems to display.     62-year-old male with history of previous bacterial endocarditis with mitral valve replacement, history of DVT, chronic anticoagulation, A. fib, BPH, CHF.  He presented with swelling of his right leg as well as redness.  He was found to have cellulitis as well as bacteremia.    #1 Gram-positive septicemia/endocarditis/cellulitis, currently patient is on IV ABX and plans for 6 weeks.  Underwent MOE with evidence of vegetation/endocarditis but will not need surgical therapy at this time.  MRI L spine without infection  2.  A. fib with RVR, currently anticoagulated with warfarin and given  that INR is not therapeutic will be bridged with heparin and is on amiodarone drip and cardiology is following and adjusting medications. oral amio and BB. Monitor PTT (high risk medication)  3.  Cellulitis right leg, as mentioned above.  4. Back pain- prn agents,  MRI L spine with degenerative changes but no infection. Can have outpatient follow up.   5. Hypokalemia- replace    JONY RN  JONY pharmacist  JONY Duong    Dispo- to home with HH. Potentially with lovenox bridge but cannot leave today as cannot have ABX tomorrow at home    Greater than 36 minutes spent with greater than 50% counseling and coordinating care        Bobby Mcbride MD  [facility]  08/06/22  16:23 EDT

## 2022-08-06 NOTE — DISCHARGE SUMMARY
NAME: Elliot Sebastian ADMIT: 2022   : 1960  PCP: Mannie Beltran DO    MRN: [id number] LOS: 6 days   AGE/SEX: 62 y.o. male  ROOM: [facility]     Date of Admission:  2022  Date of Discharge:  2022    PCP: Mannie Beltran DO    CHIEF COMPLAINT  Leg Swelling and Weakness - Generalized      DISCHARGE DIAGNOSIS  Active Hospital Problems    Diagnosis  POA   • **Acute bacterial endocarditis [I33.0]  Yes   • Gram positive septicemia (HCC) [A41.89]  Yes   • Fever [R50.9]  Yes   • Chronic anticoagulation [Z79.01]  Not Applicable   • ASHIA (acute kidney injury) (HCC) [N17.9]  Yes   • Hx of mitral valve replacement with mechanical valve [Z95.2] [Z95.2]  Not Applicable   • Paroxysmal atrial fibrillation ([facility]) [I48.0]  Yes   • Bacterial endocarditis - history of [I33.0]  Yes   • Cellulitis [L03.90]  Yes      Resolved Hospital Problems   No resolved problems to display.       SECONDARY DIAGNOSES  Past Medical History:   Diagnosis Date   • Acute bacterial endocarditis    • Anemia    • APC (atrial premature contractions)    • Atrial fibrillation (HCC)    • BPH (benign prostatic hypertrophy)    • CHF (congestive heart failure) ([facility])    • Cholelithiasis    • Chronic constipation    • Deep vein thrombophlebitis of leg ([facility])     DISTAL   • Disease of thyroid gland    • Gout    • Intestinal obstruction ([facility])    • Ischemic enteritis ([facility])    • Left heart failure, NYHA class 3 ([facility])     CLASS 111 LEFT SYSTOIC CONGESTIVE HEART FAILURE   • Mitral regurgitation    • Pleural effusion, bilateral    • Pulmonary hypertension ([facility])    • 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[facility])    • Umbilical hernia    • Vitamin D deficiency        CONSULTS   ID  Cardiology    HOSPITAL COURSE  62-year-old male with history of previous bacterial endocarditis with mitral valve replacement, history of DVT, chronic anticoagulation, A. fib, BPH, CHF.  He presented with swelling of his right leg as well as redness.  He was found to have  cellulitis as well as bacteremia with positive blood cultures for Streptococcus dysgalactiae ssp equisimilis.     Patient is on IV ceftriaxone and plans for 6 weeks.  Underwent MOE with evidence of vegetation/endocarditis but will not need surgical therapy at this time.  MRI L spine without infection    A. fib with RVR, currently anticoagulated with warfarin and given that INR is not therapeutic will be bridged with lovenox.  Given 4 days of Lovenox for bridging at home and he will have INR check tomorrow with further direction as recommended by his Coumadin clinic.  His previous dosing of Coumadin was alternating 5 mg and 2.5 mg dosing.  Cardiology today is recommending a dose of 10 mg but he will have further instructions Monday and Tuesday by his Coumadin clinic.  Goal INR is 2.5-3.5 with his mechanical valve.    Back pain- prn agents,  MRI L spine with degenerative changes but no infection. Can have outpatient follow up as needed and discussed this with him.      DIAGNOSTICS    08/01/2022 2209 08/04/2022 0622 Blood Culture - Blood, Arm, Left [[id number]]    (Abnormal)   Blood from Arm, Left    Final result Component Value   Blood Culture Streptococcus dysgalactiae ssp equisimilis Critical    Isolated from Aerobic and Anaerobic Bottles   Gram Stain Anaerobic Bottle Gram positive cocci in chains Critical     Aerobic Bottle Gram positive cocci in chains Critical          Susceptibility     Streptococcus dysgalactiae ssp equisimilis     RENATA     Ceftriaxone <=0.12  Susceptible     Levofloxacin 0.5  Susceptible     Penicillin G <=0.06  Susceptible     Vancomycin 0.5  Susceptible               Linear View           08/07/2022 0604 08/07/2022 0734 Protime-INR [[id number]]   (Abnormal)   Blood    Final result Component Value Units   Protime 22.0 High  Seconds   INR 1.97 High            08/07/2022 0604 08/07/2022 0753 Basic Metabolic Panel [[id number]]    (Abnormal)   Blood    Final result Component Value Units   Glucose  122 High  mg/dL   BUN 11 mg/dL   Creatinine 0.89 mg/dL   Sodium 140 mmol/L   Potassium 3.6 mmol/L   Chloride 103 mmol/L   CO2 24.6 mmol/L   Calcium 9.6 mg/dL   BUN/Creatinine Ratio 12.4    Anion Gap 12.4 mmol/L   eGFR 97.5  mL/min/1.73          08/07/2022 0604 08/07/2022 0734 aPTT [[id number]]   (Abnormal)   Blood    Final result Component Value Units   PTT 60.9 High  seconds          08/07/2022 0604 08/07/2022 0737 CBC Auto Differential [[id number]]   (Abnormal)   Blood    Final result Component Value Units   WBC 4.91 10*3/mm3   RBC 3.62 Low  10*6/mm3   Hemoglobin 12.1 Low  g/dL   Hematocrit 35.2 Low  %   MCV 97.2 High  fL   MCH 33.4 High  pg   MCHC 34.4 g/dL   RDW 13.6 %   RDW-SD 47.9 fl   MPV 10.3 fL   Platelets 167 10*3/mm3   nRBC 0.2 /100 WBC            PHYSICAL EXAM  Objective    Alert  nad  No resp distress  +mechanical click     CONDITION ON DISCHARGE  Stable.      DISCHARGE DISPOSITION   Home or Self Care      DISCHARGE MEDICATIONS       Your medication list      START taking these medications      Instructions Last Dose Given Next Dose Due   amiodarone 200 MG tablet  Commonly known as: PACERONE      Take 1 tablet by mouth Every 12 (Twelve) Hours.       cefTRIAXone 2 g in sodium chloride 0.9 % 100 mL IVPB      Infuse 2 g into a venous catheter Daily for 38 doses. Indications: Pneumonia       Enoxaparin Sodium 150 MG/ML syringe  Commonly known as: LOVENOX      Discard 0.09 mL then Inject 0.91 mL under the skin into the appropriate area as directed Every 12 (Twelve) Hours for 4 days. Until INR is greater than 2.5, further instructions provided by your coumadin clinic          CHANGE how you take these medications      Instructions Last Dose Given Next Dose Due   warfarin 5 MG tablet  Commonly known as: COUMADIN  What changed: additional instructions      Take 10mg po every afternoon on 8/7 and 8/8 unless otherwise instructed by Coumadin clinic. Dose on 8/9 may change but will need to be taken daily as directed  by coumadin clinic          CONTINUE taking these medications      Instructions Last Dose Given Next Dose Due   atenolol 25 MG tablet  Commonly known as: TENORMIN      Take 1 tablet by mouth Daily. MUST MAKE FOLLOW UP APPOINTMENT FOR FUTURE REFILLS       cholestyramine 4 GM/DOSE powder  Commonly known as: Questran      Take 1 packet by mouth Daily. If no improvement after two weeks, may increase to BID.       losartan 50 MG tablet  Commonly known as: COZAAR      Take 1 tablet by mouth Daily. MUST MAKE FOLLOW UP APPOINTMENT FOR FUTURE REFILLS 6/30/2022       Mens Multivitamin Plus tablet tablet  Generic drug: multivitamin with minerals      Take 2 capsules by mouth Daily. Soft gels       Vitamin D3 50 MCG (2000 UT) capsule      Take 1,000 Units by mouth Daily.             Where to Get Your Medications      These medications were sent to [facility] - [place] [id number]    Hours: 7:00 AM-6:00 PM Mon-Fri, 8:00 AM-4:30 PM Sat-Sun (Closed 12-12:30PM) Phone: [phone]   · amiodarone 200 MG tablet  · Enoxaparin Sodium 150 MG/ML syringe     Information about where to get these medications is not yet available    Ask your nurse or doctor about these medications  · cefTRIAXone 2 g in sodium chloride 0.9 % 100 mL IVPB  · warfarin 5 MG tablet          Future Appointments   Date Time Provider Department Center   8/19/2022  2:00 PM Leyla Byrne APRN [facility]   9/13/2022  1:40 PM Dillon Vega MD [facility]     Additional Instructions for the Follow-ups that You Need to Schedule     Ambulatory Referral to Home Health (Hospital)   As directed      Mannie Beltran, DO  IV ABX, INR checks goal INR 2.5-3.5    Order Comments: Mannie Beltran, DO IV ABX, INR checks goal INR 2.5-3.5     Face to Face Visit Date: 8/7/2022    Follow-up provider for Plan of Care?: I treated the patient in an acute care facility and will not continue treatment after discharge.    Follow-up  provider: MYAH ALEXANDER [[id number]]    Reason/Clinical Findings: debility, Afib, mechanical valve, bacteremia    Describe mobility limitations that make leaving home difficult: debility, Afib, mechanical valve, bacteremia    Nursing/Therapeutic Services Requested: Skilled Nursing    Skilled nursing orders: Medication education PICC line care/instruction Infusion therapy         Discharge Follow-up with Specialty: Cardiology in 1-2 weeks, PCP in 1-2 weeks, Infectious disease Dr. Vega 9/13   As directed      Specialty: Cardiology in 1-2 weeks, PCP in 1-2 weeks, Infectious disease Dr. Vega 9/13         Discharge Follow-up with Specialty: INR check with coumadin clinic sunday or monday. May have INR drawn by home health as well. Continue lovenox until instructed to stop by coumadin clinic if INR is over 2.5   As directed      Specialty: INR check with coumadin clinic sunday or monday. May have INR drawn by home health as well. Continue lovenox until instructed to stop by coumadin clinic if INR is over 2.5            Contact information for follow-up providers     Myah Alexander DO .    Specialty: Family Medicine  Contact information:  [address] [facility] [address] [phone]                   Contact information for after-discharge care     Dialysis/Infusion     [facility] HOME [facility] .    Service: Infusion and IV Therapy  Contact information:  [address]  [facility] [address] [phone]                 Home Medical Care     [facility] .    Service: Home Health Services  Contact information:  [address]  [facility] [address] [phone]                             TEST  RESULTS PENDING AT DISCHARGE  Pending Labs     Order Current Status    Blood Culture - Blood, Arm, Right Preliminary result    Blood Culture - Blood, Arm, Right Preliminary result             Bobby Mcbride MD  [place] [facility]ist  [place]  08/07/22  12:05 EDT      Time: greater than 32 minutes on discharge  It was a pleasure taking care of this patient while in the hospital.

## 2022-08-06 NOTE — PLAN OF CARE
Problem: Adult Inpatient Plan of Care  Goal: Plan of Care Review  Outcome: Ongoing, Progressing  Flowsheets  Taken 8/6/2022 1518 by Annika Page RN  Plan of Care Reviewed With: patient  Taken 8/6/2022 0504 by Krissy Roque RN  Progress: improving  Goal: Patient-Specific Goal (Individualized)  Outcome: Ongoing, Progressing  Goal: Absence of Hospital-Acquired Illness or Injury  Outcome: Ongoing, Progressing  Intervention: Identify and Manage Fall Risk  Recent Flowsheet Documentation  Taken 8/6/2022 1442 by Annika Page RN  Safety Promotion/Fall Prevention: (back to the unit) safety round/check completed  Taken 8/6/2022 1215 by Annika Page RN  Safety Promotion/Fall Prevention: patient off unit  Taken 8/6/2022 1050 by Annika Page RN  Safety Promotion/Fall Prevention: safety round/check completed  Taken 8/6/2022 0819 by Annika Page RN  Safety Promotion/Fall Prevention:   activity supervised   safety round/check completed  Taken 8/6/2022 0725 by Annika Page RN  Safety Promotion/Fall Prevention:   activity supervised   gait belt   safety round/check completed  Intervention: Prevent Skin Injury  Recent Flowsheet Documentation  Taken 8/6/2022 1442 by Annika Page RN  Body Position: position changed independently  Taken 8/6/2022 1050 by Annika Page RN  Body Position: position changed independently  Taken 8/6/2022 0819 by Annika Page RN  Body Position: position changed independently  Skin Protection: adhesive use limited  Taken 8/6/2022 0725 by Annika Page RN  Body Position: position changed independently  Intervention: Prevent and Manage VTE (Venous Thromboembolism) Risk  Recent Flowsheet Documentation  Taken 8/6/2022 1442 by Annika Page RN  Activity Management: activity adjusted per tolerance  Taken 8/6/2022 1050 by Annika Page RN  Activity Management: activity  adjusted per tolerance  Taken 8/6/2022 0819 by Annika Page RN  VTE Prevention/Management: (heparin gtt) --  Range of Motion: active ROM (range of motion) encouraged  Taken 8/6/2022 0725 by Annika Page RN  Activity Management: activity adjusted per tolerance  Intervention: Prevent Infection  Recent Flowsheet Documentation  Taken 8/6/2022 1442 by Annika Page RN  Infection Prevention: single patient room provided  Taken 8/6/2022 1050 by Annika Page RN  Infection Prevention: single patient room provided  Taken 8/6/2022 0819 by Annika Page RN  Infection Prevention: single patient room provided  Goal: Optimal Comfort and Wellbeing  Outcome: Ongoing, Progressing  Intervention: Monitor Pain and Promote Comfort  Recent Flowsheet Documentation  Taken 8/6/2022 1442 by Annika Page RN  Pain Management Interventions: no interventions per patient request  Taken 8/6/2022 0819 by Annika Page RN  Pain Management Interventions: no interventions per patient request  Intervention: Provide Person-Centered Care  Recent Flowsheet Documentation  Taken 8/6/2022 1442 by Annika Page RN  Trust Relationship/Rapport:   care explained   thoughts/feelings acknowledged   reassurance provided  Taken 8/6/2022 0819 by Annika Page RN  Trust Relationship/Rapport:   care explained   questions encouraged   reassurance provided   thoughts/feelings acknowledged  Goal: Readiness for Transition of Care  Outcome: Ongoing, Progressing     Problem: Heart Failure Comorbidity  Goal: Maintenance of Heart Failure Symptom Control  Outcome: Ongoing, Progressing  Intervention: Maintain Heart Failure-Management  Recent Flowsheet Documentation  Taken 8/6/2022 1442 by Annika Page RN  Medication Review/Management: medications reviewed  Taken 8/6/2022 1050 by Annika Page RN  Medication Review/Management: medications  reviewed  Taken 8/6/2022 0819 by Annkia Page RN  Medication Review/Management: medications reviewed  Taken 8/6/2022 0725 by Annika Page RN  Medication Review/Management: medications reviewed     Problem: Infection Progression (Sepsis/Septic Shock)  Goal: Absence of Infection Signs and Symptoms  Outcome: Ongoing, Progressing  Intervention: Initiate Sepsis Management  Recent Flowsheet Documentation  Taken 8/6/2022 1442 by Annika Page RN  Infection Prevention: single patient room provided  Taken 8/6/2022 1050 by Annika Page RN  Infection Prevention: single patient room provided  Taken 8/6/2022 0819 by Annika Page RN  Infection Prevention: single patient room provided  Intervention: Promote Recovery  Recent Flowsheet Documentation  Taken 8/6/2022 1442 by Annika Page RN  Activity Management: activity adjusted per tolerance  Taken 8/6/2022 1050 by Annika Page RN  Activity Management: activity adjusted per tolerance  Taken 8/6/2022 0819 by Annika Page RN  Sleep/Rest Enhancement: regular sleep/rest pattern promoted  Taken 8/6/2022 0725 by Annika Page RN  Activity Management: activity adjusted per tolerance     Problem: Infection  Goal: Absence of Infection Signs and Symptoms  Outcome: Ongoing, Progressing   Goal Outcome Evaluation:  Plan of Care Reviewed With: patient

## 2022-08-06 NOTE — PROGRESS NOTES
Weekend Coverage  Cardiology Progress Note    Patient Identification:  Name: Elliot Sebastian  Age: 62 y.o.  Sex: male  :  1960  MRN: [id number]                 Follow Up / Chief Complaint: : Follow up for endocarditis    Interval History: INR 2.08 on heparin gtt bridge, up in chair on room air        Objective:    Past Medical History:  Past Medical History:   Diagnosis Date   • Acute bacterial endocarditis    • Anemia    • APC (atrial premature contractions)    • Atrial fibrillation (HCC)    • BPH (benign prostatic hypertrophy)    • CHF (congestive heart failure) (HCC)    • Cholelithiasis    • Chronic constipation    • Deep vein thrombophlebitis of leg (HCC)     DISTAL   • Disease of thyroid gland    • Gout    • Intestinal obstruction (HCC)    • Ischemic enteritis (HCC)    • Left heart failure, NYHA class 3 (HCC)     CLASS 111 LEFT SYSTOIC CONGESTIVE HEART FAILURE   • Mitral regurgitation    • Pleural effusion, bilateral    • Pulmonary hypertension (HCC)    • 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HCC)    • Umbilical hernia    • Vitamin D deficiency      Past Surgical History:  Past Surgical History:   Procedure Laterality Date   • APPENDECTOMY     • CHOLECYSTECTOMY     • COLONOSCOPY  2013    normal per patient   • COLONOSCOPY N/A 10/26/2020    Procedure: COLONOSCOPY into cecum with biopsy, polypectomy, clip placement;  Surgeon: Juan Phillips MD;  Location: [facility] ENDOSCOPY;  Service: Gastroenterology;  Laterality: N/A;  polyps, clip  asc polyp removed but not retrieved   • EXPLORATION NECK FOR POSTOP HEMORRHAGE / THROMBOSIS / INFECTION     • MITRAL VALVE REPLACEMENT  2013    33MM [facility] MECHANICAL VALVE, PRESERVATION OF CORDS TO SUBVALVULAR APPARATUS AND DOROTHY GORE-FLORI CORD IN THE P2 AREA, DEBRIDEMENT OF ANTERIOR AND POSTERIOR MITRAL LEAFLET         Social History:   Social History     Tobacco Use   • Smoking status: Never Smoker   • Smokeless tobacco: Never Used   • Tobacco comment:  caffeine use   Substance Use Topics   • Alcohol use: Yes     Alcohol/week: 2.0 standard drinks     Types: 2 Cans of beer per week     Comment: 1-2 nightly      Family History:  Family History   Problem Relation Age of Onset   • Diabetes Mother    • Heart disease Father    • Diabetes Sister    • Diabetes Brother           Allergies:  No Known Allergies  Scheduled Meds:  amiodarone, 200 mg, Q12H  atenolol, 25 mg, Q24H  cefTRIAXone, 2 g, Q24H  cholestyramine, 1 packet, Q12H  famotidine, 20 mg, Once  sodium chloride, 10 mL, Q12H  warfarin (COUMADIN) (dosing per levels), , Daily  warfarin, 7.5 mg, Once            INTAKE AND OUTPUT:    Intake/Output Summary (Last 24 hours) at 8/6/2022 1322  Last data filed at 8/6/2022 0948  Gross per 24 hour   Intake --   Output 4200 ml   Net -4200 ml       Review of Systems:   GI: no n/v or abd pain  Cardiac: no chest pain or palpitations  Pulmonary: no shortness ofbreath or cough    Constitutional:  Temp:  [97 °F (36.1 °C)-97.8 °F (36.6 °C)] 97 °F (36.1 °C)  Heart Rate:  [55-98] 60  Resp:  [18-19] 18  BP: (115-148)/(78-86) 148/80    Physical Exam:  General:  Appears in no acute distress  Eyes: EOM normal no conjunctival drainage  HEENT:  No JVD. Thyroid not visibly enlarged. No mucosal pallor or cyanosis  Respiratory: Respirations regular and unlabored at rest. BBS with good air entry in all fields. No crackles, rubs or wheezes auscultated  Cardiovascular: S1S2 Irregular rate and rhythm. No murmur, rub or gallop. No carotid bruits. DP/PT pulses   2+  . No pretibial pitting edema  Gastrointestinal: Abdomen soft, flat, non tender. Bowel sounds present.   Musculoskeletal: BARRERA x4. No abnormal movements  Extremities: No digital clubbing or cyanosis  Skin: Color pink. Skin warm and dry to touch. No rashes    Neuro: AAO x3 CN II-XII grossly intact  Psych: Mood and affect normal, pleasant and cooperative      I reviewed the patient's new clinical results, and personally reviewed and interpreted  the patient's ECG and telemetry data from the last 24 hours      Cardiographics        Sinus rhythm,         Lab Review   Results from last 7 days   Lab Units 08/03/22  0548 08/01/22  2209   TROPONIN T ng/mL <0.010 <0.010     Results from last 7 days   Lab Units 08/05/22  1416   MAGNESIUM mg/dL 1.8     Results from last 7 days   Lab Units 08/05/22  1416   SODIUM mmol/L 139   POTASSIUM mmol/L 3.4*   BUN mg/dL 9   CREATININE mg/dL 0.92   CALCIUM mg/dL 9.1     @[id number]@  Results from last 7 days   Lab Units 08/06/22  0434 08/05/22  1416 08/04/22  0750   WBC 10*3/mm3 4.60 4.29 4.39   HEMOGLOBIN g/dL 12.3* 11.7* 12.2*   HEMATOCRIT % 34.9* 33.2* 36.1*   PLATELETS 10*3/mm3 131* 112* 95*     Results from last 7 days   Lab Units 08/06/22  0434 08/05/22  2105 08/05/22  1416 08/04/22  1445 08/04/22  0750   INR  2.08*  --  2.16*  --  1.96*   APTT seconds 67.8* 57.9* 89.0*   < > 56.8*    < > = values in this interval not displayed.         Assessment/Plan:  1.  Gram-positive septicemia: ID following, MRI yesterday no evidence of discitis, osteomyelitis or epidural abscess multilevel degenerative disease. PICC placed today  2.  Mechanical mitral valve: INR 2.08 (goal 2.5-3.5) continue heparin bridge until INR >/=2.5.  Pharmacy dosing warfarin.  Echo EF 60%, mitral valve peak and mean gradients are wnl  3.  Cellulitis RLE: ID following  4.  A. fib with RVR: Intermittent atrial flutter this morning.  Continue amiodarone and atenolol.  Rates 80-100s.  .    5.  Thrombocytopenia  6.  Endocarditis: ID following managing antibiotics-end date 9/13/2022.  7.  Hypertension: Atenolol added yesterday- BP stable    Tele reviewed, SR, did have intermittent aflutter this am with controlled rate. He asked about being able to go home on lovenox bridge. Would recommend lovenox 1mg/kg subq twice daily and would recommend daily INR until therapeutic in which at that time he could stop the lovenox injections. He did not think he  "would be able to have INR checked daily. Continue heparin bridge until INR >/= 2.5.     )8/6/2022  COREY Tavera/Transcription:   \"Dictated utilizing Dragon dictation\".   "

## 2022-08-06 NOTE — PLAN OF CARE
Goal Outcome Evaluation:  Plan of Care Reviewed With: patient        Progress: improving  Outcome Evaluation: VSS. Heparin gtt continues; rate adjusted per PTT. Potassium replaced. Labs for the morning. Patient to get PICC placed under Fluro today. Possible d/c after PICC line placed.      Problem: Heart Failure Comorbidity  Goal: Maintenance of Heart Failure Symptom Control  Outcome: Ongoing, Progressing  Intervention: Maintain Heart Failure-Management  Recent Flowsheet Documentation  Taken 8/6/2022 0051 by Krissy Roque RN  Medication Review/Management: medications reviewed  Taken 8/5/2022 2221 by Krissy Roque RN  Medication Review/Management: medications reviewed  Taken 8/5/2022 2000 by Krissy Roque RN  Medication Review/Management: medications reviewed     Problem: Infection Progression (Sepsis/Septic Shock)  Goal: Absence of Infection Signs and Symptoms  Outcome: Ongoing, Progressing  Intervention: Promote Recovery  Recent Flowsheet Documentation  Taken 8/6/2022 0051 by Krissy Roque RN  Activity Management: activity adjusted per tolerance  Taken 8/5/2022 2000 by Krissy Roque RN  Activity Management: activity adjusted per tolerance     Problem: Infection  Goal: Absence of Infection Signs and Symptoms  Outcome: Ongoing, Progressing

## 2022-08-06 NOTE — CASE MANAGEMENT/SOCIAL WORK
Continued Stay Note  [facility]     Patient Name: Elliot Sebastian  MRN: [id number]  Today's Date: 8/6/2022    Admit Date: 8/1/2022     Discharge Plan     Row Name 08/06/22 1646       Plan    Plan [facility]; Pt is enrolled in MEDS TO BEDS-will d/c on Lovenox injections.     Plan Comments S/W Christa/Malia FULTON and she advised they do not have a nurse to round on Pt until Monday am.  Christa also confirmed they WILL BE ABLE TO draw Pt PT/INR three times a week in the home.  CCP updated WILLIAN Juan and Dr. Mcbride.  Plan is to d/c Pt home tomorrow after his IV antibiotic infusion.  CCP following.......Aliyah CALLOWAY/WILLIAN CM               Discharge Codes    No documentation.               Expected Discharge Date and Time     Expected Discharge Date Expected Discharge Time    Aug 6, 2022             Aliyah Trujillo RN

## 2022-08-07 ENCOUNTER — READMISSION MANAGEMENT (OUTPATIENT)
Dept: CALL CENTER | Facility: HOSPITAL | Age: 62
End: 2022-08-07

## 2022-08-07 ENCOUNTER — HOME HEALTH ADMISSION (OUTPATIENT)
Dept: HOME HEALTH SERVICES | Facility: HOME HEALTHCARE | Age: 62
End: 2022-08-07

## 2022-08-07 VITALS
TEMPERATURE: 98.3 F | HEIGHT: 78 IN | RESPIRATION RATE: 18 BRPM | SYSTOLIC BLOOD PRESSURE: 139 MMHG | WEIGHT: 301 LBS | OXYGEN SATURATION: 97 % | BODY MASS INDEX: 34.83 KG/M2 | DIASTOLIC BLOOD PRESSURE: 81 MMHG | HEART RATE: 70 BPM

## 2022-08-07 LAB
ANION GAP SERPL CALCULATED.3IONS-SCNC: 12.4 MMOL/L (ref 5–15)
APTT PPP: 60.9 SECONDS (ref 22.7–35.4)
BUN SERPL-MCNC: 11 MG/DL (ref 8–23)
BUN/CREAT SERPL: 12.4 (ref 7–25)
CALCIUM SPEC-SCNC: 9.6 MG/DL (ref 8.6–10.5)
CHLORIDE SERPL-SCNC: 103 MMOL/L (ref 98–107)
CO2 SERPL-SCNC: 24.6 MMOL/L (ref 22–29)
CREAT SERPL-MCNC: 0.89 MG/DL (ref 0.76–1.27)
DEPRECATED RDW RBC AUTO: 47.9 FL (ref 37–54)
EGFRCR SERPLBLD CKD-EPI 2021: 97.5 ML/MIN/1.73
EOSINOPHIL # BLD MANUAL: 0.25 10*3/MM3 (ref 0–0.4)
EOSINOPHIL NFR BLD MANUAL: 5 % (ref 0.3–6.2)
ERYTHROCYTE [DISTWIDTH] IN BLOOD BY AUTOMATED COUNT: 13.6 % (ref 12.3–15.4)
GLUCOSE SERPL-MCNC: 122 MG/DL (ref 65–99)
HCT VFR BLD AUTO: 35.2 % (ref 37.5–51)
HGB BLD-MCNC: 12.1 G/DL (ref 13–17.7)
HYPOCHROMIA BLD QL: ABNORMAL
INR PPP: 1.97 (ref 0.9–1.1)
LYMPHOCYTES # BLD MANUAL: 1.23 10*3/MM3 (ref 0.7–3.1)
LYMPHOCYTES NFR BLD MANUAL: 7 % (ref 5–12)
MCH RBC QN AUTO: 33.4 PG (ref 26.6–33)
MCHC RBC AUTO-ENTMCNC: 34.4 G/DL (ref 31.5–35.7)
MCV RBC AUTO: 97.2 FL (ref 79–97)
MONOCYTES # BLD: 0.34 10*3/MM3 (ref 0.1–0.9)
MYELOCYTES NFR BLD MANUAL: 4 % (ref 0–0)
NEUTROPHILS # BLD AUTO: 2.9 10*3/MM3 (ref 1.7–7)
NEUTROPHILS NFR BLD MANUAL: 59 % (ref 42.7–76)
NRBC BLD AUTO-RTO: 0.2 /100 WBC (ref 0–0.2)
PLAT MORPH BLD: NORMAL
PLATELET # BLD AUTO: 167 10*3/MM3 (ref 140–450)
PMV BLD AUTO: 10.3 FL (ref 6–12)
POTASSIUM SERPL-SCNC: 3.6 MMOL/L (ref 3.5–5.2)
PROTHROMBIN TIME: 22 SECONDS (ref 11.7–14.2)
RBC # BLD AUTO: 3.62 10*6/MM3 (ref 4.14–5.8)
SODIUM SERPL-SCNC: 140 MMOL/L (ref 136–145)
VARIANT LYMPHS NFR BLD MANUAL: 25 % (ref 19.6–45.3)
WBC MORPH BLD: NORMAL
WBC NRBC COR # BLD: 4.91 10*3/MM3 (ref 3.4–10.8)

## 2022-08-07 PROCEDURE — 80048 BASIC METABOLIC PNL TOTAL CA: CPT | Performed by: NURSE PRACTITIONER

## 2022-08-07 PROCEDURE — 25010000002 CEFTRIAXONE PER 250 MG: Performed by: INTERNAL MEDICINE

## 2022-08-07 PROCEDURE — 85610 PROTHROMBIN TIME: CPT | Performed by: NURSE PRACTITIONER

## 2022-08-07 PROCEDURE — 85025 COMPLETE CBC W/AUTO DIFF WBC: CPT | Performed by: INTERNAL MEDICINE

## 2022-08-07 PROCEDURE — 25010000002 HEPARIN (PORCINE) PER 1000 UNITS: Performed by: INTERNAL MEDICINE

## 2022-08-07 PROCEDURE — 99232 SBSQ HOSP IP/OBS MODERATE 35: CPT

## 2022-08-07 PROCEDURE — 85007 BL SMEAR W/DIFF WBC COUNT: CPT | Performed by: INTERNAL MEDICINE

## 2022-08-07 PROCEDURE — 85730 THROMBOPLASTIN TIME PARTIAL: CPT | Performed by: INTERNAL MEDICINE

## 2022-08-07 PROCEDURE — 25010000002 ENOXAPARIN PER 10 MG: Performed by: INTERNAL MEDICINE

## 2022-08-07 RX ORDER — ENOXAPARIN SODIUM 150 MG/ML
1 INJECTION SUBCUTANEOUS EVERY 12 HOURS
Status: DISCONTINUED | OUTPATIENT
Start: 2022-08-07 | End: 2022-08-07 | Stop reason: HOSPADM

## 2022-08-07 RX ORDER — WARFARIN SODIUM 5 MG/1
TABLET ORAL
Qty: 75 TABLET | Refills: 0
Start: 2022-08-07 | End: 2022-08-19 | Stop reason: SDUPTHER

## 2022-08-07 RX ORDER — WARFARIN SODIUM 10 MG/1
10 TABLET ORAL
Status: COMPLETED | OUTPATIENT
Start: 2022-08-07 | End: 2022-08-07

## 2022-08-07 RX ADMIN — AMIODARONE HYDROCHLORIDE 200 MG: 200 TABLET ORAL at 08:29

## 2022-08-07 RX ADMIN — ATENOLOL 25 MG: 25 TABLET ORAL at 08:29

## 2022-08-07 RX ADMIN — CEFTRIAXONE 2 G: 2 INJECTION, POWDER, FOR SOLUTION INTRAMUSCULAR; INTRAVENOUS at 08:38

## 2022-08-07 RX ADMIN — HEPARIN SODIUM 4000 UNITS: 5000 INJECTION INTRAVENOUS; SUBCUTANEOUS at 08:29

## 2022-08-07 RX ADMIN — ENOXAPARIN SODIUM 140 MG: 150 INJECTION SUBCUTANEOUS at 11:19

## 2022-08-07 RX ADMIN — WARFARIN 10 MG: 10 TABLET ORAL at 11:19

## 2022-08-07 RX ADMIN — Medication 10 ML: at 09:51

## 2022-08-07 RX ADMIN — CHOLESTYRAMINE 1 PACKET: 4 POWDER, FOR SUSPENSION ORAL at 08:29

## 2022-08-07 NOTE — PROGRESS NOTES
Weekend Coverage  Cardiology Progress Note    Patient Identification:  Name: Elliot Sebastian  Age: 62 y.o.  Sex: male  :  1960  MRN: [id number]                 Follow Up / Chief Complaint: : Follow up for endocarditis    Interval History: INR 1.97, plans for home on lovenox bridge with home health        Objective:    Past Medical History:  Past Medical History:   Diagnosis Date   • Acute bacterial endocarditis    • Anemia    • APC (atrial premature contractions)    • Atrial fibrillation (HCC)    • BPH (benign prostatic hypertrophy)    • CHF (congestive heart failure) (HCC)    • Cholelithiasis    • Chronic constipation    • Deep vein thrombophlebitis of leg (HCC)     DISTAL   • Disease of thyroid gland    • Gout    • Intestinal obstruction (HCC)    • Ischemic enteritis (HCC)    • Left heart failure, NYHA class 3 (HCC)     CLASS 111 LEFT SYSTOIC CONGESTIVE HEART FAILURE   • Mitral regurgitation    • Pleural effusion, bilateral    • Pulmonary hypertension (HCC)    • 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HCC)    • Umbilical hernia    • Vitamin D deficiency      Past Surgical History:  Past Surgical History:   Procedure Laterality Date   • APPENDECTOMY     • CHOLECYSTECTOMY     • COLONOSCOPY  2013    normal per patient   • COLONOSCOPY N/A 10/26/2020    Procedure: COLONOSCOPY into cecum with biopsy, polypectomy, clip placement;  Surgeon: Juan Phillips MD;  Location: [facility] ENDOSCOPY;  Service: Gastroenterology;  Laterality: N/A;  polyps, clip  asc polyp removed but not retrieved   • EXPLORATION NECK FOR POSTOP HEMORRHAGE / THROMBOSIS / INFECTION     • MITRAL VALVE REPLACEMENT  2013    33MM [facility] [facility] MECHANICAL VALVE, PRESERVATION OF CORDS TO SUBVALVULAR APPARATUS AND DOROTHY GORE-FLORI CORD IN THE P2 AREA, DEBRIDEMENT OF ANTERIOR AND POSTERIOR MITRAL LEAFLET         Social History:   Social History     Tobacco Use   • Smoking status: Never Smoker   • Smokeless tobacco: Never Used   • Tobacco  comment: caffeine use   Substance Use Topics   • Alcohol use: Yes     Alcohol/week: 2.0 standard drinks     Types: 2 Cans of beer per week     Comment: 1-2 nightly      Family History:  Family History   Problem Relation Age of Onset   • Diabetes Mother    • Heart disease Father    • Diabetes Sister    • Diabetes Brother           Allergies:  No Known Allergies  Scheduled Meds:  amiodarone, 200 mg, Q12H  atenolol, 25 mg, Q24H  cefTRIAXone, 2 g, Q24H  cholestyramine, 1 packet, Q12H  enoxaparin, 1 mg/kg, Q12H  famotidine, 20 mg, Once  sodium chloride, 10 mL, Q12H  warfarin (COUMADIN) (dosing per levels), , Daily            INTAKE AND OUTPUT:    Intake/Output Summary (Last 24 hours) at 8/7/2022 1144  Last data filed at 8/7/2022 0700  Gross per 24 hour   Intake 1504 ml   Output 3275 ml   Net -1771 ml       Review of Systems:   GI: no n/v or abd pain  Cardiac: no chest pain or palpitations  Pulmonary: no shortness ofbreath or cough    Constitutional:  Temp:  [97.7 °F (36.5 °C)-98.3 °F (36.8 °C)] 98.3 °F (36.8 °C)  Heart Rate:  [57-70] 70  Resp:  [18] 18  BP: (133-154)/(77-93) 139/81      Physical Exam:  General:  Appears in no acute distress  Eyes: EOM normal no conjunctival drainage  HEENT:  No JVD. Thyroid not visibly enlarged. No mucosal pallor or cyanosis  Respiratory: Respirations regular and unlabored at rest. BBS with good air entry in all fields. No crackles, rubs or wheezes auscultated  Cardiovascular: S1S2 Regular rate and rhythm. No murmur, rub or gallop. No carotid bruits. DP/PT pulses   2+  . No pretibial pitting edema  Gastrointestinal: Abdomen soft, flat, non tender. Bowel sounds present. No hepatosplenomegaly. No ascites  Musculoskeletal: BARRERA x4. No abnormal movements  Neuro: AAO x3 CN II-XII grossly intact  Psych: Mood and affect normal, pleasant and cooperative        I reviewed the patient's new clinical results, and personally reviewed and interpreted the patient's ECG and telemetry data from the last  "24 hours        Cardiographics      Lab Review   Results from last 7 days   Lab Units 08/03/22  0548 08/01/22  2209   TROPONIN T ng/mL <0.010 <0.010     Results from last 7 days   Lab Units 08/05/22  1416   MAGNESIUM mg/dL 1.8     Results from last 7 days   Lab Units 08/07/22  0604   SODIUM mmol/L 140   POTASSIUM mmol/L 3.6   BUN mg/dL 11   CREATININE mg/dL 0.89   CALCIUM mg/dL 9.6     @[id number]@  Results from last 7 days   Lab Units 08/07/22  0604 08/06/22  0434 08/05/22  1416   WBC 10*3/mm3 4.91 4.60 4.29   HEMOGLOBIN g/dL 12.1* 12.3* 11.7*   HEMATOCRIT % 35.2* 34.9* 33.2*   PLATELETS 10*3/mm3 167 131* 112*     Results from last 7 days   Lab Units 08/07/22  0604 08/06/22  0434 08/05/22  2105 08/05/22  1416   INR  1.97* 2.08*  --  2.16*   APTT seconds 60.9* 67.8* 57.9* 89.0*         Assessment/Plan:  1.  Gram-positive septicemia: ID following, MRI yesterday no evidence of discitis, osteomyelitis or epidural abscess multilevel degenerative disease. PICC placed today  2.  Mechanical mitral valve: INR 2.08 (goal 2.5-3.5) continue heparin bridge until INR >/=2.5.  Pharmacy dosing warfarin.  Echo EF 60%, mitral valve peak and mean gradients are wnl  3.  Cellulitis RLE: ID following  4.  A. fib with RVR: Intermittent atrial flutter this morning.  Continue amiodarone and atenolol.  Rates 80-100s.  .    5.  Thrombocytopenia  6.  Endocarditis: ID following managing antibiotics-end date 9/13/2022.  7.  Hypertension: Atenolol added yesterday- BP stable    Ok from cardiology standpoint to go home on lovenox bridge, INR 1.97, recommend INR to be checked tomorrow, he reports he gets them checked at [facility], however if he is going home with home health they can check.      )8/7/2022  COREY Tavera      EMR Lu/Transcription:   \"Dictated utilizing Dragon dictation\".   "

## 2022-08-07 NOTE — OUTREACH NOTE
Prep Survey    Flowsheet Row Responses   [facility] facility patient discharged from? [facility]   Is LACE score < 7 ? No   Emergency Room discharge w/ pulse ox? No   Eligibility [facility]   Date of Admission 08/01/22   Date of Discharge 08/07/22   Discharge Disposition Home-Health Care Svc   Discharge diagnosis Acute bacterial endocarditis, septicemia,  RLE cellulitis, ASHIA, A-fib RVR   Does the patient have one of the following disease processes/diagnoses(primary or secondary)? Sepsis   Does the patient have Home health ordered? Yes   What is the Home health agency?  [facility]   Is there a DME ordered? No   Prep survey completed? Yes          RAJINDER SERNA - Registered Nurse

## 2022-08-07 NOTE — PLAN OF CARE
Goal Outcome Evaluation:  Plan of Care Reviewed With: patient        Progress: improving  Outcome Evaluation: pt remains on heparin gtt bridging with coumadin to reach INR goal of 2.5 - 3.5. pt remains on rocephin for endocarditis. cellulitis to RLE is progressing well. aptt/INR in the morning.

## 2022-08-07 NOTE — PROGRESS NOTES
[facility] Clinical Pharmacy Services: Warfarin Dosing/Monitoring Consult    Elliot Sebastian is a 62 y.o. male, estimated creatinine clearance is 135.6 mL/min (by C-G formula based on SCr of 0.89 mg/dL). weighing (!) 137 kg (301 lb).    Results from last 7 days   Lab Units 08/07/22  0604 08/06/22  0434 08/05/22  2105 08/05/22  1416 08/05/22  0052 08/04/22  1445 08/04/22  0750 08/03/22  1210 08/03/22  0548   INR  1.97* 2.08*  --  2.16*  --   --  1.96*  --  1.85*   APTT seconds 60.9* 67.8* 57.9* 89.0* 86.6*   < > 56.8*   < >  --    HEMOGLOBIN g/dL 12.1* 12.3*  --  11.7*  --   --  12.2*  --  13.2   HEMATOCRIT % 35.2* 34.9*  --  33.2*  --   --  36.1*  --  36.5*   PLATELETS 10*3/mm3 167 131*  --  112*  --   --  95*  --  90*    < > = values in this interval not displayed.     Prior to admission anticoagulation: 2.5mg M/F and 5mg all other days    Hospital Anticoagulation:  Consulting provider: Nataly NICOLE  Inpatient Start date: 8/2  Indication: A Fib - requiring full anticoagulation/mechanical valve  Target INR: 2.5 - 3.5  Expected duration: indefinite   Bridge Therapy: Yes  with unfractionated heparin    Potential food or drug interactions:  Amiodarone- may enhance the anticoagulant effect of Vitamin K Antagonists  Cholestyramine- may decrease the serum concentration of Vitamin K Antagonists    Education complete?/Date: No; plan for follow up TBD    Assessment/Plan:  Dose: INR subtherapeutic. Will order warfarin 10 mg today  Nursing to monitor for any signs or symptoms of bleeding  Follow up daily INRs and dose adjustments    Pharmacy will continue to follow until discharge or discontinuation of warfarin.     Liza Marroquin [facility]

## 2022-08-07 NOTE — PAYOR COMM NOTE
"Elliot Singh (62 y.o. Male)     PLEASE SEE ATTACHED CLINICAL REVIEW.   LOOKING FOR AUTH 8/4 FORWARD THROUGH DC 8/7/22    REF#[id number]    PLEASE CALL  OR  [phone]    THANK YOU    MELITON JAVIER LPN [facility]            Date of Birth   1960    Social Security Number       Address   [address] [place], [place] [id number]    Home Phone   [phone]    MRN   [id number]       Spiritism   Jain    Marital Status                               Admission Date   8/1/22    Admission Type   Emergency    Admitting Provider   Raúl Pendleton MD    Attending Provider       Department, Room/Bed   [address], S523/1       Discharge Date   8/7/2022    Discharge Disposition   Home or Self Care    Discharge Destination                               Attending Provider: (none)   Allergies: No Known Allergies    Isolation: None   Infection: None   Code Status: CPR   Advance Care Planning Activity    Ht: 198.1 cm (78\")   Wt: 137 kg (301 lb)    Admission Cmt: None   Principal Problem: Acute bacterial endocarditis [I33.0]                 Active Insurance as of 8/1/2022     Primary Coverage     Payor Plan Insurance Group Employer/Plan Group    [employer] BLUE CROSS [employer] BLUE CROSS BLUE [employer]IELD PPO [id number]     Payor Plan Address Payor Plan Phone Number Payor Plan Fax Number Effective Dates    [address] [phone]  7/1/2022 - None Entered    [place] [address]       Subscriber Name Subscriber Birth Date Member ID       ELLIOT SINGH 1960 [id number]                 Emergency Contacts      (Rel.) Home Phone Work Phone Mobile Phone    Nola Cantu (Spouse) [phone] -- [phone]            Operative/Procedure Notes (last 72 hours)  Notes from 08/04/22 1516 through 08/07/22 1516   No notes of this type exist for this encounter.            Physician Progress Notes (last 72 hours)      Amelia Mccabe APRN at 08/07/22 1144          Weekend " Coverage  Cardiology Progress Note    Patient Identification:  Name: Elliot Sebastian  Age: 62 y.o.  Sex: male  :  1960  MRN: [id number]                 Follow Up / Chief Complaint: : Follow up for endocarditis    Interval History: INR 1.97, plans for home on lovenox bridge with home health        Objective:    Past Medical History:  Past Medical History:   Diagnosis Date   • Acute bacterial endocarditis    • Anemia    • APC (atrial premature contractions)    • Atrial fibrillation (HCC)    • BPH (benign prostatic hypertrophy)    • CHF (congestive heart failure) (HCC)    • Cholelithiasis    • Chronic constipation    • Deep vein thrombophlebitis of leg (HCC)     DISTAL   • Disease of thyroid gland    • Gout    • Intestinal obstruction (HCC)    • Ischemic enteritis (HCC)    • Left heart failure, NYHA class 3 (HCC)     CLASS 111 LEFT SYSTOIC CONGESTIVE HEART FAILURE   • Mitral regurgitation    • Pleural effusion, bilateral    • Pulmonary hypertension (HCC)    • 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HCC)    • Umbilical hernia    • Vitamin D deficiency      Past Surgical History:  Past Surgical History:   Procedure Laterality Date   • APPENDECTOMY     • CHOLECYSTECTOMY     • COLONOSCOPY  2013    normal per patient   • COLONOSCOPY N/A 10/26/2020    Procedure: COLONOSCOPY into cecum with biopsy, polypectomy, clip placement;  Surgeon: Juan Phillips MD;  Location: [facility] ENDOSCOPY;  Service: Gastroenterology;  Laterality: N/A;  polyps, clip  asc polyp removed but not retrieved   • EXPLORATION NECK FOR POSTOP HEMORRHAGE / THROMBOSIS / INFECTION     • MITRAL VALVE REPLACEMENT  2013    33MM ST. JASON MECHANICAL VALVE, PRESERVATION OF CORDS TO SUBVALVULAR APPARATUS AND DOROTHY GORE-FLORI CORD IN THE P2 AREA, DEBRIDEMENT OF ANTERIOR AND POSTERIOR MITRAL LEAFLET         Social History:   Social History     Tobacco Use   • Smoking status: Never Smoker   • Smokeless tobacco: Never Used   • Tobacco comment:  caffeine use   Substance Use Topics   • Alcohol use: Yes     Alcohol/week: 2.0 standard drinks     Types: 2 Cans of beer per week     Comment: 1-2 nightly      Family History:  Family History   Problem Relation Age of Onset   • Diabetes Mother    • Heart disease Father    • Diabetes Sister    • Diabetes Brother           Allergies:  No Known Allergies  Scheduled Meds:  amiodarone, 200 mg, Q12H  atenolol, 25 mg, Q24H  cefTRIAXone, 2 g, Q24H  cholestyramine, 1 packet, Q12H  enoxaparin, 1 mg/kg, Q12H  famotidine, 20 mg, Once  sodium chloride, 10 mL, Q12H  warfarin (COUMADIN) (dosing per levels), , Daily            INTAKE AND OUTPUT:    Intake/Output Summary (Last 24 hours) at 8/7/2022 1144  Last data filed at 8/7/2022 0700  Gross per 24 hour   Intake 1504 ml   Output 3275 ml   Net -1771 ml       Review of Systems:   GI: no n/v or abd pain  Cardiac: no chest pain or palpitations  Pulmonary: no shortness ofbreath or cough    Constitutional:  Temp:  [97.7 °F (36.5 °C)-98.3 °F (36.8 °C)] 98.3 °F (36.8 °C)  Heart Rate:  [57-70] 70  Resp:  [18] 18  BP: (133-154)/(77-93) 139/81      Physical Exam:  General:  Appears in no acute distress  Eyes: EOM normal no conjunctival drainage  HEENT:  No JVD. Thyroid not visibly enlarged. No mucosal pallor or cyanosis  Respiratory: Respirations regular and unlabored at rest. BBS with good air entry in all fields. No crackles, rubs or wheezes auscultated  Cardiovascular: S1S2 Regular rate and rhythm. No murmur, rub or gallop. No carotid bruits. DP/PT pulses   2+  . No pretibial pitting edema  Gastrointestinal: Abdomen soft, flat, non tender. Bowel sounds present. No hepatosplenomegaly. No ascites  Musculoskeletal: BARRERA x4. No abnormal movements  Neuro: AAO x3 CN II-XII grossly intact  Psych: Mood and affect normal, pleasant and cooperative        I reviewed the patient's new clinical results, and personally reviewed and interpreted the patient's ECG and telemetry data from the last 24  "hours        Cardiographics      Lab Review   Results from last 7 days   Lab Units 08/03/22  0548 08/01/22  2209   TROPONIN T ng/mL <0.010 <0.010     Results from last 7 days   Lab Units 08/05/22  1416   MAGNESIUM mg/dL 1.8     Results from last 7 days   Lab Units 08/07/22  0604   SODIUM mmol/L 140   POTASSIUM mmol/L 3.6   BUN mg/dL 11   CREATININE mg/dL 0.89   CALCIUM mg/dL 9.6     @LABRCNTIPbnp@  Results from last 7 days   Lab Units 08/07/22  0604 08/06/22  0434 08/05/22  1416   WBC 10*3/mm3 4.91 4.60 4.29   HEMOGLOBIN g/dL 12.1* 12.3* 11.7*   HEMATOCRIT % 35.2* 34.9* 33.2*   PLATELETS 10*3/mm3 167 131* 112*     Results from last 7 days   Lab Units 08/07/22  0604 08/06/22  0434 08/05/22  2105 08/05/22  1416   INR  1.97* 2.08*  --  2.16*   APTT seconds 60.9* 67.8* 57.9* 89.0*         Assessment/Plan:  1.  Gram-positive septicemia: ID following, MRI yesterday no evidence of discitis, osteomyelitis or epidural abscess multilevel degenerative disease. PICC placed today  2.  Mechanical mitral valve: INR 2.08 (goal 2.5-3.5) continue heparin bridge until INR >/=2.5.  Pharmacy dosing warfarin.  Echo EF 60%, mitral valve peak and mean gradients are wnl  3.  Cellulitis RLE: ID following  4.  A. fib with RVR: Intermittent atrial flutter this morning.  Continue amiodarone and atenolol.  Rates 80-100s.  .    5.  Thrombocytopenia  6.  Endocarditis: ID following managing antibiotics-end date 9/13/2022.  7.  Hypertension: Atenolol added yesterday- BP stable    Ok from cardiology standpoint to go home on lovenox bridge, INR 1.97, recommend INR to be checked tomorrow, he reports he gets them checked [facility], however if he is going home with home health they can check.      )8/7/2022  Amelia L Peevey, APRN      EMR Dragon/Transcription:   \"Dictated utilizing Dragon dictation\".     Electronically signed by Amelia Mccabe APRN at 08/07/22 1254     Bobby Mcbride MD at 08/06/22 2122              Name: " Elliot Sebastian ADMIT: 2022   : 1960  PCP: Mannie Beltran DO    MRN: [id number] LOS: 5 days   AGE/SEX: 62 y.o. male  ROOM: [facility]   Subjective   Chief Complaint   Patient presents with   • Leg Swelling   • Weakness - Generalized     On IV ABX  On heparin gtt  Had MRI yesterday  Feels well  Ambulating some    ROS  No f/c  No n/v  No cp/palp  No soa/cough    Objective   Vital Signs  Temp:  [97 °F (36.1 °C)-97.8 °F (36.6 °C)] 97 °F (36.1 °C)  Heart Rate:  [55-98] 57  Resp:  [18-19] 18  BP: (115-148)/(78-86) 136/85  SpO2:  [95 %-98 %] 97 %  on   ;   Device (Oxygen Therapy): room air  Body mass index is 34.78 kg/m².    Physical Exam  Constitutional:       Appearance: He is ill-appearing.   HENT:      Head: Normocephalic and atraumatic.   Eyes:      General: No scleral icterus.  Cardiovascular:      Rate and Rhythm: Regular rhythm. Tachycardia present.      Comments: +mechanical click  Pulmonary:      Effort: Pulmonary effort is normal. No respiratory distress.      Breath sounds: Normal breath sounds.   Abdominal:      General: There is no distension.      Palpations: Abdomen is soft.      Tenderness: There is no abdominal tenderness.   Musculoskeletal:      Cervical back: Neck supple.   Neurological:      Mental Status: He is alert.   Psychiatric:         Behavior: Behavior normal.     Right lower extremity swelling    Results Review:       I reviewed the patient's new clinical results.  Results from last 7 days   Lab Units 22  0434 22  1416 22  0750 22  0548   WBC 10*3/mm3 4.60 4.29 4.39 3.67   HEMOGLOBIN g/dL 12.3* 11.7* 12.2* 13.2   PLATELETS 10*3/mm3 131* 112* 95* 90*     Results from last 7 days   Lab Units 22  1512 22  1416 22  0750 22  0548   SODIUM mmol/L 144 139 135* 131*   POTASSIUM mmol/L 4.5 3.4* 3.6 3.8   CHLORIDE mmol/L 107 107 108* 102   CO2 mmol/L 27.0 22.8 18.3* 20.0*   BUN mg/dL 11 9 11 13   CREATININE mg/dL 0.96 0.92 0.89 1.00   GLUCOSE  mg/dL 126* 101* 97 103*   Estimated Creatinine Clearance: 125.7 mL/min (by C-G formula based on SCr of 0.96 mg/dL).  Results from last 7 days   Lab Units 08/03/22  0548 08/01/22 2209   ALBUMIN g/dL 2.90* 4.00   BILIRUBIN mg/dL 1.0 1.7*   ALK PHOS U/L 53 67   AST (SGOT) U/L 47* 44*   ALT (SGPT) U/L 29 35     Results from last 7 days   Lab Units 08/06/22  1512 08/05/22  1416 08/04/22  0750 08/03/22  0548 08/01/22  2209   CALCIUM mg/dL 9.4 9.1 8.6 8.4* 9.7   ALBUMIN g/dL  --   --   --  2.90* 4.00   MAGNESIUM mg/dL  --  1.8  --  1.8  --      Results from last 7 days   Lab Units 08/02/22  0454 08/02/22  0142 08/01/22 2209   LACTATE mmol/L 2.0 4.7* 3.0*       Coag   Results from last 7 days   Lab Units 08/06/22  0434 08/05/22  2105 08/05/22  1416 08/05/22  0052 08/04/22  1445 08/04/22  0750 08/04/22  0052 08/03/22  1210 08/03/22  0548 08/02/22  0142 08/01/22 2209   INR  2.08*  --  2.16*  --   --  1.96*  --   --  1.85* 1.93* 1.79*   APTT seconds 67.8* 57.9* 89.0* 86.6* 63.5* 56.8* 47.3*   < >  --   --   --     < > = values in this interval not displayed.     HbA1C No results found for: HGBA1C  Infection   Results from last 7 days   Lab Units 08/03/22  0547 08/03/22  0546 08/01/22 2209   BLOODCX  No growth at 3 days No growth at 3 days Streptococcus dysgalactiae ssp equisimilis*  Streptococcus dysgalactiae ssp equisimilis*   BCIDPCR   --   --  Streptococcus spp, not A, B, or pneumonia. Identification by BCID2 PCR.*     Radiology(recent) IR PICC W Imaging Guidance    Result Date: 8/6/2022  Successful placement of a 5 Wolof double lumen PICC line using ultrasound and fluoroscopic guidance.  FLUOROSCOPY TIME: 10 seconds, 1 images.  DOSE: 3.8 mGy  This report was finalized on 8/6/2022 1:05 PM by Dr. Noah Emery M.D.      MRI Lumbar Spine With & Without Contrast    Result Date: 8/5/2022  1.  No evidence of discitis, osteomyelitis or epidural abscess. 2.  Multilevel degenerative disease involving the lumbar spine as  described above including bilateral L5 pars defects, grade 1 anterolisthesis of L5 upon S1 and a milder retrolisthesis of L4 upon L5. Mild-to-moderate facet degenerative disease is noted as described. See above.      No results found for: TROPONINT, TROPONINI, BNP  No components found for: TSH;2    amiodarone, 200 mg, Oral, Q12H  atenolol, 25 mg, Oral, Q24H  cefTRIAXone, 2 g, Intravenous, Q24H  cholestyramine, 1 packet, Oral, Q12H  famotidine, 20 mg, Intravenous, Once  sodium chloride, 10 mL, Intravenous, Q12H  warfarin (COUMADIN) (dosing per levels), , Does not apply, Daily  warfarin, 7.5 mg, Oral, Once      heparin, 7.3 Units/kg/hr, Last Rate: 9.3 Units/kg/hr (08/06/22 0049)  Pharmacy to dose warfarin,     Diet Regular; Consistent Carbohydrate      Assessment & Plan     Active Hospital Problems    Diagnosis  POA   • **Acute bacterial endocarditis [I33.0]  Yes   • Gram positive septicemia (HCC) [A41.89]  Yes   • Fever [R50.9]  Yes   • Chronic anticoagulation [Z79.01]  Not Applicable   • ASHIA (acute kidney injury) (HCC) [N17.9]  Yes   • Hx of mitral valve replacement with mechanical valve [Z95.2] [Z95.2]  Not Applicable   • Paroxysmal atrial fibrillation (HCC) [I48.0]  Yes   • Bacterial endocarditis - history of [I33.0]  Yes   • Cellulitis [L03.90]  Yes      Resolved Hospital Problems   No resolved problems to display.     62-year-old male with history of previous bacterial endocarditis with mitral valve replacement, history of DVT, chronic anticoagulation, A. fib, BPH, CHF.  He presented with swelling of his right leg as well as redness.  He was found to have cellulitis as well as bacteremia.    #1 Gram-positive septicemia/endocarditis/cellulitis, currently patient is on IV ABX and plans for 6 weeks.  Underwent MOE with evidence of vegetation/endocarditis but will not need surgical therapy at this time.  MRI L spine without infection  2.  A. fib with RVR, currently anticoagulated with warfarin and given that INR is  not therapeutic will be bridged with heparin and is on amiodarone drip and cardiology is following and adjusting medications. oral amio and BB. Monitor PTT (high risk medication)  3.  Cellulitis right leg, as mentioned above.  4. Back pain- prn agents,  MRI L spine with degenerative changes but no infection. Can have outpatient follow up.   5. Hypokalemia- replace    DW RN  JONY pharmacist  JONY Duong    Dispo- to home with HH. Potentially with lovenox bridge but cannot leave today as cannot have ABX tomorrow at home    Greater than 36 minutes spent with greater than 50% counseling and coordinating care        Bobby Mcbride MD  [facility]  22  16:23 EDT    Electronically signed by Bobby Mcbride MD at 22 1651     Amelia Mccabe APRN at 22 1321          Weekend Coverage  Cardiology Progress Note    Patient Identification:  Name: Elliot Sebastian  Age: 62 y.o.  Sex: male  :  1960  MRN: [id number]                 Follow Up / Chief Complaint: : Follow up for endocarditis    Interval History: INR 2.08 on heparin gtt bridge, up in chair on room air        Objective:    Past Medical History:  Past Medical History:   Diagnosis Date   • Acute bacterial endocarditis    • Anemia    • APC (atrial premature contractions)    • Atrial fibrillation ([facility])    • BPH (benign prostatic hypertrophy)    • CHF (congestive heart failure) ([facility])    • Cholelithiasis    • Chronic constipation    • Deep vein thrombophlebitis of leg ([facility])     DISTAL   • Disease of thyroid gland    • Gout    • Intestinal obstruction ([facility])    • Ischemic enteritis ([facility])    • Left heart failure, NYHA class 3 ([facility])     CLASS 111 LEFT SYSTOIC CONGESTIVE HEART FAILURE   • Mitral regurgitation    • Pleural effusion, bilateral    • Pulmonary hypertension ([facility])    • 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HCC)    • Umbilical hernia    • Vitamin D deficiency      Past Surgical History:  Past Surgical History:    Procedure Laterality Date   • APPENDECTOMY     • CHOLECYSTECTOMY     • COLONOSCOPY  approx 2013    normal per patient   • COLONOSCOPY N/A 10/26/2020    Procedure: COLONOSCOPY into cecum with biopsy, polypectomy, clip placement;  Surgeon: Juan Phillips MD;  Location: [facility] ENDOSCOPY;  Service: Gastroenterology;  Laterality: N/A;  polyps, clip  asc polyp removed but not retrieved   • EXPLORATION NECK FOR POSTOP HEMORRHAGE / THROMBOSIS / INFECTION     • MITRAL VALVE REPLACEMENT  01/03/2013    33MM ST. JASON MECHANICAL VALVE, PRESERVATION OF CORDS TO SUBVALVULAR APPARATUS AND DOROTHY GORE-FLORI CORD IN THE P2 AREA, DEBRIDEMENT OF ANTERIOR AND POSTERIOR MITRAL LEAFLET         Social History:   Social History     Tobacco Use   • Smoking status: Never Smoker   • Smokeless tobacco: Never Used   • Tobacco comment: caffeine use   Substance Use Topics   • Alcohol use: Yes     Alcohol/week: 2.0 standard drinks     Types: 2 Cans of beer per week     Comment: 1-2 nightly      Family History:  Family History   Problem Relation Age of Onset   • Diabetes Mother    • Heart disease Father    • Diabetes Sister    • Diabetes Brother           Allergies:  No Known Allergies  Scheduled Meds:  amiodarone, 200 mg, Q12H  atenolol, 25 mg, Q24H  cefTRIAXone, 2 g, Q24H  cholestyramine, 1 packet, Q12H  famotidine, 20 mg, Once  sodium chloride, 10 mL, Q12H  warfarin (COUMADIN) (dosing per levels), , Daily  warfarin, 7.5 mg, Once            INTAKE AND OUTPUT:    Intake/Output Summary (Last 24 hours) at 8/6/2022 1322  Last data filed at 8/6/2022 0948  Gross per 24 hour   Intake --   Output 4200 ml   Net -4200 ml       Review of Systems:   GI: no n/v or abd pain  Cardiac: no chest pain or palpitations  Pulmonary: no shortness ofbreath or cough    Constitutional:  Temp:  [97 °F (36.1 °C)-97.8 °F (36.6 °C)] 97 °F (36.1 °C)  Heart Rate:  [55-98] 60  Resp:  [18-19] 18  BP: (115-148)/(78-86) 148/80    Physical Exam:  General:  Appears in no acute  distress  Eyes: EOM normal no conjunctival drainage  HEENT:  No JVD. Thyroid not visibly enlarged. No mucosal pallor or cyanosis  Respiratory: Respirations regular and unlabored at rest. BBS with good air entry in all fields. No crackles, rubs or wheezes auscultated  Cardiovascular: S1S2 Irregular rate and rhythm. No murmur, rub or gallop. No carotid bruits. DP/PT pulses   2+  . No pretibial pitting edema  Gastrointestinal: Abdomen soft, flat, non tender. Bowel sounds present.   Musculoskeletal: BARRERA x4. No abnormal movements  Extremities: No digital clubbing or cyanosis  Skin: Color pink. Skin warm and dry to touch. No rashes    Neuro: AAO x3 CN II-XII grossly intact  Psych: Mood and affect normal, pleasant and cooperative      I reviewed the patient's new clinical results, and personally reviewed and interpreted the patient's ECG and telemetry data from the last 24 hours      Cardiographics        Sinus rhythm,         Lab Review   Results from last 7 days   Lab Units 08/03/22  0548 08/01/22  2209   TROPONIN T ng/mL <0.010 <0.010     Results from last 7 days   Lab Units 08/05/22  1416   MAGNESIUM mg/dL 1.8     Results from last 7 days   Lab Units 08/05/22  1416   SODIUM mmol/L 139   POTASSIUM mmol/L 3.4*   BUN mg/dL 9   CREATININE mg/dL 0.92   CALCIUM mg/dL 9.1     @LABRCNTIPbnp@  Results from last 7 days   Lab Units 08/06/22  0434 08/05/22  1416 08/04/22  0750   WBC 10*3/mm3 4.60 4.29 4.39   HEMOGLOBIN g/dL 12.3* 11.7* 12.2*   HEMATOCRIT % 34.9* 33.2* 36.1*   PLATELETS 10*3/mm3 131* 112* 95*     Results from last 7 days   Lab Units 08/06/22  0434 08/05/22  2105 08/05/22  1416 08/04/22  1445 08/04/22  0750   INR  2.08*  --  2.16*  --  1.96*   APTT seconds 67.8* 57.9* 89.0*   < > 56.8*    < > = values in this interval not displayed.         Assessment/Plan:  1.  Gram-positive septicemia: ID following, MRI yesterday no evidence of discitis, osteomyelitis or epidural abscess multilevel degenerative disease.  "PICC placed today  2.  Mechanical mitral valve: INR 2.08 (goal 2.5-3.5) continue heparin bridge until INR >/=2.5.  Pharmacy dosing warfarin.  Echo EF 60%, mitral valve peak and mean gradients are wnl  3.  Cellulitis RLE: ID following  4.  A. fib with RVR: Intermittent atrial flutter this morning.  Continue amiodarone and atenolol.  Rates 80-100s.  .    5.  Thrombocytopenia  6.  Endocarditis: ID following managing antibiotics-end date 2022.  7.  Hypertension: Atenolol added yesterday- BP stable    Tele reviewed, SR, did have intermittent aflutter this am with controlled rate. He asked about being able to go home on lovenox bridge. Would recommend lovenox 1mg/kg subq twice daily and would recommend daily INR until therapeutic in which at that time he could stop the lovenox injections. He did not think he would be able to have INR checked daily. Continue heparin bridge until INR >/= 2.5.     )2022  COREY Tavera      EMR [employer]/Transcription:   \"Dictated utilizing Dragon dictation\".     Electronically signed by Amelia Mccabe APRN at 22 1605     Bobby Mcbride MD at 22 9727              Name: Elliot Sebastian ADMIT: 2022   : 1960  PCP: Mannie Beltran DO    MRN: [id number] LOS: 4 days   AGE/SEX: 62 y.o. male  ROOM[facility]/   Subjective   Chief Complaint   Patient presents with   • Leg Swelling   • Weakness - Generalized     On IV ABX  On heparin gtt  Had MRI earlier today  Feels ok    ROS  No f/c  No n/v  No cp/palp  No soa/cough    Objective   Vital Signs  Temp:  [97.5 °F (36.4 °C)-98.1 °F (36.7 °C)] 98.1 °F (36.7 °C)  Heart Rate:  [66-72] 72  Resp:  [17-18] 18  BP: (139-162)/(77-98) 139/83  SpO2:  [95 %-98 %] 95 %  on  Flow (L/min):  [2] 2;   Device (Oxygen Therapy): room air  Body mass index is 34.78 kg/m².    Physical Exam  Constitutional:       Appearance: He is ill-appearing.   HENT:      Head: Normocephalic and atraumatic.   Eyes:      General: No scleral " icterus.  Cardiovascular:      Rate and Rhythm: Regular rhythm. Tachycardia present.      Comments: +mechanical click  Pulmonary:      Effort: Pulmonary effort is normal. No respiratory distress.      Breath sounds: Normal breath sounds.   Abdominal:      General: There is no distension.      Palpations: Abdomen is soft.      Tenderness: There is no abdominal tenderness.   Musculoskeletal:      Cervical back: Neck supple.   Neurological:      Mental Status: He is alert.   Psychiatric:         Behavior: Behavior normal.     Right lower extremity swelling    Results Review:       I reviewed the patient's new clinical results.  Results from last 7 days   Lab Units 08/05/22 1416 08/04/22  0750 08/03/22  0548 08/01/22  2209   WBC 10*3/mm3 4.29 4.39 3.67 8.66   HEMOGLOBIN g/dL 11.7* 12.2* 13.2 15.6   PLATELETS 10*3/mm3 112* 95* 90* 131*     Results from last 7 days   Lab Units 08/05/22  1416 08/04/22  0750 08/03/22  0548 08/02/22  1019 08/01/22  2209   SODIUM mmol/L 139 135* 131*  --  132*   POTASSIUM mmol/L 3.4* 3.6 3.8  --  4.2   CHLORIDE mmol/L 107 108* 102  --  98   CO2 mmol/L 22.8 18.3* 20.0*  --  21.8*   BUN mg/dL 9 11 13  --  18   CREATININE mg/dL 0.92 0.89 1.00 0.88 1.29*   GLUCOSE mg/dL 101* 97 103*  --  109*   Estimated Creatinine Clearance: 131.2 mL/min (by C-G formula based on SCr of 0.92 mg/dL).  Results from last 7 days   Lab Units 08/03/22 0548 08/01/22  2209   ALBUMIN g/dL 2.90* 4.00   BILIRUBIN mg/dL 1.0 1.7*   ALK PHOS U/L 53 67   AST (SGOT) U/L 47* 44*   ALT (SGPT) U/L 29 35     Results from last 7 days   Lab Units 08/05/22  1416 08/04/22  0750 08/03/22  0548 08/01/22  2209   CALCIUM mg/dL 9.1 8.6 8.4* 9.7   ALBUMIN g/dL  --   --  2.90* 4.00   MAGNESIUM mg/dL  --   --  1.8  --      Results from last 7 days   Lab Units 08/02/22  0454 08/02/22  0142 08/01/22  2209   LACTATE mmol/L 2.0 4.7* 3.0*       Coag   Results from last 7 days   Lab Units 08/05/22  1416 08/05/22  0052 08/04/22  1445 08/04/22  0750  08/04/22  0052 08/03/22  1845 08/03/22  1210 08/03/22  0548 08/02/22  0142 08/01/22  2209   INR  2.16*  --   --  1.96*  --   --   --  1.85* 1.93* 1.79*   APTT seconds 89.0* 86.6* 63.5* 56.8* 47.3* 45.8* 34.5  --   --   --      HbA1C No results found for: HGBA1C  Infection   Results from last 7 days   Lab Units 08/03/22  0547 08/03/22  0546 08/01/22  2209   BLOODCX  No growth at 2 days No growth at 2 days Streptococcus dysgalactiae ssp equisimilis*  Streptococcus dysgalactiae ssp equisimilis*   BCIDPCR   --   --  Streptococcus spp, not A, B, or pneumonia. Identification by BCID2 PCR.*     Radiology(recent) MRI Lumbar Spine With & Without Contrast    Result Date: 8/5/2022  1.  No evidence of discitis, osteomyelitis or epidural abscess. 2.  Multilevel degenerative disease involving the lumbar spine as described above including bilateral L5 pars defects, grade 1 anterolisthesis of L5 upon S1 and a milder retrolisthesis of L4 upon L5. Mild-to-moderate facet degenerative disease is noted as described. See above.      Troponin T   Date Value Ref Range Status   08/03/2022 <0.010 0.000 - 0.030 ng/mL Final     No components found for: TSH;2    amiodarone, 200 mg, Oral, Q12H  atenolol, 25 mg, Oral, Q24H  cefTRIAXone, 2 g, Intravenous, Q24H  cholestyramine, 1 packet, Oral, Q12H  famotidine, 20 mg, Intravenous, Once  sodium chloride, 10 mL, Intravenous, Q12H  warfarin (COUMADIN) (dosing per levels), , Does not apply, Daily      heparin, 7.3 Units/kg/hr, Last Rate: 8.3 Units/kg/hr (08/05/22 1442)  Pharmacy to dose warfarin,     Diet Regular; Consistent Carbohydrate      Assessment & Plan     Active Hospital Problems    Diagnosis  POA   • **Acute bacterial endocarditis [I33.0]  Yes   • Gram positive septicemia (HCC) [A41.89]  Yes   • Fever [R50.9]  Yes   • Chronic anticoagulation [Z79.01]  Not Applicable   • ASHIA (acute kidney injury) (HCC) [N17.9]  Yes   • Hx of mitral valve replacement with mechanical valve [Z95.2] [Z95.2]  Not  Applicable   • Paroxysmal atrial fibrillation (HCC) [I48.0]  Yes   • Bacterial endocarditis - history of [I33.0]  Yes   • Cellulitis [L03.90]  Yes      Resolved Hospital Problems   No resolved problems to display.     62-year-old male with history of previous bacterial endocarditis with mitral valve replacement, history of DVT, chronic anticoagulation, A. fib, BPH, CHF.  He presented with swelling of his right leg as well as redness.  He was found to have cellulitis as well as bacteremia.    #1 Gram-positive septicemia/endocarditis/cellulitis, currently patient is on IV ABX and plans for 6 weeks.  Underwent MOE with evidence of vegetation/endocarditis but will not need surgical therapy at this time.  MRI L spine without infection  2.  A. fib with RVR, currently anticoagulated with warfarin and given that INR is not therapeutic will be bridged with heparin and is on amiodarone drip and cardiology is following and adjusting medications. oral amio and BB. Monitor PTT   3.  Cellulitis right leg, as mentioned above.  4. Back pain- prn agents,  MRI L spine with degenerative changes but no infection. Can have outpatient follow up.   5. Hypokalemia- replace    DW RN  DW pharmacist  JONY Vega    Dispo- to home with HH when INR greater than 2.5    Thanks to specialists    Bobby Mcbride MD  [facility]  22  16:23 EDT    Electronically signed by Bobby Mcbride MD at 22 8918     Cherie Torres APRN at 22 1215              Patient Name: Elliot Sebastian  :1960  62 y.o.      Patient Care Team:  Mannie Beltran DO as PCP - General (Family Medicine)  Sary Tejeda REBECA as Pharmacist    Chief Complaint: follow up endocarditis     Interval History: he is resting in bed. No complaints. INR pending.       Objective   Vital Signs  Temp:  [97.5 °F (36.4 °C)-98.1 °F (36.7 °C)] 98.1 °F (36.7 °C)  Heart Rate:  [66-81] 68  Resp:  [17-20] 17  BP: (139-162)/(77-98)  "143/77    Intake/Output Summary (Last 24 hours) at 8/5/2022 1215  Last data filed at 8/5/2022 0509  Gross per 24 hour   Intake 120 ml   Output 1200 ml   Net -1080 ml     Flowsheet Rows    Flowsheet Row First Filed Value   Admission Height 198.1 cm (78\") Documented at 08/02/2022 0329   Admission Weight 137 kg (301 lb 9.6 oz) Documented at 08/02/2022 0329          Physical Exam:   General Appearance:    Alert, cooperative, in no acute distress   Lungs:     Clear to auscultation.  Normal respiratory effort and rate.      Heart:    Regular rhythm and normal rate, normal S1 and S2, no murmurs, gallops or rubs.     Chest Wall:    No abnormalities observed   Abdomen:     Soft, nontender, positive bowel sounds.     Extremities:   no cyanosis, clubbing or edema.  No marked joint deformities.  Adequate musculoskeletal strength.       Results Review:    Results from last 7 days   Lab Units 08/04/22  0750   SODIUM mmol/L 135*   POTASSIUM mmol/L 3.6   CHLORIDE mmol/L 108*   CO2 mmol/L 18.3*   BUN mg/dL 11   CREATININE mg/dL 0.89   GLUCOSE mg/dL 97   CALCIUM mg/dL 8.6     Results from last 7 days   Lab Units 08/03/22  0548 08/01/22  2209   TROPONIN T ng/mL <0.010 <0.010     Results from last 7 days   Lab Units 08/04/22  0750   WBC 10*3/mm3 4.39   HEMOGLOBIN g/dL 12.2*   HEMATOCRIT % 36.1*   PLATELETS 10*3/mm3 95*     Results from last 7 days   Lab Units 08/05/22  0052 08/04/22  1445 08/04/22  0750 08/03/22  1210 08/03/22  0548 08/02/22  0142   INR   --   --  1.96*  --  1.85* 1.93*   APTT seconds 86.6* 63.5* 56.8*   < >  --   --     < > = values in this interval not displayed.     Results from last 7 days   Lab Units 08/03/22  0548   MAGNESIUM mg/dL 1.8                   Medication Review:   amiodarone, 200 mg, Oral, Q12H  atenolol, 25 mg, Oral, Q24H  cefTRIAXone, 2 g, Intravenous, Q24H  cholestyramine, 1 packet, Oral, Q12H  famotidine, 20 mg, Intravenous, Once  sodium chloride, 10 mL, Intravenous, Q12H  warfarin (COUMADIN) " (dosing per levels), , Does not apply, Daily         heparin, 7.3 Units/kg/hr, Last Rate: 10.3 Units/kg/hr (08/05/22 2564)  Pharmacy to dose warfarin,         Assessment & Plan   1. Gram positive septicemia - streptococcus, may be related to cellulitis versus endocarditis. Getting MRI for persistent lower back pain.   2. Mechanical mitral valve on warfarin. Target INR 2.5-3.5. on heparin drip, continue until INR >/ 2.5  3. Cellulitis RLE, improving.   4. Atrial fibrillation with RVR, converted back to SR. Now on oral amiodarone. Monitor QT.   5. Thrombocytopenia   6. Endocarditis - normal mitral valve function. PICC/IV abx. ID following. Stop date 9/13.  7. Hypertension - was hypotensive on admission. Improved. Will resume atenolol. Continue to hold losartan.     INR pending. Dc heparin when INR >2.5.  Resume atenolol as above.   Check EKG in AM.     COREY Woods  [facility]  08/05/22  12:15 EDT      Electronically signed by Cherie Torres APRN at 08/05/22 1223     Dillon Vega MD at 08/05/22 0926        ID note for sepsis    Subjective: Complains of intermittent lower back pain, 8-9 out of 10 in intensity, worse with weightbearing.      ROS: No fever, chills, night sweats, no rash, diarrhea    Objective:   Temp:  [97.5 °F (36.4 °C)-98.1 °F (36.7 °C)] 98.1 °F (36.7 °C)  Heart Rate:  [66-84] 68  Resp:  [17-20] 17  BP: (139-162)/(77-98) 143/77  GENERAL: Awake and alert, ill  HEENT: Oropharynx is clear. Hearing is grossly normal.   SKIN: Warm and dry with minimal erythema of the right lower extremity with erythematous patches  PSYCHIATRIC: Appropriate mood, affect, insight, and judgment.     Microbiology  8/1 blood cultures 2/2 strep dysgalactiae ssp equisimilis   8/3 blood cultures 2/2 ngtd    Assessment and plan  1.  Streptococcal septicemia   2.  Prosthetic mitral valve endocarditis  3.  A. fib with RVR  4.  Thrombocytopenia  5.  Lower back pain, rule out  discitis    Continue Rocephin 2 g IV every 24 hours  Repeat bcx are negative to date.  Still with LBP.  Getting mri today to rule out infectious etiology but if negative could probably dc today from my end.  I have ordered picc    1. Ceftriaxone 2g IV q24, stop date   2. Check weekly cbc/diff and serum cr faxed to me [phone]  3. F/u with me on       Discussed with Dr. Mcbride.      ADDENDUM: MRI negative for infection.  Nothing more to add at this time and we will see as needed.    Electronically signed by Dillon Vega MD at 22 1352     Bobby Mcbride MD at 22 1626              Name: Elliot Sebastian ADMIT: 2022   : 1960  PCP: Mannie Beltran DO    MRN: [id number] LOS: 3 days   AGE/SEX: 62 y.o. male  ROOM[facility]   Subjective   Chief Complaint   Patient presents with   • Leg Swelling   • Weakness - Generalized     On IV ABX  On heparin gtt  Had MOE yesterday  Feels ok    ROS  No f/c  No n/v  No cp/palp  No soa/cough    Objective   Vital Signs  Temp:  [97.7 °F (36.5 °C)-99.5 °F (37.5 °C)] 97.7 °F (36.5 °C)  Heart Rate:  [] 81  Resp:  [18-20] 20  BP: (105-139)/(88-98) 139/91  SpO2:  [94 %-96 %] 96 %  on   ;   Device (Oxygen Therapy): room air  Body mass index is 34.78 kg/m².    Physical Exam  Constitutional:       Appearance: He is ill-appearing.   HENT:      Head: Normocephalic and atraumatic.   Eyes:      General: No scleral icterus.  Cardiovascular:      Rate and Rhythm: Regular rhythm. Tachycardia present.      Comments: +mechanical click  Pulmonary:      Effort: Pulmonary effort is normal. No respiratory distress.      Breath sounds: Normal breath sounds.   Abdominal:      General: There is no distension.      Palpations: Abdomen is soft.      Tenderness: There is no abdominal tenderness.   Musculoskeletal:      Cervical back: Neck supple.   Neurological:      Mental Status: He is alert.   Psychiatric:         Behavior: Behavior normal.         Results  Review:       I reviewed the patient's new clinical results.  Results from last 7 days   Lab Units 08/04/22  0750 08/03/22  0548 08/01/22 2209   WBC 10*3/mm3 4.39 3.67 8.66   HEMOGLOBIN g/dL 12.2* 13.2 15.6   PLATELETS 10*3/mm3 95* 90* 131*     Results from last 7 days   Lab Units 08/04/22  0750 08/03/22  0548 08/02/22  1019 08/01/22 2209   SODIUM mmol/L 135* 131*  --  132*   POTASSIUM mmol/L 3.6 3.8  --  4.2   CHLORIDE mmol/L 108* 102  --  98   CO2 mmol/L 18.3* 20.0*  --  21.8*   BUN mg/dL 11 13  --  18   CREATININE mg/dL 0.89 1.00 0.88 1.29*   GLUCOSE mg/dL 97 103*  --  109*   Estimated Creatinine Clearance: 135.6 mL/min (by C-G formula based on SCr of 0.89 mg/dL).  Results from last 7 days   Lab Units 08/03/22  0548 08/01/22 2209   ALBUMIN g/dL 2.90* 4.00   BILIRUBIN mg/dL 1.0 1.7*   ALK PHOS U/L 53 67   AST (SGOT) U/L 47* 44*   ALT (SGPT) U/L 29 35     Results from last 7 days   Lab Units 08/04/22  0750 08/03/22  0548 08/01/22 2209   CALCIUM mg/dL 8.6 8.4* 9.7   ALBUMIN g/dL  --  2.90* 4.00   MAGNESIUM mg/dL  --  1.8  --      Results from last 7 days   Lab Units 08/02/22  0454 08/02/22  0142 08/01/22 2209   LACTATE mmol/L 2.0 4.7* 3.0*       Coag   Results from last 7 days   Lab Units 08/04/22  1445 08/04/22  0750 08/04/22  0052 08/03/22  1845 08/03/22  1210 08/03/22  0548 08/02/22  0142 08/01/22 2209   INR   --  1.96*  --   --   --  1.85* 1.93* 1.79*   APTT seconds 63.5* 56.8* 47.3* 45.8* 34.5  --   --   --      HbA1C No results found for: HGBA1C  Infection   Results from last 7 days   Lab Units 08/03/22  0547 08/03/22  0546 08/01/22  2209   BLOODCX  No growth at 24 hours No growth at 24 hours Streptococcus dysgalactiae ssp equisimilis*  Streptococcus dysgalactiae ssp equisimilis*   BCIDPCR   --   --  Streptococcus spp, not A, B, or pneumonia. Identification by BCID2 PCR.*     Radiology(recent) No radiology results for the last day  Troponin T   Date Value Ref Range Status   08/03/2022 <0.010 0.000 -  0.030 ng/mL Final     No components found for: TSH;2    amiodarone, 200 mg, Oral, Q12H  cefTRIAXone, 2 g, Intravenous, Q24H  cholestyramine, 1 packet, Oral, Q12H  famotidine, 20 mg, Intravenous, Once  sodium chloride, 10 mL, Intravenous, Q12H  warfarin (COUMADIN) (dosing per levels), , Does not apply, Daily  warfarin, 5 mg, Oral, Once      heparin, 7.3 Units/kg/hr, Last Rate: 11.3 Units/kg/hr (08/04/22 1130)  Pharmacy to dose warfarin,   sodium chloride, 100 mL/hr, Last Rate: 100 mL/hr (08/03/22 2340)    Diet Regular; Consistent Carbohydrate      Assessment & Plan     Active Hospital Problems    Diagnosis  POA   • **Fever [R50.9]  Yes   • Gram positive septicemia (HCC) [A41.89]  Yes   • Chronic anticoagulation [Z79.01]  Not Applicable   • ASHIA (acute kidney injury) (HCC) [N17.9]  Yes   • Hx of mitral valve replacement with mechanical valve [Z95.2] [Z95.2]  Not Applicable   • Paroxysmal atrial fibrillation (HCC) [I48.0]  Yes   • Bacterial endocarditis - history of [I33.0]  Yes   • Cellulitis [L03.90]  Yes      Resolved Hospital Problems   No resolved problems to display.     62-year-old male with history of previous bacterial endocarditis with mitral valve replacement, history of DVT, chronic anticoagulation, A. fib, BPH, CHF.  He presented with swelling of his right leg as well as redness.  He was found to have cellulitis as well as bacteremia.    #1 Gram-positive septicemia/endocarditis/cellulitis, currently patient is on IV ABX and plans for 6 weeks.  Underwent MOE with evidence of vegetation/endocarditis but will not need surgical therapy at this time. Plans for MRI L spine noted  2.  A. fib with RVR, currently anticoagulated with warfarin and given that INR is not therapeutic will be bridged with heparin and is on amiodarone drip and cardiology is following and adjusting medications. Amio gtt stopped today and to be on oral amio. Monitor PTT   3.  Cellulitis right leg, as mentioned above.  4. Back pain- prn  agents, plan for MRI L spine    DW RN  DW pharmacist  JONY Vega    Thanks to specialists    Bobby Mcbride MD  [facility]  22  16:23 EDT    Electronically signed by Bobby Mcbride MD at 22 1629            Discharge Summary      Bobby Mcbride MD at 22 1639                 NAME: Elliot Sebastian ADMIT: 2022   : 1960  PCP: Mannie Beltran DO    MRN: [id number] LOS: 6 days   AGE/SEX: 62 y.o. male  ROOM: [facility]     Date of Admission:  2022  Date of Discharge:  2022    PCP: Mannie Beltran DO    CHIEF COMPLAINT  Leg Swelling and Weakness - Generalized      DISCHARGE DIAGNOSIS  Active Hospital Problems    Diagnosis  POA   • **Acute bacterial endocarditis [I33.0]  Yes   • Gram positive septicemia ([facility]) [A41.89]  Yes   • Fever [R50.9]  Yes   • Chronic anticoagulation [Z79.01]  Not Applicable   • ASHIA (acute kidney injury) ([facility]) [N17.9]  Yes   • Hx of mitral valve replacement with mechanical valve [Z95.2] [Z95.2]  Not Applicable   • Paroxysmal atrial fibrillation ([facility]) [I48.0]  Yes   • Bacterial endocarditis - history of [I33.0]  Yes   • Cellulitis [L03.90]  Yes      Resolved Hospital Problems   No resolved problems to display.       SECONDARY DIAGNOSES  Past Medical History:   Diagnosis Date   • Acute bacterial endocarditis    • Anemia    • APC (atrial premature contractions)    • Atrial fibrillation ([facility])    • BPH (benign prostatic hypertrophy)    • CHF (congestive heart failure) ([facility])    • Cholelithiasis    • Chronic constipation    • Deep vein thrombophlebitis of leg ([facility])     DISTAL   • Disease of thyroid gland    • Gout    • Intestinal obstruction ([facility])    • Ischemic enteritis ([facility])    • Left heart failure, NYHA class 3 ([facility])     CLASS 111 LEFT SYSTOIC CONGESTIVE HEART FAILURE   • Mitral regurgitation    • Pleural effusion, bilateral    • Pulmonary hypertension ([facility])    • 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 fib, BPH, CHF.  He presented with swelling of his right leg as well as redness.  He was found to have cellulitis as well as bacteremia with positive blood cultures for Streptococcus dysgalactiae ssp equisimilis.     Patient is on IV ceftriaxone and plans for 6 weeks.  Underwent MOE with evidence of vegetation/endocarditis but will not need surgical therapy at this time.  MRI L spine without infection    A. fib with RVR, currently anticoagulated with warfarin and given that INR is not therapeutic will be bridged with lovenox.  Given 4 days of Lovenox for bridging at home and he will have INR check tomorrow with further direction as recommended by his Coumadin clinic.  His previous dosing of Coumadin was alternating 5 mg and 2.5 mg dosing.  Cardiology today is recommending a dose of 10 mg but he will have further instructions Monday and Tuesday by his Coumadin clinic.  Goal INR is 2.5-3.5 with his mechanical valve.    Back pain- prn agents,  MRI L spine with degenerative changes but no infection. Can have outpatient follow up as needed and discussed this with him.      DIAGNOSTICS    08/01/2022 2209 08/04/2022 0622 Blood Culture - Blood, Arm, Left [[id number]]    (Abnormal)   Blood from Arm, Left    Final result Component Value   Blood Culture Streptococcus dysgalactiae ssp equisimilis Critical    Isolated from Aerobic and Anaerobic Bottles   Gram Stain Anaerobic Bottle Gram positive cocci in chains Critical     Aerobic Bottle Gram positive cocci in chains Critical          Susceptibility     Streptococcus dysgalactiae ssp equisimilis     RENATA     Ceftriaxone <=0.12  Susceptible     Levofloxacin 0.5  Susceptible     Penicillin G <=0.06  Susceptible     Vancomycin 0.5  Susceptible               Linear View            08/07/2022 0604 08/07/2022 0734 Protime-INR [[id number]]   (Abnormal)   Blood    Final result Component Value Units   Protime 22.0 High  Seconds   INR 1.97 High            08/07/2022 0604 08/07/2022 0753 Basic Metabolic Panel [[id number]]    (Abnormal)   Blood    Final result Component Value Units   Glucose 122 High  mg/dL   BUN 11 mg/dL   Creatinine 0.89 mg/dL   Sodium 140 mmol/L   Potassium 3.6 mmol/L   Chloride 103 mmol/L   CO2 24.6 mmol/L   Calcium 9.6 mg/dL   BUN/Creatinine Ratio 12.4    Anion Gap 12.4 mmol/L   eGFR 97.5  mL/min/1.73          08/07/2022 0604 08/07/2022 0734 aPTT [[id number]]   (Abnormal)   Blood    Final result Component Value Units   PTT 60.9 High  seconds          08/07/2022 0604 08/07/2022 0737 CBC Auto Differential [[id number]]   (Abnormal)   Blood    Final result Component Value Units   WBC 4.91 10*3/mm3   RBC 3.62 Low  10*6/mm3   Hemoglobin 12.1 Low  g/dL   Hematocrit 35.2 Low  %   MCV 97.2 High  fL   MCH 33.4 High  pg   MCHC 34.4 g/dL   RDW 13.6 %   RDW-SD 47.9 fl   MPV 10.3 fL   Platelets 167 10*3/mm3   nRBC 0.2 /100 WBC            PHYSICAL EXAM  Objective    Alert  nad  No resp distress  +mechanical click     CONDITION ON DISCHARGE  Stable.      DISCHARGE DISPOSITION   Home or Self Care      DISCHARGE MEDICATIONS       Your medication list      START taking these medications      Instructions Last Dose Given Next Dose Due   amiodarone 200 MG tablet  Commonly known as: PACERONE      Take 1 tablet by mouth Every 12 (Twelve) Hours.       cefTRIAXone 2 g in sodium chloride 0.9 % 100 mL IVPB      Infuse 2 g into a venous catheter Daily for 38 doses. Indications: Pneumonia       Enoxaparin Sodium 150 MG/ML syringe  Commonly known as: LOVENOX      Discard 0.09 mL then Inject 0.91 mL under the skin into the appropriate area as directed Every 12 (Twelve) Hours for 4 days. Until INR is greater than 2.5, further instructions provided by your coumadin clinic          CHANGE how you take  these medications      Instructions Last Dose Given Next Dose Due   warfarin 5 MG tablet  Commonly known as: COUMADIN  What changed: additional instructions      Take 10mg po every afternoon on 8/7 and 8/8 unless otherwise instructed by Coumadin clinic. Dose on 8/9 may change but will need to be taken daily as directed by coumadin clinic          CONTINUE taking these medications      Instructions Last Dose Given Next Dose Due   atenolol 25 MG tablet  Commonly known as: TENORMIN      Take 1 tablet by mouth Daily. MUST MAKE FOLLOW UP APPOINTMENT FOR FUTURE REFILLS       cholestyramine 4 GM/DOSE powder  Commonly known as: Questran      Take 1 packet by mouth Daily. If no improvement after two weeks, may increase to BID.       losartan 50 MG tablet  Commonly known as: COZAAR      Take 1 tablet by mouth Daily. MUST MAKE FOLLOW UP APPOINTMENT FOR FUTURE REFILLS 6/30/2022       Mens Multivitamin Plus tablet tablet  Generic drug: multivitamin with minerals      Take 2 capsules by mouth Daily. Soft gels       Vitamin D3 50 MCG (2000 UT) capsule      Take 1,000 Units by mouth Daily.             Where to Get Your Medications      These medications were sent to [facility] - [place] [address]    Hours: 7:00 AM-6:00 PM Mon-Fri, 8:00 AM-4:30 PM Sat-Sun (Closed 12-12:30PM) Phone: [phone]   · amiodarone 200 MG tablet  · Enoxaparin Sodium 150 MG/ML syringe     Information about where to get these medications is not yet available    Ask your nurse or doctor about these medications  · cefTRIAXone 2 g in sodium chloride 0.9 % 100 mL IVPB  · warfarin 5 MG tablet          Future Appointments   Date Time Provider Department Center   8/19/2022  2:00 PM Leyla Byrne APRN [facility]   9/13/2022  1:40 PM Dillon Vega MD [facility]     Additional Instructions for the Follow-ups that You Need to Schedule     Ambulatory Referral to Home Health (Hospital)   As directed       Mannie Beltran DO  IV ABX, INR checks goal INR 2.5-3.5    Order Comments: Mannie Beltran DO IV ABX, INR checks goal INR 2.5-3.5     Face to Face Visit Date: 8/7/2022    Follow-up provider for Plan of Care?: I treated the patient in an acute care facility and will not continue treatment after discharge.    Follow-up provider: MANNIE BELTRAN [[id number]]    Reason/Clinical Findings: debility, Afib, mechanical valve, bacteremia    Describe mobility limitations that make leaving home difficult: debility, Afib, mechanical valve, bacteremia    Nursing/Therapeutic Services Requested: Skilled Nursing    Skilled nursing orders: Medication education PICC line care/instruction Infusion therapy         Discharge Follow-up with Specialty: Cardiology in 1-2 weeks, PCP in 1-2 weeks, Infectious disease Dr. Vega 9/13   As directed      Specialty: Cardiology in 1-2 weeks, PCP in 1-2 weeks, Infectious disease Dr. Vega 9/13         Discharge Follow-up with Specialty: INR check with coumadin clinic sunday or monday. May have INR drawn by home health as well. Continue lovenox until instructed to stop by coumadin clinic if INR is over 2.5   As directed      Specialty: INR check with coumadin clinic sunday or monday. May have INR drawn by home health as well. Continue lovenox until instructed to stop by coumadin clinic if INR is over 2.5            Contact information for follow-up providers     Mannie Beltran DO .    Specialty: Family Medicine  Contact information:  [address]  [facility] [place] [address] [phone]                   Contact information for after-discharge care     Dialysis/Infusion     [facility] .    Service: Infusion and IV Therapy  Contact information:  [address]  [facility] [address] [phone]                 Home Medical Care     [facility] .    Service: Home Health Services  Contact information:  [address]  [place] [address]  [phone]                             TEST  RESULTS PENDING AT DISCHARGE  Pending Labs     Order Current Status    Blood Culture - Blood, Arm, Right Preliminary result    Blood Culture - Blood, Arm, Right Preliminary result             Bobby Mcbride MD  [facility]  08/07/22  12:05 EDT      Time: greater than 32 minutes on discharge  It was a pleasure taking care of this patient while in the hospital.       Electronically signed by Bobby Mcbride MD at 08/07/22 8937

## 2022-08-07 NOTE — PLAN OF CARE
Goal Outcome Evaluation:  Plan of Care Reviewed With: patient            Patient cleared to be discharged home. PICC lonny left in place. Home health agency will follow. IV and heart monitor taken off. New meds delivered to room. Wife will take him home  Problem: Adult Inpatient Plan of Care  Goal: Plan of Care Review  Outcome: Met  Goal: Patient-Specific Goal (Individualized)  Outcome: Met  Goal: Absence of Hospital-Acquired Illness or Injury  Outcome: Met  Intervention: Identify and Manage Fall Risk  Recent Flowsheet Documentation  Taken 8/7/2022 1200 by Annika Page RN  Safety Promotion/Fall Prevention:   activity supervised   fall prevention program maintained   room organization consistent   safety round/check completed  Taken 8/7/2022 1015 by Annika Page RN  Safety Promotion/Fall Prevention:   fall prevention program maintained   room organization consistent  Taken 8/7/2022 0828 by Annika Page RN  Safety Promotion/Fall Prevention:   fall prevention program maintained   safety round/check completed  Taken 8/7/2022 0712 by Annika Page RN  Safety Promotion/Fall Prevention: safety round/check completed  Intervention: Prevent Skin Injury  Recent Flowsheet Documentation  Taken 8/7/2022 1200 by Annika Page RN  Body Position: position changed independently  Taken 8/7/2022 1015 by Annika Page RN  Body Position: position changed independently  Taken 8/7/2022 0828 by Annika Page RN  Body Position: position changed independently  Skin Protection: adhesive use limited  Taken 8/7/2022 0712 by Annika Page RN  Body Position: position changed independently  Intervention: Prevent and Manage VTE (Venous Thromboembolism) Risk  Recent Flowsheet Documentation  Taken 8/7/2022 1200 by Annika Page RN  Activity Management: activity adjusted per tolerance  Taken 8/7/2022 1015 by Annika Page  RN  Activity Management: activity adjusted per tolerance  Taken 8/7/2022 0828 by Annika Page RN  Activity Management: activity adjusted per tolerance  Range of Motion: active ROM (range of motion) encouraged  Taken 8/7/2022 0712 by Annika Page RN  Activity Management: activity adjusted per tolerance  Intervention: Prevent Infection  Recent Flowsheet Documentation  Taken 8/7/2022 1200 by Annika Page RN  Infection Prevention: single patient room provided  Taken 8/7/2022 1015 by Annika Page RN  Infection Prevention: single patient room provided  Taken 8/7/2022 0828 by Annika Page RN  Infection Prevention: single patient room provided  Taken 8/7/2022 0712 by Annika Page RN  Infection Prevention: single patient room provided  Goal: Optimal Comfort and Wellbeing  Outcome: Met  Intervention: Monitor Pain and Promote Comfort  Recent Flowsheet Documentation  Taken 8/7/2022 0828 by Annika Page RN  Pain Management Interventions: no interventions per patient request  Intervention: Provide Person-Centered Care  Recent Flowsheet Documentation  Taken 8/7/2022 0828 by Annika Page RN  Trust Relationship/Rapport:   care explained   questions encouraged   reassurance provided   thoughts/feelings acknowledged  Goal: Readiness for Transition of Care  Outcome: Met     Problem: Heart Failure Comorbidity  Goal: Maintenance of Heart Failure Symptom Control  Outcome: Met  Intervention: Maintain Heart Failure-Management  Recent Flowsheet Documentation  Taken 8/7/2022 1200 by Annika Page RN  Medication Review/Management: medications reviewed  Taken 8/7/2022 1015 by Annika Page RN  Medication Review/Management: medications reviewed  Taken 8/7/2022 0828 by Annika Page RN  Medication Review/Management: medications reviewed  Taken 8/7/2022 0712 by Annika Page RN  Medication  Review/Management: medications reviewed     Problem: Infection Progression (Sepsis/Septic Shock)  Goal: Absence of Infection Signs and Symptoms  Outcome: Met  Intervention: Initiate Sepsis Management  Recent Flowsheet Documentation  Taken 8/7/2022 1200 by Annika Page RN  Infection Prevention: single patient room provided  Taken 8/7/2022 1015 by Annika Page RN  Infection Prevention: single patient room provided  Taken 8/7/2022 0828 by Annika Page RN  Infection Prevention: single patient room provided  Taken 8/7/2022 0712 by Annika Page RN  Infection Prevention: single patient room provided  Intervention: Promote Recovery  Recent Flowsheet Documentation  Taken 8/7/2022 1200 by Annika Page RN  Activity Management: activity adjusted per tolerance  Taken 8/7/2022 1015 by Annika Page RN  Activity Management: activity adjusted per tolerance  Taken 8/7/2022 0828 by Annika Page RN  Activity Management: activity adjusted per tolerance  Sleep/Rest Enhancement: regular sleep/rest pattern promoted  Taken 8/7/2022 0712 by Annika Page, RN  Activity Management: activity adjusted per tolerance     Problem: Infection  Goal: Absence of Infection Signs and Symptoms  Outcome: Met

## 2022-08-07 NOTE — PROGRESS NOTES
Patient is discharging today . Face sheet information is correct and patient is agreeable to home health . Orders in [facility] for home health SN and [facility] has delivered IV medications. Please call wife for visit [phone].RUBY--Ceftriaxone 2g IV q24, stop date 9/13 and   weekly cbc/diff and serum cr faxed to [phone]/ Dr Vega / infectious disease.Noted IVR needed to bechecked Monday 8/8/22 and will be on Lovenox bridge . Thank you !

## 2022-08-08 ENCOUNTER — HOME CARE VISIT (OUTPATIENT)
Dept: HOME HEALTH SERVICES | Facility: HOME HEALTHCARE | Age: 62
End: 2022-08-08

## 2022-08-08 ENCOUNTER — TRANSITIONAL CARE MANAGEMENT TELEPHONE ENCOUNTER (OUTPATIENT)
Dept: CALL CENTER | Facility: HOSPITAL | Age: 62
End: 2022-08-08

## 2022-08-08 ENCOUNTER — ANTICOAGULATION VISIT (OUTPATIENT)
Dept: PHARMACY | Facility: HOSPITAL | Age: 62
End: 2022-08-08

## 2022-08-08 VITALS
HEIGHT: 78 IN | DIASTOLIC BLOOD PRESSURE: 84 MMHG | BODY MASS INDEX: 35.42 KG/M2 | SYSTOLIC BLOOD PRESSURE: 144 MMHG | HEART RATE: 60 BPM | TEMPERATURE: 97.4 F | RESPIRATION RATE: 16 BRPM | WEIGHT: 306.13 LBS | OXYGEN SATURATION: 99 %

## 2022-08-08 DIAGNOSIS — Z95.2 HX OF MITRAL VALVE REPLACEMENT WITH MECHANICAL VALVE: ICD-10-CM

## 2022-08-08 DIAGNOSIS — I48.0 PAROXYSMAL ATRIAL FIBRILLATION: Primary | ICD-10-CM

## 2022-08-08 LAB
BACTERIA SPEC AEROBE CULT: NORMAL
BACTERIA SPEC AEROBE CULT: NORMAL
INR PPP: 3.2

## 2022-08-08 PROCEDURE — G0299 HHS/HOSPICE OF RN EA 15 MIN: HCPCS

## 2022-08-08 PROCEDURE — 25010000002 CEFTRIAXONE PER 250 MG

## 2022-08-08 RX ADMIN — CEFTRIAXONE SODIUM 2 G: 2 INJECTION, POWDER, FOR SOLUTION INTRAMUSCULAR; INTRAVENOUS at 15:42

## 2022-08-08 NOTE — PROGRESS NOTES
Anticoagulation Clinic Progress Note    Anticoagulation Summary  As of 8/8/2022    INR goal:  2.5-3.5   TTR:  64.8 % (3.7 y)   INR used for dosing:  3.20 (8/8/2022)   Warfarin maintenance plan:  2.5 mg every Mon, Fri; 5 mg all other days   Weekly warfarin total:  30 mg   No change documented:  Michael Horner, Pablo   Plan last modified:  Angelica Marroquin RPH (7/28/2022)   Next INR check:  8/11/2022   Priority:  High   Target end date:  Indefinite    Indications    Paroxysmal atrial fibrillation (HCC) [I48.0]  Hx of mitral valve replacement with mechanical valve [Z95.2] [Z95.2]             Anticoagulation Episode Summary     INR check location:      Preferred lab:      Send INR reminders to:  [facility]  POOL    Comments:  [facility] (previously [facility]; stopped due to cost)      Anticoagulation Care Providers     Provider Role Specialty Phone number    Navi Fay MD Referring Cardiology [phone]        Findings:  Hospital admission 8/1/22 - 8/7/22 for bacterial endocarditis. He was discharged yesterday on IV ceftriaxone x 6 weeks, amiodarone, and enoxaparin bridge due to subtherapeutic INR.     INR History:  Anticoagulation Monitoring 7/11/2022 7/28/2022 8/8/2022   INR 2.3 2.2 3.20   INR Date 7/11/2022 7/28/2022 8/8/2022   INR Goal 2.5-3.5 2.5-3.5 2.5-3.5   Trend Same Up Same   Last Week Total 22.5 mg 27.5 mg 40 mg   Next Week Total 30 mg 32.5 mg 30 mg   Sun 5 mg 5 mg -   Mon 5 mg (7/11); Otherwise 2.5 mg 2.5 mg 2.5 mg   Tue 5 mg 5 mg 5 mg   Wed 2.5 mg 5 mg 5 mg   Thu 5 mg 7.5 mg (7/28); Otherwise 5 mg -   Fri 2.5 mg 2.5 mg -   Sat 5 mg 5 mg -   Visit Report - - -   Some recent data might be hidden       Plan:  1. INR is Therapeutic today- see above in Anticoagulation Summary.   Provided instructions to [facility] with [facility] to DISCONTINUE enoxaparin and take warfarin 2.5 mg Mon/Fri, 5 mg all other days - see above in Anticoagulation Summary.  2. Follow up in 3 days      Michael Horner,  PharmD

## 2022-08-08 NOTE — TELEPHONE ENCOUNTER
[facility] is needing ok to have skilled nursing evaluate and treat patient since getting home from hospital.

## 2022-08-08 NOTE — OUTREACH NOTE
Call Center TCM Note    Flowsheet Row Responses   [facility] patient discharged from? [place]   Does the patient have one of the following disease processes/diagnoses(primary or secondary)? Sepsis   TCM attempt successful? Yes   Call start time 1353   Call end time 1407   Discharge diagnosis Acute bacterial endocarditis, septicemia,  RLE cellulitis, ASHIA, A-fib RVR   Person spoke with today (if not patient) and relationship patient   Meds reviewed with patient/caregiver? Yes   Is the patient having any side effects they believe may be caused by any medication additions or changes? No   Does the patient have all medications related to this admission filled (includes all antibiotics, inhalers, nebulizers,steroids,etc.) Yes   Is the patient taking all medications as directed (includes completed medication regime)? Yes   Does the patient have a primary care provider?  Yes   Comments regarding PCP Patient prefers to make own followup with PCP .    Does the patient have an appointment with their PCP within 7 days of discharge? No   What is preventing the patient from scheduling follow up appointments within 7 days of discharge? Haven't had time   Nursing Interventions Advised patient to make appointment, Educated patient on importance of making appointment   Has the patient kept scheduled appointments due by today? N/A   What is the Home health agency?  [facility]   Has home health visited the patient within 72 hours of discharge? Call prior to 72 hours   Home health comments [facility] is coming today patient states.    Psychosocial issues? No   Did the patient receive a copy of their discharge instructions? Yes   Nursing interventions Reviewed instructions with patient   What is the patient's perception of their health status since discharge? Improving   Nursing interventions Nurse provided patient education   Is the patient/caregiver able to teach back Sepsis? S - Shivering,fever or very cold, E -  Extreme pain or generalized discomfort (worst ever,especially abdomen), P - Pale or discolored skin, S - Sleepy, difficult to arouse,confused, I -   I feel like I might die-a feeling of hopelessness, S - Short of breath   Nursing interventions Nurse provided reassurance to patient   Is patient/caregiver able to teach back steps to recovery at home? Set small, achievable goals for return to baseline health, Rest and regain strength, Make a list of questions for PCP appoinment   Is the patient/caregiver able to teach back signs and symptoms of worsening condition: Fever, Hyperthermia   If the patient is a current smoker, are they able to teach back resources for cessation? Not a smoker   Is the patient/caregiver able to teach back the hierarchy of who to call/visit for symptoms/problems? PCP, Specialist, Home health nurse, Urgent Care, ED, 911 Yes   TCM call completed? Yes          Mello Brooks RN    8/8/2022, 14:07 EDT

## 2022-08-09 ENCOUNTER — HOME CARE VISIT (OUTPATIENT)
Dept: HOME HEALTH SERVICES | Facility: HOME HEALTHCARE | Age: 62
End: 2022-08-09

## 2022-08-09 NOTE — HOME HEALTH
PT HOSPITALIZED 8/1-8/7 FOR RLE CELLULITIS, BACTERIAL ENDOCARDITIS, BACTEREMIA, HX OF MVR WITH MECHANICAL VALVE, CHRONIC ANTICOAGULANT THERAPY, ASHIA, PAF     PT/INR 8/11   CBC WITH DIFF AND CR  ON MONDAYS EFFECTIVE 8/15    FOC PT SEEN BY SN FOR C/P ASSESS, INSTRUCT ON DISEASE PROCESS AND S/S EXACERBATION, I/S ON S/S INFECTION, INSTRUCT ON IV THERAPY. PERFORM  PICC CARE, INSTRUCT ON ANTICOAGULANT THERAPY, PAIN MANAGEMENT, LABS AS ORDERED

## 2022-08-11 ENCOUNTER — HOME CARE VISIT (OUTPATIENT)
Dept: HOME HEALTH SERVICES | Facility: HOME HEALTHCARE | Age: 62
End: 2022-08-11

## 2022-08-11 ENCOUNTER — ANTICOAGULATION VISIT (OUTPATIENT)
Dept: PHARMACY | Facility: HOSPITAL | Age: 62
End: 2022-08-11

## 2022-08-11 VITALS
SYSTOLIC BLOOD PRESSURE: 132 MMHG | RESPIRATION RATE: 16 BRPM | BODY MASS INDEX: 34.68 KG/M2 | HEART RATE: 64 BPM | WEIGHT: 300.13 LBS | TEMPERATURE: 98.3 F | OXYGEN SATURATION: 99 % | DIASTOLIC BLOOD PRESSURE: 80 MMHG

## 2022-08-11 DIAGNOSIS — I48.0 PAROXYSMAL ATRIAL FIBRILLATION: Primary | ICD-10-CM

## 2022-08-11 DIAGNOSIS — Z95.2 HX OF MITRAL VALVE REPLACEMENT WITH MECHANICAL VALVE: ICD-10-CM

## 2022-08-11 LAB — INR PPP: 2.2

## 2022-08-11 PROCEDURE — G0299 HHS/HOSPICE OF RN EA 15 MIN: HCPCS

## 2022-08-11 NOTE — PROGRESS NOTES
Anticoagulation Clinic Progress Note    Anticoagulation Summary  As of 2022    INR goal:  2.5-3.5   TTR:  64.8 % (3.7 y)   INR used for dosin.20 (2022)   Warfarin maintenance plan:  5 mg every day   Weekly warfarin total:  35 mg   Plan last modified:  Michael Horner, Pablo (2022)   Next INR check:  8/15/2022   Priority:  High   Target end date:  Indefinite    Indications    Paroxysmal atrial fibrillation (HCC) [I48.0]  Hx of mitral valve replacement with mechanical valve [Z95.2] [Z95.2]             Anticoagulation Episode Summary     INR check location:      Preferred lab:      Send INR reminders to:  [facility]  POOL    Comments:  [facility] (previously [facility]; stopped due to cost)      Anticoagulation Care Providers     Provider Role Specialty Phone number    Navi Fay MD Referring Cardiology [phone]          INR History:  Anticoagulation Monitoring 2022   INR 2.2 3.20 2.20   INR Date 2022   INR Goal 2.5-3.5 2.5-3.5 2.5-3.5   Trend Up Same Up   Last Week Total 27.5 mg 40 mg 40 mg   Next Week Total 32.5 mg 30 mg 37.5 mg   Sun 5 mg - 5 mg   Mon 2.5 mg 2.5 mg -   Tue 5 mg 5 mg -   Wed 5 mg 5 mg -   Thu 7.5 mg (); Otherwise 5 mg - 7.5 mg ()   Fri 2.5 mg - 5 mg   Sat 5 mg - 5 mg   Visit Report - - -   Some recent data might be hidden       Plan:  1. INR is Subtherapeutic today- see above in Anticoagulation Summary.   Provided instructions to  Romulo (by phone) and Kayleigh (by secure voicemail) with [facility] to Increase their warfarin regimen (7.5 mg today, then 5 mg daily) - see above in Anticoagulation Summary.  2. Follow up in 4 days      Michael Horner, Pablo

## 2022-08-15 ENCOUNTER — HOME CARE VISIT (OUTPATIENT)
Dept: HOME HEALTH SERVICES | Facility: HOME HEALTHCARE | Age: 62
End: 2022-08-15

## 2022-08-15 ENCOUNTER — ANTICOAGULATION VISIT (OUTPATIENT)
Dept: PHARMACY | Facility: HOSPITAL | Age: 62
End: 2022-08-15

## 2022-08-15 ENCOUNTER — READMISSION MANAGEMENT (OUTPATIENT)
Dept: CALL CENTER | Facility: HOSPITAL | Age: 62
End: 2022-08-15

## 2022-08-15 ENCOUNTER — LAB REQUISITION (OUTPATIENT)
Dept: LAB | Facility: HOSPITAL | Age: 62
End: 2022-08-15

## 2022-08-15 DIAGNOSIS — I48.0 PAROXYSMAL ATRIAL FIBRILLATION: Primary | ICD-10-CM

## 2022-08-15 DIAGNOSIS — Z95.2 HX OF MITRAL VALVE REPLACEMENT WITH MECHANICAL VALVE: ICD-10-CM

## 2022-08-15 DIAGNOSIS — Z00.00 ENCOUNTER FOR GENERAL ADULT MEDICAL EXAMINATION WITHOUT ABNORMAL FINDINGS: ICD-10-CM

## 2022-08-15 LAB
BASOPHILS # BLD AUTO: 0.04 10*3/MM3 (ref 0–0.2)
BASOPHILS NFR BLD AUTO: 0.7 % (ref 0–1.5)
CREAT SERPL-MCNC: 0.92 MG/DL (ref 0.76–1.27)
DEPRECATED RDW RBC AUTO: 48.1 FL (ref 37–54)
EGFRCR SERPLBLD CKD-EPI 2021: 94.6 ML/MIN/1.73
EOSINOPHIL # BLD AUTO: 0.06 10*3/MM3 (ref 0–0.4)
EOSINOPHIL NFR BLD AUTO: 1 % (ref 0.3–6.2)
ERYTHROCYTE [DISTWIDTH] IN BLOOD BY AUTOMATED COUNT: 13.4 % (ref 12.3–15.4)
HCT VFR BLD AUTO: 40.1 % (ref 37.5–51)
HGB BLD-MCNC: 13.6 G/DL (ref 13–17.7)
IMM GRANULOCYTES # BLD AUTO: 0.02 10*3/MM3 (ref 0–0.05)
IMM GRANULOCYTES NFR BLD AUTO: 0.3 % (ref 0–0.5)
INR PPP: 3.5
LYMPHOCYTES # BLD AUTO: 2.09 10*3/MM3 (ref 0.7–3.1)
LYMPHOCYTES NFR BLD AUTO: 36.5 % (ref 19.6–45.3)
MCH RBC QN AUTO: 33.3 PG (ref 26.6–33)
MCHC RBC AUTO-ENTMCNC: 33.9 G/DL (ref 31.5–35.7)
MCV RBC AUTO: 98 FL (ref 79–97)
MONOCYTES # BLD AUTO: 0.64 10*3/MM3 (ref 0.1–0.9)
MONOCYTES NFR BLD AUTO: 11.2 % (ref 5–12)
NEUTROPHILS NFR BLD AUTO: 2.88 10*3/MM3 (ref 1.7–7)
NEUTROPHILS NFR BLD AUTO: 50.3 % (ref 42.7–76)
NRBC BLD AUTO-RTO: 0 /100 WBC (ref 0–0.2)
PLATELET # BLD AUTO: 294 10*3/MM3 (ref 140–450)
PMV BLD AUTO: 9.3 FL (ref 6–12)
RBC # BLD AUTO: 4.09 10*6/MM3 (ref 4.14–5.8)
WBC NRBC COR # BLD: 5.73 10*3/MM3 (ref 3.4–10.8)

## 2022-08-15 PROCEDURE — G0299 HHS/HOSPICE OF RN EA 15 MIN: HCPCS

## 2022-08-15 PROCEDURE — 82565 ASSAY OF CREATININE: CPT | Performed by: INTERNAL MEDICINE

## 2022-08-15 PROCEDURE — 85025 COMPLETE CBC W/AUTO DIFF WBC: CPT | Performed by: INTERNAL MEDICINE

## 2022-08-15 NOTE — PROGRESS NOTES
Anticoagulation Clinic Progress Note    Anticoagulation Summary  As of 8/15/2022    INR goal:  2.5-3.5   TTR:  64.9 % (3.7 y)   INR used for dosing:  3.50 (8/15/2022)   Warfarin maintenance plan:  5 mg every day   Weekly warfarin total:  35 mg   No change documented:  Bushra Torre PharmD   Plan last modified:  Michael Horner PharmD (8/11/2022)   Next INR check:  8/18/2022   Priority:  High   Target end date:  Indefinite    Indications    Paroxysmal atrial fibrillation (HCC) [I48.0]  Hx of mitral valve replacement with mechanical valve [Z95.2] [Z95.2]             Anticoagulation Episode Summary     INR check location:      Preferred lab:      Send INR reminders to:   [facility]  POOL    Comments:  [facility] (previously [facility]; stopped due to cost)      Anticoagulation Care Providers     Provider Role Specialty Phone number    Navi Fay MD Referring Cardiology [phone]          INR History:  Anticoagulation Monitoring 8/8/2022 8/11/2022 8/15/2022   INR 3.20 2.20 3.50   INR Date 8/8/2022 8/11/2022 8/15/2022   INR Goal 2.5-3.5 2.5-3.5 2.5-3.5   Trend Same Up Same   Last Week Total 40 mg 40 mg 35 mg   Next Week Total 30 mg 37.5 mg 35 mg   Sun - 5 mg -   Mon 2.5 mg - 5 mg   Tue 5 mg - 5 mg   Wed 5 mg - 5 mg   Thu - 7.5 mg (8/11) -   Fri - 5 mg -   Sat - 5 mg -   Visit Report - - -   Some recent data might be hidden       Plan:  1. INR is Therapeutic today- see above in Anticoagulation Summary.   Provided instructions to Sveta with [facility] to Continue their warfarin regimen- see above in Anticoagulation Summary.  2. Follow up in 4 days      Bushra Torre PharmD

## 2022-08-15 NOTE — OUTREACH NOTE
Sepsis Week 2 Survey    Flowsheet Row Responses   [facility] [place] patient discharged from? [place]   Does the patient have one of the following disease processes/diagnoses(primary or secondary)? Sepsis   Week 2 attempt successful? No   Unsuccessful attempts Attempt 1          LOLIS MARKS - Licensed Nurse

## 2022-08-16 VITALS
OXYGEN SATURATION: 99 % | TEMPERATURE: 97.6 F | HEART RATE: 60 BPM | SYSTOLIC BLOOD PRESSURE: 130 MMHG | BODY MASS INDEX: 34.83 KG/M2 | WEIGHT: 301.38 LBS | DIASTOLIC BLOOD PRESSURE: 78 MMHG | RESPIRATION RATE: 16 BRPM

## 2022-08-18 ENCOUNTER — HOME CARE VISIT (OUTPATIENT)
Dept: HOME HEALTH SERVICES | Facility: HOME HEALTHCARE | Age: 62
End: 2022-08-18

## 2022-08-18 ENCOUNTER — ANTICOAGULATION VISIT (OUTPATIENT)
Dept: PHARMACY | Facility: HOSPITAL | Age: 62
End: 2022-08-18

## 2022-08-18 VITALS
RESPIRATION RATE: 20 BRPM | BODY MASS INDEX: 24.21 KG/M2 | TEMPERATURE: 98.1 F | OXYGEN SATURATION: 99 % | SYSTOLIC BLOOD PRESSURE: 152 MMHG | WEIGHT: 209.5 LBS | HEART RATE: 64 BPM | DIASTOLIC BLOOD PRESSURE: 82 MMHG

## 2022-08-18 DIAGNOSIS — I48.0 PAROXYSMAL ATRIAL FIBRILLATION: Primary | ICD-10-CM

## 2022-08-18 DIAGNOSIS — Z95.2 HX OF MITRAL VALVE REPLACEMENT WITH MECHANICAL VALVE: ICD-10-CM

## 2022-08-18 LAB — INR PPP: 3.3

## 2022-08-18 PROCEDURE — G0299 HHS/HOSPICE OF RN EA 15 MIN: HCPCS

## 2022-08-18 NOTE — PROGRESS NOTES
Anticoagulation Clinic Progress Note    Anticoagulation Summary  As of 8/18/2022    INR goal:  2.5-3.5   TTR:  64.9 % (3.7 y)   INR used for dosing:  3.30 (8/18/2022)   Warfarin maintenance plan:  5 mg every day   Weekly warfarin total:  35 mg   No change documented:  Michael Horner PharmD   Plan last modified:  Michael Horner PharmD (8/11/2022)   Next INR check:  8/22/2022   Priority:  High   Target end date:  Indefinite    Indications    Paroxysmal atrial fibrillation (HCC) [I48.0]  Hx of mitral valve replacement with mechanical valve [Z95.2] [Z95.2]             Anticoagulation Episode Summary     INR check location:      Preferred lab:      Send INR reminders to:   [facility]  POOL    Comments:  [facility] (previously [facility]; stopped due to cost)      Anticoagulation Care Providers     Provider Role Specialty Phone number    Navi Fay MD Referring Cardiology [phone]          INR History:  Anticoagulation Monitoring 8/11/2022 8/15/2022 8/18/2022   INR 2.20 3.50 3.30   INR Date 8/11/2022 8/15/2022 8/18/2022   INR Goal 2.5-3.5 2.5-3.5 2.5-3.5   Trend Up Same Same   Last Week Total 40 mg 35 mg 37.5 mg   Next Week Total 37.5 mg 35 mg 35 mg   Sun 5 mg - 5 mg   Mon - 5 mg -   Tue - 5 mg -   Wed - 5 mg -   Thu 7.5 mg (8/11) - 5 mg   Fri 5 mg - 5 mg   Sat 5 mg - 5 mg   Visit Report - - -   Some recent data might be hidden       Plan:  1. INR is Therapeutic today- see above in Anticoagulation Summary.   Provided instructions to Katherin KIMBRELY with [facility] to Continue their warfarin regimen- see above in Anticoagulation Summary.  2. Follow up in 4 days      Michael Horner PharmD

## 2022-08-19 ENCOUNTER — OFFICE VISIT (OUTPATIENT)
Dept: CARDIOLOGY | Facility: CLINIC | Age: 62
End: 2022-08-19

## 2022-08-19 VITALS
BODY MASS INDEX: 35.15 KG/M2 | OXYGEN SATURATION: 98 % | HEIGHT: 78 IN | HEART RATE: 55 BPM | WEIGHT: 303.8 LBS | DIASTOLIC BLOOD PRESSURE: 84 MMHG | SYSTOLIC BLOOD PRESSURE: 126 MMHG

## 2022-08-19 DIAGNOSIS — I48.0 PAROXYSMAL ATRIAL FIBRILLATION: Primary | ICD-10-CM

## 2022-08-19 DIAGNOSIS — I33.0 ACUTE BACTERIAL ENDOCARDITIS: ICD-10-CM

## 2022-08-19 DIAGNOSIS — Z95.2 HX OF MITRAL VALVE REPLACEMENT WITH MECHANICAL VALVE: ICD-10-CM

## 2022-08-19 DIAGNOSIS — K08.9 TEETH PROBLEM: ICD-10-CM

## 2022-08-19 DIAGNOSIS — A41.89 GRAM POSITIVE SEPTICEMIA: ICD-10-CM

## 2022-08-19 DIAGNOSIS — Z79.01 CHRONIC ANTICOAGULATION: ICD-10-CM

## 2022-08-19 DIAGNOSIS — I10 ESSENTIAL HYPERTENSION: ICD-10-CM

## 2022-08-19 PROCEDURE — 93000 ELECTROCARDIOGRAM COMPLETE: CPT | Performed by: NURSE PRACTITIONER

## 2022-08-19 PROCEDURE — 99214 OFFICE O/P EST MOD 30 MIN: CPT | Performed by: NURSE PRACTITIONER

## 2022-08-19 RX ORDER — WARFARIN SODIUM 5 MG/1
5 TABLET ORAL NIGHTLY
Start: 2022-08-19 | End: 2022-08-22 | Stop reason: SDUPTHER

## 2022-08-19 RX ORDER — LOSARTAN POTASSIUM 50 MG/1
50 TABLET ORAL DAILY
Qty: 90 TABLET | Refills: 3 | Status: SHIPPED | OUTPATIENT
Start: 2022-08-19 | End: 2022-08-23 | Stop reason: SDUPTHER

## 2022-08-19 RX ORDER — ATENOLOL 25 MG/1
25 TABLET ORAL DAILY
Qty: 90 TABLET | Refills: 3 | Status: SHIPPED | OUTPATIENT
Start: 2022-08-19 | End: 2022-08-23 | Stop reason: SDUPTHER

## 2022-08-19 NOTE — PROGRESS NOTES
Date of Office Visit: 2022  Encounter Provider: COREY Acevedo  Place of Service: [facility] CARDIOLOGY  Patient Name: Elliot Sebastian  :1960    Chief Complaint   Patient presents with   • Atrial Fibrillation     BHE 2 wk f/u   :     HPI: Elliot Sebastian is a 62 y.o. male who is a patient of Dr. Fay.  He is new to me today and presents for a 6-month follow-up.  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  His heart failure has resolved.  On his last office visit with Dr. Fay on 2022, he complained of intermittent palpitations.  Longest duration lasting approximately an hour.  His blood pressure had been elevated for quite some time with systolic ranging in the 170s.  Originally, he was on atenolol for control of his A. fib and blood pressure.      On his last office visit, losartan 50 mg was added.  The plan was for patient to monitor blood pressure and come back into office in 1 month for blood pressure check.  He, however, did not present back to the office until today.    Patient presented to [facility] emergency department on 2022 with generalized weakness and leg swelling.  He was found to have cellulitis as well as bacteremia with positive blood cultures for streptococcus dysgalactiae.  He was found to have bacterial endocarditis and placed on IV ceftriaxone for plan of 6 weeks.  No surgical therapy at this time. Work-up showed lactic acid 3-> 4.7.      On 8/3/2022 patient underwent MOE to check valvular function.  Mitral valve peak and mean gradients were within defined limits and prosthetic mitral valve is normal.  There was a mobile vegetation on the left atrial surface of the mechanical mitral valve at the septal annulus very near to the ostium of the left atrial appendage measuring 0.7 cm which appears to  be attached to a knot on annulus.  He also had mild hypertension, normal left ventricular systolic function with an EF of 51 to 55%.  Saline test results were negative.  Patient also had mild pulmonary hypertension (RSVP 35-45mm Hg) with trace to mild tricuspid regurgitation.    Today patient presents with no complaints of chest pain, palpitations, dizziness or shortness of air.  Had a long discussion about his recurrent endocarditis.  Patient is on IV antibiotics right now and is set to complete either 6 to 8 weeks of IV antibiotics.  He has a follow-up exam with Dr. Vega on 09/13.  Patient noted that he has about 8 teeth left, the rest are falls.  He notes that he is had some pain when flossing and does not know if that could be a source to his endocarditis.  He also was diagnosed with cellulitis of the right leg.  Therefore there could be multiple possible sources of his endocarditis.  The plan is to complete this round of IV antibiotics, follow-up with ID and repeat MOE.  He works as a  and I have advised him to stay off work until that time, due to possibilty of introducing infection with a PICC line in place.  He is in a sinus rhythm with an incomplete right bundle branch block on EKG today.  Blood pressure is well controlled.    Previous testing and notes have been reviewed by me.   Past Medical History:   Diagnosis Date   • Acute bacterial endocarditis    • Anemia    • APC (atrial premature contractions)    • Atrial fibrillation ([facility])    • BPH (benign prostatic hypertrophy)    • CHF (congestive heart failure) ([facility])    • Cholelithiasis    • Chronic constipation    • Deep vein thrombophlebitis of leg ([facility])     DISTAL   • Disease of thyroid gland    • Gout    • Intestinal obstruction ([facility])    • Ischemic enteritis ([facility])    • Left heart failure, NYHA class 3 ([facility])     CLASS 111 LEFT SYSTOIC CONGESTIVE HEART FAILURE   • Mitral regurgitation    • Pleural effusion, bilateral    • Pulmonary  hypertension (HCC)    • 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HCC)    • Umbilical hernia    • Vitamin D deficiency        Past Surgical History:   Procedure Laterality Date   • APPENDECTOMY     • CHOLECYSTECTOMY     • COLONOSCOPY  approx 2013    normal per patient   • COLONOSCOPY N/A 10/26/2020    Procedure: COLONOSCOPY into cecum with biopsy, polypectomy, clip placement;  Surgeon: Juan Phillips MD;  Location: [facility] ENDOSCOPY;  Service: Gastroenterology;  Laterality: N/A;  polyps, clip  asc polyp removed but not retrieved   • EXPLORATION NECK FOR POSTOP HEMORRHAGE / THROMBOSIS / INFECTION     • MITRAL VALVE REPLACEMENT  01/03/2013    33MM [facility] MECHANICAL VALVE, PRESERVATION OF CORDS TO SUBVALVULAR APPARATUS AND DOROTHY GORE-FLORI CORD IN THE P2 AREA, DEBRIDEMENT OF ANTERIOR AND POSTERIOR MITRAL LEAFLET        Social History     Socioeconomic History   • Marital status:    Tobacco Use   • Smoking status: Never Smoker   • Smokeless tobacco: Never Used   • Tobacco comment: caffeine use   Substance and Sexual Activity   • Alcohol use: Yes     Alcohol/week: 2.0 standard drinks     Types: 2 Cans of beer per week     Comment: 1-2 nightly   • Drug use: Never       Family History   Problem Relation Age of Onset   • Diabetes Mother    • Heart disease Father    • Diabetes Sister    • Diabetes Brother        Review of Systems   Constitutional: Negative.   HENT: Negative.    Eyes: Negative.    Cardiovascular: Negative.    Respiratory: Negative.    Endocrine: Negative.    Hematologic/Lymphatic:        Warfarin   Skin: Negative.    Gastrointestinal: Negative.    Genitourinary: Negative.    Neurological: Negative.    Psychiatric/Behavioral: Negative.    Allergic/Immunologic: Negative.        No Known Allergies      Current Outpatient Medications:   •  atenolol (TENORMIN) 25 MG tablet, Take 1 tablet by mouth Daily. MUST MAKE FOLLOW UP APPOINTMENT FOR FUTURE REFILLS, Disp: 90 tablet, Rfl: 3  •  cefTRIAXone 2 g in sodium  "chloride 0.9 % 100 mL IVPB, Infuse 2 g into a venous catheter Daily for 38 doses. Indications: Pneumonia, Disp: , Rfl:   •  [START ON 8/24/2022] Cholecalciferol (VITAMIN D3) 2000 UNITS capsule, Take 5,000 Units by mouth Daily , Disp: , Rfl:   •  cholestyramine (Questran) 4 GM/DOSE powder, Take 1 packet by mouth Daily. If no improvement after two weeks, may increase to BID., Disp: 90 packet, Rfl: 3  •  EPINEPHrine 0.3 MG/0.3ML solution prefilled syringe, Inject 0.3 syringes into the appropriate muscle as directed by prescriber As Needed. SEVERE REACTION, Disp: , Rfl:   •  losartan (COZAAR) 50 MG tablet, Take 1 tablet by mouth Daily. MUST MAKE FOLLOW UP APPOINTMENT FOR FUTURE REFILLS 6/30/2022, Disp: 90 tablet, Rfl: 3  •  Multiple Vitamins-Minerals (MENS MULTIVITAMIN PLUS) tablet, Take 2 capsules by mouth Daily. Soft gels, Disp: , Rfl:   •  warfarin (COUMADIN) 5 MG tablet, Take 1 tablet by mouth Every Night. 7.5MG 8/11 THEN 5MG QD, Disp: , Rfl:   •  diphenhydrAMINE (BENADRYL) 50 MG tablet, Take 50 mg by mouth As Needed. MILD REACTION, Disp: , Rfl:       Objective:     Vitals:    08/19/22 1405   BP: 126/84   BP Location: Left arm   Patient Position: Sitting   Cuff Size: Large Adult   Pulse: 55   SpO2: 98%   Weight: (!) 138 kg (303 lb 12.8 oz)   Height: 198.1 cm (78\")     Body mass index is 35.11 kg/m².     Transesophageal Echocardiogram 8/3/2022:  LVEF 51 to 55%, all LV walls contract normally  Normal RV size and systolic function  Normal LA cavity size, left atrial appendage found to be multilobar, no evidence of left atrial appendage thrombus, negative saline test  Normal RA cavity size  Actually normal AV with no regurgitation or stenosis  Trace MR, paravalvular leak, no significant MS, mitral valve peak and mean gradients within defined limits.  Prosthetic mitral valve normal.  There is mobile vegetation on left atrial surface of mechanical mitral valve at the septal annulus very near to the ostium of the left " atrial appendage, measuring 0.7 cm, appears to be attached to a knot on annulus.  Trace to mild tricuspid regurgitation, estimated RVSP from TR 35 to 45 mmHg  Borderline dilation of aortic root    Exercise EKG stress test 7/11/2018:  No EKG evidence of myocardial ischemia      PHYSICAL EXAM:    Constitutional:       Appearance: Healthy appearance. Not in distress.   Neck:      Vascular: No JVR. JVD normal.   Pulmonary:      Effort: Pulmonary effort is normal.      Breath sounds: Normal breath sounds. No wheezing. No rhonchi. No rales.   Chest:      Chest wall: Not tender to palpatation.   Cardiovascular:      PMI at left midclavicular line. Normal rate. Regular rhythm. Normal S1. Normal S2.      Murmurs: There is no murmur.      No gallop. No click. Mechanical mitral valve No rub.   Pulses:     Intact distal pulses.   Edema:     Peripheral edema absent.   Abdominal:      General: Bowel sounds are normal.      Palpations: Abdomen is soft.      Tenderness: There is no abdominal tenderness.   Musculoskeletal: Normal range of motion.         General: No tenderness. Skin:     General: Skin is warm and dry.   Neurological:      General: No focal deficit present.      Mental Status: Alert and oriented to person, place and time.           ECG 12 Lead    Date/Time: 8/19/2022 3:13 PM  Performed by: Leyla Byrne APRN  Authorized by: Leyla Byrne APRN   Comparison: compared with previous ECG from 8/6/2022  Similar to previous ECG  Rhythm: sinus rhythm  BPM: 55  Conduction: incomplete right bundle branch block  ST Segments: ST segments normal  T Waves: T waves normal                Assessment:       Diagnosis Plan   1. Paroxysmal atrial fibrillation (HCC)     2. Acute bacterial endocarditis  Adult Transesophageal Echo (MOE) W/ Cont if Necessary Per Protocol   3. Hx of mitral valve replacement with mechanical valve [Z95.2]  Adult Transesophageal Echo (MOE) W/ Cont if Necessary Per Protocol   4. Chronic  anticoagulation     5. Essential hypertension     6. Gram positive septicemia (HCC)  Adult Transesophageal Echo (MOE) W/ Cont if Necessary Per Protocol   7. Teeth problem       Orders Placed This Encounter   Procedures   • Adult Transesophageal Echo (MOE) W/ Cont if Necessary Per Protocol     Standing Status:   Future     Standing Expiration Date:   8/19/2023     Order Specific Question:   What type of sedation does the patient require?     Answer:   Monitored Anesthesia Care     Order Specific Question:   Reason for exam?     Answer:   Endocarditis     Order Specific Question:   Release to patient     Answer:   Immediate          Plan:       1.  Paroxysmal atrial fibrillation: Anticoagulation with warfarin.  In sinus rhythm with incomplete right bundle branch block on EKG today.  I will take him off amiodarone at this time.  He will remain on atenolol.  2.  Hypertension: Well-controlled on atenolol and losartan  3.  Mitral valve replacement mechanical valve secondary to bacterial endocarditis: On warfarin.  Normal functioning valve  4.  Current prosthetic mitral valve endocarditis: On antibiotics.  No surgical plan at this time.  Plan is to finish course of antibiotics, follow-up with ID and schedule follow-up MOE to reevaluate mitral valve  5.  History of diastolic congestive heart failure: Well compensated  6.  Cellulitis: IV antibiotic  7.  Teeth problem: follow up with ID, have MOE and possibly have remaining teeth pulled    Mr. Roman will follow up with Dr. Chanel in 2 months.  He will have a MOE once finished with his IV antibiotics 3 evaluate mitral valve endocarditis.  He will call sooner for any questions or concerns.         Your medication list          Accurate as of August 19, 2022  3:10 PM. If you have any questions, ask your nurse or doctor.            CONTINUE taking these medications      Instructions Last Dose Given Next Dose Due   atenolol 25 MG tablet  Commonly known as: TENORMIN       Take 1 tablet by mouth Daily. MUST MAKE FOLLOW UP APPOINTMENT FOR FUTURE REFILLS       cefTRIAXone 2 g in sodium chloride 0.9 % 100 mL IVPB      Infuse 2 g into a venous catheter Daily for 38 doses. Indications: Pneumonia       cholestyramine 4 GM/DOSE powder  Commonly known as: Questran      Take 1 packet by mouth Daily. If no improvement after two weeks, may increase to BID.       diphenhydrAMINE 50 MG tablet  Commonly known as: BENADRYL      Take 50 mg by mouth As Needed. MILD REACTION       EPINEPHrine 0.3 MG/0.3ML solution prefilled syringe      Inject 0.3 syringes into the appropriate muscle as directed by prescriber As Needed. SEVERE REACTION       losartan 50 MG tablet  Commonly known as: COZAAR      Take 1 tablet by mouth Daily. MUST MAKE FOLLOW UP APPOINTMENT FOR FUTURE REFILLS 6/30/2022       Mens Multivitamin Plus tablet tablet  Generic drug: multivitamin with minerals      Take 2 capsules by mouth Daily. Soft gels       Vitamin D3 50 MCG (2000 UT) capsule  Start taking on: August 24, 2022      Take 5,000 Units by mouth Daily       warfarin 5 MG tablet  Commonly known as: COUMADIN      Take 1 tablet by mouth Every Night. 7.5MG 8/11 THEN 5MG QD          STOP taking these medications    amiodarone 200 MG tablet  Commonly known as: PACERONE  Stopped by: COREY Acevedo              Where to Get Your Medications      These medications were sent to [address] - [address] [facility] - [phone]  - [phone]   [address] [facility] [address]    Phone: [phone]   · atenolol 25 MG tablet  · losartan 50 MG tablet     Information about where to get these medications is not yet available    Ask your nurse or doctor about these medications  · warfarin 5 MG tablet           As always, it has been a pleasure to participate in your patient's care.      Sincerely,       COREY Meracdo

## 2022-08-22 ENCOUNTER — LAB REQUISITION (OUTPATIENT)
Dept: LAB | Facility: HOSPITAL | Age: 62
End: 2022-08-22

## 2022-08-22 ENCOUNTER — ANTICOAGULATION VISIT (OUTPATIENT)
Dept: PHARMACY | Facility: HOSPITAL | Age: 62
End: 2022-08-22

## 2022-08-22 ENCOUNTER — HOME CARE VISIT (OUTPATIENT)
Dept: HOME HEALTH SERVICES | Facility: HOME HEALTHCARE | Age: 62
End: 2022-08-22

## 2022-08-22 VITALS
OXYGEN SATURATION: 98 % | RESPIRATION RATE: 16 BRPM | HEART RATE: 68 BPM | DIASTOLIC BLOOD PRESSURE: 86 MMHG | SYSTOLIC BLOOD PRESSURE: 122 MMHG | TEMPERATURE: 97.7 F

## 2022-08-22 DIAGNOSIS — I48.0 PAROXYSMAL ATRIAL FIBRILLATION: Primary | ICD-10-CM

## 2022-08-22 DIAGNOSIS — Z00.00 ENCOUNTER FOR GENERAL ADULT MEDICAL EXAMINATION WITHOUT ABNORMAL FINDINGS: ICD-10-CM

## 2022-08-22 DIAGNOSIS — Z95.2 HX OF MITRAL VALVE REPLACEMENT WITH MECHANICAL VALVE: ICD-10-CM

## 2022-08-22 LAB
BASOPHILS # BLD AUTO: 0.03 10*3/MM3 (ref 0–0.2)
BASOPHILS NFR BLD AUTO: 0.6 % (ref 0–1.5)
CREAT SERPL-MCNC: 0.99 MG/DL (ref 0.76–1.27)
DEPRECATED RDW RBC AUTO: 47.6 FL (ref 37–54)
EGFRCR SERPLBLD CKD-EPI 2021: 86.7 ML/MIN/1.73
EOSINOPHIL # BLD AUTO: 0.12 10*3/MM3 (ref 0–0.4)
EOSINOPHIL NFR BLD AUTO: 2.3 % (ref 0.3–6.2)
ERYTHROCYTE [DISTWIDTH] IN BLOOD BY AUTOMATED COUNT: 13.3 % (ref 12.3–15.4)
HCT VFR BLD AUTO: 44.8 % (ref 37.5–51)
HGB BLD-MCNC: 15 G/DL (ref 13–17.7)
IMM GRANULOCYTES # BLD AUTO: 0.01 10*3/MM3 (ref 0–0.05)
IMM GRANULOCYTES NFR BLD AUTO: 0.2 % (ref 0–0.5)
INR PPP: 5
LYMPHOCYTES # BLD AUTO: 2.03 10*3/MM3 (ref 0.7–3.1)
LYMPHOCYTES NFR BLD AUTO: 38.7 % (ref 19.6–45.3)
MCH RBC QN AUTO: 32.8 PG (ref 26.6–33)
MCHC RBC AUTO-ENTMCNC: 33.5 G/DL (ref 31.5–35.7)
MCV RBC AUTO: 97.8 FL (ref 79–97)
MONOCYTES # BLD AUTO: 0.58 10*3/MM3 (ref 0.1–0.9)
MONOCYTES NFR BLD AUTO: 11.1 % (ref 5–12)
NEUTROPHILS NFR BLD AUTO: 2.47 10*3/MM3 (ref 1.7–7)
NEUTROPHILS NFR BLD AUTO: 47.1 % (ref 42.7–76)
NRBC BLD AUTO-RTO: 0 /100 WBC (ref 0–0.2)
PLATELET # BLD AUTO: 260 10*3/MM3 (ref 140–450)
PMV BLD AUTO: 9.2 FL (ref 6–12)
RBC # BLD AUTO: 4.58 10*6/MM3 (ref 4.14–5.8)
WBC NRBC COR # BLD: 5.24 10*3/MM3 (ref 3.4–10.8)

## 2022-08-22 PROCEDURE — 85025 COMPLETE CBC W/AUTO DIFF WBC: CPT | Performed by: INTERNAL MEDICINE

## 2022-08-22 PROCEDURE — G0299 HHS/HOSPICE OF RN EA 15 MIN: HCPCS

## 2022-08-22 PROCEDURE — 82565 ASSAY OF CREATININE: CPT | Performed by: INTERNAL MEDICINE

## 2022-08-22 PROCEDURE — G0180 MD CERTIFICATION HHA PATIENT: HCPCS | Performed by: FAMILY MEDICINE

## 2022-08-22 RX ORDER — WARFARIN SODIUM 5 MG/1
TABLET ORAL
Qty: 90 TABLET | Refills: 1 | Status: SHIPPED | OUTPATIENT
Start: 2022-08-22 | End: 2023-02-02 | Stop reason: SDUPTHER

## 2022-08-22 NOTE — PROGRESS NOTES
Anticoagulation Clinic Progress Note    Anticoagulation Summary  As of 2022    INR goal:  2.5-3.5   TTR:  64.8 % (3.7 y)   INR used for dosin.00 (2022)   Warfarin maintenance plan:  5 mg every day   Weekly warfarin total:  35 mg   Plan last modified:  Michael Horner, PharmD (2022)   Next INR check:  2022   Priority:  High   Target end date:  Indefinite    Indications    Paroxysmal atrial fibrillation (HCC) [I48.0]  Hx of mitral valve replacement with mechanical valve [Z95.2] [Z95.2]             Anticoagulation Episode Summary     INR check location:      Preferred lab:      Send INR reminders to:   [facility]    Comments:  [facility] (previously [facility]; stopped due to cost)      Anticoagulation Care Providers     Provider Role Specialty Phone number    Navi Fay MD Referring Cardiology [phone]          Clinic Interview:  Patient Findings     Positives:  Change in medications    Negatives:  Signs/symptoms of thrombosis, Signs/symptoms of bleeding,   Laboratory test error suspected, Change in health, Change in alcohol use,   Change in activity, Upcoming invasive procedure, Emergency department   visit, Upcoming dental procedure, Missed doses, Extra doses, Change in   diet/appetite, Hospital admission, Bruising, Other complaints    Comments:  Reports stopping the amiodarone after visit with cardiology on   Friday. Reports drinking alcohol (about a drink per evening) and denies   any changes in quantity or frequency.       Clinical Outcomes     Negatives:  Major bleeding event, Thromboembolic event,   Anticoagulation-related hospital admission, Anticoagulation-related ED   visit, Anticoagulation-related fatality    Comments:  Reports stopping the amiodarone after visit with cardiology on   Friday. Reports drinking alcohol (about a drink per evening) and denies   any changes in quantity or frequency.         INR History:  Anticoagulation Monitoring 8/15/2022  8/18/2022 8/22/2022   INR 3.50 3.30 5.00   INR Date 8/15/2022 8/18/2022 8/22/2022   INR Goal 2.5-3.5 2.5-3.5 2.5-3.5   Trend Same Same Same   Last Week Total 35 mg 37.5 mg 35 mg   Next Week Total 35 mg 35 mg 27.5 mg   Sun - 5 mg -   Mon 5 mg - 5 mg   Tue 5 mg - Hold (8/23)   Wed 5 mg - 2.5 mg (8/24)   Thu - 5 mg -   Fri - 5 mg -   Sat - 5 mg -   Visit Report - - -   Some recent data might be hidden       Plan:  1. INR is Supratherapeutic today- see above in Anticoagulation Summary.   Will instruct Elliot Sebastian to Change their warfarin regimen- see above in Anticoagulation Summary.  Patient took dose for today; hold dose for tomorrow, then take 2.5mg on Wednesday, then resume previous weekly dosing.  2. Follow up in 3 days (Thursday, 8/25)  3. Provided dosing instructions and follow up plan to patient and to [facility]. They have been instructed to call if any changes in medications, doses, concerns, etc. Patient expresses understanding and has no further questions at this time.    Bushra Torre, PharmD

## 2022-08-23 RX ORDER — ATENOLOL 25 MG/1
25 TABLET ORAL DAILY
Qty: 90 TABLET | Refills: 0 | Status: SHIPPED | OUTPATIENT
Start: 2022-08-23 | End: 2023-02-02 | Stop reason: SDUPTHER

## 2022-08-23 RX ORDER — LOSARTAN POTASSIUM 50 MG/1
50 TABLET ORAL DAILY
Qty: 90 TABLET | Refills: 0 | Status: SHIPPED | OUTPATIENT
Start: 2022-08-23 | End: 2022-11-09

## 2022-08-23 NOTE — TELEPHONE ENCOUNTER
Please advise filling    LOV   -   8/19/2022  Next   -   DUE - 10/2022 with Dr. Darío Walter labs    Renetta HOPKINS

## 2022-08-24 ENCOUNTER — TELEPHONE (OUTPATIENT)
Dept: CARDIOLOGY | Facility: CLINIC | Age: 62
End: 2022-08-24

## 2022-08-24 NOTE — TELEPHONE ENCOUNTER
Called and spoke with [facility] pharmacist to let her know the Amiodarone was discontinued.

## 2022-08-24 NOTE — TELEPHONE ENCOUNTER
Jazmyne    [facility] called stating they cannot fill his Warfarin until they hear back from us.    They are stating there is a drug to drug interaction of increased risk of bleeding between his Warfarin and Amiodarone.    Your last office notes states you will take him off of his Amiodarone.  I just wanted to make sure this is still correct.    Please advise and I will call [facility] back.    Thank You,    Renetta

## 2022-08-25 ENCOUNTER — ANTICOAGULATION VISIT (OUTPATIENT)
Dept: PHARMACY | Facility: HOSPITAL | Age: 62
End: 2022-08-25

## 2022-08-25 ENCOUNTER — HOME CARE VISIT (OUTPATIENT)
Dept: HOME HEALTH SERVICES | Facility: HOME HEALTHCARE | Age: 62
End: 2022-08-25

## 2022-08-25 VITALS
RESPIRATION RATE: 16 BRPM | TEMPERATURE: 97.6 F | SYSTOLIC BLOOD PRESSURE: 134 MMHG | OXYGEN SATURATION: 99 % | HEART RATE: 72 BPM | DIASTOLIC BLOOD PRESSURE: 88 MMHG

## 2022-08-25 DIAGNOSIS — I48.0 PAROXYSMAL ATRIAL FIBRILLATION: Primary | ICD-10-CM

## 2022-08-25 DIAGNOSIS — Z95.2 HX OF MITRAL VALVE REPLACEMENT WITH MECHANICAL VALVE: ICD-10-CM

## 2022-08-25 LAB — INR PPP: 3.2

## 2022-08-25 PROCEDURE — G0299 HHS/HOSPICE OF RN EA 15 MIN: HCPCS

## 2022-08-25 NOTE — PROGRESS NOTES
Anticoagulation Clinic Progress Note    Anticoagulation Summary  As of 8/25/2022    INR goal:  2.5-3.5   TTR:  64.7 % (3.7 y)   INR used for dosing:  3.20 (8/25/2022)   Warfarin maintenance plan:  5 mg every day   Weekly warfarin total:  35 mg   No change documented:  Bushra Torre PharmD   Plan last modified:  Michael Horner PharmD (8/11/2022)   Next INR check:  8/29/2022   Priority:  High   Target end date:  Indefinite    Indications    Paroxysmal atrial fibrillation (HCC) [I48.0]  Hx of mitral valve replacement with mechanical valve [Z95.2] [Z95.2]             Anticoagulation Episode Summary     INR check location:      Preferred lab:      Send INR reminders to:   [facility]    Comments:  [facility] (previously [facility]; stopped due to cost)      Anticoagulation Care Providers     Provider Role Specialty Phone number    Navi Fay MD Referring Cardiology [phone]          INR History:  Anticoagulation Monitoring 8/18/2022 8/22/2022 8/25/2022   INR 3.30 5.00 3.20   INR Date 8/18/2022 8/22/2022 8/25/2022   INR Goal 2.5-3.5 2.5-3.5 2.5-3.5   Trend Same Same Same   Last Week Total 37.5 mg 35 mg 27.5 mg   Next Week Total 35 mg 27.5 mg 35 mg   Sun 5 mg - 5 mg   Mon - 5 mg -   Tue - Hold (8/23) -   Wed - 2.5 mg (8/24) -   Thu 5 mg - 5 mg   Fri 5 mg - 5 mg   Sat 5 mg - 5 mg   Visit Report - - -   Some recent data might be hidden       Plan:  1. INR is Therapeutic today- see above in Anticoagulation Summary.   Provided instructions to Sveta with [facility] to Continue their warfarin regimen- see above in Anticoagulation Summary.  2. Follow up in 4 days      Bushra Torre PharmD

## 2022-08-29 ENCOUNTER — ANTICOAGULATION VISIT (OUTPATIENT)
Dept: PHARMACY | Facility: HOSPITAL | Age: 62
End: 2022-08-29

## 2022-08-29 ENCOUNTER — LAB REQUISITION (OUTPATIENT)
Dept: LAB | Facility: HOSPITAL | Age: 62
End: 2022-08-29

## 2022-08-29 ENCOUNTER — HOME CARE VISIT (OUTPATIENT)
Dept: HOME HEALTH SERVICES | Facility: HOME HEALTHCARE | Age: 62
End: 2022-08-29

## 2022-08-29 VITALS
OXYGEN SATURATION: 99 % | DIASTOLIC BLOOD PRESSURE: 82 MMHG | WEIGHT: 303.31 LBS | RESPIRATION RATE: 16 BRPM | HEART RATE: 72 BPM | BODY MASS INDEX: 35.05 KG/M2 | SYSTOLIC BLOOD PRESSURE: 142 MMHG | TEMPERATURE: 96.9 F

## 2022-08-29 DIAGNOSIS — I33.0 ACUTE AND SUBACUTE INFECTIVE ENDOCARDITIS: ICD-10-CM

## 2022-08-29 DIAGNOSIS — I48.0 PAROXYSMAL ATRIAL FIBRILLATION: Primary | ICD-10-CM

## 2022-08-29 DIAGNOSIS — Z95.2 HX OF MITRAL VALVE REPLACEMENT WITH MECHANICAL VALVE: ICD-10-CM

## 2022-08-29 LAB
BASOPHILS # BLD AUTO: 0.03 10*3/MM3 (ref 0–0.2)
BASOPHILS NFR BLD AUTO: 0.7 % (ref 0–1.5)
CREAT SERPL-MCNC: 0.83 MG/DL (ref 0.76–1.27)
DEPRECATED RDW RBC AUTO: 46.5 FL (ref 37–54)
EGFRCR SERPLBLD CKD-EPI 2021: 99.6 ML/MIN/1.73
EOSINOPHIL # BLD AUTO: 0.18 10*3/MM3 (ref 0–0.4)
EOSINOPHIL NFR BLD AUTO: 4 % (ref 0.3–6.2)
ERYTHROCYTE [DISTWIDTH] IN BLOOD BY AUTOMATED COUNT: 13.1 % (ref 12.3–15.4)
HCT VFR BLD AUTO: 43.1 % (ref 37.5–51)
HGB BLD-MCNC: 14.4 G/DL (ref 13–17.7)
IMM GRANULOCYTES # BLD AUTO: 0.01 10*3/MM3 (ref 0–0.05)
IMM GRANULOCYTES NFR BLD AUTO: 0.2 % (ref 0–0.5)
INR PPP: 3.2
LYMPHOCYTES # BLD AUTO: 2.01 10*3/MM3 (ref 0.7–3.1)
LYMPHOCYTES NFR BLD AUTO: 44.8 % (ref 19.6–45.3)
MCH RBC QN AUTO: 32.4 PG (ref 26.6–33)
MCHC RBC AUTO-ENTMCNC: 33.4 G/DL (ref 31.5–35.7)
MCV RBC AUTO: 97.1 FL (ref 79–97)
MONOCYTES # BLD AUTO: 0.5 10*3/MM3 (ref 0.1–0.9)
MONOCYTES NFR BLD AUTO: 11.1 % (ref 5–12)
NEUTROPHILS NFR BLD AUTO: 1.76 10*3/MM3 (ref 1.7–7)
NEUTROPHILS NFR BLD AUTO: 39.2 % (ref 42.7–76)
NRBC BLD AUTO-RTO: 0 /100 WBC (ref 0–0.2)
PLATELET # BLD AUTO: 179 10*3/MM3 (ref 140–450)
PMV BLD AUTO: 9.3 FL (ref 6–12)
RBC # BLD AUTO: 4.44 10*6/MM3 (ref 4.14–5.8)
WBC NRBC COR # BLD: 4.49 10*3/MM3 (ref 3.4–10.8)

## 2022-08-29 PROCEDURE — G0299 HHS/HOSPICE OF RN EA 15 MIN: HCPCS

## 2022-08-29 PROCEDURE — 85025 COMPLETE CBC W/AUTO DIFF WBC: CPT | Performed by: INTERNAL MEDICINE

## 2022-08-29 PROCEDURE — 82565 ASSAY OF CREATININE: CPT | Performed by: INTERNAL MEDICINE

## 2022-08-29 NOTE — PROGRESS NOTES
Anticoagulation Clinic Progress Note    Anticoagulation Summary  As of 8/29/2022    INR goal:  2.5-3.5   TTR:  64.9 % (3.8 y)   INR used for dosing:  3.20 (8/29/2022)   Warfarin maintenance plan:  5 mg every day   Weekly warfarin total:  35 mg   No change documented:  Mckinley Frank, [facility]   Plan last modified:  Michael Horner, PharmD (8/11/2022)   Next INR check:  9/6/2022   Priority:  High   Target end date:  Indefinite    Indications    Paroxysmal atrial fibrillation (HCC) [I48.0]  Hx of mitral valve replacement with mechanical valve [Z95.2] [Z95.2]             Anticoagulation Episode Summary     INR check location:      Preferred lab:      Send INR reminders to:   [facility]    Comments:  [facility] (previously [facility]; stopped due to cost)      Anticoagulation Care Providers     Provider Role Specialty Phone number    Navi Fay MD Referring Cardiology [phone]          Clinic Interview:  Patient Findings     Negatives:  Signs/symptoms of thrombosis, Signs/symptoms of bleeding,   Laboratory test error suspected, Change in health, Change in alcohol use,   Change in activity, Upcoming invasive procedure, Emergency department   visit, Upcoming dental procedure, Missed doses, Extra doses, Change in   medications, Change in diet/appetite, Hospital admission, Bruising, Other   complaints      Clinical Outcomes     Negatives:  Major bleeding event, Thromboembolic event,   Anticoagulation-related hospital admission, Anticoagulation-related ED   visit, Anticoagulation-related fatality        INR History:  Anticoagulation Monitoring 8/22/2022 8/25/2022 8/29/2022   INR 5.00 3.20 3.20   INR Date 8/22/2022 8/25/2022 8/29/2022   INR Goal 2.5-3.5 2.5-3.5 2.5-3.5   Trend Same Same Same   Last Week Total 35 mg 27.5 mg 27.5 mg   Next Week Total 27.5 mg 35 mg 35 mg   Sun - 5 mg 5 mg   Mon 5 mg - 5 mg   Tue Hold (8/23) - 5 mg   Wed 2.5 mg (8/24) - 5 mg   Thu - 5 mg 5 mg   Fri - 5 mg 5 mg   Sat - 5 mg 5  mg   Visit Report - - -   Some recent data might be hidden       Plan:  1. INR is Therapeutic today- see above in Anticoagulation Summary.   Will instruct Elliot Sebastian to Continue their warfarin regimen- see above in Anticoagulation Summary.  2. Follow up with INR check on 9/6  3. Pt has agreed to only be called if INR out of range. They have been instructed to call if any changes in medications, doses, concerns, etc. Patient expresses understanding and has no further questions at this time.    Mckinley Frank [facility]

## 2022-09-06 ENCOUNTER — LAB REQUISITION (OUTPATIENT)
Dept: LAB | Facility: HOSPITAL | Age: 62
End: 2022-09-06

## 2022-09-06 ENCOUNTER — HOME CARE VISIT (OUTPATIENT)
Dept: HOME HEALTH SERVICES | Facility: HOME HEALTHCARE | Age: 62
End: 2022-09-06

## 2022-09-06 ENCOUNTER — ANTICOAGULATION VISIT (OUTPATIENT)
Dept: PHARMACY | Facility: HOSPITAL | Age: 62
End: 2022-09-06

## 2022-09-06 VITALS
SYSTOLIC BLOOD PRESSURE: 156 MMHG | RESPIRATION RATE: 16 BRPM | DIASTOLIC BLOOD PRESSURE: 86 MMHG | HEART RATE: 64 BPM | OXYGEN SATURATION: 98 % | TEMPERATURE: 96.2 F

## 2022-09-06 DIAGNOSIS — Z95.2 HX OF MITRAL VALVE REPLACEMENT WITH MECHANICAL VALVE: ICD-10-CM

## 2022-09-06 DIAGNOSIS — Z00.00 ENCOUNTER FOR GENERAL ADULT MEDICAL EXAMINATION WITHOUT ABNORMAL FINDINGS: ICD-10-CM

## 2022-09-06 DIAGNOSIS — I48.0 PAROXYSMAL ATRIAL FIBRILLATION: Primary | ICD-10-CM

## 2022-09-06 LAB
BASOPHILS # BLD AUTO: 0.02 10*3/MM3 (ref 0–0.2)
BASOPHILS NFR BLD AUTO: 0.5 % (ref 0–1.5)
CREAT SERPL-MCNC: 0.98 MG/DL (ref 0.76–1.27)
DEPRECATED RDW RBC AUTO: 44.9 FL (ref 37–54)
EGFRCR SERPLBLD CKD-EPI 2021: 87.7 ML/MIN/1.73
EOSINOPHIL # BLD AUTO: 0.14 10*3/MM3 (ref 0–0.4)
EOSINOPHIL NFR BLD AUTO: 3.2 % (ref 0.3–6.2)
ERYTHROCYTE [DISTWIDTH] IN BLOOD BY AUTOMATED COUNT: 12.9 % (ref 12.3–15.4)
HCT VFR BLD AUTO: 41.2 % (ref 37.5–51)
HGB BLD-MCNC: 14.4 G/DL (ref 13–17.7)
IMM GRANULOCYTES # BLD AUTO: 0.01 10*3/MM3 (ref 0–0.05)
IMM GRANULOCYTES NFR BLD AUTO: 0.2 % (ref 0–0.5)
INR PPP: 3.5
LYMPHOCYTES # BLD AUTO: 1.86 10*3/MM3 (ref 0.7–3.1)
LYMPHOCYTES NFR BLD AUTO: 43.2 % (ref 19.6–45.3)
MCH RBC QN AUTO: 33.4 PG (ref 26.6–33)
MCHC RBC AUTO-ENTMCNC: 35 G/DL (ref 31.5–35.7)
MCV RBC AUTO: 95.6 FL (ref 79–97)
MONOCYTES # BLD AUTO: 0.49 10*3/MM3 (ref 0.1–0.9)
MONOCYTES NFR BLD AUTO: 11.4 % (ref 5–12)
NEUTROPHILS NFR BLD AUTO: 1.79 10*3/MM3 (ref 1.7–7)
NEUTROPHILS NFR BLD AUTO: 41.5 % (ref 42.7–76)
NRBC BLD AUTO-RTO: 0 /100 WBC (ref 0–0.2)
PLATELET # BLD AUTO: 149 10*3/MM3 (ref 140–450)
PMV BLD AUTO: 9.2 FL (ref 6–12)
RBC # BLD AUTO: 4.31 10*6/MM3 (ref 4.14–5.8)
WBC NRBC COR # BLD: 4.31 10*3/MM3 (ref 3.4–10.8)

## 2022-09-06 PROCEDURE — 82565 ASSAY OF CREATININE: CPT | Performed by: INTERNAL MEDICINE

## 2022-09-06 PROCEDURE — 85025 COMPLETE CBC W/AUTO DIFF WBC: CPT | Performed by: INTERNAL MEDICINE

## 2022-09-06 NOTE — PROGRESS NOTES
Anticoagulation Clinic Progress Note    Anticoagulation Summary  As of 9/6/2022    INR goal:  2.5-3.5   TTR:  65.1 % (3.8 y)   INR used for dosing:  3.50 (9/6/2022)   Warfarin maintenance plan:  5 mg every day   Weekly warfarin total:  35 mg   No change documented:  Angelica Marroquin, GURINDER   Plan last modified:  Michael Horner, PharmD (8/11/2022)   Next INR check:  9/20/2022   Priority:  High   Target end date:  Indefinite    Indications    Paroxysmal atrial fibrillation (HCC) [I48.0]  Hx of mitral valve replacement with mechanical valve [Z95.2] [Z95.2]             Anticoagulation Episode Summary     INR check location:      Preferred lab:      Send INR reminders to:   CHARLES BHAT  POOL    Comments:  [facility] (previously [facility]; stopped due to cost)      Anticoagulation Care Providers     Provider Role Specialty Phone number    Navi Fay MD Referring Cardiology [phone]    Leyla Byrne APRN Referring Cardiology [phone]          Clinic Interview:  Patient Findings     Negatives:  Signs/symptoms of thrombosis, Signs/symptoms of bleeding,   Laboratory test error suspected, Change in health, Change in alcohol use,   Change in activity, Upcoming invasive procedure, Emergency department   visit, Upcoming dental procedure, Missed doses, Extra doses, Change in   medications, Change in diet/appetite, Hospital admission, Bruising, Other   complaints      Clinical Outcomes     Negatives:  Major bleeding event, Thromboembolic event,   Anticoagulation-related hospital admission, Anticoagulation-related ED   visit, Anticoagulation-related fatality        INR History:  Anticoagulation Monitoring 8/25/2022 8/29/2022 9/6/2022   INR 3.20 3.20 3.50   INR Date 8/25/2022 8/29/2022 9/6/2022   INR Goal 2.5-3.5 2.5-3.5 2.5-3.5   Trend Same Same Same   Last Week Total 27.5 mg 27.5 mg 35 mg   Next Week Total 35 mg 35 mg 35 mg   Sun 5 mg 5 mg 5 mg   Mon - 5 mg 5 mg   Tue - 5 mg 5 mg   Wed - 5 mg 5 mg   Thu 5  mg 5 mg 5 mg   Fri 5 mg 5 mg 5 mg   Sat 5 mg 5 mg 5 mg   Visit Report - - -   Some recent data might be hidden       Plan:  1. INR is Therapeutic today- see above in Anticoagulation Summary.   Will instruct Elliot Sebastian to Continue their warfarin regimen- see above in Anticoagulation Summary.  2. Follow up in 2 weeks at [place]. Discharged from home health today   3.  They have been instructed to call if any changes in medications, doses, concerns, etc. Patient expresses understanding and has no further questions at this time.    Angelica Marroquin, [facility]

## 2022-09-12 DIAGNOSIS — K58.0 IRRITABLE BOWEL SYNDROME WITH DIARRHEA: ICD-10-CM

## 2022-09-12 RX ORDER — CHOLESTYRAMINE 4 G/9G
4 POWDER, FOR SUSPENSION ORAL DAILY
Qty: 90 PACKET | Refills: 3 | Status: SHIPPED | OUTPATIENT
Start: 2022-09-12

## 2022-09-13 ENCOUNTER — OFFICE VISIT (OUTPATIENT)
Dept: INFECTIOUS DISEASES | Facility: CLINIC | Age: 62
End: 2022-09-13

## 2022-09-13 VITALS
DIASTOLIC BLOOD PRESSURE: 100 MMHG | HEIGHT: 78 IN | RESPIRATION RATE: 16 BRPM | SYSTOLIC BLOOD PRESSURE: 162 MMHG | HEART RATE: 72 BPM | TEMPERATURE: 97.1 F | BODY MASS INDEX: 35.47 KG/M2 | WEIGHT: 306.6 LBS

## 2022-09-13 DIAGNOSIS — Z79.2 LONG TERM (CURRENT) USE OF ANTIBIOTICS: ICD-10-CM

## 2022-09-13 DIAGNOSIS — T82.6XXD PROSTHETIC VALVE ENDOCARDITIS, SUBSEQUENT ENCOUNTER: ICD-10-CM

## 2022-09-13 DIAGNOSIS — I38 PROSTHETIC VALVE ENDOCARDITIS, SUBSEQUENT ENCOUNTER: ICD-10-CM

## 2022-09-13 DIAGNOSIS — A40.9 STREPTOCOCCAL SEPTICEMIA: Primary | ICD-10-CM

## 2022-09-13 PROCEDURE — 99214 OFFICE O/P EST MOD 30 MIN: CPT | Performed by: INTERNAL MEDICINE

## 2022-09-13 NOTE — PROGRESS NOTES
"cc: Follow-up endocarditis    Briefly patient was hospitalized in August.  He was found to have a small vegetation on his prosthetic mitral valve.  Cultures grew strep and was discharged to complete 6 weeks of IV Rocephin.  Symptoms tolerated well without missed doses or side effects.  INR is therapeutic.  Due to cardiology for repeat MOE soon.  He is not having any constitutional symptoms of infection and feels back to baseline health.  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      Blood pressure 162/100, pulse 72, temperature 97.1 °F (36.2 °C), resp. rate 16, height 198.1 cm (77.99\"), weight (!) 139 kg (306 lb 9.6 oz).  GENERAL: Awake and alert, in no acute distress.     HEART: Regular rate and rhythm. No peripheral edema.   SKIN: Warm and dry without cutaneous eruptions   PSYCHIATRIC: Appropriate mood, affect, insight, and judgment.       DIAGNOSTICS:  Antibiotic monitoring labs reviewed, creatinine 0.98, white count 4.31, hematocrit 41, platelets 149    Microbiology  8/1 blood cultures 2/2 strep dysgalactiae ssp equisimilis   8/3 blood cultures 2/2 neg     Assessment and Plan  1.  Streptococcal septicemia   2.  Prosthetic mitral valve endocarditis  3.  Long term (current) use of antibiotics    Has been doing well.  White count now normal.  Afebrile.  Clinically much improved.  Okay to stop antibiotics from my standpoint we will do so today along with removing his PICC line.  Discussed with him signs and symptoms of recrudescent infection when to seek medical attention  He will follow-up with cardiology for repeat MOE per their inpatient plans    "

## 2022-09-21 NOTE — TELEPHONE ENCOUNTER
[address]    [address] [facility] [address]    Phone:  [phone]    Fax:  [phone]       Thank You for choosing us for your health care visit. We are glad to serve you and happy to provide you with this summary of your visit. Please help us to ensure we have accurate records. If you find anything that needs to be changed, please let our staff know as soon as possible.          Your Demographic Information     Patient Name Sex     George Leary Male 2010       Ethnic Group Patient Race    Not of  or  Origin White      Your Visit Details     Date & Time Provider Department    2017 10:40 AM DYLON Parsons [address]      Your Upcoming Appointment*(Max 10)     2017  9:00 AM CDT   Office Visit with Nick Bowen, PHD   [facility] ([facility])    [address] [facility] [id number] [phone]              Your To Do List     Follow-Up    Return if symptoms worsen or fail to improve.      Conditions Discussed Today or Order-Related Diagnoses        Comments    Impetigo    -  Primary     Acute URI           Your Vitals Were     Pulse Temp Weight SpO2 Smoking Status       100 99.9 °F (37.7 °C) (Temporal Artery) 41 lb (18.6 kg) (26 %, Z= -0.64)* 99% Never Smoker     *Growth percentiles are based on CDC 2-20 Years data.      Medications Prescribed or Re-Ordered Today     amoxicillin (AMOXIL) 400 MG/5ML suspension    Sig - Route: Take 10 mLs by mouth 2 times daily for 7 days. - Oral    Class: Normal    Pharmacy: [facility] [id number] [place] [address] Ph #: [phone]    mupirocin (BACTROBAN) 2 % ointment    Sig: Apply 2-3 times daily to affected area as directed    Class: Eprescribe    Pharmacy: [facility] [id number] [place] [address] Ph #: [phone]      Your Current  Generator attached   charissam for pt to c/b dmk   Medications Are        Disp Refills Start End    amoxicillin (AMOXIL) 400 MG/5ML suspension 140 mL 0 2/6/2017 2/13/2017    Sig - Route: Take 10 mLs by mouth 2 times daily for 7 days. - Oral    mupirocin (BACTROBAN) 2 % ointment 60 g 0 2/6/2017     Sig: Apply 2-3 times daily to affected area as directed    Class: Eprescribe      Allergies     No Known Allergies      Immunizations History as of 2/6/2017     Name Date    DTaP 10/5/2015, 7/24/2012    DTaP/HIB/IPV 2011, 2011    DTaP/Hep B/IPV 2011    HEPATITIS A CHILD 4/22/2013, 4/24/2012    HIB 7/24/2012, 2011    Hepatitis B Child 2011, 2011    MMR 4/24/2012    MMRV 10/5/2015    Pneumococcal Conjugate 2011, 2011    Pneumococcal Conjugate 13 Valent 4/22/2013, 4/24/2012, 2011    Polio, INACTIVATED 10/5/2015    Rotavirus 2011, 2011    Varicella 4/24/2012              Patient Instructions                       Please encourage nose blowing  Push fluids  Please use humidifier in room at night with sleep, the extra moisture will help thin secretions and also help with sore throat          When Your Child Has Impetigo      Impetigo is a skin infection that usually appears around the nose and mouth.   Impetigo is a skin infection. It is contagious (can spread from one person to another). Impetigo usually appears as a rash around the nose and mouth but can appear anywhere on the body. It is often mistaken for illnesses such as shingles or chickenpox. Impetigo can cause your child mild discomfort. But it’s generally not a serious problem. Most cases can easily be treated with medication and home care.  What Causes Impetigo?  Impetigo is usually caused by Staphylococcus (staph) or Streptococcus (strep) bacteria.  Who Is More Likely to Get Impetigo?  Your child has a higher chance of getting impetigo if he or she:  · Has a staph or strep infection of the nose or mouth.  · Has a skin condition such as eczema.  · Often gets  insect bites, cuts, or scrapes.  · Lives in close quarters with many other people.  How Is Impetigo Spread?  Children can spread the infection by:  · Touching or scratching infected skin and then touching other parts of their bodies.  · Sharing personal items, such as clothing or towels, that have been in contact with infected skin.  What Are the Symptoms of Impetigo?  Impetigo often starts in a broken area of the skin. It appears as a rash with small, red bumps or blisters. The rash may also be itchy. The bumps or blisters often pop open, becoming open sores. The sores then crust or scab over. This can give them a yellow or gold (honey-colored) appearance.  How Is Impetigo Diagnosed?  Impetigo is usually diagnosed by how it looks. To get more information, the doctor will ask about your child’s symptoms and health history. The doctor will also examine your child. If needed, material or fluid from the infected skin can be tested (cultured) for bacteria.  How Is Impetigo Treated?  · With treatment, impetigo generally goes away within 7 days.  · Your child is no longer contagious 24 hours after starting treatment. The infected skin should be covered with bandages or gauze when your child is at school or .  · For mild cases, antibiotic ointment is prescribed. Before each application of the ointment, wash your hands first with warm water and soap. Then, gently clean the infected skin and apply the ointment. Wash your hands afterward.  · Ask the doctor if there are any over-the-counter medications appropriate for treating your child.  · In some cases, the doctor will prescribe oral antibiotics. Your child should take ALL of the medication until it is gone, even if he or she starts feeling better.  Call the doctor if your child has any of the following:  · Symptoms that do not improve within 48 hours of starting treatment  · In an infant under 3 months old, a rectal temperature of 100.4°F (38.0°C) or higher  · In a  child 3 to 36 months, a rectal temperature of 102°F (39.0°C) or higher  · In a child of any age who has a temperature of 103°F (39.4°C) or higher  · A fever that lasts more than 24-hours in a child under 2 years old, or for 3 days in a child 2 years or older  · Your child has had a seizure caused by the fever   How Is Impetigo Prevented?  Follow these steps to keep your child from passing impetigo on to others:  · Teach your child to wash his or her hands with soap and warm water often. Handwashing is especially important before eating or handling food, after using the bathroom, and after touching the infected skin.  · Trim your child’s fingernails short to discourage him or her from scratching the infected skin.  · Wash your child’s bed linen, towels, and clothing daily until the infection goes away.  © 9047-3363 [employer]. [address] [place] [address]. All rights reserved. This information is not intended as a substitute for professional medical care. Always follow your healthcare professional's instructions.        Middle Ear Infection, Wait & See Antibiotic Treatment (Child Over 6 Months)  Your child has an infection of the middle ear (the space behind the eardrum). Sometimes the common cold causes this type of infection. This is because congestion can block the internal passage (eustachian tube) that drains fluid from the middle ear. When the middle ear fills with fluid, bacteria or viruses may grow there, causing an infection. Until recently, antibiotics were used to treat almost all cases of middle ear infection. Doctors now know that most cases of ear infection will get better without antibiotics.    The reasons for not using antibiotics include:  · Antibiotics don't relieve pain in the first 24 hours and only have a minimal effect on pain after that.  · Antibiotics often prescribed for ear infection may cause diarrhea or other side effects.  · Antibiotics don't help with viral  infections.  · Antibiotics don't treat middle ear fluid.  · Frequent use of antibiotics cause bacteria to become resistant, making it harder to treat in the future.  · Certain antibiotics are very expensive.  For these reasons, you are being given a wait & see prescription. That means treating your child only with acetaminophen or ibuprofen and pain-relieving ear drops for the first 2 days to see if it improves. Fill the antibiotic prescription 48 hours (2 days) after today’s visit, only if your child is not better or is getting worse.  Home care  The following are general care guidelines:  · Fluids. Fever increases water loss from the body. For infants under age 1, continue regular formula or breast feedings.  Between feedings give plain water or an oral rehydration solution. You can buy oral rehydration solution from grocery and drug stores. No prescription is required. For children over 1 year old, give plenty of fluids like water, juice, lemon-lime soda, ginger-juju, lemonade, or popsicles. Sports drinks are also ok. Energy drinks containing caffeine should never be given.  · Eating. If your child doesn’t want to eat solid foods, it’s ok for a few days, as long as the child drinks lots of fluid.  · Rest. Keep children with fever at home resting or playing quietly. Your child may return to  or school when the fever is gone and he or she is eating well and feeling better.  · Fever and pain. Your child may use acetaminophen to control pain. In children over 6 months, use ibuprofen instead of acetaminophen. [NOTE: If your child has chronic liver or kidney disease or ever had a stomach ulcer or GI bleeding, talk with your doctor before using these medicines. Aspirin should never be used in anyone under 18 years of age who is ill with a fever. It may cause severe liver damage.]   · Ear drops. Pain-relieving ear drops may be given. These should be used every 2 hours as needed for ear pain, or as directed. If you  were not given a prescription for these ear drops and if ibuprofen alone is not controlling pain, ask your doctor for a prescription.  · Antibiotics. Fill the antibiotic prescription 48 hours (2 days) after today’s visit, only if your child is not better or is getting worse. Once you start the antibiotic, finish all of the medicine prescribed, even though your child may feel better after the first few days.   Follow-up care  Sometimes the infection does not respond fully to the first antibiotic. A different medicine may be needed.  Therefore, make an appointment to have your child’s ears rechecked in 2 weeks to be sure the infection has cleared.  When to seek medical care  Get prompt medical attention or contact your doctor if any of the following occur:  · Symptoms get worse or don't start to get better after 2 days of treatment  · Fever of 100.4°F (38°C) oral or 101.4°F (38.5°C) rectal or higher that doesn't get better with fever medication  · Unusual fussiness, drowsiness, or confusion  · No wet diapers for 8 hours, no tears when crying, or dry mouth  · Headache, neck pain, or stiff neck  · New rash appears  · Frequent diarrhea or vomiting  · Fluid or bloody drainage from the ear  · Convulsion (seizure)  © 9782-6231 [employer]. [address] [place] [address], [place] [address]. All rights reserved. This information is not intended as a substitute for professional medical care. Always follow your healthcare professional's instructions.

## 2022-09-26 ENCOUNTER — ANTICOAGULATION VISIT (OUTPATIENT)
Dept: PHARMACY | Facility: HOSPITAL | Age: 62
End: 2022-09-26

## 2022-09-26 ENCOUNTER — LAB (OUTPATIENT)
Dept: LAB | Facility: HOSPITAL | Age: 62
End: 2022-09-26

## 2022-09-26 DIAGNOSIS — Z95.2 HX OF MITRAL VALVE REPLACEMENT WITH MECHANICAL VALVE: ICD-10-CM

## 2022-09-26 DIAGNOSIS — I48.0 PAROXYSMAL ATRIAL FIBRILLATION: Primary | ICD-10-CM

## 2022-09-26 LAB
INR PPP: 4.5 (ref 0.8–1.2)
PROTHROMBIN TIME: 50 SECONDS (ref 12.8–15.2)

## 2022-09-26 PROCEDURE — 85610 PROTHROMBIN TIME: CPT | Performed by: NURSE PRACTITIONER

## 2022-09-26 NOTE — PROGRESS NOTES
Anticoagulation Clinic Progress Note    Anticoagulation Summary  As of 2022    INR goal:  2.5-3.5   TTR:  64.1 % (3.8 y)   INR used for dosin.5 (2022)   Warfarin maintenance plan:  5 mg every day   Weekly warfarin total:  35 mg   Plan last modified:  Michael Horner, PharmD (2022)   Next INR check:  10/3/2022   Priority:  High   Target end date:  Indefinite    Indications    Paroxysmal atrial fibrillation (HCC) [I48.0]  Hx of mitral valve replacement with mechanical valve [Z95.2] [Z95.2]             Anticoagulation Episode Summary     INR check location:      Preferred lab:      Send INR reminders to:   CHARLES MENDEZ  POOL    Comments:  [facility] (previously [facility]; stopped due to cost)      Anticoagulation Care Providers     Provider Role Specialty Phone number    Navi Fay MD Referring Cardiology [phone]    Leyla Byrne APRN Referring Cardiology [phone]          Clinic Interview:  Patient Findings     Positives:  Change in alcohol use, Missed doses, Other complaints    Negatives:  Signs/symptoms of thrombosis, Signs/symptoms of bleeding,   Laboratory test error suspected, Change in health, Change in activity,   Upcoming invasive procedure, Emergency department visit, Upcoming dental   procedure, Extra doses, Change in medications, Change in diet/appetite,   Hospital admission, Bruising    Comments:  Reports he missed a dose of warfarin 3 days ago, and he   decided to drink more alcohol over the weekend to help raise his INR,   including 3 drinks yesterday. Reports he has already taken today's dose of   warfarin.       Clinical Outcomes     Negatives:  Major bleeding event, Thromboembolic event,   Anticoagulation-related hospital admission, Anticoagulation-related ED   visit, Anticoagulation-related fatality    Comments:  Reports he missed a dose of warfarin 3 days ago, and he   decided to drink more alcohol over the weekend to help raise his INR,   including 3  drinks yesterday. Reports he has already taken today's dose of   warfarin.         INR History:  Anticoagulation Monitoring 8/29/2022 9/6/2022 9/26/2022   INR 3.20 3.50 4.5   INR Date 8/29/2022 9/6/2022 9/26/2022   INR Goal 2.5-3.5 2.5-3.5 2.5-3.5   Trend Same Same Same   Last Week Total 27.5 mg 35 mg 30 mg   Next Week Total 35 mg 35 mg 30 mg   Sun 5 mg 5 mg 5 mg   Mon 5 mg 5 mg 5 mg   Tue 5 mg 5 mg Hold (9/27)   Wed 5 mg 5 mg 5 mg   Thu 5 mg 5 mg 5 mg   Fri 5 mg 5 mg 5 mg   Sat 5 mg 5 mg 5 mg   Visit Report - - -   Some recent data might be hidden       Plan:  1. INR is Supratherapeutic today- see above in Anticoagulation Summary.   Will instruct Elliot Sebastian to Change their warfarin regimen- see above in Anticoagulation Summary.  2. Follow up in 1 week  3. They have been instructed to call if any changes in medications, doses, concerns, etc. To seek immediate medical attention if s/sx of bleeding develop or fall occurs. Patient expresses understanding and has no further questions at this time.    Michael Horner, PharmD

## 2022-10-10 ENCOUNTER — TELEPHONE (OUTPATIENT)
Dept: PHARMACY | Facility: HOSPITAL | Age: 62
End: 2022-10-10

## 2022-10-10 NOTE — TELEPHONE ENCOUNTER
Contacted patient regarding being overdue for INR check. During phone call, patient stated he missed his dose of warfarin on Sunday, 10/9.  Patient states he plans to get INR checked at lab tomorrow morning (10/11).

## 2022-10-11 ENCOUNTER — ANTICOAGULATION VISIT (OUTPATIENT)
Dept: PHARMACY | Facility: HOSPITAL | Age: 62
End: 2022-10-11

## 2022-10-11 ENCOUNTER — LAB (OUTPATIENT)
Dept: LAB | Facility: HOSPITAL | Age: 62
End: 2022-10-11

## 2022-10-11 DIAGNOSIS — Z95.2 HX OF MITRAL VALVE REPLACEMENT WITH MECHANICAL VALVE: ICD-10-CM

## 2022-10-11 DIAGNOSIS — I48.0 PAROXYSMAL ATRIAL FIBRILLATION: ICD-10-CM

## 2022-10-11 DIAGNOSIS — I48.0 PAROXYSMAL ATRIAL FIBRILLATION: Primary | ICD-10-CM

## 2022-10-11 LAB
INR PPP: 3 (ref 0.8–1.2)
PROTHROMBIN TIME: 34.7 SECONDS (ref 12.8–15.2)

## 2022-10-11 PROCEDURE — 85610 PROTHROMBIN TIME: CPT | Performed by: NURSE PRACTITIONER

## 2022-10-11 NOTE — PROGRESS NOTES
Anticoagulation Clinic Progress Note    Anticoagulation Summary  As of 10/11/2022    INR goal:  2.5-3.5   TTR:  63.8 % (3.9 y)   INR used for dosing:  3.0 (10/11/2022)   Warfarin maintenance plan:  5 mg every day   Weekly warfarin total:  35 mg   No change documented:  Bushra Torre, PharmD   Plan last modified:  Michael Horner PharmD (8/11/2022)   Next INR check:  11/8/2022   Priority:  High   Target end date:  Indefinite    Indications    Paroxysmal atrial fibrillation (HCC) [I48.0]  Hx of mitral valve replacement with mechanical valve [Z95.2] [Z95.2]             Anticoagulation Episode Summary     INR check location:      Preferred lab:      Send INR reminders to:   CHARLES MENDEZ  POOL    Comments:  [facility] (previously [facility]; stopped due to cost)      Anticoagulation Care Providers     Provider Role Specialty Phone number    Navi Fay MD Referring Cardiology [phone]    Leyla Byrne APRN Referring Cardiology [phone]          Clinic Interview:  Patient Findings     Negatives:  Signs/symptoms of thrombosis, Signs/symptoms of bleeding,   Laboratory test error suspected, Change in health, Change in alcohol use,   Change in activity, Upcoming invasive procedure, Emergency department   visit, Upcoming dental procedure, Missed doses, Extra doses, Change in   medications, Change in diet/appetite, Hospital admission, Bruising, Other   complaints      Clinical Outcomes     Negatives:  Major bleeding event, Thromboembolic event,   Anticoagulation-related hospital admission, Anticoagulation-related ED   visit, Anticoagulation-related fatality        INR History:  Anticoagulation Monitoring 9/6/2022 9/26/2022 10/11/2022   INR 3.50 4.5 3.0   INR Date 9/6/2022 9/26/2022 10/11/2022   INR Goal 2.5-3.5 2.5-3.5 2.5-3.5   Trend Same Same Same   Last Week Total 35 mg 30 mg 35 mg   Next Week Total 35 mg 30 mg 35 mg   Sun 5 mg 5 mg 5 mg   Mon 5 mg 5 mg 5 mg   Tue 5 mg Hold (9/27) 5 mg   Wed 5 mg  5 mg 5 mg   Thu 5 mg 5 mg 5 mg   Fri 5 mg 5 mg 5 mg   Sat 5 mg 5 mg 5 mg   Visit Report - - -   Some recent data might be hidden       Plan:  1. INR is Therapeutic today- see above in Anticoagulation Summary.   Will instruct Elliot Sebastian to Continue their warfarin regimen- see above in Anticoagulation Summary.  2. Follow up in 4 weeks  3. They have been instructed to call if any changes in medications, doses, concerns, etc. Patient expresses understanding and has no further questions at this time.    Bushra Torre, PharmD

## 2022-11-09 RX ORDER — LOSARTAN POTASSIUM 50 MG/1
TABLET ORAL
Qty: 90 TABLET | Refills: 0 | Status: SHIPPED | OUTPATIENT
Start: 2022-11-09 | End: 2023-02-02 | Stop reason: SDUPTHER

## 2022-11-15 ENCOUNTER — TELEPHONE (OUTPATIENT)
Dept: PHARMACY | Facility: HOSPITAL | Age: 62
End: 2022-11-15

## 2022-11-15 NOTE — PROGRESS NOTES
Anticoagulation Clinic Progress Note    Anticoagulation Summary  As of 3/17/2021    INR goal:  2.5-3.5   TTR:  57.2 % (2.3 y)   INR used for dosing:  3.00 (3/17/2021)   Warfarin maintenance plan:  5 mg every day   Weekly warfarin total:  35 mg   No change documented:  Manisha Benjamin   Plan last modified:  Collette Vora, [facility] (1/25/2021)   Next INR check:  3/24/2021   Priority:  High   Target end date:  Indefinite    Indications    Paroxysmal atrial fibrillation (CMS/HCC) [I48.0]  Hx of mitral valve replacement with mechanical valve [Z95.2] [Z95.2]             Anticoagulation Episode Summary     INR check location:      Preferred lab:      [facility]    Comments:  NICOLE home juliet weekly *only call when out of range*      Anticoagulation Care Providers     Provider Role Specialty Phone number    Navi Fay MD Referring Cardiology [phone]          Clinic Interview:  No pertinent clinical findings have been reported.    INR History:  Anticoagulation Monitoring 3/1/2021 3/11/2021 3/17/2021   INR 3.10 2.90 3.00   INR Date 3/1/2021 3/10/2021 3/17/2021   INR Goal 2.5-3.5 2.5-3.5 2.5-3.5   Trend Same Same Same   Last Week Total 35 mg 35 mg 35 mg   Next Week Total 35 mg 35 mg 35 mg   Sun 5 mg 5 mg 5 mg   Mon 5 mg 5 mg 5 mg   Tue 5 mg 5 mg 5 mg   Wed 5 mg - 5 mg   Thu 5 mg 5 mg 5 mg   Fri 5 mg 5 mg 5 mg   Sat 5 mg 5 mg 5 mg   Visit Report - - -   Some recent data might be hidden       Plan:  1. INR is therapeutic today- see above in Anticoagulation Summary.    Elliot Sebastian to continue their warfarin regimen- see above in Anticoagulation Summary.  2. Follow up in 1 week  3. Pt has agreed to only be called if INR out of range. They have been instructed to call if any changes in medications, doses, concerns, etc. Patient expresses understanding and has no further questions at this time.    Manisha Benjamin   Linette Villatoro, MS, RD, LD  Outpatient Registered Dietitian  [address] [facility]  Phone: [phone]  Fax: [phone]    ASSESSMENT  Susan Summers is a 46 y.o. male referred with the following diagnosis (es): Hyperlipidemia, unspecified [E78.5]     PMH includes HLD. Surgical Hx: Mastoidectomy (), Labrum repair (), 3x knee scopes/repair (, , ), 2x broken arm repair (, ), ear drum reconstruction (), tonsilectomy/adenoctomy ()    Labs:   A1c: 5.5%  Total Chol: 260  T  HDL: 58  LDL: 171  AST: 47  ALT: 58    Meds: none    Patient stated goal: \"Control Cholesterol without the use of statins\"  . Eating behaviors and factors impacting food choices:   -Dining out more than 50% of meals  -Family food preferences  -Perceived time constraints  -Skips meals    Initial Diet Recall :   B:  1 oz apple cinnamon oatmeal    L:  [address] [facility] sandwich w/ tomatoes, lettuce, oil&vinegar + 1/2 bag chips    D:  Steak bowl (rice black beans, steak, sauteed mixed veggies)    Fluid intake: 64 oz water / day    ** Red meat consumption ~3-4 x week    Eating pattern appears excessive in saturated fat and refined carbohydrate, and inadequate in monounsaturated fat, polyunsaturated fat, fiber, fruit, and calcium. Lifestyle/Family Influence/Support:   - Owner of \"forklift company\" (travels weekly)  -  w/ 2 children (wife is stay-at-home spouse)  - light drinker on weekends  - Family history of CVD     Movement includes ~ 3 days week HIIT resistance training @ home gym (30 min each session)  Stage of Change: Preparation. Anthropometrics: Wt: 197.6 lb (89.82 kg)    BMI: 26.8 kg/m2  Ht: 6' (182.88 cm)     Estimated Nutrition Needs:  MSJ x 1.3  (2413kcal/d)      Protein: 121g/day (20%)     DIAGNOSIS:  Unbalanced diet related to nutrition knowledge deficit as evidenced by eating pattern as above.      INTERVENTION:  RD Goal: Nutrition Related Labs WNL  RD Goal: Improved food variety  RD Goal: Compliance with CVD Prevention nutrition guidelines    Recommendations:  -5-10g from soluble fiber/day  -Limit of 20g saturated fat/day (<7% of kcal)  -Limit sodium to 2300mg/day  -25g+ fiber/day  -Increase phytonutrient intake with variety of color at meals  -Add omega 3 and omega 9 rich foods  -Limit red meat and highly processed foods  -Limit trans fat  -Structured meal times  -Track meals    SMART Goals:  Continue to track meals via [employer]iqPal  Construct meals to abide by MyPlate Guidelines  30 min HIIT training sessions 4 days / week (Mon-Thurs) + family walk on weekend  Limit saturated fat intake to <20g / day  Consume 35 - 40 g total fiber / day (5-10g soluble fiber)  2g plant sterols / stanols / day     Nutrition Education and Counseling (60 minutes):  Reviewed diet recall and provided recommendations as above. Heart Healthy nutrition principles reviewed including structured meal times, macronutrient balance at meals and snacks, benefits of increasing viscous fiber for lipid and glycemic management, reducing saturated fat intake and increasing omega 3 and omega 9 intake. Strategies provided. Demonstrated label reading. Materials Provided and Discussed:  \"Foods that fight cholesterol\"  \"CVD Prevention Guidelines\"  \"MyPlate Visual Resource\"    Behavior change strategies utilized: MI, HBM, Goal setting, Skill building  Patient verbalized understanding of recommendations discussed.     MONITORING & EVALUATION:  Monitor Food and Nutrient Intake, Physical Activity and Function, Anthropometrics, and Biochemical  Follow Up: Nov. 28th @ 1:00pm    Alex Wagner, MS, RD, LD

## 2022-11-16 ENCOUNTER — ANTICOAGULATION VISIT (OUTPATIENT)
Dept: PHARMACY | Facility: HOSPITAL | Age: 62
End: 2022-11-16

## 2022-11-16 ENCOUNTER — LAB (OUTPATIENT)
Dept: LAB | Facility: HOSPITAL | Age: 62
End: 2022-11-16

## 2022-11-16 DIAGNOSIS — Z95.2 HX OF MITRAL VALVE REPLACEMENT WITH MECHANICAL VALVE: ICD-10-CM

## 2022-11-16 DIAGNOSIS — I48.0 PAROXYSMAL ATRIAL FIBRILLATION: Primary | ICD-10-CM

## 2022-11-16 LAB
INR PPP: 3 (ref 0.8–1.2)
PROTHROMBIN TIME: 33.7 SECONDS (ref 12.8–15.2)

## 2022-11-16 PROCEDURE — 85610 PROTHROMBIN TIME: CPT | Performed by: NURSE PRACTITIONER

## 2022-11-16 NOTE — PROGRESS NOTES
Anticoagulation Clinic Progress Note    Anticoagulation Summary  As of 11/16/2022    INR goal:  2.5-3.5   TTR:  64.7 % (4 y)   INR used for dosing:  3.0 (11/16/2022)   Warfarin maintenance plan:  5 mg every day; Starting 11/16/2022   Weekly warfarin total:  35 mg   No change documented:  Angelica Marroquin RPH   Plan last modified:  Michael Horner, PharmD (8/11/2022)   Next INR check:  12/14/2022   Priority:  High   Target end date:  Indefinite    Indications    Paroxysmal atrial fibrillation (HCC) [I48.0]  Hx of mitral valve replacement with mechanical valve [Z95.2] [Z95.2]             Anticoagulation Episode Summary     INR check location:      Preferred lab:      Send INR reminders to:  [facility]  POOL    Comments:  [facility] (previously [facility]; stopped due to cost)      Anticoagulation Care Providers     Provider Role Specialty Phone number    Navi Fay MD Referring Cardiology [phone]    Leyla Byrne APRN Referring Cardiology [phone]          Clinic Interview:  Patient Findings     Negatives:  Signs/symptoms of thrombosis, Signs/symptoms of bleeding,   Laboratory test error suspected, Change in health, Change in alcohol use,   Change in activity, Upcoming invasive procedure, Emergency department   visit, Upcoming dental procedure, Missed doses, Extra doses, Change in   medications, Change in diet/appetite, Hospital admission, Bruising, Other   complaints      Clinical Outcomes     Negatives:  Major bleeding event, Thromboembolic event,   Anticoagulation-related hospital admission, Anticoagulation-related ED   visit, Anticoagulation-related fatality        INR History:  Anticoagulation Monitoring 9/26/2022 10/11/2022 11/16/2022   INR 4.5 3.0 3.0   INR Date 9/26/2022 10/11/2022 11/16/2022   INR Goal 2.5-3.5 2.5-3.5 2.5-3.5   Trend Same Same Same   Last Week Total 30 mg 35 mg 35 mg   Next Week Total 30 mg 35 mg 35 mg   Sun 5 mg 5 mg 5 mg   Mon 5 mg 5 mg 5 mg   Tue Hold (9/27) 5  mg 5 mg   Wed 5 mg 5 mg 5 mg   Thu 5 mg 5 mg 5 mg   Fri 5 mg 5 mg 5 mg   Sat 5 mg 5 mg 5 mg   Visit Report - - -   Some recent data might be hidden       Plan:  1. INR is Therapeutic today- see above in Anticoagulation Summary.   Will instruct Elliot Sebastian to Continue their warfarin regimen- see above in Anticoagulation Summary.  2. Follow up in 4 weeks  3. They have been instructed to call if any changes in medications, doses, concerns, etc. Patient expresses understanding and has no further questions at this time.    Angelica Marroquin, [facility]

## 2022-11-22 NOTE — PROGRESS NOTES
OFFICE VISIT      Date of Office Visit: 2022    Patient Name: Elliot Sebastian  : 1960    Encounter Provider: Navi Fay MD  Referring Provider: No ref. 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  This was back in .  With respect to heart failure that has resolved.  Presently he does complain intermittently of palpitations.  Longest duration is approximately an hour.  More importantly recently during examinations he has had elevated blood pressure in the 170 systolic range.  He has been on atenolol for control of the atrial fibrillation and blood pressure but obviously not enough any longer.  He has also gained some weight over the last year.  This I believe is contributing to his blood pressure rise.    He does complain of palpitations and occasionally some shortness of breath with the palpitations.  Occasionally he will notice some fatigue.  He does not experience lightheadedness and or dizziness.  He is not having any chest discomfort.  Past Medical History:   Diagnosis Date   • Acute bacterial endocarditis    • Anemia    • APC (atrial premature contractions)    • Atrial fibrillation ([facility])    • BPH (benign prostatic hypertrophy)    • CHF (congestive heart failure) ([facility])    • Cholelithiasis    • Chronic constipation    • Deep vein thrombophlebitis of leg ([facility])     DISTAL   • Disease of thyroid gland    • Gout    • Intestinal obstruction ([facility])    • Ischemic enteritis ([facility])    • Left heart failure, NYHA class 3 ([facility])     CLASS 111 LEFT SYSTOIC CONGESTIVE HEART FAILURE    • Mitral regurgitation    • Pleural effusion, bilateral    • Pulmonary hypertension (HCC)    • 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HCC)    • Umbilical hernia    • Vitamin D deficiency          Past Surgical History:   Procedure Laterality Date   • APPENDECTOMY     • CHOLECYSTECTOMY     • COLONOSCOPY  approx 2013    normal per patient   • COLONOSCOPY N/A 10/26/2020    Procedure: COLONOSCOPY into cecum with biopsy, polypectomy, clip placement;  Surgeon: Juan Phillips MD;  Location: [facility] ENDOSCOPY;  Service: Gastroenterology;  Laterality: N/A;  polyps, clip  asc polyp removed but not retrieved   • EXPLORATION NECK FOR POSTOP HEMORRHAGE / THROMBOSIS / INFECTION     • MITRAL VALVE REPLACEMENT  01/03/2013    33MM [facility] MECHANICAL VALVE, PRESERVATION OF CORDS TO SUBVALVULAR APPARATUS AND DOROTHY GORE-FLORI CORD IN THE P2 AREA, DEBRIDEMENT OF ANTERIOR AND POSTERIOR MITRAL LEAFLET            Current Outpatient Medications:   •  atenolol (TENORMIN) 25 MG tablet, Take 1 tablet by mouth Daily., Disp: 90 tablet, Rfl: 1  •  Cholecalciferol (VITAMIN D3) 2000 UNITS capsule, Take 1,000 Units by mouth Daily., Disp: , Rfl:   •  cholestyramine (Questran) 4 GM/DOSE powder, Take 1 packet by mouth Daily. If no improvement after two weeks, may increase to BID., Disp: 30 packet, Rfl: 11  •  Multiple Vitamins-Minerals (MENS MULTIVITAMIN PLUS) tablet, Take 2 capsules by mouth Daily. Soft gels, Disp: , Rfl:   •  warfarin (COUMADIN) 5 MG tablet, Take one-half tablet (2.5 mg) by mouth on Monday, Wednesday, and Friday, and take one tablet (5 mg) by mouth all other days or as directed., Disp: 71 tablet, Rfl: 1  •  losartan (Cozaar) 50 MG tablet, Take 1 tablet by mouth Daily., Disp: 90 tablet, Rfl: 3  •  losartan (Cozaar) 50 MG tablet, Take 1 tablet by mouth Daily., Disp: 30 tablet, Rfl: 1      Social History     Socioeconomic History   • Marital status:    Tobacco Use   • Smoking status: Never Smoker   • Smokeless tobacco: Never  "Used   • Tobacco comment: caffeine use   Substance and Sexual Activity   • Alcohol use: Yes     Alcohol/week: 2.0 standard drinks     Types: 2 Cans of beer per week     Comment: 1-2 nightly   • Drug use: Never         Review of Systems   Constitutional: Positive for malaise/fatigue.   Cardiovascular: Positive for palpitations.       Procedures      ECG 12 Lead    Date/Time: 1/17/2022 2:57 PM  Performed by: Navi Fay MD  Authorized by: Navi Fay MD   Comparison: compared with previous ECG from 12/29/2020  Similar to previous ECG  Rate: normal  Conduction: conduction normal  QRS axis: normal  Other findings: poor R wave progression                Objective:    /88   Pulse 63   Ht 200.7 cm (79\")   Wt (!) 140 kg (309 lb)   BMI 34.81 kg/m²         Vitals reviewed.   Constitutional:       Appearance: Healthy appearance. Well-developed, overweight and not in distress.   Eyes:      Pupils: Pupils are equal, round, and reactive to light.   HENT:      Head: Normocephalic.   Neck:      Thyroid: No thyromegaly.      Vascular: No carotid bruit or JVD.   Pulmonary:      Effort: Pulmonary effort is normal.      Breath sounds: Normal breath sounds.   Cardiovascular:      Normal rate. Regular rhythm.      No gallop. No click. No rub.      Comments: Normal mechanical sounds  Pulses:     Intact distal pulses.   Edema:     Peripheral edema absent.   Abdominal:      General: Bowel sounds are normal.   Musculoskeletal:      Cervical back: Normal range of motion. Skin:     General: Skin is warm and dry.      Findings: No erythema.   Neurological:      Mental Status: Alert and oriented to person, place, and time.             Assessment & Plan:       Diagnosis Plan   1. Paroxysmal atrial fibrillation (HCC)     2. Nonrheumatic mitral valve regurgitation     3. Elevated blood pressure reading         1.  Paroxysmal atrial fibrillation: The patient is anticoagulated with warfarin.  His episodes are not " prolonged.  Some of the issues associated with his elevated blood pressure.  I believe if we get his pressure down his atrial fibrillation may improve.  We may also have to change his medication.  2.  Hypertension: Add losartan 50 mg daily.  He will start to check his blood pressure on a daily basis.  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  Mitral valve replacement secondary to bacterial endocarditis.  Normal functioning valve  4.  History of congestive heart failure: Well compensated at this time     Detail Level: Zone

## 2022-12-22 ENCOUNTER — ANTICOAGULATION VISIT (OUTPATIENT)
Dept: PHARMACY | Facility: HOSPITAL | Age: 62
End: 2022-12-22

## 2022-12-22 DIAGNOSIS — I48.0 PAROXYSMAL ATRIAL FIBRILLATION: Primary | ICD-10-CM

## 2022-12-22 DIAGNOSIS — Z95.2 HX OF MITRAL VALVE REPLACEMENT WITH MECHANICAL VALVE: ICD-10-CM

## 2022-12-22 LAB — INR PPP: 3.7

## 2022-12-22 PROCEDURE — G0248 DEMONSTRATE USE HOME INR MON: HCPCS

## 2022-12-22 PROCEDURE — G0249 PROVIDE INR TEST MATER/EQUIP: HCPCS

## 2022-12-22 NOTE — PROGRESS NOTES
Anticoagulation Clinic Progress Note - Initial Training for Home Testing    Anticoagulation Summary  As of 12/22/2022    INR goal:  2.5-3.5   TTR:  64.8 % (4.1 y)   INR used for dosing:  3.70 (12/22/2022)   Warfarin maintenance plan:  5 mg every day; Starting 12/22/2022   Weekly warfarin total:  35 mg   Plan last modified:  Michael Horner, PharmD (8/11/2022)   Next INR check:  12/27/2022   Priority:  High   Target end date:  Indefinite    Indications    Paroxysmal atrial fibrillation (HCC) [I48.0]  Hx of mitral valve replacement with mechanical valve [Z95.2] [Z95.2]             Anticoagulation Episode Summary     INR check location:      Preferred lab:      Send INR reminders to:   [facility]  POOL    Comments:  [facility] (previously [facility]; stopped due to cost)      Anticoagulation Care Providers     Provider Role Specialty Phone number    Navi Fay MD Referring Cardiology [phone]    Leyla Byrne APRN Referring Cardiology [phone]          Clinic Interview:  Patient Findings     Negatives:  Signs/symptoms of thrombosis, Signs/symptoms of bleeding,   Laboratory test error suspected, Change in health, Change in alcohol use,   Change in activity, Upcoming invasive procedure, Emergency department   visit, Upcoming dental procedure, Missed doses, Extra doses, Change in   medications, Change in diet/appetite, Hospital admission, Bruising, Other   complaints      Clinical Outcomes     Negatives:  Major bleeding event, Thromboembolic event,   Anticoagulation-related hospital admission, Anticoagulation-related ED   visit, Anticoagulation-related fatality        INR History:  Anticoagulation Monitoring 10/11/2022 11/16/2022 12/22/2022   INR 3.0 3.0 3.70   INR Date 10/11/2022 11/16/2022 12/22/2022   INR Goal 2.5-3.5 2.5-3.5 2.5-3.5   Trend Same Same Same   Last Week Total 35 mg 35 mg 35 mg   Next Week Total 35 mg 35 mg 32.5 mg   Sun 5 mg 5 mg 5 mg   Mon 5 mg 5 mg 5 mg   Tue 5 mg 5 mg -    Wed 5 mg 5 mg -   Thu 5 mg 5 mg 5 mg   Fri 5 mg 5 mg 2.5 mg (12/23)   Sat 5 mg 5 mg 5 mg   Visit Report - - -   Some recent data might be hidden       Equipment Provided to Patient:   [id number]-Sense - Serial Number: [id number]     Coag-Sense POC INR result performed by clinic staff: 3.7  Coag-Sense POC INR result performed by patient and/or caregiver: 3.7  Patient successfully completed self test on 1st attempt.    Education:  Elliot Sebastian is newly starting home testing through the [facility] home testing service. he expressed consent to adhere to the Home INR Monitoring guidelines as prescribed by the [facility].      Home testing training was provided in clinic. The following items were discussed and demonstrated:  1. Explained to patient that any cost not covered by his insurance is the responsibility of the patient. It is the responsibility of Elliot Sebastian to determine the cost of the service prior to proceeding.  2. Reviewed contents included within the Coag-Sense testing box including reference materials, meter, power cord, carrying case, and lancets. Discussed proper usage of all materials including turning on and setting up meter.  3. Instructed patient to get supplies out and ready prior to testing, including lancet, test strip, transfer tube, alcohol pad, bandage (optional). Instructed patient that if alcohol pads not available to wash hands with soap and water.  4. Reviewed use of test strips and process to confirm correct lot number and barcode number.   5. Reviewed how to use lancet including where on the finger to prick and recommendations for collecting the sample.  6. Instructed patient to contact [facility] after first failed attempt for further instruction in order to avoid wasting supplies.  7. Reviewed how to dispose of all materials used during testing (i.e. sharps container or strong plastic container)  8. Explained that weekly  testing is required, results should be reported to the [facility], and INR results will be addressed within one business day.  9. Instructed patient to call his referring physician if INR results require immediate attention and it is after clinic hours.  10. Informed patient that the meter is a loan rather than being owned by the patient. Should home testing services be discontinued for any reason, the meter must be returned to the [facility]. Repeated failure to comply with recommended testing intervals or non-adherence to recommendations will result in the patient being transitioned back to in-clinic visits.    Patient and/or caregiver successfully demonstrated correct use of Coag-Sense machine. All patient questions were answered satisfactorily.     Patient has agreed to only be called if INR is out of range - no (except in the case when patient reports a change or concern that would affect warfarin management upon submitting his INR result).     Plan:  1. INR is Supratherapeutic today. Instructed Elliot Sebastian to Change their warfarin regimen- see above in Anticoagulation Summary.  2. Follow-up with home test in 1 week. Follow-up in clinic at least once per year.   3. Coag-Sense machine was loaned to patient as above. Test strips (#6), lancets (#8), transfer tubes (#54) were provided.   4. Patient declines warfarin refills.  5. Verbal and printed information was provided. They have been instructed to call if any changes in medications, doses, concerns, etc. Patient expresses understanding and has no further questions at this time.    [facility] Marroquin [facility], located within [facility]

## 2022-12-27 ENCOUNTER — ANTICOAGULATION VISIT (OUTPATIENT)
Dept: PHARMACY | Facility: HOSPITAL | Age: 62
End: 2022-12-27

## 2022-12-27 DIAGNOSIS — Z95.2 HX OF MITRAL VALVE REPLACEMENT WITH MECHANICAL VALVE: ICD-10-CM

## 2022-12-27 DIAGNOSIS — I48.0 PAROXYSMAL ATRIAL FIBRILLATION: Primary | ICD-10-CM

## 2022-12-27 LAB — INR PPP: 3.3

## 2022-12-27 NOTE — PROGRESS NOTES
Anticoagulation Clinic Progress Note    Anticoagulation Summary  As of 12/27/2022    INR goal:  2.5-3.5   TTR:  64.8 % (4.1 y)   INR used for dosing:  3.30 (12/27/2022)   Warfarin maintenance plan:  5 mg every day; Starting 12/27/2022   Weekly warfarin total:  35 mg   No change documented:  Bushra Torre, PharmD   Plan last modified:  Michael Horner, PharmD (8/11/2022)   Next INR check:  1/3/2023   Priority:  High   Target end date:  Indefinite    Indications    Paroxysmal atrial fibrillation (HCC) [I48.0]  Hx of mitral valve replacement with mechanical valve [Z95.2] [Z95.2]             Anticoagulation Episode Summary     INR check location:      Preferred lab:      Send INR reminders to:  [facility]  POOL    Comments:  [facility] (previously [facility]; stopped due to cost)      Anticoagulation Care Providers     Provider Role Specialty Phone number    Navi Fay MD Referring Cardiology [phone]    Leyla Byrne APRN Referring Cardiology [phone]          Clinic Interview:  Patient Findings     Negatives:  Signs/symptoms of thrombosis, Signs/symptoms of bleeding,   Laboratory test error suspected, Change in health, Change in alcohol use,   Change in activity, Upcoming invasive procedure, Emergency department   visit, Upcoming dental procedure, Missed doses, Extra doses, Change in   medications, Change in diet/appetite, Hospital admission, Bruising, Other   complaints      Clinical Outcomes     Negatives:  Major bleeding event, Thromboembolic event,   Anticoagulation-related hospital admission, Anticoagulation-related ED   visit, Anticoagulation-related fatality        INR History:  Anticoagulation Monitoring 11/16/2022 12/22/2022 12/27/2022   INR 3.0 3.70 3.30   INR Date 11/16/2022 12/22/2022 12/27/2022   INR Goal 2.5-3.5 2.5-3.5 2.5-3.5   Trend Same Same Same   Last Week Total 35 mg 35 mg 32.5 mg   Next Week Total 35 mg 32.5 mg 35 mg   Sun 5 mg 5 mg 5 mg   Mon 5 mg 5 mg 5 mg   Tue 5  mg - 5 mg   Wed 5 mg - 5 mg   Thu 5 mg 5 mg 5 mg   Fri 5 mg 2.5 mg (12/23) 5 mg   Sat 5 mg 5 mg 5 mg   Visit Report - - -   Some recent data might be hidden       Plan:  1. INR is Therapeutic today- see above in Anticoagulation Summary.   Will instruct Elliot Sebastian to Continue their warfarin regimen- see above in Anticoagulation Summary.  2. Follow up in 1 weeks  3. Left secure voicemail with instructions for dosing and follow up. They have been instructed to call if any changes in medications, doses, concerns, etc. Patient expresses understanding and has no further questions at this time.    Bushra Torre, PharmD

## 2023-01-03 ENCOUNTER — ANTICOAGULATION VISIT (OUTPATIENT)
Dept: PHARMACY | Facility: HOSPITAL | Age: 63
End: 2023-01-03
Payer: COMMERCIAL

## 2023-01-03 DIAGNOSIS — Z95.2 HX OF MITRAL VALVE REPLACEMENT WITH MECHANICAL VALVE: ICD-10-CM

## 2023-01-03 DIAGNOSIS — I48.0 PAROXYSMAL ATRIAL FIBRILLATION: Primary | ICD-10-CM

## 2023-01-03 LAB — INR PPP: 4.7

## 2023-01-03 NOTE — PROGRESS NOTES
Anticoagulation Clinic Progress Note    Anticoagulation Summary  As of 1/3/2023    INR goal:  2.5-3.5   TTR:  64.5 % (4.1 y)   INR used for dosin.70 (1/3/2023)   Warfarin maintenance plan:  5 mg every day; Starting 1/3/2023   Weekly warfarin total:  35 mg   Plan last modified:  Michael Horner, PharmD (2022)   Next INR check:  1/10/2023   Priority:  High   Target end date:  Indefinite    Indications    Paroxysmal atrial fibrillation (HCC) [I48.0]  Hx of mitral valve replacement with mechanical valve [Z95.2] [Z95.2]             Anticoagulation Episode Summary     INR check location:      Preferred lab:      Send INR reminders to:   [facility]  [facility]    Comments:  [facility] (previously [facility]; stopped due to cost)      Anticoagulation Care Providers     Provider Role Specialty Phone number    Navi Fay MD Referring Cardiology [phone]    Leyla Byrne APRN Referring Cardiology [phone]          Clinic Interview:  Patient Findings     Positives:  Change in alcohol use    Negatives:  Signs/symptoms of thrombosis, Signs/symptoms of bleeding,   Laboratory test error suspected, Change in health, Change in activity,   Upcoming invasive procedure, Emergency department visit, Upcoming dental   procedure, Missed doses, Extra doses, Change in medications, Change in   diet/appetite, Hospital admission, Bruising, Other complaints    Comments:  Reports having more ETOH over weekend--some sheryl's,   champagne and a few Truly drinks.       Clinical Outcomes     Negatives:  Major bleeding event, Thromboembolic event,   Anticoagulation-related hospital admission, Anticoagulation-related ED   visit, Anticoagulation-related fatality    Comments:  Reports having more ETOH over weekend--some sheryl's,   champagne and a few Truly drinks.         INR History:  Anticoagulation Monitoring 2022 2022 1/3/2023   INR 3.70 3.30 4.70   INR Date 2022 2022 1/3/2023   INR Goal 2.5-3.5  2.5-3.5 2.5-3.5   Trend Same Same Same   Last Week Total 35 mg 32.5 mg 35 mg   Next Week Total 32.5 mg 35 mg 27.5 mg   Sun 5 mg 5 mg 5 mg   Mon 5 mg 5 mg 5 mg   Tue - 5 mg Hold (1/3)   Wed - 5 mg 2.5 mg (1/4)   Thu 5 mg 5 mg 5 mg   Fri 2.5 mg (12/23) 5 mg 5 mg   Sat 5 mg 5 mg 5 mg   Visit Report - - -   Some recent data might be hidden       Plan:  1. INR is Supratherapeutic today- see above in Anticoagulation Summary.   Will instruct Elliot Sebastian to Change their warfarin regimen- see above in Anticoagulation Summary.  Hold warfarin today (1/3) take 2.5 mg tomorrow (1/4) then resume previous weekly dosing until next INR.  2. Follow up in 1 weeks  3. They have been instructed to call if any changes in medications, doses, concerns, etc. Patient expresses understanding and has no further questions at this time.    Bushra Torre, PharmD

## 2023-01-09 ENCOUNTER — ANTICOAGULATION VISIT (OUTPATIENT)
Dept: PHARMACY | Facility: HOSPITAL | Age: 63
End: 2023-01-09
Payer: COMMERCIAL

## 2023-01-09 DIAGNOSIS — Z95.2 HX OF MITRAL VALVE REPLACEMENT WITH MECHANICAL VALVE: ICD-10-CM

## 2023-01-09 DIAGNOSIS — I48.0 PAROXYSMAL ATRIAL FIBRILLATION: Primary | ICD-10-CM

## 2023-01-09 LAB — INR PPP: 2.3

## 2023-01-09 NOTE — PROGRESS NOTES
Anticoagulation Clinic Progress Note    Anticoagulation Summary  As of 2023    INR goal:  2.5-3.5   TTR:  64.4 % (4.1 y)   INR used for dosin.30 (2023)   Warfarin maintenance plan:  5 mg every day; Starting 2023   Weekly warfarin total:  35 mg   No change documented:  Michael Horner, Pablo   Plan last modified:  Michael Horner, PharmD (2022)   Next INR check:  2023   Priority:  High   Target end date:  Indefinite    Indications    Paroxysmal atrial fibrillation (HCC) [I48.0]  Hx of mitral valve replacement with mechanical valve [Z95.2] [Z95.2]             Anticoagulation Episode Summary     INR check location:      Preferred lab:      Send INR reminders to:  [facility]  POOL    Comments:  [facility] (previously [facility]; stopped due to cost)      Anticoagulation Care Providers     Provider Role Specialty Phone number    Navi Fay MD Referring Cardiology [phone]    Leyla Byrne APRN Referring Cardiology [phone]          Clinic Interview:  Patient Findings     Positives:  Change in health, Change in alcohol use, Change in   diet/appetite    Negatives:  Signs/symptoms of thrombosis, Signs/symptoms of bleeding,   Laboratory test error suspected, Change in activity, Upcoming invasive   procedure, Emergency department visit, Upcoming dental procedure, Missed   doses, Extra doses, Change in medications, Hospital admission, Bruising,   Other complaints    Comments:  Reports he ate a bag of brussels sprouts last week to help   lower INR. Reports he injured his back several days ago, so he drank more   alcohol over the weekend.       Clinical Outcomes     Negatives:  Major bleeding event, Thromboembolic event,   Anticoagulation-related hospital admission, Anticoagulation-related ED   visit, Anticoagulation-related fatality    Comments:  Reports he ate a bag of brussels sprouts last week to help   lower INR. Reports he injured his back several days ago, so he drank  more   alcohol over the weekend.         INR History:  Anticoagulation Monitoring 12/27/2022 1/3/2023 1/9/2023   INR 3.30 4.70 2.30   INR Date 12/27/2022 1/3/2023 1/9/2023   INR Goal 2.5-3.5 2.5-3.5 2.5-3.5   Trend Same Same Same   Last Week Total 32.5 mg 35 mg 27.5 mg   Next Week Total 35 mg 27.5 mg 35 mg   Sun 5 mg 5 mg 5 mg   Mon 5 mg 5 mg 5 mg   Tue 5 mg Hold (1/3) 5 mg   Wed 5 mg 2.5 mg (1/4) 5 mg   Thu 5 mg 5 mg 5 mg   Fri 5 mg 5 mg 5 mg   Sat 5 mg 5 mg 5 mg   Visit Report - - -   Some recent data might be hidden       Plan:  1. INR is Subtherapeutic today- see above in Anticoagulation Summary.   Will instruct Elliot Sebastian to Continue their warfarin regimen at dose of 5 mg daily - see above in Anticoagulation Summary.  2. Follow up in 1 week  3. They have been instructed to call if any changes in medications, doses, concerns, etc. Patient expresses understanding and has no further questions at this time.    Michael Horner, PharmD

## 2023-01-11 ENCOUNTER — HOSPITAL ENCOUNTER (INPATIENT)
Facility: HOSPITAL | Age: 63
LOS: 2 days | Discharge: HOME | DRG: 603 | End: 2023-01-13
Attending: EMERGENCY MEDICINE | Admitting: INTERNAL MEDICINE
Payer: COMMERCIAL

## 2023-01-11 ENCOUNTER — APPOINTMENT (OUTPATIENT)
Dept: CARDIOLOGY | Facility: HOSPITAL | Age: 63
DRG: 603 | End: 2023-01-11
Payer: COMMERCIAL

## 2023-01-11 DIAGNOSIS — R79.89 ELEVATED PROCALCITONIN: ICD-10-CM

## 2023-01-11 DIAGNOSIS — L03.115 CELLULITIS OF RIGHT LEG: Primary | ICD-10-CM

## 2023-01-11 PROBLEM — L03.119 CELLULITIS, LEG: Status: ACTIVE | Noted: 2023-01-11

## 2023-01-11 LAB
ALBUMIN SERPL-MCNC: 3.8 G/DL (ref 3.5–5.2)
ALBUMIN/GLOB SERPL: 1.6 G/DL
ALP SERPL-CCNC: 70 U/L (ref 39–117)
ALT SERPL W P-5'-P-CCNC: 29 U/L (ref 1–41)
ANION GAP SERPL CALCULATED.3IONS-SCNC: 10 MMOL/L (ref 5–15)
APTT PPP: 43.9 SECONDS (ref 22.7–35.4)
AST SERPL-CCNC: 48 U/L (ref 1–40)
BASOPHILS # BLD AUTO: 0.03 10*3/MM3 (ref 0–0.2)
BASOPHILS NFR BLD AUTO: 0.3 % (ref 0–1.5)
BILIRUB SERPL-MCNC: 1.2 MG/DL (ref 0–1.2)
BUN SERPL-MCNC: 22 MG/DL (ref 8–23)
BUN/CREAT SERPL: 18.6 (ref 7–25)
CALCIUM SPEC-SCNC: 9.6 MG/DL (ref 8.6–10.5)
CHLORIDE SERPL-SCNC: 104 MMOL/L (ref 98–107)
CO2 SERPL-SCNC: 22 MMOL/L (ref 22–29)
CREAT SERPL-MCNC: 1.18 MG/DL (ref 0.76–1.27)
D-LACTATE SERPL-SCNC: 1.5 MMOL/L (ref 0.5–2)
DEPRECATED RDW RBC AUTO: 47.6 FL (ref 37–54)
EGFRCR SERPLBLD CKD-EPI 2021: 70.2 ML/MIN/1.73
EOSINOPHIL # BLD AUTO: 0.05 10*3/MM3 (ref 0–0.4)
EOSINOPHIL NFR BLD AUTO: 0.5 % (ref 0.3–6.2)
ERYTHROCYTE [DISTWIDTH] IN BLOOD BY AUTOMATED COUNT: 13.6 % (ref 12.3–15.4)
GLOBULIN UR ELPH-MCNC: 2.4 GM/DL
GLUCOSE SERPL-MCNC: 122 MG/DL (ref 65–99)
HCT VFR BLD AUTO: 41.9 % (ref 37.5–51)
HGB BLD-MCNC: 14.2 G/DL (ref 13–17.7)
IMM GRANULOCYTES # BLD AUTO: 0.03 10*3/MM3 (ref 0–0.05)
IMM GRANULOCYTES NFR BLD AUTO: 0.3 % (ref 0–0.5)
INR PPP: 1.77 (ref 0.9–1.1)
LYMPHOCYTES # BLD AUTO: 1.36 10*3/MM3 (ref 0.7–3.1)
LYMPHOCYTES NFR BLD AUTO: 12.7 % (ref 19.6–45.3)
MCH RBC QN AUTO: 32.1 PG (ref 26.6–33)
MCHC RBC AUTO-ENTMCNC: 33.9 G/DL (ref 31.5–35.7)
MCV RBC AUTO: 94.8 FL (ref 79–97)
MONOCYTES # BLD AUTO: 0.88 10*3/MM3 (ref 0.1–0.9)
MONOCYTES NFR BLD AUTO: 8.2 % (ref 5–12)
NEUTROPHILS NFR BLD AUTO: 78 % (ref 42.7–76)
NEUTROPHILS NFR BLD AUTO: 8.34 10*3/MM3 (ref 1.7–7)
NRBC BLD AUTO-RTO: 0 /100 WBC (ref 0–0.2)
PLATELET # BLD AUTO: 144 10*3/MM3 (ref 140–450)
PMV BLD AUTO: 10 FL (ref 6–12)
POTASSIUM SERPL-SCNC: 4.4 MMOL/L (ref 3.5–5.2)
PROCALCITONIN SERPL-MCNC: 19.1 NG/ML (ref 0–0.25)
PROT SERPL-MCNC: 6.2 G/DL (ref 6–8.5)
PROTHROMBIN TIME: 20.8 SECONDS (ref 11.7–14.2)
RBC # BLD AUTO: 4.42 10*6/MM3 (ref 4.14–5.8)
SODIUM SERPL-SCNC: 136 MMOL/L (ref 136–145)
WBC NRBC COR # BLD: 10.69 10*3/MM3 (ref 3.4–10.8)

## 2023-01-11 PROCEDURE — 80053 COMPREHEN METABOLIC PANEL: CPT | Performed by: EMERGENCY MEDICINE

## 2023-01-11 PROCEDURE — 99284 EMERGENCY DEPT VISIT MOD MDM: CPT

## 2023-01-11 PROCEDURE — 84145 PROCALCITONIN (PCT): CPT | Performed by: EMERGENCY MEDICINE

## 2023-01-11 PROCEDURE — 87040 BLOOD CULTURE FOR BACTERIA: CPT | Performed by: EMERGENCY MEDICINE

## 2023-01-11 PROCEDURE — 85730 THROMBOPLASTIN TIME PARTIAL: CPT | Performed by: EMERGENCY MEDICINE

## 2023-01-11 PROCEDURE — 25010000002 VANCOMYCIN 10 G RECONSTITUTED SOLUTION: Performed by: EMERGENCY MEDICINE

## 2023-01-11 PROCEDURE — 93971 EXTREMITY STUDY: CPT

## 2023-01-11 PROCEDURE — 83605 ASSAY OF LACTIC ACID: CPT | Performed by: EMERGENCY MEDICINE

## 2023-01-11 PROCEDURE — 85610 PROTHROMBIN TIME: CPT | Performed by: EMERGENCY MEDICINE

## 2023-01-11 PROCEDURE — 25010000002 CEFTRIAXONE PER 250 MG: Performed by: INTERNAL MEDICINE

## 2023-01-11 PROCEDURE — 85025 COMPLETE CBC W/AUTO DIFF WBC: CPT | Performed by: EMERGENCY MEDICINE

## 2023-01-11 RX ORDER — SODIUM CHLORIDE 9 MG/ML
75 INJECTION, SOLUTION INTRAVENOUS CONTINUOUS
Status: DISCONTINUED | OUTPATIENT
Start: 2023-01-11 | End: 2023-01-13 | Stop reason: HOSPADM

## 2023-01-11 RX ORDER — ONDANSETRON 4 MG/1
4 TABLET, FILM COATED ORAL EVERY 6 HOURS PRN
Status: DISCONTINUED | OUTPATIENT
Start: 2023-01-11 | End: 2023-01-13 | Stop reason: HOSPADM

## 2023-01-11 RX ORDER — NITROGLYCERIN 0.4 MG/1
0.4 TABLET SUBLINGUAL
Status: DISCONTINUED | OUTPATIENT
Start: 2023-01-11 | End: 2023-01-13 | Stop reason: HOSPADM

## 2023-01-11 RX ORDER — ONDANSETRON 2 MG/ML
4 INJECTION INTRAMUSCULAR; INTRAVENOUS EVERY 6 HOURS PRN
Status: DISCONTINUED | OUTPATIENT
Start: 2023-01-11 | End: 2023-01-13 | Stop reason: HOSPADM

## 2023-01-11 RX ORDER — ACETAMINOPHEN 325 MG/1
650 TABLET ORAL EVERY 4 HOURS PRN
Status: DISCONTINUED | OUTPATIENT
Start: 2023-01-11 | End: 2023-01-13 | Stop reason: HOSPADM

## 2023-01-11 RX ORDER — UREA 10 %
3 LOTION (ML) TOPICAL NIGHTLY PRN
Status: DISCONTINUED | OUTPATIENT
Start: 2023-01-11 | End: 2023-01-13 | Stop reason: HOSPADM

## 2023-01-11 RX ADMIN — VANCOMYCIN HYDROCHLORIDE 2750 MG: 10 INJECTION, POWDER, LYOPHILIZED, FOR SOLUTION INTRAVENOUS at 17:17

## 2023-01-11 RX ADMIN — SODIUM CHLORIDE 75 ML/HR: 9 INJECTION, SOLUTION INTRAVENOUS at 21:35

## 2023-01-11 RX ADMIN — CEFTRIAXONE SODIUM 1 G: 1 INJECTION, POWDER, FOR SOLUTION INTRAMUSCULAR; INTRAVENOUS at 22:31

## 2023-01-11 NOTE — ED PROVIDER NOTES
EMERGENCY DEPARTMENT ENCOUNTER  I wore full protective equipment throughout this patient encounter including a N95 mask, eye shield, gown and gloves. Hand hygiene was performed before donning protective equipment and after removal when leaving the room.    Room Number:  S515/1  Date of encounter:  1/12/2023  PCP: Mannie Beltran DO    HPI:  Context: Elliot Sebastian is a 62 y.o. male who presents to the ED c/o chief complaint of right leg pain.  Patient reports that he is a , 2 days ago began having shakes and chills, then noticed that he had some redness and swelling in his right lower leg.  Patient reports that the leg is tender to touch, otherwise denies pain in it.  Patient reports redness and swelling is limited to his calf.  Patient denies any injury to the area.  Patient reports that he is concerned for cellulitis, reports that he has had in the past.  Patient reports low-grade fevers, denies any night sweats.  Patient reports that he has a history of blood clots in the past, is on Coumadin, is compliant with his Coumadin.  Patient denies any chest pain or shortness of breath at present.  Patient reports that he was seen at outside hospital when symptoms began, reports that work-up was reportedly negative at that time, patient reports that he is upset because they did not discharge him with an antibiotic or anything.    MEDICAL HISTORY REVIEW  Reviewed in EPIC    PAST MEDICAL HISTORY  Active Ambulatory Problems     Diagnosis Date Noted   • Paroxysmal atrial fibrillation (HCC) 03/02/2016   • Bacterial endocarditis - history of 03/02/2016   • Congestive heart failure (HCC) 03/02/2016   • Mitral valve insufficiency 03/02/2016   • Hx of mitral valve replacement with mechanical valve [Z95.2] 10/19/2018   • Benign prostatic hyperplasia without lower urinary tract symptoms 02/10/2020   • Cellulitis 11/02/2015   • Elevated prostate specific antigen (PSA) 02/10/2020   • Fatigue 02/10/2020   • Low back  pain 02/10/2020   • Poor concentration 02/10/2020   • Reduced libido 02/10/2020   • Subclinical hyperthyroidism 02/10/2020   • Superficial bacterial skin infection 11/02/2015   • Vitamin D deficiency 02/10/2020   • Elevated blood pressure reading 02/10/2020   • Sepsis without acute organ dysfunction (HCC) 03/30/2020   • Chronic diarrhea 07/27/2020   • Abdominal cramping 07/27/2020   • Exogenous obesity 11/30/2020   • Glucose intolerance (impaired glucose tolerance) 11/30/2020   • Gram positive septicemia (HCC) 08/02/2022   • Fever 08/02/2022   • Chronic anticoagulation 08/02/2022   • ASHIA (acute kidney injury) ([facility]) 08/02/2022   • Acute bacterial endocarditis 08/05/2022   • Essential hypertension 08/19/2022   • Teeth problem 08/19/2022     Resolved Ambulatory Problems     Diagnosis Date Noted   • Diarrhea 09/21/2020     Past Medical History:   Diagnosis Date   • Anemia    • APC (atrial premature contractions)    • Atrial fibrillation (HCC)    • BPH (benign prostatic hypertrophy)    • CHF (congestive heart failure) (HCC)    • Cholelithiasis    • Chronic constipation    • Deep vein thrombophlebitis of leg (HCC)    • Disease of thyroid gland    • Gout    • Intestinal obstruction (HCC)    • Ischemic enteritis (HCC)    • Left heart failure, NYHA class 3 (HCC)    • Mitral regurgitation    • Pleural effusion, bilateral    • Pulmonary hypertension (HCC)    • 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HCC)    • Umbilical hernia        PAST SURGICAL HISTORY  Past Surgical History:   Procedure Laterality Date   • APPENDECTOMY     • CHOLECYSTECTOMY     • COLONOSCOPY  approx 2013    normal per patient   • COLONOSCOPY N/A 10/26/2020    Procedure: COLONOSCOPY into cecum with biopsy, polypectomy, clip placement;  Surgeon: Juan Phillips MD;  Location: [facility] ENDOSCOPY;  Service: Gastroenterology;  Laterality: N/A;  polyps, clip  asc polyp removed but not retrieved   • EXPLORATION NECK FOR POSTOP HEMORRHAGE / THROMBOSIS / INFECTION      • MITRAL VALVE REPLACEMENT  01/03/2013    [address]MM [facility] [facility] MECHANICAL VALVE, PRESERVATION OF CORDS TO SUBVALVULAR APPARATUS AND DOROTHY GORE-FLORI CORD IN THE P2 AREA, DEBRIDEMENT OF ANTERIOR AND POSTERIOR MITRAL LEAFLET        FAMILY HISTORY  Family History   Problem Relation Age of Onset   • Diabetes Mother    • Heart disease Father    • Diabetes Sister    • Diabetes Brother        SOCIAL HISTORY  Social History     Socioeconomic History   • Marital status:    Tobacco Use   • Smoking status: Never   • Smokeless tobacco: Never   • Tobacco comments:     caffeine use   Substance and Sexual Activity   • Alcohol use: Yes     Alcohol/week: 2.0 standard drinks     Types: 2 Cans of beer per week     Comment: 1-2 nightly   • Drug use: Never       ALLERGIES  Patient has no known allergies.    The patient's allergies have been reviewed    REVIEW OF SYSTEMS  All systems reviewed and negative except for those discussed in HPI.     PHYSICAL EXAM  I have reviewed the triage vital signs and nursing notes.  ED Triage Vitals   Temp Heart Rate Resp BP SpO2   01/11/23 1319 01/11/23 1319 01/11/23 1319 01/11/23 1323 01/11/23 1319   96.6 °F (35.9 °C) 77 18 131/79 98 %      Temp src Heart Rate Source Patient Position BP Location FiO2 (%)   01/11/23 1319 -- -- -- --   Tympanic           General: No acute distress.  HENT: NCAT, PERRL, Nares patent.  Eyes: no scleral icterus.  Neck: trachea midline, no ROM limitations.  CV: regular rhythm, regular rate.  Respiratory: normal effort, CTAB.  Abdomen: soft, nondistended, NTTP, no rebound tenderness, no guarding or rigidity.  Musculoskeletal: no deformity.  Neuro: alert, moves all extremities, follows commands.  Skin: warm, dry.  Right leg: Patient has swelling of the leg below the calf, swelling predominantly limited to the calf, trace swelling of the foot.  Patient has pinkish erythema on anterior leg, leg is not warm to touch, no induration, no fluctuance or drainage.  No palpable cords,  negative Homans.    LAB RESULTS  Recent Results (from the past 24 hour(s))   Comprehensive Metabolic Panel    Collection Time: 01/11/23  2:44 PM    Specimen: Arm, Left; Blood   Result Value Ref Range    Glucose 122 (H) 65 - 99 mg/dL    BUN 22 8 - 23 mg/dL    Creatinine 1.18 0.76 - 1.27 mg/dL    Sodium 136 136 - 145 mmol/L    Potassium 4.4 3.5 - 5.2 mmol/L    Chloride 104 98 - 107 mmol/L    CO2 22.0 22.0 - 29.0 mmol/L    Calcium 9.6 8.6 - 10.5 mg/dL    Total Protein 6.2 6.0 - 8.5 g/dL    Albumin 3.8 3.5 - 5.2 g/dL    ALT (SGPT) 29 1 - 41 U/L    AST (SGOT) 48 (H) 1 - 40 U/L    Alkaline Phosphatase 70 39 - 117 U/L    Total Bilirubin 1.2 0.0 - 1.2 mg/dL    Globulin 2.4 gm/dL    A/G Ratio 1.6 g/dL    BUN/Creatinine Ratio 18.6 7.0 - 25.0    Anion Gap 10.0 5.0 - 15.0 mmol/L    eGFR 70.2 >60.0 mL/min/1.73   Protime-INR    Collection Time: 01/11/23  2:44 PM    Specimen: Arm, Left; Blood   Result Value Ref Range    Protime 20.8 (H) 11.7 - 14.2 Seconds    INR 1.77 (H) 0.90 - 1.10   aPTT    Collection Time: 01/11/23  2:44 PM    Specimen: Arm, Left; Blood   Result Value Ref Range    PTT 43.9 (H) 22.7 - 35.4 seconds   Lactic Acid, Plasma    Collection Time: 01/11/23  2:44 PM    Specimen: Arm, Left; Blood   Result Value Ref Range    Lactate 1.5 0.5 - 2.0 mmol/L   Procalcitonin    Collection Time: 01/11/23  2:44 PM    Specimen: Arm, Left; Blood   Result Value Ref Range    Procalcitonin 19.10 (H) 0.00 - 0.25 ng/mL   CBC Auto Differential    Collection Time: 01/11/23  2:44 PM    Specimen: Arm, Left; Blood   Result Value Ref Range    WBC 10.69 3.40 - 10.80 10*3/mm3    RBC 4.42 4.14 - 5.80 10*6/mm3    Hemoglobin 14.2 13.0 - 17.7 g/dL    Hematocrit 41.9 37.5 - 51.0 %    MCV 94.8 79.0 - 97.0 fL    MCH 32.1 26.6 - 33.0 pg    MCHC 33.9 31.5 - 35.7 g/dL    RDW 13.6 12.3 - 15.4 %    RDW-SD 47.6 37.0 - 54.0 fl    MPV 10.0 6.0 - 12.0 fL    Platelets 144 140 - 450 10*3/mm3    Neutrophil % 78.0 (H) 42.7 - 76.0 %    Lymphocyte % 12.7 (L) 19.6 -  45.3 %    Monocyte % 8.2 5.0 - 12.0 %    Eosinophil % 0.5 0.3 - 6.2 %    Basophil % 0.3 0.0 - 1.5 %    Immature Grans % 0.3 0.0 - 0.5 %    Neutrophils, Absolute 8.34 (H) 1.70 - 7.00 10*3/mm3    Lymphocytes, Absolute 1.36 0.70 - 3.10 10*3/mm3    Monocytes, Absolute 0.88 0.10 - 0.90 10*3/mm3    Eosinophils, Absolute 0.05 0.00 - 0.40 10*3/mm3    Basophils, Absolute 0.03 0.00 - 0.20 10*3/mm3    Immature Grans, Absolute 0.03 0.00 - 0.05 10*3/mm3    nRBC 0.0 0.0 - 0.2 /100 WBC   Duplex Venous Lower Extremity - Right CAR    Collection Time: 01/11/23  4:19 PM   Result Value Ref Range    Target HR (85%) 135 bpm    Max. Pred. HR (100%) 159 bpm    Right Mid Femoral Spont 1.0     Right Common Femoral Spont Y     Right Common Femoral Competent Y     Right Common Femoral Phasic Y     Right Common Femoral Compress C     Right Common Femoral Augment Y     Right Saphenofemoral Junction Compress C     Right Profunda Femoral Compress C     Right Proximal Femoral Compress C     Right Mid Femoral Spont Y     Right Mid Femoral Competent Y     Right Mid Femoral Phasic Y     Right Mid Femoral Compress C     Right Mid Femoral Augment Y     Right Mid Femoral Thrombus C     Right Distal Femoral Compress C     Right Popliteal Spont Y     Right Popliteal Competent Y     Right Popliteal Phasic Y     Right Popliteal Compress C     Right Popliteal Augment Y     Right Posterior Tibial Compress C     Right Peroneal Compress C     Right Gastronemius Compress C     BH CV LOWER VASCULAR RIGHT SOLEAL COMPRESS C     Right Greater Saph AK Compress C     Right Greater Saph BK Compress C     Right Lesser Saph Compress C     Left Common Femoral Spont Y     Left Common Femoral Competent Y     Left Common Femoral Phasic Y     Left Common Femoral Compress C     Left Common Femoral Augment Y    Basic Metabolic Panel    Collection Time: 01/12/23  5:53 AM    Specimen: Blood   Result Value Ref Range    Glucose 100 (H) 65 - 99 mg/dL    BUN 17 8 - 23 mg/dL     Creatinine 1.01 0.76 - 1.27 mg/dL    Sodium 138 136 - 145 mmol/L    Potassium 4.1 3.5 - 5.2 mmol/L    Chloride 107 98 - 107 mmol/L    CO2 23.0 22.0 - 29.0 mmol/L    Calcium 9.1 8.6 - 10.5 mg/dL    BUN/Creatinine Ratio 16.8 7.0 - 25.0    Anion Gap 8.0 5.0 - 15.0 mmol/L    eGFR 84.6 >60.0 mL/min/1.73   CBC (No Diff)    Collection Time: 01/12/23  5:53 AM    Specimen: Blood   Result Value Ref Range    WBC 7.02 3.40 - 10.80 10*3/mm3    RBC 4.31 4.14 - 5.80 10*6/mm3    Hemoglobin 14.0 13.0 - 17.7 g/dL    Hematocrit 39.6 37.5 - 51.0 %    MCV 91.9 79.0 - 97.0 fL    MCH 32.5 26.6 - 33.0 pg    MCHC 35.4 31.5 - 35.7 g/dL    RDW 13.3 12.3 - 15.4 %    RDW-SD 45.2 37.0 - 54.0 fl    MPV 9.6 6.0 - 12.0 fL    Platelets 154 140 - 450 10*3/mm3   Protime-INR    Collection Time: 01/12/23  5:53 AM    Specimen: Blood   Result Value Ref Range    Protime 23.1 (H) 11.7 - 14.2 Seconds    INR 2.02 (H) 0.90 - 1.10       I ordered the above labs and reviewed the results.    RADIOLOGY  Duplex Venous Lower Extremity - Right CAR    Result Date: 1/12/2023  •  Chronic right lower extremity deep vein thrombosis noted in the mid femoral. •  All other right sided veins appeared normal.       I ordered the above noted radiological studies. I reviewed the images and results. I agree with the radiologist interpretation.    PROCEDURES  Procedures    MEDICATIONS GIVEN IN ER  Medications   nitroglycerin (NITROSTAT) SL tablet 0.4 mg (has no administration in time range)   acetaminophen (TYLENOL) tablet 650 mg (has no administration in time range)   ondansetron (ZOFRAN) tablet 4 mg (has no administration in time range)     Or   ondansetron (ZOFRAN) injection 4 mg (has no administration in time range)   melatonin tablet 3 mg (has no administration in time range)   sodium chloride 0.9 % infusion (75 mL/hr Intravenous New Bag 1/11/23 6260)   atenolol (TENORMIN) tablet 25 mg (25 mg Oral Given 1/12/23 0902)   cholecalciferol (VITAMIN D3) tablet 2,000 Units (2,000  Units Oral Given 1/12/23 0856)   losartan (COZAAR) tablet 50 mg (50 mg Oral Given 1/12/23 0856)   warfarin (COUMADIN) tablet 5 mg (has no administration in time range)   Pharmacy to dose warfarin (has no administration in time range)   influenza vac split quad (FLUZONE,FLUARIX,AFLURIA,FLULAVAL) injection 0.5 mL (has no administration in time range)   cefTRIAXone (ROCEPHIN) 2 g in sodium chloride 0.9 % 100 mL IVPB-VTB (has no administration in time range)   vancomycin 2750 mg/1000 mL 0.9% NS IVPB (2,750 mg Intravenous Given 1/11/23 1717)       PROGRESS, DATA ANALYSIS, CONSULTS, AND MEDICAL DECISION MAKING  A complete history and physical exam have been performed.  All available laboratory and imaging results have been reviewed by myself prior to disposition.    MDM  After the initial H&P, I discussed pertinent information from history and physical exam with patient/family.  Discussed differential diagnosis.  Discussed plan for ED evaluation/workup/treatment.  All questions answered.  Patient/family is agreeable with plan.  ED Course as of 01/12/23 0916   Wed Jan 11, 2023   1410 Patient presents with right leg pain and swelling, patient concern for cellulitis, minimally red, cellulitis possible but less likely.  Will obtain lab work for infectious work-up including blood cultures and lactic acid as well as procalcitonin.  Patient reports history of DVT in the past, anticoagulated on Coumadin.  Obtain ultrasound imaging, obtaining coagulation factors. [JG]   1531 Patient afebrile, vital signs stable.  Patient has no leukocytosis or left shift, lactic acid negative.  Patient does have slight hyperglycemia, just over normal, no signs of dehydration or DKA.  Patient is slightly subtherapeutic on INR, INR currently 1.77.  Lab work overall is reassuring.  Patient currently pending ultrasound imaging. [JG]   1532 Patient reassessed, discussed ED work-up and results to present.  Discussed pending ultrasound imaging.  Upon  reassessment, erythema is much darker, now dark reddish color, erythema on the lower leg has spread. [JG]   1612 Procalcitonin is significantly elevated.  Patient's physical exam concerning for rapidly spreading cellulitis.  Beginning treatment with vancomycin. [JG]   1613 Transfer of care to Dr Lewis.  Discussed the patient, relevant history, exam, diagnostics, ED findings/progress. Pending US imaging and disposition.  [JG]   1614 Procalcitonin(!): 19.10 [MM]   1709 This patient was turned over to me by Dr. Teresa.  He wanted the patient admitted to the hospital.  He ordered IV antibiotics.  Venous Doppler of his lower extremity is pending.  He is on warfarin and a history of DVT.  He is treating him for infection to his lower extremity.  He would like me to call [facility] for admission.  After the venous Doppler  Patient's venous Doppler report was given to me from the vascular tech and it was negative for any acute DVT. [MM]   1716 I saw this patient and informed him of the conversation that I had with Dr. Teresa.  I informed him the results of his Doppler.  He did not have a DVT.  The plan was to admit him for cellulitis.  He is hemodynamically stable.  Oxygen saturation is 99% on room air.  I will give a call out to admitting physician and hospitalist.  All questions answered. [MM]   1734 I discussed the case with Dr. Du who is on for [facility].  Informed her of the conversation I had with Dr. Teresa and the plan for admission and treat as if there is a cellulitis.  She agrees to admit the patient to the hospital. [MM]      ED Course User Index  [JG] Ben Teresa MD  [MM] Michael Lewsi MD       AS OF 09:16 EST VITALS:    BP - 104/56  HR - 65  TEMP - 98.5 °F (36.9 °C) (Oral)  O2 SATS - 97%    DIAGNOSIS  Final diagnoses:   Cellulitis of right leg   Elevated procalcitonin         DISPOSITION  ADMISSION    Discussed treatment plan and reason for admission with pt/family and admitting physician.  Pt/family  voiced understanding of the plan for admission for further testing/treatment as needed.          Ben Teresa MD  01/12/23 0916

## 2023-01-11 NOTE — ED TRIAGE NOTES
"Pt to ED from home via PV. Pt c/o pain in right lower leg. Pt states \"I think I have cellulitis and sepsis.\" Right leg appears red and slightly swollen. Distal pulses palpable.   "

## 2023-01-11 NOTE — PROGRESS NOTES
Today's preliminary for right lower venous doppler is negative for acute DVT. This preliminary was given to Dr. Lewis.

## 2023-01-12 ENCOUNTER — APPOINTMENT (OUTPATIENT)
Dept: CARDIOLOGY | Facility: HOSPITAL | Age: 63
DRG: 603 | End: 2023-01-12
Payer: COMMERCIAL

## 2023-01-12 ENCOUNTER — TELEPHONE (OUTPATIENT)
Dept: FAMILY MEDICINE CLINIC | Facility: CLINIC | Age: 63
End: 2023-01-12

## 2023-01-12 PROBLEM — I50.32 CHRONIC HEART FAILURE WITH PRESERVED EJECTION FRACTION (HFPEF): Status: ACTIVE | Noted: 2023-01-12

## 2023-01-12 PROBLEM — E66.9 OBESITY (BMI 30-39.9): Status: ACTIVE | Noted: 2023-01-12

## 2023-01-12 PROBLEM — R79.89 ELEVATED PROCALCITONIN: Status: ACTIVE | Noted: 2023-01-12

## 2023-01-12 PROBLEM — I82.509 CHRONIC DEEP VEIN THROMBOSIS (DVT): Status: ACTIVE | Noted: 2023-01-12

## 2023-01-12 LAB
ANION GAP SERPL CALCULATED.3IONS-SCNC: 8 MMOL/L (ref 5–15)
BH CV LOW VAS RIGHT MID FEMORAL SPONT: 1
BH CV LOWER VASCULAR LEFT COMMON FEMORAL AUGMENT: NORMAL
BH CV LOWER VASCULAR LEFT COMMON FEMORAL COMPETENT: NORMAL
BH CV LOWER VASCULAR LEFT COMMON FEMORAL COMPRESS: NORMAL
BH CV LOWER VASCULAR LEFT COMMON FEMORAL PHASIC: NORMAL
BH CV LOWER VASCULAR LEFT COMMON FEMORAL SPONT: NORMAL
BH CV LOWER VASCULAR RIGHT COMMON FEMORAL AUGMENT: NORMAL
BH CV LOWER VASCULAR RIGHT COMMON FEMORAL COMPETENT: NORMAL
BH CV LOWER VASCULAR RIGHT COMMON FEMORAL COMPRESS: NORMAL
BH CV LOWER VASCULAR RIGHT COMMON FEMORAL PHASIC: NORMAL
BH CV LOWER VASCULAR RIGHT COMMON FEMORAL SPONT: NORMAL
BH CV LOWER VASCULAR RIGHT DISTAL FEMORAL COMPRESS: NORMAL
BH CV LOWER VASCULAR RIGHT GASTRONEMIUS COMPRESS: NORMAL
BH CV LOWER VASCULAR RIGHT GREATER SAPH AK COMPRESS: NORMAL
BH CV LOWER VASCULAR RIGHT GREATER SAPH BK COMPRESS: NORMAL
BH CV LOWER VASCULAR RIGHT LESSER SAPH COMPRESS: NORMAL
BH CV LOWER VASCULAR RIGHT MID FEMORAL AUGMENT: NORMAL
BH CV LOWER VASCULAR RIGHT MID FEMORAL COMPETENT: NORMAL
BH CV LOWER VASCULAR RIGHT MID FEMORAL COMPRESS: NORMAL
BH CV LOWER VASCULAR RIGHT MID FEMORAL PHASIC: NORMAL
BH CV LOWER VASCULAR RIGHT MID FEMORAL SPONT: NORMAL
BH CV LOWER VASCULAR RIGHT MID FEMORAL THROMBUS: NORMAL
BH CV LOWER VASCULAR RIGHT PERONEAL COMPRESS: NORMAL
BH CV LOWER VASCULAR RIGHT POPLITEAL AUGMENT: NORMAL
BH CV LOWER VASCULAR RIGHT POPLITEAL COMPETENT: NORMAL
BH CV LOWER VASCULAR RIGHT POPLITEAL COMPRESS: NORMAL
BH CV LOWER VASCULAR RIGHT POPLITEAL PHASIC: NORMAL
BH CV LOWER VASCULAR RIGHT POPLITEAL SPONT: NORMAL
BH CV LOWER VASCULAR RIGHT POSTERIOR TIBIAL COMPRESS: NORMAL
BH CV LOWER VASCULAR RIGHT PROFUNDA FEMORAL COMPRESS: NORMAL
BH CV LOWER VASCULAR RIGHT PROXIMAL FEMORAL COMPRESS: NORMAL
BH CV LOWER VASCULAR RIGHT SAPHENOFEMORAL JUNCTION COMPRESS: NORMAL
BH CV LOWER VASCULAR RIGHT SOLEAL COMPRESS: NORMAL
BUN SERPL-MCNC: 17 MG/DL (ref 8–23)
BUN/CREAT SERPL: 16.8 (ref 7–25)
CALCIUM SPEC-SCNC: 9.1 MG/DL (ref 8.6–10.5)
CHLORIDE SERPL-SCNC: 107 MMOL/L (ref 98–107)
CO2 SERPL-SCNC: 23 MMOL/L (ref 22–29)
CREAT SERPL-MCNC: 1.01 MG/DL (ref 0.76–1.27)
CRP SERPL-MCNC: 12.19 MG/DL (ref 0–0.5)
DEPRECATED RDW RBC AUTO: 45.2 FL (ref 37–54)
EGFRCR SERPLBLD CKD-EPI 2021: 84.6 ML/MIN/1.73
ERYTHROCYTE [DISTWIDTH] IN BLOOD BY AUTOMATED COUNT: 13.3 % (ref 12.3–15.4)
GLUCOSE SERPL-MCNC: 100 MG/DL (ref 65–99)
HCT VFR BLD AUTO: 39.6 % (ref 37.5–51)
HGB BLD-MCNC: 14 G/DL (ref 13–17.7)
INR PPP: 2.02 (ref 0.9–1.1)
MAXIMAL PREDICTED HEART RATE: 159 BPM
MCH RBC QN AUTO: 32.5 PG (ref 26.6–33)
MCHC RBC AUTO-ENTMCNC: 35.4 G/DL (ref 31.5–35.7)
MCV RBC AUTO: 91.9 FL (ref 79–97)
PLATELET # BLD AUTO: 154 10*3/MM3 (ref 140–450)
PMV BLD AUTO: 9.6 FL (ref 6–12)
POTASSIUM SERPL-SCNC: 4.1 MMOL/L (ref 3.5–5.2)
PROTHROMBIN TIME: 23.1 SECONDS (ref 11.7–14.2)
RBC # BLD AUTO: 4.31 10*6/MM3 (ref 4.14–5.8)
SODIUM SERPL-SCNC: 138 MMOL/L (ref 136–145)
STRESS TARGET HR: 135 BPM
WBC NRBC COR # BLD: 7.02 10*3/MM3 (ref 3.4–10.8)

## 2023-01-12 PROCEDURE — 25010000002 VANCOMYCIN 10 G RECONSTITUTED SOLUTION: Performed by: INTERNAL MEDICINE

## 2023-01-12 PROCEDURE — 36415 COLL VENOUS BLD VENIPUNCTURE: CPT | Performed by: INTERNAL MEDICINE

## 2023-01-12 PROCEDURE — 85610 PROTHROMBIN TIME: CPT | Performed by: INTERNAL MEDICINE

## 2023-01-12 PROCEDURE — 25010000002 PERFLUTREN (DEFINITY) 8.476 MG IN SODIUM CHLORIDE (PF) 0.9 % 10 ML INJECTION: Performed by: INTERNAL MEDICINE

## 2023-01-12 PROCEDURE — 80048 BASIC METABOLIC PNL TOTAL CA: CPT | Performed by: INTERNAL MEDICINE

## 2023-01-12 PROCEDURE — 86140 C-REACTIVE PROTEIN: CPT | Performed by: INTERNAL MEDICINE

## 2023-01-12 PROCEDURE — 25010000002 ENOXAPARIN PER 10 MG: Performed by: STUDENT IN AN ORGANIZED HEALTH CARE EDUCATION/TRAINING PROGRAM

## 2023-01-12 PROCEDURE — 93306 TTE W/DOPPLER COMPLETE: CPT | Performed by: INTERNAL MEDICINE

## 2023-01-12 PROCEDURE — 25010000002 CEFTRIAXONE PER 250 MG: Performed by: INTERNAL MEDICINE

## 2023-01-12 PROCEDURE — 93306 TTE W/DOPPLER COMPLETE: CPT

## 2023-01-12 PROCEDURE — 99222 1ST HOSP IP/OBS MODERATE 55: CPT | Performed by: INTERNAL MEDICINE

## 2023-01-12 PROCEDURE — 85027 COMPLETE CBC AUTOMATED: CPT | Performed by: INTERNAL MEDICINE

## 2023-01-12 RX ORDER — ENOXAPARIN SODIUM 150 MG/ML
1 INJECTION SUBCUTANEOUS EVERY 12 HOURS
Status: DISCONTINUED | OUTPATIENT
Start: 2023-01-12 | End: 2023-01-13 | Stop reason: HOSPADM

## 2023-01-12 RX ORDER — WARFARIN SODIUM 7.5 MG/1
7.5 TABLET ORAL
Status: COMPLETED | OUTPATIENT
Start: 2023-01-12 | End: 2023-01-12

## 2023-01-12 RX ORDER — LOSARTAN POTASSIUM 50 MG/1
50 TABLET ORAL DAILY
Status: DISCONTINUED | OUTPATIENT
Start: 2023-01-12 | End: 2023-01-13 | Stop reason: HOSPADM

## 2023-01-12 RX ORDER — WARFARIN SODIUM 5 MG/1
5 TABLET ORAL DAILY
Status: DISCONTINUED | OUTPATIENT
Start: 2023-01-12 | End: 2023-01-12

## 2023-01-12 RX ORDER — CHOLESTYRAMINE 4 G/9G
1 POWDER, FOR SUSPENSION ORAL
Status: DISCONTINUED | OUTPATIENT
Start: 2023-01-12 | End: 2023-01-13 | Stop reason: HOSPADM

## 2023-01-12 RX ORDER — MELATONIN
2000 DAILY
Status: DISCONTINUED | OUTPATIENT
Start: 2023-01-12 | End: 2023-01-13 | Stop reason: HOSPADM

## 2023-01-12 RX ORDER — ATENOLOL 25 MG/1
25 TABLET ORAL DAILY
Status: DISCONTINUED | OUTPATIENT
Start: 2023-01-12 | End: 2023-01-13 | Stop reason: HOSPADM

## 2023-01-12 RX ADMIN — Medication 2000 UNITS: at 08:56

## 2023-01-12 RX ADMIN — CHOLESTYRAMINE 1 PACKET: 4 POWDER, FOR SUSPENSION ORAL at 18:54

## 2023-01-12 RX ADMIN — ENOXAPARIN SODIUM 140 MG: 150 INJECTION SUBCUTANEOUS at 16:23

## 2023-01-12 RX ADMIN — ATENOLOL 25 MG: 25 TABLET ORAL at 09:02

## 2023-01-12 RX ADMIN — CEFTRIAXONE 2 G: 2 INJECTION, POWDER, FOR SOLUTION INTRAMUSCULAR; INTRAVENOUS at 13:23

## 2023-01-12 RX ADMIN — SODIUM CHLORIDE 75 ML/HR: 9 INJECTION, SOLUTION INTRAVENOUS at 18:54

## 2023-01-12 RX ADMIN — WARFARIN 7.5 MG: 7.5 TABLET ORAL at 18:54

## 2023-01-12 RX ADMIN — PERFLUTREN 2.5 ML: 6.52 INJECTION, SUSPENSION INTRAVENOUS at 11:15

## 2023-01-12 RX ADMIN — VANCOMYCIN HYDROCHLORIDE 1250 MG: 10 INJECTION, POWDER, LYOPHILIZED, FOR SOLUTION INTRAVENOUS at 04:42

## 2023-01-12 RX ADMIN — LOSARTAN POTASSIUM 50 MG: 50 TABLET, FILM COATED ORAL at 08:56

## 2023-01-12 NOTE — PROGRESS NOTES
Clinical Pharmacy Services: Medication History    Elliot Sebastian is a 62 y.o. male presenting to [facility] for   Chief Complaint   Patient presents with   • Leg Pain   • Wound Check       He  has a past medical history of Acute bacterial endocarditis, Anemia, APC (atrial premature contractions), Atrial fibrillation (HCC), BPH (benign prostatic hypertrophy), CHF (congestive heart failure) (HCC), Cholelithiasis, Chronic constipation, Deep vein thrombophlebitis of leg (HCC), Disease of thyroid gland, Gout, Intestinal obstruction (HCC), Ischemic enteritis (HCC), Left heart failure, NYHA class 3 (HCC), Mitral regurgitation, Pleural effusion, bilateral, Pulmonary hypertension (HCC), 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HCC), Umbilical hernia, and Vitamin D deficiency.    Allergies as of 01/11/2023   • (No Known Allergies)       Medication information was obtained from: Patient  Pharmacy and Phone Number:     Prior to Admission Medications     Prescriptions Last Dose Informant Patient Reported? Taking?    atenolol (TENORMIN) 25 MG tablet 1/11/2023 Self No Yes    Take 1 tablet by mouth Daily. Follow up with Dr. Chanel 10/2022    Patient taking differently:  Take 25 mg by mouth Daily.    Cholecalciferol (VITAMIN D3) 2000 UNITS capsule 1/11/2023 Self Yes Yes    Take 5,000 Units by mouth Daily     cholestyramine (Questran) 4 GM/DOSE powder 1/10/2023 Self No Yes    Take 1 packet by mouth Daily. If no improvement after two weeks, may increase to BID.    Patient taking differently:  Take 4 g by mouth Every Evening. If no improvement after two weeks, may increase to BID.    losartan (COZAAR) 50 MG tablet 1/11/2023 Self No Yes    TAKE 1 TABLET DAILY    Patient taking differently:  Take 50 mg by mouth Daily.    Multiple Vitamins-Minerals (MENS MULTIVITAMIN PLUS) tablet 1/11/2023 Self Yes Yes    Take 2 capsules by mouth Daily.    warfarin (COUMADIN) 5 MG tablet 1/11/2023 Self No Yes    Take 1 tablet (5mg) by  mouth daily or as directed by medication management clinic.    Patient taking differently:  Take 5 mg by mouth Daily.            Medication notes:     This medication list is complete to the best of my knowledge as of 1/11/2023    Please call if questions.    Pascale Jarquin  Medication History Technician   [phone]    1/11/2023 19:32 EST

## 2023-01-12 NOTE — TELEPHONE ENCOUNTER
[facility]    Best call back number: [phone]    What orders are you requesting (i.e. lab or imaging): EVALUATE FOR NURSING AND PHYSICAL THERAPY     In what timeframe would the patient need to come in: ASAP

## 2023-01-12 NOTE — PROGRESS NOTES
[facility] Clinical Pharmacy Services: Warfarin Dosing/Monitoring Consult    Elliot Sebastian is a 62 y.o. male, estimated creatinine clearance is 118.4 mL/min (by C-G formula based on SCr of 1.01 mg/dL). weighing (!) 136 kg (300 lb 14.4 oz).    Results from last 7 days   Lab Units 01/12/23  0553 01/11/23  1444 01/09/23  0000   INR  2.02* 1.77* 2.30   APTT seconds  --  43.9*  --    HEMOGLOBIN g/dL 14.0 14.2  --    HEMATOCRIT % 39.6 41.9  --    PLATELETS 10*3/mm3 154 144  --      Prior to admission anticoagulation:  Warfarin 5 mg daily - INR is fairly stable around 3 on this dose per [facility] clinic notes     Hospital Anticoagulation:  Consulting provider: Dr. Rai  Start date: 1/12  Indication: Mechanical Heart Valve  Target INR: 2.5 - 3.5  Expected duration: Indefinite   Bridge Therapy:No    Potential food or drug interactions: None    Education complete?/Date: No; plan for follow up TBD    Assessment/Plan:  INR is sub-therapeutic today. However patient is therapeutic on 5mg daily pretty regularly according to [facility] clinic notes. Will plan to just give supplemental dose today and try to resume on 5mg daily. INR daily    Give 7.5mg today, and possible resume 5mg tomorrow depending on INR  INR daily    Pharmacy will continue to follow until discharge or discontinuation of warfarin.     Pop Flores [facility]  Clinical Pharmacist

## 2023-01-12 NOTE — H&P
HISTORY AND PHYSICAL   [facility]        Date of Admission: 2023  Patient Identification:  Name: Elliot Sebastian  Age: 62 y.o.  Sex: male  :  1960  MRN: [id number]                     Primary Care Physician: Mannie Beltran DO    Chief Complaint:  62 year old gentleman who presented to the emergency room with pain and redness of his right leg; he has a history of cellulitis in the past which felt similar; he has had subjective fever or chills; he also has a history of endocarditis and never had a follow up echo; he is on coumadin and has a history of blood clots and a fib as well as a mechanical heart valve    History of Present Illness:   As above    Past Medical History:  Past Medical History:   Diagnosis Date   • Acute bacterial endocarditis    • Anemia    • APC (atrial premature contractions)    • Atrial fibrillation (HCC)    • BPH (benign prostatic hypertrophy)    • CHF (congestive heart failure) (HCC)    • Cholelithiasis    • Chronic constipation    • Deep vein thrombophlebitis of leg (HCC)     DISTAL   • Disease of thyroid gland    • Gout    • Intestinal obstruction (HCC)    • Ischemic enteritis (HCC)    • Left heart failure, NYHA class 3 (HCC)     CLASS 111 LEFT SYSTOIC CONGESTIVE HEART FAILURE   • Mitral regurgitation    • Pleural effusion, bilateral    • Pulmonary hypertension (HCC)    • 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HCC)    • Umbilical hernia    • Vitamin D deficiency      Past Surgical History:  Past Surgical History:   Procedure Laterality Date   • APPENDECTOMY     • CHOLECYSTECTOMY     • COLONOSCOPY  approx     normal per patient   • COLONOSCOPY N/A 10/26/2020    Procedure: COLONOSCOPY into cecum with biopsy, polypectomy, clip placement;  Surgeon: Juan Phillips MD;  Location: [facility] ENDOSCOPY;  Service: Gastroenterology;  Laterality: N/A;  polyps, clip  asc polyp removed but not retrieved   • EXPLORATION NECK FOR POSTOP HEMORRHAGE / THROMBOSIS /  INFECTION     • MITRAL VALVE REPLACEMENT  01/03/2013    33MM [facility] MECHANICAL VALVE, PRESERVATION OF CORDS TO SUBVALVULAR APPARATUS AND DOROTHY GORE-FLORI CORD IN THE P2 AREA, DEBRIDEMENT OF ANTERIOR AND POSTERIOR MITRAL LEAFLET       Home Meds:  (Not in a hospital admission)      Allergies:  No Known Allergies  Immunizations:  Immunization History   Administered Date(s) Administered   • COVID-19 (MODERNA) 1st, 2nd, 3rd Dose Only 01/03/2022   • FluLaval/Fluzone >6mos 09/11/2016, 10/27/2017   • FluMist 2-49yrs 10/15/2015, 09/11/2016, 10/27/2017   • Hepatitis A 09/11/2016, 05/05/2017   • Hepatitis B 09/11/2016, 05/05/2017   • Hepatitis B Vaccine Adult IM 09/11/2016, 11/09/2016, 05/05/2017   • Influenza TIV (IM) 11/09/2016   • Tdap 09/11/2016     Social History:   Social History     Social History Narrative   • Not on file     Social History     Socioeconomic History   • Marital status:    Tobacco Use   • Smoking status: Never   • Smokeless tobacco: Never   • Tobacco comments:     caffeine use   Substance and Sexual Activity   • Alcohol use: Yes     Alcohol/week: 2.0 standard drinks     Types: 2 Cans of beer per week     Comment: 1-2 nightly   • Drug use: Never       Family History:  Family History   Problem Relation Age of Onset   • Diabetes Mother    • Heart disease Father    • Diabetes Sister    • Diabetes Brother         Review of Systems  See history of present illness and past medical history.  Patient denies headache, dizziness, syncope, falls, trauma, change in vision, change in hearing, change in taste, changes in weight, changes in appetite, focal weakness, numbness, or paresthesia.  Patient denies chest pain, palpitations, dyspnea, orthopnea, PND, cough, sinus pressure, rhinorrhea, epistaxis, hemoptysis, nausea, vomiting,hematemesis, diarrhea, constipation or hematchezia.  Denies cold or heat intolerance, polydipsia, polyuria, polyphagia. Denies hematuria, pyuria, dysuria, hesitancy, frequency or  "urgency. Denies consumption of raw and under cooked meats foods or change in water source.  Denies fever, chills, sweats, night sweats.  Denies missing any routine medications. Remainder of ROS is negative.    Objective:  T Max 24 hrs: Temp (24hrs), Av.6 °F (35.9 °C), Min:96.6 °F (35.9 °C), Max:96.6 °F (35.9 °C)    Vitals Ranges:   Temp:  [96.6 °F (35.9 °C)] 96.6 °F (35.9 °C)  Heart Rate:  [61-77] 66  Resp:  [18] 18  BP: (104-131)/(53-86) 126/86      Exam:  /86   Pulse 66   Temp 96.6 °F (35.9 °C) (Tympanic)   Resp 18   Ht 198.1 cm (78\")   Wt (!) 143 kg (315 lb)   SpO2 98%   BMI 36.40 kg/m²     General Appearance:    Alert, cooperative, no distress, appears stated age   Head:    Normocephalic, without obvious abnormality, atraumatic   Eyes:    PERRL, conjunctivae/corneas clear, EOM's intact, both eyes   Ears:    Normal external ear canals, both ears   Nose:   Nares normal, septum midline, mucosa normal, no drainage    or sinus tenderness   Throat:   Lips, mucosa, and tongue normal   Neck:   Supple, symmetrical, trachea midline, no adenopathy;     thyroid:  no enlargement/tenderness/nodules; no carotid    bruit or JVD   Back:     Symmetric, no curvature, ROM normal, no CVA tenderness   Lungs:     Decreased breath sounds bilaterally, respirations unlabored   Chest Wall:    No tenderness or deformity    Heart:    Regular rate and rhythm, S1 and S2 normal, no murmur, rub   or gallop   Abdomen:     Soft, nontender, bowel sounds active all four quadrants,     no masses, no hepatomegaly, no splenomegaly   Extremities:   Extremities normal, atraumatic, no cyanosis or edema   Pulses:   2+ and symmetric all extremities   Skin:   Skin color, texture, turgor normal, no rashes or lesions               .    Data Review:  Labs in chart were reviewed.  WBC   Date Value Ref Range Status   2023 10.69 3.40 - 10.80 10*3/mm3 Final     Hemoglobin   Date Value Ref Range Status   2023 14.2 13.0 - 17.7 g/dL Final "     Hematocrit   Date Value Ref Range Status   01/11/2023 41.9 37.5 - 51.0 % Final     Platelets   Date Value Ref Range Status   01/11/2023 144 140 - 450 10*3/mm3 Final     Sodium   Date Value Ref Range Status   01/11/2023 136 136 - 145 mmol/L Final     Potassium   Date Value Ref Range Status   01/11/2023 4.4 3.5 - 5.2 mmol/L Final     Comment:     Specimen hemolyzed.  Results may be affected.     Chloride   Date Value Ref Range Status   01/11/2023 104 98 - 107 mmol/L Final     CO2   Date Value Ref Range Status   01/11/2023 22.0 22.0 - 29.0 mmol/L Final     BUN   Date Value Ref Range Status   01/11/2023 22 8 - 23 mg/dL Final     Creatinine   Date Value Ref Range Status   01/11/2023 1.18 0.76 - 1.27 mg/dL Final     Glucose   Date Value Ref Range Status   01/11/2023 122 (H) 65 - 99 mg/dL Final     Calcium   Date Value Ref Range Status   01/11/2023 9.6 8.6 - 10.5 mg/dL Final     AST (SGOT)   Date Value Ref Range Status   01/11/2023 48 (H) 1 - 40 U/L Final     Comment:     Specimen hemolyzed.  Results may be affected.     ALT (SGPT)   Date Value Ref Range Status   01/11/2023 29 1 - 41 U/L Final     Comment:     Specimen hemolyzed.  Results may be affected.     Alkaline Phosphatase   Date Value Ref Range Status   01/11/2023 70 39 - 117 U/L Final                Imaging Results (All)     None            Assessment:  Active Hospital Problems    Diagnosis  POA   • **Cellulitis, leg [L03.119]  Yes      Resolved Hospital Problems   No resolved problems to display.   paf  Pulmonary hypertension  Chronic chf  hyperglycemia    Plan:  Will continue antibiotics  Check echo  Ask id to see him  Trend blood sugar  dw patient and ED provider    Leesa Rai MD  1/11/2023  20:11 EST

## 2023-01-12 NOTE — PLAN OF CARE
Patient transferred to [facility] from ED around 2100. VSS, A&O x4, room air. Ambulates with no gait disturbances, able to make needs known. Redness on R leg marked. IVF infusing, received IV Rocephin and IV Vanc. No chest pain. No falls. Call light within reach.    Goal Outcome Evaluation:  Plan of Care Reviewed With: patient    Progress: no change       Problem: Adult Inpatient Plan of Care  Goal: Plan of Care Review  Outcome: Ongoing, Progressing  Flowsheets (Taken 1/12/2023 0449)  Progress: no change  Plan of Care Reviewed With: patient  Goal: Patient-Specific Goal (Individualized)  Outcome: Ongoing, Progressing  Goal: Absence of Hospital-Acquired Illness or Injury  Outcome: Ongoing, Progressing  Intervention: Identify and Manage Fall Risk  Description: Perform standard risk assessment on admission using a validated tool or comprehensive approach appropriate to the patient; reassess fall risk frequently, with change in status or transfer to another level of care.  Communicate fall injury risk to interprofessional healthcare team.  Determine need for increased observation, equipment and environmental modification, such as low bed, signage and supportive, nonskid footwear.  Adjust safety measures to individual developmental age, stage and identified risk factors.  Reinforce the importance of safety and physical activity with patient and family.  Perform regular intentional rounding to assess need for position change, pain assessment and personal needs, including assistance with toileting.  Recent Flowsheet Documentation  Taken 1/12/2023 0447 by Yeni Frias, RN  Safety Promotion/Fall Prevention: safety round/check completed  Taken 1/12/2023 0224 by Yeni Frias, RN  Safety Promotion/Fall Prevention: safety round/check completed  Taken 1/12/2023 0048 by Yeni Frias, RN  Safety Promotion/Fall Prevention:   activity supervised   assistive device/personal items within reach   clutter free environment  maintained   fall prevention program maintained   gait belt   lighting adjusted   muscle strengthening facilitated   nonskid shoes/slippers when out of bed   room organization consistent   safety round/check completed  Taken 1/11/2023 2230 by Yeni Frias RN  Safety Promotion/Fall Prevention: safety round/check completed  Taken 1/11/2023 2110 by Yeni Frias RN  Safety Promotion/Fall Prevention:   activity supervised   assistive device/personal items within reach   clutter free environment maintained   fall prevention program maintained   gait belt   lighting adjusted   muscle strengthening facilitated   nonskid shoes/slippers when out of bed   room organization consistent   safety round/check completed  Intervention: Prevent Skin Injury  Description: Perform a screening for skin injury risk, such as pressure or moisture associated skin damage on admission and at regular intervals throughout hospital stay.  Keep all areas of skin (especially folds) clean and dry.  Maintain adequate skin hydration.  Relieve and redistribute pressure and protect bony prominences; implement measures based on patient-specific risk factors.  Match turning and repositioning schedule to clinical condition.  Encourage weight shift frequently; assist with reposition if unable to complete independently.  Float heels off bed; avoid pressure on the Achilles tendon.  Keep skin free from extended contact with medical devices.  Encourage functional activity and mobility, as early as tolerated.  Use aids (e.g., slide boards, mechanical lift) during transfer.  Recent Flowsheet Documentation  Taken 1/12/2023 0447 by Yeni Frias RN  Body Position:   side-lying   left   head facing, left  Taken 1/12/2023 0224 by Yeni Frias RN  Body Position:   right   position changed independently  Taken 1/12/2023 0048 by Yeni Frias RN  Body Position:   side-lying   left   position changed independently  Skin Protection:   adhesive use  limited   tubing/devices free from skin contact  Taken 1/11/2023 2230 by Yeni Frias RN  Body Position: sitting up in bed  Taken 1/11/2023 2110 by Yeni Frias RN  Body Position:   sitting up in bed   position changed independently  Skin Protection:   adhesive use limited   tubing/devices free from skin contact  Intervention: Prevent and Manage VTE (Venous Thromboembolism) Risk  Description: Assess for VTE (venous thromboembolism) risk.  Encourage and assist with early ambulation.  Initiate and maintain compression or other therapy, as indicated, based on identified risk in accordance with organizational protocol and provider order.  Encourage both active and passive leg exercises while in bed, if unable to ambulate.  Recent Flowsheet Documentation  Taken 1/12/2023 0447 by Yeni Frias RN  Activity Management: activity adjusted per tolerance  Taken 1/12/2023 0224 by Yeni Frias RN  Activity Management: activity adjusted per tolerance  Taken 1/12/2023 0048 by Yeni Frias RN  Activity Management: activity adjusted per tolerance  VTE Prevention/Management: (Coumadin)   bilateral   sequential compression devices off   patient refused intervention  Range of Motion: ROM (range of motion) performed  Taken 1/11/2023 2230 by Yeni Frias RN  Activity Management: activity adjusted per tolerance  Taken 1/11/2023 2110 by Yeni Frias RN  Activity Management: activity adjusted per tolerance  VTE Prevention/Management: (Coumadin)   bilateral   sequential compression devices off   patient refused intervention   other (see comments)  Range of Motion: ROM (range of motion) performed  Intervention: Prevent Infection  Description: Maintain skin and mucous membrane integrity; promote hand, oral and pulmonary hygiene.  Optimize fluid balance, nutrition, sleep and glycemic control to maximize infection resistance.  Identify potential sources of infection early to prevent or mitigate progression of  infection (e.g., wound, lines, devices).  Evaluate ongoing need for invasive devices; remove promptly when no longer indicated.  Recent Flowsheet Documentation  Taken 1/12/2023 0447 by Yeni Frias RN  Infection Prevention: rest/sleep promoted  Taken 1/12/2023 0224 by Yeni Frias RN  Infection Prevention: rest/sleep promoted  Taken 1/12/2023 0048 by Yeni Frias RN  Infection Prevention:   hand hygiene promoted   rest/sleep promoted   single patient room provided   visitors restricted/screened  Taken 1/11/2023 2230 by Yeni Frias RN  Infection Prevention:   hand hygiene promoted   rest/sleep promoted   single patient room provided   visitors restricted/screened  Taken 1/11/2023 2110 by Yeni Frias RN  Infection Prevention:   hand hygiene promoted   rest/sleep promoted   single patient room provided   visitors restricted/screened  Goal: Optimal Comfort and Wellbeing  Outcome: Ongoing, Progressing  Intervention: Provide Person-Centered Care  Description: Use a family-focused approach to care.  Develop trust and rapport by proactively providing information, encouraging questions, addressing concerns and offering reassurance.  Acknowledge emotional response to hospitalization.  Recognize and utilize personal coping strategies.  Honor spiritual and cultural preferences.  Recent Flowsheet Documentation  Taken 1/12/2023 0048 by Yeni Frias RN  Trust Relationship/Rapport:   care explained   choices provided  Taken 1/11/2023 2110 by Yeni Frias RN  Trust Relationship/Rapport:   care explained   choices provided   emotional support provided   empathic listening provided   questions encouraged   questions answered   reassurance provided   thoughts/feelings acknowledged  Goal: Readiness for Transition of Care  Outcome: Ongoing, Progressing  Intervention: Mutually Develop Transition Plan  Description: Identify available resources for support (e.g., family, friends, community).  Identify  and address barriers to ongoing treatment and home management (e.g., environmental, financial).  Provide opportunities to practice self-management skills.  Assess and monitor emotional readiness for transition.  Establish or reconnect linkage with outpatient providers or community-based services.  Recent Flowsheet Documentation  Taken 1/11/2023 2147 by Yeni Frias, RN  Transportation Anticipated: family or friend will provide  Patient/Family Anticipated Services at Transition: none  Patient/Family Anticipates Transition to: home with family  Taken 1/11/2023 2110 by Yeni Frias, RN  Equipment Currently Used at Home: none

## 2023-01-12 NOTE — DISCHARGE PLACEMENT REQUEST
"Elliot Singh (62 y.o. Male)     Date of Birth   1960    Social Security Number       Address   [address] [place], [place] [address]    Home Phone   [phone]    MRN   [id number]       Sabianism   Hoahaoism    Marital Status                               Admission Date   1/11/23    Admission Type   Emergency    Admitting Provider   Leesa Rai MD    Attending Provider   Julio Garcia DO    Department, Room/Bed   [address], [address]       Discharge Date       Discharge Disposition       Discharge Destination                               Attending Provider: Julio Garcia DO    Allergies: No Known Allergies    Isolation: None   Infection: None   Code Status: CPR    Ht: 189 cm (74.41\")   Wt: 136 kg (299 lb 13.2 oz)    Admission Cmt: None   Principal Problem: Cellulitis, leg [L03.119]                 Active Insurance as of 1/11/2023     Primary Coverage     Payor Plan Insurance Group Employer/Plan Group    [employer] BLUE CROSS [employer] BLUE CROSS BLUE SHIELD O [id number]     Payor Plan Address Payor Plan Phone Number Payor Plan Fax Number Effective Dates    [address] [phone]  12/1/2022 - None Entered    [facility] [address]       Subscriber Name Subscriber Birth Date Member ID       ELLIOT SINGH 1960 [id number]                 Emergency Contacts      (Rel.) Home Phone Work Phone Mobile Phone    Nola Cantu (Spouse) [phone] -- [phone]              "

## 2023-01-12 NOTE — PROGRESS NOTES
[facility] Clinical Pharmacy Services: Warfarin Dosing/Monitoring Consult    Elliot Sebastian is a 62 y.o. male, estimated creatinine clearance is 104.1 mL/min (by C-G formula based on SCr of 1.18 mg/dL). weighing (!) 143 kg (315 lb).    Results from last 7 days   Lab Units 01/11/23  1444 01/09/23  0000   INR  1.77* 2.30   APTT seconds 43.9*  --    HEMOGLOBIN g/dL 14.2  --    HEMATOCRIT % 41.9  --    PLATELETS 10*3/mm3 144  --      Prior to admission anticoagulation:  Warfarin 5 mg daily     Hospital Anticoagulation:  Consulting provider: Dr. Rai  Start date: 1/12  Indication: Mechanical Heart Valve  Target INR: 2.5 - 3.5  Expected duration: Indefinite   Bridge Therapy:No    Potential food or drug interactions: None    Education complete?/Date: No; plan for follow up TBD    Assessment/Plan:  Patient reported last taken yesterday. Pharmacy will continued to follow up with daily INRs and making dose adjustments if necessary     Pharmacy will continue to follow until discharge or discontinuation of warfarin.     Aimee Holbrook [facility]  Clinical Pharmacist

## 2023-01-12 NOTE — PAYOR COMM NOTE
"Elliot Singh (62 y.o. Male)                          ATTENTION; - INITIAL CLINICALS FOR REVIEW, AUTH PENDING [id number]                       REPLY TO [facility] DEPT  [phone] OR CALL   EILEEN VELASQUEZ LPN       Date of Birth   1960    Social Security Number       Address   [address] [place] [id number]    Home Phone   [phone]    MRN   [id number]       Anab[employer]ist   [employer]    Marital Status                               Admission Date   23    Admission Type   Emergency    Admitting Provider   Leesa Rai MD    Attending Provider   Julio Garcia DO    Department, Room/Bed   [address], S515/1       Discharge Date       Discharge Disposition       Discharge Destination                               Attending Provider: Juloi Garcia DO    Allergies: No Known Allergies    Isolation: None   Infection: None   Code Status: CPR    Ht: 189 cm (74.41\")   Wt: 136 kg (299 lb 13.2 oz)    Admission Cmt: None   Principal Problem: Cellulitis, leg [L03.119]                 Active Insurance as of 2023     Primary Coverage     Payor Plan Insurance Group Employer/Plan Group    [employer] BLUE [employer]OSS [employer] BLUE [employer]OSS BLUE [employer]IELD PPO [id number]     Payor Plan Address Payor Plan Phone Number Payor Plan Fax Number Effective Dates    [address] [phone]  2022 - None Entered    [place] [address]       Subscriber Name Subscriber Birth Date Member ID       ELLIOT SINGH 1960 [id number]                 Emergency Contacts      (Rel.) Home Phone Work Phone Mobile Phone    Nola Cantu (Spouse) [phone] -- [phone]               History & Physical      Leesa Rai MD at 23 2011          HISTORY AND PHYSICAL   Psychiatric        Date of Admission: 2023  Patient Identification:  Name: Elliot Singh  Age: 62 y.o.  Sex: male  :  1960  MRN: [id number]                     Primary " Care Physician: Mannie Beltran DO    Chief Complaint:  62 year old gentleman who presented to the emergency room with pain and redness of his right leg; he has a history of cellulitis in the past which felt similar; he has had subjective fever or chills; he also has a history of endocarditis and never had a follow up echo; he is on coumadin and has a history of blood clots and a fib as well as a mechanical heart valve    History of Present Illness:   As above    Past Medical History:  Past Medical History:   Diagnosis Date   • Acute bacterial endocarditis    • Anemia    • APC (atrial premature contractions)    • Atrial fibrillation (HCC)    • BPH (benign prostatic hypertrophy)    • CHF (congestive heart failure) (HCC)    • Cholelithiasis    • Chronic constipation    • Deep vein thrombophlebitis of leg (HCC)     DISTAL   • Disease of thyroid gland    • Gout    • Intestinal obstruction (HCC)    • Ischemic enteritis (HCC)    • Left heart failure, NYHA class 3 (HCC)     CLASS 111 LEFT SYSTOIC CONGESTIVE HEART FAILURE   • Mitral regurgitation    • Pleural effusion, bilateral    • Pulmonary hypertension (HCC)    • 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HCC)    • Umbilical hernia    • Vitamin D deficiency      Past Surgical History:  Past Surgical History:   Procedure Laterality Date   • APPENDECTOMY     • CHOLECYSTECTOMY     • COLONOSCOPY  approx 2013    normal per patient   • COLONOSCOPY N/A 10/26/2020    Procedure: COLONOSCOPY into cecum with biopsy, polypectomy, clip placement;  Surgeon: Juan Phillips MD;  Location: [facility] ENDOSCOPY;  Service: Gastroenterology;  Laterality: N/A;  polyps, clip  asc polyp removed but not retrieved   • EXPLORATION NECK FOR POSTOP HEMORRHAGE / THROMBOSIS / INFECTION     • MITRAL VALVE REPLACEMENT  01/03/2013    33MM [employer] JASON MECHANICAL VALVE, PRESERVATION OF CORDS TO SUBVALVULAR APPARATUS AND DOROTHY GORE-FLORI CORD IN THE P2 AREA, DEBRIDEMENT OF ANTERIOR AND POSTERIOR MITRAL LEAFLET        Home Meds:  (Not in a hospital admission)      Allergies:  No Known Allergies  Immunizations:  Immunization History   Administered Date(s) Administered   • COVID-19 (MODERNA) 1st, 2nd, 3rd Dose Only 01/03/2022   • FluLaval/Fluzone >6mos 09/11/2016, 10/27/2017   • FluMist 2-49yrs 10/15/2015, 09/11/2016, 10/27/2017   • Hepatitis A 09/11/2016, 05/05/2017   • Hepatitis B 09/11/2016, 05/05/2017   • Hepatitis B Vaccine Adult IM 09/11/2016, 11/09/2016, 05/05/2017   • Influenza TIV (IM) 11/09/2016   • Tdap 09/11/2016     Social History:   Social History     Social History Narrative   • Not on file     Social History     Socioeconomic History   • Marital status:    Tobacco Use   • Smoking status: Never   • Smokeless tobacco: Never   • Tobacco comments:     caffeine use   Substance and Sexual Activity   • Alcohol use: Yes     Alcohol/week: 2.0 standard drinks     Types: 2 Cans of beer per week     Comment: 1-2 nightly   • Drug use: Never       Family History:  Family History   Problem Relation Age of Onset   • Diabetes Mother    • Heart disease Father    • Diabetes Sister    • Diabetes Brother         Review of Systems  See history of present illness and past medical history.  Patient denies headache, dizziness, syncope, falls, trauma, change in vision, change in hearing, change in taste, changes in weight, changes in appetite, focal weakness, numbness, or paresthesia.  Patient denies chest pain, palpitations, dyspnea, orthopnea, PND, cough, sinus pressure, rhinorrhea, epistaxis, hemoptysis, nausea, vomiting,hematemesis, diarrhea, constipation or hematchezia.  Denies cold or heat intolerance, polydipsia, polyuria, polyphagia. Denies hematuria, pyuria, dysuria, hesitancy, frequency or urgency. Denies consumption of raw and under cooked meats foods or change in water source.  Denies fever, chills, sweats, night sweats.  Denies missing any routine medications. Remainder of ROS is negative.    Objective:  T Max 24  "hrs: Temp (24hrs), Av.6 °F (35.9 °C), Min:96.6 °F (35.9 °C), Max:96.6 °F (35.9 °C)    Vitals Ranges:   Temp:  [96.6 °F (35.9 °C)] 96.6 °F (35.9 °C)  Heart Rate:  [61-77] 66  Resp:  [18] 18  BP: (104-131)/(53-86) 126/86      Exam:  /86   Pulse 66   Temp 96.6 °F (35.9 °C) (Tympanic)   Resp 18   Ht 198.1 cm (78\")   Wt (!) 143 kg (315 lb)   SpO2 98%   BMI 36.40 kg/m²     General Appearance:    Alert, cooperative, no distress, appears stated age   Head:    Normocephalic, without obvious abnormality, atraumatic   Eyes:    PERRL, conjunctivae/corneas clear, EOM's intact, both eyes   Ears:    Normal external ear canals, both ears   Nose:   Nares normal, septum midline, mucosa normal, no drainage    or sinus tenderness   Throat:   Lips, mucosa, and tongue normal   Neck:   Supple, symmetrical, trachea midline, no adenopathy;     thyroid:  no enlargement/tenderness/nodules; no carotid    bruit or JVD   Back:     Symmetric, no curvature, ROM normal, no CVA tenderness   Lungs:     Decreased breath sounds bilaterally, respirations unlabored   Chest Wall:    No tenderness or deformity    Heart:    Regular rate and rhythm, S1 and S2 normal, no murmur, rub   or gallop   Abdomen:     Soft, nontender, bowel sounds active all four quadrants,     no masses, no hepatomegaly, no splenomegaly   Extremities:   Extremities normal, atraumatic, no cyanosis or edema   Pulses:   2+ and symmetric all extremities   Skin:   Skin color, texture, turgor normal, no rashes or lesions               .    Data Review:  Labs in chart were reviewed.  WBC   Date Value Ref Range Status   2023 10.69 3.40 - 10.80 10*3/mm3 Final     Hemoglobin   Date Value Ref Range Status   2023 14.2 13.0 - 17.7 g/dL Final     Hematocrit   Date Value Ref Range Status   2023 41.9 37.5 - 51.0 % Final     Platelets   Date Value Ref Range Status   2023 144 140 - 450 10*3/mm3 Final     Sodium   Date Value Ref Range Status   2023 136 136 " "- 145 mmol/L Final     Potassium   Date Value Ref Range Status   01/11/2023 4.4 3.5 - 5.2 mmol/L Final     Comment:     Specimen hemolyzed.  Results may be affected.     Chloride   Date Value Ref Range Status   01/11/2023 104 98 - 107 mmol/L Final     CO2   Date Value Ref Range Status   01/11/2023 22.0 22.0 - 29.0 mmol/L Final     BUN   Date Value Ref Range Status   01/11/2023 22 8 - 23 mg/dL Final     Creatinine   Date Value Ref Range Status   01/11/2023 1.18 0.76 - 1.27 mg/dL Final     Glucose   Date Value Ref Range Status   01/11/2023 122 (H) 65 - 99 mg/dL Final     Calcium   Date Value Ref Range Status   01/11/2023 9.6 8.6 - 10.5 mg/dL Final     AST (SGOT)   Date Value Ref Range Status   01/11/2023 48 (H) 1 - 40 U/L Final     Comment:     Specimen hemolyzed.  Results may be affected.     ALT (SGPT)   Date Value Ref Range Status   01/11/2023 29 1 - 41 U/L Final     Comment:     Specimen hemolyzed.  Results may be affected.     Alkaline Phosphatase   Date Value Ref Range Status   01/11/2023 70 39 - 117 U/L Final                Imaging Results (All)     None            Assessment:  Active Hospital Problems    Diagnosis  POA   • **Cellulitis, leg [L03.119]  Yes      Resolved Hospital Problems   No resolved problems to display.   paf  Pulmonary hypertension  Chronic chf  hyperglycemia    Plan:  Will continue antibiotics  Check echo  Ask id to see him  Trend blood sugar  dw patient and ED provider    Leesa Rai MD  1/11/2023  20:11 EST      Electronically signed by Leesa Rai MD at 01/11/23 2016          Emergency Department Notes      Zehra Martinez RN at 01/11/23 1324        Pt to ED from home via PV. Pt c/o pain in right lower leg. Pt states \"I think I have cellulitis and sepsis.\" Right leg appears red and slightly swollen. Distal pulses palpable.     Electronically signed by Zehra Martinez RN at 01/11/23 1325     Ben Teresa MD at 01/11/23 1327           EMERGENCY DEPARTMENT " ENCOUNTER  I wore full protective equipment throughout this patient encounter including a N95 mask, eye shield, gown and gloves. Hand hygiene was performed before donning protective equipment and after removal when leaving the room.    Room Number:  S515/1  Date of encounter:  1/12/2023  PCP: Mannie Beltran DO    HPI:  Context: Elliot Sebastian is a 62 y.o. male who presents to the ED c/o chief complaint of right leg pain.  Patient reports that he is a , 2 days ago began having shakes and chills, then noticed that he had some redness and swelling in his right lower leg.  Patient reports that the leg is tender to touch, otherwise denies pain in it.  Patient reports redness and swelling is limited to his calf.  Patient denies any injury to the area.  Patient reports that he is concerned for cellulitis, reports that he has had in the past.  Patient reports low-grade fevers, denies any night sweats.  Patient reports that he has a history of blood clots in the past, is on Coumadin, is compliant with his Coumadin.  Patient denies any chest pain or shortness of breath at present.  Patient reports that he was seen at outside hospital when symptoms began, reports that work-up was reportedly negative at that time, patient reports that he is upset because they did not discharge him with an antibiotic or anything.    MEDICAL HISTORY REVIEW  Reviewed in EPIC    PAST MEDICAL HISTORY  Active Ambulatory Problems     Diagnosis Date Noted   • Paroxysmal atrial fibrillation (HCC) 03/02/2016   • Bacterial endocarditis - history of 03/02/2016   • Congestive heart failure (HCC) 03/02/2016   • Mitral valve insufficiency 03/02/2016   • Hx of mitral valve replacement with mechanical valve [Z95.2] 10/19/2018   • Benign prostatic hyperplasia without lower urinary tract symptoms 02/10/2020   • Cellulitis 11/02/2015   • Elevated prostate specific antigen (PSA) 02/10/2020   • Fatigue 02/10/2020   • Low back pain 02/10/2020   • Poor  concentration 02/10/2020   • Reduced libido 02/10/2020   • Subclinical hyperthyroidism 02/10/2020   • Superficial bacterial skin infection 11/02/2015   • Vitamin D deficiency 02/10/2020   • Elevated blood pressure reading 02/10/2020   • Sepsis without acute organ dysfunction (HCC) 03/30/2020   • Chronic diarrhea 07/27/2020   • Abdominal cramping 07/27/2020   • Exogenous obesity 11/30/2020   • Glucose intolerance (impaired glucose tolerance) 11/30/2020   • Gram positive septicemia (HCC) 08/02/2022   • Fever 08/02/2022   • Chronic anticoagulation 08/02/2022   • ASHIA (acute kidney injury) ([facility]) 08/02/2022   • Acute bacterial endocarditis 08/05/2022   • Essential hypertension 08/19/2022   • Teeth problem 08/19/2022     Resolved Ambulatory Problems     Diagnosis Date Noted   • Diarrhea 09/21/2020     Past Medical History:   Diagnosis Date   • Anemia    • APC (atrial premature contractions)    • Atrial fibrillation (HCC)    • BPH (benign prostatic hypertrophy)    • CHF (congestive heart failure) (HCC)    • Cholelithiasis    • Chronic constipation    • Deep vein thrombophlebitis of leg (HCC)    • Disease of thyroid gland    • Gout    • Intestinal obstruction (HCC)    • Ischemic enteritis (HCC)    • Left heart failure, NYHA class 3 (HCC)    • Mitral regurgitation    • Pleural effusion, bilateral    • Pulmonary hypertension (HCC)    • 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HCC)    • Umbilical hernia        PAST SURGICAL HISTORY  Past Surgical History:   Procedure Laterality Date   • APPENDECTOMY     • CHOLECYSTECTOMY     • COLONOSCOPY  approx 2013    normal per patient   • COLONOSCOPY N/A 10/26/2020    Procedure: COLONOSCOPY into cecum with biopsy, polypectomy, clip placement;  Surgeon: Juan Phillips MD;  Location: [facility] ENDOSCOPY;  Service: Gastroenterology;  Laterality: N/A;  polyps, clip  asc polyp removed but not retrieved   • EXPLORATION NECK FOR POSTOP HEMORRHAGE / THROMBOSIS / INFECTION     • MITRAL VALVE  REPLACEMENT  01/03/2013    33MM [facility] JASON MECHANICAL VALVE, PRESERVATION OF CORDS TO SUBVALVULAR APPARATUS AND DOROTHY GORE-FLORI CORD IN THE P2 AREA, DEBRIDEMENT OF ANTERIOR AND POSTERIOR MITRAL LEAFLET        FAMILY HISTORY  Family History   Problem Relation Age of Onset   • Diabetes Mother    • Heart disease Father    • Diabetes Sister    • Diabetes Brother        SOCIAL HISTORY  Social History     Socioeconomic History   • Marital status:    Tobacco Use   • Smoking status: Never   • Smokeless tobacco: Never   • Tobacco comments:     caffeine use   Substance and Sexual Activity   • Alcohol use: Yes     Alcohol/week: 2.0 standard drinks     Types: 2 Cans of beer per week     Comment: 1-2 nightly   • Drug use: Never       ALLERGIES  Patient has no known allergies.    The patient's allergies have been reviewed    REVIEW OF SYSTEMS  All systems reviewed and negative except for those discussed in HPI.     PHYSICAL EXAM  I have reviewed the triage vital signs and nursing notes.  ED Triage Vitals   Temp Heart Rate Resp BP SpO2   01/11/23 1319 01/11/23 1319 01/11/23 1319 01/11/23 1323 01/11/23 1319   96.6 °F (35.9 °C) 77 18 131/79 98 %      Temp src Heart Rate Source Patient Position BP Location FiO2 (%)   01/11/23 1319 -- -- -- --   Tympanic           General: No acute distress.  HENT: NCAT, PERRL, Nares patent.  Eyes: no scleral icterus.  Neck: trachea midline, no ROM limitations.  CV: regular rhythm, regular rate.  Respiratory: normal effort, CTAB.  Abdomen: soft, nondistended, NTTP, no rebound tenderness, no guarding or rigidity.  Musculoskeletal: no deformity.  Neuro: alert, moves all extremities, follows commands.  Skin: warm, dry.  Right leg: Patient has swelling of the leg below the calf, swelling predominantly limited to the calf, trace swelling of the foot.  Patient has pinkish erythema on anterior leg, leg is not warm to touch, no induration, no fluctuance or drainage.  No palpable cords, negative  Homans.    LAB RESULTS  Recent Results (from the past 24 hour(s))   Comprehensive Metabolic Panel    Collection Time: 01/11/23  2:44 PM    Specimen: Arm, Left; Blood   Result Value Ref Range    Glucose 122 (H) 65 - 99 mg/dL    BUN 22 8 - 23 mg/dL    Creatinine 1.18 0.76 - 1.27 mg/dL    Sodium 136 136 - 145 mmol/L    Potassium 4.4 3.5 - 5.2 mmol/L    Chloride 104 98 - 107 mmol/L    CO2 22.0 22.0 - 29.0 mmol/L    Calcium 9.6 8.6 - 10.5 mg/dL    Total Protein 6.2 6.0 - 8.5 g/dL    Albumin 3.8 3.5 - 5.2 g/dL    ALT (SGPT) 29 1 - 41 U/L    AST (SGOT) 48 (H) 1 - 40 U/L    Alkaline Phosphatase 70 39 - 117 U/L    Total Bilirubin 1.2 0.0 - 1.2 mg/dL    Globulin 2.4 gm/dL    A/G Ratio 1.6 g/dL    BUN/Creatinine Ratio 18.6 7.0 - 25.0    Anion Gap 10.0 5.0 - 15.0 mmol/L    eGFR 70.2 >60.0 mL/min/1.73   Protime-INR    Collection Time: 01/11/23  2:44 PM    Specimen: Arm, Left; Blood   Result Value Ref Range    Protime 20.8 (H) 11.7 - 14.2 Seconds    INR 1.77 (H) 0.90 - 1.10   aPTT    Collection Time: 01/11/23  2:44 PM    Specimen: Arm, Left; Blood   Result Value Ref Range    PTT 43.9 (H) 22.7 - 35.4 seconds   Lactic Acid, Plasma    Collection Time: 01/11/23  2:44 PM    Specimen: Arm, Left; Blood   Result Value Ref Range    Lactate 1.5 0.5 - 2.0 mmol/L   Procalcitonin    Collection Time: 01/11/23  2:44 PM    Specimen: Arm, Left; Blood   Result Value Ref Range    Procalcitonin 19.10 (H) 0.00 - 0.25 ng/mL   CBC Auto Differential    Collection Time: 01/11/23  2:44 PM    Specimen: Arm, Left; Blood   Result Value Ref Range    WBC 10.69 3.40 - 10.80 10*3/mm3    RBC 4.42 4.14 - 5.80 10*6/mm3    Hemoglobin 14.2 13.0 - 17.7 g/dL    Hematocrit 41.9 37.5 - 51.0 %    MCV 94.8 79.0 - 97.0 fL    MCH 32.1 26.6 - 33.0 pg    MCHC 33.9 31.5 - 35.7 g/dL    RDW 13.6 12.3 - 15.4 %    RDW-SD 47.6 37.0 - 54.0 fl    MPV 10.0 6.0 - 12.0 fL    Platelets 144 140 - 450 10*3/mm3    Neutrophil % 78.0 (H) 42.7 - 76.0 %    Lymphocyte % 12.7 (L) 19.6 - 45.3 %     Monocyte % 8.2 5.0 - 12.0 %    Eosinophil % 0.5 0.3 - 6.2 %    Basophil % 0.3 0.0 - 1.5 %    Immature Grans % 0.3 0.0 - 0.5 %    Neutrophils, Absolute 8.34 (H) 1.70 - 7.00 10*3/mm3    Lymphocytes, Absolute 1.36 0.70 - 3.10 10*3/mm3    Monocytes, Absolute 0.88 0.10 - 0.90 10*3/mm3    Eosinophils, Absolute 0.05 0.00 - 0.40 10*3/mm3    Basophils, Absolute 0.03 0.00 - 0.20 10*3/mm3    Immature Grans, Absolute 0.03 0.00 - 0.05 10*3/mm3    nRBC 0.0 0.0 - 0.2 /100 WBC   Duplex Venous Lower Extremity - Right CAR    Collection Time: 01/11/23  4:19 PM   Result Value Ref Range    Target HR (85%) 135 bpm    Max. Pred. HR (100%) 159 bpm    Right Mid Femoral Spont 1.0     Right Common Femoral Spont Y     Right Common Femoral Competent Y     Right Common Femoral Phasic Y     Right Common Femoral Compress C     Right Common Femoral Augment Y     Right Saphenofemoral Junction Compress C     Right Profunda Femoral Compress C     Right Proximal Femoral Compress C     Right Mid Femoral Spont Y     Right Mid Femoral Competent Y     Right Mid Femoral Phasic Y     Right Mid Femoral Compress C     Right Mid Femoral Augment Y     Right Mid Femoral Thrombus C     Right Distal Femoral Compress C     Right Popliteal Spont Y     Right Popliteal Competent Y     Right Popliteal Phasic Y     Right Popliteal Compress C     Right Popliteal Augment Y     Right Posterior Tibial Compress C     Right Peroneal Compress C     Right Gastronemius Compress C     BH CV LOWER VASCULAR RIGHT SOLEAL COMPRESS C     Right Greater Saph AK Compress C     Right Greater Saph BK Compress C     Right Lesser Saph Compress C     Left Common Femoral Spont Y     Left Common Femoral Competent Y     Left Common Femoral Phasic Y     Left Common Femoral Compress C     Left Common Femoral Augment Y    Basic Metabolic Panel    Collection Time: 01/12/23  5:53 AM    Specimen: Blood   Result Value Ref Range    Glucose 100 (H) 65 - 99 mg/dL    BUN 17 8 - 23 mg/dL    Creatinine 1.01  0.76 - 1.27 mg/dL    Sodium 138 136 - 145 mmol/L    Potassium 4.1 3.5 - 5.2 mmol/L    Chloride 107 98 - 107 mmol/L    CO2 23.0 22.0 - 29.0 mmol/L    Calcium 9.1 8.6 - 10.5 mg/dL    BUN/Creatinine Ratio 16.8 7.0 - 25.0    Anion Gap 8.0 5.0 - 15.0 mmol/L    eGFR 84.6 >60.0 mL/min/1.73   CBC (No Diff)    Collection Time: 01/12/23  5:53 AM    Specimen: Blood   Result Value Ref Range    WBC 7.02 3.40 - 10.80 10*3/mm3    RBC 4.31 4.14 - 5.80 10*6/mm3    Hemoglobin 14.0 13.0 - 17.7 g/dL    Hematocrit 39.6 37.5 - 51.0 %    MCV 91.9 79.0 - 97.0 fL    MCH 32.5 26.6 - 33.0 pg    MCHC 35.4 31.5 - 35.7 g/dL    RDW 13.3 12.3 - 15.4 %    RDW-SD 45.2 37.0 - 54.0 fl    MPV 9.6 6.0 - 12.0 fL    Platelets 154 140 - 450 10*3/mm3   Protime-INR    Collection Time: 01/12/23  5:53 AM    Specimen: Blood   Result Value Ref Range    Protime 23.1 (H) 11.7 - 14.2 Seconds    INR 2.02 (H) 0.90 - 1.10       I ordered the above labs and reviewed the results.    RADIOLOGY  Duplex Venous Lower Extremity - Right CAR    Result Date: 1/12/2023  •  Chronic right lower extremity deep vein thrombosis noted in the mid femoral. •  All other right sided veins appeared normal.       I ordered the above noted radiological studies. I reviewed the images and results. I agree with the radiologist interpretation.    PROCEDURES  Procedures    MEDICATIONS GIVEN IN ER  Medications   nitroglycerin (NITROSTAT) SL tablet 0.4 mg (has no administration in time range)   acetaminophen (TYLENOL) tablet 650 mg (has no administration in time range)   ondansetron (ZOFRAN) tablet 4 mg (has no administration in time range)     Or   ondansetron (ZOFRAN) injection 4 mg (has no administration in time range)   melatonin tablet 3 mg (has no administration in time range)   sodium chloride 0.9 % infusion (75 mL/hr Intravenous New Bag 1/11/23 8106)   atenolol (TENORMIN) tablet 25 mg (25 mg Oral Given 1/12/23 0902)   cholecalciferol (VITAMIN D3) tablet 2,000 Units (2,000 Units Oral Given  1/12/23 0856)   losartan (COZAAR) tablet 50 mg (50 mg Oral Given 1/12/23 0856)   warfarin (COUMADIN) tablet 5 mg (has no administration in time range)   Pharmacy to dose warfarin (has no administration in time range)   influenza vac split quad (FLUZONE,FLUARIX,AFLURIA,FLULAVAL) injection 0.5 mL (has no administration in time range)   cefTRIAXone (ROCEPHIN) 2 g in sodium chloride 0.9 % 100 mL IVPB-VTB (has no administration in time range)   vancomycin 2750 mg/1000 mL 0.9% NS IVPB (2,750 mg Intravenous Given 1/11/23 1717)       PROGRESS, DATA ANALYSIS, CONSULTS, AND MEDICAL DECISION MAKING  A complete history and physical exam have been performed.  All available laboratory and imaging results have been reviewed by myself prior to disposition.    MDM  After the initial H&P, I discussed pertinent information from history and physical exam with patient/family.  Discussed differential diagnosis.  Discussed plan for ED evaluation/workup/treatment.  All questions answered.  Patient/family is agreeable with plan.  ED Course as of 01/12/23 0916   Wed Jan 11, 2023   1410 Patient presents with right leg pain and swelling, patient concern for cellulitis, minimally red, cellulitis possible but less likely.  Will obtain lab work for infectious work-up including blood cultures and lactic acid as well as procalcitonin.  Patient reports history of DVT in the past, anticoagulated on Coumadin.  Obtain ultrasound imaging, obtaining coagulation factors. [JG]   1531 Patient afebrile, vital signs stable.  Patient has no leukocytosis or left shift, lactic acid negative.  Patient does have slight hyperglycemia, just over normal, no signs of dehydration or DKA.  Patient is slightly subtherapeutic on INR, INR currently 1.77.  Lab work overall is reassuring.  Patient currently pending ultrasound imaging. [JG]   1532 Patient reassessed, discussed ED work-up and results to present.  Discussed pending ultrasound imaging.  Upon reassessment,  erythema is much darker, now dark reddish color, erythema on the lower leg has spread. [JG]   1612 Procalcitonin is significantly elevated.  Patient's physical exam concerning for rapidly spreading cellulitis.  Beginning treatment with vancomycin. [JG]   1613 Transfer of care to Dr Lewis.  Discussed the patient, relevant history, exam, diagnostics, ED findings/progress. Pending US imaging and disposition.  [JG]   1614 Procalcitonin(!): 19.10 [MM]   1709 This patient was turned over to me by Dr. Teresa.  He wanted the patient admitted to the hospital.  He ordered IV antibiotics.  Venous Doppler of his lower extremity is pending.  He is on warfarin and a history of DVT.  He is treating him for infection to his lower extremity.  He would like me to call [facility] for admission.  After the venous Doppler  Patient's venous Doppler report was given to me from the vascular tech and it was negative for any acute DVT. [MM]   1716 I saw this patient and informed him of the conversation that I had with Dr. Teresa.  I informed him the results of his Doppler.  He did not have a DVT.  The plan was to admit him for cellulitis.  He is hemodynamically stable.  Oxygen saturation is 99% on room air.  I will give a call out to admitting physician and hospitalist.  All questions answered. [MM]   1738 I discussed the case with Dr. Du who is on for [facility].  Informed her of the conversation I had with Dr. Teresa and the plan for admission and treat as if there is a cellulitis.  She agrees to admit the patient to the hospital. [MM]      ED Course User Index  [JG] Ben Teresa MD  [MM] Michael Lewis MD       AS OF 09:16 EST VITALS:    BP - 104/56  HR - 65  TEMP - 98.5 °F (36.9 °C) (Oral)  O2 SATS - 97%    DIAGNOSIS  Final diagnoses:   Cellulitis of right leg   Elevated procalcitonin         DISPOSITION  ADMISSION    Discussed treatment plan and reason for admission with pt/family and admitting physician.  Pt/family voiced  understanding of the plan for admission for further testing/treatment as needed.          Ben Teresa MD  01/12/23 0916      Electronically signed by Ben Teresa MD at 01/12/23 0916     Bushra Hill, RN at 01/11/23 1936          Nursing report ED to floor  Elliot Sebastian  62 y.o.  male    HPI :   Chief Complaint   Patient presents with   • Leg Pain   • Wound Check       Admitting doctor:   Leesa Rai MD    Admitting diagnosis:   There were no encounter diagnoses.    Code status:   Current Code Status       Date Active Code Status Order ID Comments User Context       1/11/2023 1756 CPR (Attempt to Resuscitate) [id number]  Leesa Rai MD ED        Question Answer    Code Status (Patient has no pulse and is not breathing) CPR (Attempt to Resuscitate)    Medical Interventions (Patient has pulse or is breathing) Full                    Allergies:   Patient has no known allergies.    Isolation:   No active isolations    Intake and Output  No intake or output data in the 24 hours ending 01/11/23 1936    Weight:       01/11/23  1322   Weight: (!) 143 kg (315 lb)       Most recent vitals:   Vitals:    01/11/23 1648 01/11/23 1653 01/11/23 1723 01/11/23 1823   BP: 114/67 106/62 120/78 123/84   Pulse: 65 64 69 62   Resp:       Temp:       TempSrc:       SpO2: 98% 97% 99%    Weight:       Height:           Active LDAs/IV Access:   Lines, Drains & Airways       Active LDAs       Name Placement date Placement time Site Days    PICC Double Lumen 08/06/22 Right Basilic 08/06/22  1220  Basilic  158    Peripheral IV 01/11/23 1330 Right Antecubital 01/11/23  1330  Antecubital  less than 1                    Labs (abnormal labs have a star):   Labs Reviewed   COMPREHENSIVE METABOLIC PANEL - Abnormal; Notable for the following components:       Result Value    Glucose 122 (*)     AST (SGOT) 48 (*)     All other components within normal limits    Narrative:     GFR Normal >60  Chronic Kidney  "Disease <60  Kidney Failure <15     PROTIME-INR - Abnormal; Notable for the following components:    Protime 20.8 (*)     INR 1.77 (*)     All other components within normal limits   APTT - Abnormal; Notable for the following components:    PTT 43.9 (*)     All other components within normal limits   PROCALCITONIN - Abnormal; Notable for the following components:    Procalcitonin 19.10 (*)     All other components within normal limits    Narrative:     As a Marker for Sepsis (Non-Neonates):    1. <0.5 ng/mL represents a low risk of severe sepsis and/or septic shock.  2. >2 ng/mL represents a high risk of severe sepsis and/or septic shock.    As a Marker for Lower Respiratory Tract Infections that require antibiotic therapy:    PCT on Admission    Antibiotic Therapy       6-12 Hrs later    >0.5                Strongly Recommended  >0.25 - <0.5        Recommended   0.1 - 0.25          Discouraged              Remeasure/reassess PCT  <0.1                Strongly Discouraged     Remeasure/reassess PCT    As 28 day mortality risk marker: \"Change in Procalcitonin Result\" (>80% or <=80%) if Day 0 (or Day 1) and Day 4 values are available. Refer to http://[email or web address]-[email or web address]-[email or web address]    Change in PCT <=80%  A decrease of PCT levels below or equal to 80% defines a positive change in PCT test result representing a higher risk for 28-day all-cause mortality of patients diagnosed with severe sepsis for septic shock.    Change in PCT >80%  A decrease of PCT levels of more than 80% defines a negative change in PCT result representing a lower risk for 28-day all-cause mortality of patients diagnosed with severe sepsis or septic shock.      CBC WITH AUTO DIFFERENTIAL - Abnormal; Notable for the following components:    Neutrophil % 78.0 (*)     Lymphocyte % 12.7 (*)     Neutrophils, Absolute 8.34 (*)     All other components within normal limits   LACTIC ACID, PLASMA - Normal   BLOOD CULTURE   BLOOD CULTURE   CBC AND DIFFERENTIAL "    Narrative:     The following orders were created for panel order CBC & Differential.  Procedure                               Abnormality         Status                     ---------                               -----------         ------                     CBC Auto Differential[[id number]]        Abnormal            Final result                 Please view results for these tests on the individual orders.       EKG:   No orders to display       Meds given in ED:   Medications   nitroglycerin (NITROSTAT) SL tablet 0.4 mg (has no administration in time range)   acetaminophen (TYLENOL) tablet 650 mg (has no administration in time range)   ondansetron (ZOFRAN) tablet 4 mg (has no administration in time range)     Or   ondansetron (ZOFRAN) injection 4 mg (has no administration in time range)   melatonin tablet 3 mg (has no administration in time range)   sodium chloride 0.9 % infusion (has no administration in time range)   cefTRIAXone (ROCEPHIN) 1 g in sodium chloride 0.9 % 100 mL IVPB-VTB (has no administration in time range)   Pharmacy to dose vancomycin (has no administration in time range)   vancomycin 1250 mg/250 mL 0.9% NS IVPB ([facility]) (has no administration in time range)   vancomycin 2750 mg/1000 mL 0.9% NS IVPB (2,750 mg Intravenous Given 1/11/23 1717)       Imaging results:  No radiology results for the last day    Ambulatory status:   - ad jovon    Social issues:   Social History     Socioeconomic History   • Marital status:    Tobacco Use   • Smoking status: Never   • Smokeless tobacco: Never   • Tobacco comments:     caffeine use   Substance and Sexual Activity   • Alcohol use: Yes     Alcohol/week: 2.0 standard drinks     Types: 2 Cans of beer per week     Comment: 1-2 nightly   • Drug use: Never       NIH Stroke Scale:         Bushra Hill RN  01/11/23 19:36 EST         Electronically signed by Bushra Hill RN at 01/11/23 1936       Vital Signs (last day)     Date/Time Temp Temp [facility]  Pulse Resp BP Patient Position SpO2    01/12/23 1114 -- -- 60 -- 104/56 -- --    01/12/23 0700 98.5 (36.9) Oral 65 18 104/56 Lying 97    01/12/23 0430 -- -- 66 18 115/58 Lying 95    01/12/23 0015 98.2 (36.8) Oral 71 18 122/70 Lying 96    01/11/23 2115 98.2 (36.8) Oral 70 18 116/77 Sitting 98    01/11/23 1923 -- -- 66 -- 126/86 -- 98    01/11/23 1853 -- -- 62 -- 112/60 -- 99    01/11/23 1823 -- -- 62 -- 123/84 -- --    01/11/23 1723 -- -- 69 -- 120/78 -- 99    01/11/23 1653 -- -- 64 -- 106/62 -- 97    01/11/23 1648 -- -- 65 -- 114/67 -- 98    01/11/23 1528 -- -- 65 -- 108/53 -- 97    01/11/23 1458 -- -- 61 -- 104/74 -- 96    01/11/23 1323 -- -- -- -- 131/79 -- --    01/11/23 1319 96.6 (35.9) Tympanic 77 18 -- -- 98          Oxygen Therapy (last day)     Date/Time SpO2 Device (Oxygen Therapy) Flow (L/min) Oxygen Concentration (%) ETCO2 (mmHg)    01/12/23 0700 97 room air -- -- --    01/12/23 0430 95 room air -- -- --    01/12/23 0048 -- room air -- -- --    01/12/23 0015 96 room air -- -- --    01/11/23 2115 98 room air -- -- --    01/11/23 2110 -- room air -- -- --    01/11/23 1923 98 -- -- -- --    01/11/23 1853 99 -- -- -- --    01/11/23 1723 99 -- -- -- --    01/11/23 1653 97 -- -- -- --    01/11/23 1648 98 -- -- -- --    01/11/23 1528 97 -- -- -- --    01/11/23 1458 96 -- -- -- --    01/11/23 1319 98 room air -- -- --            Facility-Administered Medications as of 1/12/2023   Medication Dose Route Frequency Provider Last Rate Last Admin   • acetaminophen (TYLENOL) tablet 650 mg  650 mg Oral Q4H PRN Leesa Rai MD       • atenolol (TENORMIN) tablet 25 mg  25 mg Oral Daily Leesa Rai MD   25 mg at 01/12/23 0902   • cefTRIAXone (ROCEPHIN) 2 g in sodium chloride 0.9 % 100 mL IVPB-VTB  2 g Intravenous Q24H Lee Willams  mL/hr at 01/12/23 1323 2 g at 01/12/23 1323   • cholecalciferol (VITAMIN D3) tablet 2,000 Units  2,000 Units Oral Daily Leesa Rai MD   2,000  Units at 01/12/23 0856   • cholestyramine (QUESTRAN) packet 1 packet  1 packet Oral Daily With Dinner Julio Garcia DO       • Enoxaparin Sodium (LOVENOX) syringe 140 mg  1 mg/kg Subcutaneous Q12H Julio Garcia DO       • influenza vac split quad (FLUZONE,FLUARIX,AFLURIA,FLULAVAL) injection 0.5 mL  0.5 mL Intramuscular During Hospitalization Leesa Rai MD       • losartan (COZAAR) tablet 50 mg  50 mg Oral Daily Leesa Rai MD   50 mg at 01/12/23 0856   • melatonin tablet 3 mg  3 mg Oral Nightly PRN Leesa Rai MD       • nitroglycerin (NITROSTAT) SL tablet 0.4 mg  0.4 mg Sublingual Q5 Min PRN Leesa Rai MD       • ondansetron (ZOFRAN) tablet 4 mg  4 mg Oral Q6H PRN Leesa Rai MD        Or   • ondansetron (ZOFRAN) injection 4 mg  4 mg Intravenous Q6H PRN Leesa Rai MD       • [COMPLETED] perflutren (DEFINITY) 8.476 mg in Sodium Chloride (PF) 0.9 % 10 mL injection  2.5 mL Intravenous Once in imaging Leesa Rai MD   2.5 mL at 01/12/23 1115   • Pharmacy to dose warfarin   Does not apply Continuous PRN Leesa Rai MD       • sodium chloride 0.9 % infusion  75 mL/hr Intravenous Continuous Leesa Rai MD 75 mL/hr at 01/11/23 2135 75 mL/hr at 01/11/23 2135   • [COMPLETED] vancomycin 2750 mg/1000 mL 0.9% NS IVPB  20 mg/kg Intravenous Once Ben Teresa MD   2,750 mg at 01/11/23 1717   • warfarin (COUMADIN) tablet 7.5 mg  7.5 mg Oral Once Julio Garcia DO         Orders (last 24 hrs)      Start     Ordered    01/13/23 0600  CBC (No Diff)  Morning Draw         01/12/23 0858    01/13/23 0600  Basic Metabolic Panel  Morning Draw         01/12/23 0858    01/13/23 0500  Vancomycin, Trough  Timed,   Status:  Canceled         01/11/23 1901    01/12/23 1800  warfarin (COUMADIN) tablet 5 mg  Daily,   Status:  Discontinued         01/12/23 0002    01/12/23 1800  warfarin (COUMADIN) tablet 7.5 mg  Once (Warfarin)         01/12/23  0923    01/12/23 1800  cholestyramine (QUESTRAN) packet 1 packet  Daily With Dinner         01/12/23 1049    01/12/23 1215  perflutren (DEFINITY) 8.476 mg in Sodium Chloride (PF) 0.9 % 10 mL injection  Once in Imaging         01/12/23 1115    01/12/23 1200  cefTRIAXone (ROCEPHIN) 2 g in sodium chloride 0.9 % 100 mL IVPB-VTB  Every 24 Hours         01/12/23 0858    01/12/23 1200  Enoxaparin Sodium (LOVENOX) syringe 140 mg  Every 12 Hours         01/12/23 1049    01/12/23 0900  atenolol (TENORMIN) tablet 25 mg  Daily         01/12/23 0002    01/12/23 0900  cholecalciferol (VITAMIN D3) tablet 2,000 Units  Daily         01/12/23 0002    01/12/23 0900  losartan (COZAAR) tablet 50 mg  Daily         01/12/23 0002    01/12/23 0859  C-reactive Protein  Once         01/12/23 0858    01/12/23 0600  Basic Metabolic Panel  Morning Draw         01/11/23 1756    01/12/23 0600  CBC (No Diff)  Morning Draw         01/11/23 1756    01/12/23 0530  vancomycin 1250 mg/250 mL 0.9% NS IVPB ([facility])  Every 12 Hours,   Status:  Discontinued         01/11/23 1901    01/12/23 0003  Protime-INR  Daily       01/12/23 0002    01/12/23 0002  Pharmacy to dose warfarin  Continuous PRN         01/12/23 0002    01/11/23 2146  influenza vac split quad (FLUZONE,FLUARIX,AFLURIA,FLULAVAL) injection 0.5 mL  During Hospitalization         01/11/23 2147    01/11/23 2000  Vital Signs  Every 4 Hours      Comments: Per per hospital policy    01/11/23 1756    01/11/23 1816  cefTRIAXone (ROCEPHIN) 1 g in sodium chloride 0.9 % 100 mL IVPB-VTB  Every 24 Hours,   Status:  Discontinued         01/11/23 1814    01/11/23 1816  Inpatient Infectious Diseases Consult  Once        Specialty:  Infectious Diseases  Provider:  Mayra Fowler MD    01/11/23 1815    01/11/23 1815  Adult Transthoracic Echo Complete W/ Cont if Necessary Per Protocol  Once         01/11/23 1814    01/11/23 1814  Pharmacy to dose vancomycin  Continuous PRN,   Status:  Discontinued         01/11/23  1814    01/11/23 1800  Oral Care  2 Times Daily       01/11/23 1756    01/11/23 1758  sodium chloride 0.9 % infusion  Continuous         01/11/23 1756    01/11/23 1757  Code Status and Medical Interventions:  Continuous         01/11/23 1756    01/11/23 1757  Cardiac Monitoring  Continuous,   Status:  Canceled         01/11/23 1756    01/11/23 1757  Telemetry - Maintain IV Access  Continuous         01/11/23 1756    01/11/23 1757  Continuous Cardiac Monitoring  Continuous        Comments: Follow Standing Orders As Outlined in Process Instructions (Open Order Report to View Full Instructions)    01/11/23 1756    01/11/23 1757  May Be Off Telemetry for Tests  Continuous         01/11/23 1756    01/11/23 1757  Notify Provider if ACLS Protocol Activated  Until Discontinued         01/11/23 1756    01/11/23 1757  Daily Weights  Daily       01/11/23 1756    01/11/23 1757  Strict Intake & Output  Every Shift       01/11/23 1756    01/11/23 1757  Place Sequential Compression Device  Once         01/11/23 1756    01/11/23 1757  Maintain Sequential Compression Device  Continuous         01/11/23 1756    01/11/23 1757  Diet: Cardiac Diets; Healthy Heart (2-3 Na+); Texture: Regular Texture (IDDSI 7); Fluid Consistency: Thin (IDDSI 0)  Diet Effective Now         01/11/23 1756    01/11/23 1756  acetaminophen (TYLENOL) tablet 650 mg  Every 4 Hours PRN         01/11/23 1756    01/11/23 1756  ondansetron (ZOFRAN) tablet 4 mg  Every 6 Hours PRN        See Hyperspace for full Linked Orders Report.    01/11/23 1756    01/11/23 1756  ondansetron (ZOFRAN) injection 4 mg  Every 6 Hours PRN        See Hyperspace for full Linked Orders Report.    01/11/23 1756    01/11/23 1756  melatonin tablet 3 mg  Nightly PRN         01/11/23 1756    01/11/23 1756  nitroglycerin (NITROSTAT) SL tablet 0.4 mg  Every 5 Minutes PRN         01/11/23 1756    01/11/23 1735  Inpatient Admission  Once         01/11/23 1735    01/11/23 1711  [facility] (on-call MD unless  specified) Details  Once        Specialty:  Hospitalist  Provider:  Leesa Rai MD    01/11/23 1710    01/11/23 1619  vancomycin 2750 mg/1000 mL 0.9% NS IVPB  Once         01/11/23 1617    Unscheduled  Telemetry - Pulse Oximetry  Continuous PRN      Comments: If Patient Develops Unresponsiveness, Acute Dyspnea, Cyanosis or Suspected Hypoxemia Start Continuous Pulse Ox Monitoring, Apply Oxygen & Notify Provider    01/11/23 1756    Unscheduled  Oxygen Therapy- Nasal Cannula; Titrate for SPO2: 90% - 95%  Continuous PRN      Comments: If Patient Develops Unresponsiveness, Acute Dyspnea, Cyanosis or Suspected Hypoxemia Start Continuous Pulse Ox Monitoring, Apply Oxygen & Notify Provider    01/11/23 1756    Unscheduled  ECG 12 Lead Other; Acute Chest Pain or Dysrhythmia  As Needed      Comments: Nurse to Release if Patient Expericences Acute Chest Pain or Dysrhythmias    01/11/23 1756    Unscheduled  Potassium  As Needed      Comments: For Ventricular Arrhythmias      01/11/23 1756    Unscheduled  Magnesium  As Needed      Comments: For Ventricular Arrhythmias      01/11/23 1756    Unscheduled  Troponin  As Needed      Comments: For Chest Pain      01/11/23 1756    Unscheduled  Blood Gas, Arterial -  As Needed      Comments: Draw for Acute Dyspnea, Cyanosis, Suspected Hypoxemia or UnresponsivenessNotify Provider of Results      01/11/23 1756    Unscheduled  Initiate ACLS Protocol For Life Threatening Dysrhythmias (If Appropriate for Patient)  As Needed       01/11/23 1756    Unscheduled  Up with assistance  As Needed       01/11/23 1756    --  SCANNED - TELEMETRY           01/11/23 0000    Pending  Inpatient Infectious Diseases Consult  Once        Comments: [id number] message left @ 1296   Specialty:  Infectious Diseases  Provider:  Mayra Fowler MD    Pending                   Consult Notes (last 24 hours)      Lee Willams MD at 01/12/23 0851      Consult Orders    1. Inpatient Infectious  Diseases Consult [[id number]] ordered by Leesa Rai MD at 01/11/23 4092               Referring Provider: Leesa Rai MD  [address] [facility]  [address]  [place] [id number]    Reason for Consultation: RLE cellulitis    History of present illness:  Elliot Sebastian is a 62 y.o. who I am asked to evaluate and give opinion for RLE cellulitis. History is obtained from the patient and review of the old medical records which I summarize/synthesize as follows: He has a PMH of MV replacement. Our group saw him back August 2022 for Strep dysgalactiae septicemia with a small vegetation on his prosthetic mitral valve. He was treated with 6 weeks of IV ceftriaxone w/ a good response. Unfortunately he lost his job and his insurance so could not get his follow-up MOE.     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 He went to a nearby ER but says he was told that there was no evidence of infection. He developed fever and chills and went to a local motel. He made it back to [place] and presented to the ER last night. He was afebrile with a normal HR and BP. Labs notable for procal 19 and WBC 10.6. He was started on IV vanco and ceftriaxone.     Past Medical History:   Diagnosis Date   • Acute bacterial endocarditis    • Anemia    • APC (atrial premature contractions)    • Atrial fibrillation ([facility])    • BPH (benign prostatic hypertrophy)    • CHF (congestive heart failure) ([facility])    • Cholelithiasis    • Chronic constipation    • Deep vein thrombophlebitis of leg ([facility])     DISTAL   • Disease of thyroid gland    • Gout    • Intestinal obstruction ([facility])    • Ischemic enteritis ([facility])    • Left heart failure, NYHA class 3 ([facility])     CLASS 111 LEFT SYSTOIC CONGESTIVE HEART FAILURE   • Mitral regurgitation    • Pleural effusion, bilateral    • Pulmonary hypertension ([facility])    • 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 ENDOSCOPY;  Service: Gastroenterology;  Laterality: N/A;  polyps, clip  asc polyp removed but not retrieved   • EXPLORATION NECK FOR POSTOP HEMORRHAGE / THROMBOSIS / INFECTION     • MITRAL VALVE REPLACEMENT  01/03/2013    [address]MM [facility] [employer] MECHANICAL VALVE, PRESERVATION OF CORDS TO SUBVALVULAR APPARATUS AND DOROTHY GORE-FLORI CORD IN THE P2 AREA, DEBRIDEMENT OF ANTERIOR AND POSTERIOR MITRAL LEAFLET        Social History:      Antibiotic allergies and intolerances:  None    Medications:    Current Facility-Administered Medications:   •  acetaminophen (TYLENOL) tablet 650 mg, 650 mg, Oral, Q4H PRN, Leesa Rai MD  •  atenolol (TENORMIN) tablet 25 mg, 25 mg, Oral, Daily, Leesa Rai MD  •  cefTRIAXone (ROCEPHIN) 1 g in sodium chloride 0.9 % 100 mL IVPB-VTB, 1 g, Intravenous, Q24H, Leesa Rai MD, Last Rate: 200 mL/hr at 01/11/23 2231, 1 g at 01/11/23 2231  •  cholecalciferol (VITAMIN D3) tablet 2,000 Units, 2,000 Units, Oral, Daily, Leesa Rai MD  •  influenza vac split quad (FLUZONE,FLUARIX,AFLURIA,FLULAVAL) injection 0.5 mL, 0.5 mL, Intramuscular, During Hospitalization, Leesa Rai MD  •  losartan (COZAAR) tablet 50 mg, 50 mg, Oral, Daily, Leesa Rai MD  •  melatonin tablet 3 mg, 3 mg, Oral, Nightly PRN, Leesa Rai MD  •  nitroglycerin (NITROSTAT) SL tablet 0.4 mg, 0.4 mg, Sublingual, Q5 Min PRN, Leesa Rai MD  •  ondansetron (ZOFRAN) tablet 4 mg, 4 mg, Oral, Q6H PRN **OR** ondansetron (ZOFRAN) injection 4 mg, 4 mg, Intravenous, Q6H PRN, Stingl, Leesa  MD Alvarez  •  Pharmacy to dose vancomycin, , Does not apply, Continuous PRN, Leesa Rai MD  •  Pharmacy to dose warfarin, , Does not apply, Continuous PRN, Leesa Rai MD  •  sodium chloride 0.9 % infusion, 75 mL/hr, Intravenous, Continuous, Leesa Rai MD, Last Rate: 75 mL/hr at 01/11/23 2135, 75 mL/hr at 01/11/23 2135  •  vancomycin 1250 mg/250 mL 0.9% NS IVPB ([facility]), 1,250 mg, Intravenous, Q12H, Leesa Rai MD, 1,250 mg at 01/12/23 0442  •  warfarin (COUMADIN) tablet 5 mg, 5 mg, Oral, Daily, Leesa Rai MD      Objective   Vital Signs   Temp:  [96.6 °F (35.9 °C)-98.5 °F (36.9 °C)] 98.5 °F (36.9 °C)  Heart Rate:  [61-77] 65  Resp:  [18] 18  BP: (104-131)/(53-86) 104/56    Physical Exam:   General: awake, alert, NAD, very nice   Eyes: no scleral icterus  ENT: no thrush  Cardiovascular: NR, + click at MV  Respiratory: normal work of breathing on RA  GI: Abdomen is soft, not tender, + bowel sounds in all four quadrants  :  no Mallory catheter  Skin: + RLE heat and erythema  Neurological: Alert and oriented x 3  Psychiatric: Normal mood and affect   Vasc: PIV w/o erythema    Labs:     Lab Results   Component Value Date    WBC 7.02 01/12/2023    HGB 14.0 01/12/2023    HCT 39.6 01/12/2023    MCV 91.9 01/12/2023     01/12/2023       Lab Results   Component Value Date    GLUCOSE 100 (H) 01/12/2023    BUN 17 01/12/2023    CREATININE 1.01 01/12/2023    EGFRIFNONA 94 11/23/2020    EGFRIFAFRI 109 11/23/2020    BCR 16.8 01/12/2023    CO2 23.0 01/12/2023    CALCIUM 9.1 01/12/2023    PROTENTOTREF 6.6 11/23/2020    ALBUMIN 3.8 01/11/2023    LABIL2 1.6 11/23/2020    AST 48 (H) 01/11/2023    ALT 29 01/11/2023     Lab Results   Component Value Date    INR 2.02 (H) 01/12/2023    INR 1.77 (H) 01/11/2023    INR 2.30 01/09/2023    PROTIME 23.1 (H) 01/12/2023    PROTIME 20.8 (H) 01/11/2023    PROTIME 33.7 (H) 11/16/2022     Microbiology:  1/11 BCx: pending    Radiology:  RLED  Doppler w/ chronic RLE DVT    ASSESSMENT/PLAN:  1. RLE cellulitis  2. MV replaced  3. 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 Obesity BMI 34  5. Chronic RLE DVT  6. Elevated procalcitonin  7. On Coumadin    For RLE cellulitis, continue ceftriaxone but change dose to 2 g IV q24h. Follow-up blood cultures. With elevated procal to 19, I suspect they will turn positive. If so, will need recurrent workup for endocarditis of his prosthetic MV. Check CRP. Repeat CBC and BMP in the AM. ID will follow.     ID will follow.       Electronically signed by Lee Willams MD at 01/12/23 2246

## 2023-01-12 NOTE — CASE MANAGEMENT/SOCIAL WORK
Discharge Planning Assessment  [facility]     Patient Name: Elliot Sebastian  MRN: [id number]  Today's Date: 1/12/2023    Admit Date: 1/11/2023    Plan: Home with possible HH/ infusion.   Discharge Needs Assessment     Row Name 01/12/23 1515       Living Environment    People in Home spouse    Current Living Arrangements home    Primary Care Provided by self    Provides Primary Care For no one    Family Caregiver if Needed spouse    Quality of Family Relationships supportive    Able to Return to Prior Arrangements yes       Resource/Environmental Concerns    Resource/Environmental Concerns none       Transition Planning    Patient/Family Anticipates Transition to home;home with help/services    Patient/Family Anticipated Services at Transition home health care    Transportation Anticipated family or friend will provide;car, drives self       Discharge Needs Assessment    Readmission Within the Last 30 Days no previous admission in last 30 days    Equipment Currently Used at Home none    Concerns to be Addressed discharge planning    Provided Post Acute Provider List? N/A    Provided Post Acute Provider Quality & Resource List? N/A               Discharge Plan     Row Name 01/12/23 1517       Plan    Plan Home with possible HH/ infusion.    Plan Comments S/W pt at bedside.  Facesheet info confirmed.  Pt lives in a house w/ his wife Nola, is IADLs and can drive.  He works full time as a .  He does not use any home DME, has used [facility] and [facility] in the past and would want to use them again if needed.  No hx of SNF.  Referral called to [facility] intake in case needed.  WIll follow to see if [facility] will be needed. ............Marian TAYLOR/ TRINH              Continued Care and Services - Admitted Since 1/11/2023     Home Medical Care     Service Provider Request Status Selected Services Address Phone Fax Patient Preferred    [facility] Pending - Request Sent N/A [id number],  [facility] [phone] --                 Demographic Summary     Row Name 01/12/23 1514       General Information    Admission Type inpatient    Arrived From home    Referral Source admission list    Reason for Consult discharge planning    Preferred Language English               Functional Status     Row Name 01/12/23 1514       Functional Status    Usual Activity Tolerance good    Current Activity Tolerance good       Functional Status, IADL    Medications independent    Meal Preparation independent    Housekeeping independent    Laundry independent    Shopping independent       Mental Status    General Appearance WDL WDL       Mental Status Summary    Recent Changes in Mental Status/Cognitive Functioning no changes       Employment/    Employment Status employed full-time    Current or Previous Occupation traveling/driving                         Marian Ricketts RN

## 2023-01-12 NOTE — PROGRESS NOTES
Name: Elliot Sebastian ADMIT: 2023   : 1960  PCP: Mannie Beltran DO    MRN: [id number] LOS: 1 days   AGE/SEX: 62 y.o. male  ROOM: [facility]     Subjective   Subjective   Patient seen and examined this morning. Hospital day 1. At time of my examination, patient is awake, alert and oriented x3, resting comfortably in bed. Discussed with nursing, no reported acute events overnight. Patient endorsing ongoing RLE erythema/pain/swelling, however, slightly improved from prior.    Review of Systems   Constitutional: Negative for chills and fever.   Respiratory: Negative for cough and shortness of breath.    Cardiovascular: Negative for chest pain and palpitations.   Gastrointestinal: Negative for abdominal distention and abdominal pain.   Neurological: Negative for dizziness and light-headedness.        Objective   Objective   Vital Signs  Temp:  [96.6 °F (35.9 °C)-98.5 °F (36.9 °C)] 98.5 °F (36.9 °C)  Heart Rate:  [61-77] 65  Resp:  [18] 18  BP: (104-131)/(53-86) 104/56  SpO2:  [95 %-99 %] 97 %  on   ;   Device (Oxygen Therapy): room air  Body mass index is 34.77 kg/m².  Physical Exam  Vitals and nursing note reviewed.   Constitutional:       General: He is awake. He is not in acute distress.     Appearance: He is obese.   HENT:      Head: Atraumatic.   Eyes:      General: No scleral icterus.  Cardiovascular:      Rate and Rhythm: Normal rate and regular rhythm.      Pulses: Normal pulses.      Heart sounds: Normal heart sounds.   Pulmonary:      Effort: Pulmonary effort is normal. No respiratory distress.      Breath sounds: Normal breath sounds.   Abdominal:      General: Bowel sounds are normal. There is no distension.      Palpations: Abdomen is soft.   Musculoskeletal:      Right lower leg: Edema (with associated erythema, swelling, tenderness to palpation) present.   Skin:     General: Skin is warm and dry.      Comments: Chronic venous stasis changes LE   Neurological:      General: No focal deficit  present.      Mental Status: He is alert and oriented to person, place, and time.   Psychiatric:         Behavior: Behavior is cooperative.       Results Review:  I reviewed the patient's new clinical results.  I reviewed the patient's new imaging results and agree with the interpretation.  I reviewed the patient's other test results and agree with the interpretation  I personally viewed and interpreted the patient's EKG/Telemetry data    Results from last 7 days   Lab Units 01/12/23  0553 01/11/23  1444   WBC 10*3/mm3 7.02 10.69   HEMOGLOBIN g/dL 14.0 14.2   PLATELETS 10*3/mm3 154 144     Results from last 7 days   Lab Units 01/12/23  0553 01/11/23  1444   SODIUM mmol/L 138 136   POTASSIUM mmol/L 4.1 4.4   CHLORIDE mmol/L 107 104   CO2 mmol/L 23.0 22.0   BUN mg/dL 17 22   CREATININE mg/dL 1.01 1.18   GLUCOSE mg/dL 100* 122*   EGFR mL/min/1.73 84.6 70.2     Results from last 7 days   Lab Units 01/11/23  1444   ALBUMIN g/dL 3.8   BILIRUBIN mg/dL 1.2   ALK PHOS U/L 70   AST (SGOT) U/L 48*   ALT (SGPT) U/L 29     Results from last 7 days   Lab Units 01/12/23  0553 01/11/23  1444   CALCIUM mg/dL 9.1 9.6   ALBUMIN g/dL  --  3.8     Results from last 7 days   Lab Units 01/11/23  1444   PROCALCITONIN ng/mL 19.10*   LACTATE mmol/L 1.5     No results found for: HGBA1C, POCGLU    No radiology results for the last day  Scheduled Medications  atenolol, 25 mg, Oral, Daily  cefTRIAXone, 2 g, Intravenous, Q24H  cholecalciferol, 2,000 Units, Oral, Daily  losartan, 50 mg, Oral, Daily  warfarin, 7.5 mg, Oral, Once    Infusions  Pharmacy to dose warfarin,   sodium chloride, 75 mL/hr, Last Rate: 75 mL/hr (01/11/23 2135)    Diet  Diet: Cardiac Diets; Healthy Heart (2-3 Na+); Texture: Regular Texture (IDDSI 7); Fluid Consistency: Thin (IDDSI 0)       Assessment/Plan     Active Hospital Problems    Diagnosis  POA   • **Cellulitis, leg [L03.119]  Yes   • Obesity (BMI 30-39.9) [E66.9]  Yes   • Chronic heart failure with preserved ejection  fraction (HFpEF) ([facility]) [I50.32]  Yes   • Chronic deep vein thrombosis (DVT) ([facility]) [I82.509]  Yes   • Essential hypertension [I10]  Yes   • Chronic anticoagulation [Z79.01]  Not Applicable   • Hx of mitral valve replacement with mechanical valve [Z95.2] [Z95.2]  Not Applicable   • Bacterial endocarditis - history of [I33.0]  Yes      Resolved Hospital Problems   No resolved problems to display.       62 y.o. male admitted with Cellulitis, leg.    Right lower extremity cellulitis  - RLE with erythema, pain, swelling, appears well demarcated, consistent with cellulitis.  - Duplex US noting chronic RLE DVT.  - On physical exam, area appears slightly improved when compared to prior.  - WBC within normal limits, patient afebrile, procalcitonin elevated 19.10, CRP elevated at 12.19.  - ID consulted and following, patient currently on Ceftriaxone 2 grams IV q24H, continue as prescribed.  - 2D Echo ordered, results pending.  - Follow up with ID, echocardiogram and infectious workup to guide ongoing management.  - Order repeat CBC in AM for reassessment, will trend ESR/CRP.    Hypertension  - BP acceptable acutely. Appears stable. No indications to warrant acute changes/intervention at this time.  - Continue current medications as prescribed. Trend BP to guide ongoing management decisions.    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  - ID following, may need reassessment with echocardiogram for endocarditis, depending on clinical course.    Chronic right LE DVT  - Noted on repeat Duplex following admission: Chronic right lower extremity deep vein thrombosis noted in the mid femoral.  - Continue AC as prescribed with warfarin, however, discussed with pharmacy, patient subtherapeutic (goal INR 2.5-3.5), adding Lovenox for bridging at this time.  - Requires close monitoring, follow up repeat INR.    Obesity  - BMI 34.77 kg/m2. Complicating all medical  problems.      · All workup, problems and plans new to me today. First day taking care of patient.  · Warfarin (home med) for DVT prophylaxis.  · Full code.  · Discussed with patient, nursing staff, consulting provider, CCP and care team on multidisciplinary rounds.  · Anticipate discharge TBD timing yet to be determined.      Julio Garcia DO  [facility]  01/12/23  10:24 EST

## 2023-01-12 NOTE — PROGRESS NOTES
"[facility] Clinical Pharmacy Services: Vancomycin Pharmacokinetic Initial Consult Note    Elliot Sebastian is a 62 y.o. male who is on day 1/5 of pharmacy to dose vancomycin.    Indication: Skin and Soft Tissue  Consulting Provider: Dr. Rai  Planned Duration of Therapy: 5 days  Loading Dose Ordered or Given: 2750 mg on 1/11/23 at 1717  Culture/Source: Target: -600 mg/L.hr   Other Antimicrobials: ceftriaxone    Vitals/Labs  Ht: 198.1 cm (78\"); Wt: (!) 143 kg (315 lb)  Temp Readings from Last 1 Encounters:   01/11/23 96.6 °F (35.9 °C) (Tympanic)    Estimated Creatinine Clearance: 104.1 mL/min (by C-G formula based on SCr of 1.18 mg/dL).  renal function stable     Results from last 7 days   Lab Units 01/11/23  1444   CREATININE mg/dL 1.18   WBC 10*3/mm3 10.69     Assessment/Plan:    1. Vancomycin Dose:   1250 mg IV every  12  hours  2. Predictive AUC level for the dose ordered is 511 mg/L.hr, which is within the target of 400-600 mg/L.hr  3. Vanc Trough has been ordered for 1/13/23 at 0500 prior to 4th overall dose of vancomycin     Pharmacy will follow patient's kidney function and will adjust doses and obtain levels as necessary. Thank you for involving pharmacy in this patient's care. Please contact pharmacy with any questions or concerns.                           Santiago Parra, PharmD  Emergency Medicine Clinical Pharmacist   Phone: [phone]      "

## 2023-01-12 NOTE — ED NOTES
Nursing report ED to floor  Elliot Sebastian  62 y.o.  male    HPI :   Chief Complaint   Patient presents with    Leg Pain    Wound Check       Admitting doctor:   Leesa Rai MD    Admitting diagnosis:   There were no encounter diagnoses.    Code status:   Current Code Status       Date Active Code Status Order ID Comments User Context       1/11/2023 1756 CPR (Attempt to Resuscitate) [id number]  Leesa Rai MD ED        Question Answer    Code Status (Patient has no pulse and is not breathing) CPR (Attempt to Resuscitate)    Medical Interventions (Patient has pulse or is breathing) Full                    Allergies:   Patient has no known allergies.    Isolation:   No active isolations    Intake and Output  No intake or output data in the 24 hours ending 01/11/23 1936    Weight:       01/11/23  1322   Weight: (!) 143 kg (315 lb)       Most recent vitals:   Vitals:    01/11/23 1648 01/11/23 1653 01/11/23 1723 01/11/23 1823   BP: 114/67 106/62 120/78 123/84   Pulse: 65 64 69 62   Resp:       Temp:       TempSrc:       SpO2: 98% 97% 99%    Weight:       Height:           Active LDAs/IV Access:   Lines, Drains & Airways       Active LDAs       Name Placement date Placement time Site Days    PICC Double Lumen 08/06/22 Right Basilic 08/06/22  1220  Basilic  158    Peripheral IV 01/11/23 1330 Right Antecubital 01/11/23  1330  Antecubital  less than 1                    Labs (abnormal labs have a star):   Labs Reviewed   COMPREHENSIVE METABOLIC PANEL - Abnormal; Notable for the following components:       Result Value    Glucose 122 (*)     AST (SGOT) 48 (*)     All other components within normal limits    Narrative:     GFR Normal >60  Chronic Kidney Disease <60  Kidney Failure <15     PROTIME-INR - Abnormal; Notable for the following components:    Protime 20.8 (*)     INR 1.77 (*)     All other components within normal limits   APTT - Abnormal; Notable for the following components:    PTT 43.9 (*)   "   All other components within normal limits   PROCALCITONIN - Abnormal; Notable for the following components:    Procalcitonin 19.10 (*)     All other components within normal limits    Narrative:     As a Marker for Sepsis (Non-Neonates):    1. <0.5 ng/mL represents a low risk of severe sepsis and/or septic shock.  2. >2 ng/mL represents a high risk of severe sepsis and/or septic shock.    As a Marker for Lower Respiratory Tract Infections that require antibiotic therapy:    PCT on Admission    Antibiotic Therapy       6-12 Hrs later    >0.5                Strongly Recommended  >0.25 - <0.5        Recommended   0.1 - 0.25          Discouraged              Remeasure/reassess PCT  <0.1                Strongly Discouraged     Remeasure/reassess PCT    As 28 day mortality risk marker: \"Change in Procalcitonin Result\" (>80% or <=80%) if Day 0 (or Day 1) and Day 4 values are available. Refer to http://www.Uplikepct-calculator.com    Change in PCT <=80%  A decrease of PCT levels below or equal to 80% defines a positive change in PCT test result representing a higher risk for 28-day all-cause mortality of patients diagnosed with severe sepsis for septic shock.    Change in PCT >80%  A decrease of PCT levels of more than 80% defines a negative change in PCT result representing a lower risk for 28-day all-cause mortality of patients diagnosed with severe sepsis or septic shock.      CBC WITH AUTO DIFFERENTIAL - Abnormal; Notable for the following components:    Neutrophil % 78.0 (*)     Lymphocyte % 12.7 (*)     Neutrophils, Absolute 8.34 (*)     All other components within normal limits   LACTIC ACID, PLASMA - Normal   BLOOD CULTURE   BLOOD CULTURE   CBC AND DIFFERENTIAL    Narrative:     The following orders were created for panel order CBC & Differential.  Procedure                               Abnormality         Status                     ---------                               -----------         ------              "        CBC Auto Differential[[id number]]        Abnormal            Final result                 Please view results for these tests on the individual orders.       EKG:   No orders to display       Meds given in ED:   Medications   nitroglycerin (NITROSTAT) SL tablet 0.4 mg (has no administration in time range)   acetaminophen (TYLENOL) tablet 650 mg (has no administration in time range)   ondansetron (ZOFRAN) tablet 4 mg (has no administration in time range)     Or   ondansetron (ZOFRAN) injection 4 mg (has no administration in time range)   melatonin tablet 3 mg (has no administration in time range)   sodium chloride 0.9 % infusion (has no administration in time range)   cefTRIAXone (ROCEPHIN) 1 g in sodium chloride 0.9 % 100 mL IVPB-VTB (has no administration in time range)   Pharmacy to dose vancomycin (has no administration in time range)   vancomycin 1250 mg/250 mL 0.9% NS IVPB ([facility]) (has no administration in time range)   vancomycin 2750 mg/1000 mL 0.9% NS IVPB (2,750 mg Intravenous Given 1/11/23 1717)       Imaging results:  No radiology results for the last day    Ambulatory status:   - ad jovon    Social issues:   Social History     Socioeconomic History    Marital status:    Tobacco Use    Smoking status: Never    Smokeless tobacco: Never    Tobacco comments:     caffeine use   Substance and Sexual Activity    Alcohol use: Yes     Alcohol/week: 2.0 standard drinks     Types: 2 Cans of beer per week     Comment: 1-2 nightly    Drug use: Never       NIH Stroke Scale:         Bushra Hill RN  01/11/23 19:36 EST

## 2023-01-12 NOTE — CONSULTS
Referring Provider: Leesa Rai MD  [address]  [address]  [place] [address]    Reason for Consultation: RLE cellulitis    History of present illness:  Elliot Sebastian is a 62 y.o. who I am asked to evaluate and give opinion for RLE cellulitis. History is obtained from the patient and review of the old medical records which I summarize/synthesize as follows: He has a PMH of MV replacement. Our group saw him back August 2022 for Strep dysgalactiae septicemia with a small vegetation on his prosthetic mitral valve. He was treated with 6 weeks of IV ceftriaxone w/ a good response. Unfortunately he lost his job and his insurance so could not get his follow-up MOE.     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 He went to a nearby ER but says he was told that there was no evidence of infection. He developed fever and chills and went to a local motel. He made it back to [place] and presented to the ER last night. He was afebrile with a normal HR and BP. Labs notable for procal 19 and WBC 10.6. He was started on IV vanco and ceftriaxone.     Past Medical History:   Diagnosis Date   • Acute bacterial endocarditis    • Anemia    • APC (atrial premature contractions)    • Atrial fibrillation ([facility])    • BPH (benign prostatic hypertrophy)    • CHF (congestive heart failure) ([facility])    • Cholelithiasis    • Chronic constipation    • Deep vein thrombophlebitis of leg ([facility])     DISTAL   • Disease of thyroid gland    • Gout    • Intestinal obstruction ([facility])    • Ischemic enteritis ([facility])    • Left heart failure, NYHA class 3 ([facility])     CLASS 111 LEFT SYSTOIC CONGESTIVE HEART FAILURE   • Mitral regurgitation    • Pleural effusion, bilateral    • Pulmonary hypertension ([facility])    • 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[facility])    • Umbilical hernia    • Vitamin D deficiency        Past Surgical History:    Procedure Laterality Date   • APPENDECTOMY     • CHOLECYSTECTOMY     • COLONOSCOPY  approx 2013    normal per patient   • COLONOSCOPY N/A 10/26/2020    Procedure: COLONOSCOPY into cecum with biopsy, polypectomy, clip placement;  Surgeon: Juan Phillips MD;  Location: [facility] ENDOSCOPY;  Service: Gastroenterology;  Laterality: N/A;  polyps, clip  asc polyp removed but not retrieved   • EXPLORATION NECK FOR POSTOP HEMORRHAGE / THROMBOSIS / INFECTION     • MITRAL VALVE REPLACEMENT  01/03/2013    33MM [employer] MECHANICAL VALVE, PRESERVATION OF CORDS TO SUBVALVULAR APPARATUS AND DOROTHY GORE-FLORI CORD IN THE P2 AREA, DEBRIDEMENT OF ANTERIOR AND POSTERIOR MITRAL LEAFLET        Social History:      Antibiotic allergies and intolerances:  None    Medications:    Current Facility-Administered Medications:   •  acetaminophen (TYLENOL) tablet 650 mg, 650 mg, Oral, Q4H PRN, Leesa Rai MD  •  atenolol (TENORMIN) tablet 25 mg, 25 mg, Oral, Daily, Leesa Rai MD  •  cefTRIAXone (ROCEPHIN) 1 g in sodium chloride 0.9 % 100 mL IVPB-VTB, 1 g, Intravenous, Q24H, Leesa Rai MD, Last Rate: 200 mL/hr at 01/11/23 2231, 1 g at 01/11/23 2231  •  cholecalciferol (VITAMIN D3) tablet 2,000 Units, 2,000 Units, Oral, Daily, Leesa Rai MD  •  influenza vac split quad (FLUZONE,FLUARIX,AFLURIA,FLULAVAL) injection 0.5 mL, 0.5 mL, Intramuscular, During Hospitalization, Leesa Rai MD  •  losartan (COZAAR) tablet 50 mg, 50 mg, Oral, Daily, Leesa Rai MD  •  melatonin tablet 3 mg, 3 mg, Oral, Nightly PRN, Leesa Rai MD  •  nitroglycerin (NITROSTAT) SL tablet 0.4 mg, 0.4 mg, Sublingual, Q5 Min PRN, Leesa Rai MD  •  ondansetron (ZOFRAN) tablet 4 mg, 4 mg, Oral, Q6H PRN **OR** ondansetron (ZOFRAN) injection 4 mg, 4 mg, Intravenous, Q6H PRN, Cecelia, Leesa Alvarez, MD  •  Pharmacy to dose vancomycin, , Does not apply, Continuous PRN, Stingl, Leesa  MD Alvarez  •  Pharmacy to dose warfarin, , Does not apply, Continuous PRN, Leesa Rai MD  •  sodium chloride 0.9 % infusion, 75 mL/hr, Intravenous, Continuous, Leesa Rai MD, Last Rate: 75 mL/hr at 01/11/23 2135, 75 mL/hr at 01/11/23 2135  •  vancomycin 1250 mg/250 mL 0.9% NS IVPB ([facility]), 1,250 mg, Intravenous, Q12H, Leesa Rai MD, 1,250 mg at 01/12/23 0442  •  warfarin (COUMADIN) tablet 5 mg, 5 mg, Oral, Daily, Leesa Rai MD      Objective   Vital Signs   Temp:  [96.6 °F (35.9 °C)-98.5 °F (36.9 °C)] 98.5 °F (36.9 °C)  Heart Rate:  [61-77] 65  Resp:  [18] 18  BP: (104-131)/(53-86) 104/56    Physical Exam:   General: awake, alert, NAD, very nice   Eyes: no scleral icterus  ENT: no thrush  Cardiovascular: NR, + click at MV  Respiratory: normal work of breathing on RA  GI: Abdomen is soft, not tender, + bowel sounds in all four quadrants  :  no Mallory catheter  Skin: + RLE heat and erythema  Neurological: Alert and oriented x 3  Psychiatric: Normal mood and affect   Vasc: PIV w/o erythema    Labs:     Lab Results   Component Value Date    WBC 7.02 01/12/2023    HGB 14.0 01/12/2023    HCT 39.6 01/12/2023    MCV 91.9 01/12/2023     01/12/2023       Lab Results   Component Value Date    GLUCOSE 100 (H) 01/12/2023    BUN 17 01/12/2023    CREATININE 1.01 01/12/2023    EGFRIFNONA 94 11/23/2020    EGFRIFAFRI 109 11/23/2020    BCR 16.8 01/12/2023    CO2 23.0 01/12/2023    CALCIUM 9.1 01/12/2023    PROTENTOTREF 6.6 11/23/2020    ALBUMIN 3.8 01/11/2023    LABIL2 1.6 11/23/2020    AST 48 (H) 01/11/2023    ALT 29 01/11/2023     Lab Results   Component Value Date    INR 2.02 (H) 01/12/2023    INR 1.77 (H) 01/11/2023    INR 2.30 01/09/2023    PROTIME 23.1 (H) 01/12/2023    PROTIME 20.8 (H) 01/11/2023    PROTIME 33.7 (H) 11/16/2022     Microbiology:  1/11 BCx: pending    Radiology:  RLE Doppler w/ chronic RLE DVT    ASSESSMENT/PLAN:  1. RLE cellulitis  2. MV replaced  3. History  of MV endocarditis due to Strep  4. Obesity BMI 34  5. Chronic RLE DVT  6. Elevated procalcitonin  7. On Coumadin    For RLE cellulitis, continue ceftriaxone but change dose to 2 g IV q24h. Follow-up blood cultures. With elevated procal to 19, I suspect they will turn positive. If so, will need recurrent workup for endocarditis of his prosthetic MV. Check CRP. Repeat CBC and BMP in the AM. ID will follow.     ID will follow.

## 2023-01-13 ENCOUNTER — READMISSION MANAGEMENT (OUTPATIENT)
Dept: CALL CENTER | Facility: HOSPITAL | Age: 63
End: 2023-01-13
Payer: COMMERCIAL

## 2023-01-13 VITALS
HEIGHT: 74 IN | DIASTOLIC BLOOD PRESSURE: 80 MMHG | HEART RATE: 63 BPM | WEIGHT: 298.94 LBS | SYSTOLIC BLOOD PRESSURE: 134 MMHG | OXYGEN SATURATION: 96 % | RESPIRATION RATE: 16 BRPM | TEMPERATURE: 97 F | BODY MASS INDEX: 38.37 KG/M2

## 2023-01-13 LAB
ANION GAP SERPL CALCULATED.3IONS-SCNC: 7.5 MMOL/L (ref 5–15)
ASCENDING AORTA: 3.3 CM
BH CV ECHO MEAS - ACS: 1.84 CM
BH CV ECHO MEAS - AO MAX PG: 3.8 MMHG
BH CV ECHO MEAS - AO MEAN PG: 2.7 MMHG
BH CV ECHO MEAS - AO ROOT DIAM: 3.8 CM
BH CV ECHO MEAS - AO V2 MAX: 96.9 CM/SEC
BH CV ECHO MEAS - AO V2 VTI: 25.1 CM
BH CV ECHO MEAS - AVA(I,D): 2.37 CM2
BH CV ECHO MEAS - EDV(CUBED): 218.7 ML
BH CV ECHO MEAS - EDV(MOD-SP2): 134 ML
BH CV ECHO MEAS - EDV(MOD-SP4): 231 ML
BH CV ECHO MEAS - EF(MOD-BP): 53.8 %
BH CV ECHO MEAS - EF(MOD-SP2): 51.5 %
BH CV ECHO MEAS - EF(MOD-SP4): 55.8 %
BH CV ECHO MEAS - ESV(CUBED): 107.1 ML
BH CV ECHO MEAS - ESV(MOD-SP2): 65 ML
BH CV ECHO MEAS - ESV(MOD-SP4): 102 ML
BH CV ECHO MEAS - FS: 21.2 %
BH CV ECHO MEAS - IVS/LVPW: 1.03 CM
BH CV ECHO MEAS - IVSD: 1.14 CM
BH CV ECHO MEAS - LAT PEAK E' VEL: 6.2 CM/SEC
BH CV ECHO MEAS - LV DIASTOLIC VOL/BSA (35-75): 86.2 CM2
BH CV ECHO MEAS - LV MASS(C)D: 291.2 GRAMS
BH CV ECHO MEAS - LV MAX PG: 3 MMHG
BH CV ECHO MEAS - LV MEAN PG: 1.19 MMHG
BH CV ECHO MEAS - LV SYSTOLIC VOL/BSA (12-30): 38.1 CM2
BH CV ECHO MEAS - LV V1 MAX: 86.3 CM/SEC
BH CV ECHO MEAS - LV V1 VTI: 16.6 CM
BH CV ECHO MEAS - LVIDD: 6 CM
BH CV ECHO MEAS - LVIDS: 4.7 CM
BH CV ECHO MEAS - LVOT AREA: 3.6 CM2
BH CV ECHO MEAS - LVOT DIAM: 2.14 CM
BH CV ECHO MEAS - LVPWD: 1.11 CM
BH CV ECHO MEAS - MED PEAK E' VEL: 5.8 CM/SEC
BH CV ECHO MEAS - MV A DUR: 0.17 SEC
BH CV ECHO MEAS - MV A MAX VEL: 100.4 CM/SEC
BH CV ECHO MEAS - MV DEC SLOPE: 488.7 CM/SEC2
BH CV ECHO MEAS - MV DEC TIME: 0.3 MSEC
BH CV ECHO MEAS - MV E MAX VEL: 136 CM/SEC
BH CV ECHO MEAS - MV E/A: 1.35
BH CV ECHO MEAS - MV MAX PG: 10.3 MMHG
BH CV ECHO MEAS - MV MEAN PG: 3.7 MMHG
BH CV ECHO MEAS - MV P1/2T: 96 MSEC
BH CV ECHO MEAS - MV V2 VTI: 48.8 CM
BH CV ECHO MEAS - MVA(P1/2T): 2.29 CM2
BH CV ECHO MEAS - MVA(VTI): 1.22 CM2
BH CV ECHO MEAS - PA ACC TIME: 0.06 SEC
BH CV ECHO MEAS - PA PR(ACCEL): 54 MMHG
BH CV ECHO MEAS - PA V2 MAX: 73.3 CM/SEC
BH CV ECHO MEAS - PULM A REVS DUR: 0.12 SEC
BH CV ECHO MEAS - PULM A REVS VEL: 18.4 CM/SEC
BH CV ECHO MEAS - PULM DIAS VEL: 24.4 CM/SEC
BH CV ECHO MEAS - PULM S/D: 1.46
BH CV ECHO MEAS - PULM SYS VEL: 35.6 CM/SEC
BH CV ECHO MEAS - QP/QS: 0.89
BH CV ECHO MEAS - RAP SYSTOLE: 8 MMHG
BH CV ECHO MEAS - RV MAX PG: 1.65 MMHG
BH CV ECHO MEAS - RV V1 MAX: 64.3 CM/SEC
BH CV ECHO MEAS - RV V1 VTI: 15.6 CM
BH CV ECHO MEAS - RVOT DIAM: 2.08 CM
BH CV ECHO MEAS - RVSP: 22 MMHG
BH CV ECHO MEAS - SI(MOD-SP2): 25.7 ML/M2
BH CV ECHO MEAS - SI(MOD-SP4): 48.1 ML/M2
BH CV ECHO MEAS - SV(LVOT): 59.6 ML
BH CV ECHO MEAS - SV(MOD-SP2): 69 ML
BH CV ECHO MEAS - SV(MOD-SP4): 129 ML
BH CV ECHO MEAS - SV(RVOT): 52.8 ML
BH CV ECHO MEAS - TAPSE (>1.6): 1.8 CM
BH CV ECHO MEAS - TR MAX PG: 14.3 MMHG
BH CV ECHO MEAS - TR MAX VEL: 188.8 CM/SEC
BH CV ECHO MEASUREMENTS AVERAGE E/E' RATIO: 22.67
BH CV XLRA - RV BASE: 3.9 CM
BH CV XLRA - RV LENGTH: 8.4 CM
BH CV XLRA - RV MID: 3.9 CM
BH CV XLRA - TDI S': 11.6 CM/SEC
BUN SERPL-MCNC: 12 MG/DL (ref 8–23)
BUN/CREAT SERPL: 13.3 (ref 7–25)
CALCIUM SPEC-SCNC: 9.2 MG/DL (ref 8.6–10.5)
CHLORIDE SERPL-SCNC: 109 MMOL/L (ref 98–107)
CO2 SERPL-SCNC: 22.5 MMOL/L (ref 22–29)
CREAT SERPL-MCNC: 0.9 MG/DL (ref 0.76–1.27)
CRP SERPL-MCNC: 4.76 MG/DL (ref 0–0.5)
DEPRECATED RDW RBC AUTO: 45.5 FL (ref 37–54)
EGFRCR SERPLBLD CKD-EPI 2021: 97.2 ML/MIN/1.73
ERYTHROCYTE [DISTWIDTH] IN BLOOD BY AUTOMATED COUNT: 13.2 % (ref 12.3–15.4)
ERYTHROCYTE [SEDIMENTATION RATE] IN BLOOD: 19 MM/HR (ref 0–20)
GLUCOSE SERPL-MCNC: 96 MG/DL (ref 65–99)
HCT VFR BLD AUTO: 37.7 % (ref 37.5–51)
HGB BLD-MCNC: 12.9 G/DL (ref 13–17.7)
INR PPP: 2.29 (ref 0.9–1.1)
LEFT ATRIUM VOLUME INDEX: 31.4 ML/M2
MAXIMAL PREDICTED HEART RATE: 159 BPM
MCH RBC QN AUTO: 31.8 PG (ref 26.6–33)
MCHC RBC AUTO-ENTMCNC: 34.2 G/DL (ref 31.5–35.7)
MCV RBC AUTO: 92.9 FL (ref 79–97)
PLATELET # BLD AUTO: 151 10*3/MM3 (ref 140–450)
PMV BLD AUTO: 10.4 FL (ref 6–12)
POTASSIUM SERPL-SCNC: 4.3 MMOL/L (ref 3.5–5.2)
PROCALCITONIN SERPL-MCNC: 4.67 NG/ML (ref 0–0.25)
PROTHROMBIN TIME: 25.5 SECONDS (ref 11.7–14.2)
RBC # BLD AUTO: 4.06 10*6/MM3 (ref 4.14–5.8)
SINUS: 3.9 CM
SODIUM SERPL-SCNC: 139 MMOL/L (ref 136–145)
STJ: 3.3 CM
STRESS TARGET HR: 135 BPM
WBC NRBC COR # BLD: 4.87 10*3/MM3 (ref 3.4–10.8)

## 2023-01-13 PROCEDURE — 25010000002 ENOXAPARIN PER 10 MG: Performed by: STUDENT IN AN ORGANIZED HEALTH CARE EDUCATION/TRAINING PROGRAM

## 2023-01-13 PROCEDURE — 25010000002 CEFTRIAXONE PER 250 MG: Performed by: INTERNAL MEDICINE

## 2023-01-13 PROCEDURE — 85610 PROTHROMBIN TIME: CPT | Performed by: INTERNAL MEDICINE

## 2023-01-13 PROCEDURE — 85027 COMPLETE CBC AUTOMATED: CPT | Performed by: STUDENT IN AN ORGANIZED HEALTH CARE EDUCATION/TRAINING PROGRAM

## 2023-01-13 PROCEDURE — 84145 PROCALCITONIN (PCT): CPT | Performed by: STUDENT IN AN ORGANIZED HEALTH CARE EDUCATION/TRAINING PROGRAM

## 2023-01-13 PROCEDURE — 99232 SBSQ HOSP IP/OBS MODERATE 35: CPT | Performed by: INTERNAL MEDICINE

## 2023-01-13 PROCEDURE — 85652 RBC SED RATE AUTOMATED: CPT | Performed by: STUDENT IN AN ORGANIZED HEALTH CARE EDUCATION/TRAINING PROGRAM

## 2023-01-13 PROCEDURE — 86140 C-REACTIVE PROTEIN: CPT | Performed by: STUDENT IN AN ORGANIZED HEALTH CARE EDUCATION/TRAINING PROGRAM

## 2023-01-13 PROCEDURE — 80048 BASIC METABOLIC PNL TOTAL CA: CPT | Performed by: STUDENT IN AN ORGANIZED HEALTH CARE EDUCATION/TRAINING PROGRAM

## 2023-01-13 RX ORDER — CEPHALEXIN 500 MG/1
1000 CAPSULE ORAL EVERY 8 HOURS
Qty: 54 CAPSULE | Refills: 0 | Status: SHIPPED | OUTPATIENT
Start: 2023-01-13 | End: 2023-01-22

## 2023-01-13 RX ORDER — WARFARIN SODIUM 5 MG/1
5 TABLET ORAL
Status: DISCONTINUED | OUTPATIENT
Start: 2023-01-13 | End: 2023-01-13

## 2023-01-13 RX ORDER — WARFARIN SODIUM 5 MG/1
7.5 TABLET ORAL
Status: COMPLETED | OUTPATIENT
Start: 2023-01-13 | End: 2023-01-13

## 2023-01-13 RX ADMIN — SODIUM CHLORIDE 75 ML/HR: 9 INJECTION, SOLUTION INTRAVENOUS at 11:40

## 2023-01-13 RX ADMIN — ATENOLOL 25 MG: 25 TABLET ORAL at 08:22

## 2023-01-13 RX ADMIN — Medication 2000 UNITS: at 08:22

## 2023-01-13 RX ADMIN — ENOXAPARIN SODIUM 140 MG: 150 INJECTION SUBCUTANEOUS at 00:03

## 2023-01-13 RX ADMIN — LOSARTAN POTASSIUM 50 MG: 50 TABLET, FILM COATED ORAL at 08:22

## 2023-01-13 RX ADMIN — ENOXAPARIN SODIUM 140 MG: 150 INJECTION SUBCUTANEOUS at 11:39

## 2023-01-13 RX ADMIN — CEFTRIAXONE 2 G: 2 INJECTION, POWDER, FOR SOLUTION INTRAMUSCULAR; INTRAVENOUS at 11:41

## 2023-01-13 RX ADMIN — WARFARIN SODIUM 7.5 MG: 5 TABLET ORAL at 17:38

## 2023-01-13 NOTE — PROGRESS NOTES
[facility] Clinical Pharmacy Services: Warfarin Dosing/Monitoring Consult    Elliot Sebastian is a 62 y.o. male, estimated creatinine clearance is 126.8 mL/min (by C-G formula based on SCr of 0.9 mg/dL). weighing 136 kg (298 lb 15.1 oz).    Results from last 7 days   Lab Units 01/13/23  0625 01/12/23  0553 01/11/23  1444 01/09/23  0000   INR  2.29* 2.02* 1.77* 2.30   APTT seconds  --   --  43.9*  --    HEMOGLOBIN g/dL 12.9* 14.0 14.2  --    HEMATOCRIT % 37.7 39.6 41.9  --    PLATELETS 10*3/mm3 151 154 144  --      Prior to admission anticoagulation:  Warfarin 5 mg daily - INR is fairly stable around 3 on this dose per [facility] AC clinic notes     Hospital Anticoagulation:  Consulting provider: Dr. Rai  Start date: 1/12  Indication: Mechanical Heart Valve  Target INR: 2.5 - 3.5  Expected duration: Indefinite   Bridge Therapy: Yes, enoxaparin 1 mg/kg q12h    Potential food or drug interactions:   • Cholestyramine (home med): may decrease warfarin concentrations (decreased INR) and decrease vitamin K absorption (increased INR)  • Ceftriaxone: may enhance the anticoagulant effect  • Enoxaparin: increased risk of bleeding    Education complete?: [facility] AC clinic    Assessment/Plan:  1. INR increased but remains slightly subtherapeutic -- will dose 7.5 mg once today and then, in anticipation of discharge today, recommend resuming 5 mg daily and follow up with AC clinic next Monday to recheck INR.   2. INR daily    Pharmacy will continue to follow until discharge or discontinuation of warfarin.     José Zamora III, PharmD  Clinical Pharmacist

## 2023-01-13 NOTE — PLAN OF CARE
Problem: Adult Inpatient Plan of Care  Goal: Plan of Care Review  1/13/2023 1455 by Willy Fernandez RN  Outcome: Adequate for Care Transition  1/13/2023 1424 by Willy Fernandez RN  Outcome: Ongoing, Progressing  Goal: Patient-Specific Goal (Individualized)  1/13/2023 1455 by Willy Fernandez RN  Outcome: Adequate for Care Transition  1/13/2023 1424 by Willy Fernandez RN  Outcome: Ongoing, Progressing  Goal: Absence of Hospital-Acquired Illness or Injury  1/13/2023 1455 by Willy Fernandez RN  Outcome: Adequate for Care Transition  1/13/2023 1424 by Willy Fernandez RN  Outcome: Ongoing, Progressing  Intervention: Identify and Manage Fall Risk  Recent Flowsheet Documentation  Taken 1/13/2023 1400 by Willy Fernandez RN  Safety Promotion/Fall Prevention: safety round/check completed  Taken 1/13/2023 1200 by Willy Fernandez RN  Safety Promotion/Fall Prevention: safety round/check completed  Taken 1/13/2023 1000 by Willy Fernandez RN  Safety Promotion/Fall Prevention: safety round/check completed  Taken 1/13/2023 0800 by Willy Fernandez RN  Safety Promotion/Fall Prevention: safety round/check completed  Intervention: Prevent Skin Injury  Recent Flowsheet Documentation  Taken 1/13/2023 1400 by Willy Fernandez RN  Body Position:   position changed independently   weight shifting   neutral body alignment   neutral head position  Taken 1/13/2023 1200 by Willy Fernandez RN  Body Position:   position changed independently   weight shifting  Taken 1/13/2023 1000 by Willy Fernandez RN  Body Position:   position changed independently   weight shifting  Taken 1/13/2023 0800 by Willy Fernandez RN  Body Position:   position changed independently   neutral body alignment   neutral head position   weight shifting  Skin Protection:   adhesive use limited   transparent dressing maintained  Intervention: Prevent and Manage VTE (Venous Thromboembolism) Risk  Recent Flowsheet Documentation  Taken 1/13/2023 1400 by Willy Fernandez RN  VTE  Prevention/Management:   bilateral   sequential compression devices off   patient refused intervention  Range of Motion: active ROM (range of motion) encouraged  Taken 1/13/2023 0800 by Willy Fernandez RN  VTE Prevention/Management:   bilateral   sequential compression devices off   patient refused intervention  Range of Motion: active ROM (range of motion) encouraged  Intervention: Prevent Infection  Recent Flowsheet Documentation  Taken 1/13/2023 1400 by Willy Fernandez RN  Infection Prevention: single patient room provided  Taken 1/13/2023 1200 by Willy Fernandez RN  Infection Prevention: single patient room provided  Taken 1/13/2023 1000 by Willy Fernandez RN  Infection Prevention: single patient room provided  Taken 1/13/2023 0800 by Willy Fernandez RN  Infection Prevention: single patient room provided  Goal: Optimal Comfort and Wellbeing  1/13/2023 1455 by Willy Fernandez RN  Outcome: Adequate for Care Transition  1/13/2023 1424 by Willy Fernandez RN  Outcome: Ongoing, Progressing  Intervention: Monitor Pain and Promote Comfort  Recent Flowsheet Documentation  Taken 1/13/2023 1400 by Willy Fernandez RN  Pain Management Interventions:   see MAR   quiet environment facilitated   position adjusted   pillow support provided   care clustered  Taken 1/13/2023 0800 by Willy Fernandez RN  Pain Management Interventions:   see MAR   quiet environment facilitated   position adjusted   pillow support provided   care clustered  Intervention: Provide Person-Centered Care  Recent Flowsheet Documentation  Taken 1/13/2023 1400 by Willy Fernandez RN  Trust Relationship/Rapport: thoughts/feelings acknowledged  Taken 1/13/2023 0800 by Willy Fernandez RN  Trust Relationship/Rapport: thoughts/feelings acknowledged  Goal: Readiness for Transition of Care  1/13/2023 1455 by Willy Fernandez RN  Outcome: Adequate for Care Transition  1/13/2023 1424 by Willy Fernandez RN  Outcome: Ongoing, Progressing     Problem: Hypertension  Comorbidity  Goal: Blood Pressure in Desired Range  1/13/2023 1455 by Willy Fernandez RN  Outcome: Adequate for Care Transition  1/13/2023 1424 by Willy Fernandez RN  Outcome: Ongoing, Progressing  Intervention: Maintain Blood Pressure Management  Recent Flowsheet Documentation  Taken 1/13/2023 1400 by Willy Fernandez RN  Medication Review/Management: medications reviewed  Taken 1/13/2023 1200 by Willy Fernandez RN  Medication Review/Management: medications reviewed  Taken 1/13/2023 1000 by Willy Fernandez RN  Medication Review/Management: medications reviewed  Taken 1/13/2023 0800 by Willy Fernandez RN  Medication Review/Management: medications reviewed     Problem: Skin or Soft Tissue Infection  Goal: Absence of Infection Signs and Symptoms  1/13/2023 1455 by Willy Fernandez RN  Outcome: Adequate for Care Transition  1/13/2023 1424 by Willy Fernandez RN  Outcome: Ongoing, Progressing  Intervention: Minimize and Manage Infection Progression  Recent Flowsheet Documentation  Taken 1/13/2023 1400 by Willy Fernandez RN  Infection Prevention: single patient room provided  Taken 1/13/2023 1200 by Willy Fernandez RN  Infection Prevention: single patient room provided  Taken 1/13/2023 1000 by Willy Fernandez RN  Infection Prevention: single patient room provided  Taken 1/13/2023 0800 by Willy Fernandez RN  Infection Prevention: single patient room provided   Goal Outcome Evaluation:   Preparing for d/c today. Meds to bed.  Warfarin dose verified via pharmacy.  Waiting for family to transport.  Abx switched to po

## 2023-01-13 NOTE — DISCHARGE SUMMARY
Patient Name: Elliot Sebastian  : 1960  MRN: [id number]    Date of Admission: 2023  Date of Discharge:  2023  Primary Care Physician: Mannie Beltran DO      Chief Complaint:   Leg Pain and Wound Check      Discharge Diagnoses     Active Hospital Problems    Diagnosis  POA   • **Cellulitis, leg [L03.119]  Yes   • Obesity (BMI 30-39.9) [E66.9]  Yes   • Chronic heart failure with preserved ejection fraction (HFpEF) ([facility]) [I50.32]  Yes   • Chronic deep vein thrombosis (DVT) ([facility]) [I82.509]  Yes   • Elevated procalcitonin [R79.89]  Yes   • Essential hypertension [I10]  Yes   • Chronic anticoagulation [Z79.01]  Not Applicable   • Hx of mitral valve replacement with mechanical valve [Z95.2] [Z95.2]  Not Applicable   • Bacterial endocarditis - history of [I33.0]  Yes      Resolved Hospital Problems   No resolved problems to display.        Hospital Course     Mr. Sebastian is a 62 y.o. 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  Please see the admitting history and physical for further details.  He was admitted to the hospital for further evaluation and treatment.     Right lower extremity cellulitis  - RLE with erythema, pain, swelling, appeared well demarcated, consistent with cellulitis. Duplex US noting chronic RLE DVT. WBC within normal limits, patient afebrile; Procalcitonin initially 19.10 and improved to 4.76 on repeat; CRP elevated at 12.19 initially, also improved to 4.76 on repeat. ID consulted and followed patient for duration of his stay. Treated with IV Ceftriaxone, leading to good response and significant improvement. TTE ordered, negative for evidence of vegetations, blood cultures no growth to date >48 hours. Discussed with Dr. Willams with ID on day of discharge, felt  patient was okay for discharge home with oral antibiotics.  - Discharge medication plan: cephalexin 1 g PO q8h through 1/21/23.  - Per ID: encouraged to wear compression stockings to help w/ LE edema once his infection is fully cured.   - Recommend close follow up with PCP within 1 week for reassessment and repeat CBC, procalcitonin, ESR, CRP to guide ongoing management decisions.     Hypertension  - BP acceptable acutely. Appears stable. No indications to warrant acute changes/intervention at this time.  - Continue current medications as prescribed at time of discharge. Recommend close follow up with PCP within 1 week with repeat BMP to guide ongoing management decisions.   Trend BP to guide ongoing management decisions.    Chronic right LE DVT  - Noted on repeat Duplex following admission: Chronic right lower extremity deep vein thrombosis noted in the mid femoral. Continued AC with Warfarin, however, did require additional use of therapeutic Lovenox for bridging anticoagulation due to INR being subtherapeutic (~1.7 on admission). However, by time of discharge, INR improved, near target goal, discussed with pharmacy on day of discharge, they state that they follow with him closely in the outpatient clinic for INR monitoring and dosage adjustments.  He is going to give another booster dose of warfarin prior to discharge and schedule for him to come in this Monday 01/16 for INR recheck.  Per recommendations, continue home warfarin as previously prescribed with plans for follow-up as above.    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 Repeat TTE ordered here, negative for evidence of vegetation, no need for MOE per ID, as blood cultures negative as well. Continue AC as prescribed.     Obesity  - BMI 34.77 kg/m2. Complicating all medical problems.    Day of Discharge     Subjective:  Patient seen and examined this morning. Hospital day 2. At time of my  examination, patient is awake, alert and oriented x3, resting comfortably in bed. Discussed with nursing, no reported acute events overnight. Discussed with Dr. Willams, blood cultures negative, TTE negative for vegetations, okay for discharge on antibiotics. Patient states symptoms improved from prior.     Physical Exam:  Temp:  [98.2 °F (36.8 °C)-98.9 °F (37.2 °C)] 98.4 °F (36.9 °C)  Heart Rate:  [57-69] 59  Resp:  [18] 18  BP: (116-158)/(70-94) 137/93  Body mass index is 37.96 kg/m².  Physical Exam  Vitals and nursing note reviewed.   Constitutional:       General: He is awake. He is not in acute distress.     Appearance: He is obese.   Eyes:      General: No scleral icterus.  Cardiovascular:      Rate and Rhythm: Normal rate and regular rhythm.      Pulses: Normal pulses.      Heart sounds: Normal heart sounds.   Pulmonary:      Effort: Pulmonary effort is normal. No respiratory distress.      Breath sounds: Normal breath sounds.   Abdominal:      General: Bowel sounds are normal. There is no distension.      Palpations: Abdomen is soft.   Musculoskeletal:      Right lower leg: Edema (with associated erythema, swelling, tenderness to palpation, improved from prior) present.   Skin:     General: Skin is warm and dry.      Comments: Chronic venous stasis changes LE   Neurological:      General: No focal deficit present.      Mental Status: He is alert and oriented to person, place, and time.   Psychiatric:         Behavior: Behavior is cooperative    Consultants     Consult Orders (all) (From admission, onward)     Start     Ordered    01/11/23 1816  Inpatient Infectious Diseases Consult  Once        Specialty:  Infectious Diseases  Provider:  Mayra Fowler MD    01/11/23 1815 01/11/23 1711  [facility] (on-call MD unless specified) Details  Once        Specialty:  Hospitalist  Provider:  Leesa Rai MD    01/11/23 1710    Pending  Inpatient Infectious Diseases Consult  Once        Comments: [id number]  message left @ 0628   Specialty:  Infectious Diseases  Provider:  Mayra Fowler MD    Pending              Procedures     * Surgery not found *      Imaging Results (All)     None        Results for orders placed during the hospital encounter of 01/11/23    Duplex Venous Lower Extremity - Right CAR    Interpretation Summary  •  Chronic right lower extremity deep vein thrombosis noted in the mid femoral.  •  All other right sided veins appeared normal.    Results for orders placed during the hospital encounter of 01/11/23    Adult Transthoracic Echo Complete W/ Cont if Necessary Per Protocol    Interpretation Summary  •  Left ventricular ejection fraction appears to be 51 - 55%.  •  The left ventricular cavity is mildly dilated.  •  Left ventricular diastolic function was normal.  •  The right ventricular cavity is borderline dilated.  •  The right atrial cavity is borderline dilated.  •  There is calcification of the aortic valve.  •  There is a mechanical mitral valve prosthesis present.    Pertinent Labs     Results from last 7 days   Lab Units 01/13/23  0625 01/12/23  0553 01/11/23  1444   WBC 10*3/mm3 4.87 7.02 10.69   HEMOGLOBIN g/dL 12.9* 14.0 14.2   PLATELETS 10*3/mm3 151 154 144     Results from last 7 days   Lab Units 01/13/23  0625 01/12/23  0553 01/11/23  1444   SODIUM mmol/L 139 138 136   POTASSIUM mmol/L 4.3 4.1 4.4   CHLORIDE mmol/L 109* 107 104   CO2 mmol/L 22.5 23.0 22.0   BUN mg/dL 12 17 22   CREATININE mg/dL 0.90 1.01 1.18   GLUCOSE mg/dL 96 100* 122*   EGFR mL/min/1.73 97.2 84.6 70.2     Results from last 7 days   Lab Units 01/11/23  1444   ALBUMIN g/dL 3.8   BILIRUBIN mg/dL 1.2   ALK PHOS U/L 70   AST (SGOT) U/L 48*   ALT (SGPT) U/L 29     Results from last 7 days   Lab Units 01/13/23  0625 01/12/23  0553 01/11/23  1444   CALCIUM mg/dL 9.2 9.1 9.6   ALBUMIN g/dL  --   --  3.8               Invalid input(s): LDLCALC  Results from last 7 days   Lab Units 01/11/23  1715 01/11/23  1442   BLOODCX  No  growth at 24 hours No growth at 24 hours         Test Results Pending at Discharge     Pending Labs     Order Current Status    Blood Culture - Blood, Arm, Left Preliminary result    Blood Culture - Blood, Arm, Right Preliminary result          Discharge Details        Discharge Medications      New Medications      Instructions Start Date   cephalexin 500 MG capsule  Commonly known as: Keflex   1,000 mg, Oral, Every 8 Hours, To be taken through 01/21/23         Changes to Medications      Instructions Start Date   atenolol 25 MG tablet  Commonly known as: TENORMIN  What changed: additional instructions   25 mg, Oral, Daily, Follow up with Dr. Chanel 10/2022      cholestyramine 4 GM/DOSE powder  Commonly known as: Questran  What changed: when to take this   4 g, Oral, Daily, If no improvement after two weeks, may increase to BID.      warfarin 5 MG tablet  Commonly known as: COUMADIN  What changed:   · how much to take  · how to take this  · when to take this  · additional instructions   Take 1 tablet (5mg) by mouth daily or as directed by medication management clinic.         Continue These Medications      Instructions Start Date   losartan 50 MG tablet  Commonly known as: COZAAR   TAKE 1 TABLET DAILY      Mens Multivitamin Plus tablet tablet  Generic drug: multivitamin with minerals   2 capsules, Oral, Daily      Vitamin D3 50 MCG (2000 UT) capsule   5,000 Units, Oral, Daily             No Known Allergies    Discharge Disposition:  Home or Self Care      Discharge Diet:  Diet Order   Procedures   • Diet: Cardiac Diets; Healthy Heart (2-3 Na+); Texture: Regular Texture (IDDSI 7); Fluid Consistency: Thin (IDDSI 0)       Discharge Activity:   Activity Instructions     Activity as Tolerated            CODE STATUS:    Code Status and Medical Interventions:   Ordered at: 01/11/23 6855     Code Status (Patient has no pulse and is not breathing):    CPR (Attempt to Resuscitate)     Medical Interventions (Patient has  pulse or is breathing):    Full       No future appointments.  Additional Instructions for the Follow-ups that You Need to Schedule     Discharge Follow-up with PCP   As directed       Currently Documented PCP:    Mannie Beltran DO    PCP Phone Number:    [phone]     Follow Up Details: within 1 week after discharge for reassessment, medication review, repeat BMP, CBC, INR            Contact information for follow-up providers     Mannie Beltran DO .    Specialty: Family Medicine  Why: within 1 week after discharge for reassessment, medication review, repeat BMP, CBC, INR  Contact information:  [address] [address]  [phone]                   Contact information for after-discharge care     Home Medical Care     [facility] .    Service: Home Health Services  Contact information:  [address] [facility] [address]  [facility] [address] [phone]                             Additional Instructions for the Follow-ups that You Need to Schedule     Discharge Follow-up with PCP   As directed       Currently Documented PCP:    Mannie Beltran DO    PCP Phone Number:    [phone]     Follow Up Details: within 1 week after discharge for reassessment, medication review, repeat BMP, CBC, INR           Time Spent on Discharge:  Greater than 30 minutes      Julio Garcia DO  [facility]  01/13/23  14:46 EST

## 2023-01-13 NOTE — PLAN OF CARE
Problem: Adult Inpatient Plan of Care  Goal: Plan of Care Review  Outcome: Ongoing, Progressing  Goal: Patient-Specific Goal (Individualized)  Outcome: Ongoing, Progressing  Goal: Absence of Hospital-Acquired Illness or Injury  Outcome: Ongoing, Progressing  Intervention: Identify and Manage Fall Risk  Recent Flowsheet Documentation  Taken 1/13/2023 1400 by Willy Fernandez RN  Safety Promotion/Fall Prevention: safety round/check completed  Taken 1/13/2023 1200 by Wlily Fernandez RN  Safety Promotion/Fall Prevention: safety round/check completed  Taken 1/13/2023 1000 by Willy Fernandez RN  Safety Promotion/Fall Prevention: safety round/check completed  Taken 1/13/2023 0800 by Willy Fernandez RN  Safety Promotion/Fall Prevention: safety round/check completed  Intervention: Prevent Skin Injury  Recent Flowsheet Documentation  Taken 1/13/2023 1400 by Willy Fernandez RN  Body Position:   position changed independently   weight shifting   neutral body alignment   neutral head position  Taken 1/13/2023 1200 by Willy Fernandez RN  Body Position:   position changed independently   weight shifting  Taken 1/13/2023 1000 by Willy Fernandez RN  Body Position:   position changed independently   weight shifting  Taken 1/13/2023 0800 by Willy Fernandez RN  Body Position:   position changed independently   neutral body alignment   neutral head position   weight shifting  Skin Protection:   adhesive use limited   transparent dressing maintained  Intervention: Prevent and Manage VTE (Venous Thromboembolism) Risk  Recent Flowsheet Documentation  Taken 1/13/2023 1400 by Willy Fernandez RN  VTE Prevention/Management:   bilateral   sequential compression devices off   patient refused intervention  Range of Motion: active ROM (range of motion) encouraged  Taken 1/13/2023 0800 by Willy Fernandez RN  VTE Prevention/Management:   bilateral   sequential compression devices off   patient refused intervention  Range of Motion: active ROM  (range of motion) encouraged  Intervention: Prevent Infection  Recent Flowsheet Documentation  Taken 1/13/2023 1400 by Willy Fernandez RN  Infection Prevention: single patient room provided  Taken 1/13/2023 1200 by Willy Fernandez RN  Infection Prevention: single patient room provided  Taken 1/13/2023 1000 by Willy Fernandez RN  Infection Prevention: single patient room provided  Taken 1/13/2023 0800 by Willy Fernandez RN  Infection Prevention: single patient room provided  Goal: Optimal Comfort and Wellbeing  Outcome: Ongoing, Progressing  Intervention: Monitor Pain and Promote Comfort  Recent Flowsheet Documentation  Taken 1/13/2023 1400 by Willy Fernandez RN  Pain Management Interventions:   see MAR   quiet environment facilitated   position adjusted   pillow support provided   care clustered  Taken 1/13/2023 0800 by Willy Fernandez RN  Pain Management Interventions:   see MAR   quiet environment facilitated   position adjusted   pillow support provided   care clustered  Intervention: Provide Person-Centered Care  Recent Flowsheet Documentation  Taken 1/13/2023 1400 by Willy Fernandez RN  Trust Relationship/Rapport: thoughts/feelings acknowledged  Taken 1/13/2023 0800 by Willy Fernandez RN  Trust Relationship/Rapport: thoughts/feelings acknowledged  Goal: Readiness for Transition of Care  Outcome: Ongoing, Progressing     Problem: Hypertension Comorbidity  Goal: Blood Pressure in Desired Range  Outcome: Ongoing, Progressing  Intervention: Maintain Blood Pressure Management  Recent Flowsheet Documentation  Taken 1/13/2023 1400 by Willy Fernandez RN  Medication Review/Management: medications reviewed  Taken 1/13/2023 1200 by Willy Fernandez RN  Medication Review/Management: medications reviewed  Taken 1/13/2023 1000 by Willy Fernandez RN  Medication Review/Management: medications reviewed  Taken 1/13/2023 0800 by Willy Fernandez RN  Medication Review/Management: medications reviewed     Problem: Skin or Soft Tissue  Infection  Goal: Absence of Infection Signs and Symptoms  Outcome: Ongoing, Progressing  Intervention: Minimize and Manage Infection Progression  Recent Flowsheet Documentation  Taken 1/13/2023 1400 by Willy Fernandez RN  Infection Prevention: single patient room provided  Taken 1/13/2023 1200 by Willy Fernandez RN  Infection Prevention: single patient room provided  Taken 1/13/2023 1000 by Willy Fernandez RN  Infection Prevention: single patient room provided  Taken 1/13/2023 0800 by Willy Fernandez RN  Infection Prevention: single patient room provided   Goal Outcome Evaluation:   Pt vss, a&ox4, ab jovon in room, NSR, RA, uses bathroom. No distress/pain noted at this time. Pt receiving iv abx.  Possible d/c after abx are transitioned to po.  Pleasant/cooperative w/ care today.

## 2023-01-13 NOTE — CASE MANAGEMENT/SOCIAL WORK
Continued Stay Note  [facility]     Patient Name: Elliot Sebastian  MRN: [id number]  Today's Date: 1/13/2023    Admit Date: 1/11/2023    Plan: Home with possible HH/ infusion.   Discharge Plan     Row Name 01/13/23 1458       Plan    Plan Comments DC orders noted.  Transitioning to po abx.  Pt will not need HH or [facility] Infusion.    Final Note DC home...........Marian TAYLOR/ TRINH               Discharge Codes    No documentation.               Expected Discharge Date and Time     Expected Discharge Date Expected Discharge Time    Jan 13, 2023  2:44 PM            Marian Ricketts RN

## 2023-01-13 NOTE — PROGRESS NOTES
ID PROGRESS NOTE    CC: f/u RLE cellulitis    Subj: RLE erythema and pain are much improved. Thankfully though somewhat surprisingly his blood cultures are negative. TTE report is pending. He is hoping for discharge soon.     Medications:    Current Facility-Administered Medications:   •  acetaminophen (TYLENOL) tablet 650 mg, 650 mg, Oral, Q4H PRN, Leesa Rai MD  •  atenolol (TENORMIN) tablet 25 mg, 25 mg, Oral, Daily, Leesa Rai MD, 25 mg at 01/13/23 0822  •  cefTRIAXone (ROCEPHIN) 2 g in sodium chloride 0.9 % 100 mL IVPB-VTB, 2 g, Intravenous, Q24H, Lee Willams MD, Last Rate: 200 mL/hr at 01/12/23 1323, 2 g at 01/12/23 1323  •  cholecalciferol (VITAMIN D3) tablet 2,000 Units, 2,000 Units, Oral, Daily, Leesa Rai MD, 2,000 Units at 01/13/23 0822  •  cholestyramine (QUESTRAN) packet 1 packet, 1 packet, Oral, Daily With Dinner, Julio Garcia DO, 1 packet at 01/12/23 1854  •  Enoxaparin Sodium (LOVENOX) syringe 140 mg, 1 mg/kg, Subcutaneous, Q12H, Julio Garcia DO, 140 mg at 01/13/23 0003  •  influenza vac split quad (FLUZONE,FLUARIX,AFLURIA,FLULAVAL) injection 0.5 mL, 0.5 mL, Intramuscular, During Hospitalization, Leesa Rai MD  •  losartan (COZAAR) tablet 50 mg, 50 mg, Oral, Daily, Leesa Rai MD, 50 mg at 01/13/23 0822  •  melatonin tablet 3 mg, 3 mg, Oral, Nightly PRN, Leesa Rai MD  •  nitroglycerin (NITROSTAT) SL tablet 0.4 mg, 0.4 mg, Sublingual, Q5 Min PRN, Leesa Rai MD  •  ondansetron (ZOFRAN) tablet 4 mg, 4 mg, Oral, Q6H PRN **OR** ondansetron (ZOFRAN) injection 4 mg, 4 mg, Intravenous, Q6H PRN, Leesa Rai MD  •  Pharmacy to dose warfarin, , Does not apply, Continuous PRN, Leesa Rai MD  •  sodium chloride 0.9 % infusion, 75 mL/hr, Intravenous, Continuous, Leesa Rai MD, Last Rate: 75 mL/hr at 01/12/23 1854, 75 mL/hr at 01/12/23 1854  •  warfarin (COUMADIN) tablet 5 mg, 5  mg, Oral, Once, Radha, Julio, DO      Objective   Vital Signs   Temp:  [98.2 °F (36.8 °C)-98.9 °F (37.2 °C)] 98.2 °F (36.8 °C)  Heart Rate:  [57-69] 68  Resp:  [18] 18  BP: (104-158)/(56-94) 127/79    Physical Exam:   General: awake, alert, NAD, very nice   Eyes: no scleral icterus  Cardiovascular: NR, + click at MV  Respiratory: normal work of breathing on ambient air  GI: Abdomen is soft  :  no Mallory catheter  Skin: + RLE heat and erythema are much improved  Neurological: Alert and oriented x 3  Psychiatric: Normal mood and affect     Labs:   CBC, BMP, procal, INR, and blood cultures reviewed today  Lab Results   Component Value Date    WBC 4.87 01/13/2023    HGB 12.9 (L) 01/13/2023    HCT 37.7 01/13/2023    MCV 92.9 01/13/2023     01/13/2023     Lab Results   Component Value Date    GLUCOSE 96 01/13/2023    CALCIUM 9.2 01/13/2023     01/13/2023    K 4.3 01/13/2023    CO2 22.5 01/13/2023     (H) 01/13/2023    BUN 12 01/13/2023    CREATININE 0.90 01/13/2023    EGFRIFAFRI 109 11/23/2020    EGFRIFNONA 94 11/23/2020    BCR 13.3 01/13/2023    ANIONGAP 7.5 01/13/2023       Lab Results   Component Value Date    INR 2.29 (H) 01/13/2023    INR 2.02 (H) 01/12/2023    INR 1.77 (H) 01/11/2023    PROTIME 25.5 (H) 01/13/2023    PROTIME 23.1 (H) 01/12/2023    PROTIME 20.8 (H) 01/11/2023     Procal 19--->4    Microbiology:  1/11 BCx: NGTD    New radiology:  TTE pending    Prior Radiology:  RLE Doppler w/ chronic RLE DVT    ASSESSMENT/PLAN:  1. RLE cellulitis  2. MV replaced  3. 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 Obesity BMI 37  5. Chronic RLE DVT  6. Elevated procalcitonin - better  7. On Coumadin    His RLE exam is markedly improved. He is afebrile. procal down from 19 to 4. Thankfully his blood cultures are negative to date. TTE is pending.     While awaiting TTE results, continue ceftriaxone 2 g IV q24h. If TTE is negative for vegetations and his blood cultures remain negative, then OK to change  to cephalexin 1 g PO q8h x 8 more days through 1/21/23. I suggested he wear some compression stockings to help w/ LE edema once his infection is fully cured.     D/W Dr Garcia re: case and its management.     ADDENDUM: TTE negative for vegetations. With negative blood cultures, I think endocarditis is unlikely. Therefore OK to change to cephalexin 1 g PO q8h through 1/21/23 as outlined above when he is otherwise medically ready for discharge.

## 2023-01-14 NOTE — OUTREACH NOTE
Prep Survey    Flowsheet Row Responses   [facility] facility patient discharged from? [place]   Is LACE score < 7 ? No   Eligibility [facility]   Date of Admission 01/11/23   Date of Discharge 01/13/23   Discharge Disposition Home or Self Care   Discharge diagnosis Cellulitis   Does the patient have one of the following disease processes/diagnoses(primary or secondary)? Other   Does the patient have Home health ordered? Yes   What is the Home health agency?  [facility]    Is there a DME ordered? No   Prep survey completed? Yes          AKIRA WINSTON - Registered Nurse

## 2023-01-15 NOTE — PAYOR COMM NOTE
"Elliot Singh (62 y.o. Male)                      ATTENTION; DISCHARGE CASE REF #[id number]      Date of Birth   1960    Social Security Number       Address   [address] [place], [place] [address]    Home Phone   [phone]    MRN   [id number]       Faith   Synagogue    Marital Status                               Admission Date   23    Admission Type   Emergency    Admitting Provider   Leesa Rai MD    Attending Provider       Department, Room/Bed   [address], [address]/       Discharge Date   2023    Discharge Disposition   Home or Self Care    Discharge Destination                               Attending Provider: (none)   Allergies: No Known Allergies    Isolation: None   Infection: None   Code Status: Prior    Ht: 189 cm (74.41\")   Wt: 136 kg (298 lb 15.1 oz)    Admission Cmt: None   Principal Problem: Cellulitis, leg [L03.119]                 Active Insurance as of 2023     Primary Coverage     Payor Plan Insurance Group Employer/Plan Group    ANTHEM BLUE CROSS ANTH BLUE CROSS BLUE SHIELD [facility] [id number]     Payor Plan Address Payor Plan Phone Number Payor Plan Fax Number Effective Dates    [address] [phone]  2022 - None Entered    [place] [address]       Subscriber Name Subscriber Birth Date Member ID       ELLIOT SINGH 1960 [id number]                 Emergency Contacts      (Rel.) Home Phone Work Phone Mobile Phone    Nola Cantu (Spouse) [phone] -- [phone]               Discharge Summary      Julio Garcia DO at 23 1446              Patient Name: Elliot Singh  : 1960  MRN: [id number]    Date of Admission: 2023  Date of Discharge:  2023  Primary Care Physician: Mannie Beltran DO      Chief Complaint:   Leg Pain and Wound Check      Discharge Diagnoses     Active Hospital Problems    Diagnosis  POA   • **Cellulitis, leg [L03.119]  Yes   • Obesity (BMI " 30-39.9) [E66.9]  Yes   • Chronic heart failure with preserved ejection fraction (HFpEF) ([facility]) [I50.32]  Yes   • Chronic deep vein thrombosis (DVT) ([facility]) [I82.509]  Yes   • Elevated procalcitonin [R79.89]  Yes   • Essential hypertension [I10]  Yes   • Chronic anticoagulation [Z79.01]  Not Applicable   • Hx of mitral valve replacement with mechanical valve [Z95.2] [Z95.2]  Not Applicable   • Bacterial endocarditis - history of [I33.0]  Yes      Resolved Hospital Problems   No resolved problems to display.        Hospital Course     Mr. Sebastian is a 62 y.o. 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  Please see the admitting history and physical for further details.  He was admitted to the hospital for further evaluation and treatment.     Right lower extremity cellulitis  - RLE with erythema, pain, swelling, appeared well demarcated, consistent with cellulitis. Duplex US noting chronic RLE DVT. WBC within normal limits, patient afebrile; Procalcitonin initially 19.10 and improved to 4.76 on repeat; CRP elevated at 12.19 initially, also improved to 4.76 on repeat. ID consulted and followed patient for duration of his stay. Treated with IV Ceftriaxone, leading to good response and significant improvement. TTE ordered, negative for evidence of vegetations, blood cultures no growth to date >48 hours. Discussed with Dr. Willams with ID on day of discharge, felt patient was okay for discharge home with oral antibiotics.  - Discharge medication plan: cephalexin 1 g PO q8h through 1/21/23.  - Per ID: encouraged to wear compression stockings to help w/ LE edema once his infection is fully cured.   - Recommend close follow up with PCP within 1 week for reassessment and repeat CBC, procalcitonin, ESR, CRP to guide  ongoing management decisions.     Hypertension  - BP acceptable acutely. Appears stable. No indications to warrant acute changes/intervention at this time.  - Continue current medications as prescribed at time of discharge. Recommend close follow up with PCP within 1 week with repeat BMP to guide ongoing management decisions.   Trend BP to guide ongoing management decisions.    Chronic right LE DVT  - Noted on repeat Duplex following admission: Chronic right lower extremity deep vein thrombosis noted in the mid femoral. Continued AC with Warfarin, however, did require additional use of therapeutic Lovenox for bridging anticoagulation due to INR being subtherapeutic (~1.7 on admission). However, by time of discharge, INR improved, near target goal, discussed with pharmacy on day of discharge, they state that they follow with him closely in the outpatient clinic for INR monitoring and dosage adjustments.  He is going to give another booster dose of warfarin prior to discharge and schedule for him to come in this Monday 01/16 for INR recheck.  Per recommendations, continue home warfarin as previously prescribed with plans for follow-up as above.    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 Repeat TTE ordered here, negative for evidence of vegetation, no need for MOE per ID, as blood cultures negative as well. Continue AC as prescribed.     Obesity  - BMI 34.77 kg/m2. Complicating all medical problems.    Day of Discharge     Subjective:  Patient seen and examined this morning. Hospital day 2. At time of my examination, patient is awake, alert and oriented x3, resting comfortably in bed. Discussed with nursing, no reported acute events overnight. Discussed with Dr. Willams, blood cultures negative, TTE negative for vegetations, okay for discharge on antibiotics. Patient states symptoms improved from prior.     Physical Exam:  Temp:  [98.2 °F (36.8  °C)-98.9 °F (37.2 °C)] 98.4 °F (36.9 °C)  Heart Rate:  [57-69] 59  Resp:  [18] 18  BP: (116-158)/(70-94) 137/93  Body mass index is 37.96 kg/m².  Physical Exam  Vitals and nursing note reviewed.   Constitutional:       General: He is awake. He is not in acute distress.     Appearance: He is obese.   Eyes:      General: No scleral icterus.  Cardiovascular:      Rate and Rhythm: Normal rate and regular rhythm.      Pulses: Normal pulses.      Heart sounds: Normal heart sounds.   Pulmonary:      Effort: Pulmonary effort is normal. No respiratory distress.      Breath sounds: Normal breath sounds.   Abdominal:      General: Bowel sounds are normal. There is no distension.      Palpations: Abdomen is soft.   Musculoskeletal:      Right lower leg: Edema (with associated erythema, swelling, tenderness to palpation, improved from prior) present.   Skin:     General: Skin is warm and dry.      Comments: Chronic venous stasis changes LE   Neurological:      General: No focal deficit present.      Mental Status: He is alert and oriented to person, place, and time.   Psychiatric:         Behavior: Behavior is cooperative    Consultants     Consult Orders (all) (From admission, onward)     Start     Ordered    01/11/23 1816  Inpatient Infectious Diseases Consult  Once        Specialty:  Infectious Diseases  Provider:  Mayra Fowler MD    01/11/23 1815    01/11/23 1711  [facility] (on-call MD unless specified) Details  Once        Specialty:  Hospitalist  Provider:  Leesa Rai MD    01/11/23 1710    Pending  Inpatient Infectious Diseases Consult  Once        Comments: [id number] message left @ [phone]   Specialty:  Infectious Diseases  Provider:  Mayra Fowler MD    Pending              Procedures     * Surgery not found *      Imaging Results (All)     None        Results for orders placed during the hospital encounter of 01/11/23    Duplex Venous Lower Extremity - Right CAR    Interpretation Summary  •  Chronic right lower  extremity deep vein thrombosis noted in the mid femoral.  •  All other right sided veins appeared normal.    Results for orders placed during the hospital encounter of 01/11/23    Adult Transthoracic Echo Complete W/ Cont if Necessary Per Protocol    Interpretation Summary  •  Left ventricular ejection fraction appears to be 51 - 55%.  •  The left ventricular cavity is mildly dilated.  •  Left ventricular diastolic function was normal.  •  The right ventricular cavity is borderline dilated.  •  The right atrial cavity is borderline dilated.  •  There is calcification of the aortic valve.  •  There is a mechanical mitral valve prosthesis present.    Pertinent Labs     Results from last 7 days   Lab Units 01/13/23  0625 01/12/23  0553 01/11/23  1444   WBC 10*3/mm3 4.87 7.02 10.69   HEMOGLOBIN g/dL 12.9* 14.0 14.2   PLATELETS 10*3/mm3 151 154 144     Results from last 7 days   Lab Units 01/13/23  0625 01/12/23  0553 01/11/23  1444   SODIUM mmol/L 139 138 136   POTASSIUM mmol/L 4.3 4.1 4.4   CHLORIDE mmol/L 109* 107 104   CO2 mmol/L 22.5 23.0 22.0   BUN mg/dL 12 17 22   CREATININE mg/dL 0.90 1.01 1.18   GLUCOSE mg/dL 96 100* 122*   EGFR mL/min/1.73 97.2 84.6 70.2     Results from last 7 days   Lab Units 01/11/23  1444   ALBUMIN g/dL 3.8   BILIRUBIN mg/dL 1.2   ALK PHOS U/L 70   AST (SGOT) U/L 48*   ALT (SGPT) U/L 29     Results from last 7 days   Lab Units 01/13/23  0625 01/12/23  0553 01/11/23  1444   CALCIUM mg/dL 9.2 9.1 9.6   ALBUMIN g/dL  --   --  3.8               Invalid input(s): LDLCALC  Results from last 7 days   Lab Units 01/11/23  1715 01/11/23  1442   BLOODCX  No growth at 24 hours No growth at 24 hours         Test Results Pending at Discharge     Pending Labs     Order Current Status    Blood Culture - Blood, Arm, Left Preliminary result    Blood Culture - Blood, Arm, Right Preliminary result          Discharge Details        Discharge Medications      New Medications      Instructions Start Date    cephalexin 500 MG capsule  Commonly known as: Keflex   1,000 mg, Oral, Every 8 Hours, To be taken through 01/21/23         Changes to Medications      Instructions Start Date   atenolol 25 MG tablet  Commonly known as: TENORMIN  What changed: additional instructions   25 mg, Oral, Daily, Follow up with Dr. Chanel 10/2022      cholestyramine 4 GM/DOSE powder  Commonly known as: Questran  What changed: when to take this   4 g, Oral, Daily, If no improvement after two weeks, may increase to BID.      warfarin 5 MG tablet  Commonly known as: COUMADIN  What changed:   · how much to take  · how to take this  · when to take this  · additional instructions   Take 1 tablet (5mg) by mouth daily or as directed by medication management clinic.         Continue These Medications      Instructions Start Date   losartan 50 MG tablet  Commonly known as: COZAAR   TAKE 1 TABLET DAILY      Mens Multivitamin Plus tablet tablet  Generic drug: multivitamin with minerals   2 capsules, Oral, Daily      Vitamin D3 50 MCG (2000 UT) capsule   5,000 Units, Oral, Daily             No Known Allergies    Discharge Disposition:  Home or Self Care      Discharge Diet:  Diet Order   Procedures   • Diet: Cardiac Diets; Healthy Heart (2-3 Na+); Texture: Regular Texture (IDDSI 7); Fluid Consistency: Thin (IDDSI 0)       Discharge Activity:   Activity Instructions     Activity as Tolerated            CODE STATUS:    Code Status and Medical Interventions:   Ordered at: 01/11/23 1802     Code Status (Patient has no pulse and is not breathing):    CPR (Attempt to Resuscitate)     Medical Interventions (Patient has pulse or is breathing):    Full       No future appointments.  Additional Instructions for the Follow-ups that You Need to Schedule     Discharge Follow-up with PCP   As directed       Currently Documented PCP:    Mannie Beltran DO    PCP Phone Number:    [phone]     Follow Up Details: within 1 week after discharge for reassessment,  medication review, repeat BMP, CBC, INR            Contact information for follow-up providers     Mannie Beltran DO .    Specialty: Family Medicine  Why: within 1 week after discharge for reassessment, medication review, repeat BMP, CBC, INR  Contact information:  [address]  [facility] [address]  [phone]                   Contact information for after-discharge care     Home Medical Care     [facility] .    Service: Home Health Services  Contact information:  [address]  [facility] [address] [phone]                             Additional Instructions for the Follow-ups that You Need to Schedule     Discharge Follow-up with PCP   As directed       Currently Documented PCP:    Mannie Beltran DO    PCP Phone Number:    [phone]     Follow Up Details: within 1 week after discharge for reassessment, medication review, repeat BMP, CBC, INR           Time Spent on Discharge:  Greater than 30 minutes      Jeremias Garcia DO  [facility]  01/13/23  14:46 EST                Electronically signed by Jeremias Garcia DO at 01/13/23 1500       Discharge Order (From admission, onward)     Start     Ordered    01/13/23 1438  Discharge patient  Once        Expected Discharge Date: 01/13/23    Expected Discharge Time: 14:44    Discharge Disposition: Home or Self Care    Physician of Record for Attribution - Please select from Treatment Team: JEREMIAS GARCIA [[id number]]    Review needed by CMO to determine Physician of Record: No       Question Answer Comment   Physician of Record for Attribution - Please select from Treatment Team JEREMIAS GARCIA    Review needed by CMO to determine Physician of Record No        01/13/23 1444

## 2023-01-16 ENCOUNTER — TRANSITIONAL CARE MANAGEMENT TELEPHONE ENCOUNTER (OUTPATIENT)
Dept: CALL CENTER | Facility: HOSPITAL | Age: 63
End: 2023-01-16
Payer: COMMERCIAL

## 2023-01-16 ENCOUNTER — TELEPHONE (OUTPATIENT)
Dept: PHARMACY | Facility: HOSPITAL | Age: 63
End: 2023-01-16
Payer: COMMERCIAL

## 2023-01-16 LAB
BACTERIA SPEC AEROBE CULT: NORMAL
BACTERIA SPEC AEROBE CULT: NORMAL

## 2023-01-16 NOTE — TELEPHONE ENCOUNTER
Called patient regarding recent hospital admission (discharge on 1/13). INR on discharge was 2.29.  He states he is taking cephalexin for another week or so.  Advised him to recheck INR end of this week so we can evaluate if the dose of warfarin needs to be adjusted due to cephalexin interaction.

## 2023-01-16 NOTE — OUTREACH NOTE
Call Center TCM Note    Flowsheet Row Responses   [facility] patient discharged from? [place]   Does the patient have one of the following disease processes/diagnoses(primary or secondary)? Other   TCM attempt successful? Yes   Call start time 0943   Call end time 0945   Discharge diagnosis Cellulitis   Is patient permission given to speak with other caregiver? Yes   List who call center can speak with spouse- Nola   Person spoke with today (if not patient) and relationship spouse   Meds reviewed with patient/caregiver? Yes   Is the patient having any side effects they believe may be caused by any medication additions or changes? No   Does the patient have all medications ordered at discharge? Yes   Is the patient taking all medications as directed (includes completed medication regime)? Yes   Comments Hospital f/u scheduled for 1/23 during call   Does the patient have an appointment with their PCP within 7 days of discharge? Yes   What is the Home health agency?  [facility]    Has home health visited the patient within 72 hours of discharge? Call prior to 72 hours   Psychosocial issues? No   Did the patient receive a copy of their discharge instructions? Yes   What is the patient's perception of their health status since discharge? Improving   Is the patient/caregiver able to teach back the hierarchy of who to call/visit for symptoms/problems? PCP, Specialist, Home health nurse, Urgent Care, ED, 911 Yes   TCM call completed? Yes   Wrap up additional comments Per spouse, patient is doing well, scheduled f/u appt with Dr. Beltran for 1/23.   Call end time 0945   Would this patient benefit from a Referral to [facility] Social Work? No   Is the patient interested in additional calls from an ambulatory ?  NOTE:  applies to high risk patients requiring additional follow-up. No          Meeta Loza RN    1/16/2023, 09:46 EST

## 2023-01-18 ENCOUNTER — OFFICE VISIT (OUTPATIENT)
Dept: FAMILY MEDICINE CLINIC | Facility: CLINIC | Age: 63
End: 2023-01-18
Payer: COMMERCIAL

## 2023-01-18 VITALS
WEIGHT: 305 LBS | TEMPERATURE: 97.8 F | BODY MASS INDEX: 39.14 KG/M2 | OXYGEN SATURATION: 97 % | HEART RATE: 73 BPM | HEIGHT: 74 IN | SYSTOLIC BLOOD PRESSURE: 118 MMHG | DIASTOLIC BLOOD PRESSURE: 84 MMHG

## 2023-01-18 DIAGNOSIS — I87.8 VENOUS STASIS OF LOWER EXTREMITY: ICD-10-CM

## 2023-01-18 DIAGNOSIS — L03.115 CELLULITIS AND ABSCESS OF RIGHT LOWER EXTREMITY: Primary | ICD-10-CM

## 2023-01-18 DIAGNOSIS — Z86.19 HISTORY OF SEPSIS: ICD-10-CM

## 2023-01-18 DIAGNOSIS — L02.415 CELLULITIS AND ABSCESS OF RIGHT LOWER EXTREMITY: Primary | ICD-10-CM

## 2023-01-18 PROBLEM — R06.02 SHORTNESS OF BREATH: Status: ACTIVE | Noted: 2023-01-10

## 2023-01-18 PROCEDURE — 99213 OFFICE O/P EST LOW 20 MIN: CPT | Performed by: FAMILY MEDICINE

## 2023-01-18 NOTE — PROGRESS NOTES
Subjective   Elliot Sebastian is a 62 y.o. male with   Chief Complaint   Patient presents with   • Hospital Follow Up Visit   .    History of Present Illness   62-year-old white male in follow-up for recent hospitalization for recurrent cellulitis of the right lower extremity.  Patient is status post mitral valve replacement with mechanical valve and is on warfarin.  He is concerned given these recurrent episodes of cellulitis that resulted in sepsis.  He was admitted and placed on IV vancomycin as well as increase fluids.  During his last admission blood cultures were obtained and found to be negative.  He is concerned that he may have developed vegetations on his mitral valve that are not showing up on his transthoracic echoes.  He was supposed to have a transesophageal echo before leaving the hospital but this never happened.  He does have a cardiologist-Dr. Fay who recently retired.  His nurse practitioner is now taking over this individual's care.  Patient cellulitis always occurs in the right anterior shin region and spreads from there.  He denies ever seeing a break in the skin although admits to having some poor venous structures in this area.  The following portions of the patient's history were reviewed and updated as appropriate: allergies, current medications, past family history, past medical history, past social history, past surgical history and problem list.    Review of Systems   Cardiovascular:        Venous stasis right lower extremity, paroxysmal atrial fibrillation, status post mitral valve replacement with mechanical valve, hypertension   Skin:        Cellulitis right lower extremity   Hematological:        Sepsis       Objective     Vitals:    01/18/23 1617   BP: 118/84   Pulse: 73   Temp: 97.8 °F (36.6 °C)   SpO2: 97%       Recent Results (from the past 672 hour(s))   Protime-INR    Collection Time: 12/22/22 12:00 AM    Specimen: Blood   Result Value Ref Range    INR 3.70    Protime-INR     Collection Time: 12/27/22 12:00 AM    Specimen: Blood   Result Value Ref Range    INR 3.30    Protime-INR    Collection Time: 01/03/23 12:00 AM    Specimen: Blood   Result Value Ref Range    INR 4.70    Protime-INR    Collection Time: 01/09/23 12:00 AM    Specimen: Blood   Result Value Ref Range    INR 2.30    Blood Culture - Blood, Arm, Right    Collection Time: 01/11/23  2:42 PM    Specimen: Arm, Right; Blood   Result Value Ref Range    Blood Culture No growth at 5 days    Comprehensive Metabolic Panel    Collection Time: 01/11/23  2:44 PM    Specimen: Arm, Left; Blood   Result Value Ref Range    Glucose 122 (H) 65 - 99 mg/dL    BUN 22 8 - 23 mg/dL    Creatinine 1.18 0.76 - 1.27 mg/dL    Sodium 136 136 - 145 mmol/L    Potassium 4.4 3.5 - 5.2 mmol/L    Chloride 104 98 - 107 mmol/L    CO2 22.0 22.0 - 29.0 mmol/L    Calcium 9.6 8.6 - 10.5 mg/dL    Total Protein 6.2 6.0 - 8.5 g/dL    Albumin 3.8 3.5 - 5.2 g/dL    ALT (SGPT) 29 1 - 41 U/L    AST (SGOT) 48 (H) 1 - 40 U/L    Alkaline Phosphatase 70 39 - 117 U/L    Total Bilirubin 1.2 0.0 - 1.2 mg/dL    Globulin 2.4 gm/dL    A/G Ratio 1.6 g/dL    BUN/Creatinine Ratio 18.6 7.0 - 25.0    Anion Gap 10.0 5.0 - 15.0 mmol/L    eGFR 70.2 >60.0 mL/min/1.73   Protime-INR    Collection Time: 01/11/23  2:44 PM    Specimen: Arm, Left; Blood   Result Value Ref Range    Protime 20.8 (H) 11.7 - 14.2 Seconds    INR 1.77 (H) 0.90 - 1.10   aPTT    Collection Time: 01/11/23  2:44 PM    Specimen: Arm, Left; Blood   Result Value Ref Range    PTT 43.9 (H) 22.7 - 35.4 seconds   Lactic Acid, Plasma    Collection Time: 01/11/23  2:44 PM    Specimen: Arm, Left; Blood   Result Value Ref Range    Lactate 1.5 0.5 - 2.0 mmol/L   Procalcitonin    Collection Time: 01/11/23  2:44 PM    Specimen: Arm, Left; Blood   Result Value Ref Range    Procalcitonin 19.10 (H) 0.00 - 0.25 ng/mL   CBC Auto Differential    Collection Time: 01/11/23  2:44 PM    Specimen: Arm, Left; Blood   Result Value Ref Range     WBC 10.69 3.40 - 10.80 10*3/mm3    RBC 4.42 4.14 - 5.80 10*6/mm3    Hemoglobin 14.2 13.0 - 17.7 g/dL    Hematocrit 41.9 37.5 - 51.0 %    MCV 94.8 79.0 - 97.0 fL    MCH 32.1 26.6 - 33.0 pg    MCHC 33.9 31.5 - 35.7 g/dL    RDW 13.6 12.3 - 15.4 %    RDW-SD 47.6 37.0 - 54.0 fl    MPV 10.0 6.0 - 12.0 fL    Platelets 144 140 - 450 10*3/mm3    Neutrophil % 78.0 (H) 42.7 - 76.0 %    Lymphocyte % 12.7 (L) 19.6 - 45.3 %    Monocyte % 8.2 5.0 - 12.0 %    Eosinophil % 0.5 0.3 - 6.2 %    Basophil % 0.3 0.0 - 1.5 %    Immature Grans % 0.3 0.0 - 0.5 %    Neutrophils, Absolute 8.34 (H) 1.70 - 7.00 10*3/mm3    Lymphocytes, Absolute 1.36 0.70 - 3.10 10*3/mm3    Monocytes, Absolute 0.88 0.10 - 0.90 10*3/mm3    Eosinophils, Absolute 0.05 0.00 - 0.40 10*3/mm3    Basophils, Absolute 0.03 0.00 - 0.20 10*3/mm3    Immature Grans, Absolute 0.03 0.00 - 0.05 10*3/mm3    nRBC 0.0 0.0 - 0.2 /100 WBC   Duplex Venous Lower Extremity - Right CAR    Collection Time: 01/11/23  4:19 PM   Result Value Ref Range    Target HR (85%) 135 bpm    Max. Pred. HR (100%) 159 bpm    Right Mid Femoral Spont 1.0     Right Common Femoral Spont Y     Right Common Femoral Competent Y     Right Common Femoral Phasic Y     Right Common Femoral Compress C     Right Common Femoral Augment Y     Right Saphenofemoral Junction Compress C     Right Profunda Femoral Compress C     Right Proximal Femoral Compress C     Right Mid Femoral Spont Y     Right Mid Femoral Competent Y     Right Mid Femoral Phasic Y     Right Mid Femoral Compress C     Right Mid Femoral Augment Y     Right Mid Femoral Thrombus C     Right Distal Femoral Compress C     Right Popliteal Spont Y     Right Popliteal Competent Y     Right Popliteal Phasic Y     Right Popliteal Compress C     Right Popliteal Augment Y     Right Posterior Tibial Compress C     Right Peroneal Compress C     Right Gastronemius Compress C     BH CV LOWER VASCULAR RIGHT SOLEAL COMPRESS C     Right Greater Saph AK Compress C      Right Greater Saph BK Compress C     Right Lesser Saph Compress C     Left Common Femoral Spont Y     Left Common Femoral Competent Y     Left Common Femoral Phasic Y     Left Common Femoral Compress C     Left Common Femoral Augment Y    Blood Culture - Blood, Arm, Left    Collection Time: 01/11/23  5:15 PM    Specimen: Arm, Left; Blood   Result Value Ref Range    Blood Culture No growth at 5 days    Basic Metabolic Panel    Collection Time: 01/12/23  5:53 AM    Specimen: Blood   Result Value Ref Range    Glucose 100 (H) 65 - 99 mg/dL    BUN 17 8 - 23 mg/dL    Creatinine 1.01 0.76 - 1.27 mg/dL    Sodium 138 136 - 145 mmol/L    Potassium 4.1 3.5 - 5.2 mmol/L    Chloride 107 98 - 107 mmol/L    CO2 23.0 22.0 - 29.0 mmol/L    Calcium 9.1 8.6 - 10.5 mg/dL    BUN/Creatinine Ratio 16.8 7.0 - 25.0    Anion Gap 8.0 5.0 - 15.0 mmol/L    eGFR 84.6 >60.0 mL/min/1.73   CBC (No Diff)    Collection Time: 01/12/23  5:53 AM    Specimen: Blood   Result Value Ref Range    WBC 7.02 3.40 - 10.80 10*3/mm3    RBC 4.31 4.14 - 5.80 10*6/mm3    Hemoglobin 14.0 13.0 - 17.7 g/dL    Hematocrit 39.6 37.5 - 51.0 %    MCV 91.9 79.0 - 97.0 fL    MCH 32.5 26.6 - 33.0 pg    MCHC 35.4 31.5 - 35.7 g/dL    RDW 13.3 12.3 - 15.4 %    RDW-SD 45.2 37.0 - 54.0 fl    MPV 9.6 6.0 - 12.0 fL    Platelets 154 140 - 450 10*3/mm3   Protime-INR    Collection Time: 01/12/23  5:53 AM    Specimen: Blood   Result Value Ref Range    Protime 23.1 (H) 11.7 - 14.2 Seconds    INR 2.02 (H) 0.90 - 1.10   C-reactive Protein    Collection Time: 01/12/23  5:53 AM    Specimen: Blood   Result Value Ref Range    C-Reactive Protein 12.19 (H) 0.00 - 0.50 mg/dL   Adult Transthoracic Echo Complete W/ Cont if Necessary Per Protocol    Collection Time: 01/12/23 11:14 AM   Result Value Ref Range    Target HR (85%) 135 bpm    Max. Pred. HR (100%) 159 bpm    EF(MOD-bp) 53.8 %    LVIDd 6.0 cm    LVIDs 4.7 cm    IVSd 1.14 cm    LVPWd 1.11 cm    FS 21.2 %    IVS/LVPW 1.03 cm    ESV(cubed)  107.1 ml    LV Sys Vol (BSA corrected) 38.1 cm2    EDV(cubed) 218.7 ml    LV Palafox Vol (BSA corrected) 86.2 cm2    LVOT area 3.6 cm2    LV mass(C)d 291.2 grams    LVOT diam 2.14 cm    EDV(MOD-sp2) 134.0 ml    EDV(MOD-sp4) 231.0 ml    ESV(MOD-sp2) 65.0 ml    ESV(MOD-sp4) 102.0 ml    SV(MOD-sp2) 69.0 ml    SV(MOD-sp4) 129.0 ml    SI(MOD-sp2) 25.7 ml/m2    SI(MOD-sp4) 48.1 ml/m2    EF(MOD-sp2) 51.5 %    EF(MOD-sp4) 55.8 %    MV E max humza 136.0 cm/sec    MV A max humza 100.4 cm/sec    MV dec time 0.30 msec    MV E/A 1.35     Pulm A Revs Dur 0.12 sec    MV A dur 0.17 sec    LA ESV Index (BP) 31.4 ml/m2    Med Peak E' Humza 5.8 cm/sec    Lat Peak E' Humza 6.2 cm/sec    Avg E/e' ratio 22.67     SV(LVOT) 59.6 ml    SV(RVOT) 52.8 ml    Qp/Qs 0.89     RV Base 3.9 cm    RV Mid 3.9 cm    RV Length 8.4 cm    TAPSE (>1.6) 1.80 cm    RV S' 11.6 cm/sec    Pulm Sys Humza 35.6 cm/sec    Pulm Palafox Humza 24.4 cm/sec    Pulm S/D 1.46     Pulm A Revs Humza 18.4 cm/sec    LV V1 max 86.3 cm/sec    LV V1 max PG 3.0 mmHg    LV V1 mean PG 1.19 mmHg    LV V1 VTI 16.6 cm    Ao pk humza 96.9 cm/sec    Ao max PG 3.8 mmHg    Ao mean PG 2.7 mmHg    Ao V2 VTI 25.1 cm    BERTA(I,D) 2.37 cm2    MV max PG 10.3 mmHg    MV mean PG 3.7 mmHg    MV V2 VTI 48.8 cm    MV P1/2t 96.0 msec    MVA(P1/2t) 2.29 cm2    MVA(VTI) 1.22 cm2    MV dec slope 488.7 cm/sec2    TR max humza 188.8 cm/sec    TR max PG 14.3 mmHg    RVOT diam 2.08 cm    RV V1 max PG 1.65 mmHg    RV V1 max 64.3 cm/sec    RV V1 VTI 15.6 cm    PA V2 max 73.3 cm/sec    PA acc time 0.06 sec    PA pr(Accel) 54.0 mmHg    Ao root diam 3.8 cm    ACS 1.84 cm    Sinus 3.9 cm    STJ 3.3 cm    RVSP(TR) 22 mmHg    RAP systole 8 mmHg    Ascending aorta 3.3 cm   Protime-INR    Collection Time: 01/13/23  6:25 AM    Specimen: Blood   Result Value Ref Range    Protime 25.5 (H) 11.7 - 14.2 Seconds    INR 2.29 (H) 0.90 - 1.10   Basic Metabolic Panel    Collection Time: 01/13/23  6:25 AM    Specimen: Blood   Result Value Ref Range     Glucose 96 65 - 99 mg/dL    BUN 12 8 - 23 mg/dL    Creatinine 0.90 0.76 - 1.27 mg/dL    Sodium 139 136 - 145 mmol/L    Potassium 4.3 3.5 - 5.2 mmol/L    Chloride 109 (H) 98 - 107 mmol/L    CO2 22.5 22.0 - 29.0 mmol/L    Calcium 9.2 8.6 - 10.5 mg/dL    BUN/Creatinine Ratio 13.3 7.0 - 25.0    Anion Gap 7.5 5.0 - 15.0 mmol/L    eGFR 97.2 >60.0 mL/min/1.73   CBC (No Diff)    Collection Time: 01/13/23  6:25 AM    Specimen: Blood   Result Value Ref Range    WBC 4.87 3.40 - 10.80 10*3/mm3    RBC 4.06 (L) 4.14 - 5.80 10*6/mm3    Hemoglobin 12.9 (L) 13.0 - 17.7 g/dL    Hematocrit 37.7 37.5 - 51.0 %    MCV 92.9 79.0 - 97.0 fL    MCH 31.8 26.6 - 33.0 pg    MCHC 34.2 31.5 - 35.7 g/dL    RDW 13.2 12.3 - 15.4 %    RDW-SD 45.5 37.0 - 54.0 fl    MPV 10.4 6.0 - 12.0 fL    Platelets 151 140 - 450 10*3/mm3   Sedimentation Rate    Collection Time: 01/13/23  6:25 AM    Specimen: Blood   Result Value Ref Range    Sed Rate 19 0 - 20 mm/hr   C-reactive Protein    Collection Time: 01/13/23  6:25 AM    Specimen: Blood   Result Value Ref Range    C-Reactive Protein 4.76 (H) 0.00 - 0.50 mg/dL   Procalcitonin    Collection Time: 01/13/23  6:25 AM    Specimen: Blood   Result Value Ref Range    Procalcitonin 4.67 (H) 0.00 - 0.25 ng/mL       Physical Exam  Vitals and nursing note reviewed.   Constitutional:       Appearance: Normal appearance. He is well-developed and well-groomed.   HENT:      Head: Normocephalic and atraumatic.   Neck:      Thyroid: No thyroid mass or thyromegaly.      Vascular: Normal carotid pulses. No carotid bruit.      Trachea: Trachea and phonation normal.   Cardiovascular:      Rate and Rhythm: Normal rate and regular rhythm.      Heart sounds: Normal heart sounds. No murmur heard.    No friction rub. No gallop.      Comments: Varicosities observed in the anterior tibial region of the right lower extremity with taunt tissue without pitting.  Pulmonary:      Effort: Pulmonary effort is normal. No respiratory distress.       Breath sounds: Normal breath sounds. No decreased breath sounds, wheezing, rhonchi or rales.   Musculoskeletal:      Cervical back: Neck supple.   Lymphadenopathy:      Cervical: No cervical adenopathy.   Skin:     General: Skin is warm and dry.      Findings: No rash.   Neurological:      Mental Status: He is alert and oriented to person, place, and time.   Psychiatric:         Attention and Perception: Attention and perception normal.         Mood and Affect: Mood and affect normal.         Speech: Speech normal.         Behavior: Behavior normal. Behavior is cooperative.         Thought Content: Thought content normal.         Cognition and Memory: Cognition and memory normal.         Judgment: Judgment normal.         Assessment & Plan   Diagnoses and all orders for this visit:    1. Cellulitis and abscess of right lower extremity (Primary)  -     Ambulatory Referral to Vascular Surgery    2. History of sepsis  -     Ambulatory Referral to Vascular Surgery    3. Venous stasis of lower extremity  -     Ambulatory Referral to Vascular Surgery        Return if symptoms worsen or fail to improve.

## 2023-01-19 ENCOUNTER — PATIENT MESSAGE (OUTPATIENT)
Dept: FAMILY MEDICINE CLINIC | Facility: CLINIC | Age: 63
End: 2023-01-19
Payer: COMMERCIAL

## 2023-01-19 ENCOUNTER — ANTICOAGULATION VISIT (OUTPATIENT)
Dept: PHARMACY | Facility: HOSPITAL | Age: 63
End: 2023-01-19
Payer: COMMERCIAL

## 2023-01-19 DIAGNOSIS — Z95.2 HX OF MITRAL VALVE REPLACEMENT WITH MECHANICAL VALVE: ICD-10-CM

## 2023-01-19 DIAGNOSIS — I48.0 PAROXYSMAL ATRIAL FIBRILLATION: Primary | ICD-10-CM

## 2023-01-19 LAB — INR PPP: 2.2

## 2023-01-19 PROCEDURE — G0249 PROVIDE INR TEST MATER/EQUIP: HCPCS

## 2023-01-19 NOTE — PROGRESS NOTES
Anticoagulation Clinic Progress Note    Anticoagulation Summary  As of 2023    INR goal:  2.5-3.5   TTR:  64.2 % (4.1 y)   INR used for dosin.20 (2023)   Warfarin maintenance plan:  5 mg every day; Starting 2023   Weekly warfarin total:  35 mg   Plan last modified:  Michael Horner, PharmD (2022)   Next INR check:  2023   Priority:  High   Target end date:  Indefinite    Indications    Paroxysmal atrial fibrillation (HCC) [I48.0]  Hx of mitral valve replacement with mechanical valve [Z95.2] [Z95.2]             Anticoagulation Episode Summary     INR check location:  Home Draw    Preferred lab:      Send INR reminders to:   [facility]  [facility]    Comments:   home juliet      Anticoagulation Care Providers     Provider Role Specialty Phone number    Navi Fay MD Referring Cardiology [phone]    Leyla Byrne APRN Referring Cardiology [phone]          Clinic Interview:  Patient Findings     Positives:  Change in medications, Change in diet/appetite, Hospital   admission    Negatives:  Signs/symptoms of thrombosis, Signs/symptoms of bleeding,   Laboratory test error suspected, Change in health, Change in alcohol use,   Change in activity, Upcoming invasive procedure, Emergency department   visit, Upcoming dental procedure, Missed doses, Extra doses, Bruising,   Other complaints    Comments:  Admitted - for cellulitis. Discharged on keflex   through . Ate a whole bag of brussels to help lower his INR and drank   more etoh.      Clinical Outcomes     Negatives:  Major bleeding event, Thromboembolic event,   Anticoagulation-related hospital admission, Anticoagulation-related ED   visit, Anticoagulation-related fatality    Comments:  Admitted - for cellulitis. Discharged on keflex   through . Ate a whole bag of brussels to help lower his INR and drank   more etoh.        INR History:  Anticoagulation Monitoring 1/3/2023 2023  1/19/2023   INR 4.70 2.30 2.20   INR Date 1/3/2023 1/9/2023 1/19/2023   INR Goal 2.5-3.5 2.5-3.5 2.5-3.5   Trend Same Same Same   Last Week Total 35 mg 27.5 mg 40 mg   Next Week Total 27.5 mg 35 mg 37.5 mg   Sun 5 mg 5 mg 5 mg   Mon 5 mg 5 mg 5 mg   Tue Hold (1/3) 5 mg 5 mg   Wed 2.5 mg (1/4) 5 mg 5 mg   Thu 5 mg 5 mg 7.5 mg (1/19)   Fri 5 mg 5 mg 5 mg   Sat 5 mg 5 mg 5 mg   Visit Report - - -   Some recent data might be hidden       Plan:  1. INR is Subtherapeutic today- see above in Anticoagulation Summary.   Will instruct Elliot Sebastian to Increase their warfarin regimen- see above in Anticoagulation Summary.  2. Follow up in 1 weeks  3. They have been instructed to call if any changes in medications, doses, concerns, etc. Patient expresses understanding and has no further questions at this time.    Angelica Marroquin [facility]

## 2023-01-20 ENCOUNTER — TELEPHONE (OUTPATIENT)
Dept: FAMILY MEDICINE CLINIC | Facility: CLINIC | Age: 63
End: 2023-01-20
Payer: COMMERCIAL

## 2023-01-20 NOTE — TELEPHONE ENCOUNTER
Caller: Elliot Sebastian    Relationship: Self    Best call back number: [phone]    What form or medical record are you requesting: WORK RELEASE FORM - THIS NEEDS TO BE EDITED TO ADDRESS THE SHORTNESS OF BREATH THAT THE PATIENT EXPERIENCED.     Who is requesting this form or medical record from you: [employer]MENT    How would you like to receive the form or medical records (pick-up, mail, fax): FAX BACK [phone]    Timeframe paperwork needed: ASAP

## 2023-01-26 ENCOUNTER — ANTICOAGULATION VISIT (OUTPATIENT)
Dept: PHARMACY | Facility: HOSPITAL | Age: 63
End: 2023-01-26
Payer: COMMERCIAL

## 2023-01-26 DIAGNOSIS — Z95.2 HX OF MITRAL VALVE REPLACEMENT WITH MECHANICAL VALVE: ICD-10-CM

## 2023-01-26 DIAGNOSIS — I48.0 PAROXYSMAL ATRIAL FIBRILLATION: Primary | ICD-10-CM

## 2023-01-26 LAB — INR PPP: 4.5

## 2023-01-26 NOTE — PROGRESS NOTES
Anticoagulation Clinic Progress Note    Anticoagulation Summary  As of 2023    INR goal:  2.5-3.5   TTR:  64.1 % (4.2 y)   INR used for dosin.50 (2023)   Warfarin maintenance plan:  5 mg every day; Starting 2023   Weekly warfarin total:  35 mg   Plan last modified:  Michael Horner, PharmD (2022)   Next INR check:  2023   Priority:  High   Target end date:  Indefinite    Indications    Paroxysmal atrial fibrillation (HCC) [I48.0]  Hx of mitral valve replacement with mechanical valve [Z95.2] [Z95.2]             Anticoagulation Episode Summary     INR check location:  Home Draw    Preferred lab:      Send INR reminders to:  [facility]  POOL    Comments:   home juliet      Anticoagulation Care Providers     Provider Role Specialty Phone number    Navi Fay MD Referring Cardiology [phone]    Leyla Byrne APRN Referring Cardiology [phone]          Clinic Interview:  Patient Findings     Positives:  Change in alcohol use    Negatives:  Signs/symptoms of thrombosis, Signs/symptoms of bleeding,   Laboratory test error suspected, Change in health, Change in activity,   Upcoming invasive procedure, Emergency department visit, Upcoming dental   procedure, Missed doses, Extra doses, Change in medications, Change in   diet/appetite, Hospital admission, Bruising, Other complaints    Comments:  Endorses more alcohol this week.      Clinical Outcomes     Negatives:  Major bleeding event, Thromboembolic event,   Anticoagulation-related hospital admission, Anticoagulation-related ED   visit, Anticoagulation-related fatality    Comments:  Endorses more alcohol this week.        INR History:  Anticoagulation Monitoring 2023   INR 2.30 2.20 4.50   INR Date 2023   INR Goal 2.5-3.5 2.5-3.5 2.5-3.5   Trend Same Same Same   Last Week Total 27.5 mg 40 mg 37.5 mg   Next Week Total 35 mg 37.5 mg 27.5 mg   Sun 5 mg 5 mg 5 mg    Mon 5 mg 5 mg 5 mg   Tue 5 mg 5 mg 5 mg   Wed 5 mg 5 mg 5 mg   Thu 5 mg 7.5 mg (1/19) Hold (1/26)   Fri 5 mg 5 mg 2.5 mg (1/27)   Sat 5 mg 5 mg 5 mg   Visit Report - - -   Some recent data might be hidden       Plan:  1. INR is Supratherapeutic today- see above in Anticoagulation Summary.   Will instruct Elliot Sebastian to Change their warfarin regimen- see above in Anticoagulation Summary.  Hold today, take 2.5mg tomorrow then resume 5 mg daily until next INR.  2. Follow up in 1 weeks  3. They have been instructed to call if any changes in medications, doses, concerns, etc. Patient expresses understanding and has no further questions at this time.    Bushra Torre, PharmD

## 2023-02-02 RX ORDER — WARFARIN SODIUM 5 MG/1
TABLET ORAL
Qty: 90 TABLET | Refills: 1 | Status: SHIPPED | OUTPATIENT
Start: 2023-02-02

## 2023-02-02 RX ORDER — LOSARTAN POTASSIUM 50 MG/1
50 TABLET ORAL DAILY
Qty: 90 TABLET | Refills: 0 | Status: SHIPPED | OUTPATIENT
Start: 2023-02-02

## 2023-02-02 RX ORDER — ATENOLOL 25 MG/1
25 TABLET ORAL DAILY
Qty: 90 TABLET | Refills: 0 | Status: SHIPPED | OUTPATIENT
Start: 2023-02-02

## 2023-02-03 ENCOUNTER — ANTICOAGULATION VISIT (OUTPATIENT)
Dept: PHARMACY | Facility: HOSPITAL | Age: 63
End: 2023-02-03
Payer: COMMERCIAL

## 2023-02-03 DIAGNOSIS — Z95.2 HX OF MITRAL VALVE REPLACEMENT WITH MECHANICAL VALVE: ICD-10-CM

## 2023-02-03 DIAGNOSIS — I48.0 PAROXYSMAL ATRIAL FIBRILLATION: Primary | ICD-10-CM

## 2023-02-03 LAB — INR PPP: 3.6

## 2023-02-03 NOTE — PROGRESS NOTES
Anticoagulation Clinic Progress Note    Anticoagulation Summary  As of 2/3/2023    INR goal:  2.5-3.5   TTR:  63.8 % (4.2 y)   INR used for dosing:  3.60 (2/3/2023)   Warfarin maintenance plan:  5 mg every day; Starting 2/3/2023   Weekly warfarin total:  35 mg   Plan last modified:  Michael Horner, PharmD (8/11/2022)   Next INR check:  2/10/2023   Priority:  High   Target end date:  Indefinite    Indications    Paroxysmal atrial fibrillation (HCC) [I48.0]  Hx of mitral valve replacement with mechanical valve [Z95.2] [Z95.2]             Anticoagulation Episode Summary     INR check location:  Home Draw    Preferred lab:      Send INR reminders to:  [facility]Z  POOL    Comments:   home juliet      Anticoagulation Care Providers     Provider Role Specialty Phone number    Navi Fay MD Referring Cardiology [phone]    Leyla Byrne APRN Referring Cardiology [phone]          Clinic Interview:  Patient Findings     Positives:  Change in alcohol use    Negatives:  Signs/symptoms of thrombosis, Signs/symptoms of bleeding,   Laboratory test error suspected, Change in health, Change in activity,   Upcoming invasive procedure, Emergency department visit, Upcoming dental   procedure, Missed doses, Extra doses, Change in medications, Change in   diet/appetite, Hospital admission, Bruising, Other complaints    Comments:  Reports more ETOH than typical. Already took dose this AM.      Clinical Outcomes     Negatives:  Major bleeding event, Thromboembolic event,   Anticoagulation-related hospital admission, Anticoagulation-related ED   visit, Anticoagulation-related fatality    Comments:  Reports more ETOH than typical. Already took dose this AM.        INR History:  Anticoagulation Monitoring 1/19/2023 1/26/2023 2/3/2023   INR 2.20 4.50 3.60   INR Date 1/19/2023 1/26/2023 2/3/2023   INR Goal 2.5-3.5 2.5-3.5 2.5-3.5   Trend Same Same Same   Last Week Total 40 mg 37.5 mg 32.5 mg   Next Week  Total 37.5 mg 27.5 mg 32.5 mg   Sun 5 mg 5 mg 5 mg   Mon 5 mg 5 mg 5 mg   Tue 5 mg 5 mg 5 mg   Wed 5 mg 5 mg 5 mg   Thu 7.5 mg (1/19) Hold (1/26) 5 mg   Fri 5 mg 2.5 mg (1/27) 5 mg   Sat 5 mg 5 mg 2.5 mg (2/4)   Visit Report - - -   Some recent data might be hidden       Plan:  1. INR is Supratherapeutic today- see above in Anticoagulation Summary.   Will instruct Elliot Sebastian to Change their warfarin regimen- see above in Anticoagulation Summary.  2. Follow up in 1 weeks  3. They have been instructed to call if any changes in medications, doses, concerns, etc. Patient expresses understanding and has no further questions at this time.    Bushra Torre, PharmD

## 2023-02-09 ENCOUNTER — ANTICOAGULATION VISIT (OUTPATIENT)
Dept: PHARMACY | Facility: HOSPITAL | Age: 63
End: 2023-02-09
Payer: COMMERCIAL

## 2023-02-09 DIAGNOSIS — I48.0 PAROXYSMAL ATRIAL FIBRILLATION: Primary | ICD-10-CM

## 2023-02-09 DIAGNOSIS — Z95.2 HX OF MITRAL VALVE REPLACEMENT WITH MECHANICAL VALVE: ICD-10-CM

## 2023-02-09 LAB — INR PPP: 2.8

## 2023-02-09 NOTE — PROGRESS NOTES
Anticoagulation Clinic Progress Note    Anticoagulation Summary  As of 2023    INR goal:  2.5-3.5   TTR:  63.9 % (4.2 y)   INR used for dosin.80 (2023)   Warfarin maintenance plan:  5 mg every day; Starting 2023   Weekly warfarin total:  35 mg   No change documented:  Angelica Marroquin RPH   Plan last modified:  Michael Horner, PharmD (2022)   Next INR check:  2023   Priority:  High   Target end date:  Indefinite    Indications    Paroxysmal atrial fibrillation (HCC) [I48.0]  Hx of mitral valve replacement with mechanical valve [Z95.2] [Z95.2]             Anticoagulation Episode Summary     INR check location:  Home Draw    Preferred lab:      Send INR reminders to:   [facility]  POOL    Comments:   home juliet      Anticoagulation Care Providers     Provider Role Specialty Phone number    Navi Fay MD Referring Cardiology [phone]    Leyla Byrne APRN Referring Cardiology [phone]          Clinic Interview:  No pertinent clinical findings have been reported.    INR History:  Anticoagulation Monitoring 2023 2/3/2023 2023   INR 4.50 3.60 2.80   INR Date 2023 2/3/2023 2023   INR Goal 2.5-3.5 2.5-3.5 2.5-3.5   Trend Same Same Same   Last Week Total 37.5 mg 32.5 mg 32.5 mg   Next Week Total 27.5 mg 32.5 mg 35 mg   Sun 5 mg 5 mg 5 mg   Mon 5 mg 5 mg 5 mg   Tue 5 mg 5 mg 5 mg   Wed 5 mg 5 mg 5 mg   Thu Hold () 5 mg 5 mg   Fri 2.5 mg () 5 mg 5 mg   Sat 5 mg 2.5 mg () 5 mg   Visit Report - - -   Some recent data might be hidden       Plan:  1. INR is therapeutic today- see above in Anticoagulation Summary.    Elliot Sebastian to continue their warfarin regimen- see above in Anticoagulation Summary.  2. Follow up in 1 week  3.  They have been instructed to call if any changes in medications, doses, concerns, etc. Patient expresses understanding and has no further questions at this time.    Angelica Marroquin RPH

## 2023-02-16 LAB — INR PPP: 3

## 2023-02-17 ENCOUNTER — ANTICOAGULATION VISIT (OUTPATIENT)
Dept: PHARMACY | Facility: HOSPITAL | Age: 63
End: 2023-02-17
Payer: COMMERCIAL

## 2023-02-17 DIAGNOSIS — Z95.2 HX OF MITRAL VALVE REPLACEMENT WITH MECHANICAL VALVE: ICD-10-CM

## 2023-02-17 DIAGNOSIS — I48.0 PAROXYSMAL ATRIAL FIBRILLATION: Primary | ICD-10-CM

## 2023-02-17 PROCEDURE — G0249 PROVIDE INR TEST MATER/EQUIP: HCPCS

## 2023-02-17 NOTE — PROGRESS NOTES
Anticoagulation Clinic Progress Note    Anticoagulation Summary  As of 2/17/2023    INR goal:  2.5-3.5   TTR:  64.0 % (4.2 y)   INR used for dosing:  3.00 (2/16/2023)   Warfarin maintenance plan:  5 mg every day; Starting 2/17/2023   Weekly warfarin total:  35 mg   No change documented:  Bushra Torre, PharmD   Plan last modified:  Michael Horner, PharmD (8/11/2022)   Next INR check:  2/24/2023   Priority:  High   Target end date:  Indefinite    Indications    Paroxysmal atrial fibrillation (HCC) [I48.0]  Hx of mitral valve replacement with mechanical valve [Z95.2] [Z95.2]             Anticoagulation Episode Summary     INR check location:  Home Draw    Preferred lab:      Send INR reminders to:   [facility]  [facility]    Comments:   home juliet      Anticoagulation Care Providers     Provider Role Specialty Phone number    Navi Fay MD Referring Cardiology [phone]    Leyla Byrne APRN Referring Cardiology [phone]          Clinic Interview:  Patient Findings     Negatives:  Signs/symptoms of thrombosis, Signs/symptoms of bleeding,   Laboratory test error suspected, Change in health, Change in alcohol use,   Change in activity, Upcoming invasive procedure, Emergency department   visit, Upcoming dental procedure, Missed doses, Extra doses, Change in   medications, Change in diet/appetite, Hospital admission, Bruising, Other   complaints      Clinical Outcomes     Negatives:  Major bleeding event, Thromboembolic event,   Anticoagulation-related hospital admission, Anticoagulation-related ED   visit, Anticoagulation-related fatality        INR History:  Anticoagulation Monitoring 2/3/2023 2/9/2023 2/17/2023   INR 3.60 2.80 3.00   INR Date 2/3/2023 2/9/2023 2/16/2023   INR Goal 2.5-3.5 2.5-3.5 2.5-3.5   Trend Same Same Same   Last Week Total 32.5 mg 32.5 mg 35 mg   Next Week Total 32.5 mg 35 mg 35 mg   Sun 5 mg 5 mg 5 mg   Mon 5 mg 5 mg 5 mg   Tue 5 mg 5 mg 5 mg   Wed 5 mg 5 mg 5 mg    Thu 5 mg 5 mg 5 mg   Fri 5 mg 5 mg 5 mg   Sat 2.5 mg (2/4) 5 mg 5 mg   Visit Report - - -   Some recent data might be hidden       Plan:  1. INR is Therapeutic today- see above in Anticoagulation Summary.   Will instruct Elliot Sebastian to Continue their warfarin regimen- see above in Anticoagulation Summary.  2. Follow up in 1 weeks  3. Pt has agreed to only be called if INR out of range. They have been instructed to call if any changes in medications, doses, concerns, etc. Patient expresses understanding and has no further questions at this time.    Bushra Torre, PharmD

## 2023-02-23 ENCOUNTER — ANTICOAGULATION VISIT (OUTPATIENT)
Dept: PHARMACY | Facility: HOSPITAL | Age: 63
End: 2023-02-23
Payer: COMMERCIAL

## 2023-02-23 DIAGNOSIS — Z95.2 HX OF MITRAL VALVE REPLACEMENT WITH MECHANICAL VALVE: ICD-10-CM

## 2023-02-23 DIAGNOSIS — I48.0 PAROXYSMAL ATRIAL FIBRILLATION: Primary | ICD-10-CM

## 2023-02-23 LAB — INR PPP: 3.2

## 2023-02-23 NOTE — PROGRESS NOTES
Anticoagulation Clinic Progress Note    Anticoagulation Summary  As of 2/23/2023    INR goal:  2.5-3.5   TTR:  64.2 % (4.2 y)   INR used for dosing:  3.20 (2/23/2023)   Warfarin maintenance plan:  5 mg every day; Starting 2/23/2023   Weekly warfarin total:  35 mg   No change documented:  Angelica Marroquin RPH   Plan last modified:  Michael Horner, PharmD (8/11/2022)   Next INR check:  3/2/2023   Priority:  High   Target end date:  Indefinite    Indications    Paroxysmal atrial fibrillation (HCC) [I48.0]  Hx of mitral valve replacement with mechanical valve [Z95.2] [Z95.2]             Anticoagulation Episode Summary     INR check location:  Home Draw    Preferred lab:      Send INR reminders to:   [facility]    Comments:   home juliet      Anticoagulation Care Providers     Provider Role Specialty Phone number    Navi Fay MD Referring Cardiology [phone]    Leyla Byrne APRN Referring Cardiology [phone]          Clinic Interview:  No pertinent clinical findings have been reported.    INR History:  Anticoagulation Monitoring 2/9/2023 2/17/2023 2/23/2023   INR 2.80 3.00 3.20   INR Date 2/9/2023 2/16/2023 2/23/2023   INR Goal 2.5-3.5 2.5-3.5 2.5-3.5   Trend Same Same Same   Last Week Total 32.5 mg 35 mg 35 mg   Next Week Total 35 mg 35 mg 35 mg   Sun 5 mg 5 mg 5 mg   Mon 5 mg 5 mg 5 mg   Tue 5 mg 5 mg 5 mg   Wed 5 mg 5 mg 5 mg   Thu 5 mg 5 mg 5 mg   Fri 5 mg 5 mg 5 mg   Sat 5 mg 5 mg 5 mg   Visit Report - - -   Some recent data might be hidden       Plan:  1. INR is therapeutic today- see above in Anticoagulation Summary.    Elliot Sebastian to continue their warfarin regimen- see above in Anticoagulation Summary.  2. Follow up in 1 week  3. Pt has agreed to only be called if INR out of range. They have been instructed to call if any changes in medications, doses, concerns, etc. Patient expresses understanding and has no further questions at this time.    Angelica Marroquin  RPH

## 2023-02-27 RX ORDER — TERBINAFINE HYDROCHLORIDE 250 MG/1
250 TABLET ORAL DAILY
Qty: 30 TABLET | Refills: 2 | Status: SHIPPED | OUTPATIENT
Start: 2023-02-27

## 2023-03-03 ENCOUNTER — ANTICOAGULATION VISIT (OUTPATIENT)
Dept: PHARMACY | Facility: HOSPITAL | Age: 63
End: 2023-03-03
Payer: COMMERCIAL

## 2023-03-03 DIAGNOSIS — I48.0 PAROXYSMAL ATRIAL FIBRILLATION: Primary | ICD-10-CM

## 2023-03-03 DIAGNOSIS — Z95.2 HX OF MITRAL VALVE REPLACEMENT WITH MECHANICAL VALVE: ICD-10-CM

## 2023-03-03 LAB — INR PPP: 3.1

## 2023-03-03 NOTE — PROGRESS NOTES
Anticoagulation Clinic Progress Note    Anticoagulation Summary  As of 3/3/2023    INR goal:  2.5-3.5   TTR:  64.4 % (4.2 y)   INR used for dosing:  3.10 (3/3/2023)   Warfarin maintenance plan:  5 mg every day; Starting 3/3/2023   Weekly warfarin total:  35 mg   No change documented:  Bushra Torre, PharmD   Plan last modified:  Michael Horner, PharmD (8/11/2022)   Next INR check:  3/10/2023   Priority:  High   Target end date:  Indefinite    Indications    Paroxysmal atrial fibrillation (HCC) [I48.0]  Hx of mitral valve replacement with mechanical valve [Z95.2] [Z95.2]             Anticoagulation Episode Summary     INR check location:  Home Draw    Preferred lab:      Send INR reminders to:   [facility]    Comments:   home [place]      Anticoagulation Care Providers     Provider Role Specialty Phone number    Navi Fay MD Referring Cardiology [phone]    Leyla Byrne APRN Referring Cardiology [phone]          Clinic Interview:  Patient Findings     Negatives:  Signs/symptoms of thrombosis, Signs/symptoms of bleeding,   Laboratory test error suspected, Change in health, Change in alcohol use,   Change in activity, Upcoming invasive procedure, Emergency department   visit, Upcoming dental procedure, Missed doses, Extra doses, Change in   medications, Change in diet/appetite, Hospital admission, Bruising, Other   complaints      Clinical Outcomes     Negatives:  Major bleeding event, Thromboembolic event,   Anticoagulation-related hospital admission, Anticoagulation-related ED   visit, Anticoagulation-related fatality        INR History:  Anticoagulation Monitoring 2/17/2023 2/23/2023 3/3/2023   INR 3.00 3.20 3.10   INR Date 2/16/2023 2/23/2023 3/3/2023   INR Goal 2.5-3.5 2.5-3.5 2.5-3.5   Trend Same Same Same   Last Week Total 35 mg 35 mg 35 mg   Next Week Total 35 mg 35 mg 35 mg   Sun 5 mg Hold (2/26) 5 mg   Mon 5 mg 10 mg (2/27) 5 mg   Tue 5 mg 5 mg 5 mg   Wed 5 mg 5  mg 5 mg   Thu 5 mg 5 mg 5 mg   Fri 5 mg 5 mg 5 mg   Sat 5 mg 5 mg 5 mg   Visit Report - - -   Some recent data might be hidden       Plan:  1. INR is Therapeutic today- see above in Anticoagulation Summary.   Will instruct Elliot Sebastian to Continue their warfarin regimen- see above in Anticoagulation Summary.  2. Follow up in 1 weeks  3. Pt has agreed to only be called if INR out of range. They have been instructed to call if any changes in medications, doses, concerns, etc. Patient expresses understanding and has no further questions at this time.    Bushra Torre, PharmD

## 2023-03-07 ENCOUNTER — HOME CARE VISIT (OUTPATIENT)
Dept: HOME HEALTH SERVICES | Facility: HOME HEALTHCARE | Age: 63
End: 2023-03-07

## 2023-03-10 ENCOUNTER — ANTICOAGULATION VISIT (OUTPATIENT)
Dept: PHARMACY | Facility: HOSPITAL | Age: 63
End: 2023-03-10
Payer: COMMERCIAL

## 2023-03-10 DIAGNOSIS — Z95.2 HX OF MITRAL VALVE REPLACEMENT WITH MECHANICAL VALVE: ICD-10-CM

## 2023-03-10 DIAGNOSIS — I48.0 PAROXYSMAL ATRIAL FIBRILLATION: Primary | ICD-10-CM

## 2023-03-10 LAB — INR PPP: 2.3

## 2023-03-10 NOTE — PROGRESS NOTES
Anticoagulation Clinic Progress Note    Anticoagulation Summary  As of 3/10/2023    INR goal:  2.5-3.5   TTR:  64.4 % (4.3 y)   INR used for dosin.30 (3/10/2023)   Warfarin maintenance plan:  5 mg every day; Starting 3/10/2023   Weekly warfarin total:  35 mg   Plan last modified:  Michael Horner, PharmD (2022)   Next INR check:  3/17/2023   Priority:  High   Target end date:  Indefinite    Indications    Paroxysmal atrial fibrillation (HCC) [I48.0]  Hx of mitral valve replacement with mechanical valve [Z95.2] [Z95.2]             Anticoagulation Episode Summary     INR check location:  Home Draw    Preferred lab:      Send INR reminders to:  [facility]  [facility]    Comments:   home [place]      Anticoagulation Care Providers     Provider Role Specialty Phone number    Navi Fay MD Referring Cardiology [phone]    Leyla Byrne APRN Referring Cardiology [phone]          Clinic Interview:  Patient Findings     Positives:  Missed doses, Extra doses    Negatives:  Signs/symptoms of thrombosis, Signs/symptoms of bleeding,   Laboratory test error suspected, Change in health, Change in alcohol use,   Change in activity, Upcoming invasive procedure, Emergency department   visit, Upcoming dental procedure, Change in medications, Change in   diet/appetite, Hospital admission, Bruising, Other complaints      Clinical Outcomes     Negatives:  Major bleeding event, Thromboembolic event,   Anticoagulation-related hospital admission, Anticoagulation-related ED   visit, Anticoagulation-related fatality        INR History:  Anticoagulation Monitoring 2023 3/3/2023 3/10/2023   INR 3.20 3.10 2.30   INR Date 2023 3/3/2023 3/10/2023   INR Goal 2.5-3.5 2.5-3.5 2.5-3.5   Trend Same Same Same   Last Week Total 35 mg 35 mg 35 mg   Next Week Total 35 mg 35 mg 37.5 mg   Sun Hold () 5 mg 5 mg   Mon 10 mg () 5 mg 5 mg   Tue 5 mg 5 mg 5 mg   Wed 5 mg 5 mg 5 mg   Thu 5 mg 5 mg 5 mg    Fri 5 mg 5 mg 7.5 mg (3/10)   Sat 5 mg 5 mg 5 mg   Visit Report - - -   Some recent data might be hidden       Plan:  1. INR is Subtherapeutic today- see above in Anticoagulation Summary.   Will instruct Elliot Sebastian to Change their warfarin regimen- see above in Anticoagulation Summary.  2. Follow up in 1 weeks  3. Left detailed voicemail with instructions and followup plan. Requested call back with any changes, questions or concerns.  Bushra Torre, PharmD

## 2023-03-17 ENCOUNTER — ANTICOAGULATION VISIT (OUTPATIENT)
Dept: PHARMACY | Facility: HOSPITAL | Age: 63
End: 2023-03-17
Payer: COMMERCIAL

## 2023-03-17 DIAGNOSIS — Z95.2 HX OF MITRAL VALVE REPLACEMENT WITH MECHANICAL VALVE: ICD-10-CM

## 2023-03-17 DIAGNOSIS — I48.0 PAROXYSMAL ATRIAL FIBRILLATION: Primary | ICD-10-CM

## 2023-03-17 LAB — INR PPP: 2.8

## 2023-03-17 PROCEDURE — G0249 PROVIDE INR TEST MATER/EQUIP: HCPCS

## 2023-03-17 NOTE — PROGRESS NOTES
Anticoagulation Clinic Progress Note    Anticoagulation Summary  As of 3/17/2023    INR goal:  2.5-3.5   TTR:  64.4 % (4.3 y)   INR used for dosin.80 (3/17/2023)   Warfarin maintenance plan:  5 mg every day; Starting 3/17/2023   Weekly warfarin total:  35 mg   No change documented:  Bushra Torre, PharmD   Plan last modified:  Michael Horner, PharmD (2022)   Next INR check:  3/24/2023   Priority:  High   Target end date:  Indefinite    Indications    Paroxysmal atrial fibrillation (HCC) [I48.0]  Hx of mitral valve replacement with mechanical valve [Z95.2] [Z95.2]             Anticoagulation Episode Summary     INR check location:  Home Draw    Preferred lab:      Send INR reminders to:   [facility]    Comments:   home [place]      Anticoagulation Care Providers     Provider Role Specialty Phone number    Navi Fay MD Referring Cardiology [phone]    Leyla Byrne APRN Referring Cardiology [phone]          Clinic Interview:  Patient Findings     Negatives:  Signs/symptoms of thrombosis, Signs/symptoms of bleeding,   Laboratory test error suspected, Change in health, Change in alcohol use,   Change in activity, Upcoming invasive procedure, Emergency department   visit, Upcoming dental procedure, Missed doses, Extra doses, Change in   medications, Change in diet/appetite, Hospital admission, Bruising, Other   complaints      Clinical Outcomes     Negatives:  Major bleeding event, Thromboembolic event,   Anticoagulation-related hospital admission, Anticoagulation-related ED   visit, Anticoagulation-related fatality        INR History:  Anticoagulation Monitoring 3/3/2023 3/10/2023 3/17/2023   INR 3.10 2.30 2.80   INR Date 3/3/2023 3/10/2023 3/17/2023   INR Goal 2.5-3.5 2.5-3.5 2.5-3.5   Trend Same Same Same   Last Week Total 35 mg 35 mg 37.5 mg   Next Week Total 35 mg 37.5 mg 35 mg   Sun 5 mg 5 mg 5 mg   Mon 5 mg 5 mg 5 mg   Tue 5 mg 5 mg 5 mg   Wed 5 mg 5 mg 5 mg    Thu 5 mg 5 mg 5 mg   Fri 5 mg 7.5 mg (3/10) 5 mg   Sat 5 mg 5 mg 5 mg   Visit Report - - -   Some recent data might be hidden       Plan:  1. INR is Therapeutic today- see above in Anticoagulation Summary.   Will instruct Elliot Sebastian to Continue their warfarin regimen- see above in Anticoagulation Summary.  2. Follow up in 1 weeks  3.  They have been instructed to call if any changes in medications, doses, concerns, etc. Patient expresses understanding and has no further questions at this time.    Bushra Torre, PharmD

## 2023-03-25 LAB — INR PPP: 2.8

## 2023-03-27 ENCOUNTER — ANTICOAGULATION VISIT (OUTPATIENT)
Dept: PHARMACY | Facility: HOSPITAL | Age: 63
End: 2023-03-27
Payer: COMMERCIAL

## 2023-03-27 DIAGNOSIS — Z95.2 HX OF MITRAL VALVE REPLACEMENT WITH MECHANICAL VALVE: ICD-10-CM

## 2023-03-27 DIAGNOSIS — I48.0 PAROXYSMAL ATRIAL FIBRILLATION: Primary | ICD-10-CM

## 2023-03-27 NOTE — PROGRESS NOTES
Anticoagulation Clinic Progress Note    Anticoagulation Summary  As of 3/27/2023    INR goal:  2.5-3.5   TTR:  64.5 % (4.3 y)   INR used for dosin.80 (3/25/2023)   Warfarin maintenance plan:  5 mg every day; Starting 3/27/2023   Weekly warfarin total:  35 mg   No change documented:  Angelica Marroquin RPH   Plan last modified:  Michael Horner, PharmD (2022)   Next INR check:  2023   Priority:  High   Target end date:  Indefinite    Indications    Paroxysmal atrial fibrillation (HCC) [I48.0]  Hx of mitral valve replacement with mechanical valve [Z95.2] [Z95.2]             Anticoagulation Episode Summary     INR check location:  Home Draw    Preferred lab:      Send INR reminders to:   [facility]    Comments:   home [place]      Anticoagulation Care Providers     Provider Role Specialty Phone number    Navi Fay MD Referring Cardiology [phone]    Leyla Byrne APRN Referring Cardiology [phone]          Clinic Interview:  No pertinent clinical findings have been reported.    INR History:  Anticoagulation Monitoring 3/10/2023 3/17/2023 3/27/2023   INR 2.30 2.80 2.80   INR Date 3/10/2023 3/17/2023 3/25/2023   INR Goal 2.5-3.5 2.5-3.5 2.5-3.5   Trend Same Same Same   Last Week Total 35 mg 37.5 mg 35 mg   Next Week Total 37.5 mg 35 mg 35 mg   Sun 5 mg 5 mg -   Mon 5 mg 5 mg 5 mg   Tue 5 mg 5 mg 5 mg   Wed 5 mg 5 mg 5 mg   Thu 5 mg 5 mg 5 mg   Fri 7.5 mg (3/10) 5 mg 5 mg   Sat 5 mg 5 mg -   Visit Report - - -   Some recent data might be hidden       Plan:  1. INR is therapeutic today- see above in Anticoagulation Summary.    Elliot Sebastian to continue their warfarin regimen- see above in Anticoagulation Summary.  2. Follow up in 1 week  3. Pt has agreed to only be called if INR out of range. They have been instructed to call if any changes in medications, doses, concerns, etc. Patient expresses understanding and has no further questions at this time.    Angelica  Cara, [facility]

## 2023-03-30 ENCOUNTER — ANTICOAGULATION VISIT (OUTPATIENT)
Dept: PHARMACY | Facility: HOSPITAL | Age: 63
End: 2023-03-30
Payer: COMMERCIAL

## 2023-03-30 DIAGNOSIS — I48.0 PAROXYSMAL ATRIAL FIBRILLATION: Primary | ICD-10-CM

## 2023-03-30 DIAGNOSIS — Z95.2 HX OF MITRAL VALVE REPLACEMENT WITH MECHANICAL VALVE: ICD-10-CM

## 2023-03-30 LAB — INR PPP: 2.4

## 2023-03-30 NOTE — PROGRESS NOTES
Anticoagulation Clinic Progress Note    Anticoagulation Summary  As of 3/30/2023    INR goal:  2.5-3.5   TTR:  64.5 % (4.3 y)   INR used for dosin.40 (3/30/2023)   Warfarin maintenance plan:  5 mg every day; Starting 3/30/2023   Weekly warfarin total:  35 mg   Plan last modified:  Michael Horner, PharmD (2022)   Next INR check:  2023   Priority:  High   Target end date:  Indefinite    Indications    Paroxysmal atrial fibrillation (HCC) [I48.0]  Hx of mitral valve replacement with mechanical valve [Z95.2] [Z95.2]             Anticoagulation Episode Summary     INR check location:  Home Draw    Preferred lab:      Send INR reminders to:  [facility]  [facility]    Comments:   home [place]iet      Anticoagulation Care Providers     Provider Role Specialty Phone number    Navi Fay MD Referring Cardiology [phone]    Leyla Byrne APRN Referring Cardiology [phone]          Clinic Interview:  Patient Findings     Negatives:  Signs/symptoms of thrombosis, Signs/symptoms of bleeding,   Laboratory test error suspected, Change in health, Change in alcohol use,   Change in activity, Upcoming invasive procedure, Emergency department   visit, Upcoming dental procedure, Missed doses, Extra doses, Change in   medications, Change in diet/appetite, Hospital admission, Bruising, Other   complaints      Clinical Outcomes     Negatives:  Major bleeding event, Thromboembolic event,   Anticoagulation-related hospital admission, Anticoagulation-related ED   visit, Anticoagulation-related fatality        INR History:  Anticoagulation Monitoring 3/17/2023 3/27/2023 3/30/2023   INR 2.80 2.80 2.40   INR Date 3/17/2023 3/25/2023 3/30/2023   INR Goal 2.5-3.5 2.5-3.5 2.5-3.5   Trend Same Same Same   Last Week Total 37.5 mg 35 mg 35 mg   Next Week Total 35 mg 35 mg 37.5 mg   Sun 5 mg - 5 mg   Mon 5 mg 5 mg 5 mg   Tue 5 mg 5 mg 5 mg   Wed 5 mg 5 mg 5 mg   Thu 5 mg 5 mg 7.5 mg (3/30)   Fri 5 mg 5 mg 5 mg    Sat 5 mg - 5 mg   Visit Report - - -   Some recent data might be hidden       Plan:  1. INR is Subtherapeutic today- see above in Anticoagulation Summary.   Will instruct Elliot Sebastian to Change their warfarin regimen- see above in Anticoagulation Summary.  2. Follow up in 1 weeks  3. They have been instructed to call if any changes in medications, doses, concerns, etc. Patient expresses understanding and has no further questions at this time.    Bushra Torre, PharmD

## 2023-04-08 LAB — INR PPP: 3.1

## 2023-04-10 ENCOUNTER — ANTICOAGULATION VISIT (OUTPATIENT)
Dept: PHARMACY | Facility: HOSPITAL | Age: 63
End: 2023-04-10
Payer: COMMERCIAL

## 2023-04-10 ENCOUNTER — TELEPHONE (OUTPATIENT)
Dept: CARDIOLOGY | Facility: CLINIC | Age: 63
End: 2023-04-10
Payer: COMMERCIAL

## 2023-04-10 DIAGNOSIS — Z95.2 HX OF MITRAL VALVE REPLACEMENT WITH MECHANICAL VALVE: ICD-10-CM

## 2023-04-10 DIAGNOSIS — I48.0 PAROXYSMAL ATRIAL FIBRILLATION: Primary | ICD-10-CM

## 2023-04-10 PROCEDURE — G0249 PROVIDE INR TEST MATER/EQUIP: HCPCS

## 2023-04-10 NOTE — PROGRESS NOTES
Anticoagulation Clinic Progress Note    Anticoagulation Summary  As of 4/10/2023    INR goal:  2.5-3.5   TTR:  64.6 % (4.4 y)   INR used for dosing:  3.10 (4/8/2023)   Warfarin maintenance plan:  5 mg every day; Starting 4/10/2023   Weekly warfarin total:  35 mg   No change documented:  Bushra Torre, PharmD   Plan last modified:  Michael Horner PharmD (8/11/2022)   Next INR check:  4/17/2023   Priority:  High   Target end date:  Indefinite    Indications    Paroxysmal atrial fibrillation (HCC) [I48.0]  Hx of mitral valve replacement with mechanical valve [Z95.2] [Z95.2]             Anticoagulation Episode Summary     INR check location:  Home Draw    Preferred lab:      Send INR reminders to:  [facility]  [facility]OL    Comments:   home [place]iet      Anticoagulation Care Providers     Provider Role Specialty Phone number    Navi Fay MD Referring Cardiology [phone]    Leyla Byrne APRN Referring Cardiology [phone]          Clinic Interview:  No pertinent clinical findings have been reported.    INR History:  Anticoagulation Monitoring 3/27/2023 3/30/2023 4/10/2023   INR 2.80 2.40 3.10   INR Date 3/25/2023 3/30/2023 4/8/2023   INR Goal 2.5-3.5 2.5-3.5 2.5-3.5   Trend Same Same Same   Last Week Total 35 mg 35 mg 35 mg   Next Week Total 35 mg 37.5 mg 35 mg   Sun - 5 mg 5 mg   Mon 5 mg 5 mg 5 mg   Tue 5 mg 5 mg 5 mg   Wed 5 mg 5 mg 5 mg   Thu 5 mg 7.5 mg (3/30) 5 mg   Fri 5 mg 5 mg 5 mg   Sat - 5 mg 5 mg   Visit Report - - -   Some recent data might be hidden       Plan:  1. INR is therapeutic today- see above in Anticoagulation Summary.    Elliot Sebastian to continue their warfarin regimen- see above in Anticoagulation Summary.  2. Follow up in 1 week  3. Pt has agreed to only be called if INR out of range. They have been instructed to call if any changes in medications, doses, concerns, etc. Patient expresses understanding and has no further questions at this time.    Bushra  Grove, PharmD

## 2023-04-10 NOTE — TELEPHONE ENCOUNTER
4/10/23   Fernanda with [facility] mail order pharmacy called for a new rx for Losartan 50 mg 2 tablets ( 100 mg) daily. She states they were not told of increase and They had his pill pack set for losartan 50 mg daily. Pt  is now out of medication.   I reviewed chart last time he was seen he was taking Losartan 50 mg daily.   There is  Phone message from 9/9/22 were you told him to increase Losartan  100 mg daily but for him to keep B?P in AM and PM  for next 4-5 day and to call to let us know if works.   I did not see were there was any B/P log and canceled appt with MM on 9/19/22 and has no follow up.   Please advise  If OK to send new rx for Losartan 100 mg to [facility]   Thanks Jeremy Gallegos at [facility] call back # [phone]

## 2023-04-11 ENCOUNTER — TELEPHONE (OUTPATIENT)
Dept: CARDIOLOGY | Facility: CLINIC | Age: 63
End: 2023-04-11

## 2023-04-11 RX ORDER — LOSARTAN POTASSIUM 50 MG/1
100 TABLET ORAL DAILY
Qty: 180 TABLET | Refills: 0 | Status: SHIPPED | OUTPATIENT
Start: 2023-04-11

## 2023-04-11 NOTE — TELEPHONE ENCOUNTER
Scheduling will you please get pt set up to see Renetta Head for a follow up appt.   Thanks Jeremy SANTIAGO

## 2023-04-11 NOTE — TELEPHONE ENCOUNTER
Patient needs to follow up with Renetta Head.  I will send in 90 day prescription for 100mg taken as two 50mg so we do not confuse patient.

## 2023-04-14 LAB — INR PPP: 2.4

## 2023-04-17 ENCOUNTER — ANTICOAGULATION VISIT (OUTPATIENT)
Dept: PHARMACY | Facility: HOSPITAL | Age: 63
End: 2023-04-17
Payer: COMMERCIAL

## 2023-04-17 DIAGNOSIS — Z95.2 HX OF MITRAL VALVE REPLACEMENT WITH MECHANICAL VALVE: ICD-10-CM

## 2023-04-17 DIAGNOSIS — I48.0 PAROXYSMAL ATRIAL FIBRILLATION: Primary | ICD-10-CM

## 2023-04-17 PROCEDURE — G0249 PROVIDE INR TEST MATER/EQUIP: HCPCS

## 2023-04-17 RX ORDER — ATENOLOL 25 MG/1
25 TABLET ORAL DAILY
Qty: 90 TABLET | Refills: 0 | Status: SHIPPED | OUTPATIENT
Start: 2023-04-17 | End: 2023-04-18

## 2023-04-17 RX ORDER — ATENOLOL 25 MG/1
25 TABLET ORAL DAILY
Qty: 90 TABLET | Refills: 0 | Status: SHIPPED | OUTPATIENT
Start: 2023-04-17 | End: 2023-04-17 | Stop reason: SDUPTHER

## 2023-04-17 NOTE — TELEPHONE ENCOUNTER
RESENT REFILL REQUEST  FIRST ONE PRINTED.             atenolol (TENORMIN) 25 MG tablet [[id number]] (Order [id number])  Order  Date: 4/17/2023 Department: [facility] CARDIOLOGY Ordering/Authorizing: Leyla Byrne APRN     Medication  atenolol (TENORMIN) 25 MG tablet [[id number]]  Order History  Outpatient  Date/Time Action Taken User Additional Information   04/15/23 0219 Pend Interface, Surescripts In    04/17/23 0823 Sign Jeremy Ambrosio MA Reorder from Order: [id number]; Ordering Mode: Per protocol: no cosign required   04/17/23 0823 Taking Flag Checked Jeremy Ambrosio MA [id number]     Outpatient Morphine Milligram Equivalents Per Day  Expand All  Collapse All  No orders with morphine equivalence     atenolol (TENORMIN) 25 MG tablet [[id number]]     Order Details  Dose: 25 mg Route: Oral Frequency: Daily   Dispense Quantity: 90 tablet Refills: 0    Note to Pharmacy: Patient enrolled in our Rx Med Sync service to improve adherence. We are requesting a refill authorization in advance to ensure an active prescription is on file. PATIENT NEEDS AN APPOINTMENT TO RECEIVE FUTURE REFILL.         Sig: Take 1 tablet by mouth Daily. Follow up with Dr. Chanel 10/2022. NEEDS APPOINTMENT FOR FUTURE REFILLS         Start Date: 04/17/23 End Date: --   Written Date: 04/17/23 Expiration Date: 04/16/24   Original Order:  atenolol (TENORMIN) 25 MG tablet [[id number]]   Providers    Ordering Provider and Authorizing Provider:    Leyla Byrne APRN   [address] [address]   Phone:  [phone]   Fax:  [phone]   NPI:  [id number]        Ordering User:  Jeremy Ambrosio MA          Pharmacy    [id number] - [place], [place] - [address] [place] [address] [phone] [facility] [phone] [address] [place] [address]   Phone:  [phone]  Fax:  [phone]     Orders with any of the following pharmaceutical classes: Beta Blockers    Name Dose Frequency Start Date End Date  Medication Warnings Interventions? Order Mode    atenolol (TENORMIN) 25 MG tablet 25 mg Daily 02/02/23 04/17/23   Outpatient      Verbal Order Info    Action Created on Order Mode Entered by Responsible Provider Signed by Signed on   Ordering 04/17/23 0823 Per protocol: no cosign required Jeremy Ambrosio MA        Warnings Override History    No Interaction Warnings Shown      Pharmacist Clinical Review History    This prescription has not been clinically reviewed.     Order Reconciliation Actions       Order Reconciliation Actions     E-Prescribing Status      Outpatient Medication Detail    atenolol (TENORMIN) 25 MG tablet        Sig: Take 1 tablet by mouth Daily. Follow up with Dr. Chanel 10/2022. NEEDS APPOINTMENT FOR FUTURE REFILLS        Class: Print        Notes to Pharmacy: Patient enrolled in our Rx Med Sync service to improve adherence. We are requesting a refill authorization in advance to ensure an active prescription is on file. PATIENT NEEDS AN APPOINTMENT TO RECEIVE FUTURE REFILL.        Route: Oral            Event History       Event History     Tracking Reports    Cosign Tracking Order Transmittal Tracking

## 2023-04-17 NOTE — PROGRESS NOTES
Anticoagulation Clinic Progress Note    Anticoagulation Summary  As of 2023    INR goal:  2.5-3.5   TTR:  64.6 % (4.4 y)   INR used for dosin.40 (2023)   Warfarin maintenance plan:  7.5 mg every Mon; 5 mg all other days; Starting 2023   Weekly warfarin total:  37.5 mg   Plan last modified:  Bushra Torre, PharmD (2023)   Next INR check:  2023   Priority:  High   Target end date:  Indefinite    Indications    Paroxysmal atrial fibrillation (HCC) [I48.0]  Hx of mitral valve replacement with mechanical valve [Z95.2] [Z95.2]             Anticoagulation Episode Summary     INR check location:  Home Draw    Preferred lab:      Send INR reminders to:  [facility]  POOL    Comments:   home juliet      Anticoagulation Care Providers     Provider Role Specialty Phone number    Nvai Fay MD Referring Cardiology [phone]    Leyla Byrne APRN Referring Cardiology [phone]          Clinic Interview:  Patient Findings     Negatives:  Signs/symptoms of thrombosis, Signs/symptoms of bleeding,   Laboratory test error suspected, Change in health, Change in alcohol use,   Change in activity, Upcoming invasive procedure, Emergency department   visit, Upcoming dental procedure, Missed doses, Extra doses, Change in   medications, Change in diet/appetite, Hospital admission, Bruising, Other   complaints      Clinical Outcomes     Negatives:  Major bleeding event, Thromboembolic event,   Anticoagulation-related hospital admission, Anticoagulation-related ED   visit, Anticoagulation-related fatality        INR History:      3/27/2023     8:17 AM 3/30/2023    12:00 AM 3/30/2023     2:20 PM 2023    12:00 AM 4/10/2023     8:33 AM 2023    12:00 AM 2023     8:45 AM   Anticoagulation Monitoring   INR 2.80  2.40  3.10  2.40   INR Date 3/25/2023  3/30/2023  2023  2023   INR Goal 2.5-3.5  2.5-3.5  2.5-3.5  2.5-3.5   Trend Same  Same  Same  Up   Last Week  Total 35 mg  35 mg  35 mg  35 mg   Next Week Total 35 mg  37.5 mg  35 mg  37.5 mg   Sun -  5 mg  5 mg  5 mg   Mon 5 mg  5 mg  5 mg  7.5 mg   Tue 5 mg  5 mg  5 mg  5 mg   Wed 5 mg  5 mg  5 mg  5 mg   Thu 5 mg  7.5 mg (3/30)  5 mg  5 mg   Fri 5 mg  5 mg  5 mg  5 mg   Sat -  5 mg  5 mg  5 mg   Historical INR  2.40       3.10       2.40             This result is from an external source.       Plan:  1. INR is Subtherapeutic today- see above in Anticoagulation Summary.   Will instruct Elliot Sebastian to Change their warfarin regimen- see above in Anticoagulation Summary.  2. Follow up in 1 weeks  3. Left HIPPA compliant message with instructions and follow up. They have been instructed to call if any changes in medications, doses, concerns, etc. Patient expresses understanding and has no further questions at this time.    Bushra Torre, PharmD

## 2023-04-18 RX ORDER — ATENOLOL 25 MG/1
TABLET ORAL
Qty: 90 TABLET | Refills: 0 | Status: SHIPPED | OUTPATIENT
Start: 2023-04-18

## 2023-04-21 ENCOUNTER — ANTICOAGULATION VISIT (OUTPATIENT)
Dept: PHARMACY | Facility: HOSPITAL | Age: 63
End: 2023-04-21
Payer: COMMERCIAL

## 2023-04-21 DIAGNOSIS — Z95.2 HX OF MITRAL VALVE REPLACEMENT WITH MECHANICAL VALVE: ICD-10-CM

## 2023-04-21 DIAGNOSIS — I48.0 PAROXYSMAL ATRIAL FIBRILLATION: Primary | ICD-10-CM

## 2023-04-21 LAB — INR PPP: 3.7

## 2023-04-21 NOTE — PROGRESS NOTES
Anticoagulation Clinic Progress Note    Anticoagulation Summary  As of 4/21/2023    INR goal:  2.5-3.5   TTR:  64.6 % (4.4 y)   INR used for dosing:  3.70 (4/21/2023)   Warfarin maintenance plan:  7.5 mg every Mon; 5 mg all other days; Starting 4/21/2023   Weekly warfarin total:  37.5 mg   Plan last modified:  Bushra Torre, PharmD (4/17/2023)   Next INR check:  4/24/2023   Priority:  High   Target end date:  Indefinite    Indications    Paroxysmal atrial fibrillation (HCC) [I48.0]  Hx of mitral valve replacement with mechanical valve [Z95.2] [Z95.2]             Anticoagulation Episode Summary     INR check location:  Home Draw    Preferred lab:      Send INR reminders to:  [facility]  POOL    Comments:   home [place]iet      Anticoagulation Care Providers     Provider Role Specialty Phone number    Navi Fay MD Referring Cardiology [phone]    Leyla Byrne APRN Referring Cardiology [phone]          Clinic Interview:  Patient Findings     Positives:  Change in health, Change in medications    Negatives:  Signs/symptoms of thrombosis, Signs/symptoms of bleeding,   Laboratory test error suspected, Change in alcohol use, Change in   activity, Upcoming invasive procedure, Emergency department visit,   Upcoming dental procedure, Missed doses, Extra doses, Change in   diet/appetite, Hospital admission, Bruising, Other complaints    Comments:  Threw his back out and went to  on Wednesday. Started taking   medrol dose pack and flexeril.       Clinical Outcomes     Negatives:  Major bleeding event, Thromboembolic event,   Anticoagulation-related hospital admission, Anticoagulation-related ED   visit, Anticoagulation-related fatality    Comments:  Threw his back out and went to  on Wednesday. Started taking   medrol dose pack and flexeril.         INR History:      3/30/2023     2:20 PM 4/8/2023    12:00 AM 4/10/2023     8:33 AM 4/14/2023    12:00 AM 4/17/2023     8:45 AM  4/21/2023    12:00 AM 4/21/2023     2:15 PM   Anticoagulation Monitoring   INR 2.40  3.10  2.40  3.70   INR Date 3/30/2023  4/8/2023  4/14/2023  4/21/2023   INR Goal 2.5-3.5  2.5-3.5  2.5-3.5  2.5-3.5   Trend Same  Same  Up  Same   Last Week Total 35 mg  35 mg  35 mg  37.5 mg   Next Week Total 37.5 mg  35 mg  37.5 mg  35 mg   Sun 5 mg  5 mg  5 mg  5 mg   Mon 5 mg  5 mg  7.5 mg  -   Tue 5 mg  5 mg  5 mg  -   Wed 5 mg  5 mg  5 mg  -   Thu 7.5 mg (3/30)  5 mg  5 mg  -   Fri 5 mg  5 mg  5 mg  2.5 mg (4/21)   Sat 5 mg  5 mg  5 mg  5 mg   Historical INR  3.10       2.40       3.70             This result is from an external source.       Plan:  1. INR is Supratherapeutic today- see above in Anticoagulation Summary.   Will instruct Elliot Sebastian to Change their warfarin regimen- see above in Anticoagulation Summary.  2. Follow up in 3 days   3. They have been instructed to call if any changes in medications, doses, concerns, etc. Patient expresses understanding and has no further questions at this time.    Bushra Torre, PharmD

## 2023-04-24 LAB — INR PPP: 3.1

## 2023-04-25 ENCOUNTER — ANTICOAGULATION VISIT (OUTPATIENT)
Dept: PHARMACY | Facility: HOSPITAL | Age: 63
End: 2023-04-25
Payer: COMMERCIAL

## 2023-04-25 DIAGNOSIS — Z95.2 HX OF MITRAL VALVE REPLACEMENT WITH MECHANICAL VALVE: ICD-10-CM

## 2023-04-25 DIAGNOSIS — I48.0 PAROXYSMAL ATRIAL FIBRILLATION: Primary | ICD-10-CM

## 2023-04-25 NOTE — PROGRESS NOTES
Anticoagulation Clinic Progress Note    Anticoagulation Summary  As of 4/25/2023    INR goal:  2.5-3.5   TTR:  64.6 % (4.4 y)   INR used for dosing:  3.10 (4/24/2023)   Warfarin maintenance plan:  7.5 mg every Mon; 5 mg all other days; Starting 4/25/2023   Weekly warfarin total:  37.5 mg   No change documented:  Bushra Torre PharmD   Plan last modified:  Bushra Torre PharmD (4/17/2023)   Next INR check:  5/2/2023   Priority:  High   Target end date:  Indefinite    Indications    Paroxysmal atrial fibrillation (HCC) [I48.0]  Hx of mitral valve replacement with mechanical valve [Z95.2] [Z95.2]             Anticoagulation Episode Summary     INR check location:  Home Draw    Preferred lab:      Send INR reminders to:  [facility]  POOL    Comments:   home [place]      Anticoagulation Care Providers     Provider Role Specialty Phone number    Navi Fay MD Referring Cardiology [phone]    Leyla Byrne APRN Referring Cardiology [phone]          Clinic Interview:  Patient Findings     Negatives:  Signs/symptoms of thrombosis, Signs/symptoms of bleeding,   Laboratory test error suspected, Change in health, Change in alcohol use,   Change in activity, Upcoming invasive procedure, Emergency department   visit, Upcoming dental procedure, Missed doses, Extra doses, Change in   medications, Change in diet/appetite, Hospital admission, Bruising, Other   complaints    Comments:  Completed steroid gabo yesterday.       Clinical Outcomes     Negatives:  Major bleeding event, Thromboembolic event,   Anticoagulation-related hospital admission, Anticoagulation-related ED   visit, Anticoagulation-related fatality    Comments:  Completed steroid gabo yesterday.         INR History:      4/10/2023     8:33 AM 4/14/2023    12:00 AM 4/17/2023     8:45 AM 4/21/2023    12:00 AM 4/21/2023     2:15 PM 4/24/2023    12:00 AM 4/25/2023     8:49 AM   Anticoagulation Monitoring   INR 3.10  2.40  3.70   3.10   INR Date 4/8/2023 4/14/2023 4/21/2023 4/24/2023   INR Goal 2.5-3.5  2.5-3.5  2.5-3.5  2.5-3.5   Trend Same  Up  Same  Same   Last Week Total 35 mg  35 mg  37.5 mg  35 mg   Next Week Total 35 mg  37.5 mg  35 mg  37.5 mg   Sun 5 mg  5 mg  5 mg  5 mg   Mon 5 mg  7.5 mg  -  7.5 mg   Tue 5 mg  5 mg  -  5 mg   Wed 5 mg  5 mg  -  5 mg   Thu 5 mg  5 mg  -  5 mg   Fri 5 mg  5 mg  2.5 mg (4/21)  5 mg   Sat 5 mg  5 mg  5 mg  5 mg   Historical INR  2.40       3.70       3.10  C          C Corrected result    This result is from an external source.       Plan:  1. INR is Therapeutic today- see above in Anticoagulation Summary.   Will instruct Elliot CAIN Sebastian to Continue their warfarin regimen- see above in Anticoagulation Summary.  2. Follow up in 1 weeks  3. Left detailed message with dosing recommendations and follow up plan. They have been instructed to call if any changes in medications, doses, concerns, etc. Patient expresses understanding and has no further questions at this time.    Bushra Torre, PharmD

## 2023-04-28 ENCOUNTER — ANTICOAGULATION VISIT (OUTPATIENT)
Dept: PHARMACY | Facility: HOSPITAL | Age: 63
End: 2023-04-28
Payer: COMMERCIAL

## 2023-04-28 DIAGNOSIS — I48.0 PAROXYSMAL ATRIAL FIBRILLATION: Primary | ICD-10-CM

## 2023-04-28 DIAGNOSIS — Z95.2 HX OF MITRAL VALVE REPLACEMENT WITH MECHANICAL VALVE: ICD-10-CM

## 2023-04-28 LAB — INR PPP: 3.4

## 2023-04-28 NOTE — PROGRESS NOTES
Anticoagulation Clinic Progress Note    Anticoagulation Summary  As of 4/28/2023    INR goal:  2.5-3.5   TTR:  64.7 % (4.4 y)   INR used for dosing:  3.40 (4/28/2023)   Warfarin maintenance plan:  7.5 mg every Mon; 5 mg all other days; Starting 4/28/2023   Weekly warfarin total:  37.5 mg   Plan last modified:  Bushra Torre, PharmD (4/17/2023)   Next INR check:  5/5/2023   Priority:  High   Target end date:  Indefinite    Indications    Paroxysmal atrial fibrillation (HCC) [I48.0]  Hx of mitral valve replacement with mechanical valve [Z95.2] [Z95.2]             Anticoagulation Episode Summary     INR check location:  Home Draw    Preferred lab:      Send INR reminders to:  [facility]  POOL    Comments:   home [place]      Anticoagulation Care Providers     Provider Role Specialty Phone number    Navi Fay MD Referring Cardiology [phone]    Leyla Byrne APRN Referring Cardiology [phone]          Clinic Interview:  Patient Findings     Positives:  Change in medications    Negatives:  Signs/symptoms of thrombosis, Signs/symptoms of bleeding,   Laboratory test error suspected, Change in health, Change in alcohol use,   Change in activity, Upcoming invasive procedure, Emergency department   visit, Upcoming dental procedure, Missed doses, Extra doses, Change in   diet/appetite, Hospital admission, Bruising, Other complaints    Comments:  Finished steroid on Monday       Clinical Outcomes     Negatives:  Major bleeding event, Thromboembolic event,   Anticoagulation-related hospital admission, Anticoagulation-related ED   visit, Anticoagulation-related fatality    Comments:  Finished steroid on Monday         INR History:      4/17/2023     8:45 AM 4/21/2023    12:00 AM 4/21/2023     2:15 PM 4/24/2023    12:00 AM 4/25/2023     8:49 AM 4/28/2023    12:00 AM 4/28/2023     2:23 PM   Anticoagulation Monitoring   INR 2.40  3.70  3.10  3.40   INR Date 4/14/2023 4/21/2023 4/24/2023   4/28/2023   INR Goal 2.5-3.5  2.5-3.5  2.5-3.5  2.5-3.5   Trend Up  Same  Same  Same   Last Week Total 35 mg  37.5 mg  30 mg  30 mg   Next Week Total 37.5 mg  35 mg  37.5 mg  35 mg   Sun 5 mg  5 mg  5 mg  5 mg   Mon 7.5 mg  -  7.5 mg  5 mg (5/1)   Tue 5 mg  -  5 mg  5 mg   Wed 5 mg  -  5 mg  5 mg   Thu 5 mg  -  5 mg  5 mg   Fri 5 mg  2.5 mg (4/21)  5 mg  5 mg   Sat 5 mg  5 mg  5 mg  5 mg   Historical INR  3.70       3.10  C     3.40            C Corrected result    This result is from an external source.       Plan:  1. INR is Therapeutic today- see above in Anticoagulation Summary.   Will instruct Elliot Sebastian to Change their warfarin regimen- see above in Anticoagulation Summary.  Decrease to 5 mg daily   2. Follow up in 1 weeks  3.  They have been instructed to call if any changes in medications, doses, concerns, etc. Patient expresses understanding and has no further questions at this time.    Angelica Marroquin [facility]

## 2023-05-05 ENCOUNTER — ANTICOAGULATION VISIT (OUTPATIENT)
Dept: PHARMACY | Facility: HOSPITAL | Age: 63
End: 2023-05-05
Payer: COMMERCIAL

## 2023-05-05 DIAGNOSIS — Z95.2 HX OF MITRAL VALVE REPLACEMENT WITH MECHANICAL VALVE: ICD-10-CM

## 2023-05-05 DIAGNOSIS — I48.0 PAROXYSMAL ATRIAL FIBRILLATION: Primary | ICD-10-CM

## 2023-05-05 LAB — INR PPP: 3.4

## 2023-05-05 NOTE — PROGRESS NOTES
Anticoagulation Clinic Progress Note    Anticoagulation Summary  As of 5/5/2023    INR goal:  2.5-3.5   TTR:  64.9 % (4.4 y)   INR used for dosing:  3.40 (5/5/2023)   Warfarin maintenance plan:  5 mg every day; Starting 5/5/2023   Weekly warfarin total:  35 mg   Plan last modified:  Michael Horner, PharmD (5/5/2023)   Next INR check:  5/12/2023   Priority:  High   Target end date:  Indefinite    Indications    Paroxysmal atrial fibrillation (HCC) [I48.0]  Hx of mitral valve replacement with mechanical valve [Z95.2] [Z95.2]             Anticoagulation Episode Summary     INR check location:  Home Draw    Preferred lab:      Send INR reminders to:  [facility]  POOL    Comments:   home [place]      Anticoagulation Care Providers     Provider Role Specialty Phone number    Navi Fay MD Referring Cardiology [phone]    Leyla Byrne APRN Referring Cardiology [phone]          Clinic Interview:  Patient Findings     Negatives:  Signs/symptoms of thrombosis, Signs/symptoms of bleeding,   Laboratory test error suspected, Change in health, Change in alcohol use,   Change in activity, Upcoming invasive procedure, Emergency department   visit, Upcoming dental procedure, Missed doses, Extra doses, Change in   medications, Change in diet/appetite, Hospital admission, Bruising, Other   complaints      Clinical Outcomes     Negatives:  Major bleeding event, Thromboembolic event,   Anticoagulation-related hospital admission, Anticoagulation-related ED   visit, Anticoagulation-related fatality        INR History:      4/21/2023     2:15 PM 4/24/2023    12:00 AM 4/25/2023     8:49 AM 4/28/2023    12:00 AM 4/28/2023     2:23 PM 5/5/2023    12:00 AM 5/5/2023     4:02 PM   Anticoagulation Monitoring   INR 3.70  3.10  3.40  3.40   INR Date 4/21/2023 4/24/2023 4/28/2023 5/5/2023   INR Goal 2.5-3.5  2.5-3.5  2.5-3.5  2.5-3.5   Trend Same  Same  Same  Down   Last Week Total 37.5 mg  30 mg  30 mg  35  mg   Next Week Total 35 mg  37.5 mg  35 mg  35 mg   Sun 5 mg  5 mg  5 mg  5 mg   Mon -  7.5 mg  5 mg (5/1)  5 mg   Tue -  5 mg  5 mg  5 mg   Wed -  5 mg  5 mg  5 mg   Thu -  5 mg  5 mg  5 mg   Fri 2.5 mg (4/21)  5 mg  5 mg  5 mg   Sat 5 mg  5 mg  5 mg  5 mg   Historical INR  3.10  C     3.40       3.40            C Corrected result    This result is from an external source.       Plan:  1. INR is Therapeutic today- see above in Anticoagulation Summary.   Will instruct Elliot ACIN Sebastian to Continue their warfarin regimen at dose of 5 mg daily - see above in Anticoagulation Summary.  2. Follow up in 1 week.  3. They have been instructed to call if any changes in medications, doses, concerns, etc. Patient expresses understanding and has no further questions at this time.    Michael Horner, PharmD

## 2023-05-13 LAB — INR PPP: 4.1

## 2023-05-15 ENCOUNTER — ANTICOAGULATION VISIT (OUTPATIENT)
Dept: PHARMACY | Facility: HOSPITAL | Age: 63
End: 2023-05-15
Payer: COMMERCIAL

## 2023-05-15 DIAGNOSIS — I48.0 PAROXYSMAL ATRIAL FIBRILLATION: Primary | ICD-10-CM

## 2023-05-15 DIAGNOSIS — Z95.2 HX OF MITRAL VALVE REPLACEMENT WITH MECHANICAL VALVE: ICD-10-CM

## 2023-05-15 PROCEDURE — G0249 PROVIDE INR TEST MATER/EQUIP: HCPCS

## 2023-05-15 NOTE — PROGRESS NOTES
Anticoagulation Clinic Progress Note    Anticoagulation Summary  As of 5/15/2023    INR goal:  2.5-3.5   TTR:  64.7 % (4.4 y)   INR used for dosin.10 (2023)   Warfarin maintenance plan:  5 mg every day; Starting 5/15/2023   Weekly warfarin total:  35 mg   No change documented:  Michael Horner, Pablo   Plan last modified:  Michael Horner, PharmD (2023)   Next INR check:  2023   Priority:  High   Target end date:  Indefinite    Indications    Paroxysmal atrial fibrillation (HCC) [I48.0]  Hx of mitral valve replacement with mechanical valve [Z95.2] [Z95.2]             Anticoagulation Episode Summary     INR check location:  Home Draw    Preferred lab:      Send INR reminders to:  [facility]Z  POOL    Comments:   home [place]      Anticoagulation Care Providers     Provider Role Specialty Phone number    Navi Fay MD Referring Cardiology [phone]    Leyla Byrne APRN Referring Cardiology [phone]          Clinic Interview:  Patient Findings     Positives:  Other complaints    Negatives:  Signs/symptoms of thrombosis, Signs/symptoms of bleeding,   Laboratory test error suspected, Change in health, Change in alcohol use,   Change in activity, Upcoming invasive procedure, Emergency department   visit, Upcoming dental procedure, Missed doses, Extra doses, Change in   medications, Change in diet/appetite, Hospital admission, Bruising    Comments:  Reports 3 beers total last week (no significant change).   Denies any notable changes. He took lower 2.5-mg dose of warfarin   yesterday after seeing INR of 4.1 on 23.      Clinical Outcomes     Negatives:  Major bleeding event, Thromboembolic event,   Anticoagulation-related hospital admission, Anticoagulation-related ED   visit, Anticoagulation-related fatality    Comments:  Reports 3 beers total last week (no significant change).   Denies any notable changes. He took lower 2.5-mg dose of warfarin   yesterday after  seeing INR of 4.1 on 5/13/23.        INR History:      4/25/2023     8:49 AM 4/28/2023    12:00 AM 4/28/2023     2:23 PM 5/5/2023    12:00 AM 5/5/2023     4:02 PM 5/13/2023    12:00 AM 5/15/2023     8:15 AM   Anticoagulation Monitoring   INR 3.10  3.40  3.40  4.10   INR Date 4/24/2023 4/28/2023 5/5/2023 5/13/2023   INR Goal 2.5-3.5  2.5-3.5  2.5-3.5  2.5-3.5   Trend Same  Same  Down  Same   Last Week Total 30 mg  30 mg  35 mg  32.5 mg   Next Week Total 37.5 mg  35 mg  35 mg  35 mg   Sun 5 mg  5 mg  5 mg  -   Mon 7.5 mg  5 mg (5/1)  5 mg  5 mg   Tue 5 mg  5 mg  5 mg  5 mg   Wed 5 mg  5 mg  5 mg  5 mg   Thu 5 mg  5 mg  5 mg  5 mg   Fri 5 mg  5 mg  5 mg  -   Sat 5 mg  5 mg  5 mg  -   Historical INR  3.40       3.40       4.10             This result is from an external source.       Plan:  1. INR was Supratherapeutic 5/13/23 - see above in Anticoagulation Summary. He already corrected this by taking lower dose of 2.5 mg of warfarin yesterday.   Will instruct Elliot Sebastian to Continue their warfarin regimen- see above in Anticoagulation Summary.  2. Follow up in 4 days.   3. They have been instructed to call if any changes in medications, doses, concerns, etc. Patient expresses understanding and has no further questions at this time.    Michael Horner, PharmD

## 2023-05-19 ENCOUNTER — TELEPHONE (OUTPATIENT)
Dept: CARDIOLOGY | Facility: CLINIC | Age: 63
End: 2023-05-19

## 2023-05-19 NOTE — TELEPHONE ENCOUNTER
Caller: Elliot Sebastian    Relationship to patient: Self    Best call back number: [phone]    Patient is needing: PATIENT CALLED TO MAKE APPOINTMENT, HE STATED HE RECEIVED TO CALLS ONE TO SCHEDULED WITH MADHAV MARQUEZ AND ONE TO SCHEDULE RAVI AL AND ONE FOR AN ECHO. HE DIDN'T THINK HE NEEDED AN ECHO AND DIDN'T UNDERSTAND WHO HE NEEDED TO SEE. PER LAST TE I OFFERED TO SCHEDULE PATIENT BUT HE REQUESTED TO GET MORE CLARITY . PATIENT REQUESTING TO ONLY BE CALLED AFTER 2 PM PER WORK SCHEDULE PLEASE.

## 2023-05-20 LAB — INR PPP: 3.2

## 2023-05-22 ENCOUNTER — ANTICOAGULATION VISIT (OUTPATIENT)
Dept: PHARMACY | Facility: HOSPITAL | Age: 63
End: 2023-05-22
Payer: COMMERCIAL

## 2023-05-22 DIAGNOSIS — Z95.2 HX OF MITRAL VALVE REPLACEMENT WITH MECHANICAL VALVE: ICD-10-CM

## 2023-05-22 DIAGNOSIS — I48.0 PAROXYSMAL ATRIAL FIBRILLATION: Primary | ICD-10-CM

## 2023-05-22 RX ORDER — TERBINAFINE HYDROCHLORIDE 250 MG/1
250 TABLET ORAL DAILY
Qty: 30 TABLET | Refills: 0 | Status: SHIPPED | OUTPATIENT
Start: 2023-05-22

## 2023-05-22 NOTE — PROGRESS NOTES
Anticoagulation Clinic Progress Note    Anticoagulation Summary  As of 5/22/2023    INR goal:  2.5-3.5   TTR:  64.6 % (4.5 y)   INR used for dosing:  3.20 (5/20/2023)   Warfarin maintenance plan:  5 mg every day; Starting 5/22/2023   Weekly warfarin total:  35 mg   No change documented:  Bushra Torre, PharmD   Plan last modified:  Michael Horner, PharmD (5/5/2023)   Next INR check:  5/29/2023   Priority:  High   Target end date:  Indefinite    Indications    Paroxysmal atrial fibrillation (HCC) [I48.0]  Hx of mitral valve replacement with mechanical valve [Z95.2] [Z95.2]             Anticoagulation Episode Summary     INR check location:  Home Draw    Preferred lab:      Send INR reminders to:  [facility]  POOL    Comments:   home [place]      Anticoagulation Care Providers     Provider Role Specialty Phone number    Navi Fay MD Referring Cardiology [phone]    Leyla Byrne APRN Referring Cardiology [phone]          Clinic Interview:  Patient Findings     Negatives:  Signs/symptoms of thrombosis, Signs/symptoms of bleeding,   Laboratory test error suspected, Change in health, Change in alcohol use,   Change in activity, Upcoming invasive procedure, Emergency department   visit, Upcoming dental procedure, Missed doses, Extra doses, Change in   medications, Change in diet/appetite, Hospital admission, Bruising, Other   complaints      Clinical Outcomes     Negatives:  Major bleeding event, Thromboembolic event,   Anticoagulation-related hospital admission, Anticoagulation-related ED   visit, Anticoagulation-related fatality        INR History:      4/28/2023     2:23 PM 5/5/2023    12:00 AM 5/5/2023     4:02 PM 5/13/2023    12:00 AM 5/15/2023     8:15 AM 5/20/2023    12:00 AM 5/22/2023    10:14 AM   Anticoagulation Monitoring   INR 3.40  3.40  4.10  3.20   INR Date 4/28/2023 5/5/2023 5/13/2023 5/20/2023   INR Goal 2.5-3.5  2.5-3.5  2.5-3.5  2.5-3.5   Trend Same  Down   Same  Same   Last Week Total 30 mg  35 mg  32.5 mg  35 mg   Next Week Total 35 mg  35 mg  35 mg  35 mg   Sun 5 mg  5 mg  -  5 mg   Mon 5 mg (5/1)  5 mg  5 mg  5 mg   Tue 5 mg  5 mg  5 mg  5 mg   Wed 5 mg  5 mg  5 mg  5 mg   Thu 5 mg  5 mg  5 mg  5 mg   Fri 5 mg  5 mg  -  5 mg   Sat 5 mg  5 mg  -  5 mg   Historical INR  3.40       4.10       3.20             This result is from an external source.       Plan:  1. INR is Therapeutic today- see above in Anticoagulation Summary.   Will instruct Elliot Sebastian to Continue their warfarin regimen- see above in Anticoagulation Summary.  2. Follow up in 1 weeks  3. HIPPA compliant voicemail for patient with instructions and follow up.  They have been instructed to call if any changes in medications, doses, concerns, etc. Patient expresses understanding and has no further questions at this time.    Bushra Torre, PharmD

## 2023-05-24 NOTE — TELEPHONE ENCOUNTER
05.24.23    Patient was called, I left a voicemail for patient to return call to get appointment scheduled.     Thanks   Juana

## 2023-05-26 ENCOUNTER — ANTICOAGULATION VISIT (OUTPATIENT)
Dept: PHARMACY | Facility: HOSPITAL | Age: 63
End: 2023-05-26
Payer: COMMERCIAL

## 2023-05-26 DIAGNOSIS — I48.0 PAROXYSMAL ATRIAL FIBRILLATION: Primary | ICD-10-CM

## 2023-05-26 DIAGNOSIS — Z95.2 HX OF MITRAL VALVE REPLACEMENT WITH MECHANICAL VALVE: ICD-10-CM

## 2023-05-26 LAB — INR PPP: 3.6

## 2023-05-26 NOTE — PROGRESS NOTES
Anticoagulation Clinic Progress Note    Anticoagulation Summary  As of 5/26/2023    INR goal:  2.5-3.5   TTR:  64.5 % (4.5 y)   INR used for dosing:  3.60 (5/26/2023)   Warfarin maintenance plan:  5 mg every day; Starting 5/26/2023   Weekly warfarin total:  35 mg   Plan last modified:  Michael Horner, PharmD (5/5/2023)   Next INR check:  6/2/2023   Priority:  High   Target end date:  Indefinite    Indications    Paroxysmal atrial fibrillation (HCC) [I48.0]  Hx of mitral valve replacement with mechanical valve [Z95.2] [Z95.2]             Anticoagulation Episode Summary     INR check location:  Home Draw    Preferred lab:      Send INR reminders to:  DYANA MENDEZ  POOL    Comments:   home juliet      Anticoagulation Care Providers     Provider Role Specialty Phone number    Navi Fay MD Referring Cardiology [phone]    Leyla Byrne APRN Referring Cardiology [phone]          Clinic Interview:  Patient Findings     Positives:  Change in alcohol use    Negatives:  Signs/symptoms of thrombosis, Signs/symptoms of bleeding,   Laboratory test error suspected, Change in health, Change in activity,   Upcoming invasive procedure, Emergency department visit, Upcoming dental   procedure, Missed doses, Extra doses, Change in medications, Change in   diet/appetite, Hospital admission, Bruising, Other complaints    Comments:  Reports more ETOH this week and plans to have extra this   weekend.       Clinical Outcomes     Negatives:  Major bleeding event, Thromboembolic event,   Anticoagulation-related hospital admission, Anticoagulation-related ED   visit, Anticoagulation-related fatality    Comments:  Reports more ETOH this week and plans to have extra this   weekend.         INR History:      5/5/2023     4:02 PM 5/13/2023    12:00 AM 5/15/2023     8:15 AM 5/20/2023    12:00 AM 5/22/2023    10:14 AM 5/26/2023    12:00 AM 5/26/2023     2:42 PM   Anticoagulation Monitoring   INR 3.40  4.10   3.20  3.60   INR Date 5/5/2023 5/13/2023 5/20/2023 5/26/2023   INR Goal 2.5-3.5  2.5-3.5  2.5-3.5  2.5-3.5   Trend Down  Same  Same  Same   Last Week Total 35 mg  32.5 mg  35 mg  35 mg   Next Week Total 35 mg  35 mg  35 mg  32.5 mg   Sun 5 mg  -  5 mg  5 mg   Mon 5 mg  5 mg  5 mg  5 mg   Tue 5 mg  5 mg  5 mg  5 mg   Wed 5 mg  5 mg  5 mg  5 mg   Thu 5 mg  5 mg  5 mg  5 mg   Fri 5 mg  -  5 mg  5 mg   Sat 5 mg  -  5 mg  2.5 mg (5/27)   Historical INR  4.10       3.20       3.60             This result is from an external source.       Plan:  1. INR is Supratherapeutic today- see above in Anticoagulation Summary.   Will instruct Elliot Sebastian to Change their warfarin regimen- see above in Anticoagulation Summary.  2. Follow up in 1 weeks  3.  They have been instructed to call if any changes in medications, doses, concerns, etc. Patient expresses understanding and has no further questions at this time.    Bushra Torre, PharmD

## 2023-06-01 ENCOUNTER — TELEPHONE (OUTPATIENT)
Dept: CARDIOLOGY | Facility: CLINIC | Age: 63
End: 2023-06-01

## 2023-06-01 NOTE — TELEPHONE ENCOUNTER
Caller: MARYJO    Relationship to patient: SELF    Best call back number: [phone]    Patient is needing: PT IS NEEDING AN APT FOR HIS MEDICATION HE WORKS NIGHT SHIFT SO PLEASE DO NOT CALL PRIOR TO 2PM.      FIRST AVAILABLE I SEE IS IN September. PLEASE CALL TO SCHEDULE.     THANK YOU.

## 2023-06-02 ENCOUNTER — ANTICOAGULATION VISIT (OUTPATIENT)
Dept: PHARMACY | Facility: HOSPITAL | Age: 63
End: 2023-06-02

## 2023-06-02 DIAGNOSIS — K58.0 IRRITABLE BOWEL SYNDROME WITH DIARRHEA: ICD-10-CM

## 2023-06-02 DIAGNOSIS — I48.0 PAROXYSMAL ATRIAL FIBRILLATION: Primary | ICD-10-CM

## 2023-06-02 DIAGNOSIS — Z95.2 HX OF MITRAL VALVE REPLACEMENT WITH MECHANICAL VALVE: ICD-10-CM

## 2023-06-02 LAB — INR PPP: 3.4

## 2023-06-02 RX ORDER — CHOLESTYRAMINE 4 G/9G
4 POWDER, FOR SUSPENSION ORAL DAILY
Qty: 30 PACKET | Refills: 0 | Status: SHIPPED | OUTPATIENT
Start: 2023-06-02

## 2023-06-02 NOTE — PROGRESS NOTES
Anticoagulation Clinic Progress Note    Anticoagulation Summary  As of 6/2/2023    INR goal:  2.5-3.5   TTR:  64.5 % (4.5 y)   INR used for dosing:  3.40 (6/2/2023)   Warfarin maintenance plan:  2.5 mg every Fri; 5 mg all other days; Starting 6/5/2023   Weekly warfarin total:  32.5 mg   Plan last modified:  Mckinley Frank REBECA (6/2/2023)   Next INR check:  6/9/2023   Priority:  High   Target end date:  Indefinite    Indications    Paroxysmal atrial fibrillation (HCC) [I48.0]  Hx of mitral valve replacement with mechanical valve [Z95.2] [Z95.2]             Anticoagulation Episode Summary     INR check location:  Home Draw    Preferred lab:      Send INR reminders to:  DYANA MENDEZ  POOL    Comments:   home juliet      Anticoagulation Care Providers     Provider Role Specialty Phone number    Navi Fay MD Referring Cardiology [phone]    Leyla Byrne APRN Referring Cardiology [phone]          Clinic Interview:  Patient Findings     Negatives:  Signs/symptoms of thrombosis, Signs/symptoms of bleeding,   Laboratory test error suspected, Change in health, Change in alcohol use,   Change in activity, Upcoming invasive procedure, Emergency department   visit, Upcoming dental procedure, Missed doses, Extra doses, Change in   medications, Change in diet/appetite, Hospital admission, Bruising, Other   complaints      Clinical Outcomes     Negatives:  Major bleeding event, Thromboembolic event,   Anticoagulation-related hospital admission, Anticoagulation-related ED   visit, Anticoagulation-related fatality      Patient has required a dose adjustment of 2.5 mg one day a week to bring INR to therapeutic range. Given this, plan to change warfarin regimen to 2.5 mg on fridays and 5mg AOD. Patient has already taken 5mg tablet today, so will have patient take 2.5 mg on Saturday.     INR History:      5/15/2023     8:15 AM 5/20/2023    12:00 AM 5/22/2023    10:14 AM 5/26/2023    12:00 AM  5/26/2023     2:42 PM 6/2/2023    12:00 AM 6/2/2023     2:06 PM   Anticoagulation Monitoring   INR 4.10  3.20  3.60  3.40   INR Date 5/13/2023 5/20/2023 5/26/2023 6/2/2023   INR Goal 2.5-3.5  2.5-3.5  2.5-3.5  2.5-3.5   Trend Same  Same  Same  Down   Last Week Total 32.5 mg  35 mg  35 mg  32.5 mg   Next Week Total 35 mg  35 mg  32.5 mg  32.5 mg   Sun -  5 mg  5 mg  5 mg   Mon 5 mg  5 mg  5 mg  5 mg   Tue 5 mg  5 mg  5 mg  5 mg   Wed 5 mg  5 mg  5 mg  5 mg   Thu 5 mg  5 mg  5 mg  5 mg   Fri -  5 mg  5 mg  5 mg   Sat -  5 mg  2.5 mg (5/27)  2.5 mg (6/3)   Historical INR  3.20       3.60       3.40             This result is from an external source.       Plan:  1. INR is Therapeutic today- see above in Anticoagulation Summary.   Will instruct Elliot Sebastian to Change their warfarin regimen- see above in Anticoagulation Summary. Patient will take 2.5 mg on Saturday (6/3) since he already took 5 mg today.   2. Follow up in 1 week  3. They have been instructed to call if any changes in medications, doses, concerns, etc. Patient expresses understanding and has no further questions at this time.    Mckinley Frank [facility]

## 2023-06-05 NOTE — PROGRESS NOTES
"[facility] Clinical Pharmacy Services: Vancomycin Pharmacokinetic Initial Consult Note    Elliot Sebastian is a 62 y.o. male who is on day 1 of pharmacy to dose vancomycin.    Indication: Skin and Soft Tissue  Consulting Provider: Nataly NICOLE  Planned Duration of Therapy: TBD - consulted for 5 days  Loading Dose Ordered or Given: 2500 mg on 8/1 at 2327  MRSA PCR performed: No  Culture/Source:   8/1: Blood cx pending 2/2 gram positive cocci in chains (ID following)  Target: -600 mg/L.hr   Other Antimicrobials: None    Vitals/Labs  Ht: 198.1 cm (78\"); Wt: (!) 137 kg (301 lb)  Temp Readings from Last 1 Encounters:   08/02/22 99.8 °F (37.7 °C) (Oral)    Estimated Creatinine Clearance: 93.6 mL/min (A) (by C-G formula based on SCr of 1.29 mg/dL (H)).        Results from last 7 days   Lab Units 08/01/22  2209   CREATININE mg/dL 1.29*   WBC 10*3/mm3 8.66     Assessment/Plan:      Vancomycin Random level as expected.  Will continue Vancomycin Dose:   1000 mg IV every  12  hours  Predictive AUC level for the dose ordered is 452 mg/L.hr, which is within the target of 400-600 mg/L.hr  Next level due: 8/4/22 @ 1030      Pharmacy will follow patient's kidney function and will adjust doses and obtain levels as necessary. Thank you for involving pharmacy in this patient's care. Please contact pharmacy with any questions or concerns.                           Jazmyne Marin, Pharm.D., [facility]  Clinical Pharmacist  8/2/2022  14:24 EDT      " (1) Other Medications/None

## 2023-06-10 LAB — INR PPP: 2.9

## 2023-06-12 ENCOUNTER — ANTICOAGULATION VISIT (OUTPATIENT)
Dept: PHARMACY | Facility: HOSPITAL | Age: 63
End: 2023-06-12
Payer: COMMERCIAL

## 2023-06-12 DIAGNOSIS — I48.0 PAROXYSMAL ATRIAL FIBRILLATION: Primary | ICD-10-CM

## 2023-06-12 DIAGNOSIS — Z95.2 HX OF MITRAL VALVE REPLACEMENT WITH MECHANICAL VALVE: ICD-10-CM

## 2023-06-12 PROCEDURE — G0249 PROVIDE INR TEST MATER/EQUIP: HCPCS

## 2023-06-12 NOTE — PROGRESS NOTES
Anticoagulation Clinic Progress Note    Anticoagulation Summary  As of 2023    INR goal:  2.5-3.5   TTR:  64.7 % (4.5 y)   INR used for dosin.90 (6/10/2023)   Warfarin maintenance plan:  2.5 mg every Fri; 5 mg all other days; Starting 2023   Weekly warfarin total:  32.5 mg   No change documented:  Angelica Marroquin RPH   Plan last modified:  Mckinley Frank RPH (2023)   Next INR check:  2023   Priority:  High   Target end date:  Indefinite    Indications    Paroxysmal atrial fibrillation [I48.0]  Hx of mitral valve replacement with mechanical valve [Z95.2] [Z95.2]             Anticoagulation Episode Summary     INR check location:  Home Draw    Preferred lab:      Send INR reminders to:  DYANA MENDEZ  POOL    Comments:   home juliet      Anticoagulation Care Providers     Provider Role Specialty Phone number    Navi Fay MD Referring Cardiology [phone]    Leyla Byrne APRN Referring Cardiology [phone]          Clinic Interview:  No pertinent clinical findings have been reported.    INR History:      2023    10:14 AM 2023    12:00 AM 2023     2:42 PM 2023    12:00 AM 2023     2:06 PM 6/10/2023    12:00 AM 2023     9:00 AM   Anticoagulation Monitoring   INR 3.20  3.60  3.40  2.90   INR Date 2023  2023  2023  6/10/2023   INR Goal 2.5-3.5  2.5-3.5  2.5-3.5  2.5-3.5   Trend Same  Same  Down  Same   Last Week Total 35 mg  35 mg  32.5 mg  32.5 mg   Next Week Total 35 mg  32.5 mg  32.5 mg  32.5 mg   Sun 5 mg  5 mg  5 mg  -   Mon 5 mg  5 mg  5 mg  5 mg   Tue 5 mg  5 mg  5 mg  5 mg   Wed 5 mg  5 mg  5 mg  5 mg   Thu 5 mg  5 mg  5 mg  5 mg   Fri 5 mg  5 mg  5 mg  2.5 mg   Sat 5 mg  2.5 mg ()  2.5 mg (6/3)  -   Historical INR  3.60      3.40      2.90            This result is from an external source.       Plan:  1. INR is therapeutic today- see above in Anticoagulation Summary.    Elliot Sebastian to continue  their warfarin regimen- see above in Anticoagulation Summary.  2. Follow up in 1 week  3. They have been instructed to call if any changes in medications, doses, concerns, etc. Patient expresses understanding and has no further questions at this time.    Angelica Marroquin [facility]

## 2023-06-16 LAB — INR PPP: 2.7

## 2023-06-19 ENCOUNTER — ANTICOAGULATION VISIT (OUTPATIENT)
Dept: PHARMACY | Facility: HOSPITAL | Age: 63
End: 2023-06-19
Payer: COMMERCIAL

## 2023-06-19 DIAGNOSIS — Z95.2 HX OF MITRAL VALVE REPLACEMENT WITH MECHANICAL VALVE: ICD-10-CM

## 2023-06-19 DIAGNOSIS — I48.0 PAROXYSMAL ATRIAL FIBRILLATION: Primary | ICD-10-CM

## 2023-06-19 RX ORDER — TERBINAFINE HYDROCHLORIDE 250 MG/1
TABLET ORAL
Qty: 30 TABLET | Refills: 0 | Status: SHIPPED | OUTPATIENT
Start: 2023-06-19 | End: 2023-06-21 | Stop reason: SDUPTHER

## 2023-06-19 NOTE — PROGRESS NOTES
Anticoagulation Clinic Progress Note    Anticoagulation Summary  As of 2023      INR goal:  2.5-3.5   TTR:  64.8 % (4.5 y)   INR used for dosin.70 (2023)   Warfarin maintenance plan:  2.5 mg every Fri; 5 mg all other days; Starting 2023   Weekly warfarin total:  32.5 mg   No change documented:  Angelica Marroquin RPH   Plan last modified:  Mckinley Frank RPH (2023)   Next INR check:  2023   Priority:  High   Target end date:  Indefinite    Indications    Paroxysmal atrial fibrillation [I48.0]  Hx of mitral valve replacement with mechanical valve [Z95.2] [Z95.2]                 Anticoagulation Episode Summary       INR check location:  Home Draw    Preferred lab:      Send INR reminders to:  DYANA MENDEZ  POOL    Comments:   home juliet          Anticoagulation Care Providers       Provider Role Specialty Phone number    Navi Fay MD Referring Cardiology [phone]    Leyla Byrne APRN Referring Cardiology [phone]            Clinic Interview:  No pertinent clinical findings have been reported.    INR History:      2023     2:42 PM 2023    12:00 AM 2023     2:06 PM 6/10/2023    12:00 AM 2023     9:00 AM 2023    12:00 AM 2023     8:21 AM   Anticoagulation Monitoring   INR 3.60  3.40  2.90  2.70   INR Date 2023  2023  6/10/2023  2023   INR Goal 2.5-3.5  2.5-3.5  2.5-3.5  2.5-3.5   Trend Same  Down  Same  Same   Last Week Total 35 mg  32.5 mg  32.5 mg  32.5 mg   Next Week Total 32.5 mg  32.5 mg  32.5 mg  32.5 mg   Sun 5 mg  5 mg  -  -   Mon 5 mg  5 mg  5 mg  5 mg   Tue 5 mg  5 mg  5 mg  5 mg   Wed 5 mg  5 mg  5 mg  5 mg   Thu 5 mg  5 mg  5 mg  5 mg   Fri 5 mg  5 mg  2.5 mg  -   Sat 2.5 mg ()  2.5 mg (6/3)  -  -   Historical INR  3.40      2.90      2.70            This result is from an external source.       Plan:  1. INR is therapeutic today- see above in Anticoagulation Summary.    Elliot Sebastian to  continue their warfarin regimen- see above in Anticoagulation Summary.  2. Follow up in 1 week  3. Pt has agreed to only be called if INR out of range. They have been instructed to call if any changes in medications, doses, concerns, etc. Patient expresses understanding and has no further questions at this time.    Angelica Marroquin, [facility]

## 2023-07-23 LAB — INR PPP: 2.9

## 2023-07-24 ENCOUNTER — ANTICOAGULATION VISIT (OUTPATIENT)
Dept: PHARMACY | Facility: HOSPITAL | Age: 63
End: 2023-07-24
Payer: COMMERCIAL

## 2023-07-24 DIAGNOSIS — Z95.2 HX OF MITRAL VALVE REPLACEMENT WITH MECHANICAL VALVE: ICD-10-CM

## 2023-07-24 DIAGNOSIS — I48.0 PAROXYSMAL ATRIAL FIBRILLATION: Primary | ICD-10-CM

## 2023-07-24 NOTE — PROGRESS NOTES
Anticoagulation Clinic Progress Note    Anticoagulation Summary  As of 2023      INR goal:  2.5-3.5   TTR:  64.8 % (4.6 y)   INR used for dosin.90 (2023)   Warfarin maintenance plan:  5 mg every day; Starting 2023   Weekly warfarin total:  35 mg   Plan last modified:  Michael Horner, PharmD (2023)   Next INR check:  2023   Priority:  High   Target end date:  Indefinite    Indications    Paroxysmal atrial fibrillation [I48.0]  Hx of mitral valve replacement with mechanical valve [Z95.2] [Z95.2]                 Anticoagulation Episode Summary       INR check location:  Home Draw    Preferred lab:      Send INR reminders to:  DYANA MENDEZ  POOL    Comments:   home juliet          Anticoagulation Care Providers       Provider Role Specialty Phone number    Chavez LeylaCOREY Paul Referring Cardiology [phone]            Clinic Interview:  Patient Findings     Positives:  Upcoming dental procedure    Negatives:  Signs/symptoms of thrombosis, Signs/symptoms of bleeding,   Laboratory test error suspected, Change in health, Change in alcohol use,   Change in activity, Upcoming invasive procedure, Emergency department   visit, Missed doses, Extra doses, Change in medications, Change in   diet/appetite, Hospital admission, Bruising, Other complaints    Comments:  Reports several many will be extracted on 23. Reports he   has 12-14 150-mg enoxaparin syringes leftover from procedure earlier last   year (prescribed 22).      Clinical Outcomes     Negatives:  Major bleeding event, Thromboembolic event,   Anticoagulation-related hospital admission, Anticoagulation-related ED   visit, Anticoagulation-related fatality    Comments:  Reports several many will be extracted on 23. Reports he   has 12-14 150-mg enoxaparin syringes leftover from procedure earlier last   year (prescribed 22).        INR History:      7/10/2023     8:45 AM 2023    12:00 AM 2023      9:51 AM 7/19/2023    12:00 AM 7/20/2023     8:10 AM 7/23/2023    12:00 AM 7/24/2023     8:12 AM   Anticoagulation Monitoring   INR 2.30  2.10  2.30  2.90   INR Date 7/10/2023  7/14/2023  7/19/2023  7/23/2023   INR Goal 2.5-3.5  2.5-3.5  2.5-3.5  2.5-3.5   Trend Same  Up  Same  Same   Last Week Total 27.5 mg  40 mg  40 mg  37.5 mg   Next Week Total 35 mg  35 mg  35 mg  35 mg   Sun -  -  5 mg  5 mg   Mon 7.5 mg (7/10)  5 mg  -  5 mg   Tue 5 mg  5 mg  -  5 mg   Wed 5 mg  -  -  5 mg   Thu 5 mg  -  5 mg  5 mg   Fri -  -  5 mg  5 mg   Sat -  -  5 mg  5 mg   Historical INR  2.10      2.30      2.90            This result is from an external source.       Plan:  1. INR is Therapeutic today- see above in Anticoagulation Summary.   Will instruct Elliot Sebastian to Continue their warfarin regimen at dose of 5 mg daily - see above in Anticoagulation Summary.    In preparation for upcoming dental procedure, will utilize the following holding/bridging plan:     8/06/23   HOLD warfarin  8/07/23   HOLD warfarin  8/08/23   HOLD warfarin; enoxaparin 135 mg every 12 hours  8/09/23   HOLD warfarin; enoxaparin 135 mg every 12 hours  8/10/23   HOLD warfarin; enoxaparin 135 mg in morning only (>24 hrs prior to procedure)  8/11/23   PROCEDURE; HOLD warfarin/enoxaparin  8/12/23   Warfarin 7.5 mg; enoxaparin 135 mg every 12 hours  8/13/23   Warfarin 7.5 mg; enoxaparin 135 mg every 12 hours  8/14/23   Warfarin 7.5 mg; enoxaparin 135 mg every 12 hours  8/15/23   Enoxaparin 135 mg in morning; INR CHECK to determine further plan.    Please note:  To achieve enoxaparin dose of 135 mg, waste/discard 0.1 mL from 150-mg syringe, then administer the remaining 0.9 mL (135 mg) as directed.     2. Follow up in 1 week to review bridging instructions in detail.   3. They have been instructed to call if any changes in medications, doses, concerns, etc. He is familiar and comfortable with enoxaparin use. Patient expresses understanding and has no further  questions at this time.    Michael Horner, PharmD

## 2023-07-24 NOTE — PATIENT INSTRUCTIONS
8/06/23   HOLD warfarin  8/07/23   HOLD warfarin  8/08/23   HOLD warfarin; enoxaparin 135 mg every 12 hours  8/09/23   HOLD warfarin; enoxaparin 135 mg every 12 hours  8/10/23   HOLD warfarin; enoxaparin 135 mg in morning only (>24 hrs prior to procedure)  8/11/23   PROCEDURE; HOLD warfarin/enoxaparin  8/12/23   Warfarin 7.5 mg; enoxaparin 135 mg every 12 hours  8/13/23   Warfarin 7.5 mg; enoxaparin 135 mg every 12 hours  8/14/23   Warfarin 7.5 mg; enoxaparin 135 mg every 12 hours  8/15/23   Enoxaparin 135 mg in morning; INR CHECK to determine further plan.    Please note:  To achieve enoxaparin dose of 135 mg, waste/discard 0.1 mL from 150-mg syringe, then administer the remaining 0.9 mL (135 mg) as directed.

## 2023-07-31 ENCOUNTER — ANTICOAGULATION VISIT (OUTPATIENT)
Dept: PHARMACY | Facility: HOSPITAL | Age: 63
End: 2023-07-31
Payer: COMMERCIAL

## 2023-07-31 DIAGNOSIS — Z95.2 HX OF MITRAL VALVE REPLACEMENT WITH MECHANICAL VALVE: ICD-10-CM

## 2023-07-31 DIAGNOSIS — I48.0 PAROXYSMAL ATRIAL FIBRILLATION: Primary | ICD-10-CM

## 2023-07-31 LAB — INR PPP: 2.2

## 2023-08-02 ENCOUNTER — OFFICE VISIT (OUTPATIENT)
Dept: CARDIOLOGY | Facility: CLINIC | Age: 63
End: 2023-08-02
Payer: COMMERCIAL

## 2023-08-02 VITALS
HEIGHT: 74 IN | WEIGHT: 295 LBS | SYSTOLIC BLOOD PRESSURE: 138 MMHG | BODY MASS INDEX: 37.86 KG/M2 | HEART RATE: 64 BPM | DIASTOLIC BLOOD PRESSURE: 76 MMHG

## 2023-08-02 DIAGNOSIS — I48.0 PAROXYSMAL ATRIAL FIBRILLATION: ICD-10-CM

## 2023-08-02 DIAGNOSIS — I33.0 ACUTE BACTERIAL ENDOCARDITIS: Primary | ICD-10-CM

## 2023-08-02 DIAGNOSIS — I50.32 CHRONIC HEART FAILURE WITH PRESERVED EJECTION FRACTION (HFPEF): ICD-10-CM

## 2023-08-02 DIAGNOSIS — I10 ESSENTIAL HYPERTENSION: ICD-10-CM

## 2023-08-02 DIAGNOSIS — Z95.2 HX OF MITRAL VALVE REPLACEMENT WITH MECHANICAL VALVE: ICD-10-CM

## 2023-08-02 DIAGNOSIS — I82.5Y1 CHRONIC DEEP VEIN THROMBOSIS (DVT) OF PROXIMAL VEIN OF RIGHT LOWER EXTREMITY: ICD-10-CM

## 2023-08-02 DIAGNOSIS — I34.0 NONRHEUMATIC MITRAL VALVE REGURGITATION: ICD-10-CM

## 2023-08-02 PROCEDURE — 93000 ELECTROCARDIOGRAM COMPLETE: CPT | Performed by: NURSE PRACTITIONER

## 2023-08-02 PROCEDURE — 99214 OFFICE O/P EST MOD 30 MIN: CPT | Performed by: NURSE PRACTITIONER

## 2023-08-02 RX ORDER — LOSARTAN POTASSIUM 50 MG/1
100 TABLET ORAL DAILY
Qty: 180 TABLET | Refills: 2 | Status: SHIPPED | OUTPATIENT
Start: 2023-08-02 | End: 2023-08-02 | Stop reason: SDUPTHER

## 2023-08-02 RX ORDER — LOSARTAN POTASSIUM 50 MG/1
100 TABLET ORAL DAILY
Qty: 180 TABLET | Refills: 2 | Status: SHIPPED | OUTPATIENT
Start: 2023-08-02

## 2023-08-02 NOTE — PROGRESS NOTES
"      [facility] CARDIOLOGY  [address] [facility] [address]  [facility] [address]  Phone: [phone]  Fax: [phone]  Patient Name: Elliot Sebastian  :1960  Age: 63 y.o.  Primary Cardiologist: Navi Fay MD  Encounter Provider:  COREY Umana    History of Present Illness     Elliot Sebastian is a 63 y.o.  male whose medical history includes thyroid disease, gout, superior mesenteric artery aneurysm.  He has been followed in our office by Dr. 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 Fermin in 2022 when he presented with likely bacterial endocarditis again. I have reviewed the past medical records in preparation of today's visit.     Follow-up:  He is here for 1 year follow-up and I am seeing him for the first time today.  He was hospitalized in 2022 with right lower extremity cellulitis, bacteremia, and likely endocarditis of the prosthetic mitral valve; he was treated with IV ceftriaxone.  He then presented again in 2023 with lower right lower extremity cellulitis; blood culture showed no growth and there was no vegetation noted on the mitral valve.  He is doing well.  He recently had a varicose vein ablation with hope that this is going to help with his leg swelling and prevent him from having cellulitis.  He is not checking his blood pressure at home.  He does not think he has had any issues with atrial fibrillation.  He denies chest pain, dyspnea with exertion, orthopnea, palpitations, and his leg swelling is better.  He is taking his medications as prescribed.    Data Review     The following data was reviewed by COREY Umana on 23:    Vital Signs:   /76   Pulse 64   Ht 188 cm (74\")   Wt 134 kg (295 lb)   BMI 37.88 kg/mý       Weight:  Wt " Readings from Last 3 Encounters:   08/02/23 134 kg (295 lb)   06/21/23 135 kg (298 lb)   01/18/23 (!) 138 kg (305 lb)     Body mass index is 37.88 kg/mý.    Below is a summary of pertinent cardiology findings:  2013 he had bacterial endocarditis and underwent mitral valve replacement with a mechanical valve.  He had a complicated recovery that included mesenteric artery aneurysm, cholecystitis, DVT of the upper extremities, atrial fibrillation, ASHIA, and postoperative bleeding.  July 2018 treadmill stress test showed no ECG evidence of ischemia; he exercised for 9 minutes and achieved 10 METS.  August 2022 he was admitted with right lower extremity cellulitis and was also noted to be bacteremic with Strep dysgalactiae.  MOE did show possible vegetation of the mitral valve.  He was treated with 6 weeks of IV ceftriaxone.  He did have A-fib with RVR and was started on warfarin.  August 2022 echocardiogram showed EF 60%, mild concentric LVH, indeterminate LV diastolic function, mechanical mitral valve present with peak and mean gradients within defined limits.  Bilateral lower extremity Doppler study was normal.  August 2022 MOE showed EF 51 to 55%, negative saline test results, mild pulmonary hypertension, normal functioning mechanical mitral valve with a mobile vegetation on the left atrial surface at the septal annulus near the ostium of the left atrial appendage.  January 2023 he is again presented with right lower extremity cellulitis; echocardiogram negative for vegetations.  Blood cultures showed no growth at 48 hours.  He was discharged home on oral cephalexin.  January 2023 echocardiogram shows EF 51 to 55%, mildly dilated LV cavity, normal LV diastolic function, borderline dilated RV cavity, borderline dilated right atrial cavity, calcification of the aortic valve, and mechanical mitral valve present    Labs:  01/10/2023:  cr 1.0, K 4.4, otherwise unremarkable CMP, Hgb 15.8, Plt 176      ECG 12  Lead    Date/Time: 8/2/2023 2:58 PM  Performed by: Ángela Head APRN  Authorized by: Ángela Head APRN   Comparison: compared with previous ECG from 8/19/2022  Similar to previous ECG  Rhythm: sinus rhythm  Rate: normal  BPM: 64  Conduction: incomplete right bundle branch block  ST Depression: II, III and aVF  T inversion: III and aVF  T flattening: V2 and V3    Clinical impression: abnormal EKG        Medications     Allergies as of 08/02/2023    (No Known Allergies)       Current Outpatient Medications   Medication Instructions    atenolol (TENORMIN) 25 mg, Oral, Daily    cholestyramine (QUESTRAN) 4 g, Oral, Daily, If no improvement after two weeks, may increase to BID.*Appointment required before next refill*    cholestyramine light (PREVALITE) 4 g, Oral, 3 Times Daily With Meals    HYDROcodone-acetaminophen (Norco) 5-325 MG per tablet 1 tablet, Oral, Every 4 Hours PRN    losartan (COZAAR) 100 mg, Oral, Daily    Multiple Vitamins-Minerals (MENS MULTIVITAMIN PLUS) tablet 2 capsules, Oral, Daily    naproxen (NAPROSYN) 500 mg, Oral, 2 Times Daily With Meals    terbinafine (LAMISIL) 250 mg, Oral, Daily    Vitamin D3 5,000 Units, Oral, Daily    warfarin (Jantoven) 5 MG tablet Take one-half tablet (2.5 mg) by mouth on Fridays, and take one tablet (5 mg) by mouth all other days or as directed.        Past History, Review of Systems, Exam     Past Medical History:   Diagnosis Date    Acute bacterial endocarditis     Anemia     APC (atrial premature contractions)     Atrial fibrillation     BPH (benign prostatic hypertrophy)     CHF (congestive heart failure)     Cholelithiasis     Chronic constipation     Deep vein thrombophlebitis of leg     Disease of thyroid gland     Gout     Intestinal obstruction     Ischemic enteritis     Left heart failure, NYHA class 3     Mitral regurgitation     Pleural effusion, bilateral     Pulmonary hypertension     Superior mesenteric artery aneurysm      Umbilical hernia     Vitamin D deficiency        Past Surgical History:   has a past surgical history that includes Appendectomy; Cholecystectomy; Exploration neck for postop hemorrhage / thrombosis / infection; Mitral valve replacement (01/03/2013); Colonoscopy (approx 2013); and Colonoscopy (N/A, 10/26/2020).     Social History     Socioeconomic History    Marital status:    Tobacco Use    Smoking status: Never    Smokeless tobacco: Never    Tobacco comments:     caffeine use   Substance and Sexual Activity    Alcohol use: Yes     Alcohol/week: 2.0 standard drinks     Types: 2 Cans of beer per week     Comment: 1-2 nightly    Drug use: Never       Review of Systems   Cardiovascular:  Positive for leg swelling. Negative for chest pain, claudication, cyanosis, dyspnea on exertion, irregular heartbeat, near-syncope, orthopnea, palpitations, paroxysmal nocturnal dyspnea and syncope.     Vitals reviewed.   Constitutional:       Appearance: Not in distress.   Eyes:      Conjunctiva/sclera: Conjunctivae normal.      Pupils: Pupils are equal, round, and reactive to light.   HENT:      Head: Normocephalic.      Nose: Nose normal.    Mouth/Throat:      Pharynx: Oropharynx is clear.   Neck:      Vascular: JVD normal.   Pulmonary:      Effort: Pulmonary effort is normal.      Breath sounds: Normal breath sounds. No wheezing. No rhonchi. No rales.   Cardiovascular:      Normal rate. Regular rhythm. Normal S1. Normal S2.       Murmurs: There is no murmur.   Pulses:     Intact distal pulses.   Edema:     Peripheral edema present.     Pretibial: bilateral trace edema of the pretibial area.  Abdominal:      General: Bowel sounds are normal. There is no distension.      Palpations: Abdomen is soft.      Tenderness: There is no abdominal tenderness.   Musculoskeletal: Normal range of motion.      Cervical back: Normal range of motion and neck supple. Skin:     General: Skin is warm and dry.   Neurological:      Mental Status:  Alert and oriented to person, place and time.   Psychiatric:         Attention and Perception: Attention normal.         Mood and Affect: Mood normal.         Speech: Speech normal.         Behavior: Behavior is cooperative.        Assessment and Plan     Assessment:  1. Acute bacterial endocarditis    2. Nonrheumatic mitral valve regurgitation    3. Hx of mitral valve replacement with mechanical valve [Z95.2]    4. Paroxysmal atrial fibrillation    5. Chronic heart failure with preserved ejection fraction (HFpEF)    6. Essential hypertension    7. Chronic deep vein thrombosis (DVT) of proximal vein of right lower extremity         History of bacterial endocarditis: This occurred in 2013 and he ended up having mitral valve replacement with a mechanical valve.  He has been having issues with right lower extremity cellulitis and there was mobile vegetation on mitral valve in August 2022; he was treated with antibiotics.  January 2023 echocardiogram showed no vegetation.  Mitral valve regurgitation: This occurred in the setting of bacterial endocarditis in 2013 and he underwent mechanical mitral valve replacement.  He is on warfarin.  January 2023 echocardiogram showed normal functioning mechanical mitral valve.  Paroxysmal atrial fibrillation: He is maintaining sinus rhythm on 25 mg atenolol twice daily.  He is already anticoagulated with warfarin given his history of mechanical valve.  Chronic HFpEF: January 2023 echocardiogram was stable.  He is compensated by exam today.  He is on atenolol and losartan.  Hypertension: Controlled in office but home readings are largely unknown.  History of chronic right lower extremity DVT: He is on warfarin.    Mr. Sebastian is a patient who has seen Dr. Fay following mitral valve replacement with mechanical valve after he had bacterial endocarditis.  He did have recurrent endocarditis in the setting of right lower extremity cellulitis in August 2022 but last echo in January 2023  showed no vegetation.  We did discuss that he should have antibiotics prior to dental work.  I will make no further medication changes today.  I will have him see Dr. Chanel in a 40-minute spot in 6 months as he will be a new patient for her.    Return in about 6 months (around 2/2/2024) for Follow-up with Dr. Chanel; needs 40 minute appointment. He will be a new patient for her..  Orders Placed This Encounter   Procedures    ECG 12 Lead    SCANNED EKG      New Medications Ordered This Visit   Medications    losartan (COZAAR) 50 MG tablet     Sig: Take 2 tablets by mouth Daily.     Dispense:  180 tablet     Refill:  2         Thank you for the opportunity to participate in this patient's care.    COREY Shah    This office note has been dictated.

## 2023-08-03 RX ORDER — ATENOLOL 25 MG/1
25 TABLET ORAL DAILY
Qty: 90 TABLET | Refills: 3 | Status: SHIPPED | OUTPATIENT
Start: 2023-08-03

## 2023-08-13 ENCOUNTER — DOCUMENTATION (OUTPATIENT)
Dept: CARDIOLOGY | Facility: CLINIC | Age: 63
End: 2023-08-13
Payer: COMMERCIAL

## 2023-08-13 RX ORDER — ENOXAPARIN SODIUM 150 MG/ML
1 INJECTION SUBCUTANEOUS EVERY 12 HOURS
Qty: 6 ML | Refills: 0 | Status: SHIPPED | OUTPATIENT
Start: 2023-08-13

## 2023-08-13 RX ORDER — ENOXAPARIN SODIUM 100 MG/ML
1 INJECTION SUBCUTANEOUS EVERY 12 HOURS SCHEDULED
Qty: 5.2 ML | Refills: 0 | Status: SHIPPED | OUTPATIENT
Start: 2023-08-13 | End: 2023-08-13

## 2023-08-15 ENCOUNTER — ANTICOAGULATION VISIT (OUTPATIENT)
Dept: PHARMACY | Facility: HOSPITAL | Age: 63
End: 2023-08-15
Payer: COMMERCIAL

## 2023-08-15 DIAGNOSIS — I48.0 PAROXYSMAL ATRIAL FIBRILLATION: Primary | ICD-10-CM

## 2023-08-15 DIAGNOSIS — Z95.2 HX OF MITRAL VALVE REPLACEMENT WITH MECHANICAL VALVE: ICD-10-CM

## 2023-08-15 LAB — INR PPP: 2

## 2023-08-15 PROCEDURE — G0249 PROVIDE INR TEST MATER/EQUIP: HCPCS

## 2023-08-15 NOTE — PROGRESS NOTES
Anticoagulation Clinic Progress Note    Anticoagulation Summary  As of 8/15/2023      INR goal:  2.5-3.5   TTR:  64.2 % (4.7 y)   INR used for dosin.00 (8/15/2023)   Warfarin maintenance plan:  7.5 mg every Mon; 5 mg all other days   Weekly warfarin total:  37.5 mg   Plan last modified:  Bushra Torre, PharmD (2023)   Next INR check:  2023   Priority:  High   Target end date:  Indefinite    Indications    Paroxysmal atrial fibrillation [I48.0]  Hx of mitral valve replacement with mechanical valve [Z95.2] [Z95.2]                 Anticoagulation Episode Summary       INR check location:  Home Draw    Preferred lab:      Send INR reminders to:  DYANA MENDEZ  POOL    Comments:   home juliet          Anticoagulation Care Providers       Provider Role Specialty Phone number    Leyla Byrne APRN Referring Cardiology [phone]            Clinic Interview:  Patient Findings     Negatives:  Signs/symptoms of thrombosis, Signs/symptoms of bleeding,   Laboratory test error suspected, Change in health, Change in alcohol use,   Change in activity, Upcoming invasive procedure, Emergency department   visit, Upcoming dental procedure, Missed doses, Extra doses, Change in   medications, Change in diet/appetite, Hospital admission, Bruising, Other   complaints      Clinical Outcomes     Negatives:  Major bleeding event, Thromboembolic event,   Anticoagulation-related hospital admission, Anticoagulation-related ED   visit, Anticoagulation-related fatality        INR History:      2023     8:10 AM 2023    12:00 AM 2023     8:12 AM 2023    12:00 AM 2023     8:48 AM 8/15/2023    12:00 AM 8/15/2023     2:00 PM   Anticoagulation Monitoring   INR 2.30  2.90  2.20  2.00   INR Date 2023  2023  2023  8/15/2023   INR Goal 2.5-3.5  2.5-3.5  2.5-3.5  2.5-3.5   Trend Same  Same  Up  Same   Last Week Total 40 mg  37.5 mg  35 mg  22.5 mg   Next Week Total 35 mg  35 mg   32.5 mg  40 mg   Sun 5 mg  5 mg  Hold (8/6), 7.5 mg (8/13)  -   Mon -  5 mg  Hold (8/7); Otherwise 7.5 mg  -   Tue -  5 mg  Hold (8/8); Otherwise 5 mg  7.5 mg (8/15)   Wed -  5 mg  Hold (8/9); Otherwise 5 mg  5 mg   Thu 5 mg  5 mg  Hold (8/10); Otherwise 5 mg  -   Fri 5 mg  5 mg  Hold (8/11); Otherwise 5 mg  -   Sat 5 mg  5 mg  7.5 mg (8/12); Otherwise 5 mg  -   Historical INR  2.90      2.20      2.00            This result is from an external source.       Plan:  1. INR is Subtherapeutic today- see above in Anticoagulation Summary.   Will instruct Elliot Sebastian to Increase their warfarin regimen- see above in Anticoagulation Summary.  boost 1 more day to 7.5 mg, then resume, continue lovenox, rck on Thursday   2. Follow up in 2 days   3.  They have been instructed to call if any changes in medications, doses, concerns, etc. Patient expresses understanding and has no further questions at this time.    Angelica Marroquin [facility]

## 2023-08-17 LAB — INR PPP: 2.5

## 2023-08-18 ENCOUNTER — ANTICOAGULATION VISIT (OUTPATIENT)
Dept: PHARMACY | Facility: HOSPITAL | Age: 63
End: 2023-08-18
Payer: COMMERCIAL

## 2023-08-18 DIAGNOSIS — Z95.2 HX OF MITRAL VALVE REPLACEMENT WITH MECHANICAL VALVE: ICD-10-CM

## 2023-08-18 DIAGNOSIS — I48.0 PAROXYSMAL ATRIAL FIBRILLATION: Primary | ICD-10-CM

## 2023-08-18 NOTE — PROGRESS NOTES
Anticoagulation Clinic Progress Note    Anticoagulation Summary  As of 2023      INR goal:  2.5-3.5   TTR:  64.1 % (4.7 y)   INR used for dosin.50 (2023)   Warfarin maintenance plan:  7.5 mg every Mon; 5 mg all other days   Weekly warfarin total:  37.5 mg   No change documented:  Angelica Marroquin, GURINDER   Plan last modified:  Bushra Torre, PharmD (2023)   Next INR check:  2023   Priority:  High   Target end date:  Indefinite    Indications    Paroxysmal atrial fibrillation [I48.0]  Hx of mitral valve replacement with mechanical valve [Z95.2] [Z95.2]                 Anticoagulation Episode Summary       INR check location:  Home Draw    Preferred lab:      Send INR reminders to:  DYANA MENDEZ  POOL    Comments:   home juliet          Anticoagulation Care Providers       Provider Role Specialty Phone number    Leyla Byrne APRN Referring Cardiology [phone]            Clinic Interview:  No pertinent clinical findings have been reported.    INR History:      2023     8:12 AM 2023    12:00 AM 2023     8:48 AM 8/15/2023    12:00 AM 8/15/2023     2:00 PM 2023    12:00 AM 2023     2:25 PM   Anticoagulation Monitoring   INR 2.90  2.20  2.00  2.50   INR Date 2023  2023  8/15/2023  2023   INR Goal 2.5-3.5  2.5-3.5  2.5-3.5  2.5-3.5   Trend Same  Up  Same  Same   Last Week Total 37.5 mg  35 mg  22.5 mg  40 mg   Next Week Total 35 mg  32.5 mg  40 mg  37.5 mg   Sun 5 mg  Hold (), 7.5 mg ()  -  5 mg   Mon 5 mg  Hold (); Otherwise 7.5 mg  -  5 mg   Tue 5 mg  Hold (); Otherwise 5 mg  7.5 mg (8/15)  5 mg   Wed 5 mg  Hold (); Otherwise 5 mg  5 mg  5 mg   Thu 5 mg  Hold (8/10); Otherwise 5 mg  -  -   Fri 5 mg  Hold (); Otherwise 5 mg  -  7.5 mg ()   Sat 5 mg  7.5 mg (); Otherwise 5 mg  -  5 mg   Historical INR  2.20      2.00      2.50            This result is from an external source.       Plan:  1. INR is  therapeutic today- see above in Anticoagulation Summary.    Elliot Sebastian to continue their warfarin regimen- see above in Anticoagulation Summary.  Discontinue lovenox, resume regular warfarin dosage (Patient wanted to take 7.5 mg on fri instead of Monday. However, it is the same twd)  2. Follow up in 1 week  3. They have been instructed to call if any changes in medications, doses, concerns, etc. Patient expresses understanding and has no further questions at this time.    Angelica Marroquin, [facility]

## 2023-08-26 LAB — INR PPP: 4.2

## 2023-08-28 ENCOUNTER — ANTICOAGULATION VISIT (OUTPATIENT)
Dept: PHARMACY | Facility: HOSPITAL | Age: 63
End: 2023-08-28
Payer: COMMERCIAL

## 2023-08-28 DIAGNOSIS — Z95.2 HX OF MITRAL VALVE REPLACEMENT WITH MECHANICAL VALVE: ICD-10-CM

## 2023-08-28 DIAGNOSIS — I48.0 PAROXYSMAL ATRIAL FIBRILLATION: Primary | ICD-10-CM

## 2023-08-28 RX ORDER — CEPHALEXIN 500 MG/1
500 CAPSULE ORAL 3 TIMES DAILY
Qty: 21 CAPSULE | Refills: 0 | Status: SHIPPED | OUTPATIENT
Start: 2023-08-28 | End: 2023-09-04

## 2023-08-28 NOTE — PROGRESS NOTES
Anticoagulation Clinic Progress Note    Anticoagulation Summary  As of 2023      INR goal:  2.5-3.5   TTR:  64.1 % (4.7 y)   INR used for dosin.20 (2023)   Warfarin maintenance plan:  7.5 mg every Fri; 5 mg all other days   Weekly warfarin total:  37.5 mg   Plan last modified:  Angelica Marroquin RPH (2023)   Next INR check:  2023   Priority:  High   Target end date:  Indefinite    Indications    Paroxysmal atrial fibrillation [I48.0]  Hx of mitral valve replacement with mechanical valve [Z95.2] [Z95.2]                 Anticoagulation Episode Summary       INR check location:  Home Draw    Preferred lab:      Send INR reminders to:  DYANA MENDEZ  POOL    Comments:   home juliet          Anticoagulation Care Providers       Provider Role Specialty Phone number    Leyla Byrne APRN Referring Cardiology [phone]            Clinic Interview:  Patient Findings     Negatives:  Signs/symptoms of thrombosis, Signs/symptoms of bleeding,   Laboratory test error suspected, Change in health, Change in alcohol use,   Change in activity, Upcoming invasive procedure, Emergency department   visit, Upcoming dental procedure, Missed doses, Extra doses, Change in   medications, Change in diet/appetite, Hospital admission, Bruising, Other   complaints   Missed a dose one day so he took extra for two days then skipped on Saturday when INR was elevated.     Clinical Outcomes     Negatives:  Major bleeding event, Thromboembolic event,   Anticoagulation-related hospital admission, Anticoagulation-related ED   visit, Anticoagulation-related fatality        INR History:      2023     8:48 AM 8/15/2023    12:00 AM 8/15/2023     2:00 PM 2023    12:00 AM 2023     2:25 PM 2023    12:00 AM 2023    11:27 AM   Anticoagulation Monitoring   INR 2.20  2.00  2.50  4.20   INR Date 2023  8/15/2023  2023  2023   INR Goal 2.5-3.5  2.5-3.5  2.5-3.5  2.5-3.5   Trend Up   Same  Same  Same   Last Week Total 35 mg  22.5 mg  40 mg  37.5 mg   Next Week Total 32.5 mg  40 mg  37.5 mg  35 mg   Sun Hold (8/6), 7.5 mg (8/13)  -  5 mg  5 mg   Mon Hold (8/7); Otherwise 7.5 mg  -  5 mg  2.5 mg (8/28)   Tue Hold (8/8); Otherwise 5 mg  7.5 mg (8/15)  5 mg  5 mg   Wed Hold (8/9); Otherwise 5 mg  5 mg  5 mg  5 mg   Thu Hold (8/10); Otherwise 5 mg  -  -  5 mg   Fri Hold (8/11); Otherwise 5 mg  -  7.5 mg (8/18)  7.5 mg   Sat 7.5 mg (8/12); Otherwise 5 mg  -  5 mg  5 mg   Historical INR  2.00      2.50      4.20            This result is from an external source.       Plan:  1. INR is Supratherapeutic today- see above in Anticoagulation Summary.   Will instruct Elliot Sebastian to Change their warfarin regimen- see above in Anticoagulation Summary.  2. Follow up in 1 weeks  3. Voicemail with instructions and follow up plan.They have been instructed to call if any changes in medications, doses, concerns, etc. Patient expresses understanding and has no further questions at this time.    Bushra Torre, PharmD

## 2023-09-03 LAB — INR PPP: 2.5

## 2023-09-05 ENCOUNTER — ANTICOAGULATION VISIT (OUTPATIENT)
Dept: PHARMACY | Facility: HOSPITAL | Age: 63
End: 2023-09-05
Payer: COMMERCIAL

## 2023-09-05 DIAGNOSIS — Z95.2 HX OF MITRAL VALVE REPLACEMENT WITH MECHANICAL VALVE: ICD-10-CM

## 2023-09-05 DIAGNOSIS — I48.0 PAROXYSMAL ATRIAL FIBRILLATION: Primary | ICD-10-CM

## 2023-09-05 NOTE — PROGRESS NOTES
Anticoagulation Clinic Progress Note    Anticoagulation Summary  As of 2023      INR goal:  2.5-3.5   TTR:  64.0 % (4.8 y)   INR used for dosin.50 (9/3/2023)   Warfarin maintenance plan:  7.5 mg every Fri; 5 mg all other days   Weekly warfarin total:  37.5 mg   No change documented:  Angelica Marroquin RPH   Plan last modified:  Angelica Marroquin RPH (2023)   Next INR check:  9/10/2023   Priority:  High   Target end date:  Indefinite    Indications    Paroxysmal atrial fibrillation [I48.0]  Hx of mitral valve replacement with mechanical valve [Z95.2] [Z95.2]                 Anticoagulation Episode Summary       INR check location:  Home Draw    Preferred lab:      Send INR reminders to:  DYANA MENDEZ  POOL    Comments:   home juliet          Anticoagulation Care Providers       Provider Role Specialty Phone number    Leyla Byrne APRN Referring Cardiology [phone]            Clinic Interview:  No pertinent clinical findings have been reported.    INR History:      8/15/2023     2:00 PM 2023    12:00 AM 2023     2:25 PM 2023    12:00 AM 2023    11:27 AM 9/3/2023    12:00 AM 2023     8:19 AM   Anticoagulation Monitoring   INR 2.00  2.50  4.20  2.50   INR Date 8/15/2023  2023  2023  9/3/2023   INR Goal 2.5-3.5  2.5-3.5  2.5-3.5  2.5-3.5   Trend Same  Same  Same  Same   Last Week Total 22.5 mg  40 mg  37.5 mg  37.5 mg   Next Week Total 40 mg  37.5 mg  37.5 mg  37.5 mg   Sun -  5 mg  -  -   Mon -  5 mg  5 mg  -   Tue 7.5 mg (8/15)  5 mg  5 mg  5 mg   Wed 5 mg  5 mg  5 mg  5 mg   Thu -  -  5 mg  5 mg   Fri -  7.5 mg ()  -  7.5 mg   Sat -  5 mg  -  5 mg   Historical INR  2.50      4.20      2.50            This result is from an external source.       Plan:  1. INR is therapeutic today- see above in Anticoagulation Summary.    Elliot Sebastian to continue their warfarin regimen- see above in Anticoagulation Summary.  2. Follow up in 1 week  3. Pt  has agreed to only be called if INR out of range. They have been instructed to call if any changes in medications, doses, concerns, etc. Patient expresses understanding and has no further questions at this time.    Angelica Marroquin [facility]

## 2023-09-09 LAB — INR PPP: 2.1

## 2023-09-11 ENCOUNTER — ANTICOAGULATION VISIT (OUTPATIENT)
Dept: PHARMACY | Facility: HOSPITAL | Age: 63
End: 2023-09-11
Payer: COMMERCIAL

## 2023-09-11 DIAGNOSIS — I48.0 PAROXYSMAL ATRIAL FIBRILLATION: Primary | ICD-10-CM

## 2023-09-11 DIAGNOSIS — Z95.2 HX OF MITRAL VALVE REPLACEMENT WITH MECHANICAL VALVE: ICD-10-CM

## 2023-09-17 LAB — INR PPP: 2.7

## 2023-09-18 ENCOUNTER — ANTICOAGULATION VISIT (OUTPATIENT)
Dept: PHARMACY | Facility: HOSPITAL | Age: 63
End: 2023-09-18
Payer: COMMERCIAL

## 2023-09-18 DIAGNOSIS — Z95.2 HX OF MITRAL VALVE REPLACEMENT WITH MECHANICAL VALVE: ICD-10-CM

## 2023-09-18 DIAGNOSIS — I48.0 PAROXYSMAL ATRIAL FIBRILLATION: Primary | ICD-10-CM

## 2023-09-18 PROCEDURE — G0249 PROVIDE INR TEST MATER/EQUIP: HCPCS

## 2023-09-24 LAB — INR PPP: 2.5

## 2023-09-25 ENCOUNTER — ANTICOAGULATION VISIT (OUTPATIENT)
Dept: PHARMACY | Facility: HOSPITAL | Age: 63
End: 2023-09-25

## 2023-09-25 DIAGNOSIS — I48.0 PAROXYSMAL ATRIAL FIBRILLATION: Primary | ICD-10-CM

## 2023-09-25 DIAGNOSIS — Z95.2 HX OF MITRAL VALVE REPLACEMENT WITH MECHANICAL VALVE: ICD-10-CM

## 2023-09-25 NOTE — PROGRESS NOTES
Anticoagulation Clinic Progress Note    Anticoagulation Summary  As of 2023      INR goal:  2.5-3.5   TTR:  63.8 % (4.8 y)   INR used for dosin.50 (2023)   Warfarin maintenance plan:  7.5 mg every Fri; 5 mg all other days   Weekly warfarin total:  37.5 mg   No change documented:  Bushra Torre, PharmD   Plan last modified:  Angelica Marroquin RPH (2023)   Next INR check:  10/2/2023   Priority:  High   Target end date:  Indefinite    Indications    Paroxysmal atrial fibrillation [I48.0]  Hx of mitral valve replacement with mechanical valve [Z95.2] [Z95.2]                 Anticoagulation Episode Summary       INR check location:  Home Draw    Preferred lab:      Send INR reminders to:   CHARLES MENDEZ  POOL    Comments:   home juliet          Anticoagulation Care Providers       Provider Role Specialty Phone number    Leyla Byrne APRN Referring Cardiology [phone]            Clinic Interview:  Patient Findings     Negatives:  Signs/symptoms of thrombosis, Signs/symptoms of bleeding,   Laboratory test error suspected, Change in health, Change in alcohol use,   Change in activity, Upcoming invasive procedure, Emergency department   visit, Upcoming dental procedure, Missed doses, Extra doses, Change in   medications, Change in diet/appetite, Hospital admission, Bruising, Other   complaints      Clinical Outcomes     Negatives:  Major bleeding event, Thromboembolic event,   Anticoagulation-related hospital admission, Anticoagulation-related ED   visit, Anticoagulation-related fatality        INR History:      2023     8:19 AM 2023    12:00 AM 2023     9:51 AM 2023    12:00 AM 2023     8:30 AM 2023    12:00 AM 2023     9:05 AM   Anticoagulation Monitoring   INR 2.50  2.10  2.70  2.50   INR Date 9/3/2023  2023  2023  2023   INR Goal 2.5-3.5  2.5-3.5  2.5-3.5  2.5-3.5   Trend Same  Same  Same  Same   Last Week Total 37.5 mg  40 mg   37.5 mg  37.5 mg   Next Week Total 37.5 mg  37.5 mg  37.5 mg  37.5 mg   Sun -  -  -  5 mg   Mon -  5 mg  5 mg  5 mg   Tue 5 mg  5 mg  5 mg  5 mg   Wed 5 mg  5 mg  5 mg  5 mg   Thu 5 mg  5 mg  5 mg  5 mg   Fri 7.5 mg  7.5 mg  7.5 mg  7.5 mg   Sat 5 mg  5 mg  5 mg  5 mg   Historical INR  2.10      2.70      2.50            This result is from an external source.       Plan:  1. INR is Therapeutic today- see above in Anticoagulation Summary.   Will instruct Elliot Sebastian to Continue their warfarin regimen- see above in Anticoagulation Summary.  2. Follow up in 1 weeks  3. Pt has agreed to only be called if INR out of range. They have been instructed to call if any changes in medications, doses, concerns, etc. Patient expresses understanding and has no further questions at this time.    Bushra Torre, PharmD

## 2023-10-08 LAB — INR PPP: 4.4

## 2023-10-09 ENCOUNTER — ANTICOAGULATION VISIT (OUTPATIENT)
Dept: PHARMACY | Facility: HOSPITAL | Age: 63
End: 2023-10-09
Payer: COMMERCIAL

## 2023-10-09 DIAGNOSIS — I48.0 PAROXYSMAL ATRIAL FIBRILLATION: Primary | ICD-10-CM

## 2023-10-09 DIAGNOSIS — Z95.2 HX OF MITRAL VALVE REPLACEMENT WITH MECHANICAL VALVE: ICD-10-CM

## 2023-10-10 DIAGNOSIS — K58.0 IRRITABLE BOWEL SYNDROME WITH DIARRHEA: ICD-10-CM

## 2023-10-10 RX ORDER — CHOLESTYRAMINE 4 G/9G
4 POWDER, FOR SUSPENSION ORAL DAILY
Qty: 205 PACKET | Refills: 0 | Status: SHIPPED | OUTPATIENT
Start: 2023-10-10

## 2023-10-15 LAB — INR PPP: 2.8

## 2023-10-16 ENCOUNTER — ANTICOAGULATION VISIT (OUTPATIENT)
Dept: PHARMACY | Facility: HOSPITAL | Age: 63
End: 2023-10-16
Payer: COMMERCIAL

## 2023-10-16 DIAGNOSIS — Z95.2 HX OF MITRAL VALVE REPLACEMENT WITH MECHANICAL VALVE: ICD-10-CM

## 2023-10-16 DIAGNOSIS — I48.0 PAROXYSMAL ATRIAL FIBRILLATION: Primary | ICD-10-CM

## 2023-10-17 NOTE — PROGRESS NOTES
Anticoagulation Clinic Progress Note    Anticoagulation Summary  As of 10/16/2023      INR goal:  2.5-3.5   TTR:  63.7% (4.9 y)   INR used for dosin.80 (10/15/2023)   Warfarin maintenance plan:  7.5 mg every Fri; 5 mg all other days   Weekly warfarin total:  37.5 mg   No change documented:  Michael Honrer, PharmD   Plan last modified:  Angelica Marroquin RPH (2023)   Next INR check:  10/23/2023   Priority:  High   Target end date:  Indefinite    Indications    Paroxysmal atrial fibrillation [I48.0]  Hx of mitral valve replacement with mechanical valve [Z95.2] [Z95.2]                 Anticoagulation Episode Summary       INR check location:  Home Draw    Preferred lab:      Send INR reminders to:   [facility]  [facility]    Comments:   home [facility]          Anticoagulation Care Providers       Provider Role Specialty Phone number    Leyla Byrne APRN Referring Cardiology [phone]            Clinic Interview:  Patient Findings     Positives:  Change in alcohol use    Negatives:  Signs/symptoms of thrombosis, Signs/symptoms of bleeding,   Laboratory test error suspected, Change in health, Change in activity,   Upcoming invasive procedure, Emergency department visit, Upcoming dental   procedure, Missed doses, Extra doses, Change in medications, Change in   diet/appetite, Hospital admission, Bruising, Other complaints    Comments:  Reports he has returned to work, and his alcohol has returned   to his usual lower intake.       Clinical Outcomes     Negatives:  Major bleeding event, Thromboembolic event,   Anticoagulation-related hospital admission, Anticoagulation-related ED   visit, Anticoagulation-related fatality    Comments:  Reports he has returned to work, and his alcohol has returned   to his usual lower intake.         INR History:      2023     8:30 AM 2023    12:00 AM 2023     9:05 AM 10/8/2023    12:00 AM 10/9/2023     8:21 AM 10/15/2023    12:00 AM 10/16/2023     9:27  AM   Anticoagulation Monitoring   INR 2.70  2.50  4.40  2.80   INR Date 9/17/2023  9/24/2023  10/8/2023  10/15/2023   INR Goal 2.5-3.5  2.5-3.5  2.5-3.5  2.5-3.5   Trend Same  Same  Same  Same   Last Week Total 37.5 mg  37.5 mg  37.5 mg  30 mg   Next Week Total 37.5 mg  37.5 mg  30 mg  37.5 mg   Sun -  5 mg  5 mg  5 mg   Mon 5 mg  5 mg  Hold (10/9)  5 mg   Tue 5 mg  5 mg  5 mg  5 mg   Wed 5 mg  5 mg  5 mg  5 mg   Thu 5 mg  5 mg  5 mg  5 mg   Fri 7.5 mg  7.5 mg  5 mg (10/13)  7.5 mg   Sat 5 mg  5 mg  5 mg  5 mg   Historical INR  2.50      4.40      2.80            This result is from an external source.       Plan:  1. INR was Therapeutic 10/15/23 - see above in Anticoagulation Summary.   Will instruct Elliot Sebastian to Continue their warfarin regimen at dose of 7.5 mg Fri, 5 mg all other days - see above in Anticoagulation Summary.  2. Follow up in 1 week.  3. They have been instructed to call if any changes in medications, doses, concerns, etc. Patient expresses understanding and has no further questions at this time.    Michael Horner, PharmD

## 2023-10-22 LAB — INR PPP: 2.4

## 2023-10-23 ENCOUNTER — ANTICOAGULATION VISIT (OUTPATIENT)
Dept: PHARMACY | Facility: HOSPITAL | Age: 63
End: 2023-10-23
Payer: COMMERCIAL

## 2023-10-23 DIAGNOSIS — I48.0 PAROXYSMAL ATRIAL FIBRILLATION: Primary | ICD-10-CM

## 2023-10-23 DIAGNOSIS — Z95.2 HX OF MITRAL VALVE REPLACEMENT WITH MECHANICAL VALVE: ICD-10-CM

## 2023-10-23 PROCEDURE — G0249 PROVIDE INR TEST MATER/EQUIP: HCPCS

## 2023-10-23 NOTE — PROGRESS NOTES
Anticoagulation Clinic Progress Note    Anticoagulation Summary  As of 10/23/2023      INR goal:  2.5-3.5   TTR:  63.7% (4.9 y)   INR used for dosin.40 (10/22/2023)   Warfarin maintenance plan:  7.5 mg every Fri; 5 mg all other days   Weekly warfarin total:  37.5 mg   No change documented:  Bushra Torre, Pablo   Plan last modified:  Angelica Marroquin RPH (2023)   Next INR check:  10/30/2023   Priority:  High   Target end date:  Indefinite    Indications    Paroxysmal atrial fibrillation [I48.0]  Hx of mitral valve replacement with mechanical valve [Z95.2] [Z95.2]                 Anticoagulation Episode Summary       INR check location:  Home Draw    Preferred lab:      Send INR reminders to:  [facility]  [facility]    Comments:   home [place]          Anticoagulation Care Providers       Provider Role Specialty Phone number    Leyla Byrne APRN Referring Cardiology [phone]            Clinic Interview:      INR History:      2023     9:05 AM 10/8/2023    12:00 AM 10/9/2023     8:21 AM 10/15/2023    12:00 AM 10/16/2023     9:27 AM 10/22/2023    12:00 AM 10/23/2023     8:15 AM   Anticoagulation Monitoring   INR 2.50  4.40  2.80  2.40   INR Date 2023  10/8/2023  10/15/2023  10/22/2023   INR Goal 2.5-3.5  2.5-3.5  2.5-3.5  2.5-3.5   Trend Same  Same  Same  Same   Last Week Total 37.5 mg  37.5 mg  30 mg  37.5 mg   Next Week Total 37.5 mg  30 mg  37.5 mg  37.5 mg   Sun 5 mg  5 mg  5 mg  5 mg   Mon 5 mg  Hold (10/9)  5 mg  5 mg   Tue 5 mg  5 mg  5 mg  5 mg   Wed 5 mg  5 mg  5 mg  5 mg   Thu 5 mg  5 mg  5 mg  5 mg   Fri 7.5 mg  5 mg (10/13)  7.5 mg  7.5 mg   Sat 5 mg  5 mg  5 mg  5 mg   Historical INR  4.40      2.80      2.40            This result is from an external source.       Plan:  1. INR is Subtherapeutic today- see above in Anticoagulation Summary.   Will instruct Elliot Sebastian to Continue their warfarin regimen- see above in Anticoagulation Summary.  2. Follow up in  1 weeks  3. Secure voicemail with instructions and follow up plan. They have been instructed to call if any changes in medications, doses, concerns, etc. Patient expresses understanding and has no further questions at this time.    Bushra Torre, PharmD

## 2023-10-29 LAB — INR PPP: 3.3

## 2023-10-30 ENCOUNTER — ANTICOAGULATION VISIT (OUTPATIENT)
Dept: PHARMACY | Facility: HOSPITAL | Age: 63
End: 2023-10-30
Payer: COMMERCIAL

## 2023-10-30 DIAGNOSIS — I48.0 PAROXYSMAL ATRIAL FIBRILLATION: Primary | ICD-10-CM

## 2023-10-30 DIAGNOSIS — Z95.2 HX OF MITRAL VALVE REPLACEMENT WITH MECHANICAL VALVE: ICD-10-CM

## 2023-10-30 NOTE — PROGRESS NOTES
Anticoagulation Clinic Progress Note    Anticoagulation Summary  As of 10/30/2023      INR goal:  2.5-3.5   TTR:  63.8% (4.9 y)   INR used for dosing:  3.30 (10/29/2023)   Warfarin maintenance plan:  7.5 mg every Fri; 5 mg all other days   Weekly warfarin total:  37.5 mg   No change documented:  Michael Horner, PharmD   Plan last modified:  Angelica Marroquin RPH (8/18/2023)   Next INR check:  11/6/2023   Priority:  High   Target end date:  Indefinite    Indications    Paroxysmal atrial fibrillation [I48.0]  Hx of mitral valve replacement with mechanical valve [Z95.2] [Z95.2]                 Anticoagulation Episode Summary       INR check location:  Home Draw    Preferred lab:      Send INR reminders to:  [facility]  [facility]    Comments:   home [place]          Anticoagulation Care Providers       Provider Role Specialty Phone number    Leyla Byrne APRN Referring Cardiology [phone]            Clinic Interview:  No pertinent clinical findings have been reported.    INR History:      10/9/2023     8:21 AM 10/15/2023    12:00 AM 10/16/2023     9:27 AM 10/22/2023    12:00 AM 10/23/2023     8:15 AM 10/29/2023    12:00 AM 10/30/2023     8:46 AM   Anticoagulation Monitoring   INR 4.40  2.80  2.40  3.30   INR Date 10/8/2023  10/15/2023  10/22/2023  10/29/2023   INR Goal 2.5-3.5  2.5-3.5  2.5-3.5  2.5-3.5   Trend Same  Same  Same  Same   Last Week Total 37.5 mg  30 mg  37.5 mg  37.5 mg   Next Week Total 30 mg  37.5 mg  37.5 mg  37.5 mg   Sun 5 mg  5 mg  5 mg  5 mg   Mon Hold (10/9)  5 mg  5 mg  5 mg   Tue 5 mg  5 mg  5 mg  5 mg   Wed 5 mg  5 mg  5 mg  5 mg   Thu 5 mg  5 mg  5 mg  5 mg   Fri 5 mg (10/13)  7.5 mg  7.5 mg  7.5 mg   Sat 5 mg  5 mg  5 mg  5 mg   Historical INR  2.80      2.40      3.30            This result is from an external source.       Plan:  1. INR is therapeutic today- see above in Anticoagulation Summary.    Elliot Sebastian to continue their warfarin regimen- see above in  Anticoagulation Summary.  2. Follow up in 1 week.  3. Pt has agreed to only be called if INR out of range. Left HIPAA-friendly voicemail with instructions. They have been instructed to call if any changes in medications, doses, concerns, etc. Patient expresses understanding and has no further questions at this time.    Michael Horner, PharmD

## 2023-11-05 LAB — INR PPP: 2.7

## 2023-11-06 ENCOUNTER — ANTICOAGULATION VISIT (OUTPATIENT)
Dept: PHARMACY | Facility: HOSPITAL | Age: 63
End: 2023-11-06

## 2023-11-06 DIAGNOSIS — I48.0 PAROXYSMAL ATRIAL FIBRILLATION: Primary | ICD-10-CM

## 2023-11-06 DIAGNOSIS — Z95.2 HX OF MITRAL VALVE REPLACEMENT WITH MECHANICAL VALVE: ICD-10-CM

## 2023-11-06 NOTE — PROGRESS NOTES
Anticoagulation Clinic Progress Note    Anticoagulation Summary  As of 2023      INR goal:  2.5-3.5   TTR:  63.9% (4.9 y)   INR used for dosin.70 (2023)   Warfarin maintenance plan:  7.5 mg every Fri; 5 mg all other days   Weekly warfarin total:  37.5 mg   No change documented:  Bushra Torre, Pablo   Plan last modified:  Angelica Marroquin RPH (2023)   Next INR check:  2023   Priority:  High   Target end date:  Indefinite    Indications    Paroxysmal atrial fibrillation [I48.0]  Hx of mitral valve replacement with mechanical valve [Z95.2] [Z95.2]                 Anticoagulation Episode Summary       INR check location:  Home Draw    Preferred lab:      Send INR reminders to:  [facility]  [facility]    Comments:   home [place]          Anticoagulation Care Providers       Provider Role Specialty Phone number    Leyla Byrne APRN Referring Cardiology [phone]            Clinic Interview:  No pertinent clinical findings have been reported.    INR History:      10/16/2023     9:27 AM 10/22/2023    12:00 AM 10/23/2023     8:15 AM 10/29/2023    12:00 AM 10/30/2023     8:46 AM 2023    12:00 AM 2023     8:23 AM   Anticoagulation Monitoring   INR 2.80  2.40  3.30  2.70   INR Date 10/15/2023  10/22/2023  10/29/2023  2023   INR Goal 2.5-3.5  2.5-3.5  2.5-3.5  2.5-3.5   Trend Same  Same  Same  Same   Last Week Total 30 mg  37.5 mg  37.5 mg  37.5 mg   Next Week Total 37.5 mg  37.5 mg  37.5 mg  37.5 mg   Sun 5 mg  5 mg  5 mg  5 mg   Mon 5 mg  5 mg  5 mg  5 mg   Tue 5 mg  5 mg  5 mg  5 mg   Wed 5 mg  5 mg  5 mg  5 mg   Thu 5 mg  5 mg  5 mg  5 mg   Fri 7.5 mg  7.5 mg  7.5 mg  7.5 mg   Sat 5 mg  5 mg  5 mg  5 mg   Historical INR  2.40      3.30      2.70            This result is from an external source.       Plan:  1. INR is therapeutic today- see above in Anticoagulation Summary.    Elliot Sebastian to continue their warfarin regimen- see above in Anticoagulation  Summary.  2. Follow up in 1 week  3. Pt has agreed to only be called if INR out of range. They have been instructed to call if any changes in medications, doses, concerns, etc. Patient expresses understanding and has no further questions at this time.    Bushra Torre, PharmD

## 2023-11-12 LAB — INR PPP: 2.5

## 2023-11-13 ENCOUNTER — ANTICOAGULATION VISIT (OUTPATIENT)
Dept: PHARMACY | Facility: HOSPITAL | Age: 63
End: 2023-11-13

## 2023-11-13 DIAGNOSIS — I48.0 PAROXYSMAL ATRIAL FIBRILLATION: Primary | ICD-10-CM

## 2023-11-13 DIAGNOSIS — Z95.2 HX OF MITRAL VALVE REPLACEMENT WITH MECHANICAL VALVE: ICD-10-CM

## 2023-11-13 NOTE — PROGRESS NOTES
Anticoagulation Clinic Progress Note    Anticoagulation Summary  As of 2023      INR goal:  2.5-3.5   TTR:  64.1% (4.9 y)   INR used for dosin.50 (2023)   Warfarin maintenance plan:  7.5 mg every Fri; 5 mg all other days   Weekly warfarin total:  37.5 mg   No change documented:  Angelica Marroquin RPH   Plan last modified:  Angelica Marroquin RPH (2023)   Next INR check:  2023   Priority:  High   Target end date:  Indefinite    Indications    Paroxysmal atrial fibrillation [I48.0]  Hx of mitral valve replacement with mechanical valve [Z95.2] [Z95.2]                 Anticoagulation Episode Summary       INR check location:  Home Draw    Preferred lab:      Send INR reminders to:  [facility]  [facility]    Comments:   home [place]          Anticoagulation Care Providers       Provider Role Specialty Phone number    Leyla Byrne APRN Referring Cardiology [phone]            Clinic Interview:  No pertinent clinical findings have been reported.    INR History:      10/23/2023     8:15 AM 10/29/2023    12:00 AM 10/30/2023     8:46 AM 2023    12:00 AM 2023     8:23 AM 2023    12:00 AM 2023     9:20 AM   Anticoagulation Monitoring   INR 2.40  3.30  2.70  2.50   INR Date 10/22/2023  10/29/2023  2023  2023   INR Goal 2.5-3.5  2.5-3.5  2.5-3.5  2.5-3.5   Trend Same  Same  Same  Same   Last Week Total 37.5 mg  37.5 mg  37.5 mg  37.5 mg   Next Week Total 37.5 mg  37.5 mg  37.5 mg  37.5 mg   Sun 5 mg  5 mg  5 mg  -   Mon 5 mg  5 mg  5 mg  5 mg   Tue 5 mg  5 mg  5 mg  5 mg   Wed 5 mg  5 mg  5 mg  5 mg   Thu 5 mg  5 mg  5 mg  5 mg   Fri 7.5 mg  7.5 mg  7.5 mg  7.5 mg   Sat 5 mg  5 mg  5 mg  5 mg   Historical INR  3.30      2.70      2.50            This result is from an external source.       Plan:  1. INR is therapeutic today- see above in Anticoagulation Summary.    Elliot Sebastian to continue their warfarin regimen- see above in Anticoagulation  Summary.  2. Follow up in 1 week  3. Pt has agreed to only be called if INR out of range. They have been instructed to call if any changes in medications, doses, concerns, etc. Patient expresses understanding and has no further questions at this time.    Angelica Marroquin, [facility]

## 2023-11-22 LAB — INR PPP: 2.6

## 2023-11-27 ENCOUNTER — ANTICOAGULATION VISIT (OUTPATIENT)
Dept: PHARMACY | Facility: HOSPITAL | Age: 63
End: 2023-11-27
Payer: MEDICAID

## 2023-11-27 DIAGNOSIS — Z95.2 HX OF MITRAL VALVE REPLACEMENT WITH MECHANICAL VALVE: ICD-10-CM

## 2023-11-27 DIAGNOSIS — I48.0 PAROXYSMAL ATRIAL FIBRILLATION: Primary | ICD-10-CM

## 2023-11-27 PROCEDURE — G0249 PROVIDE INR TEST MATER/EQUIP: HCPCS

## 2023-11-27 NOTE — PROGRESS NOTES
Anticoagulation Clinic Progress Note    Anticoagulation Summary  As of 2023      INR goal:  2.5-3.5   TTR:  64.3% (5 y)   INR used for dosin.60 (2023)   Warfarin maintenance plan:  7.5 mg every Fri; 5 mg all other days   Weekly warfarin total:  37.5 mg   No change documented:  Michael Horner, PharmD   Plan last modified:  Angelica Marroquin RPH (2023)   Next INR check:  2023   Priority:  High   Target end date:  Indefinite    Indications    Paroxysmal atrial fibrillation [I48.0]  Hx of mitral valve replacement with mechanical valve [Z95.2] [Z95.2]                 Anticoagulation Episode Summary       INR check location:  Home Draw    Preferred lab:      Send INR reminders to:   [facility]  [facility]    Comments:   home [place]          Anticoagulation Care Providers       Provider Role Specialty Phone number    Leyla Byrne APRN Referring Cardiology [phone]            Clinic Interview:  Patient Findings     Positives:  Other complaints    Negatives:  Signs/symptoms of thrombosis, Signs/symptoms of bleeding,   Laboratory test error suspected, Change in health, Change in alcohol use,   Change in activity, Upcoming invasive procedure, Emergency department   visit, Upcoming dental procedure, Missed doses, Extra doses, Change in   medications, Change in diet/appetite, Hospital admission, Bruising    Comments:  Reports new job with new health insurance. His days off are   now on .       Clinical Outcomes     Negatives:  Major bleeding event, Thromboembolic event,   Anticoagulation-related hospital admission, Anticoagulation-related ED   visit, Anticoagulation-related fatality    Comments:  Reports new job with new health insurance. His days off are   now on .         INR History:      10/30/2023     8:46 AM 2023    12:00 AM 2023     8:23 AM 2023    12:00 AM 2023     9:20 AM 2023    12:00 AM 2023     9:30 AM   Anticoagulation  Monitoring   INR 3.30  2.70  2.50  2.60   INR Date 10/29/2023  11/5/2023  11/12/2023  11/22/2023   INR Goal 2.5-3.5  2.5-3.5  2.5-3.5  2.5-3.5   Trend Same  Same  Same  Same   Last Week Total 37.5 mg  37.5 mg  37.5 mg  37.5 mg   Next Week Total 37.5 mg  37.5 mg  37.5 mg  37.5 mg   Sun 5 mg  5 mg  -  -   Mon 5 mg  5 mg  5 mg  5 mg   Tue 5 mg  5 mg  5 mg  5 mg   Wed 5 mg  5 mg  5 mg  -   Thu 5 mg  5 mg  5 mg  -   Fri 7.5 mg  7.5 mg  7.5 mg  -   Sat 5 mg  5 mg  5 mg  -   Historical INR  2.70      2.50      2.60            This result is from an external source.       Plan:  1. INR was Therapeutic 11/22/23 - see above in Anticoagulation Summary.   Will instruct Elliot Sebastian to Continue their warfarin regimen- see above in Anticoagulation Summary.  2. Follow up in 1 week.  3. They have been instructed to call if any changes in medications, doses, concerns, etc. Patient expresses understanding and has no further questions at this time.    Michael Horner, PharmD

## 2023-11-30 ENCOUNTER — ANTICOAGULATION VISIT (OUTPATIENT)
Dept: PHARMACY | Facility: HOSPITAL | Age: 63
End: 2023-11-30
Payer: MEDICAID

## 2023-11-30 DIAGNOSIS — I48.0 PAROXYSMAL ATRIAL FIBRILLATION: Primary | ICD-10-CM

## 2023-11-30 DIAGNOSIS — Z95.2 HX OF MITRAL VALVE REPLACEMENT WITH MECHANICAL VALVE: ICD-10-CM

## 2023-11-30 LAB — INR PPP: 3.3

## 2023-11-30 NOTE — PROGRESS NOTES
Anticoagulation Clinic Progress Note    Anticoagulation Summary  As of 11/30/2023      INR goal:  2.5-3.5   TTR:  64.4% (5 y)   INR used for dosing:  3.30 (11/30/2023)   Warfarin maintenance plan:  7.5 mg every Fri; 5 mg all other days   Weekly warfarin total:  37.5 mg   Plan last modified:  Angelica Marroquin RPH (8/18/2023)   Next INR check:  12/7/2023   Priority:  High   Target end date:  Indefinite    Indications    Paroxysmal atrial fibrillation [I48.0]  Hx of mitral valve replacement with mechanical valve [Z95.2] [Z95.2]                 Anticoagulation Episode Summary       INR check location:  Home Draw    Preferred lab:      Send INR reminders to:  [facility]  [facility]    Comments:   home [place]          Anticoagulation Care Providers       Provider Role Specialty Phone number    Leyla Byrne APRN Referring Cardiology [phone]            Clinic Interview:  No pertinent clinical findings have been reported.    INR History:      11/6/2023     8:23 AM 11/12/2023    12:00 AM 11/13/2023     9:20 AM 11/22/2023    12:00 AM 11/27/2023     9:30 AM 11/30/2023    12:00 AM 11/30/2023     4:00 PM   Anticoagulation Monitoring   INR 2.70  2.50  2.60  3.30   INR Date 11/5/2023 11/12/2023 11/22/2023 11/30/2023   INR Goal 2.5-3.5  2.5-3.5  2.5-3.5  2.5-3.5   Trend Same  Same  Same  Same   Last Week Total 37.5 mg  37.5 mg  37.5 mg  37.5 mg   Next Week Total 37.5 mg  37.5 mg  37.5 mg  37.5 mg   Sun 5 mg  -  -  5 mg   Mon 5 mg  5 mg  5 mg  5 mg   Tue 5 mg  5 mg  5 mg  5 mg   Wed 5 mg  5 mg  -  5 mg   Thu 5 mg  5 mg  -  5 mg   Fri 7.5 mg  7.5 mg  -  7.5 mg   Sat 5 mg  5 mg  -  5 mg   Historical INR  2.50      2.60      3.30            This result is from an external source.       Plan:  1. INR is therapeutic today- see above in Anticoagulation Summary.    Elliot Sebastian to continue their warfarin regimen- see above in Anticoagulation Summary.  2. Follow up in 1 week  3. They have been instructed to call  if any changes in medications, doses, concerns, etc. Patient expresses understanding and has no further questions at this time.    Bushra Torre, PharmD

## 2023-12-08 ENCOUNTER — ANTICOAGULATION VISIT (OUTPATIENT)
Dept: PHARMACY | Facility: HOSPITAL | Age: 63
End: 2023-12-08
Payer: MEDICAID

## 2023-12-08 DIAGNOSIS — I48.0 PAROXYSMAL ATRIAL FIBRILLATION: Primary | ICD-10-CM

## 2023-12-08 DIAGNOSIS — Z95.2 HX OF MITRAL VALVE REPLACEMENT WITH MECHANICAL VALVE: ICD-10-CM

## 2023-12-08 LAB — INR PPP: 3.5

## 2023-12-08 NOTE — PROGRESS NOTES
Anticoagulation Clinic Progress Note    Anticoagulation Summary  As of 12/8/2023      INR goal:  2.5-3.5   TTR:  64.6% (5 y)   INR used for dosing:  3.50 (12/8/2023)   Warfarin maintenance plan:  7.5 mg every Fri; 5 mg all other days   Weekly warfarin total:  37.5 mg   No change documented:  Angelica Marroquin RPH   Plan last modified:  Angelica Marroquin RPH (8/18/2023)   Next INR check:  12/15/2023   Priority:  High   Target end date:  Indefinite    Indications    Paroxysmal atrial fibrillation [I48.0]  Hx of mitral valve replacement with mechanical valve [Z95.2] [Z95.2]                 Anticoagulation Episode Summary       INR check location:  Home Draw    Preferred lab:      Send INR reminders to:  [facility]  [facility]    Comments:   home [place]          Anticoagulation Care Providers       Provider Role Specialty Phone number    Leyla Byrne APRN Referring Cardiology [phone]            Clinic Interview:  No pertinent clinical findings have been reported.    INR History:      11/13/2023     9:20 AM 11/22/2023    12:00 AM 11/27/2023     9:30 AM 11/30/2023    12:00 AM 11/30/2023     4:00 PM 12/8/2023    12:00 AM 12/8/2023     1:04 PM   Anticoagulation Monitoring   INR 2.50  2.60  3.30  3.50   INR Date 11/12/2023 11/22/2023 11/30/2023 12/8/2023   INR Goal 2.5-3.5  2.5-3.5  2.5-3.5  2.5-3.5   Trend Same  Same  Same  Same   Last Week Total 37.5 mg  37.5 mg  37.5 mg  37.5 mg   Next Week Total 37.5 mg  37.5 mg  37.5 mg  37.5 mg   Sun -  -  5 mg  5 mg   Mon 5 mg  5 mg  5 mg  5 mg   Tue 5 mg  5 mg  5 mg  5 mg   Wed 5 mg  -  5 mg  5 mg   Thu 5 mg  -  5 mg  5 mg   Fri 7.5 mg  -  7.5 mg  7.5 mg   Sat 5 mg  -  5 mg  5 mg   Historical INR  2.60      3.30      3.50            This result is from an external source.       Plan:  1. INR is therapeutic today- see above in Anticoagulation Summary.    Elliot Sebastian to continue their warfarin regimen- see above in Anticoagulation Summary.  2. Follow up in  1 week  3. Pt has agreed to only be called if INR out of range. They have been instructed to call if any changes in medications, doses, concerns, etc. Patient expresses understanding and has no further questions at this time.    Angelica Marroquin, [facility]

## 2023-12-08 NOTE — PROGRESS NOTES
Anticoagulation Clinic Progress Note    Anticoagulation Summary  As of 2023      INR goal:  2.5-3.5   TTR:  63.7 % (4.8 y)   INR used for dosin.70 (2023)   Warfarin maintenance plan:  7.5 mg every Fri; 5 mg all other days   Weekly warfarin total:  37.5 mg   No change documented:  Angelica Marroquin RPH   Plan last modified:  Angelica Marroquin RPH (2023)   Next INR check:  2023   Priority:  High   Target end date:  Indefinite    Indications    Paroxysmal atrial fibrillation [I48.0]  Hx of mitral valve replacement with mechanical valve [Z95.2] [Z95.2]                 Anticoagulation Episode Summary       INR check location:  Home Draw    Preferred lab:      Send INR reminders to:  [facility]  POOL    Comments:   home [place]          Anticoagulation Care Providers       Provider Role Specialty Phone number    Leyla Byrne APRN Referring Cardiology [phone]            Clinic Interview:  No pertinent clinical findings have been reported.    INR History:      2023    11:27 AM 9/3/2023    12:00 AM 2023     8:19 AM 2023    12:00 AM 2023     9:51 AM 2023    12:00 AM 2023     8:30 AM   Anticoagulation Monitoring   INR 4.20  2.50  2.10  2.70   INR Date 2023  9/3/2023  2023  2023   INR Goal 2.5-3.5  2.5-3.5  2.5-3.5  2.5-3.5   Trend Same  Same  Same  Same   Last Week Total 37.5 mg  37.5 mg  40 mg  37.5 mg   Next Week Total 37.5 mg  37.5 mg  37.5 mg  37.5 mg   Sun -  -  -  -   Mon 5 mg  -  5 mg  5 mg   Tue 5 mg  5 mg  5 mg  5 mg   Wed 5 mg  5 mg  5 mg  5 mg   Thu 5 mg  5 mg  5 mg  5 mg   Fri -  7.5 mg  7.5 mg  7.5 mg   Sat -  5 mg  5 mg  5 mg   Historical INR  2.50      2.10      2.70            This result is from an external source.       Plan:  1. INR is therapeutic today- see above in Anticoagulation Summary.    Elliot J Romulo to continue their warfarin regimen- see above in Anticoagulation Summary.  2. Follow up in 1 week  3. Pt  Pt states she has a adhesive allergy but is okay with medipore tape. MD aware.   has agreed to only be called if INR out of range. They have been instructed to call if any changes in medications, doses, concerns, etc. Patient expresses understanding and has no further questions at this time.    Angelica Marroquin [facility]

## 2023-12-20 LAB — INR PPP: 3.5

## 2023-12-21 ENCOUNTER — ANTICOAGULATION VISIT (OUTPATIENT)
Dept: PHARMACY | Facility: HOSPITAL | Age: 63
End: 2023-12-21
Payer: MEDICAID

## 2023-12-21 DIAGNOSIS — Z95.2 HX OF MITRAL VALVE REPLACEMENT WITH MECHANICAL VALVE: ICD-10-CM

## 2023-12-21 DIAGNOSIS — I48.0 PAROXYSMAL ATRIAL FIBRILLATION: Primary | ICD-10-CM

## 2023-12-21 NOTE — PROGRESS NOTES
Anticoagulation Clinic Progress Note    Anticoagulation Summary  As of 12/21/2023      INR goal:  2.5-3.5   TTR:  64.8% (5 y)   INR used for dosing:  3.50 (12/20/2023)   Warfarin maintenance plan:  7.5 mg every Fri; 5 mg all other days   Weekly warfarin total:  37.5 mg   No change documented:  Michael Horner, PharmD   Plan last modified:  Angelica Marroquin RPH (8/18/2023)   Next INR check:  12/27/2023   Priority:  High   Target end date:  Indefinite    Indications    Paroxysmal atrial fibrillation [I48.0]  Hx of mitral valve replacement with mechanical valve [Z95.2] [Z95.2]                 Anticoagulation Episode Summary       INR check location:  Home Draw    Preferred lab:      Send INR reminders to:   [facility]  [facility]    Comments:   home jul[place]          Anticoagulation Care Providers       Provider Role Specialty Phone number    Leyla Byrne APRN Referring Cardiology [phone]            Clinic Interview:  No pertinent clinical findings have been reported.    INR History:      11/27/2023     9:30 AM 11/30/2023    12:00 AM 11/30/2023     4:00 PM 12/8/2023    12:00 AM 12/8/2023     1:04 PM 12/20/2023    12:00 AM 12/21/2023    10:17 AM   Anticoagulation Monitoring   INR 2.60  3.30  3.50  3.50   INR Date 11/22/2023 11/30/2023 12/8/2023 12/20/2023   INR Goal 2.5-3.5  2.5-3.5  2.5-3.5  2.5-3.5   Trend Same  Same  Same  Same   Last Week Total 37.5 mg  37.5 mg  37.5 mg  37.5 mg   Next Week Total 37.5 mg  37.5 mg  37.5 mg  37.5 mg   Sun -  5 mg  5 mg  5 mg   Mon 5 mg  5 mg  5 mg  5 mg   Tue 5 mg  5 mg  5 mg  5 mg   Wed -  5 mg  5 mg  -   Thu -  5 mg  5 mg  5 mg   Fri -  7.5 mg  7.5 mg  7.5 mg   Sat -  5 mg  5 mg  5 mg   Historical INR  3.30      3.50      3.50            This result is from an external source.       Plan:  1. INR is therapeutic today- see above in Anticoagulation Summary.    Elliot Sebastian to continue their warfarin regimen- see above in Anticoagulation Summary.  2. Follow up  in 1 week  3. Pt has agreed to only be called if INR out of range. They have been instructed to call if any changes in medications, doses, concerns, etc. Patient expresses understanding and has no further questions at this time.    Michael Horner, PharmD

## 2023-12-27 ENCOUNTER — ANTICOAGULATION VISIT (OUTPATIENT)
Dept: PHARMACY | Facility: HOSPITAL | Age: 63
End: 2023-12-27
Payer: MEDICAID

## 2023-12-27 DIAGNOSIS — Z95.2 HX OF MITRAL VALVE REPLACEMENT WITH MECHANICAL VALVE: ICD-10-CM

## 2023-12-27 DIAGNOSIS — I48.0 PAROXYSMAL ATRIAL FIBRILLATION: Primary | ICD-10-CM

## 2023-12-27 LAB — INR PPP: 4

## 2023-12-27 PROCEDURE — G0249 PROVIDE INR TEST MATER/EQUIP: HCPCS

## 2023-12-27 NOTE — PROGRESS NOTES
Anticoagulation Clinic Progress Note    Anticoagulation Summary  As of 2023      INR goal:  2.5-3.5   TTR:  64.6% (5.1 y)   INR used for dosin.00 (2023)   Warfarin maintenance plan:  7.5 mg every Fri; 5 mg all other days   Weekly warfarin total:  37.5 mg   Plan last modified:  Angelica Marroquin RPH (2023)   Next INR check:  1/3/2024   Priority:  High   Target end date:  Indefinite    Indications    Paroxysmal atrial fibrillation [I48.0]  Hx of mitral valve replacement with mechanical valve [Z95.2] [Z95.2]                 Anticoagulation Episode Summary       INR check location:  Home Draw    Preferred lab:      Send INR reminders to:  DYANA MENDEZ  POOL    Comments:   home juliet          Anticoagulation Care Providers       Provider Role Specialty Phone number    Leyla Byrne APRN Referring Cardiology [phone]            Clinic Interview:  Patient Findings     Negatives:  Signs/symptoms of thrombosis, Signs/symptoms of bleeding,   Laboratory test error suspected, Change in health, Change in alcohol use,   Change in activity, Upcoming invasive procedure, Emergency department   visit, Upcoming dental procedure, Missed doses, Extra doses, Change in   medications, Change in diet/appetite, Hospital admission, Bruising, Other   complaints      Clinical Outcomes     Negatives:  Major bleeding event, Thromboembolic event,   Anticoagulation-related hospital admission, Anticoagulation-related ED   visit, Anticoagulation-related fatality        INR History:      2023     4:00 PM 2023    12:00 AM 2023     1:04 PM 2023    12:00 AM 2023    10:17 AM 2023    12:00 AM 2023     2:15 PM   Anticoagulation Monitoring   INR 3.30  3.50  3.50  4.00   INR Date 2023   INR Goal 2.5-3.5  2.5-3.5  2.5-3.5  2.5-3.5   Trend Same  Same  Same  Same   Last Week Total 37.5 mg  37.5 mg  37.5 mg  37.5 mg   Next Week Total  37.5 mg  37.5 mg  37.5 mg  32.5 mg   Sun 5 mg  5 mg  5 mg  5 mg   Mon 5 mg  5 mg  5 mg  5 mg   Tue 5 mg  5 mg  5 mg  5 mg   Wed 5 mg  5 mg  -  2.5 mg (12/27)   Thu 5 mg  5 mg  5 mg  5 mg   Fri 7.5 mg  7.5 mg  7.5 mg  5 mg (12/29)   Sat 5 mg  5 mg  5 mg  5 mg   Historical INR  3.50      3.50      4.00            This result is from an external source.       Plan:  1. INR is Supratherapeutic today- see above in Anticoagulation Summary.   Will instruct Elliot Sebastian to Change their warfarin regimen- see above in Anticoagulation Summary.  2. Follow up in 1 weeks  3. They have been instructed to call if any changes in medications, doses, concerns, etc. Patient expresses understanding and has no further questions at this time.    Bushra Torre, PharmD

## 2024-01-04 LAB — INR PPP: 3.1

## 2024-01-05 ENCOUNTER — ANTICOAGULATION VISIT (OUTPATIENT)
Dept: PHARMACY | Facility: HOSPITAL | Age: 64
End: 2024-01-05
Payer: MEDICAID

## 2024-01-05 DIAGNOSIS — Z95.2 HX OF MITRAL VALVE REPLACEMENT WITH MECHANICAL VALVE: ICD-10-CM

## 2024-01-05 DIAGNOSIS — I48.0 PAROXYSMAL ATRIAL FIBRILLATION: Primary | ICD-10-CM

## 2024-01-05 NOTE — PROGRESS NOTES
Anticoagulation Clinic Progress Note    Anticoagulation Summary  As of 1/5/2024      INR goal:  2.5-3.5   TTR:  64.5% (5.1 y)   INR used for dosing:  3.10 (1/4/2024)   Warfarin maintenance plan:  7.5 mg every Fri; 5 mg all other days   Weekly warfarin total:  37.5 mg   No change documented:  Bushra Torre, PharmD   Plan last modified:  Angelica Marroquin RPH (8/18/2023)   Next INR check:  1/11/2024   Priority:  High   Target end date:  Indefinite    Indications    Paroxysmal atrial fibrillation [I48.0]  Hx of mitral valve replacement with mechanical valve [Z95.2] [Z95.2]                 Anticoagulation Episode Summary       INR check location:  Home Draw    Preferred lab:      Send INR reminders to:   CHARLES MENDEZ  POOL    Comments:   home juliet          Anticoagulation Care Providers       Provider Role Specialty Phone number    Leyla Byrne APRN Referring Cardiology [phone]            Clinic Interview:  Patient Findings     Negatives:  Signs/symptoms of thrombosis, Signs/symptoms of bleeding,   Laboratory test error suspected, Change in health, Change in alcohol use,   Change in activity, Upcoming invasive procedure, Emergency department   visit, Upcoming dental procedure, Missed doses, Extra doses, Change in   medications, Change in diet/appetite, Hospital admission, Bruising, Other   complaints      Clinical Outcomes     Negatives:  Major bleeding event, Thromboembolic event,   Anticoagulation-related hospital admission, Anticoagulation-related ED   visit, Anticoagulation-related fatality        INR History:      12/8/2023     1:04 PM 12/20/2023    12:00 AM 12/21/2023    10:17 AM 12/27/2023    12:00 AM 12/27/2023     2:15 PM 1/4/2024    12:00 AM 1/5/2024     8:04 AM   Anticoagulation Monitoring   INR 3.50  3.50  4.00  3.10   INR Date 12/8/2023 12/20/2023 12/27/2023 1/4/2024   INR Goal 2.5-3.5  2.5-3.5  2.5-3.5  2.5-3.5   Trend Same  Same  Same  Same   Last Week Total 37.5 mg  37.5  mg  37.5 mg  35 mg   Next Week Total 37.5 mg  37.5 mg  32.5 mg  37.5 mg   Sun 5 mg  5 mg  5 mg  5 mg   Mon 5 mg  5 mg  5 mg  5 mg   Tue 5 mg  5 mg  5 mg  5 mg   Wed 5 mg  -  2.5 mg (12/27)  5 mg   Thu 5 mg  5 mg  5 mg  -   Fri 7.5 mg  7.5 mg  5 mg (12/29)  7.5 mg   Sat 5 mg  5 mg  5 mg  5 mg   Historical INR  3.50      4.00      3.10            This result is from an external source.       Plan:  1. INR is Therapeutic today- see above in Anticoagulation Summary.   Will instruct Elliot Patrician to Continue their warfarin regimen- see above in Anticoagulation Summary.  2. Follow up in 1 weeks  3. Secure voicemail with instructions and follow up. They have been instructed to call if any changes in medications, doses, concerns, etc. Patient expresses understanding and has no further questions at this time.    Bushra Torre, PharmD

## 2024-01-10 LAB — INR PPP: 2.7

## 2024-01-11 ENCOUNTER — ANTICOAGULATION VISIT (OUTPATIENT)
Dept: PHARMACY | Facility: HOSPITAL | Age: 64
End: 2024-01-11
Payer: MEDICAID

## 2024-01-11 DIAGNOSIS — Z95.2 HX OF MITRAL VALVE REPLACEMENT WITH MECHANICAL VALVE: ICD-10-CM

## 2024-01-11 DIAGNOSIS — I48.0 PAROXYSMAL ATRIAL FIBRILLATION: Primary | ICD-10-CM

## 2024-01-11 NOTE — HOME HEALTH
Currently on rosuvastatin 10 mg  Check lipid panel a.m.  Continue with atorvastatin 40   D[facility] PER MGR    PT EITHER GOING ON  SPOUSES INSURANCE OR COBRA

## 2024-01-11 NOTE — PROGRESS NOTES
Anticoagulation Clinic Progress Note    Anticoagulation Summary  As of 2024      INR goal:  2.5-3.5   TTR:  64.6% (5.1 y)   INR used for dosin.70 (1/10/2024)   Warfarin maintenance plan:  7.5 mg every Fri; 5 mg all other days   Weekly warfarin total:  37.5 mg   No change documented:  Angelica Marroquin RPH   Plan last modified:  Angelica Marroquin RPH (2023)   Next INR check:  2024   Priority:  High   Target end date:  Indefinite    Indications    Paroxysmal atrial fibrillation [I48.0]  Hx of mitral valve replacement with mechanical valve [Z95.2] [Z95.2]                 Anticoagulation Episode Summary       INR check location:  Home Draw    Preferred lab:      Send INR reminders to:  DYANA MENDEZ  POOL    Comments:   home juliet          Anticoagulation Care Providers       Provider Role Specialty Phone number    Leyla Byrne APRN Referring Cardiology [phone]            Clinic Interview:  No pertinent clinical findings have been reported.    INR History:      2023    10:17 AM 2023    12:00 AM 2023     2:15 PM 2024    12:00 AM 2024     8:04 AM 1/10/2024    12:00 AM 2024     8:25 AM   Anticoagulation Monitoring   INR 3.50  4.00  3.10  2.70   INR Date 2023  2023  2024  1/10/2024   INR Goal 2.5-3.5  2.5-3.5  2.5-3.5  2.5-3.5   Trend Same  Same  Same  Same   Last Week Total 37.5 mg  37.5 mg  35 mg  37.5 mg   Next Week Total 37.5 mg  32.5 mg  37.5 mg  37.5 mg   Sun 5 mg  5 mg  5 mg  5 mg   Mon 5 mg  5 mg  5 mg  5 mg   Tue 5 mg  5 mg  5 mg  5 mg   Wed -  2.5 mg ()  5 mg  -   Thu 5 mg  5 mg  -  5 mg   Fri 7.5 mg  5 mg ()  7.5 mg  7.5 mg   Sat 5 mg  5 mg  5 mg  5 mg   Historical INR  4.00      3.10      2.70            This result is from an external source.       Plan:  1. INR is therapeutic today- see above in Anticoagulation Summary.    Elliot Sebastian to continue their warfarin regimen- see above in Anticoagulation  Summary.  2. Follow up in 1 week  3. Pt has agreed to only be called if INR out of range. They have been instructed to call if any changes in medications, doses, concerns, etc. Patient expresses understanding and has no further questions at this time.    Angelica Marroquin, [facility]

## 2024-01-18 ENCOUNTER — ANTICOAGULATION VISIT (OUTPATIENT)
Dept: PHARMACY | Facility: HOSPITAL | Age: 64
End: 2024-01-18

## 2024-01-18 DIAGNOSIS — Z95.2 HX OF MITRAL VALVE REPLACEMENT WITH MECHANICAL VALVE: ICD-10-CM

## 2024-01-18 DIAGNOSIS — I48.0 PAROXYSMAL ATRIAL FIBRILLATION: Primary | ICD-10-CM

## 2024-01-18 LAB — INR PPP: 3.3

## 2024-01-18 NOTE — PROGRESS NOTES
Anticoagulation Clinic Progress Note    Anticoagulation Summary  As of 1/18/2024      INR goal:  2.5-3.5   TTR:  64.8% (5.1 y)   INR used for dosing:  3.30 (1/18/2024)   Warfarin maintenance plan:  7.5 mg every Fri; 5 mg all other days   Weekly warfarin total:  37.5 mg   No change documented:  Bushra Torre, PharmD   Plan last modified:  Angelica Marroquin RPH (8/18/2023)   Next INR check:  1/25/2024   Priority:  High   Target end date:  Indefinite    Indications    Paroxysmal atrial fibrillation [I48.0]  Hx of mitral valve replacement with mechanical valve [Z95.2] [Z95.2]                 Anticoagulation Episode Summary       INR check location:  Home Draw    Preferred lab:      Send INR reminders to:  DYANA MENDEZ  POOL    Comments:   home juliet          Anticoagulation Care Providers       Provider Role Specialty Phone number    Leyla Byrne APRN Referring Cardiology [phone]            Clinic Interview:  No pertinent clinical findings have been reported.    INR History:      12/27/2023     2:15 PM 1/4/2024    12:00 AM 1/5/2024     8:04 AM 1/10/2024    12:00 AM 1/11/2024     8:25 AM 1/18/2024    12:00 AM 1/18/2024     3:42 PM   Anticoagulation Monitoring   INR 4.00  3.10  2.70  3.30   INR Date 12/27/2023  1/4/2024  1/10/2024  1/18/2024   INR Goal 2.5-3.5  2.5-3.5  2.5-3.5  2.5-3.5   Trend Same  Same  Same  Same   Last Week Total 37.5 mg  35 mg  37.5 mg  37.5 mg   Next Week Total 32.5 mg  37.5 mg  37.5 mg  37.5 mg   Sun 5 mg  5 mg  5 mg  5 mg   Mon 5 mg  5 mg  5 mg  5 mg   Tue 5 mg  5 mg  5 mg  5 mg   Wed 2.5 mg (12/27)  5 mg  -  5 mg   Thu 5 mg  -  5 mg  5 mg   Fri 5 mg (12/29)  7.5 mg  7.5 mg  7.5 mg   Sat 5 mg  5 mg  5 mg  5 mg   Historical INR  3.10      2.70      3.30            This result is from an external source.       Plan:  1. INR is therapeutic today- see above in Anticoagulation Summary.    Elliot Sebastian to continue their warfarin regimen- see above in Anticoagulation  Summary.  2. Follow up in 1 week  3. Pt has agreed to only be called if INR out of range. They have been instructed to call if any changes in medications, doses, concerns, etc. Patient expresses understanding and has no further questions at this time.    Bushra Torre, PharmD

## 2024-01-23 DIAGNOSIS — K58.0 IRRITABLE BOWEL SYNDROME WITH DIARRHEA: ICD-10-CM

## 2024-01-23 RX ORDER — CHOLESTYRAMINE LIGHT 4 G/5.7G
4 POWDER, FOR SUSPENSION ORAL
Qty: 270 G | Refills: 1 | Status: SHIPPED | OUTPATIENT
Start: 2024-01-23

## 2024-01-25 LAB — INR PPP: 2.8

## 2024-01-26 ENCOUNTER — ANTICOAGULATION VISIT (OUTPATIENT)
Dept: PHARMACY | Facility: HOSPITAL | Age: 64
End: 2024-01-26

## 2024-01-26 DIAGNOSIS — Z95.2 HX OF MITRAL VALVE REPLACEMENT WITH MECHANICAL VALVE: ICD-10-CM

## 2024-01-26 DIAGNOSIS — I48.0 PAROXYSMAL ATRIAL FIBRILLATION: Primary | ICD-10-CM

## 2024-01-26 PROCEDURE — G0249 PROVIDE INR TEST MATER/EQUIP: HCPCS

## 2024-01-26 NOTE — PROGRESS NOTES
Anticoagulation Clinic Progress Note    Anticoagulation Summary  As of 2024      INR goal:  2.5-3.5   TTR:  64.9% (5.1 y)   INR used for dosin.80 (2024)   Warfarin maintenance plan:  7.5 mg every Fri; 5 mg all other days   Weekly warfarin total:  37.5 mg   No change documented:  Angelica Marroquin RPH   Plan last modified:  Angelica Marroquin RPH (2023)   Next INR check:  2024   Priority:  High   Target end date:  Indefinite    Indications    Paroxysmal atrial fibrillation [I48.0]  Hx of mitral valve replacement with mechanical valve [Z95.2] [Z95.2]                 Anticoagulation Episode Summary       INR check location:  Home Draw    Preferred lab:      Send INR reminders to:  DYANA MENDEZ  POOL    Comments:   home juliet          Anticoagulation Care Providers       Provider Role Specialty Phone number    Leyla Byrne APRN Referring Cardiology [phone]            Clinic Interview:  No pertinent clinical findings have been reported.    INR History:      2024     8:04 AM 1/10/2024    12:00 AM 2024     8:25 AM 2024    12:00 AM 2024     3:42 PM 2024    12:00 AM 2024     8:15 AM   Anticoagulation Monitoring   INR 3.10  2.70  3.30  2.80   INR Date 2024  1/10/2024  2024  2024   INR Goal 2.5-3.5  2.5-3.5  2.5-3.5  2.5-3.5   Trend Same  Same  Same  Same   Last Week Total 35 mg  37.5 mg  37.5 mg  37.5 mg   Next Week Total 37.5 mg  37.5 mg  37.5 mg  37.5 mg   Sun 5 mg  5 mg  5 mg  5 mg   Mon 5 mg  5 mg  5 mg  5 mg   Tue 5 mg  5 mg  5 mg  5 mg   Wed 5 mg  -  5 mg  5 mg   Thu -  5 mg  5 mg  -   Fri 7.5 mg  7.5 mg  7.5 mg  7.5 mg   Sat 5 mg  5 mg  5 mg  5 mg   Historical INR  2.70      3.30      2.80            This result is from an external source.       Plan:  1. INR is therapeutic today- see above in Anticoagulation Summary.    Elliot Sebastian to continue their warfarin regimen- see above in Anticoagulation Summary.  2. Follow up in 1  week  3. Pt has agreed to only be called if INR out of range. They have been instructed to call if any changes in medications, doses, concerns, etc. Patient expresses understanding and has no further questions at this time.    Angelica Marroquin [facility]

## 2024-02-07 ENCOUNTER — ANTICOAGULATION VISIT (OUTPATIENT)
Dept: PHARMACY | Facility: HOSPITAL | Age: 64
End: 2024-02-07
Payer: MEDICAID

## 2024-02-07 DIAGNOSIS — Z95.2 HX OF MITRAL VALVE REPLACEMENT WITH MECHANICAL VALVE: ICD-10-CM

## 2024-02-07 DIAGNOSIS — I48.0 PAROXYSMAL ATRIAL FIBRILLATION: Primary | ICD-10-CM

## 2024-02-07 LAB — INR PPP: 2.7

## 2024-02-07 NOTE — PROGRESS NOTES
Anticoagulation Clinic Progress Note    Anticoagulation Summary  As of 2024      INR goal:  2.5-3.5   TTR:  65.1% (5.2 y)   INR used for dosin.70 (2024)   Warfarin maintenance plan:  7.5 mg every Fri; 5 mg all other days   Weekly warfarin total:  37.5 mg   Plan last modified:  Michael Horner, PharmD (2024)   Next INR check:  2024   Priority:  High   Target end date:  Indefinite    Indications    Paroxysmal atrial fibrillation [I48.0]  Hx of mitral valve replacement with mechanical valve [Z95.2] [Z95.2]                 Anticoagulation Episode Summary       INR check location:  Home Draw    Preferred lab:      Send INR reminders to:  DYANA MENDEZ  POOL    Comments:   home juliet          Anticoagulation Care Providers       Provider Role Specialty Phone number    Leyla Byrne COREY WINSTON Referring Cardiology [phone]            Clinic Interview:  Patient Findings     Positives:  Change in alcohol use, Other complaints    Negatives:  Signs/symptoms of thrombosis, Signs/symptoms of bleeding,   Laboratory test error suspected, Change in health, Change in activity,   Upcoming invasive procedure, Emergency department visit, Upcoming dental   procedure, Missed doses, Extra doses, Change in medications, Change in   diet/appetite, Hospital admission, Bruising    Comments:  Reports he has been taking lower warfarin dose of 5 mg daily   for the past ~4 weeks. However, his alcohol intake has decreased in recent   weeks, and he is concerned his INR may become subtherapeutic.       Clinical Outcomes     Negatives:  Major bleeding event, Thromboembolic event,   Anticoagulation-related hospital admission, Anticoagulation-related ED   visit, Anticoagulation-related fatality    Comments:  Reports he has been taking lower warfarin dose of 5 mg daily   for the past ~4 weeks. However, his alcohol intake has decreased in recent   weeks, and he is concerned his INR may become subtherapeutic.          INR History:      1/11/2024     8:25 AM 1/18/2024    12:00 AM 1/18/2024     3:42 PM 1/25/2024    12:00 AM 1/26/2024     8:15 AM 2/7/2024    12:00 AM 2/7/2024     1:52 PM   Anticoagulation Monitoring   INR 2.70  3.30  2.80  2.70   INR Date 1/10/2024  1/18/2024  1/25/2024  2/7/2024   INR Goal 2.5-3.5  2.5-3.5  2.5-3.5  2.5-3.5   Trend Same  Same  Same  Same   Last Week Total 37.5 mg  37.5 mg  37.5 mg  35 mg   Next Week Total 37.5 mg  37.5 mg  37.5 mg  37.5 mg   Sun 5 mg  5 mg  5 mg  5 mg   Mon 5 mg  5 mg  5 mg  5 mg   Tue 5 mg  5 mg  5 mg  5 mg   Wed -  5 mg  5 mg  5 mg   Thu 5 mg  5 mg  -  5 mg   Fri 7.5 mg  7.5 mg  7.5 mg  7.5 mg   Sat 5 mg  5 mg  5 mg  5 mg   Historical INR  3.30      2.80      2.70            This result is from an external source.       Plan:  1. INR is Therapeutic today- see above in Anticoagulation Summary. However, as noted above, he is concerned his INR may become subtherapeutic with his decreased alcohol consumption.  Will instruct Elliot Sebastian to Increase their warfarin regimen to 7.5 mg Fri, 5 mg all other days - see above in Anticoagulation Summary.  2. Follow up in 1 week.  3. They have been instructed to call if any changes in medications, doses, concerns, etc. Patient expresses understanding and has no further questions at this time.    Michael Horner, PharmD

## 2024-02-22 ENCOUNTER — OFFICE VISIT (OUTPATIENT)
Dept: CARDIOLOGY | Facility: CLINIC | Age: 64
End: 2024-02-22
Payer: COMMERCIAL

## 2024-02-22 ENCOUNTER — LAB (OUTPATIENT)
Dept: LAB | Facility: HOSPITAL | Age: 64
End: 2024-02-22
Payer: COMMERCIAL

## 2024-02-22 VITALS
HEIGHT: 74 IN | DIASTOLIC BLOOD PRESSURE: 90 MMHG | HEART RATE: 61 BPM | WEIGHT: 299.4 LBS | SYSTOLIC BLOOD PRESSURE: 142 MMHG | BODY MASS INDEX: 38.43 KG/M2 | OXYGEN SATURATION: 98 %

## 2024-02-22 DIAGNOSIS — I48.0 PAROXYSMAL ATRIAL FIBRILLATION: ICD-10-CM

## 2024-02-22 DIAGNOSIS — I10 ESSENTIAL HYPERTENSION: ICD-10-CM

## 2024-02-22 DIAGNOSIS — I33.0 ACUTE BACTERIAL ENDOCARDITIS: Primary | ICD-10-CM

## 2024-02-22 DIAGNOSIS — Z02.89 ENCOUNTER FOR EXAMINATION REQUIRED BY DEPARTMENT OF TRANSPORTATION (DOT): ICD-10-CM

## 2024-02-22 DIAGNOSIS — I34.0 NONRHEUMATIC MITRAL VALVE REGURGITATION: ICD-10-CM

## 2024-02-22 DIAGNOSIS — I50.32 CHRONIC HEART FAILURE WITH PRESERVED EJECTION FRACTION (HFPEF): ICD-10-CM

## 2024-02-22 DIAGNOSIS — Z95.2 HX OF MITRAL VALVE REPLACEMENT WITH MECHANICAL VALVE: ICD-10-CM

## 2024-02-22 DIAGNOSIS — I45.10 INCOMPLETE RIGHT BUNDLE BRANCH BLOCK (RBBB): ICD-10-CM

## 2024-02-22 PROBLEM — R10.9 ABDOMINAL CRAMPING: Status: RESOLVED | Noted: 2020-07-27 | Resolved: 2024-02-22

## 2024-02-22 PROBLEM — K08.9 TEETH PROBLEM: Status: RESOLVED | Noted: 2022-08-19 | Resolved: 2024-02-22

## 2024-02-22 PROBLEM — R50.9 FEVER: Status: RESOLVED | Noted: 2022-08-02 | Resolved: 2024-02-22

## 2024-02-22 PROBLEM — N17.9 AKI (ACUTE KIDNEY INJURY): Status: RESOLVED | Noted: 2022-08-02 | Resolved: 2024-02-22

## 2024-02-22 PROBLEM — A41.9 SEPSIS WITHOUT ACUTE ORGAN DYSFUNCTION: Status: RESOLVED | Noted: 2020-03-30 | Resolved: 2024-02-22

## 2024-02-22 PROBLEM — R06.02 SHORTNESS OF BREATH: Status: RESOLVED | Noted: 2023-01-10 | Resolved: 2024-02-22

## 2024-02-22 PROBLEM — R53.83 FATIGUE: Status: RESOLVED | Noted: 2020-02-10 | Resolved: 2024-02-22

## 2024-02-22 PROBLEM — R03.0 ELEVATED BLOOD PRESSURE READING: Status: RESOLVED | Noted: 2020-02-10 | Resolved: 2024-02-22

## 2024-02-22 LAB
ANION GAP SERPL CALCULATED.3IONS-SCNC: 9.7 MMOL/L (ref 5–15)
BUN SERPL-MCNC: 10 MG/DL (ref 8–23)
BUN/CREAT SERPL: 10.6 (ref 7–25)
CALCIUM SPEC-SCNC: 9.7 MG/DL (ref 8.6–10.5)
CHLORIDE SERPL-SCNC: 108 MMOL/L (ref 98–107)
CO2 SERPL-SCNC: 25.3 MMOL/L (ref 22–29)
CREAT SERPL-MCNC: 0.94 MG/DL (ref 0.76–1.27)
EGFRCR SERPLBLD CKD-EPI 2021: 91.7 ML/MIN/1.73
GLUCOSE SERPL-MCNC: 93 MG/DL (ref 65–99)
POTASSIUM SERPL-SCNC: 4.4 MMOL/L (ref 3.5–5.2)
SODIUM SERPL-SCNC: 143 MMOL/L (ref 136–145)

## 2024-02-22 PROCEDURE — 36415 COLL VENOUS BLD VENIPUNCTURE: CPT

## 2024-02-22 PROCEDURE — 80048 BASIC METABOLIC PNL TOTAL CA: CPT

## 2024-02-22 RX ORDER — SPIRONOLACTONE 25 MG/1
25 TABLET ORAL DAILY
Qty: 90 TABLET | Refills: 0 | Status: SHIPPED | OUTPATIENT
Start: 2024-02-22

## 2024-02-22 NOTE — PROGRESS NOTES
Date of Office Visit: 2024  Encounter Provider: COREY Cid  Place of Service: [facility] CARDIOLOGY  Patient Name: Elliot Sebastian  :1960  Primary Cardiologist: Dr. Eveline Chanel (previously Dr. Karan Fay)    Chief Complaint   Patient presents with    Endocarditis    Follow-up   :     HPI: Elliot Sebastian is a 63 y.o. male who presents today for cardiac follow-up visit.  He is a new patient to me and I reviewed his medical records.    He has a history of bacterial endocarditis and underwent mechanical mitral valve replacement in .  He has been diagnosed with heart failure, paroxysmal atrial fibrillation, and superior mesenteric artery aneurysm.    In 2022, he was admitted for cellulitis.  MOE showed EF 51 to 55%, saline test negative, mild pulmonary hypertension, and possible vegetation of the mitral valve and he was treated with 6 weeks of IV ceftriaxone.  He had atrial fibrillation with RVR.      In 2023, he had right lower extremity cellulitis again and echocardiogram was negative for vegetation.  Echocardiogram showed EF 51 to 55% and normal functioning mitral valve.    He presents today for follow-up visit.  He reports that he had a vein procedure done at the vein clinic and denies swelling since then.  He denies chest pain, shortness of air, palpitations, edema, dizziness, or syncope.  His blood pressure is elevated today.  He is a  and requires DOT clearance.  He remains on warfarin and checks his INRs at home.  He denies any bleeding.  He has full dentures and does not go to the dentist.      Past Medical History:   Diagnosis Date    Acute bacterial endocarditis     Anemia     APC (atrial premature contractions)     Atrial fibrillation     BPH (benign prostatic hypertrophy)     CHF (congestive heart failure)     Cholelithiasis     Chronic constipation     Deep vein thrombophlebitis of leg     DISTAL    Disease  of thyroid gland     Gout     Intestinal obstruction     Ischemic enteritis     Left heart failure, NYHA class 3     CLASS 111 LEFT SYSTOIC CONGESTIVE HEART FAILURE    Mitral regurgitation     Pleural effusion, bilateral     Pulmonary hypertension     Superficial bacterial skin infection     Superior mesenteric artery aneurysm     Umbilical hernia     Vitamin D deficiency        Past Surgical History:   Procedure Laterality Date    APPENDECTOMY      CHOLECYSTECTOMY      COLONOSCOPY  approx 2013    normal per patient    COLONOSCOPY N/A 10/26/2020    Procedure: COLONOSCOPY into cecum with biopsy, polypectomy, clip placement;  Surgeon: Juan Phillips MD;  Location: [facility] ENDOSCOPY;  Service: Gastroenterology;  Laterality: N/A;  polyps, clip  asc polyp removed but not retrieved    EXPLORATION NECK FOR POSTOP HEMORRHAGE / THROMBOSIS / INFECTION      MITRAL VALVE REPLACEMENT  01/03/2013    33MM [facility] MECHANICAL VALVE, PRESERVATION OF CORDS TO SUBVALVULAR APPARATUS AND DOROTHY GORE-FLORI CORD IN THE P2 AREA, DEBRIDEMENT OF ANTERIOR AND POSTERIOR MITRAL LEAFLET        Social History     Socioeconomic History    Marital status:    Tobacco Use    Smoking status: Never    Smokeless tobacco: Never    Tobacco comments:     caffeine use   Vaping Use    Vaping Use: Never used   Substance and Sexual Activity    Alcohol use: Yes     Alcohol/week: 2.0 standard drinks of alcohol     Types: 2 Cans of beer per week     Comment: 1-2 nightly    Drug use: Never       Family History   Problem Relation Age of Onset    Diabetes Mother     Heart disease Father     Diabetes Sister     Diabetes Brother        The following portion of the patient's history were reviewed and updated as appropriate: past medical history, past surgical history, past social history, past family history, allergies, current medications, and problem list.    Review of Systems   Constitutional: Negative.   Cardiovascular: Negative.    Respiratory: Negative.   "   Hematologic/Lymphatic: Negative.    Neurological: Negative.        No Known Allergies      Current Outpatient Medications:     atenolol (TENORMIN) 25 MG tablet, Take 1 tablet by mouth Daily., Disp: 90 tablet, Rfl: 3    Cholecalciferol (VITAMIN D3) 2000 UNITS capsule, Take 5,000 Units by mouth Daily , Disp: , Rfl:     losartan (COZAAR) 50 MG tablet, Take 2 tablets by mouth Daily., Disp: 180 tablet, Rfl: 2    Multiple Vitamins-Minerals (MENS MULTIVITAMIN PLUS) tablet, Take 2 tablets by mouth Daily., Disp: , Rfl:     warfarin (Jantoven) 5 MG tablet, Take one-half tablet (2.5 mg) by mouth on Fridays, and take one tablet (5 mg) by mouth all other days or as directed., Disp: 85 tablet, Rfl: 1    spironolactone (ALDACTONE) 25 MG tablet, Take 1 tablet by mouth Daily., Disp: 90 tablet, Rfl: 0         Objective:     Vitals:    02/22/24 1111   BP: 142/90   BP Location: Left arm   Patient Position: Sitting   Cuff Size: Large Adult   Pulse: 61   SpO2: 98%   Weight: 136 kg (299 lb 6.4 oz)   Height: 188 cm (74.02\")     Body mass index is 38.42 kg/m².    PHYSICAL EXAM:    Vitals Reviewed.   General Appearance: No acute distress, well developed and well nourished. Obese.   Eyes: Eye deviation.  HENT: No hearing loss noted.    Respiratory: No signs of respiratory distress. Respiration rhythm and depth normal.  Clear to auscultation.   Cardiovascular:  Jugular Venous Pressure: Normal  Heart Rate and Rhythm: Normal, Heart Sounds: Mechanical click present.  Murmurs: No murmurs noted. No rubs, thrills, or gallops.   Lower Extremities: No edema noted.  Musculoskeletal: Normal movement of extremities.  Skin: General appearance normal.    Psychiatric: Patient alert and oriented to person, place, and time. Speech and behavior appropriate. Normal mood and affect.       ECG 12 Lead    Date/Time: 2/22/2024 11:11 AM  Performed by: Tequila Escalante APRN    Authorized by: Tequila Escalante APRN  Comparison: compared with previous ECG from " 8/2/2023  Similar to previous ECG  Rhythm: sinus rhythm  Rate: normal  BPM: 61  Conduction: incomplete right bundle branch block  ST Segments: ST segments normal  T Waves: T waves normal  QRS axis: normal    Clinical impression: abnormal EKG            Assessment:       Diagnosis Plan   1. Acute bacterial endocarditis        2. Hx of mitral valve replacement with mechanical valve [Z95.2]        3. Nonrheumatic mitral valve regurgitation        4. Paroxysmal atrial fibrillation        5. Chronic heart failure with preserved ejection fraction (HFpEF)        6. Essential hypertension  Basic Metabolic Panel      7. Encounter for examination required by Department of Transportation (DOT)               Plan:       1/3.  History of endocarditis on 2 different occasions.  Underwent mechanical mitral valve replacement in 2013.  Last echocardiogram in January 2023 showed stable mitral valve.  He does not go to the dentist because he has full dentures, but if he needed to he would require SBE prophylaxis.  He remains on warfarin for the mechanical valve and checks INRs at home which are followed in the [facility] anticoagulation clinic.    4.  Paroxysmal Atrial Fibrillation: Currently in a normal sinus rhythm.  Continue atenolol. [id number] score of 4.  He remains on warfarin.    5.  HFpEF: Euvolemic.  Continue losartan.    6.  Hypertension: Start spironolactone 25 mg daily.  Repeat BMP in 1 week.  Follow low-sodium diet.    7.  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    8.  He has a chronic incomplete right bundle branch block.    9.  Follow-up with me in 6 weeks for reassessment of blood pressure.  Follow-up with Dr. Chanel in 6 months.    As always, it has been a pleasure to participate in your patient's care. Thank you.         Sincerely,         COREY Gould  [facility] Cardiology      Dictated utilizing Dragon Dictation

## 2024-02-27 ENCOUNTER — TELEPHONE (OUTPATIENT)
Dept: PHARMACY | Facility: HOSPITAL | Age: 64
End: 2024-02-27
Payer: COMMERCIAL

## 2024-02-28 ENCOUNTER — ANTICOAGULATION VISIT (OUTPATIENT)
Dept: PHARMACY | Facility: HOSPITAL | Age: 64
End: 2024-02-28
Payer: COMMERCIAL

## 2024-02-28 DIAGNOSIS — I48.0 PAROXYSMAL ATRIAL FIBRILLATION: Primary | ICD-10-CM

## 2024-02-28 DIAGNOSIS — Z95.2 HX OF MITRAL VALVE REPLACEMENT WITH MECHANICAL VALVE: ICD-10-CM

## 2024-02-28 LAB — INR PPP: 3.4

## 2024-02-28 RX ORDER — WARFARIN SODIUM 5 MG/1
TABLET ORAL
Qty: 100 TABLET | Refills: 1 | Status: SHIPPED | OUTPATIENT
Start: 2024-02-28

## 2024-02-28 NOTE — PROGRESS NOTES
Anticoagulation Clinic Progress Note    Anticoagulation Summary  As of 2/28/2024      INR goal:  2.5-3.5   TTR:  65.5% (5.2 y)   INR used for dosing:  3.40 (2/28/2024)   Warfarin maintenance plan:  7.5 mg every Fri; 5 mg all other days   Weekly warfarin total:  37.5 mg   Plan last modified:  Michael Horner, PharmD (2/7/2024)   Next INR check:  3/6/2024   Priority:  High   Target end date:  Indefinite    Indications    Paroxysmal atrial fibrillation [I48.0]  Hx of mitral valve replacement with mechanical valve [Z95.2] [Z95.2]                 Anticoagulation Episode Summary       INR check location:  Home Draw    Preferred lab:      Send INR reminders to:  DYANA MENDEZ  POOL    Comments:   home juliet          Anticoagulation Care Providers       Provider Role Specialty Phone number    Leyla Byrne APRN Referring Cardiology [phone]            Clinic Interview:  No pertinent clinical findings have been reported.    INR History:      1/18/2024     3:42 PM 1/25/2024    12:00 AM 1/26/2024     8:15 AM 2/7/2024    12:00 AM 2/7/2024     1:52 PM 2/28/2024    12:00 AM 2/28/2024     3:39 PM   Anticoagulation Monitoring   INR 3.30  2.80  2.70  3.40   INR Date 1/18/2024 1/25/2024 2/7/2024 2/28/2024   INR Goal 2.5-3.5  2.5-3.5  2.5-3.5  2.5-3.5   Trend Same  Same  Same  Same   Last Week Total 37.5 mg  37.5 mg  35 mg  37.5 mg   Next Week Total 37.5 mg  37.5 mg  37.5 mg  37.5 mg   Sun 5 mg  5 mg  5 mg  5 mg   Mon 5 mg  5 mg  5 mg  5 mg   Tue 5 mg  5 mg  5 mg  5 mg   Wed 5 mg  5 mg  5 mg  5 mg   Thu 5 mg  -  5 mg  5 mg   Fri 7.5 mg  7.5 mg  7.5 mg  7.5 mg   Sat 5 mg  5 mg  5 mg  5 mg   Historical INR  2.80      2.70      3.40            This result is from an external source.       Plan:  1. INR is therapeutic today- see above in Anticoagulation Summary.    Elliot Sebastian to continue their warfarin regimen- see above in Anticoagulation Summary.  2. Follow up in 1 week  3. Pt has agreed to only be called  if INR out of range. They have been instructed to call if any changes in medications, doses, concerns, etc. Patient expresses understanding and has no further questions at this time.    Bushra Torre, PharmD

## 2024-02-29 ENCOUNTER — TELEPHONE (OUTPATIENT)
Dept: CARDIOLOGY | Facility: CLINIC | Age: 64
End: 2024-02-29
Payer: COMMERCIAL

## 2024-02-29 DIAGNOSIS — I10 ESSENTIAL HYPERTENSION: Primary | ICD-10-CM

## 2024-02-29 NOTE — TELEPHONE ENCOUNTER
He was recently hypertensive and I started him on spironolactone 25 mg 1 tablet daily. If BP is good, continue current meds. He need a repeat BMP at [facility], [place] or closer [facility] lab. I have placed the order. Thanks.

## 2024-02-29 NOTE — TELEPHONE ENCOUNTER
Called and left VM, will continue to try to reach pt.    HUB- please put patient straight through to triage    Roro Riley, RN  Triage RN  02/29/24 08:37 EST

## 2024-02-29 NOTE — TELEPHONE ENCOUNTER
Layton Mandel called back. He said that he just started taking the spironolactone on Tuesday. He hasn't had the opportunity to check his B/P yet, but he is going to check it and send you a Votizen message.     He is going to go to lab in about a week and a half to have his BMP drawn. I let him know we will call him with those results.    Thank you,    Jazmyne Vaca RN  Triage [facility]  02/29/24 14:58 EST

## 2024-03-07 LAB — INR PPP: 3.5

## 2024-03-08 ENCOUNTER — ANTICOAGULATION VISIT (OUTPATIENT)
Dept: PHARMACY | Facility: HOSPITAL | Age: 64
End: 2024-03-08
Payer: COMMERCIAL

## 2024-03-08 DIAGNOSIS — Z95.2 HX OF MITRAL VALVE REPLACEMENT WITH MECHANICAL VALVE: ICD-10-CM

## 2024-03-08 DIAGNOSIS — I48.0 PAROXYSMAL ATRIAL FIBRILLATION: Primary | ICD-10-CM

## 2024-03-08 NOTE — PROGRESS NOTES
Anticoagulation Clinic Progress Note    Anticoagulation Summary  As of 3/8/2024      INR goal:  2.5-3.5   TTR:  65.7% (5.3 y)   INR used for dosing:  3.50 (3/7/2024)   Warfarin maintenance plan:  7.5 mg every Fri; 5 mg all other days   Weekly warfarin total:  37.5 mg   No change documented:  Angelica Marroquin, GURINDER   Plan last modified:  Michael Horner, PharmD (2/7/2024)   Next INR check:  3/14/2024   Priority:  High   Target end date:  Indefinite    Indications    Paroxysmal atrial fibrillation [I48.0]  Hx of mitral valve replacement with mechanical valve [Z95.2] [Z95.2]                 Anticoagulation Episode Summary       INR check location:  Home Draw    Preferred lab:      Send INR reminders to:  DYANA MENDEZ  POOL    Comments:   home juliet          Anticoagulation Care Providers       Provider Role Specialty Phone number    Leyla Byrne APRN Referring Cardiology [phone]            Clinic Interview:  No pertinent clinical findings have been reported.    INR History:      1/26/2024     8:15 AM 2/7/2024    12:00 AM 2/7/2024     1:52 PM 2/28/2024    12:00 AM 2/28/2024     3:39 PM 3/7/2024    12:00 AM 3/8/2024     9:29 AM   Anticoagulation Monitoring   INR 2.80  2.70  3.40  3.50   INR Date 1/25/2024  2/7/2024  2/28/2024  3/7/2024   INR Goal 2.5-3.5  2.5-3.5  2.5-3.5  2.5-3.5   Trend Same  Same  Same  Same   Last Week Total 37.5 mg  35 mg  37.5 mg  37.5 mg   Next Week Total 37.5 mg  37.5 mg  37.5 mg  37.5 mg   Sun 5 mg  5 mg  5 mg  5 mg   Mon 5 mg  5 mg  5 mg  5 mg   Tue 5 mg  5 mg  5 mg  5 mg   Wed 5 mg  5 mg  5 mg  5 mg   Thu -  5 mg  5 mg  -   Fri 7.5 mg  7.5 mg  7.5 mg  7.5 mg   Sat 5 mg  5 mg  5 mg  5 mg   Historical INR  2.70      3.40      3.50            This result is from an external source.       Plan:  1. INR is therapeutic today- see above in Anticoagulation Summary.    Elliot Sebastian to continue their warfarin regimen- see above in Anticoagulation Summary.  2. Follow up in 1  week  3. Pt has agreed to only be called if INR out of range. They have been instructed to call if any changes in medications, doses, concerns, etc. Patient expresses understanding and has no further questions at this time.    Angelica Marroquin [facility]

## 2024-03-13 ENCOUNTER — ANTICOAGULATION VISIT (OUTPATIENT)
Dept: PHARMACY | Facility: HOSPITAL | Age: 64
End: 2024-03-13
Payer: COMMERCIAL

## 2024-03-13 DIAGNOSIS — I48.0 PAROXYSMAL ATRIAL FIBRILLATION: Primary | ICD-10-CM

## 2024-03-13 DIAGNOSIS — Z95.2 HX OF MITRAL VALVE REPLACEMENT WITH MECHANICAL VALVE: ICD-10-CM

## 2024-03-13 LAB — INR PPP: 2.6

## 2024-03-13 PROCEDURE — G0249 PROVIDE INR TEST MATER/EQUIP: HCPCS

## 2024-03-13 NOTE — PROGRESS NOTES
Anticoagulation Clinic Progress Note    Anticoagulation Summary  As of 3/13/2024      INR goal:  2.5-3.5   TTR:  65.8% (5.3 y)   INR used for dosin.60 (3/13/2024)   Warfarin maintenance plan:  7.5 mg every Fri; 5 mg all other days   Weekly warfarin total:  37.5 mg   No change documented:  Angelica Marroquin, GURINDER   Plan last modified:  Michael Horner, PharmD (2024)   Next INR check:  3/20/2024   Priority:  High   Target end date:  Indefinite    Indications    Paroxysmal atrial fibrillation [I48.0]  Hx of mitral valve replacement with mechanical valve [Z95.2] [Z95.2]                 Anticoagulation Episode Summary       INR check location:  Home Draw    Preferred lab:      Send INR reminders to:  DYANA MENDEZ  POOL    Comments:   home juliet          Anticoagulation Care Providers       Provider Role Specialty Phone number    Leyla Byrne APRN Referring Cardiology [phone]            Clinic Interview:  No pertinent clinical findings have been reported.    INR History:      2024     1:52 PM 2024    12:00 AM 2024     3:39 PM 3/7/2024    12:00 AM 3/8/2024     9:29 AM 3/13/2024    12:00 AM 3/13/2024     8:00 AM   Anticoagulation Monitoring   INR 2.70  3.40  3.50  2.60   INR Date 2024  2024  3/7/2024  3/13/2024   INR Goal 2.5-3.5  2.5-3.5  2.5-3.5  2.5-3.5   Trend Same  Same  Same  Same   Last Week Total 35 mg  37.5 mg  37.5 mg  37.5 mg   Next Week Total 37.5 mg  37.5 mg  37.5 mg  37.5 mg   Sun 5 mg  5 mg  5 mg  5 mg   Mon 5 mg  5 mg  5 mg  5 mg   Tue 5 mg  5 mg  5 mg  5 mg   Wed 5 mg  5 mg  5 mg  5 mg   Thu 5 mg  5 mg  -  5 mg   Fri 7.5 mg  7.5 mg  7.5 mg  7.5 mg   Sat 5 mg  5 mg  5 mg  5 mg   Historical INR  3.40      3.50      2.60            This result is from an external source.       Plan:  1. INR is therapeutic today- see above in Anticoagulation Summary.    Elliot Sebastian to continue their warfarin regimen- see above in Anticoagulation Summary.  2. Follow up  in 1 week  3. Pt has agreed to only be called if INR out of range. They have been instructed to call if any changes in medications, doses, concerns, etc. Patient expresses understanding and has no further questions at this time.    Angelica Marroquin, [facility]

## 2024-03-20 ENCOUNTER — ANTICOAGULATION VISIT (OUTPATIENT)
Dept: PHARMACY | Facility: HOSPITAL | Age: 64
End: 2024-03-20
Payer: COMMERCIAL

## 2024-03-20 DIAGNOSIS — Z95.2 HX OF MITRAL VALVE REPLACEMENT WITH MECHANICAL VALVE: ICD-10-CM

## 2024-03-20 DIAGNOSIS — I48.0 PAROXYSMAL ATRIAL FIBRILLATION: Primary | ICD-10-CM

## 2024-03-20 LAB — INR PPP: 2.5

## 2024-03-20 NOTE — PROGRESS NOTES
Anticoagulation Clinic Progress Note    Anticoagulation Summary  As of 3/20/2024      INR goal:  2.5-3.5   TTR:  65.9% (5.3 y)   INR used for dosin.50 (3/20/2024)   Warfarin maintenance plan:  7.5 mg every Fri; 5 mg all other days   Weekly warfarin total:  37.5 mg   No change documented:  Bushra Torre, PharmD   Plan last modified:  Michael Horner PharmD (2024)   Next INR check:  3/27/2024   Priority:  High   Target end date:  Indefinite    Indications    Paroxysmal atrial fibrillation [I48.0]  Hx of mitral valve replacement with mechanical valve [Z95.2] [Z95.2]                 Anticoagulation Episode Summary       INR check location:  Home Draw    Preferred lab:      Send INR reminders to:  DYANA MENDEZ  POOL    Comments:   home juliet          Anticoagulation Care Providers       Provider Role Specialty Phone number    Leyla Byrne APRN Referring Cardiology [phone]            Clinic Interview:  No pertinent clinical findings have been reported.    INR History:      2024     3:39 PM 3/7/2024    12:00 AM 3/8/2024     9:29 AM 3/13/2024    12:00 AM 3/13/2024     8:00 AM 3/20/2024    12:00 AM 3/20/2024     3:23 PM   Anticoagulation Monitoring   INR 3.40  3.50  2.60  2.50   INR Date 2024  3/7/2024  3/13/2024  3/20/2024   INR Goal 2.5-3.5  2.5-3.5  2.5-3.5  2.5-3.5   Trend Same  Same  Same  Same   Last Week Total 37.5 mg  37.5 mg  37.5 mg  37.5 mg   Next Week Total 37.5 mg  37.5 mg  37.5 mg  37.5 mg   Sun 5 mg  5 mg  5 mg  5 mg   Mon 5 mg  5 mg  5 mg  5 mg   Tue 5 mg  5 mg  5 mg  5 mg   Wed 5 mg  5 mg  5 mg  5 mg   Thu 5 mg  -  5 mg  5 mg   Fri 7.5 mg  7.5 mg  7.5 mg  7.5 mg   Sat 5 mg  5 mg  5 mg  5 mg   Historical INR  3.50      2.60      2.50            This result is from an external source.       Plan:  1. INR is therapeutic today- see above in Anticoagulation Summary.    Elliot Sebastian to continue their warfarin regimen- see above in Anticoagulation Summary.  2.  Follow up in 1 week  3. Pt has agreed to only be called if INR out of range. They have been instructed to call if any changes in medications, doses, concerns, etc. Patient expresses understanding and has no further questions at this time.    Bushra Torre, PharmD

## 2024-03-31 LAB — INR PPP: 3.2

## 2024-04-01 ENCOUNTER — ANTICOAGULATION VISIT (OUTPATIENT)
Dept: PHARMACY | Facility: HOSPITAL | Age: 64
End: 2024-04-01
Payer: COMMERCIAL

## 2024-04-01 ENCOUNTER — TELEPHONE (OUTPATIENT)
Dept: FAMILY MEDICINE CLINIC | Facility: CLINIC | Age: 64
End: 2024-04-01

## 2024-04-01 DIAGNOSIS — I48.0 PAROXYSMAL ATRIAL FIBRILLATION: Primary | ICD-10-CM

## 2024-04-01 DIAGNOSIS — Z95.2 HX OF MITRAL VALVE REPLACEMENT WITH MECHANICAL VALVE: ICD-10-CM

## 2024-04-01 NOTE — TELEPHONE ENCOUNTER
L/M that he is scheduled with wrong provider and that Questran is not on his current medication list.  He also would need to have fasting labs done.

## 2024-04-01 NOTE — TELEPHONE ENCOUNTER
HAS APPT SCHEDULED BUT OUT OF MEDS    Caller: Elliot Sebastian    Relationship: Self    Best call back number:     Elliot Sebastian (Self) [phone] (Mobile)       What medication are you requesting: HUDSON     What are your current symptoms:     How long have you been experiencing symptoms:     Have you had these symptoms before:    [x] Yes  [] No    Have you been treated for these symptoms before:   [x] Yes  [] No    If a prescription is needed, what is your preferred pharmacy and phone number: [facility] [address] [place], [place] - [address] [facility] [phone] [facility] [phone] FX     Additional notes:

## 2024-04-01 NOTE — PROGRESS NOTES
Anticoagulation Clinic Progress Note    Anticoagulation Summary  As of 4/1/2024      INR goal:  2.5-3.5   TTR:  66.1% (5.3 y)   INR used for dosing:  3.20 (3/31/2024)   Warfarin maintenance plan:  7.5 mg every Fri; 5 mg all other days   Weekly warfarin total:  37.5 mg   No change documented:  Michael Horner, Pablo   Plan last modified:  Michael Horner PharmD (2/7/2024)   Next INR check:  4/8/2024   Priority:  High   Target end date:  Indefinite    Indications    Paroxysmal atrial fibrillation [I48.0]  Hx of mitral valve replacement with mechanical valve [Z95.2] [Z95.2]                 Anticoagulation Episode Summary       INR check location:  Home Draw    Preferred lab:      Send INR reminders to:  DYANA MENDEZ  POOL    Comments:   home juliet          Anticoagulation Care Providers       Provider Role Specialty Phone number    Leyla Byrne APRN Referring Cardiology [phone]            Clinic Interview:  No pertinent clinical findings have been reported.    INR History:      3/8/2024     9:29 AM 3/13/2024    12:00 AM 3/13/2024     8:00 AM 3/20/2024    12:00 AM 3/20/2024     3:23 PM 3/31/2024    12:00 AM 4/1/2024     9:54 AM   Anticoagulation Monitoring   INR 3.50  2.60  2.50  3.20   INR Date 3/7/2024  3/13/2024  3/20/2024  3/31/2024   INR Goal 2.5-3.5  2.5-3.5  2.5-3.5  2.5-3.5   Trend Same  Same  Same  Same   Last Week Total 37.5 mg  37.5 mg  37.5 mg  37.5 mg   Next Week Total 37.5 mg  37.5 mg  37.5 mg  37.5 mg   Sun 5 mg  5 mg  5 mg  5 mg   Mon 5 mg  5 mg  5 mg  5 mg   Tue 5 mg  5 mg  5 mg  5 mg   Wed 5 mg  5 mg  5 mg  5 mg   Thu -  5 mg  5 mg  5 mg   Fri 7.5 mg  7.5 mg  7.5 mg  7.5 mg   Sat 5 mg  5 mg  5 mg  5 mg   Historical INR  2.60      2.50      3.20            This result is from an external source.       Plan:  1. INR is therapeutic today- see above in Anticoagulation Summary.    Elliot Sebastian to continue their warfarin regimen- see above in Anticoagulation Summary.  2. Follow up  in 1 week  3. Pt has agreed to only be called if INR out of range. They have been instructed to call if any changes in medications, doses, concerns, etc. Patient expresses understanding and has no further questions at this time.    Michael Horner, PharmD

## 2024-04-02 RX ORDER — CHOLESTYRAMINE LIGHT 4 G/5.7G
4 POWDER, FOR SUSPENSION ORAL 2 TIMES DAILY
Qty: 60 PACKET | Refills: 1 | Status: SHIPPED | OUTPATIENT
Start: 2024-04-02

## 2024-04-09 ENCOUNTER — ANTICOAGULATION VISIT (OUTPATIENT)
Dept: PHARMACY | Facility: HOSPITAL | Age: 64
End: 2024-04-09
Payer: COMMERCIAL

## 2024-04-09 DIAGNOSIS — I48.0 PAROXYSMAL ATRIAL FIBRILLATION: Primary | ICD-10-CM

## 2024-04-09 DIAGNOSIS — Z95.2 HX OF MITRAL VALVE REPLACEMENT WITH MECHANICAL VALVE: ICD-10-CM

## 2024-04-09 LAB — INR PPP: 2.5

## 2024-04-09 NOTE — PROGRESS NOTES
Anticoagulation Clinic Progress Note    Anticoagulation Summary  As of 2024      INR goal:  2.5-3.5   TTR:  66.2% (5.4 y)   INR used for dosin.50 (2024)   Warfarin maintenance plan:  7.5 mg every Fri; 5 mg all other days   Weekly warfarin total:  37.5 mg   No change documented:  Angelica Marroquin, GURINDER   Plan last modified:  Michael Horner, PharmD (2024)   Next INR check:  2024   Priority:  High   Target end date:  Indefinite    Indications    Paroxysmal atrial fibrillation [I48.0]  Hx of mitral valve replacement with mechanical valve [Z95.2] [Z95.2]                 Anticoagulation Episode Summary       INR check location:  Home Draw    Preferred lab:      Send INR reminders to:  DYANA MENDEZ  POOL    Comments:   home juliet          Anticoagulation Care Providers       Provider Role Specialty Phone number    Leyla Byrne APRN Referring Cardiology [phone]            Clinic Interview:  No pertinent clinical findings have been reported.    INR History:      3/13/2024     8:00 AM 3/20/2024    12:00 AM 3/20/2024     3:23 PM 3/31/2024    12:00 AM 2024     9:54 AM 2024    12:00 AM 2024    10:21 AM   Anticoagulation Monitoring   INR 2.60  2.50  3.20  2.50   INR Date 3/13/2024  3/20/2024  3/31/2024  2024   INR Goal 2.5-3.5  2.5-3.5  2.5-3.5  2.5-3.5   Trend Same  Same  Same  Same   Last Week Total 37.5 mg  37.5 mg  37.5 mg  37.5 mg   Next Week Total 37.5 mg  37.5 mg  37.5 mg  37.5 mg   Sun 5 mg  5 mg  5 mg  5 mg   Mon 5 mg  5 mg  5 mg  5 mg   Tue 5 mg  5 mg  5 mg  5 mg   Wed 5 mg  5 mg  5 mg  5 mg   Thu 5 mg  5 mg  5 mg  5 mg   Fri 7.5 mg  7.5 mg  7.5 mg  7.5 mg   Sat 5 mg  5 mg  5 mg  5 mg   Historical INR  2.50      3.20      2.50            This result is from an external source.       Plan:  1. INR is therapeutic today- see above in Anticoagulation Summary.    Elliot Sebastian to continue their warfarin regimen- see above in Anticoagulation Summary.  2. Follow  up in 1 week  3. Pt has agreed to only be called if INR out of range. They have been instructed to call if any changes in medications, doses, concerns, etc. Patient expresses understanding and has no further questions at this time.    Angelica Marroquin [facility]

## 2024-04-19 ENCOUNTER — ANTICOAGULATION VISIT (OUTPATIENT)
Dept: PHARMACY | Facility: HOSPITAL | Age: 64
End: 2024-04-19
Payer: COMMERCIAL

## 2024-04-19 DIAGNOSIS — Z95.2 HX OF MITRAL VALVE REPLACEMENT WITH MECHANICAL VALVE: ICD-10-CM

## 2024-04-19 DIAGNOSIS — I48.0 PAROXYSMAL ATRIAL FIBRILLATION: Primary | ICD-10-CM

## 2024-04-19 LAB — INR PPP: 3.4

## 2024-04-19 PROCEDURE — G0249 PROVIDE INR TEST MATER/EQUIP: HCPCS

## 2024-04-19 RX ORDER — CHOLESTYRAMINE LIGHT 4 G/5.7G
4 POWDER, FOR SUSPENSION ORAL 2 TIMES DAILY
Qty: 60 PACKET | Refills: 1 | Status: SHIPPED | OUTPATIENT
Start: 2024-04-19

## 2024-04-19 NOTE — PROGRESS NOTES
Anticoagulation Clinic Progress Note    Anticoagulation Summary  As of 4/19/2024      INR goal:  2.5-3.5   TTR:  66.4% (5.4 y)   INR used for dosing:  3.40 (4/19/2024)   Warfarin maintenance plan:  7.5 mg every Fri; 5 mg all other days   Weekly warfarin total:  37.5 mg   No change documented:  Angelica Marroquin, GURINDER   Plan last modified:  Michael Horner, PharmD (2/7/2024)   Next INR check:  4/26/2024   Priority:  High   Target end date:  Indefinite    Indications    Paroxysmal atrial fibrillation [I48.0]  Hx of mitral valve replacement with mechanical valve [Z95.2] [Z95.2]                 Anticoagulation Episode Summary       INR check location:  Home Draw    Preferred lab:      Send INR reminders to:  DYANA MENDEZ  POOL    Comments:   home juliet          Anticoagulation Care Providers       Provider Role Specialty Phone number    Leyla Byrne APRN Referring Cardiology [phone]            Clinic Interview:  No pertinent clinical findings have been reported.    INR History:      3/20/2024     3:23 PM 3/31/2024    12:00 AM 4/1/2024     9:54 AM 4/9/2024    12:00 AM 4/9/2024    10:21 AM 4/19/2024    12:00 AM 4/19/2024     8:00 AM   Anticoagulation Monitoring   INR 2.50  3.20  2.50  3.40   INR Date 3/20/2024  3/31/2024  4/9/2024  4/19/2024   INR Goal 2.5-3.5  2.5-3.5  2.5-3.5  2.5-3.5   Trend Same  Same  Same  Same   Last Week Total 37.5 mg  37.5 mg  37.5 mg  37.5 mg   Next Week Total 37.5 mg  37.5 mg  37.5 mg  37.5 mg   Sun 5 mg  5 mg  5 mg  5 mg   Mon 5 mg  5 mg  5 mg  5 mg   Tue 5 mg  5 mg  5 mg  5 mg   Wed 5 mg  5 mg  5 mg  5 mg   Thu 5 mg  5 mg  5 mg  5 mg   Fri 7.5 mg  7.5 mg  7.5 mg  7.5 mg   Sat 5 mg  5 mg  5 mg  5 mg   Historical INR  3.20      2.50      3.40            This result is from an external source.       Plan:  1. INR is therapeutic today- see above in Anticoagulation Summary.    Elliot Sebastian to continue their warfarin regimen- see above in Anticoagulation Summary.  2.  Follow up in 1 week  3. Pt has agreed to only be called if INR out of range. They have been instructed to call if any changes in medications, doses, concerns, etc. Patient expresses understanding and has no further questions at this time.    Angelica Marroquin [facility]

## 2024-04-27 LAB — INR PPP: 3.1

## 2024-04-29 ENCOUNTER — ANTICOAGULATION VISIT (OUTPATIENT)
Dept: PHARMACY | Facility: HOSPITAL | Age: 64
End: 2024-04-29
Payer: COMMERCIAL

## 2024-04-29 DIAGNOSIS — I48.0 PAROXYSMAL ATRIAL FIBRILLATION: Primary | ICD-10-CM

## 2024-04-29 DIAGNOSIS — Z95.2 HX OF MITRAL VALVE REPLACEMENT WITH MECHANICAL VALVE: ICD-10-CM

## 2024-04-29 NOTE — PROGRESS NOTES
Anticoagulation Clinic Progress Note    Anticoagulation Summary  As of 4/29/2024      INR goal:  2.5-3.5   TTR:  66.5% (5.4 y)   INR used for dosing:  3.10 (4/27/2024)   Warfarin maintenance plan:  7.5 mg every Fri; 5 mg all other days   Weekly warfarin total:  37.5 mg   No change documented:  Angelica Marroquin, GURINDER   Plan last modified:  Michael Horner, PharmD (2/7/2024)   Next INR check:  5/4/2024   Priority:  High   Target end date:  Indefinite    Indications    Paroxysmal atrial fibrillation [I48.0]  Hx of mitral valve replacement with mechanical valve [Z95.2] [Z95.2]                 Anticoagulation Episode Summary       INR check location:  Home Draw    Preferred lab:      Send INR reminders to:  DYANA MENDEZ  POOL    Comments:   home juliet          Anticoagulation Care Providers       Provider Role Specialty Phone number    Leyla Byrne APRN Referring Cardiology [phone]            Clinic Interview:  No pertinent clinical findings have been reported.    INR History:      4/1/2024     9:54 AM 4/9/2024    12:00 AM 4/9/2024    10:21 AM 4/19/2024    12:00 AM 4/19/2024     8:00 AM 4/27/2024    12:00 AM 4/29/2024     8:09 AM   Anticoagulation Monitoring   INR 3.20  2.50  3.40  3.10   INR Date 3/31/2024  4/9/2024  4/19/2024  4/27/2024   INR Goal 2.5-3.5  2.5-3.5  2.5-3.5  2.5-3.5   Trend Same  Same  Same  Same   Last Week Total 37.5 mg  37.5 mg  37.5 mg  37.5 mg   Next Week Total 37.5 mg  37.5 mg  37.5 mg  37.5 mg   Sun 5 mg  5 mg  5 mg  -   Mon 5 mg  5 mg  5 mg  5 mg   Tue 5 mg  5 mg  5 mg  5 mg   Wed 5 mg  5 mg  5 mg  5 mg   Thu 5 mg  5 mg  5 mg  5 mg   Fri 7.5 mg  7.5 mg  7.5 mg  7.5 mg   Sat 5 mg  5 mg  5 mg  -   Historical INR  2.50      3.40      3.10            This result is from an external source.       Plan:  1. INR is therapeutic today- see above in Anticoagulation Summary.    Elliot Sebastian to continue their warfarin regimen- see above in Anticoagulation Summary.  2. Follow up in  1 week  3. Pt has agreed to only be called if INR out of range. They have been instructed to call if any changes in medications, doses, concerns, etc. Patient expresses understanding and has no further questions at this time.    Angelica Marroquin, [facility]

## 2024-05-04 NOTE — PROGRESS NOTES
Anticoagulation Clinic Progress Note    Anticoagulation Summary  As of 10/9/2023      INR goal:  2.5-3.5   TTR:  63.7% (4.8 y)   INR used for dosin.40 (10/8/2023)   Warfarin maintenance plan:  7.5 mg every Fri; 5 mg all other days   Weekly warfarin total:  37.5 mg   Plan last modified:  Angelica Marroquin RPH (2023)   Next INR check:  10/16/2023   Priority:  High   Target end date:  Indefinite    Indications    Paroxysmal atrial fibrillation [I48.0]  Hx of mitral valve replacement with mechanical valve [Z95.2] [Z95.2]                 Anticoagulation Episode Summary       INR check location:  Home Draw    Preferred lab:      Send INR reminders to:  DYANA MENDEZ  POOL    Comments:   home juliet          Anticoagulation Care Providers       Provider Role Specialty Phone number    ByrneMariaaLeylaCOREY Paul Referring Cardiology [phone]            Clinic Interview:  Patient Findings     Positives:  Change in alcohol use    Negatives:  Signs/symptoms of thrombosis, Signs/symptoms of bleeding,   Laboratory test error suspected, Change in health, Change in activity,   Upcoming invasive procedure, Emergency department visit, Upcoming dental   procedure, Missed doses, Extra doses, Change in medications, Change in   diet/appetite, Hospital admission, Bruising, Other complaints    Comments:  Reports being on vacation and consuming 2-3 beers in the   evening instead of his usual 1 beer.        Clinical Outcomes     Negatives:  Major bleeding event, Thromboembolic event,   Anticoagulation-related hospital admission, Anticoagulation-related ED   visit, Anticoagulation-related fatality    Comments:  Reports being on vacation and consuming 2-3 beers in the   evening instead of his usual 1 beer.          INR History:      2023     9:51 AM 2023    12:00 AM 2023     8:30 AM 2023    12:00 AM 2023     9:05 AM 10/8/2023    12:00 AM 10/9/2023     8:21 AM   Anticoagulation Monitoring   INR  2.10  2.70  2.50  4.40   INR Date 9/9/2023  9/17/2023  9/24/2023  10/8/2023   INR Goal 2.5-3.5  2.5-3.5  2.5-3.5  2.5-3.5   Trend Same  Same  Same  Same   Last Week Total 40 mg  37.5 mg  37.5 mg  37.5 mg   Next Week Total 37.5 mg  37.5 mg  37.5 mg  30 mg   Sun -  -  5 mg  5 mg   Mon 5 mg  5 mg  5 mg  Hold (10/9)   Tue 5 mg  5 mg  5 mg  5 mg   Wed 5 mg  5 mg  5 mg  5 mg   Thu 5 mg  5 mg  5 mg  5 mg   Fri 7.5 mg  7.5 mg  7.5 mg  5 mg (10/13)   Sat 5 mg  5 mg  5 mg  5 mg   Historical INR  2.70      2.50      4.40            This result is from an external source.       Plan:  1. INR is Supratherapeutic today- see above in Anticoagulation Summary.   Will instruct Elliot Sebastian to Change their warfarin regimen- see above in Anticoagulation Summary.  Hold dose for today, then decrease to 5 mg daily while on vacation and alcohol intake is higher.   2. Follow up in 1 weeks  3. They have been instructed to call if any changes in medications, doses, concerns, etc. Patient expresses understanding and has no further questions at this time.    Bushra Torre, PharmD   0

## 2024-05-09 LAB — INR PPP: 3

## 2024-05-10 ENCOUNTER — ANTICOAGULATION VISIT (OUTPATIENT)
Dept: PHARMACY | Facility: HOSPITAL | Age: 64
End: 2024-05-10
Payer: COMMERCIAL

## 2024-05-10 DIAGNOSIS — I48.0 PAROXYSMAL ATRIAL FIBRILLATION: Primary | ICD-10-CM

## 2024-05-10 DIAGNOSIS — Z95.2 HX OF MITRAL VALVE REPLACEMENT WITH MECHANICAL VALVE: ICD-10-CM

## 2024-05-10 RX ORDER — ATENOLOL 25 MG/1
25 TABLET ORAL DAILY
Qty: 90 TABLET | Refills: 0 | Status: SHIPPED | OUTPATIENT
Start: 2024-05-10

## 2024-05-16 LAB — INR PPP: 3

## 2024-05-17 ENCOUNTER — ANTICOAGULATION VISIT (OUTPATIENT)
Dept: PHARMACY | Facility: HOSPITAL | Age: 64
End: 2024-05-17
Payer: COMMERCIAL

## 2024-05-17 DIAGNOSIS — I48.0 PAROXYSMAL ATRIAL FIBRILLATION: Primary | ICD-10-CM

## 2024-05-17 DIAGNOSIS — Z95.2 HX OF MITRAL VALVE REPLACEMENT WITH MECHANICAL VALVE: ICD-10-CM

## 2024-05-17 NOTE — PROGRESS NOTES
Anticoagulation Clinic Progress Note    Anticoagulation Summary  As of 5/17/2024      INR goal:  2.5-3.5   TTR:  66.9% (5.5 y)   INR used for dosing:  3.00 (5/16/2024)   Warfarin maintenance plan:  7.5 mg every Fri; 5 mg all other days   Weekly warfarin total:  37.5 mg   No change documented:  Angelica Marroquin, GURINDER   Plan last modified:  Michael Horner, PharmD (2/7/2024)   Next INR check:  5/23/2024   Priority:  High   Target end date:  Indefinite    Indications    Paroxysmal atrial fibrillation [I48.0]  Hx of mitral valve replacement with mechanical valve [Z95.2] [Z95.2]                 Anticoagulation Episode Summary       INR check location:  Home Draw    Preferred lab:      Send INR reminders to:  DYANA MENDEZ  POOL    Comments:   home juliet          Anticoagulation Care Providers       Provider Role Specialty Phone number    Leyla Byrne APRN Referring Cardiology [phone]            Clinic Interview:  No pertinent clinical findings have been reported.    INR History:      4/19/2024     8:00 AM 4/27/2024    12:00 AM 4/29/2024     8:09 AM 5/9/2024    12:00 AM 5/10/2024     8:53 AM 5/16/2024    12:00 AM 5/17/2024     8:57 AM   Anticoagulation Monitoring   INR 3.40  3.10  3.00  3.00   INR Date 4/19/2024 4/27/2024 5/9/2024 5/16/2024   INR Goal 2.5-3.5  2.5-3.5  2.5-3.5  2.5-3.5   Trend Same  Same  Same  Same   Last Week Total 37.5 mg  37.5 mg  37.5 mg  37.5 mg   Next Week Total 37.5 mg  37.5 mg  37.5 mg  37.5 mg   Sun 5 mg  -  5 mg  5 mg   Mon 5 mg  5 mg  5 mg  5 mg   Tue 5 mg  5 mg  5 mg  5 mg   Wed 5 mg  5 mg  5 mg  5 mg   Thu 5 mg  5 mg  5 mg  -   Fri 7.5 mg  7.5 mg  7.5 mg  7.5 mg   Sat 5 mg  -  5 mg  5 mg   Historical INR  3.10      3.00      3.00            This result is from an external source.       Plan:  1. INR is therapeutic today- see above in Anticoagulation Summary.    Elliot Sebastian to continue their warfarin regimen- see above in Anticoagulation Summary.  2. Follow up in  1 week  3. Pt has agreed to only be called if INR out of range. They have been instructed to call if any changes in medications, doses, concerns, etc. Patient expresses understanding and has no further questions at this time.    Angelica Marroquin, [facility]

## 2024-05-25 LAB — INR PPP: 4

## 2024-05-28 ENCOUNTER — ANTICOAGULATION VISIT (OUTPATIENT)
Dept: PHARMACY | Facility: HOSPITAL | Age: 64
End: 2024-05-28
Payer: COMMERCIAL

## 2024-05-28 ENCOUNTER — OFFICE VISIT (OUTPATIENT)
Dept: FAMILY MEDICINE CLINIC | Facility: CLINIC | Age: 64
End: 2024-05-28
Payer: COMMERCIAL

## 2024-05-28 VITALS
SYSTOLIC BLOOD PRESSURE: 132 MMHG | OXYGEN SATURATION: 97 % | HEART RATE: 60 BPM | BODY MASS INDEX: 38.37 KG/M2 | DIASTOLIC BLOOD PRESSURE: 80 MMHG | TEMPERATURE: 97.1 F | WEIGHT: 299 LBS | HEIGHT: 74 IN

## 2024-05-28 DIAGNOSIS — Z95.2 HX OF MITRAL VALVE REPLACEMENT WITH MECHANICAL VALVE: ICD-10-CM

## 2024-05-28 DIAGNOSIS — I48.0 PAROXYSMAL ATRIAL FIBRILLATION: Primary | ICD-10-CM

## 2024-05-28 DIAGNOSIS — I50.32 CHRONIC HEART FAILURE WITH PRESERVED EJECTION FRACTION (HFPEF): ICD-10-CM

## 2024-05-28 DIAGNOSIS — E55.9 VITAMIN D DEFICIENCY: ICD-10-CM

## 2024-05-28 DIAGNOSIS — E66.09 EXOGENOUS OBESITY: ICD-10-CM

## 2024-05-28 DIAGNOSIS — R73.02 GLUCOSE INTOLERANCE (IMPAIRED GLUCOSE TOLERANCE): ICD-10-CM

## 2024-05-28 DIAGNOSIS — R41.840 POOR CONCENTRATION: ICD-10-CM

## 2024-05-28 DIAGNOSIS — I48.0 PAROXYSMAL ATRIAL FIBRILLATION: ICD-10-CM

## 2024-05-28 DIAGNOSIS — I33.0 BACTERIAL ENDOCARDITIS, UNSPECIFIED CHRONICITY: ICD-10-CM

## 2024-05-28 DIAGNOSIS — I10 ESSENTIAL HYPERTENSION: ICD-10-CM

## 2024-05-28 DIAGNOSIS — D12.6 BENIGN TUBULAR ADENOMA OF LARGE INTESTINE: ICD-10-CM

## 2024-05-28 DIAGNOSIS — Z00.01 ENCOUNTER FOR WELL ADULT EXAM WITH ABNORMAL FINDINGS: Primary | ICD-10-CM

## 2024-05-28 DIAGNOSIS — K52.9 CHRONIC DIARRHEA: ICD-10-CM

## 2024-05-28 DIAGNOSIS — Z79.01 CHRONIC ANTICOAGULATION: ICD-10-CM

## 2024-05-28 DIAGNOSIS — C61 PROSTATE CANCER: ICD-10-CM

## 2024-05-28 DIAGNOSIS — E05.90 SUBCLINICAL HYPERTHYROIDISM: ICD-10-CM

## 2024-05-28 PROCEDURE — 99396 PREV VISIT EST AGE 40-64: CPT | Performed by: FAMILY MEDICINE

## 2024-05-28 PROCEDURE — G0249 PROVIDE INR TEST MATER/EQUIP: HCPCS

## 2024-05-28 RX ORDER — CHOLESTYRAMINE LIGHT 4 G/5.7G
4 POWDER, FOR SUSPENSION ORAL 2 TIMES DAILY
Qty: 180 PACKET | Refills: 1 | Status: SHIPPED | OUTPATIENT
Start: 2024-05-28

## 2024-05-28 NOTE — PROGRESS NOTES
Anticoagulation Clinic Progress Note    Anticoagulation Summary  As of 2024      INR goal:  2.5-3.5   TTR:  66.8% (5.5 y)   INR used for dosin.00 (2024)   Warfarin maintenance plan:  7.5 mg every Fri; 5 mg all other days   Weekly warfarin total:  37.5 mg   Plan last modified:  Michael Horner, PharmD (2024)   Next INR check:  2024   Priority:  High   Target end date:  Indefinite    Indications    Paroxysmal atrial fibrillation [I48.0]  Hx of mitral valve replacement with mechanical valve [Z95.2] [Z95.2]                 Anticoagulation Episode Summary       INR check location:  Home Draw    Preferred lab:      Send INR reminders to:  DYANA MENDEZ  POOL    Comments:   home juliet          Anticoagulation Care Providers       Provider Role Specialty Phone number    Leyla Byrne APRN Referring Cardiology [phone]            Clinic Interview:  Patient Findings     Negatives:  Signs/symptoms of thrombosis, Signs/symptoms of bleeding,   Laboratory test error suspected, Change in health, Change in alcohol use,   Change in activity, Upcoming invasive procedure, Emergency department   visit, Upcoming dental procedure, Missed doses, Extra doses, Change in   medications, Change in diet/appetite, Hospital admission, Bruising, Other   complaints      Clinical Outcomes     Negatives:  Major bleeding event, Thromboembolic event,   Anticoagulation-related hospital admission, Anticoagulation-related ED   visit, Anticoagulation-related fatality        INR History:      2024     8:09 AM 2024    12:00 AM 5/10/2024     8:53 AM 2024    12:00 AM 2024     8:57 AM 2024    12:00 AM 2024     8:30 AM   Anticoagulation Monitoring   INR 3.10  3.00  3.00  4.00   INR Date 2024   INR Goal 2.5-3.5  2.5-3.5  2.5-3.5  2.5-3.5   Trend Same  Same  Same  Same   Last Week Total 37.5 mg  37.5 mg  37.5 mg  37.5 mg   Next Week Total 37.5 mg  37.5 mg   37.5 mg  35 mg   Sun -  5 mg  5 mg  5 mg   Mon 5 mg  5 mg  5 mg  5 mg   Tue 5 mg  5 mg  5 mg  2.5 mg (5/28)   Wed 5 mg  5 mg  5 mg  5 mg   Thu 5 mg  5 mg  -  5 mg   Fri 7.5 mg  7.5 mg  7.5 mg  7.5 mg   Sat -  5 mg  5 mg  5 mg   Historical INR  3.00      3.00      4.00            This result is from an external source.       Plan:  1. INR is Supratherapeutic today- see above in Anticoagulation Summary.   Will instruct Elliot Sebastian to Change their warfarin regimen- see above in Anticoagulation Summary.  2. Follow up in 1 weeks  3. They have been instructed to call if any changes in medications, doses, concerns, etc. Patient expresses understanding and has no further questions at this time.    Bushra Torre, PharmD

## 2024-05-28 NOTE — PROGRESS NOTES
Subjective   Elliot Sebastian is a 64 y.o. male with   Chief Complaint   Patient presents with    Annual Exam   .    History of Present Illness   64-year-old white male here for routine complete physical exam.  Patient still is a  driving from [facility].  His base of operation is in [facility].  He still lives in [facility] and has lived there for more than 10 years.  He and his current wife have been  during that 10-year.  He is status post radiation treatments for prostate cancer.  He has not returned to the urologist but knows that he was intended to do so.  His last colonoscopy was in 2020.  A random colonic biopsy was found to be negative.  He however had a tubular adenoma in the transverse colon and a large tubular adenoma in the ileocecal region.  He was to be sent to a colorectal surgeon for their evaluation of possible resection.  He decided not to pursue this appointment and has not been back for repeat colonoscopy.  Fasting labs have not been acquired prior to this appointment.  He does have history of paroxysmal atrial fibrillation and is status post mitral valve replacement with a mechanical valve.  He has history of hypertension and congestive heart failure with preserved ejection fraction.  He is using warfarin with PT and INR is checked by current cardiology.  He is status post DVT and has history of subclinical hypothyroidism, vitamin D deficiency as well as glucose intolerance.  He has history of exogenous obesity.  He continues to have poor concentration and is told by his wife that she has to repeat things many times to him.  He has not been formally evaluated.  He is otherwise doing well and is without acute complaints.  The following portions of the patient's history were reviewed and updated as appropriate: allergies, current medications, past family history, past medical history, past social history, past surgical history and problem list.    Review of Systems   Cardiovascular:          Chronic congestive heart failure with preserved ejection fraction.  Paroxysmal atrial fibrillation, status post bacterial endocarditis with history of mitral valve replacement with a mechanical valve.  He has history of essential hypertension.   Gastrointestinal:  Positive for diarrhea (Chronic).   Endocrine:        Subclinical hypothyroidism, vitamin D deficiency, exogenous obesity, glucose intolerance   Genitourinary:         Status post radiation treatment for prostate cancer   Psychiatric/Behavioral:  Positive for decreased concentration.        Objective     Vitals:    05/28/24 1613   BP: 132/80   Pulse: 60   Temp: 97.1 °F (36.2 °C)   SpO2: 97%       Recent Results (from the past 672 hour(s))   Protime-INR    Collection Time: 05/09/24 12:00 AM    Specimen: Blood   Result Value Ref Range    INR 3.00    Protime-INR    Collection Time: 05/16/24 12:00 AM    Specimen: Blood   Result Value Ref Range    INR 3.00    Protime-INR    Collection Time: 05/25/24 12:00 AM    Specimen: Blood   Result Value Ref Range    INR 4.00        Physical Exam  Vitals and nursing note reviewed.   Constitutional:       General: He is not in acute distress.     Appearance: Normal appearance. He is well-developed and well-groomed. He is obese.      Comments: Exogenous obesity with a BMI of 38.4   HENT:      Head: Normocephalic and atraumatic.      Right Ear: Hearing, tympanic membrane, ear canal and external ear normal.      Left Ear: Hearing, tympanic membrane, ear canal and external ear normal.      Nose: Nose normal.      Mouth/Throat:      Lips: Pink.      Mouth: Mucous membranes are moist.      Dentition: Normal dentition.      Tongue: No lesions. Tongue does not deviate from midline.      Palate: No mass and lesions.      Pharynx: Oropharynx is clear. Uvula midline.   Eyes:      General: Lids are normal. No scleral icterus.     Extraocular Movements: Extraocular movements intact.      Conjunctiva/sclera: Conjunctivae normal.       Pupils: Pupils are equal, round, and reactive to light.      Funduscopic exam:     Right eye: No hemorrhage, exudate, AV nicking or papilledema.         Left eye: No hemorrhage, exudate, AV nicking or papilledema.   Neck:      Thyroid: No thyroid mass or thyromegaly.      Vascular: No carotid bruit or JVD.      Trachea: Trachea normal.   Cardiovascular:      Rate and Rhythm: Normal rate and regular rhythm.      Chest Wall: PMI is not displaced.      Pulses: Normal pulses.           Carotid pulses are 2+ on the right side and 2+ on the left side.       Radial pulses are 2+ on the right side and 2+ on the left side.      Heart sounds: Normal heart sounds, S1 normal and S2 normal. No murmur heard.     No friction rub. No gallop.      Comments: Mechanical valve closing auscultated with replaced mitral valve.  Pulmonary:      Effort: Pulmonary effort is normal.      Breath sounds: Normal breath sounds. No decreased breath sounds, wheezing, rhonchi or rales.   Abdominal:      General: Abdomen is flat. Bowel sounds are normal. There is no distension.      Palpations: Abdomen is soft. Abdomen is not rigid. There is no hepatomegaly, splenomegaly or mass.      Tenderness: There is no abdominal tenderness. There is no right CVA tenderness, left CVA tenderness, guarding or rebound.      Hernia: No hernia is present.   Musculoskeletal:         General: No tenderness or deformity. Normal range of motion.      Cervical back: Normal range of motion and neck supple. No muscular tenderness. Normal range of motion.   Lymphadenopathy:      Cervical: No cervical adenopathy.   Skin:     General: Skin is warm and dry.      Findings: No rash.   Neurological:      Mental Status: He is alert and oriented to person, place, and time.      Cranial Nerves: No cranial nerve deficit.      Sensory: Sensation is intact. No sensory deficit.      Motor: Motor function is intact. No tremor or abnormal muscle tone.      Coordination: Coordination is  intact. Coordination normal.      Gait: Gait is intact. Gait normal.      Deep Tendon Reflexes: Reflexes are normal and symmetric. Reflexes normal.      Reflex Scores:       Bicep reflexes are 2+ on the right side and 2+ on the left side.       Brachioradialis reflexes are 2+ on the right side and 2+ on the left side.       Patellar reflexes are 2+ on the right side and 2+ on the left side.  Psychiatric:         Attention and Perception: Attention and perception normal.         Mood and Affect: Mood and affect normal.         Speech: Speech normal.         Behavior: Behavior normal. Behavior is cooperative.         Thought Content: Thought content normal.         Cognition and Memory: Cognition and memory normal.         Judgment: Judgment normal.         Assessment & Plan   Diagnoses and all orders for this visit:    1. Encounter for well adult exam with abnormal findings (Primary)  -     Comprehensive metabolic panel; Future  -     PSA DIAGNOSTIC ONLY; Future  -     Vitamin D 25 hydroxy; Future  -     TSH; Future  -     Lipid panel; Future  -     Hemoglobin A1c; Future  -     CBC w AUTO Differential; Future    2. Bacterial endocarditis, unspecified chronicity  -     Comprehensive metabolic panel; Future  -     PSA DIAGNOSTIC ONLY; Future  -     Vitamin D 25 hydroxy; Future  -     TSH; Future  -     Lipid panel; Future  -     Hemoglobin A1c; Future  -     CBC w AUTO Differential; Future    3. Chronic heart failure with preserved ejection fraction (HFpEF)  -     Comprehensive metabolic panel; Future  -     PSA DIAGNOSTIC ONLY; Future  -     Vitamin D 25 hydroxy; Future  -     TSH; Future  -     Lipid panel; Future  -     Hemoglobin A1c; Future  -     CBC w AUTO Differential; Future    4. Essential hypertension  -     Comprehensive metabolic panel; Future  -     PSA DIAGNOSTIC ONLY; Future  -     Vitamin D 25 hydroxy; Future  -     TSH; Future  -     Lipid panel; Future  -     Hemoglobin A1c; Future  -     CBC w AUTO  Differential; Future    5. Hx of mitral valve replacement with mechanical valve [Z95.2]  -     Comprehensive metabolic panel; Future  -     PSA DIAGNOSTIC ONLY; Future  -     Vitamin D 25 hydroxy; Future  -     TSH; Future  -     Lipid panel; Future  -     Hemoglobin A1c; Future  -     CBC w AUTO Differential; Future    6. Paroxysmal atrial fibrillation  -     Comprehensive metabolic panel; Future  -     PSA DIAGNOSTIC ONLY; Future  -     Vitamin D 25 hydroxy; Future  -     TSH; Future  -     Lipid panel; Future  -     Hemoglobin A1c; Future  -     CBC w AUTO Differential; Future    7. Chronic anticoagulation  -     Comprehensive metabolic panel; Future  -     PSA DIAGNOSTIC ONLY; Future  -     Vitamin D 25 hydroxy; Future  -     TSH; Future  -     Lipid panel; Future  -     Hemoglobin A1c; Future  -     CBC w AUTO Differential; Future    8. Exogenous obesity  -     Comprehensive metabolic panel; Future  -     PSA DIAGNOSTIC ONLY; Future  -     Vitamin D 25 hydroxy; Future  -     TSH; Future  -     Lipid panel; Future  -     Hemoglobin A1c; Future  -     CBC w AUTO Differential; Future    9. Glucose intolerance (impaired glucose tolerance)  -     Comprehensive metabolic panel; Future  -     PSA DIAGNOSTIC ONLY; Future  -     Vitamin D 25 hydroxy; Future  -     TSH; Future  -     Lipid panel; Future  -     Hemoglobin A1c; Future  -     CBC w AUTO Differential; Future    10. Subclinical hyperthyroidism  -     Comprehensive metabolic panel; Future  -     PSA DIAGNOSTIC ONLY; Future  -     Vitamin D 25 hydroxy; Future  -     TSH; Future  -     Lipid panel; Future  -     Hemoglobin A1c; Future  -     CBC w AUTO Differential; Future    11. Vitamin D deficiency  -     Comprehensive metabolic panel; Future  -     PSA DIAGNOSTIC ONLY; Future  -     Vitamin D 25 hydroxy; Future  -     TSH; Future  -     Lipid panel; Future  -     Hemoglobin A1c; Future  -     CBC w AUTO Differential; Future    12. Chronic diarrhea  -      cholestyramine light (Prevalite) 4 g packet; Take 1 packet by mouth 2 (Two) Times a Day.  Dispense: 180 packet; Refill: 1  -     Comprehensive metabolic panel; Future  -     PSA DIAGNOSTIC ONLY; Future  -     Vitamin D 25 hydroxy; Future  -     TSH; Future  -     Lipid panel; Future  -     Hemoglobin A1c; Future  -     CBC w AUTO Differential; Future    13. Poor concentration  -     Comprehensive metabolic panel; Future  -     PSA DIAGNOSTIC ONLY; Future  -     Vitamin D 25 hydroxy; Future  -     TSH; Future  -     Lipid panel; Future  -     Hemoglobin A1c; Future  -     CBC w AUTO Differential; Future    14. Prostate cancer  -     Comprehensive metabolic panel; Future  -     PSA DIAGNOSTIC ONLY; Future  -     Vitamin D 25 hydroxy; Future  -     TSH; Future  -     Lipid panel; Future  -     Hemoglobin A1c; Future  -     CBC w AUTO Differential; Future    15. Benign tubular adenoma of large intestine  -     Comprehensive metabolic panel; Future  -     PSA DIAGNOSTIC ONLY; Future  -     Vitamin D 25 hydroxy; Future  -     TSH; Future  -     Lipid panel; Future  -     Hemoglobin A1c; Future  -     CBC w AUTO Differential; Future  -     Ambulatory Referral to Gastroenterology    No record of urologic evaluation and/or radiation treatment in this chart.  We will await the PSA and will possibly need a new urologic referral.  Patient has been referred to GI given the large adenoma found in the ileocecal valve and his failure to follow-up with colorectal surgery.  At minimum a repeat colonoscopy is likely warranted.  He will continue to follow-up with cardiology as scheduled.  Laboratory studies have been ordered in such a way that he can have these on a weekend at [facility].  Have also discussed the possibility of neuropsychiatric testing in regards to his poor concentration and memory.  Follow-up based on the above.  Patient has been advised to wear seatbelts while driving and a bicycle helmet while  riding a bicycle.    Return in about 6 months (around 11/28/2024), or if symptoms worsen or fail to improve, for Recheck.

## 2024-06-06 ENCOUNTER — ANTICOAGULATION VISIT (OUTPATIENT)
Dept: PHARMACY | Facility: HOSPITAL | Age: 64
End: 2024-06-06
Payer: COMMERCIAL

## 2024-06-06 DIAGNOSIS — I48.0 PAROXYSMAL ATRIAL FIBRILLATION: Primary | ICD-10-CM

## 2024-06-06 DIAGNOSIS — Z95.2 HX OF MITRAL VALVE REPLACEMENT WITH MECHANICAL VALVE: ICD-10-CM

## 2024-06-06 LAB — INR PPP: 2.5

## 2024-06-06 NOTE — PROGRESS NOTES
Anticoagulation Clinic Progress Note    Anticoagulation Summary  As of 2024      INR goal:  2.5-3.5   TTR:  66.8% (5.5 y)   INR used for dosin.50 (2024)   Warfarin maintenance plan:  7.5 mg every Fri; 5 mg all other days   Weekly warfarin total:  37.5 mg   No change documented:  Bushra Torre, PharmD   Plan last modified:  Michael Horner, PharmD (2024)   Next INR check:  2024   Priority:  High   Target end date:  Indefinite    Indications    Paroxysmal atrial fibrillation [I48.0]  Hx of mitral valve replacement with mechanical valve [Z95.2] [Z95.2]                 Anticoagulation Episode Summary       INR check location:  Home Draw    Preferred lab:      Send INR reminders to:   CHARLES MENDEZ  POOL    Comments:   home juliet          Anticoagulation Care Providers       Provider Role Specialty Phone number    Leyla Byrne APRN Referring Cardiology [phone]            Clinic Interview:  Patient Findings     Positives:  Missed doses    Negatives:  Signs/symptoms of thrombosis, Signs/symptoms of bleeding,   Laboratory test error suspected, Change in health, Change in alcohol use,   Change in activity, Upcoming invasive procedure, Emergency department   visit, Upcoming dental procedure, Extra doses, Change in medications,   Change in diet/appetite, Hospital admission, Bruising, Other complaints    Comments:  Missed dose on .       Clinical Outcomes     Negatives:  Major bleeding event, Thromboembolic event,   Anticoagulation-related hospital admission, Anticoagulation-related ED   visit, Anticoagulation-related fatality    Comments:  Missed dose on .         INR History:      5/10/2024     8:53 AM 2024    12:00 AM 2024     8:57 AM 2024    12:00 AM 2024     8:30 AM 2024    12:00 AM 2024    11:24 AM   Anticoagulation Monitoring   INR 3.00  3.00  4.00  2.50   INR Date 2024   INR Goal 2.5-3.5  2.5-3.5  2.5-3.5   2.5-3.5   Trend Same  Same  Same  Same   Last Week Total 37.5 mg  37.5 mg  37.5 mg  32.5 mg   Next Week Total 37.5 mg  37.5 mg  35 mg  37.5 mg   Sun 5 mg  5 mg  5 mg  5 mg   Mon 5 mg  5 mg  5 mg  5 mg   Tue 5 mg  5 mg  2.5 mg (5/28)  5 mg   Wed 5 mg  5 mg  5 mg  5 mg   Thu 5 mg  -  5 mg  5 mg   Fri 7.5 mg  7.5 mg  7.5 mg  7.5 mg   Sat 5 mg  5 mg  5 mg  5 mg   Historical INR  3.00      4.00      2.50        Visit Report     Report         This result is from an external source.       Plan:  1. INR is Therapeutic today- see above in Anticoagulation Summary.   Will instruct Elliot Sebastian to Continue their warfarin regimen- see above in Anticoagulation Summary.  2. Follow up in 1 weeks  3. They have been instructed to call if any changes in medications, doses, concerns, etc. Patient expresses understanding and has no further questions at this time.    Bushra Torre, PharmD

## 2024-06-13 LAB — INR PPP: 3

## 2024-06-14 ENCOUNTER — ANTICOAGULATION VISIT (OUTPATIENT)
Dept: PHARMACY | Facility: HOSPITAL | Age: 64
End: 2024-06-14
Payer: COMMERCIAL

## 2024-06-14 DIAGNOSIS — Z95.2 HX OF MITRAL VALVE REPLACEMENT WITH MECHANICAL VALVE: ICD-10-CM

## 2024-06-14 DIAGNOSIS — I48.0 PAROXYSMAL ATRIAL FIBRILLATION: Primary | ICD-10-CM

## 2024-06-14 NOTE — PROGRESS NOTES
Anticoagulation Clinic Progress Note    Anticoagulation Summary  As of 6/14/2024      INR goal:  2.5-3.5   TTR:  66.9% (5.5 y)   INR used for dosing:  3.00 (6/13/2024)   Warfarin maintenance plan:  7.5 mg every Fri; 5 mg all other days   Weekly warfarin total:  37.5 mg   No change documented:  Angelica Marroquin, GURINDER   Plan last modified:  Michael Horner, PharmD (2/7/2024)   Next INR check:  6/20/2024   Priority:  High   Target end date:  Indefinite    Indications    Paroxysmal atrial fibrillation [I48.0]  Hx of mitral valve replacement with mechanical valve [Z95.2] [Z95.2]                 Anticoagulation Episode Summary       INR check location:  Home Draw    Preferred lab:      Send INR reminders to:  DYANA MENDEZ  POOL    Comments:   home juliet          Anticoagulation Care Providers       Provider Role Specialty Phone number    Leyla Byrne APRN Referring Cardiology [phone]            Clinic Interview:  No pertinent clinical findings have been reported.    INR History:      5/17/2024     8:57 AM 5/25/2024    12:00 AM 5/28/2024     8:30 AM 6/6/2024    12:00 AM 6/6/2024    11:24 AM 6/13/2024    12:00 AM 6/14/2024     8:51 AM   Anticoagulation Monitoring   INR 3.00  4.00  2.50  3.00   INR Date 5/16/2024 5/25/2024 6/6/2024 6/13/2024   INR Goal 2.5-3.5  2.5-3.5  2.5-3.5  2.5-3.5   Trend Same  Same  Same  Same   Last Week Total 37.5 mg  37.5 mg  32.5 mg  37.5 mg   Next Week Total 37.5 mg  35 mg  37.5 mg  37.5 mg   Sun 5 mg  5 mg  5 mg  5 mg   Mon 5 mg  5 mg  5 mg  5 mg   Tue 5 mg  2.5 mg (5/28)  5 mg  5 mg   Wed 5 mg  5 mg  5 mg  5 mg   Thu -  5 mg  5 mg  -   Fri 7.5 mg  7.5 mg  7.5 mg  7.5 mg   Sat 5 mg  5 mg  5 mg  5 mg   Historical INR  4.00      2.50      3.00        Visit Report   Report           This result is from an external source.       Plan:  1. INR is therapeutic today- see above in Anticoagulation Summary.    Elliot Sebastian to continue their warfarin regimen- see above in  Anticoagulation Summary.  2. Follow up in 1 week  3. Pt has agreed to only be called if INR out of range. They have been instructed to call if any changes in medications, doses, concerns, etc. Patient expresses understanding and has no further questions at this time.    Angelica Marroquin, [facility]

## 2024-06-24 ENCOUNTER — ANTICOAGULATION VISIT (OUTPATIENT)
Dept: PHARMACY | Facility: HOSPITAL | Age: 64
End: 2024-06-24
Payer: COMMERCIAL

## 2024-06-24 DIAGNOSIS — I48.0 PAROXYSMAL ATRIAL FIBRILLATION: Primary | ICD-10-CM

## 2024-06-24 DIAGNOSIS — Z95.2 HX OF MITRAL VALVE REPLACEMENT WITH MECHANICAL VALVE: ICD-10-CM

## 2024-06-24 LAB — INR PPP: 3.1

## 2024-06-24 NOTE — PROGRESS NOTES
Anticoagulation Clinic Progress Note    Anticoagulation Summary  As of 6/24/2024      INR goal:  2.5-3.5   TTR:  67.1% (5.6 y)   INR used for dosing:  3.10 (6/24/2024)   Warfarin maintenance plan:  7.5 mg every Fri; 5 mg all other days   Weekly warfarin total:  37.5 mg   Plan last modified:  Michael Horner, PharmD (2/7/2024)   Next INR check:  7/1/2024   Priority:  High   Target end date:  Indefinite    Indications    Paroxysmal atrial fibrillation [I48.0]  Hx of mitral valve replacement with mechanical valve [Z95.2] [Z95.2]                 Anticoagulation Episode Summary       INR check location:  Home Draw    Preferred lab:      Send INR reminders to:  DYANA MENDEZ  POOL    Comments:   home juliet          Anticoagulation Care Providers       Provider Role Specialty Phone number    Leyla Byrne APRN Referring Cardiology [phone]            Clinic Interview:  No pertinent clinical findings have been reported.    INR History:      5/28/2024     8:30 AM 6/6/2024    12:00 AM 6/6/2024    11:24 AM 6/13/2024    12:00 AM 6/14/2024     8:51 AM 6/24/2024    12:00 AM 6/24/2024     1:13 PM   Anticoagulation Monitoring   INR 4.00  2.50  3.00  3.10   INR Date 5/25/2024 6/6/2024 6/13/2024 6/24/2024   INR Goal 2.5-3.5  2.5-3.5  2.5-3.5  2.5-3.5   Trend Same  Same  Same  Same   Last Week Total 37.5 mg  32.5 mg  37.5 mg  37.5 mg   Next Week Total 35 mg  37.5 mg  37.5 mg  37.5 mg   Sun 5 mg  5 mg  5 mg  5 mg   Mon 5 mg  5 mg  5 mg  5 mg   Tue 2.5 mg (5/28)  5 mg  5 mg  5 mg   Wed 5 mg  5 mg  5 mg  5 mg   Thu 5 mg  5 mg  -  5 mg   Fri 7.5 mg  7.5 mg  7.5 mg  7.5 mg   Sat 5 mg  5 mg  5 mg  5 mg   Historical INR  2.50      3.00      3.10        Visit Report Report             This result is from an external source.       Plan:  1. INR is therapeutic today- see above in Anticoagulation Summary.    Elliot Sebastian to continue their warfarin regimen- see above in Anticoagulation Summary.  2. Follow up in 1  week  3. Pt has agreed to only be called if INR out of range. They have been instructed to call if any changes in medications, doses, concerns, etc. Patient expresses understanding and has no further questions at this time.    Bushra Torre, PharmD

## 2024-07-02 LAB — INR PPP: 3

## 2024-07-03 ENCOUNTER — ANTICOAGULATION VISIT (OUTPATIENT)
Dept: PHARMACY | Facility: HOSPITAL | Age: 64
End: 2024-07-03
Payer: COMMERCIAL

## 2024-07-03 DIAGNOSIS — Z95.2 HX OF MITRAL VALVE REPLACEMENT WITH MECHANICAL VALVE: ICD-10-CM

## 2024-07-03 DIAGNOSIS — I48.0 PAROXYSMAL ATRIAL FIBRILLATION: Primary | ICD-10-CM

## 2024-07-03 PROCEDURE — G0249 PROVIDE INR TEST MATER/EQUIP: HCPCS

## 2024-07-03 NOTE — PROGRESS NOTES
Anticoagulation Clinic Progress Note    Anticoagulation Summary  As of 7/3/2024      INR goal:  2.5-3.5   TTR:  67.2% (5.6 y)   INR used for dosing:  3.00 (7/2/2024)   Warfarin maintenance plan:  7.5 mg every Fri; 5 mg all other days   Weekly warfarin total:  37.5 mg   No change documented:  Angelica Marroquin, GURINDER   Plan last modified:  Michael Horner, PharmD (2/7/2024)   Next INR check:  7/9/2024   Priority:  High   Target end date:  Indefinite    Indications    Paroxysmal atrial fibrillation [I48.0]  Hx of mitral valve replacement with mechanical valve [Z95.2] [Z95.2]                 Anticoagulation Episode Summary       INR check location:  Home Draw    Preferred lab:      Send INR reminders to:  DYANA MENDEZ  POOL    Comments:   home juliet          Anticoagulation Care Providers       Provider Role Specialty Phone number    Leyla Byrne APRN Referring Cardiology [phone]            Clinic Interview:  No pertinent clinical findings have been reported.    INR History:      6/6/2024    11:24 AM 6/13/2024    12:00 AM 6/14/2024     8:51 AM 6/24/2024    12:00 AM 6/24/2024     1:13 PM 7/2/2024    12:00 AM 7/3/2024     8:30 AM   Anticoagulation Monitoring   INR 2.50  3.00  3.10  3.00   INR Date 6/6/2024 6/13/2024 6/24/2024 7/2/2024   INR Goal 2.5-3.5  2.5-3.5  2.5-3.5  2.5-3.5   Trend Same  Same  Same  Same   Last Week Total 32.5 mg  37.5 mg  37.5 mg  37.5 mg   Next Week Total 37.5 mg  37.5 mg  37.5 mg  37.5 mg   Sun 5 mg  5 mg  5 mg  5 mg   Mon 5 mg  5 mg  5 mg  5 mg   Tue 5 mg  5 mg  5 mg  -   Wed 5 mg  5 mg  5 mg  5 mg   Thu 5 mg  -  5 mg  5 mg   Fri 7.5 mg  7.5 mg  7.5 mg  7.5 mg   Sat 5 mg  5 mg  5 mg  5 mg   Historical INR  3.00      3.10      3.00            This result is from an external source.       Plan:  1. INR is therapeutic today- see above in Anticoagulation Summary.    Elliot Sebastian to continue their warfarin regimen- see above in Anticoagulation Summary.  2. Follow up in 1  week  3. Pt has agreed to only be called if INR out of range. They have been instructed to call if any changes in medications, doses, concerns, etc. Patient expresses understanding and has no further questions at this time.    Angelica Marroquin [facility]

## 2024-07-15 ENCOUNTER — ANTICOAGULATION VISIT (OUTPATIENT)
Dept: PHARMACY | Facility: HOSPITAL | Age: 64
End: 2024-07-15
Payer: COMMERCIAL

## 2024-07-15 DIAGNOSIS — I48.0 PAROXYSMAL ATRIAL FIBRILLATION: Primary | ICD-10-CM

## 2024-07-15 DIAGNOSIS — Z95.2 HX OF MITRAL VALVE REPLACEMENT WITH MECHANICAL VALVE: ICD-10-CM

## 2024-07-15 LAB — INR PPP: 2.2

## 2024-07-15 NOTE — PROGRESS NOTES
Anticoagulation Clinic Progress Note    Anticoagulation Summary  As of 7/15/2024      INR goal:  2.5-3.5   TTR:  67.2% (5.6 y)   INR used for dosin.20 (7/15/2024)   Warfarin maintenance plan:  7.5 mg every Fri; 5 mg all other days   Weekly warfarin total:  37.5 mg   Plan last modified:  Michael Horner, PharmD (2024)   Next INR check:  2024   Priority:  High   Target end date:  Indefinite    Indications    Paroxysmal atrial fibrillation [I48.0]  Hx of mitral valve replacement with mechanical valve [Z95.2] [Z95.2]                 Anticoagulation Episode Summary       INR check location:  Home Draw    Preferred lab:      Send INR reminders to:  DYANA MENDEZ  POOL    Comments:   home juliet          Anticoagulation Care Providers       Provider Role Specialty Phone number    Leyla Byrne APRN Referring Cardiology [phone]            Clinic Interview:  Patient Findings     Positives:  Change in diet/appetite    Negatives:  Signs/symptoms of thrombosis, Signs/symptoms of bleeding,   Laboratory test error suspected, Change in health, Change in alcohol use,   Change in activity, Upcoming invasive procedure, Emergency department   visit, Upcoming dental procedure, Missed doses, Extra doses, Change in   medications, Hospital admission, Bruising, Other complaints      Clinical Outcomes     Negatives:  Major bleeding event, Thromboembolic event,   Anticoagulation-related hospital admission, Anticoagulation-related ED   visit, Anticoagulation-related fatality        INR History:      2024     8:51 AM 2024    12:00 AM 2024     1:13 PM 2024    12:00 AM 7/3/2024     8:30 AM 7/15/2024    12:00 AM 7/15/2024     2:57 PM   Anticoagulation Monitoring   INR 3.00  3.10  3.00  2.20   INR Date 2024  2024  2024  7/15/2024   INR Goal 2.5-3.5  2.5-3.5  2.5-3.5  2.5-3.5   Trend Same  Same  Same  Same   Last Week Total 37.5 mg  37.5 mg  37.5 mg  37.5 mg   Next Week Total 37.5  mg  37.5 mg  37.5 mg  40 mg   Sun 5 mg  5 mg  5 mg  5 mg   Mon 5 mg  5 mg  5 mg  7.5 mg (7/15)   Tue 5 mg  5 mg  -  5 mg   Wed 5 mg  5 mg  5 mg  5 mg   Thu -  5 mg  5 mg  5 mg   Fri 7.5 mg  7.5 mg  7.5 mg  7.5 mg   Sat 5 mg  5 mg  5 mg  5 mg   Historical INR  3.10      3.00      2.20            This result is from an external source.       Plan:  1. INR is Subtherapeutic today- see above in Anticoagulation Summary.   Will instruct Elliot Sebastian to Increase their warfarin regimen- see above in Anticoagulation Summary.  brussels sprouts last week. Boosted to 7.5 mg then resume, rck 1 week   2. Follow up in 1 weeks  3. They have been instructed to call if any changes in medications, doses, concerns, etc. Patient expresses understanding and has no further questions at this time.    Angelica Marroquin [facility]

## 2024-07-25 RX ORDER — LOSARTAN POTASSIUM 50 MG/1
100 TABLET ORAL DAILY
Qty: 180 TABLET | Refills: 0 | Status: SHIPPED | OUTPATIENT
Start: 2024-07-25

## 2024-07-30 ENCOUNTER — LAB (OUTPATIENT)
Dept: LAB | Facility: HOSPITAL | Age: 64
End: 2024-07-30
Payer: COMMERCIAL

## 2024-07-30 DIAGNOSIS — I50.32 CHRONIC HEART FAILURE WITH PRESERVED EJECTION FRACTION (HFPEF): ICD-10-CM

## 2024-07-30 DIAGNOSIS — Z95.2 HX OF MITRAL VALVE REPLACEMENT WITH MECHANICAL VALVE: ICD-10-CM

## 2024-07-30 DIAGNOSIS — K52.9 CHRONIC DIARRHEA: ICD-10-CM

## 2024-07-30 DIAGNOSIS — R73.02 GLUCOSE INTOLERANCE (IMPAIRED GLUCOSE TOLERANCE): ICD-10-CM

## 2024-07-30 DIAGNOSIS — E55.9 VITAMIN D DEFICIENCY: ICD-10-CM

## 2024-07-30 DIAGNOSIS — Z00.01 ENCOUNTER FOR WELL ADULT EXAM WITH ABNORMAL FINDINGS: ICD-10-CM

## 2024-07-30 DIAGNOSIS — I33.0 BACTERIAL ENDOCARDITIS, UNSPECIFIED CHRONICITY: ICD-10-CM

## 2024-07-30 DIAGNOSIS — D12.6 BENIGN TUBULAR ADENOMA OF LARGE INTESTINE: ICD-10-CM

## 2024-07-30 DIAGNOSIS — R41.840 POOR CONCENTRATION: ICD-10-CM

## 2024-07-30 DIAGNOSIS — E66.09 EXOGENOUS OBESITY: ICD-10-CM

## 2024-07-30 DIAGNOSIS — C61 PROSTATE CANCER: ICD-10-CM

## 2024-07-30 DIAGNOSIS — I48.0 PAROXYSMAL ATRIAL FIBRILLATION: ICD-10-CM

## 2024-07-30 DIAGNOSIS — E05.90 SUBCLINICAL HYPERTHYROIDISM: ICD-10-CM

## 2024-07-30 DIAGNOSIS — I10 ESSENTIAL HYPERTENSION: ICD-10-CM

## 2024-07-30 DIAGNOSIS — Z79.01 CHRONIC ANTICOAGULATION: ICD-10-CM

## 2024-07-30 LAB
25(OH)D3 SERPL-MCNC: 28.6 NG/ML (ref 30–100)
ALBUMIN SERPL-MCNC: 4.3 G/DL (ref 3.5–5.2)
ALBUMIN/GLOB SERPL: 1.8 G/DL
ALP SERPL-CCNC: 85 U/L (ref 39–117)
ALT SERPL W P-5'-P-CCNC: 22 U/L (ref 1–41)
ANION GAP SERPL CALCULATED.3IONS-SCNC: 12.5 MMOL/L (ref 5–15)
AST SERPL-CCNC: 28 U/L (ref 1–40)
BASOPHILS # BLD AUTO: 0.03 10*3/MM3 (ref 0–0.2)
BASOPHILS NFR BLD AUTO: 0.5 % (ref 0–1.5)
BILIRUB SERPL-MCNC: 0.7 MG/DL (ref 0–1.2)
BUN SERPL-MCNC: 10 MG/DL (ref 8–23)
BUN/CREAT SERPL: 9.9 (ref 7–25)
CALCIUM SPEC-SCNC: 9.8 MG/DL (ref 8.6–10.5)
CHLORIDE SERPL-SCNC: 108 MMOL/L (ref 98–107)
CHOLEST SERPL-MCNC: 180 MG/DL (ref 0–200)
CO2 SERPL-SCNC: 23.5 MMOL/L (ref 22–29)
CREAT SERPL-MCNC: 1.01 MG/DL (ref 0.76–1.27)
DEPRECATED RDW RBC AUTO: 46.6 FL (ref 37–54)
EGFRCR SERPLBLD CKD-EPI 2021: 83.6 ML/MIN/1.73
EOSINOPHIL # BLD AUTO: 0.14 10*3/MM3 (ref 0–0.4)
EOSINOPHIL NFR BLD AUTO: 2.2 % (ref 0.3–6.2)
ERYTHROCYTE [DISTWIDTH] IN BLOOD BY AUTOMATED COUNT: 13.2 % (ref 12.3–15.4)
GLOBULIN UR ELPH-MCNC: 2.4 GM/DL
GLUCOSE SERPL-MCNC: 107 MG/DL (ref 65–99)
HBA1C MFR BLD: 5.1 % (ref 4.8–5.6)
HCT VFR BLD AUTO: 46.3 % (ref 37.5–51)
HDLC SERPL-MCNC: 35 MG/DL (ref 40–60)
HGB BLD-MCNC: 15.5 G/DL (ref 13–17.7)
IMM GRANULOCYTES # BLD AUTO: 0.01 10*3/MM3 (ref 0–0.05)
IMM GRANULOCYTES NFR BLD AUTO: 0.2 % (ref 0–0.5)
LDLC SERPL CALC-MCNC: 80 MG/DL (ref 0–100)
LDLC/HDLC SERPL: 1.85 {RATIO}
LYMPHOCYTES # BLD AUTO: 2.56 10*3/MM3 (ref 0.7–3.1)
LYMPHOCYTES NFR BLD AUTO: 40.8 % (ref 19.6–45.3)
MCH RBC QN AUTO: 32 PG (ref 26.6–33)
MCHC RBC AUTO-ENTMCNC: 33.5 G/DL (ref 31.5–35.7)
MCV RBC AUTO: 95.7 FL (ref 79–97)
MONOCYTES # BLD AUTO: 0.7 10*3/MM3 (ref 0.1–0.9)
MONOCYTES NFR BLD AUTO: 11.2 % (ref 5–12)
NEUTROPHILS NFR BLD AUTO: 2.83 10*3/MM3 (ref 1.7–7)
NEUTROPHILS NFR BLD AUTO: 45.1 % (ref 42.7–76)
NRBC BLD AUTO-RTO: 0 /100 WBC (ref 0–0.2)
PLATELET # BLD AUTO: 194 10*3/MM3 (ref 140–450)
PMV BLD AUTO: 9.2 FL (ref 6–12)
POTASSIUM SERPL-SCNC: 3.9 MMOL/L (ref 3.5–5.2)
PROT SERPL-MCNC: 6.7 G/DL (ref 6–8.5)
PSA SERPL-MCNC: 0.96 NG/ML (ref 0–4)
RBC # BLD AUTO: 4.84 10*6/MM3 (ref 4.14–5.8)
SODIUM SERPL-SCNC: 144 MMOL/L (ref 136–145)
TRIGL SERPL-MCNC: 402 MG/DL (ref 0–150)
TSH SERPL DL<=0.05 MIU/L-ACNC: 0.91 UIU/ML (ref 0.27–4.2)
VLDLC SERPL-MCNC: 65 MG/DL (ref 5–40)
WBC NRBC COR # BLD AUTO: 6.27 10*3/MM3 (ref 3.4–10.8)

## 2024-07-30 PROCEDURE — 80061 LIPID PANEL: CPT

## 2024-07-30 PROCEDURE — 84153 ASSAY OF PSA TOTAL: CPT

## 2024-07-30 PROCEDURE — 80050 GENERAL HEALTH PANEL: CPT

## 2024-07-30 PROCEDURE — 36415 COLL VENOUS BLD VENIPUNCTURE: CPT

## 2024-07-30 PROCEDURE — 82306 VITAMIN D 25 HYDROXY: CPT

## 2024-07-30 PROCEDURE — 83036 HEMOGLOBIN GLYCOSYLATED A1C: CPT

## 2024-07-31 ENCOUNTER — ANTICOAGULATION VISIT (OUTPATIENT)
Dept: PHARMACY | Facility: HOSPITAL | Age: 64
End: 2024-07-31
Payer: COMMERCIAL

## 2024-07-31 DIAGNOSIS — Z95.2 HX OF MITRAL VALVE REPLACEMENT WITH MECHANICAL VALVE: ICD-10-CM

## 2024-07-31 DIAGNOSIS — E78.1 HYPERTRIGLYCERIDEMIA: Primary | ICD-10-CM

## 2024-07-31 DIAGNOSIS — I48.0 PAROXYSMAL ATRIAL FIBRILLATION: Primary | ICD-10-CM

## 2024-07-31 LAB — INR PPP: 3

## 2024-07-31 RX ORDER — CHLORAL HYDRATE 500 MG
2000 CAPSULE ORAL
COMMUNITY
Start: 2024-07-31

## 2024-07-31 NOTE — PROGRESS NOTES
Anticoagulation Clinic Progress Note    Anticoagulation Summary  As of 7/31/2024      INR goal:  2.5-3.5   TTR:  67.1% (5.7 y)   INR used for dosing:  3.00 (7/31/2024)   Warfarin maintenance plan:  7.5 mg every Fri; 5 mg all other days   Weekly warfarin total:  37.5 mg   No change documented:  Angelica Marroquin, GURINDER   Plan last modified:  Michael Horner, PharmD (2/7/2024)   Next INR check:  8/7/2024   Priority:  High   Target end date:  Indefinite    Indications    Paroxysmal atrial fibrillation [I48.0]  Hx of mitral valve replacement with mechanical valve [Z95.2] [Z95.2]                 Anticoagulation Episode Summary       INR check location:  Home Draw    Preferred lab:      Send INR reminders to:  DYANA MENDEZ  POOL    Comments:   home [place]          Anticoagulation Care Providers       Provider Role Specialty Phone number    Leyla Byrne APRN Referring Cardiology [phone]            Clinic Interview:  No pertinent clinical findings have been reported.    INR History:      6/24/2024     1:13 PM 7/2/2024    12:00 AM 7/3/2024     8:30 AM 7/15/2024    12:00 AM 7/15/2024     2:57 PM 7/31/2024    12:00 AM 7/31/2024     1:32 PM   Anticoagulation Monitoring   INR 3.10  3.00  2.20  3.00   INR Date 6/24/2024  7/2/2024  7/15/2024  7/31/2024   INR Goal 2.5-3.5  2.5-3.5  2.5-3.5  2.5-3.5   Trend Same  Same  Same  Same   Last Week Total 37.5 mg  37.5 mg  37.5 mg  37.5 mg   Next Week Total 37.5 mg  37.5 mg  40 mg  37.5 mg   Sun 5 mg  5 mg  5 mg  5 mg   Mon 5 mg  5 mg  7.5 mg (7/15)  5 mg   Tue 5 mg  -  5 mg  5 mg   Wed 5 mg  5 mg  5 mg  5 mg   Thu 5 mg  5 mg  5 mg  5 mg   Fri 7.5 mg  7.5 mg  7.5 mg  7.5 mg   Sat 5 mg  5 mg  5 mg  5 mg   Historical INR  3.00      2.20      3.00            This result is from an external source.       Plan:  1. INR is therapeutic today- see above in Anticoagulation Summary.    Elliot Sebastian to continue their warfarin regimen- see above in Anticoagulation  Summary.    Would like to get a tattoo from his daughter. He called [facility] who recommended that he reach out to the  but they would recommend avoiding holding warfarin. Patient reports his daughter did not know if she could give him a tattoo while on warfarin. I recommended she reach out to colleagues to see if they had given a tattoo to someone on warfarin. Patient wanted to hold his warfarin for 1 week and bridge with lovenox but discussed this would defeat the purpose as patient would still be anticoagulated on lovenox. Patient asked if he could just bridge after the tattoo but discussed he is at a high risk of a clot and stroke if off warfarin without lovenox. Continue and rck in 1 week   2. Follow up in 1 week  3. Pt has agreed to only be called if INR out of range. They have been instructed to call if any changes in medications, doses, concerns, etc. Patient expresses understanding and has no further questions at this time.    Angelica Marroquin, [facility]

## 2024-08-09 RX ORDER — ATENOLOL 25 MG/1
25 TABLET ORAL DAILY
Qty: 90 TABLET | Refills: 0 | Status: SHIPPED | OUTPATIENT
Start: 2024-08-09

## 2024-08-15 ENCOUNTER — TELEPHONE (OUTPATIENT)
Dept: PHARMACY | Facility: HOSPITAL | Age: 64
End: 2024-08-15
Payer: COMMERCIAL

## 2024-08-15 ENCOUNTER — ANTICOAGULATION VISIT (OUTPATIENT)
Dept: PHARMACY | Facility: HOSPITAL | Age: 64
End: 2024-08-15
Payer: COMMERCIAL

## 2024-08-15 DIAGNOSIS — I48.0 PAROXYSMAL ATRIAL FIBRILLATION: Primary | ICD-10-CM

## 2024-08-15 DIAGNOSIS — Z95.2 HX OF MITRAL VALVE REPLACEMENT WITH MECHANICAL VALVE: ICD-10-CM

## 2024-08-15 LAB — INR PPP: 2.4

## 2024-08-15 NOTE — PROGRESS NOTES
Anticoagulation Clinic Progress Note    Anticoagulation Summary  As of 8/15/2024      INR goal:  2.5-3.5   TTR:  67.3% (5.7 y)   INR used for dosin.40 (8/15/2024)   Warfarin maintenance plan:  7.5 mg every Fri; 5 mg all other days   Weekly warfarin total:  37.5 mg   Plan last modified:  Michael Horner, PharmD (2024)   Next INR check:  2024   Priority:  High   Target end date:  Indefinite    Indications    Paroxysmal atrial fibrillation [I48.0]  Hx of mitral valve replacement with mechanical valve [Z95.2] [Z95.2]                 Anticoagulation Episode Summary       INR check location:  Home Draw    Preferred lab:      Send INR reminders to:  DYANA MENDEZ  POOL    Comments:   home jul[place]          Anticoagulation Care Providers       Provider Role Specialty Phone number    Leyla Byrne APRN Referring Cardiology [phone]            Clinic Interview:  Patient Findings     Negatives:  Signs/symptoms of thrombosis, Signs/symptoms of bleeding,   Laboratory test error suspected, Change in health, Change in alcohol use,   Change in activity, Upcoming invasive procedure, Emergency department   visit, Upcoming dental procedure, Missed doses, Extra doses, Change in   medications, Change in diet/appetite, Hospital admission, Bruising, Other   complaints      Clinical Outcomes     Negatives:  Major bleeding event, Thromboembolic event,   Anticoagulation-related hospital admission, Anticoagulation-related ED   visit, Anticoagulation-related fatality        INR History:      7/3/2024     8:30 AM 7/15/2024    12:00 AM 7/15/2024     2:57 PM 2024    12:00 AM 2024     1:32 PM 8/15/2024    12:00 AM 8/15/2024     1:10 PM   Anticoagulation Monitoring   INR 3.00  2.20  3.00  2.40   INR Date 2024  7/15/2024  2024  8/15/2024   INR Goal 2.5-3.5  2.5-3.5  2.5-3.5  2.5-3.5   Trend Same  Same  Same  Same   Last Week Total 37.5 mg  37.5 mg  37.5 mg  37.5 mg   Next Week Total 37.5 mg  40 mg   37.5 mg  37.5 mg   Sun 5 mg  5 mg  5 mg  5 mg   Mon 5 mg  7.5 mg (7/15)  5 mg  5 mg   Tue -  5 mg  5 mg  5 mg   Wed 5 mg  5 mg  5 mg  5 mg   Thu 5 mg  5 mg  5 mg  5 mg   Fri 7.5 mg  7.5 mg  7.5 mg  7.5 mg   Sat 5 mg  5 mg  5 mg  5 mg   Historical INR  2.20      3.00      2.40            This result is from an external source.       Plan:  1. INR is Therapeutic today- see above in Anticoagulation Summary.   Will instruct Elliot Sebastian to Continue their warfarin regimen- see above in Anticoagulation Summary.  2. Follow up in 1 weeks  3. They have been instructed to call if any changes in medications, doses, concerns, etc. Patient expresses understanding and has no further questions at this time.    Bushra Torre, PharmD

## 2024-08-26 RX ORDER — WARFARIN SODIUM 5 MG/1
TABLET ORAL
Qty: 100 TABLET | Refills: 0 | Status: SHIPPED | OUTPATIENT
Start: 2024-08-26

## 2024-08-31 LAB — INR PPP: 2.7

## 2024-09-03 ENCOUNTER — ANTICOAGULATION VISIT (OUTPATIENT)
Dept: PHARMACY | Facility: HOSPITAL | Age: 64
End: 2024-09-03
Payer: COMMERCIAL

## 2024-09-03 DIAGNOSIS — I48.0 PAROXYSMAL ATRIAL FIBRILLATION: Primary | ICD-10-CM

## 2024-09-03 DIAGNOSIS — Z95.2 HX OF MITRAL VALVE REPLACEMENT WITH MECHANICAL VALVE: ICD-10-CM

## 2024-09-03 PROCEDURE — G0249 PROVIDE INR TEST MATER/EQUIP: HCPCS

## 2024-09-03 NOTE — PROGRESS NOTES
Anticoagulation Clinic Progress Note    Anticoagulation Summary  As of 9/3/2024      INR goal:  2.5-3.5   TTR:  67.3% (5.7 y)   INR used for dosin.70 (2024)   Warfarin maintenance plan:  7.5 mg every Fri; 5 mg all other days   Weekly warfarin total:  37.5 mg   No change documented:  Michael Horner, Pablo   Plan last modified:  Michael Horner, PharmD (2024)   Next INR check:  2024   Priority:  High   Target end date:  Indefinite    Indications    Paroxysmal atrial fibrillation [I48.0]  Hx of mitral valve replacement with mechanical valve [Z95.2] [Z95.2]                 Anticoagulation Episode Summary       INR check location:  Home Draw    Preferred lab:      Send INR reminders to:  DYANA MENDEZ  POOL    Comments:   home [place]          Anticoagulation Care Providers       Provider Role Specialty Phone number    Leyla Byrne APRN Referring Cardiology [phone]            Clinic Interview:  No pertinent clinical findings have been reported.    INR History:      7/15/2024     2:57 PM 2024    12:00 AM 2024     1:32 PM 8/15/2024    12:00 AM 8/15/2024     1:10 PM 2024    12:00 AM 9/3/2024    10:00 AM   Anticoagulation Monitoring   INR 2.20  3.00  2.40  2.70   INR Date 7/15/2024  2024  8/15/2024  2024   INR Goal 2.5-3.5  2.5-3.5  2.5-3.5  2.5-3.5   Trend Same  Same  Same  Same   Last Week Total 37.5 mg  37.5 mg  37.5 mg  37.5 mg   Next Week Total 40 mg  37.5 mg  37.5 mg  37.5 mg   Sun 5 mg  5 mg  5 mg  -   Mon 7.5 mg (7/15)  5 mg  5 mg  -   Tue 5 mg  5 mg  5 mg  5 mg   Wed 5 mg  5 mg  5 mg  5 mg   Thu 5 mg  5 mg  5 mg  5 mg   Fri 7.5 mg  7.5 mg  7.5 mg  -   Sat 5 mg  5 mg  5 mg  -   Historical INR  3.00      2.40      2.70            This result is from an external source.       Plan:  1. INR is therapeutic today- see above in Anticoagulation Summary.    Elliot Sebastian to continue their warfarin regimen- see above in Anticoagulation Summary.  2. Follow up  in 1 week  3. Pt has agreed to only be called if INR out of range. Left HIPAA-compliant voicemail with instructions and reminder to test INR once every week. They have been instructed to call if any changes in medications, doses, concerns, etc.     Michael Horner, PharmD

## 2024-09-07 LAB — INR PPP: 2.4

## 2024-09-09 ENCOUNTER — ANTICOAGULATION VISIT (OUTPATIENT)
Dept: PHARMACY | Facility: HOSPITAL | Age: 64
End: 2024-09-09
Payer: COMMERCIAL

## 2024-09-09 DIAGNOSIS — I48.0 PAROXYSMAL ATRIAL FIBRILLATION: Primary | ICD-10-CM

## 2024-09-09 DIAGNOSIS — Z95.2 HX OF MITRAL VALVE REPLACEMENT WITH MECHANICAL VALVE: ICD-10-CM

## 2024-09-09 NOTE — PROGRESS NOTES
Anticoagulation Clinic Progress Note    Anticoagulation Summary  As of 2024      INR goal:  2.5-3.5   TTR:  67.3% (5.8 y)   INR used for dosin.40 (2024)   Warfarin maintenance plan:  7.5 mg every Fri; 5 mg all other days   Weekly warfarin total:  37.5 mg   Plan last modified:  Michael Horner, PharmD (2024)   Next INR check:  2024   Priority:  High   Target end date:  Indefinite    Indications    Paroxysmal atrial fibrillation [I48.0]  Hx of mitral valve replacement with mechanical valve [Z95.2] [Z95.2]                 Anticoagulation Episode Summary       INR check location:  Home Draw    Preferred lab:      Send INR reminders to:  DYANA MENDEZ  POOL    Comments:   home [place]          Anticoagulation Care Providers       Provider Role Specialty Phone number    Mariaa Byrnehleen COREY WINSTON Referring Cardiology [phone]            Clinic Interview:  Patient Findings     Negatives:  Signs/symptoms of thrombosis, Signs/symptoms of bleeding,   Laboratory test error suspected, Change in health, Change in alcohol use,   Change in activity, Upcoming invasive procedure, Emergency department   visit, Upcoming dental procedure, Missed doses, Extra doses, Change in   medications, Change in diet/appetite, Hospital admission, Bruising, Other   complaints      Clinical Outcomes     Negatives:  Major bleeding event, Thromboembolic event,   Anticoagulation-related hospital admission, Anticoagulation-related ED   visit, Anticoagulation-related fatality      Note: patient reports he is not able to adjust dosing until Thursday as he is a  and is over the road and only had enough medication until he returns home.   INR History:      2024     1:32 PM 8/15/2024    12:00 AM 8/15/2024     1:10 PM 2024    12:00 AM 9/3/2024    10:00 AM 2024    12:00 AM 2024     8:35 AM   Anticoagulation Monitoring   INR 3.00  2.40  2.70  2.40   INR Date 2024  8/15/2024  2024  2024    INR Goal 2.5-3.5  2.5-3.5  2.5-3.5  2.5-3.5   Trend Same  Same  Same  Same   Last Week Total 37.5 mg  37.5 mg  37.5 mg  37.5 mg   Next Week Total 37.5 mg  37.5 mg  37.5 mg  40 mg   Sun 5 mg  5 mg  -  5 mg   Mon 5 mg  5 mg  -  5 mg   Tue 5 mg  5 mg  5 mg  5 mg   Wed 5 mg  5 mg  5 mg  5 mg   Thu 5 mg  5 mg  5 mg  7.5 mg (9/12)   Fri 7.5 mg  7.5 mg  -  7.5 mg   Sat 5 mg  5 mg  -  5 mg   Historical INR  2.40      2.70      2.40            This result is from an external source.       Plan:  1. INR is Subtherapeutic today- see above in Anticoagulation Summary.   Will instruct Elliot Sebastian to Change their warfarin regimen- see above in Anticoagulation Summary.  2. Follow up in 1 weeks  3. They have been instructed to call if any changes in medications, doses, concerns, etc. Patient expresses understanding and has no further questions at this time.    Bushra Torre, PharmD

## 2024-09-18 ENCOUNTER — ANTICOAGULATION VISIT (OUTPATIENT)
Dept: PHARMACY | Facility: HOSPITAL | Age: 64
End: 2024-09-18
Payer: COMMERCIAL

## 2024-09-18 DIAGNOSIS — I48.0 PAROXYSMAL ATRIAL FIBRILLATION: Primary | ICD-10-CM

## 2024-09-18 DIAGNOSIS — Z95.2 HX OF MITRAL VALVE REPLACEMENT WITH MECHANICAL VALVE: ICD-10-CM

## 2024-09-18 LAB — INR PPP: 3.7

## 2024-09-29 LAB — INR PPP: 2.6

## 2024-09-30 ENCOUNTER — ANTICOAGULATION VISIT (OUTPATIENT)
Dept: PHARMACY | Facility: HOSPITAL | Age: 64
End: 2024-09-30
Payer: COMMERCIAL

## 2024-09-30 DIAGNOSIS — I48.0 PAROXYSMAL ATRIAL FIBRILLATION: Primary | ICD-10-CM

## 2024-09-30 DIAGNOSIS — Z95.2 HX OF MITRAL VALVE REPLACEMENT WITH MECHANICAL VALVE: ICD-10-CM

## 2024-09-30 NOTE — PROGRESS NOTES
Anticoagulation Clinic Progress Note    Anticoagulation Summary  As of 2024      INR goal:  2.5-3.5   TTR:  67.4% (5.8 y)   INR used for dosin.60 (2024)   Warfarin maintenance plan:  7.5 mg every Fri; 5 mg all other days   Weekly warfarin total:  37.5 mg   No change documented:  Michael Horner, Pablo   Plan last modified:  Michael Horner, Pablo (2024)   Next INR check:  10/7/2024   Priority:  High   Target end date:  Indefinite    Indications    Paroxysmal atrial fibrillation [I48.0]  Hx of mitral valve replacement with mechanical valve [Z95.2] [Z95.2]                 Anticoagulation Episode Summary       INR check location:  Home Draw    Preferred lab:      Send INR reminders to:  DYANA MENDEZ  POOL    Comments:   home [place]          Anticoagulation Care Providers       Provider Role Specialty Phone number    Leyla Byrne APRN Referring Cardiology [phone]            Clinic Interview:  Patient Findings     Negatives:  Signs/symptoms of thrombosis, Signs/symptoms of bleeding,   Laboratory test error suspected, Change in health, Change in alcohol use,   Change in activity, Upcoming invasive procedure, Emergency department   visit, Upcoming dental procedure, Missed doses, Extra doses, Change in   medications, Change in diet/appetite, Hospital admission, Bruising, Other   complaints      Clinical Outcomes     Negatives:  Major bleeding event, Thromboembolic event,   Anticoagulation-related hospital admission, Anticoagulation-related ED   visit, Anticoagulation-related fatality        INR History:      9/3/2024    10:00 AM 2024    12:00 AM 2024     8:35 AM 2024    12:00 AM 2024     8:32 AM 2024    12:00 AM 2024     8:44 AM   Anticoagulation Monitoring   INR 2.70  2.40  3.70  2.60   INR Date 2024   INR Goal 2.5-3.5  2.5-3.5  2.5-3.5  2.5-3.5   Trend Same  Same  Same  Same   Last Week Total 37.5 mg  37.5 mg  35 mg   37.5 mg   Next Week Total 37.5 mg  40 mg  32.5 mg  37.5 mg   Sun -  5 mg  5 mg  5 mg   Mon -  5 mg  5 mg  5 mg   Tue 5 mg  5 mg  5 mg  5 mg   Wed 5 mg  5 mg  Hold (9/18)  5 mg   Thu 5 mg  7.5 mg (9/12)  5 mg  5 mg   Fri -  7.5 mg  7.5 mg  7.5 mg   Sat -  5 mg  5 mg  5 mg   Historical INR  2.40      3.70      2.60            This result is from an external source.       Plan:  1. INR is Therapeutic today- see above in Anticoagulation Summary.   Will instruct Elliot CAIN Sebastian to Continue their warfarin regimen - see above in Anticoagulation Summary.  2. Follow up in 1 week.  3. They have been instructed to call if any changes in medications, doses, concerns, etc. Patient expresses understanding and has no further questions at this time.    Michael Horner, PharmD

## 2024-10-08 ENCOUNTER — TELEPHONE (OUTPATIENT)
Dept: CARDIOLOGY | Facility: CLINIC | Age: 64
End: 2024-10-08
Payer: COMMERCIAL

## 2024-10-08 NOTE — TELEPHONE ENCOUNTER
Caller: Elliot Sebastian    Relationship to patient: Self    Best call back number: [phone]    PT HAS DOT PHYSICAL COMING UP AND HE HAS TO HAVE THAT PAPER SIGNED BEFORE 10.23.24. LAST OFFICE VISIT NOTE FROM 02.22.24 WITH RUSS BROOKE HAS INSTRUSTION FOR A 6 MO FU WITH DR HANSEN BUT SHE DOESNT HAVE ANY OPENINGS UNTIL DECEMBER. PT IS A  AND HE ISNT SURE WHEN THEY CALL WHAT STATE OR WHERE HE WILL BE AT. CAN SOMEONE CALL PT AND BETTER ASSIST WITH THIS?

## 2024-10-14 ENCOUNTER — ANTICOAGULATION VISIT (OUTPATIENT)
Dept: PHARMACY | Facility: HOSPITAL | Age: 64
End: 2024-10-14
Payer: COMMERCIAL

## 2024-10-14 DIAGNOSIS — Z95.2 HX OF MITRAL VALVE REPLACEMENT WITH MECHANICAL VALVE: ICD-10-CM

## 2024-10-14 DIAGNOSIS — I48.0 PAROXYSMAL ATRIAL FIBRILLATION: Primary | ICD-10-CM

## 2024-10-14 LAB — INR PPP: 2.5

## 2024-10-14 PROCEDURE — G0249 PROVIDE INR TEST MATER/EQUIP: HCPCS

## 2024-10-14 NOTE — PROGRESS NOTES
Anticoagulation Clinic Progress Note    Anticoagulation Summary  As of 10/14/2024      INR goal:  2.5-3.5   TTR:  67.6% (5.9 y)   INR used for dosin.50 (10/14/2024)   Warfarin maintenance plan:  7.5 mg every Fri; 5 mg all other days   Weekly warfarin total:  37.5 mg   No change documented:  Bushra Torre, PharmD   Plan last modified:  Michael Horner PharmD (2024)   Next INR check:  10/21/2024   Priority:  High   Target end date:  Indefinite    Indications    Paroxysmal atrial fibrillation [I48.0]  Hx of mitral valve replacement with mechanical valve [Z95.2] [Z95.2]                 Anticoagulation Episode Summary       INR check location:  Home Draw    Preferred lab:  --    Send INR reminders to:  DYANA MENDEZ  POOL    Comments:   home [place]          Anticoagulation Care Providers       Provider Role Specialty Phone number    Leyla Byrne APRN Referring Cardiology [phone]            Clinic Interview:  No pertinent clinical findings have been reported.    INR History:      2024     8:35 AM 2024    12:00 AM 2024     8:32 AM 2024    12:00 AM 2024     8:44 AM 10/14/2024    12:00 AM 10/14/2024     8:15 AM   Anticoagulation Monitoring   INR 2.40  3.70  2.60  2.50   INR Date 2024  2024  2024  10/14/2024   INR Goal 2.5-3.5  2.5-3.5  2.5-3.5  2.5-3.5   Trend Same  Same  Same  Same   Last Week Total 37.5 mg  35 mg  37.5 mg  37.5 mg   Next Week Total 40 mg  32.5 mg  37.5 mg  37.5 mg   Sun 5 mg  5 mg  5 mg  5 mg   Mon 5 mg  5 mg  5 mg  5 mg   Tue 5 mg  5 mg  5 mg  5 mg   Wed 5 mg  Hold ()  5 mg  5 mg   Thu 7.5 mg ()  5 mg  5 mg  5 mg   Fri 7.5 mg  7.5 mg  7.5 mg  7.5 mg   Sat 5 mg  5 mg  5 mg  5 mg   Historical INR  3.70      2.60      2.50            This result is from an external source.       Plan:  1. INR is therapeutic today- see above in Anticoagulation Summary.    Elliot Sebastian to continue their warfarin regimen- see above in  Anticoagulation Summary.  2. Follow up in 1 week  3. Pt has agreed to only be called if INR out of range. They have been instructed to call if any changes in medications, doses, concerns, etc. Patient expresses understanding and has no further questions at this time.    Bushra Torre, PharmD

## 2024-10-16 ENCOUNTER — OFFICE VISIT (OUTPATIENT)
Dept: CARDIOLOGY | Facility: CLINIC | Age: 64
End: 2024-10-16
Payer: COMMERCIAL

## 2024-10-16 VITALS
DIASTOLIC BLOOD PRESSURE: 88 MMHG | OXYGEN SATURATION: 97 % | BODY MASS INDEX: 39.61 KG/M2 | SYSTOLIC BLOOD PRESSURE: 138 MMHG | HEIGHT: 74 IN | HEART RATE: 62 BPM | WEIGHT: 308.6 LBS

## 2024-10-16 DIAGNOSIS — I10 ESSENTIAL HYPERTENSION: ICD-10-CM

## 2024-10-16 DIAGNOSIS — Z02.89 ENCOUNTER FOR EXAMINATION REQUIRED BY DEPARTMENT OF TRANSPORTATION (DOT): ICD-10-CM

## 2024-10-16 DIAGNOSIS — Z95.2 HX OF MITRAL VALVE REPLACEMENT WITH MECHANICAL VALVE: ICD-10-CM

## 2024-10-16 DIAGNOSIS — I33.0 ACUTE BACTERIAL ENDOCARDITIS: Primary | ICD-10-CM

## 2024-10-16 DIAGNOSIS — I48.0 PAROXYSMAL ATRIAL FIBRILLATION: ICD-10-CM

## 2024-10-16 DIAGNOSIS — I50.32 CHRONIC HEART FAILURE WITH PRESERVED EJECTION FRACTION (HFPEF): ICD-10-CM

## 2024-10-16 PROCEDURE — 99214 OFFICE O/P EST MOD 30 MIN: CPT | Performed by: NURSE PRACTITIONER

## 2024-10-16 PROCEDURE — 93000 ELECTROCARDIOGRAM COMPLETE: CPT | Performed by: NURSE PRACTITIONER

## 2024-10-16 RX ORDER — LOSARTAN POTASSIUM 100 MG/1
100 TABLET ORAL DAILY
Qty: 90 TABLET | Refills: 3 | Status: SHIPPED | OUTPATIENT
Start: 2024-10-16

## 2024-10-16 RX ORDER — ATENOLOL 25 MG/1
25 TABLET ORAL DAILY
Qty: 90 TABLET | Refills: 3 | Status: SHIPPED | OUTPATIENT
Start: 2024-10-16

## 2024-10-16 RX ORDER — AMLODIPINE BESYLATE 5 MG/1
5 TABLET ORAL DAILY
Qty: 90 TABLET | Refills: 0 | Status: SHIPPED | OUTPATIENT
Start: 2024-10-16

## 2024-10-16 NOTE — PROGRESS NOTES
"  Date of Office Visit: 10/16/2024  Encounter Provider: COREY Cid  Place of Service: [facility] CARDIOLOGY  Patient Name: Elliot Sebastian  :1960      Chief Complaint   Patient presents with    Follow-up    DOT   :     HPI: Elliot Sebastian is a 64 y.o. male who presents today for cardiac follow-up visit and DOT evaluation from cardiac standpoint.  I have reviewed his medical records.    He has a history of bacterial endocarditis and underwent mechanical mitral valve replacement in .  He has been diagnosed with heart failure, paroxysmal atrial fibrillation, and superior mesenteric artery aneurysm.  He has full dentures and does not go to the dentist.  He remains on warfarin and his INR's are checked at home and followed through the [facility] anticoagulation clinic.     In 2022, he was admitted for cellulitis.  MOE showed EF 51 to 55%, saline test negative, mild pulmonary hypertension, and possible vegetation of the mitral valve.  He was treated with 6 weeks of IV ceftriaxone.  He had atrial fibrillation with RVR.       In 2023, he had right lower extremity cellulitis again and echocardiogram was negative for vegetation.  Echocardiogram showed EF 51 to 55% and normal functioning mitral valve.    In 2024, blood pressure was elevated (142/90) and I started him on spironolactone 25 mg 1 tablet daily.  In 2024, he sent a message stating that he stopped spironolactone because it made him feel \"drunk\" and confused.  He said the medication really scared him.    He presents today for follow-up visit and DOT cardiac clearance ().  He exercises and remains active.  He denies chest pain, shortness of air, palpitations, edema, dizziness, syncope, or bleeding.  Blood pressure was a little better on second check.      Past Medical History:   Diagnosis Date    Acute bacterial endocarditis     Status post mechanical mitral valve " replacement    Anemia     APC (atrial premature contractions)     Atrial fibrillation     BPH (benign prostatic hypertrophy)     Cholelithiasis     Chronic constipation     Deep vein thrombophlebitis of leg     DISTAL    Disease of thyroid gland     Gout     Intestinal obstruction     Ischemic enteritis     Left heart failure, NYHA class 3     Mitral regurgitation     Pleural effusion, bilateral     Pulmonary hypertension     Superficial bacterial skin infection     Superior mesenteric artery aneurysm     Umbilical hernia     Vitamin D deficiency        Past Surgical History:   Procedure Laterality Date    APPENDECTOMY      CHOLECYSTECTOMY      COLONOSCOPY  approx 2013    normal per patient    COLONOSCOPY N/A 10/26/2020    Procedure: COLONOSCOPY into cecum with biopsy, polypectomy, clip placement;  Surgeon: Juan Phillips MD;  Location: [facility] ENDOSCOPY;  Service: Gastroenterology;  Laterality: N/A;  polyps, clip  asc polyp removed but not retrieved    EXPLORATION NECK FOR POSTOP HEMORRHAGE / THROMBOSIS / INFECTION      MITRAL VALVE REPLACEMENT  01/03/2013    33MM [facility] MECHANICAL VALVE, PRESERVATION OF CORDS TO SUBVALVULAR APPARATUS AND DOROTHY GORE-FLORI CORD IN THE P2 AREA, DEBRIDEMENT OF ANTERIOR AND POSTERIOR MITRAL LEAFLET        Social History     Socioeconomic History    Marital status:    Tobacco Use    Smoking status: Never    Smokeless tobacco: Never    Tobacco comments:     caffeine use   Vaping Use    Vaping status: Never Used   Substance and Sexual Activity    Alcohol use: Yes     Alcohol/week: 2.0 standard drinks of alcohol     Types: 2 Cans of beer per week     Comment: 1-2 nightly    Drug use: Never       Family History   Problem Relation Age of Onset    Diabetes Mother     Heart disease Father     Diabetes Sister     Diabetes Brother        The following portion of the patient's history were reviewed and updated as appropriate: past medical history, past surgical history, past social  "history, past family history, allergies, current medications, and problem list.    Review of Systems   Constitutional: Negative.   Cardiovascular: Negative.    Respiratory: Negative.     Hematologic/Lymphatic: Negative.    Neurological: Negative.        Allergies   Allergen Reactions    Spironolactone Confusion         Current Outpatient Medications:     atenolol (TENORMIN) 25 MG tablet, Take 1 tablet by mouth Daily., Disp: 90 tablet, Rfl: 3    Cholecalciferol (VITAMIN D3) 2000 UNITS capsule, Take 5,000 Units by mouth Daily , Disp: , Rfl:     cholestyramine light (Prevalite) 4 g packet, Take 1 packet by mouth 2 (Two) Times a Day., Disp: 180 packet, Rfl: 1    losartan (COZAAR) 100 MG tablet, Take 1 tablet by mouth Daily., Disp: 90 tablet, Rfl: 3    Multiple Vitamins-Minerals (MENS MULTIVITAMIN PLUS) tablet, Take 2 tablets by mouth Daily., Disp: , Rfl:     warfarin (COUMADIN) 5 MG tablet, TAKE ONE AND ONE-HALF OF A TABLET BY MOUTH ON FRI AND TAKE ONE TABLET BY MOUTH ALL OTHER DAYS OR AS DIRECTED, Disp: 100 tablet, Rfl: 0        Objective:     Vitals:    10/16/24 1509 10/16/24 1536   BP: 148/88 138/88   BP Location: Left arm Left arm   Patient Position: Sitting Sitting   Cuff Size: Adult Large Adult   Pulse: 62    SpO2: 97%    Weight: (!) 140 kg (308 lb 9.6 oz)    Height: 188 cm (74.02\")      Body mass index is 39.6 kg/m².    PHYSICAL EXAM:    Vitals Reviewed.   General Appearance: No acute distress, well developed and well nourished. Obese.    HENT: No hearing loss noted.    Respiratory: No signs of respiratory distress. Respiration rhythm and depth normal.  Clear to auscultation.   Cardiovascular:  Jugular Venous Pressure: Normal  Heart Rate and Rhythm: Normal, Heart Sounds: Normal S1 and S2. No S3 or S4 noted.  Murmurs: Mechanical click noted.    Lower Extremities: No edema noted.  Musculoskeletal: Normal movement of extremities.  Skin: General appearance normal.    Psychiatric: Patient alert and oriented to person, " place, and time. Speech and behavior appropriate. Normal mood and affect.       ECG 12 Lead    Date/Time: 10/16/2024 3:15 PM  Performed by: Tequila Escalante APRN    Authorized by: Tequila Escalante APRN  Comparison: compared with previous ECG from 2/22/2024  Similar to previous ECG  Rhythm: sinus rhythm  Rate: normal  BPM: 62  Conduction: conduction normal  ST Segments: ST segments normal  T Waves: T waves normal  QRS axis: left    Clinical impression: non-specific ECG            Assessment:       Diagnosis Plan   1. Acute bacterial endocarditis        2. Hx of mitral valve replacement with mechanical valve [Z95.2]        3. Paroxysmal atrial fibrillation  ECG 12 Lead      4. Chronic heart failure with preserved ejection fraction (HFpEF)        5. Essential hypertension        6. Encounter for examination required by Department of Transportation (DOT)               Plan:       1/2.  History of endocarditis on 2 different occasions.  Status post mechanical mitral valve replacement in 2013.  Last echocardiogram in January 2023 showed stable mitral valve.  He does not go to the dentist because he has full dentures, but if he did he would require SBE prophylaxis.  He remains on warfarin for the mechanical valve.  He checks INRs at home which are followed in the [facility] anticoagulation clinic.     3.  Paroxysmal Atrial Fibrillation: Currently in a normal sinus rhythm.  Continue atenolol. [id number] score of 4.  He remains on warfarin.     4.  HFpEF: Euvolemic.  Continue losartan.     5.  Hypertension: He was intolerant to spironolactone.  Start amlodipine 5 mg 1 tablet daily.  Monitor blood pressures and will call for an update.      6.  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     7.  He will call with any cardiac concerns.  He needs to establish care with a new cardiologist.  He will follow-up with Dr. Sathya Parra in 6 months.    As always, it has  been a pleasure to participate in your patient's care. Thank you.         Sincerely,         COREY Gould  [facility] Cardiology      Dictated utilizing Dragon Dictation

## 2024-10-22 LAB — INR PPP: 2.8

## 2024-10-23 ENCOUNTER — ANTICOAGULATION VISIT (OUTPATIENT)
Dept: PHARMACY | Facility: HOSPITAL | Age: 64
End: 2024-10-23
Payer: COMMERCIAL

## 2024-10-23 DIAGNOSIS — I48.0 PAROXYSMAL ATRIAL FIBRILLATION: Primary | ICD-10-CM

## 2024-10-23 DIAGNOSIS — Z95.2 HX OF MITRAL VALVE REPLACEMENT WITH MECHANICAL VALVE: ICD-10-CM

## 2024-10-23 NOTE — PROGRESS NOTES
Anticoagulation Clinic Progress Note    Anticoagulation Summary  As of 10/23/2024      INR goal:  2.5-3.5   TTR:  67.7% (5.9 y)   INR used for dosin.80 (10/22/2024)   Warfarin maintenance plan:  7.5 mg every Fri; 5 mg all other days   Weekly warfarin total:  37.5 mg   No change documented:  Angelica Marroquin, GURINDER   Plan last modified:  Michael Horner, PharmD (2024)   Next INR check:  10/29/2024   Priority:  High   Target end date:  Indefinite    Indications    Paroxysmal atrial fibrillation [I48.0]  Hx of mitral valve replacement with mechanical valve [Z95.2] [Z95.2]                 Anticoagulation Episode Summary       INR check location:  Home Draw    Preferred lab:  --    Send INR reminders to:  DYANA MENDEZ  POOL    Comments:   home juliet          Anticoagulation Care Providers       Provider Role Specialty Phone number    Lyela Byrne APRN Referring Cardiology [phone]            Clinic Interview:  No pertinent clinical findings have been reported.    INR History:      2024     8:32 AM 2024    12:00 AM 2024     8:44 AM 10/14/2024    12:00 AM 10/14/2024     8:15 AM 10/22/2024    12:00 AM 10/23/2024     8:26 AM   Anticoagulation Monitoring   INR 3.70  2.60  2.50  2.80   INR Date 2024  2024  10/14/2024  10/22/2024   INR Goal 2.5-3.5  2.5-3.5  2.5-3.5  2.5-3.5   Trend Same  Same  Same  Same   Last Week Total 35 mg  37.5 mg  37.5 mg  37.5 mg   Next Week Total 32.5 mg  37.5 mg  37.5 mg  37.5 mg   Sun 5 mg  5 mg  5 mg  5 mg   Mon 5 mg  5 mg  5 mg  5 mg   Tue 5 mg  5 mg  5 mg  -   Wed Hold ()  5 mg  5 mg  5 mg   Thu 5 mg  5 mg  5 mg  5 mg   Fri 7.5 mg  7.5 mg  7.5 mg  7.5 mg   Sat 5 mg  5 mg  5 mg  5 mg   Historical INR  2.60      2.50      2.80            This result is from an external source.       Plan:  1. INR is therapeutic today- see above in Anticoagulation Summary.    Elliot Sebastian to continue their warfarin regimen- see above in Anticoagulation  Summary.  2. Follow up in 1 week  3. Pt has agreed to only be called if INR out of range. They have been instructed to call if any changes in medications, doses, concerns, etc. Patient expresses understanding and has no further questions at this time.    Angelica Marroquin, [facility]

## 2024-11-04 LAB — INR PPP: 2.1

## 2024-11-05 ENCOUNTER — ANTICOAGULATION VISIT (OUTPATIENT)
Dept: PHARMACY | Facility: HOSPITAL | Age: 64
End: 2024-11-05
Payer: COMMERCIAL

## 2024-11-05 DIAGNOSIS — I48.0 PAROXYSMAL ATRIAL FIBRILLATION: Primary | ICD-10-CM

## 2024-11-05 DIAGNOSIS — Z95.2 HX OF MITRAL VALVE REPLACEMENT WITH MECHANICAL VALVE: ICD-10-CM

## 2024-11-05 NOTE — PROGRESS NOTES
Anticoagulation Clinic Progress Note    Anticoagulation Summary  As of 2024      INR goal:  2.5-3.5   TTR:  67.6% (5.9 y)   INR used for dosin.10 (2024)   Warfarin maintenance plan:  7.5 mg every Fri; 5 mg all other days   Weekly warfarin total:  37.5 mg   Plan last modified:  Michael Horner, PharmD (2024)   Next INR check:  2024   Priority:  High   Target end date:  Indefinite    Indications    Paroxysmal atrial fibrillation [I48.0]  Hx of mitral valve replacement with mechanical valve [Z95.2] [Z95.2]                 Anticoagulation Episode Summary       INR check location:  Home Draw    Preferred lab:  --    Send INR reminders to:  DYANA MENDEZ  POOL    Comments:   home juliet          Anticoagulation Care Providers       Provider Role Specialty Phone number    Leyla Byrne APRN Referring Cardiology [phone]            Clinic Interview:  Patient Findings     Negatives:  Signs/symptoms of thrombosis, Signs/symptoms of bleeding,   Laboratory test error suspected, Change in health, Change in alcohol use,   Change in activity, Upcoming invasive procedure, Emergency department   visit, Upcoming dental procedure, Missed doses, Extra doses, Change in   medications, Change in diet/appetite, Hospital admission, Bruising, Other   complaints      Clinical Outcomes     Negatives:  Major bleeding event, Thromboembolic event,   Anticoagulation-related hospital admission, Anticoagulation-related ED   visit, Anticoagulation-related fatality        INR History:      2024     8:44 AM 10/14/2024    12:00 AM 10/14/2024     8:15 AM 10/22/2024    12:00 AM 10/23/2024     8:26 AM 2024    12:00 AM 2024     7:53 AM   Anticoagulation Monitoring   INR 2.60  2.50  2.80  2.10   INR Date 2024  10/14/2024  10/22/2024  2024   INR Goal 2.5-3.5  2.5-3.5  2.5-3.5  2.5-3.5   Trend Same  Same  Same  Same   Last Week Total 37.5 mg  37.5 mg  37.5 mg  37.5 mg   Next Week Total 37.5  mg  37.5 mg  37.5 mg  40 mg   Sun 5 mg  5 mg  5 mg  5 mg   Mon 5 mg  5 mg  5 mg  -   Tue 5 mg  5 mg  -  7.5 mg (11/5)   Wed 5 mg  5 mg  5 mg  5 mg   Thu 5 mg  5 mg  5 mg  5 mg   Fri 7.5 mg  7.5 mg  7.5 mg  7.5 mg   Sat 5 mg  5 mg  5 mg  5 mg   Historical INR  2.50      2.80      2.10            This result is from an external source.       Plan:  1. INR is Subtherapeutic today- see above in Anticoagulation Summary.   Will instruct Elliot Sebastian to Change their warfarin regimen- see above in Anticoagulation Summary.  2. Follow up in 1 weeks  3. They have been instructed to call if any changes in medications, doses, concerns, etc. Patient expresses understanding and has no further questions at this time.    Bushra Torre, PharmD

## 2024-11-14 ENCOUNTER — TELEPHONE (OUTPATIENT)
Dept: CARDIOLOGY | Facility: CLINIC | Age: 64
End: 2024-11-14
Payer: COMMERCIAL

## 2024-11-14 RX ORDER — AMLODIPINE BESYLATE 5 MG/1
10 TABLET ORAL DAILY
Start: 2024-11-14

## 2024-11-14 NOTE — TELEPHONE ENCOUNTER
Pt called back in to triage.  Pt states that home BP is running 130's/80's.  Pt denies any CP, SOA or other related symptoms at this time, and states that he is tolerating amlodipine well.  Pt states that he plans to bring home BP cuff in to his 12/2 appointment to check with ours, as he is unsure how accurate his home monitor is.    Roro Riley RN  Triage RN  11/14/24 11:41 EST

## 2024-11-14 NOTE — TELEPHONE ENCOUNTER
Called and left VM, will continue to try to reach pt.    HUB- please put patient straight through to triage    Roro Riley, RN  Triage RN  11/14/24 09:31 EST

## 2024-11-14 NOTE — TELEPHONE ENCOUNTER
Reviewed recommendations with patient, verbalized understanding, will call with any further questions or complaints.  Pt states that he will double up on current 5 mg amlodipine tablets as recommended for now, and will discuss new prescription if needed with you at his upcoming appointment 12/2.    Roro Riley RN  Triage Nurse  11/14/24 13:29 EST

## 2024-11-14 NOTE — TELEPHONE ENCOUNTER
Blood pressure is better.  I would recommend increasing amlodipine to 10 mg daily for goal blood pressure of 120s/80s.  He can use what he has been takes 2 tablets a day or I can send in a new prescription.  Thank you

## 2024-11-18 ENCOUNTER — ANTICOAGULATION VISIT (OUTPATIENT)
Dept: PHARMACY | Facility: HOSPITAL | Age: 64
End: 2024-11-18
Payer: COMMERCIAL

## 2024-11-18 DIAGNOSIS — I48.0 PAROXYSMAL ATRIAL FIBRILLATION: Primary | ICD-10-CM

## 2024-11-18 DIAGNOSIS — Z95.2 HX OF MITRAL VALVE REPLACEMENT WITH MECHANICAL VALVE: ICD-10-CM

## 2024-11-18 LAB — INR PPP: 2.6

## 2024-11-18 NOTE — PROGRESS NOTES
Anticoagulation Clinic Progress Note    Anticoagulation Summary  As of 2024      INR goal:  2.5-3.5   TTR:  67.3% (6 y)   INR used for dosin.60 (2024)   Warfarin maintenance plan:  7.5 mg every Fri; 5 mg all other days   Weekly warfarin total:  37.5 mg   No change documented:  Michael Horner, PharmMACY   Plan last modified:  Michael Horner, PharmD (2024)   Next INR check:  2024   Priority:  High   Target end date:  Indefinite    Indications    Paroxysmal atrial fibrillation [I48.0]  Hx of mitral valve replacement with mechanical valve [Z95.2] [Z95.2]                 Anticoagulation Episode Summary       INR check location:  Home Draw    Preferred lab:  --    Send INR reminders to:  DYANA MENDEZ  POOL    Comments:   home juliet          Anticoagulation Care Providers       Provider Role Specialty Phone number    Leyla Byrne APRN Referring Cardiology [phone]            Clinic Interview:  No pertinent clinical findings have been reported.    INR History:      10/14/2024     8:15 AM 10/22/2024    12:00 AM 10/23/2024     8:26 AM 2024    12:00 AM 2024     7:53 AM 2024    12:00 AM 2024     9:50 AM   Anticoagulation Monitoring   INR 2.50  2.80  2.10  2.60   INR Date 10/14/2024  10/22/2024  2024  2024   INR Goal 2.5-3.5  2.5-3.5  2.5-3.5  2.5-3.5   Trend Same  Same  Same  Same   Last Week Total 37.5 mg  37.5 mg  37.5 mg  37.5 mg   Next Week Total 37.5 mg  37.5 mg  40 mg  37.5 mg   Sun 5 mg  5 mg  5 mg  5 mg   Mon 5 mg  5 mg  -  5 mg   Tue 5 mg  -  7.5 mg ()  5 mg   Wed 5 mg  5 mg  5 mg  5 mg   Thu 5 mg  5 mg  5 mg  5 mg   Fri 7.5 mg  7.5 mg  7.5 mg  7.5 mg   Sat 5 mg  5 mg  5 mg  5 mg   Historical INR  2.80      2.10      2.60            This result is from an external source.       Plan:  1. INR is therapeutic today- see above in Anticoagulation Summary.    Elliot Sebastian to continue their warfarin regimen- see above in Anticoagulation  Summary.  2. Follow up in 1 week  3. Pt has agreed to only be called if INR out of range. They have been instructed to call if any changes in medications, doses, concerns, etc. Patient expresses understanding and has no further questions at this time.    Michael Horner, PharmD

## 2024-11-22 RX ORDER — WARFARIN SODIUM 5 MG/1
TABLET ORAL
Qty: 100 TABLET | Refills: 0 | Status: SHIPPED | OUTPATIENT
Start: 2024-11-22

## 2024-11-29 LAB — INR PPP: 2.5

## 2024-12-02 ENCOUNTER — ANTICOAGULATION VISIT (OUTPATIENT)
Dept: PHARMACY | Facility: HOSPITAL | Age: 64
End: 2024-12-02
Payer: COMMERCIAL

## 2024-12-02 DIAGNOSIS — Z95.2 HX OF MITRAL VALVE REPLACEMENT WITH MECHANICAL VALVE: ICD-10-CM

## 2024-12-02 DIAGNOSIS — I48.0 PAROXYSMAL ATRIAL FIBRILLATION: Primary | ICD-10-CM

## 2024-12-02 PROCEDURE — G0249 PROVIDE INR TEST MATER/EQUIP: HCPCS

## 2024-12-02 NOTE — PROGRESS NOTES
Anticoagulation Clinic Progress Note    Anticoagulation Summary  As of 2024      INR goal:  2.5-3.5   TTR:  67.4% (6 y)   INR used for dosin.50 (2024)   Warfarin maintenance plan:  7.5 mg every Fri; 5 mg all other days   Weekly warfarin total:  37.5 mg   No change documented:  Angelica Marroquin, GURINDER   Plan last modified:  Michael Horner, PharmD (2024)   Next INR check:  2024   Priority:  High   Target end date:  Indefinite    Indications    Paroxysmal atrial fibrillation [I48.0]  Hx of mitral valve replacement with mechanical valve [Z95.2] [Z95.2]                 Anticoagulation Episode Summary       INR check location:  Home Draw    Preferred lab:  --    Send INR reminders to:  DYANA MENDEZ  POOL    Comments:   home juliet          Anticoagulation Care Providers       Provider Role Specialty Phone number    Leyla Byrne APRN Referring Cardiology [phone]            Clinic Interview:  No pertinent clinical findings have been reported.    INR History:      10/23/2024     8:26 AM 2024    12:00 AM 2024     7:53 AM 2024    12:00 AM 2024     9:50 AM 2024    12:00 AM 2024     9:00 AM   Anticoagulation Monitoring   INR 2.80  2.10  2.60  2.50   INR Date 10/22/2024  2024  2024  2024   INR Goal 2.5-3.5  2.5-3.5  2.5-3.5  2.5-3.5   Trend Same  Same  Same  Same   Last Week Total 37.5 mg  37.5 mg  37.5 mg  37.5 mg   Next Week Total 37.5 mg  40 mg  37.5 mg  37.5 mg   Sun 5 mg  5 mg  5 mg  -   Mon 5 mg  -  5 mg  5 mg   Tue -  7.5 mg ()  5 mg  5 mg   Wed 5 mg  5 mg  5 mg  5 mg   Thu 5 mg  5 mg  5 mg  5 mg   Fri 7.5 mg  7.5 mg  7.5 mg  -   Sat 5 mg  5 mg  5 mg  -   Historical INR  2.10      2.60      2.50            This result is from an external source.       Plan:  1. INR is therapeutic today- see above in Anticoagulation Summary.    Elliot Sebastian to continue their warfarin regimen- see above in Anticoagulation Summary.  2.  Follow up in 1 week  3. Pt has agreed to only be called if INR out of range. They have been instructed to call if any changes in medications, doses, concerns, etc. Patient expresses understanding and has no further questions at this time.    Angelica Marroquin [facility]

## 2024-12-10 ENCOUNTER — ANTICOAGULATION VISIT (OUTPATIENT)
Dept: PHARMACY | Facility: HOSPITAL | Age: 64
End: 2024-12-10
Payer: COMMERCIAL

## 2024-12-10 DIAGNOSIS — Z95.2 HX OF MITRAL VALVE REPLACEMENT WITH MECHANICAL VALVE: ICD-10-CM

## 2024-12-10 DIAGNOSIS — I48.0 PAROXYSMAL ATRIAL FIBRILLATION: Primary | ICD-10-CM

## 2024-12-10 LAB — INR PPP: 2.6

## 2024-12-10 NOTE — PROGRESS NOTES
Anticoagulation Clinic Progress Note    Anticoagulation Summary  As of 12/10/2024      INR goal:  2.5-3.5   TTR:  67.6% (6 y)   INR used for dosin.60 (12/10/2024)   Warfarin maintenance plan:  7.5 mg every Fri; 5 mg all other days   Weekly warfarin total:  37.5 mg   No change documented:  Angelica Marroquin, GURINDER   Plan last modified:  Michael Horner, PharmD (2024)   Next INR check:  2024   Priority:  High   Target end date:  Indefinite    Indications    Paroxysmal atrial fibrillation [I48.0]  Hx of mitral valve replacement with mechanical valve [Z95.2] [Z95.2]                 Anticoagulation Episode Summary       INR check location:  Home Draw    Preferred lab:  --    Send INR reminders to:  DYANA MENDEZ  POOL    Comments:   home juliet          Anticoagulation Care Providers       Provider Role Specialty Phone number    Leyla Byrne APRN Referring Cardiology [phone]            Clinic Interview:  No pertinent clinical findings have been reported.    INR History:      2024     7:53 AM 2024    12:00 AM 2024     9:50 AM 2024    12:00 AM 2024     9:00 AM 12/10/2024    12:00 AM 12/10/2024     2:51 PM   Anticoagulation Monitoring   INR 2.10  2.60  2.50  2.60   INR Date 2024  2024  2024  12/10/2024   INR Goal 2.5-3.5  2.5-3.5  2.5-3.5  2.5-3.5   Trend Same  Same  Same  Same   Last Week Total 37.5 mg  37.5 mg  37.5 mg  37.5 mg   Next Week Total 40 mg  37.5 mg  37.5 mg  37.5 mg   Sun 5 mg  5 mg  -  5 mg   Mon -  5 mg  5 mg  5 mg   Tue 7.5 mg ()  5 mg  5 mg  5 mg   Wed 5 mg  5 mg  5 mg  5 mg   Thu 5 mg  5 mg  5 mg  5 mg   Fri 7.5 mg  7.5 mg  -  7.5 mg   Sat 5 mg  5 mg  -  5 mg   Historical INR  2.60      2.50      2.60            This result is from an external source.       Plan:  1. INR is therapeutic today- see above in Anticoagulation Summary.    Elliot Sebastian to continue their warfarin regimen- see above in Anticoagulation  Summary.  2. Follow up in 1 week  3. Pt has agreed to only be called if INR out of range. They have been instructed to call if any changes in medications, doses, concerns, etc. Patient expresses understanding and has no further questions at this time.    Angelica Marroquin, [facility]

## 2024-12-16 RX ORDER — AMLODIPINE BESYLATE 10 MG/1
10 TABLET ORAL DAILY
Qty: 90 TABLET | Refills: 1 | Status: CANCELLED | OUTPATIENT
Start: 2024-12-16

## 2024-12-16 RX ORDER — AMLODIPINE BESYLATE 10 MG/1
10 TABLET ORAL DAILY
Qty: 90 TABLET | Refills: 1 | OUTPATIENT
Start: 2024-12-16

## 2024-12-16 RX ORDER — AMLODIPINE BESYLATE 10 MG/1
10 TABLET ORAL DAILY
Qty: 90 TABLET | Refills: 1 | Status: SHIPPED | OUTPATIENT
Start: 2024-12-16

## 2024-12-16 NOTE — TELEPHONE ENCOUNTER
Tequila Escalante, COREY   Nurse Practitioner  Specialty: Cardiology     Telephone Encounter     Signed     Encounter Date: 11/14/2024     Signed            Blood pressure is better.  I would recommend increasing amlodipine to 10 mg daily for goal blood pressure of 120s/80s.  He can use what he has been takes 2 tablets a day or I can send in a new prescription.  Thank you                Pt would like a 10 mg tablet so that he does not have to take 2 - 5 mg tablets.  Please see pending rx.

## 2024-12-20 ENCOUNTER — OFFICE VISIT (OUTPATIENT)
Dept: CARDIOLOGY | Facility: CLINIC | Age: 64
End: 2024-12-20
Payer: COMMERCIAL

## 2024-12-20 VITALS
DIASTOLIC BLOOD PRESSURE: 82 MMHG | WEIGHT: 309.2 LBS | OXYGEN SATURATION: 97 % | BODY MASS INDEX: 39.68 KG/M2 | HEART RATE: 64 BPM | HEIGHT: 74 IN | SYSTOLIC BLOOD PRESSURE: 124 MMHG

## 2024-12-20 DIAGNOSIS — I50.32 CHRONIC HEART FAILURE WITH PRESERVED EJECTION FRACTION (HFPEF): ICD-10-CM

## 2024-12-20 DIAGNOSIS — I10 ESSENTIAL HYPERTENSION: Primary | ICD-10-CM

## 2024-12-20 DIAGNOSIS — Z95.2 HX OF MITRAL VALVE REPLACEMENT WITH MECHANICAL VALVE: ICD-10-CM

## 2024-12-20 DIAGNOSIS — I48.0 PAROXYSMAL ATRIAL FIBRILLATION: ICD-10-CM

## 2024-12-20 DIAGNOSIS — Z86.79 HX OF BACTERIAL ENDOCARDITIS: ICD-10-CM

## 2025-01-03 LAB — INR PPP: 2.9

## 2025-01-06 ENCOUNTER — ANTICOAGULATION VISIT (OUTPATIENT)
Dept: PHARMACY | Facility: HOSPITAL | Age: 65
End: 2025-01-06
Payer: COMMERCIAL

## 2025-01-06 DIAGNOSIS — Z95.2 HX OF MITRAL VALVE REPLACEMENT WITH MECHANICAL VALVE: ICD-10-CM

## 2025-01-06 DIAGNOSIS — I48.0 PAROXYSMAL ATRIAL FIBRILLATION: Primary | ICD-10-CM

## 2025-01-06 NOTE — PROGRESS NOTES
Anticoagulation Clinic Progress Note    Anticoagulation Summary  As of 2025      INR goal:  2.5-3.5   TTR:  67.9% (6.1 y)   INR used for dosin.90 (1/3/2025)   Warfarin maintenance plan:  7.5 mg every Fri; 5 mg all other days   Weekly warfarin total:  37.5 mg   No change documented:  Michael Horner, Pablo   Plan last modified:  Michael Horner PharmD (2024)   Next INR check:  1/10/2025   Priority:  High   Target end date:  Indefinite    Indications    Paroxysmal atrial fibrillation [I48.0]  Hx of mitral valve replacement with mechanical valve [Z95.2] [Z95.2]                 Anticoagulation Episode Summary       INR check location:  Home Draw    Preferred lab:  --    Send INR reminders to:  DYANA MENDEZ  POOL    Comments:   home juliet          Anticoagulation Care Providers       Provider Role Specialty Phone number    Leyla Byrne APRN Referring Cardiology [phone]            Clinic Interview:  No pertinent clinical findings have been reported.    INR History:      2024     9:50 AM 2024    12:00 AM 2024     9:00 AM 12/10/2024    12:00 AM 12/10/2024     2:51 PM 1/3/2025    12:00 AM 2025    11:30 AM   Anticoagulation Monitoring   INR 2.60  2.50  2.60  2.90   INR Date 2024  2024  12/10/2024  1/3/2025   INR Goal 2.5-3.5  2.5-3.5  2.5-3.5  2.5-3.5   Trend Same  Same  Same  Same   Last Week Total 37.5 mg  37.5 mg  37.5 mg  37.5 mg   Next Week Total 37.5 mg  37.5 mg  37.5 mg  37.5 mg   Sun 5 mg  -  5 mg  -   Mon 5 mg  5 mg  5 mg  5 mg   Tue 5 mg  5 mg  5 mg  5 mg   Wed 5 mg  5 mg  5 mg  5 mg   Thu 5 mg  5 mg  5 mg  5 mg   Fri 7.5 mg  -  7.5 mg  -   Sat 5 mg  -  5 mg  -   Historical INR  2.50      2.60      2.90            This result is from an external source.       Plan:  1. INR is therapeutic today- see above in Anticoagulation Summary.    Elliot Sebastian to continue their warfarin regimen- see above in Anticoagulation Summary.  2. Follow up in 1  week  3. Pt has agreed to only be called if INR out of range. They have been instructed to call if any changes in medications, doses, concerns, etc. Patient expresses understanding and has no further questions at this time.    Michael Horner, PharmD

## 2025-01-16 NOTE — PROGRESS NOTES
Anticoagulation Clinic Progress Note    Anticoagulation Summary  As of 2023      INR goal:  2.5-3.5   TTR:  63.8 % (4.8 y)   INR used for dosin.10 (2023)   Warfarin maintenance plan:  7.5 mg every Fri; 5 mg all other days   Weekly warfarin total:  37.5 mg   No change documented:  Bushra Torre, PharmD   Plan last modified:  Angelica Marroquin RPH (2023)   Next INR check:  2023   Priority:  High   Target end date:  Indefinite    Indications    Paroxysmal atrial fibrillation [I48.0]  Hx of mitral valve replacement with mechanical valve [Z95.2] [Z95.2]                 Anticoagulation Episode Summary       INR check location:  Home Draw    Preferred lab:      Send INR reminders to:   [facility]  [facility]    Comments:   home [place]          Anticoagulation Care Providers       Provider Role Specialty Phone number    Leyla Byrne APRN Referring Cardiology [phone]            Clinic Interview:  Patient Findings     Negatives:  Signs/symptoms of thrombosis, Signs/symptoms of bleeding,   Laboratory test error suspected, Change in health, Change in alcohol use,   Change in activity, Upcoming invasive procedure, Emergency department   visit, Upcoming dental procedure, Missed doses, Extra doses, Change in   medications, Change in diet/appetite, Hospital admission, Bruising, Other   complaints      Clinical Outcomes     Negatives:  Major bleeding event, Thromboembolic event,   Anticoagulation-related hospital admission, Anticoagulation-related ED   visit, Anticoagulation-related fatality        INR History:      2023     2:25 PM 2023    12:00 AM 2023    11:27 AM 9/3/2023    12:00 AM 2023     8:19 AM 2023    12:00 AM 2023     9:51 AM   Anticoagulation Monitoring   INR 2.50  4.20  2.50  2.10   INR Date 2023  2023  9/3/2023  2023   INR Goal 2.5-3.5  2.5-3.5  2.5-3.5  2.5-3.5   Trend Same  Same  Same  Same   Last Week Total 40 mg  37.5 mg  37.5  TYTO VISIT COMPLETE   mg  40 mg   Next Week Total 37.5 mg  37.5 mg  37.5 mg  37.5 mg   Sun 5 mg  -  -  -   Mon 5 mg  5 mg  -  5 mg   Tue 5 mg  5 mg  5 mg  5 mg   Wed 5 mg  5 mg  5 mg  5 mg   Thu -  5 mg  5 mg  5 mg   Fri 7.5 mg (8/18)  -  7.5 mg  7.5 mg   Sat 5 mg  -  5 mg  5 mg   Historical INR  4.20      2.50      2.10            This result is from an external source.       Plan:  1. INR is Subtherapeutic today- see above in Anticoagulation Summary.   Will instruct Elliot Sebastian to Change their warfarin regimen- see above in Anticoagulation Summary.  2. Follow up in 1 weeks  3. They have been instructed to call if any changes in medications, doses, concerns, etc. Patient expresses understanding and has no further questions at this time.    Bushra Torre, PharmD

## 2025-01-20 LAB — INR PPP: 2.8

## 2025-01-21 ENCOUNTER — ANTICOAGULATION VISIT (OUTPATIENT)
Dept: PHARMACY | Facility: HOSPITAL | Age: 65
End: 2025-01-21
Payer: COMMERCIAL

## 2025-01-21 DIAGNOSIS — Z95.2 HX OF MITRAL VALVE REPLACEMENT WITH MECHANICAL VALVE: ICD-10-CM

## 2025-01-21 DIAGNOSIS — I48.0 PAROXYSMAL ATRIAL FIBRILLATION: Primary | ICD-10-CM

## 2025-01-21 NOTE — PROGRESS NOTES
Anticoagulation Clinic Progress Note    Anticoagulation Summary  As of 2025      INR goal:  2.5-3.5   TTR:  68.2% (6.1 y)   INR used for dosin.80 (2025)   Warfarin maintenance plan:  7.5 mg every Fri; 5 mg all other days   Weekly warfarin total:  37.5 mg   No change documented:  Angelica Marroquin, GURINDER   Plan last modified:  Michael Horner, PharmD (2024)   Next INR check:  2025   Priority:  High   Target end date:  Indefinite    Indications    Paroxysmal atrial fibrillation [I48.0]  Hx of mitral valve replacement with mechanical valve [Z95.2] [Z95.2]                 Anticoagulation Episode Summary       INR check location:  Home Draw    Preferred lab:  --    Send INR reminders to:  [facility]  [facility]    Comments:   home [place]          Anticoagulation Care Providers       Provider Role Specialty Phone number    Leyla Byrne APRN Referring Cardiology [phone]            Clinic Interview:  No pertinent clinical findings have been reported.    INR History:      2024     9:00 AM 12/10/2024    12:00 AM 12/10/2024     2:51 PM 1/3/2025    12:00 AM 2025    11:30 AM 2025    12:00 AM 2025     8:01 AM   Anticoagulation Monitoring   INR 2.50  2.60  2.90  2.80   INR Date 2024  12/10/2024  1/3/2025  2025   INR Goal 2.5-3.5  2.5-3.5  2.5-3.5  2.5-3.5   Trend Same  Same  Same  Same   Last Week Total 37.5 mg  37.5 mg  37.5 mg  37.5 mg   Next Week Total 37.5 mg  37.5 mg  37.5 mg  37.5 mg   Sun -  5 mg  -  5 mg   Mon 5 mg  5 mg  5 mg  -   Tue 5 mg  5 mg  5 mg  5 mg   Wed 5 mg  5 mg  5 mg  5 mg   Thu 5 mg  5 mg  5 mg  5 mg   Fri -  7.5 mg  -  7.5 mg   Sat -  5 mg  -  5 mg   Historical INR  2.60      2.90      2.80            This result is from an external source.       Plan:  1. INR is therapeutic today- see above in Anticoagulation Summary.    Elliot Sebastian to continue their warfarin regimen- see above in Anticoagulation Summary.  2. Follow up in 1 week  3.  Pt has agreed to only be called if INR out of range. They have been instructed to call if any changes in medications, doses, concerns, etc. Patient expresses understanding and has no further questions at this time.    Angelica Marroquin [facility]

## 2025-02-04 ENCOUNTER — ANTICOAGULATION VISIT (OUTPATIENT)
Dept: PHARMACY | Facility: HOSPITAL | Age: 65
End: 2025-02-04
Payer: COMMERCIAL

## 2025-02-04 DIAGNOSIS — I48.0 PAROXYSMAL ATRIAL FIBRILLATION: Primary | ICD-10-CM

## 2025-02-04 DIAGNOSIS — Z95.2 HX OF MITRAL VALVE REPLACEMENT WITH MECHANICAL VALVE: ICD-10-CM

## 2025-02-04 LAB — INR PPP: 2.3

## 2025-02-04 PROCEDURE — G0249 PROVIDE INR TEST MATER/EQUIP: HCPCS

## 2025-02-04 NOTE — PROGRESS NOTES
Anticoagulation Clinic Progress Note    Anticoagulation Summary  As of 2025      INR goal:  2.5-3.5   TTR:  68.1% (6.2 y)   INR used for dosin.30 (2025)   Warfarin maintenance plan:  7.5 mg every Fri; 5 mg all other days   Weekly warfarin total:  37.5 mg   Plan last modified:  Michael Horner, PharmD (2024)   Next INR check:  2025   Priority:  High   Target end date:  Indefinite    Indications    Paroxysmal atrial fibrillation [I48.0]  Hx of mitral valve replacement with mechanical valve [Z95.2] [Z95.2]                 Anticoagulation Episode Summary       INR check location:  Home Draw    Preferred lab:  --    Send INR reminders to:  [facility]  POOL    Comments:   home [place]          Anticoagulation Care Providers       Provider Role Specialty Phone number    Leyla Byrne APRN Referring Cardiology [phone]            Clinic Interview:  Patient Findings     Negatives:  Signs/symptoms of thrombosis, Signs/symptoms of bleeding,   Laboratory test error suspected, Change in health, Change in alcohol use,   Change in activity, Upcoming invasive procedure, Emergency department   visit, Upcoming dental procedure, Missed doses, Extra doses, Change in   medications, Change in diet/appetite, Hospital admission, Bruising, Other   complaints      Clinical Outcomes     Negatives:  Major bleeding event, Thromboembolic event,   Anticoagulation-related hospital admission, Anticoagulation-related ED   visit, Anticoagulation-related fatality        INR History:      12/10/2024     2:51 PM 1/3/2025    12:00 AM 2025    11:30 AM 2025    12:00 AM 2025     8:01 AM 2025    12:00 AM 2025     2:45 PM   Anticoagulation Monitoring   INR 2.60  2.90  2.80  2.30   INR Date 12/10/2024  1/3/2025  2025  2025   INR Goal 2.5-3.5  2.5-3.5  2.5-3.5  2.5-3.5   Trend Same  Same  Same  Same   Last Week Total 37.5 mg  37.5 mg  37.5 mg  37.5 mg   Next Week Total 37.5 mg  37.5 mg   37.5 mg  40 mg   Sun 5 mg  -  5 mg  5 mg   Mon 5 mg  5 mg  -  5 mg   Tue 5 mg  5 mg  5 mg  7.5 mg (2/4)   Wed 5 mg  5 mg  5 mg  5 mg   Thu 5 mg  5 mg  5 mg  5 mg   Fri 7.5 mg  -  7.5 mg  7.5 mg   Sat 5 mg  -  5 mg  5 mg   Historical INR  2.90      2.80      2.30            This result is from an external source.       Plan:  1. INR is Subtherapeutic today- see above in Anticoagulation Summary.   Will instruct Elliot Sebastian to Increase their warfarin regimen- see above in Anticoagulation Summary.      Patient already took an extra 2.5 mg today. Continue, rck 1 week   2. Follow up in 1 weeks  3. They have been instructed to call if any changes in medications, doses, concerns, etc. Patient expresses understanding and has no further questions at this time.    Angelica Marroquin [facility]

## 2025-02-14 ENCOUNTER — ANTICOAGULATION VISIT (OUTPATIENT)
Dept: PHARMACY | Facility: HOSPITAL | Age: 65
End: 2025-02-14
Payer: COMMERCIAL

## 2025-02-14 DIAGNOSIS — Z95.2 HX OF MITRAL VALVE REPLACEMENT WITH MECHANICAL VALVE: ICD-10-CM

## 2025-02-14 DIAGNOSIS — I48.0 PAROXYSMAL ATRIAL FIBRILLATION: Primary | ICD-10-CM

## 2025-02-14 LAB — INR PPP: 1.9

## 2025-02-14 NOTE — PROGRESS NOTES
Anticoagulation Clinic Progress Note    Anticoagulation Summary  As of 2025      INR goal:  2.5-3.5   TTR:  67.8% (6.2 y)   INR used for dosin.90 (2025)   Warfarin maintenance plan:  7.5 mg every Mon, Fri; 5 mg all other days   Weekly warfarin total:  40 mg   Plan last modified:  Michael Horner, PharmD (2025)   Next INR check:  2025   Priority:  High   Target end date:  Indefinite    Indications    Paroxysmal atrial fibrillation [I48.0]  Hx of mitral valve replacement with mechanical valve [Z95.2] [Z95.2]                 Anticoagulation Episode Summary       INR check location:  Home Draw    Preferred lab:  --    Send INR reminders to:  [facility]Z  POOL    Comments:   home [place]          Anticoagulation Care Providers       Provider Role Specialty Phone number    Leyla Byrne APRN Referring Cardiology [phone]            Clinic Interview:  Patient Findings     Positives:  Missed doses    Negatives:  Signs/symptoms of thrombosis, Signs/symptoms of bleeding,   Laboratory test error suspected, Change in health, Change in alcohol use,   Change in activity, Upcoming invasive procedure, Emergency department   visit, Upcoming dental procedure, Extra doses, Change in medications,   Change in diet/appetite, Hospital admission, Bruising, Other complaints    Comments:  Missed warfarin 25.       Clinical Outcomes     Negatives:  Major bleeding event, Thromboembolic event,   Anticoagulation-related hospital admission, Anticoagulation-related ED   visit, Anticoagulation-related fatality    Comments:  Missed warfarin 25.         INR History:      2025    11:30 AM 2025    12:00 AM 2025     8:01 AM 2025    12:00 AM 2025     2:45 PM 2025    12:00 AM 2025     2:28 PM   Anticoagulation Monitoring   INR 2.90  2.80  2.30  1.90   INR Date 1/3/2025  2025  2025  2025   INR Goal 2.5-3.5  2.5-3.5  2.5-3.5  2.5-3.5   Trend Same  Same   Same  Up   Last Week Total 37.5 mg  37.5 mg  37.5 mg  32.5 mg   Next Week Total 37.5 mg  37.5 mg  40 mg  42.5 mg   Sun -  5 mg  5 mg  5 mg   Mon 5 mg  -  5 mg  7.5 mg   Tue 5 mg  5 mg  7.5 mg (2/4)  5 mg   Wed 5 mg  5 mg  5 mg  5 mg   Thu 5 mg  5 mg  5 mg  5 mg   Fri -  7.5 mg  7.5 mg  10 mg (2/14)   Sat -  5 mg  5 mg  5 mg   Historical INR  2.80      2.30      1.90            This result is from an external source.       Plan:  1. INR is Subtherapeutic today- see above in Anticoagulation Summary.   Will instruct Elliot Sebastian to Increase their warfarin regimen (10 mg today, then increase to 7.5 mg MF, 5 mg all other days) - see above in Anticoagulation Summary.  2. Follow up in 1 week.  3. They have been instructed to call if any changes in medications, doses, concerns, etc. Patient expresses understanding and has no further questions at this time.    Michael Horner, PharmD

## 2025-02-19 DIAGNOSIS — K52.9 CHRONIC DIARRHEA: ICD-10-CM

## 2025-02-19 RX ORDER — WARFARIN SODIUM 5 MG/1
TABLET ORAL
Qty: 105 TABLET | Refills: 0 | Status: SHIPPED | OUTPATIENT
Start: 2025-02-19

## 2025-02-19 RX ORDER — CHOLESTYRAMINE LIGHT 4 G/5.7G
POWDER, FOR SUSPENSION ORAL
Qty: 180 EACH | Refills: 0 | Status: SHIPPED | OUTPATIENT
Start: 2025-02-19

## 2025-02-22 LAB — INR PPP: 3

## 2025-02-24 ENCOUNTER — ANTICOAGULATION VISIT (OUTPATIENT)
Dept: PHARMACY | Facility: HOSPITAL | Age: 65
End: 2025-02-24
Payer: COMMERCIAL

## 2025-02-24 DIAGNOSIS — I48.0 PAROXYSMAL ATRIAL FIBRILLATION: Primary | ICD-10-CM

## 2025-02-24 DIAGNOSIS — Z95.2 HX OF MITRAL VALVE REPLACEMENT WITH MECHANICAL VALVE: ICD-10-CM

## 2025-02-24 NOTE — PROGRESS NOTES
Anticoagulation Clinic Progress Note    Anticoagulation Summary  As of 2/24/2025      INR goal:  2.5-3.5   TTR:  67.8% (6.2 y)   INR used for dosing:  3.00 (2/22/2025)   Warfarin maintenance plan:  7.5 mg every Mon, Fri; 5 mg all other days   Weekly warfarin total:  40 mg   No change documented:  Angelica Marroquin, GURINDER   Plan last modified:  Michael Horner, PharmD (2/14/2025)   Next INR check:  3/1/2025   Priority:  High   Target end date:  Indefinite    Indications    Paroxysmal atrial fibrillation [I48.0]  Hx of mitral valve replacement with mechanical valve [Z95.2] [Z95.2]                 Anticoagulation Episode Summary       INR check location:  Home Draw    Preferred lab:  --    Send INR reminders to:  [facility]  POOL    Comments:   home [place]          Anticoagulation Care Providers       Provider Role Specialty Phone number    Leyla Byrne APRN Referring Cardiology [phone]            Clinic Interview:  No pertinent clinical findings have been reported.    INR History:      1/21/2025     8:01 AM 2/4/2025    12:00 AM 2/4/2025     2:45 PM 2/14/2025    12:00 AM 2/14/2025     2:28 PM 2/22/2025    12:00 AM 2/24/2025     9:03 AM   Anticoagulation Monitoring   INR 2.80  2.30  1.90  3.00   INR Date 1/20/2025 2/4/2025 2/14/2025 2/22/2025   INR Goal 2.5-3.5  2.5-3.5  2.5-3.5  2.5-3.5   Trend Same  Same  Up  Same   Last Week Total 37.5 mg  37.5 mg  32.5 mg  40 mg   Next Week Total 37.5 mg  40 mg  42.5 mg  40 mg   Sun 5 mg  5 mg  5 mg  -   Mon -  5 mg  7.5 mg  7.5 mg   Tue 5 mg  7.5 mg (2/4)  5 mg  5 mg   Wed 5 mg  5 mg  5 mg  5 mg   Thu 5 mg  5 mg  5 mg  5 mg   Fri 7.5 mg  7.5 mg  10 mg (2/14)  7.5 mg   Sat 5 mg  5 mg  5 mg  -   Historical INR  2.30      1.90      3.00            This result is from an external source.       Plan:  1. INR is therapeutic today- see above in Anticoagulation Summary.    Elliot Sebastian to continue their warfarin regimen- see above in Anticoagulation  Summary.  2. Follow up in 1 week  3. Pt has agreed to only be called if INR out of range. They have been instructed to call if any changes in medications, doses, concerns, etc. Patient expresses understanding and has no further questions at this time.    Angelica Marroquin, [facility]

## 2025-03-03 ENCOUNTER — ANTICOAGULATION VISIT (OUTPATIENT)
Dept: PHARMACY | Facility: HOSPITAL | Age: 65
End: 2025-03-03
Payer: COMMERCIAL

## 2025-03-03 DIAGNOSIS — Z95.2 HX OF MITRAL VALVE REPLACEMENT WITH MECHANICAL VALVE: ICD-10-CM

## 2025-03-03 DIAGNOSIS — I48.0 PAROXYSMAL ATRIAL FIBRILLATION: Primary | ICD-10-CM

## 2025-03-03 LAB — INR PPP: 2

## 2025-03-03 NOTE — PROGRESS NOTES
Anticoagulation Clinic Progress Note    Anticoagulation Summary  As of 3/3/2025      INR goal:  2.5-3.5   TTR:  67.7% (6.3 y)   INR used for dosin.00 (3/3/2025)   Warfarin maintenance plan:  7.5 mg every Fri; 5 mg all other days   Weekly warfarin total:  37.5 mg   Plan last modified:  Angelica Marroquin RPH (3/3/2025)   Next INR check:  3/10/2025   Priority:  High   Target end date:  Indefinite    Indications    Paroxysmal atrial fibrillation [I48.0]  Hx of mitral valve replacement with mechanical valve [Z95.2] [Z95.2]                 Anticoagulation Episode Summary       INR check location:  Home Draw    Preferred lab:  --    Send INR reminders to:  [facility]DEZ  POOL    Comments:   home [place]          Anticoagulation Care Providers       Provider Role Specialty Phone number    Leyla Byrne APRN Referring Cardiology [phone]            Clinic Interview:  Patient Findings     Positives:  Missed doses    Negatives:  Signs/symptoms of thrombosis, Signs/symptoms of bleeding,   Laboratory test error suspected, Change in health, Change in alcohol use,   Change in activity, Upcoming invasive procedure, Emergency department   visit, Upcoming dental procedure, Extra doses, Change in medications,   Change in diet/appetite, Hospital admission, Bruising, Other complaints      Clinical Outcomes     Negatives:  Major bleeding event, Thromboembolic event,   Anticoagulation-related hospital admission, Anticoagulation-related ED   visit, Anticoagulation-related fatality        INR History:      2025     2:45 PM 2025    12:00 AM 2025     2:28 PM 2025    12:00 AM 2025     9:03 AM 3/3/2025    12:00 AM 3/3/2025     3:06 PM   Anticoagulation Monitoring   INR 2.30  1.90  3.00  2.00   INR Date 2025  2025  2025  3/3/2025   INR Goal 2.5-3.5  2.5-3.5  2.5-3.5  2.5-3.5   Trend Same  Up  Same  Down   Last Week Total 37.5 mg  32.5 mg  40 mg  32.5 mg   Next Week Total 40 mg   42.5 mg  40 mg  45 mg   Sun 5 mg  5 mg  -  5 mg   Mon 5 mg  7.5 mg  7.5 mg  10 mg (3/3)   Tue 7.5 mg (2/4)  5 mg  5 mg  7.5 mg (3/4)   Wed 5 mg  5 mg  5 mg  5 mg   Thu 5 mg  5 mg  5 mg  5 mg   Fri 7.5 mg  10 mg (2/14)  7.5 mg  7.5 mg   Sat 5 mg  5 mg  -  5 mg   Historical INR  1.90      3.00      2.00            This result is from an external source.       Plan:  1. INR is Subtherapeutic today- see above in Anticoagulation Summary.   Will instruct Elliot Sebastian to Increase their warfarin regimen- see above in Anticoagulation Summary.  Missed dose last week and then took 10 mg the next day. Also missed dose yesterday.  He took 10 mg today. INR likely to continue to decrease over the next 24 hours. Boost to 7.5 mg today then resume, rck 1 week   2. Follow up in 1 weeks  3. They have been instructed to call if any changes in medications, doses, concerns, etc. Patient expresses understanding and has no further questions at this time.    Angelica Marroquin [facility]

## 2025-03-10 ENCOUNTER — ANTICOAGULATION VISIT (OUTPATIENT)
Dept: PHARMACY | Facility: HOSPITAL | Age: 65
End: 2025-03-10
Payer: COMMERCIAL

## 2025-03-10 DIAGNOSIS — I48.0 PAROXYSMAL ATRIAL FIBRILLATION: Primary | ICD-10-CM

## 2025-03-10 DIAGNOSIS — Z95.2 HX OF MITRAL VALVE REPLACEMENT WITH MECHANICAL VALVE: ICD-10-CM

## 2025-03-10 LAB — INR PPP: 3

## 2025-03-10 PROCEDURE — G0249 PROVIDE INR TEST MATER/EQUIP: HCPCS

## 2025-03-10 NOTE — PROGRESS NOTES
Anticoagulation Clinic Progress Note    Anticoagulation Summary  As of 3/10/2025      INR goal:  2.5-3.5   TTR:  67.6% (6.3 y)   INR used for dosing:  3.00 (3/10/2025)   Warfarin maintenance plan:  7.5 mg every Fri; 5 mg all other days   Weekly warfarin total:  37.5 mg   No change documented:  Angelica Marroquin RPH   Plan last modified:  Angelica Marroquin RPH (3/3/2025)   Next INR check:  3/17/2025   Priority:  High   Target end date:  Indefinite    Indications    Paroxysmal atrial fibrillation [I48.0]  Hx of mitral valve replacement with mechanical valve [Z95.2] [Z95.2]                 Anticoagulation Episode Summary       INR check location:  Home Draw    Preferred lab:  --    Send INR reminders to:  [facility]ENDEZ  POOL    Comments:   home [place]          Anticoagulation Care Providers       Provider Role Specialty Phone number    Leyla Byrne APRN Referring Cardiology [phone]            Clinic Interview:  No pertinent clinical findings have been reported.    INR History:      2/14/2025     2:28 PM 2/22/2025    12:00 AM 2/24/2025     9:03 AM 3/3/2025    12:00 AM 3/3/2025     3:06 PM 3/10/2025    12:00 AM 3/10/2025     3:45 PM   Anticoagulation Monitoring   INR 1.90  3.00  2.00  3.00   INR Date 2/14/2025  2/22/2025  3/3/2025  3/10/2025   INR Goal 2.5-3.5  2.5-3.5  2.5-3.5  2.5-3.5   Trend Up  Same  Down  Same   Last Week Total 32.5 mg  40 mg  32.5 mg  45 mg   Next Week Total 42.5 mg  40 mg  45 mg  37.5 mg   Sun 5 mg  -  5 mg  5 mg   Mon 7.5 mg  7.5 mg  10 mg (3/3)  5 mg   Tue 5 mg  5 mg  7.5 mg (3/4)  5 mg   Wed 5 mg  5 mg  5 mg  5 mg   Thu 5 mg  5 mg  5 mg  5 mg   Fri 10 mg (2/14)  7.5 mg  7.5 mg  7.5 mg   Sat 5 mg  -  5 mg  5 mg   Historical INR  3.00      2.00      3.00            This result is from an external source.       Plan:  1. INR is therapeutic today- see above in Anticoagulation Summary.    Elliot Sebastian to continue their warfarin regimen- see above in Anticoagulation  Summary.  2. Follow up in 1 week  3. Pt has agreed to only be called if INR out of range. They have been instructed to call if any changes in medications, doses, concerns, etc. Patient expresses understanding and has no further questions at this time.    Angelica Marroquin, [facility]

## 2025-03-18 ENCOUNTER — ANTICOAGULATION VISIT (OUTPATIENT)
Dept: PHARMACY | Facility: HOSPITAL | Age: 65
End: 2025-03-18
Payer: COMMERCIAL

## 2025-03-18 DIAGNOSIS — I48.0 PAROXYSMAL ATRIAL FIBRILLATION: Primary | ICD-10-CM

## 2025-03-18 DIAGNOSIS — Z95.2 HX OF MITRAL VALVE REPLACEMENT WITH MECHANICAL VALVE: ICD-10-CM

## 2025-03-18 LAB — INR PPP: 3.4

## 2025-03-18 NOTE — PROGRESS NOTES
Anticoagulation Clinic Progress Note    Anticoagulation Summary  As of 3/18/2025      INR goal:  2.5-3.5   TTR:  67.7% (6.3 y)   INR used for dosing:  3.40 (3/18/2025)   Warfarin maintenance plan:  7.5 mg every Fri; 5 mg all other days   Weekly warfarin total:  37.5 mg   No change documented:  Angelica Marroquin RPH   Plan last modified:  Angelica Marroquin RPH (3/3/2025)   Next INR check:  3/25/2025   Priority:  High   Target end date:  Indefinite    Indications    Paroxysmal atrial fibrillation [I48.0]  Hx of mitral valve replacement with mechanical valve [Z95.2] [Z95.2]                 Anticoagulation Episode Summary       INR check location:  Home Draw    Preferred lab:  --    Send INR reminders to:  DYANA MENDEZ  POOL    Comments:   home [place]          Anticoagulation Care Providers       Provider Role Specialty Phone number    Leyla Byrne APRN Referring Cardiology [phone]            Clinic Interview:  No pertinent clinical findings have been reported.    INR History:      2/24/2025     9:03 AM 3/3/2025    12:00 AM 3/3/2025     3:06 PM 3/10/2025    12:00 AM 3/10/2025     3:45 PM 3/18/2025    12:00 AM 3/18/2025     3:46 PM   Anticoagulation Monitoring   INR 3.00  2.00  3.00  3.40   INR Date 2/22/2025  3/3/2025  3/10/2025  3/18/2025   INR Goal 2.5-3.5  2.5-3.5  2.5-3.5  2.5-3.5   Trend Same  Down  Same  Same   Last Week Total 40 mg  32.5 mg  45 mg  37.5 mg   Next Week Total 40 mg  45 mg  37.5 mg  37.5 mg   Sun -  5 mg  5 mg  5 mg   Mon 7.5 mg  10 mg (3/3)  5 mg  5 mg   Tue 5 mg  7.5 mg (3/4)  5 mg  5 mg   Wed 5 mg  5 mg  5 mg  5 mg   Thu 5 mg  5 mg  5 mg  5 mg   Fri 7.5 mg  7.5 mg  7.5 mg  7.5 mg   Sat -  5 mg  5 mg  5 mg   Historical INR  2.00      3.00      3.40            This result is from an external source.       Plan:  1. INR is therapeutic today- see above in Anticoagulation Summary.    Elliot Sebastian to continue their warfarin regimen- see above in Anticoagulation Summary.  2.  Follow up in 1 week  3. Pt has agreed to only be called if INR out of range. They have been instructed to call if any changes in medications, doses, concerns, etc. Patient expresses understanding and has no further questions at this time.    Angelica Marroquin [facility]

## 2025-03-31 LAB — INR PPP: 2.7

## 2025-04-01 ENCOUNTER — ANTICOAGULATION VISIT (OUTPATIENT)
Dept: PHARMACY | Facility: HOSPITAL | Age: 65
End: 2025-04-01
Payer: COMMERCIAL

## 2025-04-01 DIAGNOSIS — I48.0 PAROXYSMAL ATRIAL FIBRILLATION: Primary | ICD-10-CM

## 2025-04-01 DIAGNOSIS — Z95.2 HX OF MITRAL VALVE REPLACEMENT WITH MECHANICAL VALVE: ICD-10-CM

## 2025-04-01 NOTE — PROGRESS NOTES
Anticoagulation Clinic Progress Note    Anticoagulation Summary  As of 2025      INR goal:  2.5-3.5   TTR:  67.9% (6.3 y)   INR used for dosin.70 (3/31/2025)   Warfarin maintenance plan:  7.5 mg every Fri; 5 mg all other days   Weekly warfarin total:  37.5 mg   No change documented:  Angelica Marroquin RPH   Plan last modified:  Angelica Marroquin RPH (3/3/2025)   Next INR check:  2025   Priority:  High   Target end date:  Indefinite    Indications    Paroxysmal atrial fibrillation [I48.0]  Hx of mitral valve replacement with mechanical valve [Z95.2] [Z95.2]                 Anticoagulation Episode Summary       INR check location:  Home Draw    Preferred lab:  --    Send INR reminders to:  DYANA MENDEZ  POOL    Comments:   home [place]          Anticoagulation Care Providers       Provider Role Specialty Phone number    Leyla Byrne APRN Referring Cardiology [phone]            Clinic Interview:  No pertinent clinical findings have been reported.    INR History:      3/3/2025     3:06 PM 3/10/2025    12:00 AM 3/10/2025     3:45 PM 3/18/2025    12:00 AM 3/18/2025     3:46 PM 3/31/2025    12:00 AM 2025     9:13 AM   Anticoagulation Monitoring   INR 2.00  3.00  3.40  2.70   INR Date 3/3/2025  3/10/2025  3/18/2025  3/31/2025   INR Goal 2.5-3.5  2.5-3.5  2.5-3.5  2.5-3.5   Trend Down  Same  Same  Same   Last Week Total 32.5 mg  45 mg  37.5 mg  37.5 mg   Next Week Total 45 mg  37.5 mg  37.5 mg  37.5 mg   Sun 5 mg  5 mg  5 mg  5 mg   Mon 10 mg (3/3)  5 mg  5 mg  -   Tue 7.5 mg (3/4)  5 mg  5 mg  5 mg   Wed 5 mg  5 mg  5 mg  5 mg   Thu 5 mg  5 mg  5 mg  5 mg   Fri 7.5 mg  7.5 mg  7.5 mg  7.5 mg   Sat 5 mg  5 mg  5 mg  5 mg   Historical INR  3.00      3.40      2.70            This result is from an external source.       Plan:  1. INR is therapeutic today- see above in Anticoagulation Summary.    Elliot Sebastian to continue their warfarin regimen- see above in Anticoagulation  Summary.  2. Follow up in 1 week  3. Pt has agreed to only be called if INR out of range. They have been instructed to call if any changes in medications, doses, concerns, etc. Patient expresses understanding and has no further questions at this time.    Angelica Marroquin, [facility]

## 2025-04-10 LAB — INR PPP: 2.5

## 2025-04-11 ENCOUNTER — ANTICOAGULATION VISIT (OUTPATIENT)
Dept: PHARMACY | Facility: HOSPITAL | Age: 65
End: 2025-04-11
Payer: COMMERCIAL

## 2025-04-11 DIAGNOSIS — I48.0 PAROXYSMAL ATRIAL FIBRILLATION: Primary | ICD-10-CM

## 2025-04-11 DIAGNOSIS — Z95.2 HX OF MITRAL VALVE REPLACEMENT WITH MECHANICAL VALVE: ICD-10-CM

## 2025-04-11 NOTE — PROGRESS NOTES
Anticoagulation Clinic Progress Note    Anticoagulation Summary  As of 2025      INR goal:  2.5-3.5   TTR:  68.1% (6.4 y)   INR used for dosin.50 (4/10/2025)   Warfarin maintenance plan:  7.5 mg every Fri; 5 mg all other days   Weekly warfarin total:  37.5 mg   No change documented:  Angelica Marroquin RPH   Plan last modified:  Angelica Marroquin RPH (3/3/2025)   Next INR check:  2025   Priority:  High   Target end date:  Indefinite    Indications    Paroxysmal atrial fibrillation [I48.0]  Hx of mitral valve replacement with mechanical valve [Z95.2] [Z95.2]                 Anticoagulation Episode Summary       INR check location:  Home Draw    Preferred lab:  --    Send INR reminders to:  DYANA MENDEZ  POOL    Comments:   home juliet          Anticoagulation Care Providers       Provider Role Specialty Phone number    Leyla Byrne APRN Referring Cardiology [phone]            Clinic Interview:  No pertinent clinical findings have been reported.    INR History:      3/10/2025     3:45 PM 3/18/2025    12:00 AM 3/18/2025     3:46 PM 3/31/2025    12:00 AM 2025     9:13 AM 4/10/2025    12:00 AM 2025     8:36 AM   Anticoagulation Monitoring   INR 3.00  3.40  2.70  2.50   INR Date 3/10/2025  3/18/2025  3/31/2025  4/10/2025   INR Goal 2.5-3.5  2.5-3.5  2.5-3.5  2.5-3.5   Trend Same  Same  Same  Same   Last Week Total 45 mg  37.5 mg  37.5 mg  37.5 mg   Next Week Total 37.5 mg  37.5 mg  37.5 mg  37.5 mg   Sun 5 mg  5 mg  5 mg  5 mg   Mon 5 mg  5 mg  -  5 mg   Tue 5 mg  5 mg  5 mg  5 mg   Wed 5 mg  5 mg  5 mg  5 mg   Thu 5 mg  5 mg  5 mg  -   Fri 7.5 mg  7.5 mg  7.5 mg  7.5 mg   Sat 5 mg  5 mg  5 mg  5 mg   Historical INR  3.40      2.70      2.50            This result is from an external source.       Plan:  1. INR is therapeutic today- see above in Anticoagulation Summary.    Elliot Sebastian to continue their warfarin regimen- see above in Anticoagulation Summary.  2. Follow  up in 1 week  3. Pt has agreed to only be called if INR out of range. They have been instructed to call if any changes in medications, doses, concerns, etc. Patient expresses understanding and has no further questions at this time.    Angelica Marroquin [facility]

## 2025-04-17 ENCOUNTER — ANTICOAGULATION VISIT (OUTPATIENT)
Dept: PHARMACY | Facility: HOSPITAL | Age: 65
End: 2025-04-17
Payer: COMMERCIAL

## 2025-04-17 DIAGNOSIS — Z95.2 HX OF MITRAL VALVE REPLACEMENT WITH MECHANICAL VALVE: ICD-10-CM

## 2025-04-17 DIAGNOSIS — I48.0 PAROXYSMAL ATRIAL FIBRILLATION: Primary | ICD-10-CM

## 2025-04-17 LAB — INR PPP: 2.4

## 2025-04-17 PROCEDURE — G0249 PROVIDE INR TEST MATER/EQUIP: HCPCS

## 2025-04-17 NOTE — PROGRESS NOTES
Anticoagulation Clinic Progress Note    Anticoagulation Summary  As of 2025      INR goal:  2.5-3.5   TTR:  67.9% (6.4 y)   INR used for dosin.40 (2025)   Warfarin maintenance plan:  7.5 mg every Fri; 5 mg all other days   Weekly warfarin total:  37.5 mg   Plan last modified:  Angelica Marroquin RPH (3/3/2025)   Next INR check:  2025   Priority:  High   Target end date:  Indefinite    Indications    Paroxysmal atrial fibrillation [I48.0]  Hx of mitral valve replacement with mechanical valve [Z95.2] [Z95.2]                 Anticoagulation Episode Summary       INR check location:  Home Draw    Preferred lab:  --    Send INR reminders to:  DYANA MENDEZ  POOL    Comments:   home [place]iet          Anticoagulation Care Providers       Provider Role Specialty Phone number    Leyla Byrne APRN Referring Cardiology [phone]            Clinic Interview:  Patient Findings     Positives:  Missed doses, Extra doses    Negatives:  Signs/symptoms of thrombosis, Signs/symptoms of bleeding,   Laboratory test error suspected, Change in health, Change in alcohol use,   Change in activity, Upcoming invasive procedure, Emergency department   visit, Upcoming dental procedure, Change in medications, Change in   diet/appetite, Hospital admission, Bruising, Other complaints    Comments:  Reports he missed his warfarin dose ~4 days ago; the next day   he made up the missed dose.       Clinical Outcomes     Negatives:  Major bleeding event, Thromboembolic event,   Anticoagulation-related hospital admission, Anticoagulation-related ED   visit, Anticoagulation-related fatality    Comments:  Reports he missed his warfarin dose ~4 days ago; the next day   he made up the missed dose.         INR History:      3/18/2025     3:46 PM 3/31/2025    12:00 AM 2025     9:13 AM 4/10/2025    12:00 AM 2025     8:36 AM 2025    12:00 AM 2025     3:45 PM   Anticoagulation Monitoring   INR 3.40  2.70   2.50  2.40   INR Date 3/18/2025  3/31/2025  4/10/2025  4/17/2025   INR Goal 2.5-3.5  2.5-3.5  2.5-3.5  2.5-3.5   Trend Same  Same  Same  Same   Last Week Total 37.5 mg  37.5 mg  37.5 mg  37.5 mg   Next Week Total 37.5 mg  37.5 mg  37.5 mg  40 mg   Sun 5 mg  5 mg  5 mg  5 mg   Mon 5 mg  -  5 mg  5 mg   Tue 5 mg  5 mg  5 mg  5 mg   Wed 5 mg  5 mg  5 mg  5 mg   Thu 5 mg  5 mg  -  7.5 mg (4/17)   Fri 7.5 mg  7.5 mg  7.5 mg  7.5 mg   Sat 5 mg  5 mg  5 mg  5 mg   Historical INR  2.70      2.50      2.40            This result is from an external source.       Plan:  1. INR is Subtherapeutic today- see above in Anticoagulation Summary.   Will instruct Elliot Sebastian to Change their warfarin regimen (7.5 mg today, then resume 7.5 mg Fri, 5 mg all other days) - see above in Anticoagulation Summary.  2. Follow up in 1 week.  3. They have been instructed to call if any changes in medications, doses, concerns, etc. Patient expresses understanding and has no further questions at this time.    Michael Horner, PharmD

## (undated) DEVICE — SINGLE-USE BIOPSY FORCEPS: Brand: RADIAL JAW 4

## (undated) DEVICE — SNAR POLYP SENSATION STDOVL 27 240 BX40

## (undated) DEVICE — ADAPT CLN BIOGUARD AIR/H2O DISP

## (undated) DEVICE — THE TORRENT IRRIGATION SCOPE CONNECTOR IS USED WITH THE TORRENT IRRIGATION TUBING TO PROVIDE IRRIGATION FLUIDS SUCH AS STERILE WATER DURING GASTROINTESTINAL ENDOSCOPIC PROCEDURES WHEN USED IN CONJUNCTION WITH AN IRRIGATION PUMP (OR ELECTROSURGICAL UNIT).: Brand: TORRENT

## (undated) DEVICE — SENSR O2 OXIMAX FNGR A/ 18IN NONSTR

## (undated) DEVICE — TUBING, SUCTION, 1/4" X 10', STRAIGHT: Brand: MEDLINE

## (undated) DEVICE — LN SMPL CO2 SHTRM SD STREAM W/M LUER

## (undated) DEVICE — THE SINGLE USE ETRAP – POLYP TRAP IS USED FOR SUCTION RETRIEVAL OF ENDOSCOPICALLY REMOVED POLYPS.: Brand: ETRAP

## (undated) DEVICE — CANN O2 ETCO2 FITS ALL CONN CO2 SMPL A/ 7IN DISP LF

## (undated) DEVICE — KT ORCA ORCAPOD DISP STRL